# Patient Record
Sex: FEMALE | Race: WHITE | NOT HISPANIC OR LATINO | Employment: FULL TIME | URBAN - METROPOLITAN AREA
[De-identification: names, ages, dates, MRNs, and addresses within clinical notes are randomized per-mention and may not be internally consistent; named-entity substitution may affect disease eponyms.]

---

## 2020-08-13 ENCOUNTER — MEDICAL CORRESPONDENCE (OUTPATIENT)
Dept: HEALTH INFORMATION MANAGEMENT | Facility: CLINIC | Age: 35
End: 2020-08-13

## 2020-08-13 ENCOUNTER — TRANSFERRED RECORDS (OUTPATIENT)
Dept: HEALTH INFORMATION MANAGEMENT | Facility: CLINIC | Age: 35
End: 2020-08-13

## 2020-08-17 ENCOUNTER — TRANSFERRED RECORDS (OUTPATIENT)
Dept: HEALTH INFORMATION MANAGEMENT | Facility: CLINIC | Age: 35
End: 2020-08-17

## 2020-08-18 ENCOUNTER — TRANSCRIBE ORDERS (OUTPATIENT)
Dept: OTHER | Age: 35
End: 2020-08-18

## 2020-08-18 DIAGNOSIS — C53.9 SQUAMOUS CELL CARCINOMA OF CERVIX (H): Primary | ICD-10-CM

## 2020-08-20 NOTE — TELEPHONE ENCOUNTER
ONCOLOGY INTAKE: Records Information      APPT INFORMATION:  Referring provider:  Kenneth Shahid MD  Referring provider s clinic: [facility] ([place])  Reason for visit/diagnosis: Squamous cell carcinoma of cervix  Has patient been notified of appointment date and time?: Yes    RECORDS INFORMATION:  Were the records received with the referral (via [employer])? Yes    Has patient been seen for any external appt for this diagnosis? Yes    If yes, where? [facility])    Has patient had any imaging or procedures outside of [place] for this condition? Yes      If Yes, where? [facility])    ADDITIONAL INFORMATION:  None

## 2020-08-21 NOTE — TELEPHONE ENCOUNTER
Action    Action Taken 8/21/20:    -[employer] [facility] RAD: [id number]    -[employer] Tracking/Loren -Path: [id number]  10:11 AM    8/26/20: Slides from Bhavesh rec'd, taken to 5th floor lab @ [facility]  11:40 AM    -Imaging disc from KRISTY rec'd - taken for upload  12:07 PM       RECORDS STATUS - ALL OTHER DIAGNOSIS      RECORDS RECEIVED FROM: Loren/KRISTY   DATE RECEIVED:    NOTES STATUS DETAILS   OFFICE NOTE from referring provider CE Dr. Kenneth Shahid   OFFICE NOTE from medical oncologist     DISCHARGE SUMMARY from hospital     DISCHARGE REPORT from the ER     OPERATIVE REPORT     MEDICATION LIST     CLINICAL TRIAL TREATMENTS TO DATE     LABS     PATHOLOGY REPORTS KRISTY Sissy Tsang, Report in CE, Slides requested 8/21 12/2/15: [id number]   ANYTHING RELATED TO DIAGNOSIS Epic 8/13/20   GENONOMIC TESTING     TYPE:     IMAGING (NEED IMAGES & REPORT)     CT SCANS     MRI     MAMMO     ULTRASOUND Requested 8/21 8/13/20   PET

## 2020-08-27 PROCEDURE — 00000346 ZZHCL STATISTIC REVIEW OUTSIDE SLIDES TC 88321: Performed by: OBSTETRICS & GYNECOLOGY

## 2020-08-28 LAB — COPATH REPORT: NORMAL

## 2020-08-31 NOTE — PROGRESS NOTES
GYN Oncology New Patient Consult    Referring provider:    Kenneth KELLER [facility]  [address] [place] [id number]   RE: Suni Zuluaga  : 1985  LAURA: 2020      CC: Cervical cancer     HPI: Ms Suni Zuluaga is a 35 year old year old female who presents for consultation regarding cervical cancer  . She is here today with her partner.     Suni notes a history of  Heavy vaginal bleeding for last 3 months.  Having her periods around every 30 days but is lasting about a week now ( earlier 2-3 days) , associated with passage of large blood clots. She is also having lower abdominal cramping and pain.   She also noted pain with intercourse and postcoital bleeding. She is Feeling quite fatigued and tired. She denies any vaginal discharge other than bleeding    Her medical history is notable for a tubal ligation, She has completed childbearing . She is unsure if she had received  HPV vaccine in the past . There is a hisotry of  LEEP for cervical dysplasia [facility] ( Pt couldn't recall) . She  is a daily smoker    Treatment History:   2015:HSIL Pap at the beginning of pregnancy. Repeat pap postpartum was ASCUS. Colposcopy was negative.   20: Pelvic US. Uterine fibroids, normal appearance of uterus and ovaries  2020: Pap smear which confirmed squamous cell carcinoma of the cervix. Referred to GYN  ONC  2020: GYN ONC Consult [facility]. Cervical biopsy, path pending.     Review of Systems:  Systemic:No weight changes.    Skin : No skin changes or new lesions.   Eye : No changes in vision.   Pulmonary: No cough or SOB.   Cardiovascular: No CP or palpitations  Gastrointestinal : No diarrhea, constipation, abdominal pain. Bowel habits normal.   Genitourinary: No dysuria, urgency. + vaginal bleeding per HPI  Psychiatric: No depression or anxiety  Hematologic : No palpable lymph nodes.   Endocrine : No hot flashes. No heat/cold intolerance.     "  Neurological: No headaches, no numbness.     Past Medical History:   Diagnosis Date     Cervical cancer (H)      Past Surgical History:   Procedure Laterality Date     TUBAL LIGATION         OBGYN history and Health Maintenance:  , completed childbearing , had tubal ligation   Last Pap Smear: 2020  Last Mammogram: never  Last Colonoscopy: never    No current outpatient medications on file.   No meds     Allergies   Allergen Reactions     Clindamycin Hives         Social History:  Social History     Tobacco Use     Smoking status: Current Every Day Smoker     Packs/day: 1.00     Types: Cigarettes     Smokeless tobacco: Never Used     Tobacco comment: since age 13   Substance Use Topics     Alcohol use: Yes     Frequency: Monthly or less     Work: manufacturing, manual labor  Preferred language: English  Lives at home with: Partner and 4 kids  Long smoking history since age 13. Smokes a pack daily. Denies any substance abuse     Family History:     No family history of cancer     Physical Exam:     /64   Pulse 92   Temp 99.1  F (37.3  C) (Oral)   Resp 16   Ht 1.59 m (5' 2.6\")   Wt 67.1 kg (148 lb)   LMP 2020   SpO2 99%   BMI 26.55 kg/m      PS 0   General: Alert and oriented, no acute distress  Psych: Mood stable. Linear speech, flat affect.  Cardiovascular: RRR, no murmors  Pulmonary: Lungs clear . Normal respiratory effort  GI: No distention. No masses. No hernia.   Lymph: No enlarge lymph nodes in neck or groin  : Normal external genitalia. Visibly 3+ cm friable cervical mass on speculum exam. On bimanual exam, a 4-5 cm firm mass noted confined to the cervix, seems mostly endocervical in origin. It was difficult to delineate the posterior vagina separately from the posterior lip of the cervix as the posterior fornix is completely obliterated. Bleeding on manipulation. No parametrial involvement or obvious uterosacral involvement. Adenexa  Not palpated. Rectal exam confirms these " findings.     PROCEDURE NOTE:   Cervical Biopsy obtained after informed consent. Biopsy of  Friable posterior cervix taken. Hemostasis achieved with silver nitrate solution.     Patient Name: ISIDRO GRANADOS   MR#: [id number]   Specimen #: [id number]   Collected: 8/27/2020   Received: 8/27/2020   Reported: 8/28/2020 15:39   Ordering Phy(s): MARSHA MANRIQUE   Copy To: [facility]   Pathology Department   [address] [place] [address] [phone] fax     For improved result formatting, select 'View Enhanced Report Format' under    Linked Documents section.     TEST(S):   1 slide, case #[id number]     FINAL DIAGNOSIS:   CASE FROM [facility], [place], [place] ([id number],   OBTAINED 08/13/2020):   A. CERVICAL/ENDOCERVICAL, LIQUID BASED PAP:   - Epithelial cell abnormality: Squamous cell carcinoma.     I have personally reviewed all specimens and/or slides, including the   listed special stains, and used them   with my medical judgement to determine or confirm the final diagnosis.     Electronically signed out by:   Vito Sparks M.D., [facility]    Assessment:    Isidro Granados is a 35 year old woman with a new diagnosis of cervical cancer        Plan:     1.)   Cervical cancer : At least clinical Stage IIA2 (FIGO  2018 given tumor size >4cm and upper vaginal involvement. Notably , per FIGO 2009 she would be stage IIA).  Reviewed etiology and natural history of cervical cancer. Does seem to be at least 5cm on exam. Certainly has risk for metastatic spread. She is not a surgical candidate. Explained rationale for primary chemoradiation therapy. Will need pretreatment imaging. Discussed , she is an excellent candidate.     - Followup biopsies, although cytology already confirms carcinoma  -PET and MRI for  Treatment planning  -Refer to rad onc for chemoRT discussion. Would plan weekly Cisplatin  -Will discuss  pending imaging   Studies  -Encouraged smoking cessation      2.) Genetic risk factors were assessed: N/A    A total of  58 minutes was spent with the patient, 54  minutes of which were spent in counseling the patient and/or treatment planning.    Suzy Brambila MD    Department of Ob/Gyn and Women's Health  Division of Gynecologic Oncology  [facility]  [phone]        ADDENDUM:   Path reviewed,  HSIL only, but given obvious tumor,  Will plan to repeat biopsies so that tissue is available for future use     PET with positive pelvic nodes  and large bulky tumor. No  PA  Nodes. Will likely qualify for .  I discussed this with her at length today. Will bring her in tomorrow prior to MRI for screening/consent and repeat biopsy.  Pt to see my partner, Dr Rice as I am out of town.     Suzy Brambila MD  Gynecologic Oncology  Pager [phone]    9/8/20

## 2020-09-01 ENCOUNTER — ONCOLOGY VISIT (OUTPATIENT)
Dept: ONCOLOGY | Facility: CLINIC | Age: 35
End: 2020-09-01
Attending: FAMILY MEDICINE
Payer: MEDICAID

## 2020-09-01 ENCOUNTER — PRE VISIT (OUTPATIENT)
Dept: ONCOLOGY | Facility: CLINIC | Age: 35
End: 2020-09-01

## 2020-09-01 VITALS
DIASTOLIC BLOOD PRESSURE: 64 MMHG | HEART RATE: 92 BPM | RESPIRATION RATE: 16 BRPM | WEIGHT: 148 LBS | BODY MASS INDEX: 26.22 KG/M2 | OXYGEN SATURATION: 99 % | TEMPERATURE: 99.1 F | SYSTOLIC BLOOD PRESSURE: 114 MMHG | HEIGHT: 63 IN

## 2020-09-01 DIAGNOSIS — C53.9 SQUAMOUS CELL CARCINOMA OF CERVIX (H): ICD-10-CM

## 2020-09-01 PROCEDURE — 57500 BIOPSY OF CERVIX: CPT | Performed by: OBSTETRICS & GYNECOLOGY

## 2020-09-01 PROCEDURE — G0463 HOSPITAL OUTPT CLINIC VISIT: HCPCS | Mod: ZF

## 2020-09-01 PROCEDURE — 88305 TISSUE EXAM BY PATHOLOGIST: CPT | Performed by: OBSTETRICS & GYNECOLOGY

## 2020-09-01 PROCEDURE — G0463 HOSPITAL OUTPT CLINIC VISIT: HCPCS | Mod: 25,ZF

## 2020-09-01 PROCEDURE — 57500 BIOPSY OF CERVIX: CPT

## 2020-09-01 PROCEDURE — 99204 OFFICE O/P NEW MOD 45 MIN: CPT | Mod: 25 | Performed by: OBSTETRICS & GYNECOLOGY

## 2020-09-01 SDOH — HEALTH STABILITY: MENTAL HEALTH: HOW OFTEN DO YOU HAVE A DRINK CONTAINING ALCOHOL?: MONTHLY OR LESS

## 2020-09-01 ASSESSMENT — PAIN SCALES - GENERAL: PAINLEVEL: WORST PAIN (10)

## 2020-09-01 ASSESSMENT — MIFFLIN-ST. JEOR: SCORE: 1319.06

## 2020-09-01 NOTE — NURSING NOTE
Procedure discussed. Consent signed. Time out completed. Both forms placed into scanning.    Prior to the start of the procedure and with procedural staff participation, I verbally confirmed the patient s identity using two indicators, relevant allergies, that the procedure was appropriate and matched the consent or emergent situation, and that the correct equipment/implants were available. Immediately prior to starting the procedure I conducted the Time Out with the procedural staff and re-confirmed the patient s name, procedure, and site/side. (The Joint Commission universal protocol was followed.)  Yes    Sedation (Moderate or Deep): None    Post procedure pain: 0    Karin Evans RN

## 2020-09-01 NOTE — NURSING NOTE
"Oncology Rooming Note    September 1, 2020 1:48 PM   Suni Zuluaga is a 35 year old female who presents for:    Chief Complaint   Patient presents with     Oncology Clinic Visit     New; SCC of Cervix     Initial Vitals: /64   Pulse 92   Temp 99.1  F (37.3  C) (Oral)   Resp 16   Ht 1.59 m (5' 2.6\")   Wt 67.1 kg (148 lb)   LMP 08/25/2020   SpO2 99%   BMI 26.55 kg/m   Estimated body mass index is 26.55 kg/m  as calculated from the following:    Height as of this encounter: 1.59 m (5' 2.6\").    Weight as of this encounter: 67.1 kg (148 lb). Body surface area is 1.72 meters squared.  Worst Pain (10) Comment: Data Unavailable   Patient's last menstrual period was 08/25/2020.  Allergies reviewed: Yes  Medications reviewed: Yes    Medications: Medication refills not needed today.  Pharmacy name entered into EPIC: [facility] - [place], [place] - [address]    Clinical concerns: New pt here to discuss recent diagnoses of SCC of Cervix Dr Brambila was notified.      Jennifer Javed CMA              "

## 2020-09-02 ENCOUNTER — PATIENT OUTREACH (OUTPATIENT)
Dept: ONCOLOGY | Facility: CLINIC | Age: 35
End: 2020-09-02

## 2020-09-02 NOTE — PROGRESS NOTES
RN followed up with patient just to update her that we are still working on a timely PET and MRI. As of now we can't get this scheduled together until 9/23.     Patient understands that the team is working on a better option and that this is just taking more time.    Patient appreciative of the RN update.     Karin Evans RN

## 2020-09-03 LAB — COPATH REPORT: NORMAL

## 2020-09-04 ENCOUNTER — HOSPITAL ENCOUNTER (OUTPATIENT)
Dept: PET IMAGING | Facility: CLINIC | Age: 35
Discharge: HOME | End: 2020-09-04
Attending: OBSTETRICS & GYNECOLOGY | Admitting: OBSTETRICS & GYNECOLOGY
Payer: MEDICAID

## 2020-09-04 DIAGNOSIS — C53.9 SQUAMOUS CELL CARCINOMA OF CERVIX (H): ICD-10-CM

## 2020-09-04 PROCEDURE — 25000128 H RX IP 250 OP 636: Performed by: OBSTETRICS & GYNECOLOGY

## 2020-09-04 PROCEDURE — 78816 PET IMAGE W/CT FULL BODY: CPT | Mod: PI

## 2020-09-04 PROCEDURE — A9552 F18 FDG: HCPCS | Performed by: OBSTETRICS & GYNECOLOGY

## 2020-09-04 PROCEDURE — 34300033 ZZH RX 343: Performed by: OBSTETRICS & GYNECOLOGY

## 2020-09-04 RX ORDER — IOPAMIDOL 755 MG/ML
50-135 INJECTION, SOLUTION INTRAVASCULAR ONCE
Status: COMPLETED | OUTPATIENT
Start: 2020-09-04 | End: 2020-09-04

## 2020-09-04 RX ORDER — FUROSEMIDE 10 MG/ML
40 INJECTION INTRAMUSCULAR; INTRAVENOUS ONCE
Status: COMPLETED | OUTPATIENT
Start: 2020-09-04 | End: 2020-09-04

## 2020-09-04 RX ADMIN — FLUDEOXYGLUCOSE F-18 10.13 MCI.: 500 INJECTION, SOLUTION INTRAVENOUS at 13:40

## 2020-09-04 RX ADMIN — FUROSEMIDE 40 MG: 10 INJECTION, SOLUTION INTRAVENOUS at 14:51

## 2020-09-04 RX ADMIN — IOPAMIDOL 91 ML: 755 INJECTION, SOLUTION INTRAVENOUS at 14:50

## 2020-09-08 ENCOUNTER — TELEPHONE (OUTPATIENT)
Dept: ONCOLOGY | Facility: CLINIC | Age: 35
End: 2020-09-08

## 2020-09-08 NOTE — TELEPHONE ENCOUNTER
Reviewed PET. +pelvic nodes. Large cervical tumor.   Plan primary chemoRT  Will need likely repeat biopsies given they showed non-invasive disease yet clinically has very very high concern for invasive disease (also Pap showed SCC)  Does seem to be eligible for , will discuss w rad onc colleagues at our tumor board  Discussed all of this with Suni Brambila MD  Gynecologic Oncology  Pager [phone]

## 2020-09-09 ENCOUNTER — RESEARCH ENCOUNTER (OUTPATIENT)
Dept: ONCOLOGY | Facility: CLINIC | Age: 35
End: 2020-09-09

## 2020-09-09 ENCOUNTER — ONCOLOGY VISIT (OUTPATIENT)
Dept: ONCOLOGY | Facility: CLINIC | Age: 35
End: 2020-09-09
Attending: OBSTETRICS & GYNECOLOGY
Payer: MEDICAID

## 2020-09-09 ENCOUNTER — HOSPITAL ENCOUNTER (OUTPATIENT)
Dept: MRI IMAGING | Facility: CLINIC | Age: 35
End: 2020-09-09
Attending: OBSTETRICS & GYNECOLOGY
Payer: MEDICAID

## 2020-09-09 ENCOUNTER — APPOINTMENT (OUTPATIENT)
Dept: LAB | Facility: CLINIC | Age: 35
End: 2020-09-09
Attending: OBSTETRICS & GYNECOLOGY
Payer: MEDICAID

## 2020-09-09 VITALS
SYSTOLIC BLOOD PRESSURE: 118 MMHG | RESPIRATION RATE: 16 BRPM | WEIGHT: 146 LBS | TEMPERATURE: 98.3 F | BODY MASS INDEX: 25.87 KG/M2 | DIASTOLIC BLOOD PRESSURE: 81 MMHG | HEART RATE: 89 BPM | OXYGEN SATURATION: 98 % | HEIGHT: 63 IN

## 2020-09-09 DIAGNOSIS — R59.0 AXILLARY LYMPHADENOPATHY: ICD-10-CM

## 2020-09-09 DIAGNOSIS — C53.9 SQUAMOUS CELL CARCINOMA OF CERVIX (H): ICD-10-CM

## 2020-09-09 DIAGNOSIS — C53.9 SQUAMOUS CELL CARCINOMA OF CERVIX (H): Primary | ICD-10-CM

## 2020-09-09 LAB
ALBUMIN SERPL-MCNC: 3.7 G/DL (ref 3.4–5)
ALP SERPL-CCNC: 67 U/L (ref 40–150)
ALT SERPL W P-5'-P-CCNC: 30 U/L (ref 0–50)
ANION GAP SERPL CALCULATED.3IONS-SCNC: 5 MMOL/L (ref 3–14)
APTT PPP: 28 SEC (ref 22–37)
AST SERPL W P-5'-P-CCNC: 16 U/L (ref 0–45)
BASOPHILS # BLD AUTO: 0 10E9/L (ref 0–0.2)
BASOPHILS NFR BLD AUTO: 0.3 %
BILIRUB SERPL-MCNC: 0.2 MG/DL (ref 0.2–1.3)
BUN SERPL-MCNC: 10 MG/DL (ref 7–30)
CALCIUM SERPL-MCNC: 8.9 MG/DL (ref 8.5–10.1)
CHLORIDE SERPL-SCNC: 107 MMOL/L (ref 94–109)
CO2 SERPL-SCNC: 26 MMOL/L (ref 20–32)
CREAT SERPL-MCNC: 0.54 MG/DL (ref 0.52–1.04)
DIFFERENTIAL METHOD BLD: NORMAL
EOSINOPHIL # BLD AUTO: 0.1 10E9/L (ref 0–0.7)
EOSINOPHIL NFR BLD AUTO: 1.2 %
ERYTHROCYTE [DISTWIDTH] IN BLOOD BY AUTOMATED COUNT: 14.1 % (ref 10–15)
GFR SERPL CREATININE-BSD FRML MDRD: >90 ML/MIN/{1.73_M2}
GLUCOSE SERPL-MCNC: 85 MG/DL (ref 70–99)
HCT VFR BLD AUTO: 36.9 % (ref 35–47)
HGB BLD-MCNC: 12 G/DL (ref 11.7–15.7)
IMM GRANULOCYTES # BLD: 0.1 10E9/L (ref 0–0.4)
IMM GRANULOCYTES NFR BLD: 0.5 %
INR PPP: 1.1 (ref 0.86–1.14)
LYMPHOCYTES # BLD AUTO: 1.6 10E9/L (ref 0.8–5.3)
LYMPHOCYTES NFR BLD AUTO: 15.7 %
MAGNESIUM SERPL-MCNC: 1.9 MG/DL (ref 1.6–2.3)
MCH RBC QN AUTO: 29.6 PG (ref 26.5–33)
MCHC RBC AUTO-ENTMCNC: 32.5 G/DL (ref 31.5–36.5)
MCV RBC AUTO: 91 FL (ref 78–100)
MONOCYTES # BLD AUTO: 0.8 10E9/L (ref 0–1.3)
MONOCYTES NFR BLD AUTO: 7.6 %
NEUTROPHILS # BLD AUTO: 7.4 10E9/L (ref 1.6–8.3)
NEUTROPHILS NFR BLD AUTO: 74.7 %
NRBC # BLD AUTO: 0 10*3/UL
NRBC BLD AUTO-RTO: 0 /100
PLATELET # BLD AUTO: 241 10E9/L (ref 150–450)
POTASSIUM SERPL-SCNC: 3.7 MMOL/L (ref 3.4–5.3)
PROT SERPL-MCNC: 7.6 G/DL (ref 6.8–8.8)
RBC # BLD AUTO: 4.05 10E12/L (ref 3.8–5.2)
SODIUM SERPL-SCNC: 138 MMOL/L (ref 133–144)
WBC # BLD AUTO: 9.9 10E9/L (ref 4–11)

## 2020-09-09 PROCEDURE — 87389 HIV-1 AG W/HIV-1&-2 AB AG IA: CPT | Performed by: OBSTETRICS & GYNECOLOGY

## 2020-09-09 PROCEDURE — 93010 ELECTROCARDIOGRAM REPORT: CPT | Mod: ZP | Performed by: INTERNAL MEDICINE

## 2020-09-09 PROCEDURE — 57500 BIOPSY OF CERVIX: CPT

## 2020-09-09 PROCEDURE — 83735 ASSAY OF MAGNESIUM: CPT | Performed by: OBSTETRICS & GYNECOLOGY

## 2020-09-09 PROCEDURE — 80053 COMPREHEN METABOLIC PANEL: CPT | Performed by: OBSTETRICS & GYNECOLOGY

## 2020-09-09 PROCEDURE — 93005 ELECTROCARDIOGRAM TRACING: CPT | Mod: ZF

## 2020-09-09 PROCEDURE — 85025 COMPLETE CBC W/AUTO DIFF WBC: CPT | Performed by: OBSTETRICS & GYNECOLOGY

## 2020-09-09 PROCEDURE — 88305 TISSUE EXAM BY PATHOLOGIST: CPT | Performed by: OBSTETRICS & GYNECOLOGY

## 2020-09-09 PROCEDURE — 99213 OFFICE O/P EST LOW 20 MIN: CPT | Mod: 25 | Performed by: OBSTETRICS & GYNECOLOGY

## 2020-09-09 PROCEDURE — A9585 GADOBUTROL INJECTION: HCPCS | Performed by: OBSTETRICS & GYNECOLOGY

## 2020-09-09 PROCEDURE — 85730 THROMBOPLASTIN TIME PARTIAL: CPT | Performed by: OBSTETRICS & GYNECOLOGY

## 2020-09-09 PROCEDURE — 25500064 ZZH RX 255 OP 636: Performed by: OBSTETRICS & GYNECOLOGY

## 2020-09-09 PROCEDURE — 36415 COLL VENOUS BLD VENIPUNCTURE: CPT

## 2020-09-09 PROCEDURE — 57500 BIOPSY OF CERVIX: CPT | Performed by: OBSTETRICS & GYNECOLOGY

## 2020-09-09 PROCEDURE — 85610 PROTHROMBIN TIME: CPT | Performed by: OBSTETRICS & GYNECOLOGY

## 2020-09-09 PROCEDURE — 72196 MRI PELVIS W/DYE: CPT

## 2020-09-09 RX ORDER — GADOBUTROL 604.72 MG/ML
7.5 INJECTION INTRAVENOUS ONCE
Status: COMPLETED | OUTPATIENT
Start: 2020-09-09 | End: 2020-09-09

## 2020-09-09 RX ADMIN — GADOBUTROL 6.6 ML: 604.72 INJECTION INTRAVENOUS at 16:30

## 2020-09-09 ASSESSMENT — MIFFLIN-ST. JEOR: SCORE: 1310.03

## 2020-09-09 ASSESSMENT — PAIN SCALES - GENERAL: PAINLEVEL: NO PAIN (0)

## 2020-09-09 NOTE — PATIENT INSTRUCTIONS
Cervical biopsy done today, will be notified of test results via my chart/telephone  Enrollment in study treatment plan

## 2020-09-09 NOTE — NURSING NOTE
[id number]: Informed Consent Note   The consent form, including purpose, risks and benefits, was reviewed with Suni Zuluaga, and all questions were answered before she signed the consent form. The patient understands that the study involves an active treatment phase as well as a post-treatment follow up phase.     Present during the discussion partner, Nate and daughter Luis Enrique were in the room with the patient. A copy of the signed form was provided to the patient. No procedures specific to this study were performed prior to the patient signing the consent form.    Optional section - No. Patient did not consent to optional collection.    Consent Version Date: [place] version date Mar.18.2020  Consent obtained by: Suyeon Velo, RN    Date: 9/9/2020  HIPAA authorization signed?: Yes  HIPAA authorization version date: Form date 07.18.16, Revision date: 11.04.19    OVPR & HRPP released a Research Participant Information sheet on 7/16/2020 requiring researchers to provide patients with this information sheet at the time of the consent visit.     Research participant Suni KRISHNA Pierremichele was provided the information on the Research Participant Information Sheet by Suyeon velo, RN on September 9, 2020. This document contains information regarding the risks of participating in a research study during the COVID-19 pandemic. Receipt of the information sheet was confirmed by study personnel prior to the visit.    *Note that the OVPR and HRPP only require this information sheet be provided to the participant once as the information it contains is the same regardless of what study(s) the patient is participating in.     The Patient Information Sheet was provided to the patient.    Suyeon Velo, RN    [id number]: Study Visit Note   Subject name: Suni Zuluaga     Visit: Screening visit    All screening procedures were done after the consenting visit.  Patient is fasting today for the fasting screening lab. Optional HIV testing requested  by treating MD.  VS including height and weight and EKG completed for screening visit.  Patient completed tubal ligation 4 -5 years ago and does not require two forms of birth control. Patient is not currently breast feeding.   Patient today reports 5 pain scale out of 10.  Repeat biopsy completed by Dr. Washington today.  AE and con-med collected. Patient reported fiber pill and stool softer OTC taking but does not know the names or dosage. Will Follow-up on the phone.  PET/CT completed per SOC 9/4/2020.    Simulation scheduled for Monday, 9/14/2020.  Potential Day 1 on 9/21/20 or 9/28/20.    I have personally interviewed Suni Zuluaga and reviewed her medical record for adverse events and concomitant medications and these have been recorded on the corresponding logs in Suni Zuluaga's research file. Suni Zuluaga was given the opportunity to ask any trial related questions. Please see provider progress note for physical exam and other clinical information.     Suyeon Velo, RN  Clinical Trials Office   Pager: [phone]

## 2020-09-09 NOTE — NURSING NOTE
Enroll in study treatment plan  Cervical biopsy done today, orders entered, specimen sent to pathology

## 2020-09-09 NOTE — LETTER
2020         RE: Suni Zuluaga  [address]   [place] [address]        Dear Colleague,    Thank you for referring your patient, Suni Zuluaga, to the [facility]. Please see a copy of my visit note below.    GYN Oncology Established Patient Visit - In Person    RE: Suni Zuluaga  : 1985  Encounter date: Sep 9, 2020       CC: Cervical cancer     HPI: Ms Suni Zuluaga is a 35 year old  female who presents to discuss treatment options and possible consent for sstudy participation for     Her history is briefly summarized below. She was seen by Dr. Brambila earlier this month. She had a history of heavy bleeding for a few month with menses lasting longer and passage of large clots. She has lower pelvic pain and cramping. She also noted dyspareunia and post-coital bleeding. She had a Pap smear which returned with SCC of the cervix. She had a biopsy, which unfortunately only showed RADHA III but without invasion. She is a daily smoker.       Treatment History:   2015:HSIL Pap at the beginning of pregnancy. Repeat pap postpartum was ASCUS. Colposcopy was negative.   20: Pelvic US. Uterine fibroids, normal appearance of uterus and ovaries  2020: Pap smear which confirmed squamous cell carcinoma of the cervix. Referred to GYN  ONC  2020: GYN ONC Consult [facility]. Cervical biopsy, RADHA III, unable to identify invasion    Review of Systems:  Systemic:No weight changes.    Skin : No skin changes or new lesions.   Eye : No changes in vision.   Pulmonary: No cough or SOB.   Cardiovascular: No CP or palpitations  Gastrointestinal : No diarrhea, constipation, + lower abdominal pain. Bowel habits normal.   Genitourinary: No dysuria, urgency. + vaginal bleeding per HPI  Psychiatric: No depression or anxiety  Hematologic : no known issues  Endocrine : No hot flashes. No heat/cold intolerance.      Neurological: No headaches, no numbness.     Past Medical History:  "  Diagnosis Date     Cervical cancer (H)      Past Surgical History:   Procedure Laterality Date     TUBAL LIGATION         OBGYN history and Health Maintenance:  , completed childbearing , had tubal ligation   Last Pap Smear: 2020 showing SCC  Last Mammogram: never  Last Colonoscopy: never    No current outpatient medications on file.   No meds     Allergies   Allergen Reactions     Clindamycin Hives     Social History:  Social History     Tobacco Use     Smoking status: Current Every Day Smoker     Packs/day: 1.00     Types: Cigarettes     Smokeless tobacco: Never Used     Tobacco comment: since age 13   Substance Use Topics     Alcohol use: Yes     Frequency: Monthly or less     Work: manufacturing, manual labor  Preferred language: English  Lives at home with: Partner and 4 kids  Long smoking history since age 13. Smokes a pack daily. Denies any substance abuse     Family History:   No family history of cancer     Physical Exam:     /81   Pulse 89   Temp 98.3  F (36.8  C)   Resp 16   Ht 1.59 m (5' 2.6\")   Wt 66.2 kg (146 lb)   LMP 2020   SpO2 98%   BMI 26.19 kg/m    General: Alert and oriented, no acute distress  HEENT: pupils round, equal  Neuro: CN II-X grossly intact  MSK: no edema of lower extremities, no swelling of joints, normal gait  Psych: Mood stable. Linear speech, flat affect.  Cardiovascular: RRR, no murmors  Pulmonary: Lungs clear . Normal respiratory effort  GI: No distention. No masses. No hernia.   Lymph: No enlarge lymph nodes in neck or groin  : Normal external genitalia. On the cervix there is a friable cervical mass which measures 4.5cm on speculum exam this is centered on the posterior lip of the cervix.    ECOG performance status: 0 - no restrictions    PROCEDURE NOTE:   Cervical Biopsy obtained after informed consent. Biopsy of  Friable posterior cervix taken at 5 o'clock. Hemostasis achieved with pressure, silver nitrate, and Monsel's.    PET-CT " 9/4/2020:  IMPRESSION: In this patient with recently discovered squamous cell  carcinoma of the cervix:     1. Hypermetabolic cervical mass corresponding with the squamous cell  carcinoma. Mass extends into the caudal third of the vagina, and  probable invasion of the parametrium. There is loss of the  vesicovaginal and rectovaginal fat planes at the level of the mass.  Pelvic MRI is scheduled for the patient which can further characterize  full extent of the mass.     2. Hypermetabolic bilateral pelvic adenopathy consistent with sarah  metastases.     3. Borderline FDG avid left axillary lymph node, nonspecific, favor  reactive. Location and low levels of uptake disfavor metastasis.     4. Otherwise no evidence of distant metastatic disease.     I have personally reviewed the examination and initial interpretation  and I agree with the findings.         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 She presents to discuss consent for , which she has elected to do.  Repeat biopsy was obtained due to desire for histologic diagnosis.    Plan:     1.)   Cervical cancer : Stage IIIC1r SCC of the cervix (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 She has signed consent for participation in . She completed PET-CT and has MRI today. PET shows iliac sarah involvement hence staging.    2.) Axillary lymph node with borderline PET avidity, likely reactive; however, will plan for ultrasound and possible biopsy, likely 9/15 in conjunction with Rad Onc visit.     Dustin Washington MD    Gynecologic Oncology

## 2020-09-09 NOTE — NURSING NOTE
EKG was performed on patient. Patient tolerated EKG well without any incident. EKG was transmitted to [facility].  Belia Mitchell MA on 9/9/2020 at 3:23 PM

## 2020-09-09 NOTE — PROGRESS NOTES
GYN Oncology Established Patient Visit - In Person    RE: Suni Zuluaga  : 1985  Encounter date: Sep 9, 2020       CC: Cervical cancer     HPI: Ms Suni Zuluaga is a 35 year old  female who presents to discuss treatment options and possible consent for sstudy participation for     Her history is briefly summarized below. She was seen by Dr. Brambila earlier this month. She had a history of heavy bleeding for a few month with menses lasting longer and passage of large clots. She has lower pelvic pain and cramping. She also noted dyspareunia and post-coital bleeding. She had a Pap smear which returned with SCC of the cervix. She had a biopsy, which unfortunately only showed RADHA III but without invasion. She is a daily smoker.       Treatment History:   :HSIL Pap at the beginning of pregnancy. Repeat pap postpartum was ASCUS. Colposcopy was negative.   20: Pelvic US. Uterine fibroids, normal appearance of uterus and ovaries  2020: Pap smear which confirmed squamous cell carcinoma of the cervix. Referred to GYN  ONC  2020: GYN ONC Consult [facility]. Cervical biopsy, RADHA III, unable to identify invasion    Review of Systems:  Systemic:No weight changes.    Skin : No skin changes or new lesions.   Eye : No changes in vision.   Pulmonary: No cough or SOB.   Cardiovascular: No CP or palpitations  Gastrointestinal : No diarrhea, constipation, + lower abdominal pain. Bowel habits normal.   Genitourinary: No dysuria, urgency. + vaginal bleeding per HPI  Psychiatric: No depression or anxiety  Hematologic : no known issues  Endocrine : No hot flashes. No heat/cold intolerance.      Neurological: No headaches, no numbness.     Past Medical History:   Diagnosis Date     Cervical cancer (H)      Past Surgical History:   Procedure Laterality Date     TUBAL LIGATION         OBGYN history and Health Maintenance:  , completed childbearing , had tubal ligation   Last Pap Smear: 2020 showing  "SCC  Last Mammogram: never  Last Colonoscopy: never    No current outpatient medications on file.   No meds     Allergies   Allergen Reactions     Clindamycin Hives     Social History:  Social History     Tobacco Use     Smoking status: Current Every Day Smoker     Packs/day: 1.00     Types: Cigarettes     Smokeless tobacco: Never Used     Tobacco comment: since age 13   Substance Use Topics     Alcohol use: Yes     Frequency: Monthly or less     Work: manufacturing, manual labor  Preferred language: English  Lives at home with: Partner and 4 kids  Long smoking history since age 13. Smokes a pack daily. Denies any substance abuse     Family History:   No family history of cancer     Physical Exam:     /81   Pulse 89   Temp 98.3  F (36.8  C)   Resp 16   Ht 1.59 m (5' 2.6\")   Wt 66.2 kg (146 lb)   LMP 08/25/2020   SpO2 98%   BMI 26.19 kg/m    General: Alert and oriented, no acute distress  HEENT: pupils round, equal  Neuro: CN II-X grossly intact  MSK: no edema of lower extremities, no swelling of joints, normal gait  Psych: Mood stable. Linear speech, flat affect.  Cardiovascular: RRR, no murmors  Pulmonary: Lungs clear . Normal respiratory effort  GI: No distention. No masses. No hernia.   Lymph: No enlarge lymph nodes in neck or groin  : Normal external genitalia. On the cervix there is a friable cervical mass which measures 4.5cm on speculum exam this is centered on the posterior lip of the cervix.    ECOG performance status: 0 - no restrictions    PROCEDURE NOTE:   Cervical Biopsy obtained after informed consent. Biopsy of  Friable posterior cervix taken at 5 o'clock. Hemostasis achieved with pressure, silver nitrate, and Monsel's.    PET-CT 9/4/2020:  IMPRESSION: In this patient with recently discovered squamous cell  carcinoma of the cervix:     1. Hypermetabolic cervical mass corresponding with the squamous cell  carcinoma. Mass extends into the caudal third of the vagina, and  probable " invasion of the parametrium. There is loss of the  vesicovaginal and rectovaginal fat planes at the level of the mass.  Pelvic MRI is scheduled for the patient which can further characterize  full extent of the mass.     2. Hypermetabolic bilateral pelvic adenopathy consistent with sarah  metastases.     3. Borderline FDG avid left axillary lymph node, nonspecific, favor  reactive. Location and low levels of uptake disfavor metastasis.     4. Otherwise no evidence of distant metastatic disease.     I have personally reviewed the examination and initial interpretation  and I agree with the findings.         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 She presents to discuss consent for , which she has elected to do.  Repeat biopsy was obtained due to desire for histologic diagnosis.    Plan:     1.)   Cervical cancer : Stage IIIC1r SCC of the cervix (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 She has signed consent for participation in . She completed PET-CT and has MRI today. PET shows iliac sarah involvement hence staging.    2.) Axillary lymph node with borderline PET avidity, likely reactive; however, will plan for ultrasound and possible biopsy, likely 9/15 in conjunction with Rad Onc visit.     Dustin Washington MD    Gynecologic Oncology

## 2020-09-10 ENCOUNTER — NURSE TRIAGE (OUTPATIENT)
Dept: NURSING | Facility: CLINIC | Age: 35
End: 2020-09-10

## 2020-09-10 DIAGNOSIS — Z11.59 ENCOUNTER FOR SCREENING FOR OTHER VIRAL DISEASES: Primary | ICD-10-CM

## 2020-09-10 DIAGNOSIS — R59.9 ENLARGED LYMPH NODES: Primary | ICD-10-CM

## 2020-09-10 LAB — HIV 1+2 AB+HIV1 P24 AG SERPL QL IA: NONREACTIVE

## 2020-09-10 NOTE — TELEPHONE ENCOUNTER
Patient calling information regrding upcoming covid testing.  Gave per [facility].  No triage was needed.  Rhonda Jaime RN [facility]    COVID 19 Nurse Triage Plan/Patient Instructions    Please be aware that novel coronavirus (COVID-19) may be circulating in the community. If you develop symptoms such as fever, cough, or SOB or if you have concerns about the presence of another infection including coronavirus (COVID-19), please contact your health care provider or visit [email or web address].     Disposition/Instructions    Additional COVID19 information to add for patients.   How can I protect others?  If you have symptoms (fever, cough, body aches or trouble breathing): Stay home and away from others (self-isolate) until:    At least 10 days have passed since your symptoms started. And     You ve had no fever--and no medicine that reduces fever--for 1 full day (24 hours). And      Your other symptoms have resolved (gotten better).     If you don t have symptoms, but a test showed that you have COVID-19 (you tested positive):    Stay home and away from others (self-isolate) until at least 10 days have passed since the date of your first positive COVID-19 test.    During this time:    Stay in your own room, even for meals. Use your own bathroom if you can.     Stay away from others in your home. No hugging, kissing or shaking hands. No visitors.    Don t go to work, school or anywhere else.     Clean  high touch  surfaces often (doorknobs, counters, handles, etc.). Use a household cleaning spray or wipes. You ll find a full list on the EPA website:  www.epa.gov/pesticide-registration/list-n-disinfectants-use-against-sars-cov-2.    Cover your mouth and nose with a mask, tissue or washcloth to avoid spreading germs.    Wash your hands and face often. Use soap and water.    Caregivers in these groups are at risk for severe illness due to COVID-19:  o People 65 years and older  o People who live in a nursing home  or long-term care facility  o People with chronic disease (lung, heart, cancer, diabetes, kidney, liver, immunologic)  o People who have a weakened immune system, including those who:  - Are in cancer treatment  - Take medicine that weakens the immune system, such as corticosteroids  - Had a bone marrow or organ transplant  - Have an immune deficiency  - Have poorly controlled HIV or AIDS  - Are obese (body mass index of 40 or higher)  - Smoke regularly    Caregivers should wear gloves while washing dishes, handling laundry and cleaning bedrooms and bathrooms.    Use caution when washing and drying laundry: Don t shake dirty laundry, and use the warmest water setting that you can.    For more tips, go to www.cdc.gov/coronavirus/2019-ncov/downloads/10Things.pdf.    How can I take care of myself?  1. Get lots of rest. Drink extra fluids (unless a doctor has told you not to).     2. Take Tylenol (acetaminophen) for fever or pain. If you have liver or kidney problems, ask your family doctor if it s okay to take Tylenol.     Adults can take either:     650 mg (two 325 mg pills) every 4 to 6 hours, or     1,000 mg (two 500 mg pills) every 8 hours as needed.     Note: Don t take more than 3,000 mg in one day.   Acetaminophen is found in many medicines (both prescribed and over-the-counter medicines). Read all labels to be sure you don t take too much.     For children, check the Tylenol bottle for the right dose. The dose is based on the child s age or weight.    3. If you have other health problems (like cancer, heart failure, an organ transplant or severe kidney disease): Call your specialty clinic if you don t feel better in the next 2 days.    4. Know when to call 911: Emergency warning signs include:    Trouble breathing or shortness of breath    Pain or pressure in the chest that doesn t go away    Feeling confused like you haven t felt before, or not being able to wake up    Bluish-colored lips or face    What are  the symptoms of COVID-19?     The most common symptoms are cough, fever and trouble breathing.     Less common symptoms include body aches, chills, diarrhea (loose, watery poops), fatigue (feeling very tired), headache, runny nose, sore throat and loss of smell.    COVID-19 can cause severe coughing (bronchitis) and lung infection (pneumonia).    How does it spread?     The virus may spread when a person coughs or sneezes into the air. The virus can travel about 6 feet this way, and it can live on surfaces.      Common  (household disinfectants) will kill the virus.    Who is at risk?  Anyone can catch COVID-19 if they re around someone who has the virus.    How can others protect themselves?     Stay away from people who have COVID-19 (or symptoms of COVID-19).    Wash hands often with soap and water. Or, use hand  with at least 60% alcohol.    Avoid touching the eyes, nose or mouth.     Wear a face mask when you go out in public, when sick or when caring for a sick person.    Where can I get more information?    [facility]: About COVID-19: [email or web address]    CDC: What to Do If You re Sick: www.cdc.gov/coronavirus/2019-ncov/about/steps-when-sick.html    CDC: Ending Home Isolation: www.cdc.gov/coronavirus/2019-ncov/hcp/disposition-in-home-patients.html     CDC: Caring for Someone: www.cdc.gov/coronavirus/2019-ncov/if-you-are-sick/care-for-someone.html     [facility]: Interim Guidance for Hospital Discharge to Home: www.health.[facility].[place].us/diseases/coronavirus/hcp/hospdischarge.pdf    [facility] clinical trials (COVID-19 research studies): clinicalaffairs.[facility]/umn-clinical-trials     Below are the COVID-19 hotlines at the [place] Department of Health ([facility]). Interpreters are available.   o For health questions: Call 429-427-0716 or 1-835.784.6446 (7 a.m. to 7 p.m.)  o For questions about schools and childcare: Call 175-584-7089 or 1-730.967.2453 (7 a.m. to 7  p.m.)          Thank you for taking steps to prevent the spread of this virus.  o Limit your contact with others.  o Wear a simple mask to cover your cough.  o Wash your hands well and often.    Resources    [facility]: About COVID-19: [email or web address]    CDC: What to Do If You're Sick: www.cdc.gov/coronavirus/2019-ncov/about/steps-when-sick.html    CDC: Ending Home Isolation: www.cdc.gov/coronavirus/2019-ncov/hcp/disposition-in-home-patients.html     CDC: Caring for Someone: www.cdc.gov/coronavirus/2019-ncov/if-you-are-sick/care-for-someone.html     [facility]: Interim Guidance for Hospital Discharge to Home: www.[facility].mn./diseases/coronavirus/hcp/hospdischarge.pdf    [facility] clinical trials (COVID-19 research studies): clinicalaffairs.[facility]-clinical-trials     Below are the COVID-19 hotlines at the [place] Department of [facility] ([facility]). Interpreters are available.   o For health questions: Call 717-963-3944 or 1-357.315.1140 (7 a.m. to 7 p.m.)  o For questions about schools and childcare: Call 907-058-9309 or 1-796.498.1844 (7 a.m. to 7 p.m.)

## 2020-09-10 NOTE — PROGRESS NOTES
RADIATION ONCOLOGY CONSULT NOTE  Date of Visit: Sep 11, 2020    Suni Zuluaga  MRN: [id number]  : 1985    REASON FOR CONSULTATION: consideration of definitive chemoradiotherapy for cervical cancer  Diagnosis: Squamous cell carcinoma of the cervix  Stage:FIGO Stage IIIC1(r) (FIGO 2018)    HISTORY OF PRESENT ILLNESS:  Ms. Zuluaga is a 35 year old pre-menopausal woman here for consideration of definitive radiotherapy for cervical cancer. Her oncologic history dates back to 2015. While pregnant during her first trimester, she was found to have a high grade squamous intraepithelial lesion.  After delivering her baby, she had a repeat pap smear followed by a negative colposcopy by Dr. Kenneth Shahid. She was subsequently lost to follow up.     She presented to Dr. Shahid, on 2020 because she had been having heavy vaginal bleeding for 3-6 months, along with postcoital bleeding. Exam noted a friable, firm enlarged cervix that extended over into the bilateral adnexa. Pap smear at this time (Specimen #[id number]) demonstrated squamous cell carcinoma. Pelvic ultrasound showed a normal sized uterus measuring 9.9 x 5.5 x 5.7 cm with an endometrial stripe measuring 9.5 mm and visualization of fibroids.    Ms. Zuluaga was subsequently referred to gynecologic oncology at [facility] and saw Dr. Suzy Brambila on 2020. On speculum exam, there was a 3+ cm friable cervical mass. On bimanual exam, a 4-5 cm firm mass was noted confined to the cervix, seemingly most likely endocervical in origin. It was difficult to delineate the posterior vagina separately from the posterior lip of the cervix as the posterior fornix was completely obliterated. There was no apparent parametrial invasion. Thus, clinically she appeared to have at least FIGO IIA2 disease. Cervical biopsy, however, only showed HGSIL.      Staging PET/CT from 2020 demonstrated a 7.1 x 5.4 cm hypermetabolic cervical mass  with SUV max of 18.2. Mass extended  into the caudal third of the vagina, with probable invasion of the parametrium. There were multiple hypermetabolic pelvic adenopathy consistent with sarah metastases. These included the right perirectal/internal iliac node measuring 1.7 x 0.9 cm (SUV 9.5), left perirectal node (1.0 x 0.9 cm, SUV max 5.5), left external iliac node (2.7 x 1.9 cm, SUV max 10.6), and left external iliac node posterior to the left psoas muscle (1.5 x 1.2 cm, SUV max 11.2). There was also a borderline FDG avid left axillary lymph node measuring 1.1 x 0.9 cm with fatty hilum and SUV max 2.7 for which Ms. Zuluaga is scheduled to undergo biopsy on 09/15/2020.     MRI of the pelvis on 09/09/2020 further showed evidence of bilateral parametrial invasion as well as thickening of the left uterosacral ligament with associated leftward tethering of the rectum. There was no bladder or rectal invasion.     Ms. Zuluaga was being considered for participation in  trial.  She met with Dr. Washington yesterday for further discussion of the trial.  Cervical biopsy was repeated with did confirm invasive squamous cell carcinoma, moderately differentiated.    Ms. Zuluaga is evaluated today via phone encounter. She states that she is doing well, but continues to have painful intercourse with the blood noted afterwards. She also continues to have heavy menstrual bleeding with the passage of large clots. She has associated fatigue with this. She will be undergoing left axillary lymph node biopsy next Tuesday.       REVIEW OF SYSTEMS: A 12 point review of systems was obtained. Pertinent findings are noted in the HPI and are otherwise unremarkable.     CHEMOTHERAPY HISTORY: none  PACEMAKER: none  PREGNANCY STATUS: no  PAST RADIATION THERAPY HISTORY: no    PAST MEDICAL HISTORY:  Past Medical History:   Diagnosis Date     Cervical cancer (H)        PAST SURGICAL HISTORY:  Past Surgical History:   Procedure Laterality Date     TUBAL LIGATION         ALLERGIES:  Allergies as  of 09/11/2020 - Reviewed 09/01/2020   Allergen Reaction Noted     Clindamycin Hives 02/18/2015       MEDICATIONS:  No current outpatient medications on file.        FAMILY HISTORY:  No family history of cancer    SOCIAL HISTORY:  Social History     Socioeconomic History     Marital status: Legally      Spouse name: Not on file     Number of children: 4     Years of education: Not on file     Highest education level: Not on file   Tobacco Use     Smoking status: Current Every Day Smoker     Packs/day: 1.00     Types: Cigarettes     Smokeless tobacco: Never Used     Tobacco comment: since age 13   Substance and Sexual Activity     Alcohol use: Yes     Frequency: Monthly or less     Drug use: Not on file     Sexual activity: Not on file   Lifestyle     PHYSICAL EXAM:  This encounter was conducted over the phone, and as such no physical exam could be conducted.     ECOG PERFORMANCE STATUS: 0    IMAGING       MRI Pelvis T2 JOSHUA 09/09/2020       PET CT 09/04/2020 (post-Lasix)    IMPRESSION: Ms. Zuluaga is a 35 year old woman with a locoregional advanced cervical cancer, Stage IIIC1(r).  She has a very large primary tumor, with imaging evidence of bilateral parametrial invasion and clinical evidence of posterior vaginal involvement. She has multiple pelvic lymphadenopathy involving the external iliac, perirectal, and internal iliac nodes. The conglomerate of nodes on the left external iliac chain were quite bulky. She is awaiting biopsy of left axillary lymph node.     RECOMMENDATION:  Assuming her left axillary lymph node is negative, we recommend that Ms. Zuluaga undergo definitive chemoradiation for her cervical carcinoma, which will consist of concurrent chemotherapy and external beam radiotherapy followed by brachytherapy boost.     The external beam radiotherapy will be approximately of 4500 cGy in 25 fractions to include the cervix, uterus, upper half of the vagina, parametrium, and associated regional lymph  nodes, including common, external, internal iliacs, mesorectum and preascral nodes.  Grossly positive lymph nodes will receive a higher dose using simultaneously integrated boost. The external beam radiotherapy will be given with weekly cisplatin. Once she completes external beam radiotherapy, we will plan for her to undergo restaging imaging. Her tumor response to chemoradiation will dictate whether we proceed with tandem and ring versus interstitial brachytherapy.    She is being considered for [facility]  trial.  I will reach out to the study chair to clarify her suitability for the trial.  Specifically, the trial does not allow prophylactic low paraaortic sarah treatment.  She has a high pelvic node, and there may be some benefit of treating low paraaortics. Additionally, she has bulky lymph nodes and is fairly thin.  Meeting the trial dose constraints could be a challenge.     We explained our rationale, as well as the logistics, risks, benefits, and alternatives to concurrent chemotherapy and radiotherapy to the pelvis. Potential toxicities of treatment were outlined, including but not limited to, fatigue, decreased blood counts (from chemotherapy), nausea and vomiting (chemotherapy), ototoxicity (cisplatin), renal toxicity (cisplatin), loss of pubic hair, chronic proctitis, rectal bleeding, diarrhea, bladder irritation, decreased bone density and increased risk for sacral insufficiency and pathologic fracture, induction of menopause, vaginal stenosis and sexual dysfunction, increased risk for small bowel obstruction, and secondary cancer.     Smoking cessation counseling was also provided at this juncture as this also increases her risk for small bowel obstructions in the future. We encouraged Ms. Zuluaga to ask questions, which we answered to the best of our ability.    She will be brought back for simulation next week.    The patient was seen and discussed with staff, Dr. Hernández. Thank you for involving us in the  care of this patient.  Please feel free to contact us with questions or concerns at any time.    Nevin Joya MD PGY2  Department of Radiation Oncology  [phone]      I saw and examined the patient with the resident.  I have reviewed and edited the resident's note and agree with the plan of care.      Parish Hernández MD    CC  Patient Care Team:  No Ref-Primary, Physician as PCP - General  Marsha Manrique MD as MD (Gyn-Onc)  Kenneth Shahid as Referring Physician  MARSHA MANRIQUE

## 2020-09-11 ENCOUNTER — VIRTUAL VISIT (OUTPATIENT)
Dept: RADIATION ONCOLOGY | Facility: CLINIC | Age: 35
End: 2020-09-11
Attending: OBSTETRICS & GYNECOLOGY
Payer: MEDICAID

## 2020-09-11 ENCOUNTER — TELEPHONE (OUTPATIENT)
Dept: CARE COORDINATION | Facility: CLINIC | Age: 35
End: 2020-09-11

## 2020-09-11 DIAGNOSIS — C53.1 MALIGNANT NEOPLASM OF EXOCERVIX (H): Primary | ICD-10-CM

## 2020-09-11 LAB
COPATH REPORT: NORMAL
INTERPRETATION ECG - MUSE: NORMAL

## 2020-09-11 NOTE — PROGRESS NOTES
HPI  INITIAL PATIENT ASSESSMENT    Diagnosis: Cervical cancer    Prior radiation therapy: None    Prior chemotherapy: None, Dr. Brambila    Prior hormonal therapy:No    Pain Eval:  Current history of pain associated with this visit:   Intensity: Varies throughout the day, diff to rate.  Current: constant, then suddenly feels like a sharp jabbing pain  Location: Lower abd and back pain  Treatment: Ibuprofen and midol    Psychosocial  Living arrangements: family  Fall Risk: independent  Falls Risk Screening Questions - Radiation consult    1. Have you fallen in the past one week?  No.  2. Have you felt unsteady when walking or standing in the past one week?  No.       referral needs: Not needed, SW already on the case    Advanced Directive: No  Implantable Cardiac Device? No    Onset of menarche: @ age 14  LMP: Patient's last menstrual period was 08/25/2020. Currently on period.  Onset of menopause: No signs  Abnormal vaginal bleeding/discharge: Yes, heavy and lots of clotting  Are you pregnant? No  Reproductive note: 4 children    Nurse face-to-face time: Level 3:  10 min phone time

## 2020-09-11 NOTE — TELEPHONE ENCOUNTER
"Social Work Note: Telephone Call  Oncology Clinic    Data/Intervention:  Patient Name:  Suni Zuluaga  /Age:  1985 (35 year old)    Call From:  SW contacted patient over the phone.  Reason for Call:  Outreach and planning for upcoming treatment course    Assessment:  SW contacted patient by phone to offer outreach and introduce self and role. SW explained that as patient's treatment plan is formulated, SW would be available to provide psychosocial support and gather resources to assist patient in navigating treatment course.  Patient has appointments next week that she will need to attend in person, intends to drive back and forth for these appointments.  Patient uncertain of whether she will want to stay in the local area while undergoing treatment, will likely depend on final radiation schedule.  SW explained that writer can help patient assess lodging options once more information is known.  Patient is also uninsured.  She is currently  from her  (but not legally /) and would not qualify for MA due to his income at this time.  SW gave education about oncology financial counselor role and offered to provide connection if needed.  Patient indicated appreciation.  SW also generally reviewed availability of financial grants for oncology patients undergoing treatment as well as local organizations supporting children who have a parent with a cancer diagnosis.  Patient was appreciative of information given and indicated feeling overwhelmed at this time with uncertainty and 'all the questions.\" SW provided supportive counseling and assured patient in helping to navigate options and resources. SW provided contact information to patient and offered to check in again next week following appointments.     Plan:  SW will check in with patient again following appointments to further discuss options and continue providing support. SW remains available to assist with needs, questions " and concerns.     Soo Yeon Han, MSW, [facility] Center[facility]  Pager: [phone]  Phone: [phone]

## 2020-09-11 NOTE — LETTER
2020         RE: Suni Zuluaga  [address] [facility] [id number]        Dear Colleague,    Thank you for referring your patient, Suni Zuluaga, to the RADIATION ONCOLOGY CLINIC. Please see a copy of my visit note below.    RADIATION ONCOLOGY CONSULT NOTE  Date of Visit: Sep 11, 2020    Suni Zuluaga  MRN: [id number]  : 1985    REASON FOR CONSULTATION: consideration of definitive chemoradiotherapy for cervical cancer  Diagnosis: Squamous cell carcinoma of the cervix  Stage:FIGO Stage IIIC1(r) (FIGO 2018)    HISTORY OF PRESENT ILLNESS:  Ms. Zuluaga is a 35 year old pre-menopausal woman here for consideration of definitive radiotherapy for cervical cancer. Her oncologic history dates back to 2015. While pregnant during her first trimester, she was found to have a high grade squamous intraepithelial lesion.  After delivering her baby, she had a repeat pap smear followed by a negative colposcopy by Dr. Kenneth Shahid. She was subsequently lost to follow up.     She presented to Dr. Shahid, on 2020 because she had been having heavy vaginal bleeding for 3-6 months, along with postcoital bleeding. Exam noted a friable, firm enlarged cervix that extended over into the bilateral adnexa. Pap smear at this time (Specimen #[id number]) demonstrated squamous cell carcinoma. Pelvic ultrasound showed a normal sized uterus measuring 9.9 x 5.5 x 5.7 cm with an endometrial stripe measuring 9.5 mm and visualization of fibroids.    Ms. Zuluaga was subsequently referred to gynecologic oncology at [facility] and saw Dr. Suzy Brambila on 2020. On speculum exam, there was a 3+ cm friable cervical mass. On bimanual exam, a 4-5 cm firm mass was noted confined to the cervix, seemingly most likely endocervical in origin. It was difficult to delineate the posterior vagina separately from the posterior lip of the cervix as the posterior fornix was completely obliterated. There was no apparent parametrial  invasion. Thus, clinically she appeared to have at least FIGO IIA2 disease. Cervical biopsy, however, only showed HGSIL.      Staging PET/CT from 9/4/2020 demonstrated a 7.1 x 5.4 cm hypermetabolic cervical mass  with SUV max of 18.2. Mass extended into the caudal third of the vagina, with probable invasion of the parametrium. There were multiple hypermetabolic pelvic adenopathy consistent with sarah metastases. These included the right perirectal/internal iliac node measuring 1.7 x 0.9 cm (SUV 9.5), left perirectal node (1.0 x 0.9 cm, SUV max 5.5), left external iliac node (2.7 x 1.9 cm, SUV max 10.6), and left external iliac node posterior to the left psoas muscle (1.5 x 1.2 cm, SUV max 11.2). There was also a borderline FDG avid left axillary lymph node measuring 1.1 x 0.9 cm with fatty hilum and SUV max 2.7 for which Ms. Zuluaga is scheduled to undergo biopsy on 09/15/2020.     MRI of the pelvis on 09/09/2020 further showed evidence of bilateral parametrial invasion as well as thickening of the left uterosacral ligament with associated leftward tethering of the rectum. There was no bladder or rectal invasion.     Ms. Zuluaga was being considered for participation in  trial.  She met with Dr. Washington yesterday for further discussion of the trial.  Cervical biopsy was repeated with did confirm invasive squamous cell carcinoma, moderately differentiated.    Ms. Zuluaga is evaluated today via phone encounter. She states that she is doing well, but continues to have painful intercourse with the blood noted afterwards. She also continues to have heavy menstrual bleeding with the passage of large clots. She has associated fatigue with this. She will be undergoing left axillary lymph node biopsy next Tuesday.       REVIEW OF SYSTEMS: A 12 point review of systems was obtained. Pertinent findings are noted in the HPI and are otherwise unremarkable.     CHEMOTHERAPY HISTORY: none  PACEMAKER: none  PREGNANCY STATUS: no  PAST  RADIATION THERAPY HISTORY: no    PAST MEDICAL HISTORY:  Past Medical History:   Diagnosis Date     Cervical cancer (H)        PAST SURGICAL HISTORY:  Past Surgical History:   Procedure Laterality Date     TUBAL LIGATION         ALLERGIES:  Allergies as of 09/11/2020 - Reviewed 09/01/2020   Allergen Reaction Noted     Clindamycin Hives 02/18/2015       MEDICATIONS:  No current outpatient medications on file.        FAMILY HISTORY:  No family history of cancer    SOCIAL HISTORY:  Social History     Socioeconomic History     Marital status: Legally      Spouse name: Not on file     Number of children: 4     Years of education: Not on file     Highest education level: Not on file   Tobacco Use     Smoking status: Current Every Day Smoker     Packs/day: 1.00     Types: Cigarettes     Smokeless tobacco: Never Used     Tobacco comment: since age 13   Substance and Sexual Activity     Alcohol use: Yes     Frequency: Monthly or less     Drug use: Not on file     Sexual activity: Not on file   Lifestyle     PHYSICAL EXAM:  This encounter was conducted over the phone, and as such no physical exam could be conducted.     ECOG PERFORMANCE STATUS: 0    IMAGING       MRI Pelvis T2 JOSHUA 09/09/2020       PET CT 09/04/2020 (post-Lasix)    IMPRESSION: Ms. Zuluaga is a 35 year old woman with a locoregional advanced cervical cancer, Stage IIIC1(r).  She has a very large primary tumor, with imaging evidence of bilateral parametrial invasion and clinical evidence of posterior vaginal involvement. She has multiple pelvic lymphadenopathy involving the external iliac, perirectal, and internal iliac nodes. The conglomerate of nodes on the left external iliac chain were quite bulky. She is awaiting biopsy of left axillary lymph node.     RECOMMENDATION:  Assuming her left axillary lymph node is negative, we recommend that Ms. Zuluaga undergo definitive chemoradiation for her cervical carcinoma, which will consist of concurrent chemotherapy  and external beam radiotherapy followed by brachytherapy boost.     The external beam radiotherapy will be approximately of 4500 cGy in 25 fractions to include the cervix, uterus, upper half of the vagina, parametrium, and associated regional lymph nodes, including common, external, internal iliacs, mesorectum and preascral nodes.  Grossly positive lymph nodes will receive a higher dose using simultaneously integrated boost. The external beam radiotherapy will be given with weekly cisplatin. Once she completes external beam radiotherapy, we will plan for her to undergo restaging imaging. Her tumor response to chemoradiation will dictate whether we proceed with tandem and ring versus interstitial brachytherapy.    She is being considered for [facility]  trial.  I will reach out to the study chair to clarify her suitability for the trial.  Specifically, the trial does not allow prophylactic low paraaortic sarah treatment.  She has a high pelvic node, and there may be some benefit of treating low paraaortics. Additionally, she has bulky lymph nodes and is fairly thin.  Meeting the trial dose constraints could be a challenge.     We explained our rationale, as well as the logistics, risks, benefits, and alternatives to concurrent chemotherapy and radiotherapy to the pelvis. Potential toxicities of treatment were outlined, including but not limited to, fatigue, decreased blood counts (from chemotherapy), nausea and vomiting (chemotherapy), ototoxicity (cisplatin), renal toxicity (cisplatin), loss of pubic hair, chronic proctitis, rectal bleeding, diarrhea, bladder irritation, decreased bone density and increased risk for sacral insufficiency and pathologic fracture, induction of menopause, vaginal stenosis and sexual dysfunction, increased risk for small bowel obstruction, and secondary cancer.     Smoking cessation counseling was also provided at this juncture as this also increases her risk for small bowel obstructions  in the future. We encouraged Ms. Zuluaga to ask questions, which we answered to the best of our ability.    She will be brought back for simulation next week.    The patient was seen and discussed with staff, Dr. Hernández. Thank you for involving us in the care of this patient.  Please feel free to contact us with questions or concerns at any time.    Nevin Joya MD PGY2  Department of Radiation Oncology  [phone]      I saw and examined the patient with the resident.  I have reviewed and edited the resident's note and agree with the plan of care.      Parish Hernández MD    CC  Patient Care Team:  No Ref-Primary, Physician as PCP - General  Marsha Manrique MD as MD (Gyn-Onc)  Kenneth Shahid as Referring Physician  MARSHA MANRIQUE        HPI  INITIAL PATIENT ASSESSMENT    Diagnosis: Cervical cancer    Prior radiation therapy: None    Prior chemotherapy: None, Dr. Manrique    Prior hormonal therapy:No    Pain Eval:  Current history of pain associated with this visit:   Intensity: Varies throughout the day, diff to rate.  Current: constant, then suddenly feels like a sharp jabbing pain  Location: Lower abd and back pain  Treatment: Ibuprofen and midol    Psychosocial  Living arrangements: family  Fall Risk: independent  Falls Risk Screening Questions - Radiation consult    1. Have you fallen in the past one week?  No.  2. Have you felt unsteady when walking or standing in the past one week?  No.       referral needs: Not needed, SW already on the case    Advanced Directive: No  Implantable Cardiac Device? No    Onset of menarche: @ age 14  LMP: Patient's last menstrual period was 08/25/2020. Currently on period.  Onset of menopause: No signs  Abnormal vaginal bleeding/discharge: Yes, heavy and lots of clotting  Are you pregnant? No  Reproductive note: 4 children    Nurse face-to-face time: Level 3:  10 min phone time                  Again, thank you for allowing me to participate in the care  of your patient.        Sincerely,        Parish Hernández MD

## 2020-09-15 ENCOUNTER — ANCILLARY PROCEDURE (OUTPATIENT)
Dept: MAMMOGRAPHY | Facility: CLINIC | Age: 35
End: 2020-09-15
Attending: OBSTETRICS & GYNECOLOGY

## 2020-09-15 DIAGNOSIS — C53.9 SQUAMOUS CELL CARCINOMA OF CERVIX (H): ICD-10-CM

## 2020-09-16 DIAGNOSIS — C53.8 MALIGNANT NEOPLASM OF OVERLAPPING SITES OF CERVIX (H): Primary | ICD-10-CM

## 2020-09-16 RX ORDER — MEPERIDINE HYDROCHLORIDE 25 MG/ML
25 INJECTION INTRAMUSCULAR; INTRAVENOUS; SUBCUTANEOUS EVERY 30 MIN PRN
Status: CANCELLED | OUTPATIENT
Start: 2020-11-03

## 2020-09-16 RX ORDER — MEPERIDINE HYDROCHLORIDE 25 MG/ML
25 INJECTION INTRAMUSCULAR; INTRAVENOUS; SUBCUTANEOUS EVERY 30 MIN PRN
Status: CANCELLED | OUTPATIENT
Start: 2020-10-20

## 2020-09-16 RX ORDER — HEPARIN SODIUM (PORCINE) LOCK FLUSH IV SOLN 100 UNIT/ML 100 UNIT/ML
5 SOLUTION INTRAVENOUS
Status: CANCELLED | OUTPATIENT
Start: 2020-10-27

## 2020-09-16 RX ORDER — LORAZEPAM 2 MG/ML
1 INJECTION INTRAMUSCULAR EVERY 6 HOURS PRN
Status: CANCELLED
Start: 2020-10-05

## 2020-09-16 RX ORDER — SODIUM CHLORIDE 9 MG/ML
1000 INJECTION, SOLUTION INTRAVENOUS CONTINUOUS PRN
Status: CANCELLED
Start: 2020-10-05

## 2020-09-16 RX ORDER — NALOXONE HYDROCHLORIDE 0.4 MG/ML
.1-.4 INJECTION, SOLUTION INTRAMUSCULAR; INTRAVENOUS; SUBCUTANEOUS
Status: CANCELLED | OUTPATIENT
Start: 2020-10-12

## 2020-09-16 RX ORDER — PALONOSETRON 0.05 MG/ML
0.25 INJECTION, SOLUTION INTRAVENOUS ONCE
Status: CANCELLED
Start: 2020-09-28

## 2020-09-16 RX ORDER — ALBUTEROL SULFATE 0.83 MG/ML
2.5 SOLUTION RESPIRATORY (INHALATION)
Status: CANCELLED | OUTPATIENT
Start: 2020-10-05

## 2020-09-16 RX ORDER — HEPARIN SODIUM,PORCINE 10 UNIT/ML
5 VIAL (ML) INTRAVENOUS
Status: CANCELLED | OUTPATIENT
Start: 2020-10-20

## 2020-09-16 RX ORDER — NALOXONE HYDROCHLORIDE 0.4 MG/ML
.1-.4 INJECTION, SOLUTION INTRAMUSCULAR; INTRAVENOUS; SUBCUTANEOUS
Status: CANCELLED | OUTPATIENT
Start: 2020-10-05

## 2020-09-16 RX ORDER — EPINEPHRINE 1 MG/ML
0.3 INJECTION, SOLUTION INTRAMUSCULAR; SUBCUTANEOUS EVERY 5 MIN PRN
Status: CANCELLED | OUTPATIENT
Start: 2020-10-20

## 2020-09-16 RX ORDER — METHYLPREDNISOLONE SODIUM SUCCINATE 125 MG/2ML
125 INJECTION, POWDER, LYOPHILIZED, FOR SOLUTION INTRAMUSCULAR; INTRAVENOUS
Status: CANCELLED
Start: 2020-10-20

## 2020-09-16 RX ORDER — DEXTROSE, SODIUM CHLORIDE, AND POTASSIUM CHLORIDE 5; .45; .15 G/100ML; G/100ML; G/100ML
2000 INJECTION INTRAVENOUS ONCE
Status: CANCELLED
Start: 2020-10-12

## 2020-09-16 RX ORDER — HEPARIN SODIUM,PORCINE 10 UNIT/ML
5 VIAL (ML) INTRAVENOUS
Status: CANCELLED | OUTPATIENT
Start: 2020-10-27

## 2020-09-16 RX ORDER — PALONOSETRON 0.05 MG/ML
0.25 INJECTION, SOLUTION INTRAVENOUS ONCE
Status: CANCELLED
Start: 2020-10-20

## 2020-09-16 RX ORDER — PALONOSETRON 0.05 MG/ML
0.25 INJECTION, SOLUTION INTRAVENOUS ONCE
Status: CANCELLED
Start: 2020-10-05

## 2020-09-16 RX ORDER — DIPHENHYDRAMINE HYDROCHLORIDE 50 MG/ML
50 INJECTION INTRAMUSCULAR; INTRAVENOUS
Status: CANCELLED
Start: 2020-10-05

## 2020-09-16 RX ORDER — HEPARIN SODIUM (PORCINE) LOCK FLUSH IV SOLN 100 UNIT/ML 100 UNIT/ML
5 SOLUTION INTRAVENOUS
Status: CANCELLED | OUTPATIENT
Start: 2020-10-20

## 2020-09-16 RX ORDER — ALBUTEROL SULFATE 0.83 MG/ML
2.5 SOLUTION RESPIRATORY (INHALATION)
Status: CANCELLED | OUTPATIENT
Start: 2020-11-03

## 2020-09-16 RX ORDER — DIPHENHYDRAMINE HYDROCHLORIDE 50 MG/ML
50 INJECTION INTRAMUSCULAR; INTRAVENOUS
Status: CANCELLED
Start: 2020-09-28

## 2020-09-16 RX ORDER — HEPARIN SODIUM (PORCINE) LOCK FLUSH IV SOLN 100 UNIT/ML 100 UNIT/ML
5 SOLUTION INTRAVENOUS
Status: CANCELLED | OUTPATIENT
Start: 2020-11-03

## 2020-09-16 RX ORDER — LORAZEPAM 2 MG/ML
1 INJECTION INTRAMUSCULAR EVERY 6 HOURS PRN
Status: CANCELLED
Start: 2020-11-03

## 2020-09-16 RX ORDER — ALBUTEROL SULFATE 0.83 MG/ML
2.5 SOLUTION RESPIRATORY (INHALATION)
Status: CANCELLED | OUTPATIENT
Start: 2020-10-27

## 2020-09-16 RX ORDER — ALBUTEROL SULFATE 90 UG/1
1-2 AEROSOL, METERED RESPIRATORY (INHALATION)
Status: CANCELLED
Start: 2020-10-27

## 2020-09-16 RX ORDER — EPINEPHRINE 1 MG/ML
0.3 INJECTION, SOLUTION INTRAMUSCULAR; SUBCUTANEOUS EVERY 5 MIN PRN
Status: CANCELLED | OUTPATIENT
Start: 2020-11-03

## 2020-09-16 RX ORDER — METHYLPREDNISOLONE SODIUM SUCCINATE 125 MG/2ML
125 INJECTION, POWDER, LYOPHILIZED, FOR SOLUTION INTRAMUSCULAR; INTRAVENOUS
Status: CANCELLED
Start: 2020-11-03

## 2020-09-16 RX ORDER — EPINEPHRINE 1 MG/ML
0.3 INJECTION, SOLUTION INTRAMUSCULAR; SUBCUTANEOUS EVERY 5 MIN PRN
Status: CANCELLED | OUTPATIENT
Start: 2020-10-12

## 2020-09-16 RX ORDER — DEXTROSE, SODIUM CHLORIDE, AND POTASSIUM CHLORIDE 5; .45; .15 G/100ML; G/100ML; G/100ML
2000 INJECTION INTRAVENOUS ONCE
Status: CANCELLED
Start: 2020-09-28

## 2020-09-16 RX ORDER — DIPHENHYDRAMINE HYDROCHLORIDE 50 MG/ML
50 INJECTION INTRAMUSCULAR; INTRAVENOUS
Status: CANCELLED
Start: 2020-10-20

## 2020-09-16 RX ORDER — DIPHENHYDRAMINE HYDROCHLORIDE 50 MG/ML
50 INJECTION INTRAMUSCULAR; INTRAVENOUS
Status: CANCELLED
Start: 2020-10-12

## 2020-09-16 RX ORDER — METHYLPREDNISOLONE SODIUM SUCCINATE 125 MG/2ML
125 INJECTION, POWDER, LYOPHILIZED, FOR SOLUTION INTRAMUSCULAR; INTRAVENOUS
Status: CANCELLED
Start: 2020-09-28

## 2020-09-16 RX ORDER — ALBUTEROL SULFATE 90 UG/1
1-2 AEROSOL, METERED RESPIRATORY (INHALATION)
Status: CANCELLED
Start: 2020-10-12

## 2020-09-16 RX ORDER — ALBUTEROL SULFATE 90 UG/1
1-2 AEROSOL, METERED RESPIRATORY (INHALATION)
Status: CANCELLED
Start: 2020-10-05

## 2020-09-16 RX ORDER — ALBUTEROL SULFATE 0.83 MG/ML
2.5 SOLUTION RESPIRATORY (INHALATION)
Status: CANCELLED | OUTPATIENT
Start: 2020-09-28

## 2020-09-16 RX ORDER — MEPERIDINE HYDROCHLORIDE 25 MG/ML
25 INJECTION INTRAMUSCULAR; INTRAVENOUS; SUBCUTANEOUS EVERY 30 MIN PRN
Status: CANCELLED | OUTPATIENT
Start: 2020-10-27

## 2020-09-16 RX ORDER — HEPARIN SODIUM (PORCINE) LOCK FLUSH IV SOLN 100 UNIT/ML 100 UNIT/ML
5 SOLUTION INTRAVENOUS
Status: CANCELLED | OUTPATIENT
Start: 2020-09-28

## 2020-09-16 RX ORDER — NALOXONE HYDROCHLORIDE 0.4 MG/ML
.1-.4 INJECTION, SOLUTION INTRAMUSCULAR; INTRAVENOUS; SUBCUTANEOUS
Status: CANCELLED | OUTPATIENT
Start: 2020-09-28

## 2020-09-16 RX ORDER — NALOXONE HYDROCHLORIDE 0.4 MG/ML
.1-.4 INJECTION, SOLUTION INTRAMUSCULAR; INTRAVENOUS; SUBCUTANEOUS
Status: CANCELLED | OUTPATIENT
Start: 2020-10-20

## 2020-09-16 RX ORDER — HEPARIN SODIUM,PORCINE 10 UNIT/ML
5 VIAL (ML) INTRAVENOUS
Status: CANCELLED | OUTPATIENT
Start: 2020-10-12

## 2020-09-16 RX ORDER — HEPARIN SODIUM (PORCINE) LOCK FLUSH IV SOLN 100 UNIT/ML 100 UNIT/ML
5 SOLUTION INTRAVENOUS
Status: CANCELLED | OUTPATIENT
Start: 2020-10-12

## 2020-09-16 RX ORDER — PALONOSETRON 0.05 MG/ML
0.25 INJECTION, SOLUTION INTRAVENOUS ONCE
Status: CANCELLED
Start: 2020-10-27

## 2020-09-16 RX ORDER — SODIUM CHLORIDE 9 MG/ML
1000 INJECTION, SOLUTION INTRAVENOUS CONTINUOUS PRN
Status: CANCELLED
Start: 2020-10-27

## 2020-09-16 RX ORDER — ALBUTEROL SULFATE 0.83 MG/ML
2.5 SOLUTION RESPIRATORY (INHALATION)
Status: CANCELLED | OUTPATIENT
Start: 2020-10-20

## 2020-09-16 RX ORDER — LORAZEPAM 2 MG/ML
1 INJECTION INTRAMUSCULAR EVERY 6 HOURS PRN
Status: CANCELLED
Start: 2020-09-28

## 2020-09-16 RX ORDER — NALOXONE HYDROCHLORIDE 0.4 MG/ML
.1-.4 INJECTION, SOLUTION INTRAMUSCULAR; INTRAVENOUS; SUBCUTANEOUS
Status: CANCELLED | OUTPATIENT
Start: 2020-10-27

## 2020-09-16 RX ORDER — SODIUM CHLORIDE 9 MG/ML
1000 INJECTION, SOLUTION INTRAVENOUS CONTINUOUS PRN
Status: CANCELLED
Start: 2020-09-28

## 2020-09-16 RX ORDER — ALBUTEROL SULFATE 90 UG/1
1-2 AEROSOL, METERED RESPIRATORY (INHALATION)
Status: CANCELLED
Start: 2020-11-03

## 2020-09-16 RX ORDER — MEPERIDINE HYDROCHLORIDE 25 MG/ML
25 INJECTION INTRAMUSCULAR; INTRAVENOUS; SUBCUTANEOUS EVERY 30 MIN PRN
Status: CANCELLED | OUTPATIENT
Start: 2020-10-05

## 2020-09-16 RX ORDER — DEXTROSE, SODIUM CHLORIDE, AND POTASSIUM CHLORIDE 5; .45; .15 G/100ML; G/100ML; G/100ML
2000 INJECTION INTRAVENOUS ONCE
Status: CANCELLED
Start: 2020-10-27

## 2020-09-16 RX ORDER — MEPERIDINE HYDROCHLORIDE 25 MG/ML
25 INJECTION INTRAMUSCULAR; INTRAVENOUS; SUBCUTANEOUS EVERY 30 MIN PRN
Status: CANCELLED | OUTPATIENT
Start: 2020-10-12

## 2020-09-16 RX ORDER — ALBUTEROL SULFATE 0.83 MG/ML
2.5 SOLUTION RESPIRATORY (INHALATION)
Status: CANCELLED | OUTPATIENT
Start: 2020-10-12

## 2020-09-16 RX ORDER — LORAZEPAM 2 MG/ML
1 INJECTION INTRAMUSCULAR EVERY 6 HOURS PRN
Status: CANCELLED
Start: 2020-10-27

## 2020-09-16 RX ORDER — EPINEPHRINE 1 MG/ML
0.3 INJECTION, SOLUTION INTRAMUSCULAR; SUBCUTANEOUS EVERY 5 MIN PRN
Status: CANCELLED | OUTPATIENT
Start: 2020-10-05

## 2020-09-16 RX ORDER — SODIUM CHLORIDE 9 MG/ML
1000 INJECTION, SOLUTION INTRAVENOUS CONTINUOUS PRN
Status: CANCELLED
Start: 2020-11-03

## 2020-09-16 RX ORDER — NALOXONE HYDROCHLORIDE 0.4 MG/ML
.1-.4 INJECTION, SOLUTION INTRAMUSCULAR; INTRAVENOUS; SUBCUTANEOUS
Status: CANCELLED | OUTPATIENT
Start: 2020-11-03

## 2020-09-16 RX ORDER — EPINEPHRINE 1 MG/ML
0.3 INJECTION, SOLUTION INTRAMUSCULAR; SUBCUTANEOUS EVERY 5 MIN PRN
Status: CANCELLED | OUTPATIENT
Start: 2020-09-28

## 2020-09-16 RX ORDER — HEPARIN SODIUM,PORCINE 10 UNIT/ML
5 VIAL (ML) INTRAVENOUS
Status: CANCELLED | OUTPATIENT
Start: 2020-11-03

## 2020-09-16 RX ORDER — HEPARIN SODIUM (PORCINE) LOCK FLUSH IV SOLN 100 UNIT/ML 100 UNIT/ML
5 SOLUTION INTRAVENOUS
Status: CANCELLED | OUTPATIENT
Start: 2020-10-05

## 2020-09-16 RX ORDER — SODIUM CHLORIDE 9 MG/ML
1000 INJECTION, SOLUTION INTRAVENOUS CONTINUOUS PRN
Status: CANCELLED
Start: 2020-10-12

## 2020-09-16 RX ORDER — METHYLPREDNISOLONE SODIUM SUCCINATE 125 MG/2ML
125 INJECTION, POWDER, LYOPHILIZED, FOR SOLUTION INTRAMUSCULAR; INTRAVENOUS
Status: CANCELLED
Start: 2020-10-27

## 2020-09-16 RX ORDER — ALBUTEROL SULFATE 90 UG/1
1-2 AEROSOL, METERED RESPIRATORY (INHALATION)
Status: CANCELLED
Start: 2020-09-28

## 2020-09-16 RX ORDER — EPINEPHRINE 1 MG/ML
0.3 INJECTION, SOLUTION INTRAMUSCULAR; SUBCUTANEOUS EVERY 5 MIN PRN
Status: CANCELLED | OUTPATIENT
Start: 2020-10-27

## 2020-09-16 RX ORDER — PALONOSETRON 0.05 MG/ML
0.25 INJECTION, SOLUTION INTRAVENOUS ONCE
Status: CANCELLED
Start: 2020-10-12

## 2020-09-16 RX ORDER — MEPERIDINE HYDROCHLORIDE 25 MG/ML
25 INJECTION INTRAMUSCULAR; INTRAVENOUS; SUBCUTANEOUS EVERY 30 MIN PRN
Status: CANCELLED | OUTPATIENT
Start: 2020-09-28

## 2020-09-16 RX ORDER — DEXTROSE, SODIUM CHLORIDE, AND POTASSIUM CHLORIDE 5; .45; .15 G/100ML; G/100ML; G/100ML
2000 INJECTION INTRAVENOUS ONCE
Status: CANCELLED
Start: 2020-11-03

## 2020-09-16 RX ORDER — DIPHENHYDRAMINE HYDROCHLORIDE 50 MG/ML
50 INJECTION INTRAMUSCULAR; INTRAVENOUS
Status: CANCELLED
Start: 2020-11-03

## 2020-09-16 RX ORDER — HEPARIN SODIUM,PORCINE 10 UNIT/ML
5 VIAL (ML) INTRAVENOUS
Status: CANCELLED | OUTPATIENT
Start: 2020-09-28

## 2020-09-16 RX ORDER — METHYLPREDNISOLONE SODIUM SUCCINATE 125 MG/2ML
125 INJECTION, POWDER, LYOPHILIZED, FOR SOLUTION INTRAMUSCULAR; INTRAVENOUS
Status: CANCELLED
Start: 2020-10-05

## 2020-09-16 RX ORDER — DEXTROSE, SODIUM CHLORIDE, AND POTASSIUM CHLORIDE 5; .45; .15 G/100ML; G/100ML; G/100ML
2000 INJECTION INTRAVENOUS ONCE
Status: CANCELLED
Start: 2020-10-05

## 2020-09-16 RX ORDER — ALBUTEROL SULFATE 90 UG/1
1-2 AEROSOL, METERED RESPIRATORY (INHALATION)
Status: CANCELLED
Start: 2020-10-20

## 2020-09-16 RX ORDER — METHYLPREDNISOLONE SODIUM SUCCINATE 125 MG/2ML
125 INJECTION, POWDER, LYOPHILIZED, FOR SOLUTION INTRAMUSCULAR; INTRAVENOUS
Status: CANCELLED
Start: 2020-10-12

## 2020-09-16 RX ORDER — LORAZEPAM 2 MG/ML
1 INJECTION INTRAMUSCULAR EVERY 6 HOURS PRN
Status: CANCELLED
Start: 2020-10-12

## 2020-09-16 RX ORDER — SODIUM CHLORIDE 9 MG/ML
1000 INJECTION, SOLUTION INTRAVENOUS CONTINUOUS PRN
Status: CANCELLED
Start: 2020-10-20

## 2020-09-16 RX ORDER — HEPARIN SODIUM,PORCINE 10 UNIT/ML
5 VIAL (ML) INTRAVENOUS
Status: CANCELLED | OUTPATIENT
Start: 2020-10-05

## 2020-09-16 RX ORDER — DEXTROSE, SODIUM CHLORIDE, AND POTASSIUM CHLORIDE 5; .45; .15 G/100ML; G/100ML; G/100ML
2000 INJECTION INTRAVENOUS ONCE
Status: CANCELLED
Start: 2020-10-20

## 2020-09-16 RX ORDER — PALONOSETRON 0.05 MG/ML
0.25 INJECTION, SOLUTION INTRAVENOUS ONCE
Status: CANCELLED
Start: 2020-11-03

## 2020-09-16 RX ORDER — LORAZEPAM 2 MG/ML
1 INJECTION INTRAMUSCULAR EVERY 6 HOURS PRN
Status: CANCELLED
Start: 2020-10-20

## 2020-09-16 RX ORDER — DIPHENHYDRAMINE HYDROCHLORIDE 50 MG/ML
50 INJECTION INTRAMUSCULAR; INTRAVENOUS
Status: CANCELLED
Start: 2020-10-27

## 2020-09-18 ENCOUNTER — OFFICE VISIT (OUTPATIENT)
Dept: RADIATION ONCOLOGY | Facility: CLINIC | Age: 35
End: 2020-09-18
Attending: OBSTETRICS & GYNECOLOGY
Payer: MEDICAID

## 2020-09-18 ENCOUNTER — ALLIED HEALTH/NURSE VISIT (OUTPATIENT)
Dept: RADIATION ONCOLOGY | Facility: CLINIC | Age: 35
End: 2020-09-18
Attending: RADIOLOGY
Payer: MEDICAID

## 2020-09-18 ENCOUNTER — TELEPHONE (OUTPATIENT)
Dept: CARE COORDINATION | Facility: CLINIC | Age: 35
End: 2020-09-18

## 2020-09-18 ENCOUNTER — TELEPHONE (OUTPATIENT)
Dept: ONCOLOGY | Facility: CLINIC | Age: 35
End: 2020-09-18

## 2020-09-18 DIAGNOSIS — C53.1 MALIGNANT NEOPLASM OF EXOCERVIX (H): Primary | ICD-10-CM

## 2020-09-18 PROCEDURE — 77334 RADIATION TREATMENT AID(S): CPT | Performed by: RADIOLOGY

## 2020-09-18 NOTE — PROGRESS NOTES
[facility], [place]  Radiation Oncology Follow-up Note  Sep 18, 2020    Suni Zuluaga   MRN: [id number]  : 1985     Diagnosis: Squamous cell carcinoma of the cervix  Stage:FIGO Stage IIIC1(r) (FIGO 2018)    TYPE OF RADIATION THERAPY PLANNED: 4500 cGy in 180 cGy fractions with SIB to involved nodes and parametrium.     SUBJECTIVE: Ms. Zuluaga is a 35 year old pre-menopausal woman here for signing consent prior to simulation of radiotherapy for cervical cancer. Her oncologic history dates back to 2015. While pregnant during her first trimester, she was found to have a high grade squamous intraepithelial lesion.  After delivering her baby, she had a repeat pap smear followed by a negative colposcopy by Dr. Kenneth Shahid. She was subsequently lost to follow up.      She presented to Dr. Shahid, on 2020 because she had been having heavy vaginal bleeding for 3-6 months, along with postcoital bleeding. Exam noted a friable, firm enlarged cervix that extended over into the bilateral adnexa. Pap smear at this time (Specimen #[id number]) demonstrated squamous cell carcinoma. Pelvic ultrasound showed a normal sized uterus measuring 9.9 x 5.5 x 5.7 cm with an endometrial stripe measuring 9.5 mm and visualization of fibroids.     Ms. Zuluaga was subsequently referred to gynecologic oncology at [facility] and saw Dr. Suzy Brambila on 2020. On speculum exam, there was a 3+ cm friable cervical mass. On bimanual exam, a 4-5 cm firm mass was noted confined to the cervix, seemingly most likely endocervical in origin. It was difficult to delineate the posterior vagina separately from the posterior lip of the cervix as the posterior fornix was completely obliterated. There was no apparent parametrial invasion. Thus, clinically she appeared to have at least FIGO IIA2 disease. Cervical biopsy, however, only showed HGSIL.       Staging PET/CT from 2020 demonstrated a 7.1 x 5.4 cm  hypermetabolic cervical mass  with SUV max of 18.2. Mass extended into the caudal third of the vagina, with probable invasion of the parametrium. There were multiple hypermetabolic pelvic adenopathy consistent with sarah metastases. These included the right perirectal/internal iliac node measuring 1.7 x 0.9 cm (SUV 9.5), left perirectal node (1.0 x 0.9 cm, SUV max 5.5), left external iliac node (2.7 x 1.9 cm, SUV max 10.6), and left external iliac node posterior to the left psoas muscle (1.5 x 1.2 cm, SUV max 11.2). There was also a borderline FDG avid left axillary lymph node measuring 1.1 x 0.9 cm with fatty hilum and SUV max 2.7 for which Ms. Zuluaga underwent additional evaluation with ultrasound and felt to be benign.       MRI of the pelvis on 09/09/2020 further showed evidence of bilateral parametrial invasion as well as thickening of the left uterosacral ligament with associated leftward tethering of the rectum. There was no bladder or rectal invasion.      Her case was discussed at the Gyn Onc conference. She was eligible for  trial.  However, after further examination of her imaging and discussion with study chair, it was felt that her disease might be too extensive to fulfill protocol specified dose constraints.  Additionally study prohibits using simultaneously integrated boost for parametrium.  Given her bulky pelvic adenopathy, sequential boost to the parametria using AP/PA technique may result in unacceptable bowel dose. After extensive discussion with Dr. Brambila and Dr. Gonzalez, it was ultimately decided to not enroll her in the trial and instead treat her with our institution standard technique.     Ms. Zuluaga is here today for discussing plans for radiotherapy and proceeding with simulation.  She has no additional questions and is ready to proceed.        OBJECTIVE:   LMP 08/25/2020    PHYSICAL EXAM:  Gen: Alert, in NAD  Eyes: EOMI, sclera anicteric  HENT      Head: NC/AT     Ears: No external  auricular lesions     Nose/sinus: No rhinorrhea or epistaxis     Oral Cavity/Oropharynx: MMM, no thrush noted  Pulm: Breathing comfortably on room air, no audible wheezes or ronchi  CV: Well-perfused, no cyanosis  Abdominal: Soft, nondistended  Skin: Normal color and turgor  Neurologic/MSK: motor grossly intact, normal gait  Gyn: not performed today  Psych: Appropriate mood and affect      IMPRESSION: Ms. Zuluaga is a 35 year old woman with a locoregional advanced cervical cancer, Stage IIIC1(r).  She has a very large primary tumor, with imaging evidence of bilateral parametrial invasion and clinical evidence of posterior vaginal involvement. She has multiple pelvic lymphadenopathy involving the external iliac, perirectal, and internal iliac nodes. The conglomerate of nodes on the left external iliac chain were quite bulky.      RECOMMENDATIONS: We will proceed to simulation with plan to treat her with  external beam radiotherapy will be approximately of 4500 cGy in 25 fractions to include the cervix, uterus, upper half of the vagina, parametrium, and associated regional lymph nodes, including common, external, internal iliacs, mesorectum and preascral nodes.  Grossly positive lymph nodes will receive a higher dose using simultaneously integrated boost. The external beam radiotherapy will be given with weekly cisplatin. Once she completes external beam radiotherapy, we will plan for her to undergo restaging imaging. Her tumor response to chemoradiation will dictate whether we proceed with tandem and ring versus interstitial brachytherapy.    We explained our rationale, as well as the logistics, risks, benefits, and alternatives to concurrent chemotherapy and radiotherapy to the pelvis. Potential toxicities of treatment were outlined, including but not limited to, fatigue, decreased blood counts (from chemotherapy), nausea and vomiting (chemotherapy), ototoxicity (cisplatin), renal toxicity (cisplatin), loss of pubic  hair, chronic proctitis, rectal bleeding, diarrhea, bladder irritation, decreased bone density and increased risk for sacral insufficiency and pathologic fracture, induction of menopause, vaginal stenosis and sexual dysfunction, increased risk for small bowel obstruction, and secondary cancer.     She consented for simulation at this juncture. All questions were answered to the best of our ability. The patient was seen and discussed with Dr. Hernández.     Nevin Joya MD PGY2  Department of Radiation Oncology  [phone]    I saw and examined the patient with the resident.  I have reviewed and edited the resident's note and agree with the plan of care.      Parish Hernández MD

## 2020-09-18 NOTE — TELEPHONE ENCOUNTER
Social Work Note: Telephone Call  Oncology Clinic    Data/Intervention:  Patient Name:  Suni Zuluaga  /Age:  1985 (35 year old)    Call From:  SW contacted patient by phone.  Reason for Call:  Radiation RN sent in basket message indicating patient wondering about update regarding arranging lodging.    Assessment:  SW contacted patient by phone.  Patient will be starting radiation on  for approximately 5-6 weeks.  She indicated she intends to stay in the local area Mon-Fri while going through treatment and likely returning home to her family on weekends.  She indicated her radiation providers had indicated she would be set up with lodging for this duration.  SW explained that [facility] facility is still closed at this time and explained that likely a short term apartment rental or extended stay hotel would likely be the next best option.  SW gave some quotes of rates. Patient will be taking a leave of absence from work once she starts treatment and is uncertain whether she will receive any income while on leave.  Patient asked about oncology grants.  SW gave education on [employer] and [employer] grants.  Applications completed over the phone for PIF,  general grants, [employer] and Financial Cancer Care program. SW indicated that case management department Lodging and Accommodations specialist is out of office today but that writer would reach out next week to colleague to find out best options for lodging and assess whether foundation funds or other assistance could also be used to cover lodging.  Patient indicated understanding and is in agreement to connect again next week.    Plan:  SW will contact patient again next week after speaking with lodging specialist regarding options. Patient has SW contact information for any other needs. SW continues to be available to assist.     Soo Yeon Han, MSW, LICSW  Pager: [phone]  Phone: [phone]

## 2020-09-18 NOTE — TELEPHONE ENCOUNTER
----- Message from Afsaneh Ellison sent at 9/17/2020  1:57 PM CDT -----  Regarding: RE: Brambila - labs - infusion  Hello,    This appt has been scheduled. Pt aware. She stated that she does have some questions for you and would like a call to discuss them. Please call pt when you're able.    Thanks,    Afsaneh  Clinic Coordinator  [facility] [place]    ----- Message -----  From: Liliam Paniagua RN  Sent: 9/16/2020   5:11 PM CDT  [facility]  Subject: Brambila - labs - infusion                       Hi -    Can we get this patient set up for labs- infusion (Cisplatin)- no provider needed on Mon 9/28    Please call patient and let me know when scheduled.    Thanks      Odalys Paniagua RNCC

## 2020-09-18 NOTE — LETTER
2020         RE: Suni Zuluaga  [address] [facility] [address]        Dear Colleague,    Thank you for referring your patient, Suni Zuluaga, to the RADIATION ONCOLOGY CLINIC. Please see a copy of my visit note below.    [facility], [place]  Radiation Oncology Follow-up Note  Sep 18, 2020    Suni Zuluaga   MRN: [id number]  : 1985     Diagnosis: Squamous cell carcinoma of the cervix  Stage:FIGO Stage IIIC1(r) (FIGO 2018)    TYPE OF RADIATION THERAPY PLANNED: 4500 cGy in 180 cGy fractions with SIB to involved nodes and parametrium.     SUBJECTIVE: Ms. Zuluaga is a 35 year old pre-menopausal woman here for signing consent prior to simulation of radiotherapy for cervical cancer. Her oncologic history dates back to 2015. While pregnant during her first trimester, she was found to have a high grade squamous intraepithelial lesion.  After delivering her baby, she had a repeat pap smear followed by a negative colposcopy by Dr. Kenneth Shahid. She was subsequently lost to follow up.      She presented to Dr. Shahid, on 2020 because she had been having heavy vaginal bleeding for 3-6 months, along with postcoital bleeding. Exam noted a friable, firm enlarged cervix that extended over into the bilateral adnexa. Pap smear at this time (Specimen #[id number]) demonstrated squamous cell carcinoma. Pelvic ultrasound showed a normal sized uterus measuring 9.9 x 5.5 x 5.7 cm with an endometrial stripe measuring 9.5 mm and visualization of fibroids.     Ms. Zuluaga was subsequently referred to gynecologic oncology at [facility] and saw Dr. Suzy Brambila on 2020. On speculum exam, there was a 3+ cm friable cervical mass. On bimanual exam, a 4-5 cm firm mass was noted confined to the cervix, seemingly most likely endocervical in origin. It was difficult to delineate the posterior vagina separately from the posterior lip of the cervix as the posterior fornix was  completely obliterated. There was no apparent parametrial invasion. Thus, clinically she appeared to have at least FIGO IIA2 disease. Cervical biopsy, however, only showed HGSIL.       Staging PET/CT from 9/4/2020 demonstrated a 7.1 x 5.4 cm hypermetabolic cervical mass  with SUV max of 18.2. Mass extended into the caudal third of the vagina, with probable invasion of the parametrium. There were multiple hypermetabolic pelvic adenopathy consistent with sarah metastases. These included the right perirectal/internal iliac node measuring 1.7 x 0.9 cm (SUV 9.5), left perirectal node (1.0 x 0.9 cm, SUV max 5.5), left external iliac node (2.7 x 1.9 cm, SUV max 10.6), and left external iliac node posterior to the left psoas muscle (1.5 x 1.2 cm, SUV max 11.2). There was also a borderline FDG avid left axillary lymph node measuring 1.1 x 0.9 cm with fatty hilum and SUV max 2.7 for which Ms. Zuluaga underwent additional evaluation with ultrasound and felt to be benign.       MRI of the pelvis on 09/09/2020 further showed evidence of bilateral parametrial invasion as well as thickening of the left uterosacral ligament with associated leftward tethering of the rectum. There was no bladder or rectal invasion.      Her case was discussed at the Gyn Onc conference. She was eligible for  trial.  However, after further examination of her imaging and discussion with study chair, it was felt that her disease might be too extensive to fulfill protocol specified dose constraints.  Additionally study prohibits using simultaneously integrated boost for parametrium.  Given her bulky pelvic adenopathy, sequential boost to the parametria using AP/PA technique may result in unacceptable bowel dose. After extensive discussion with Dr. Brambila and Dr. Gonzalez, it was ultimately decided to not enroll her in the trial and instead treat her with our institution standard technique.     Ms. Zuluaga is here today for discussing plans for radiotherapy  and proceeding with simulation.  She has no additional questions and is ready to proceed.        OBJECTIVE:   LMP 08/25/2020    PHYSICAL EXAM:  Gen: Alert, in NAD  Eyes: EOMI, sclera anicteric  HENT      Head: NC/AT     Ears: No external auricular lesions     Nose/sinus: No rhinorrhea or epistaxis     Oral Cavity/Oropharynx: MMM, no thrush noted  Pulm: Breathing comfortably on room air, no audible wheezes or ronchi  CV: Well-perfused, no cyanosis  Abdominal: Soft, nondistended  Skin: Normal color and turgor  Neurologic/MSK: motor grossly intact, normal gait  Gyn: not performed today  Psych: Appropriate mood and affect      IMPRESSION: Ms. Zuluaga is a 35 year old woman with a locoregional advanced cervical cancer, Stage IIIC1(r).  She has a very large primary tumor, with imaging evidence of bilateral parametrial invasion and clinical evidence of posterior vaginal involvement. She has multiple pelvic lymphadenopathy involving the external iliac, perirectal, and internal iliac nodes. The conglomerate of nodes on the left external iliac chain were quite bulky.      RECOMMENDATIONS: We will proceed to simulation with plan to treat her with  external beam radiotherapy will be approximately of 4500 cGy in 25 fractions to include the cervix, uterus, upper half of the vagina, parametrium, and associated regional lymph nodes, including common, external, internal iliacs, mesorectum and preascral nodes.  Grossly positive lymph nodes will receive a higher dose using simultaneously integrated boost. The external beam radiotherapy will be given with weekly cisplatin. Once she completes external beam radiotherapy, we will plan for her to undergo restaging imaging. Her tumor response to chemoradiation will dictate whether we proceed with tandem and ring versus interstitial brachytherapy.    We explained our rationale, as well as the logistics, risks, benefits, and alternatives to concurrent chemotherapy and radiotherapy to the pelvis.  Potential toxicities of treatment were outlined, including but not limited to, fatigue, decreased blood counts (from chemotherapy), nausea and vomiting (chemotherapy), ototoxicity (cisplatin), renal toxicity (cisplatin), loss of pubic hair, chronic proctitis, rectal bleeding, diarrhea, bladder irritation, decreased bone density and increased risk for sacral insufficiency and pathologic fracture, induction of menopause, vaginal stenosis and sexual dysfunction, increased risk for small bowel obstruction, and secondary cancer.     She consented for simulation at this juncture. All questions were answered to the best of our ability. The patient was seen and discussed with Dr. Hernández.     Nevin Joya MD PGY2  Department of Radiation Oncology  [phone]    I saw and examined the patient with the resident.  I have reviewed and edited the resident's note and agree with the plan of care.      Parish Hernández MD

## 2020-09-18 NOTE — TELEPHONE ENCOUNTER
TC to pt in response to message regarding pt having questions regarding tx/appts. No answer LMTC clinic and speak with writer today or Odalys Paniagua next week. Await response.

## 2020-09-18 NOTE — NURSING NOTE
Radiation Therapy Patient Education    Person involved with teaching: Patient    Patient educational needs for self management of treatment-related side effects assessment completed.  [facility] Patient Ed tab contains Patient Learning Assessment    Education Materials Given  Radiation Therapy for GYN Patients, sim pamphlet and schdedule    Educational Topics Discussed  Side effects expected, Pain management, Skin care, Activity, Nutrition and weight loss and When to call MD/RN    Response To Teaching  Verbalizes understanding    GYN Only  Vaginal Dilator-given and educated: not given    Referrals sent: None    Chemotherapy?  Yes: Chemo set up for starting 9/28/2020

## 2020-09-21 ENCOUNTER — TELEPHONE (OUTPATIENT)
Dept: CARE COORDINATION | Facility: CLINIC | Age: 35
End: 2020-09-21

## 2020-09-21 NOTE — TELEPHONE ENCOUNTER
Social Work Note: Telephone Call  Oncology Clinic    Data/Intervention:  Patient Name:  Suni Zuluaga  /Age:  1985 (35 year old)    Call From:  SW contacted patient by phone.  Reason for Call:  Lodging resources.    Assessment:  SW contacted patient by phone to give update that writer is working with case management Lodging and Accommodations specialist on lodging arrangement.  Patient approved for [employer] [facility].  SW also asked whether patient will be returning home on weekends, she indicated this would likely be dependent on financial resources to assist with gas costs.  SW indicated writer would contact patient again once lodging arrangements finalized.     Plan:  SW continuing to follow patient for resources and needs.       Soo Yeon Han, MSW, [facility] Island HospitalSW  Pager: [phone]  Phone: [phone]

## 2020-09-22 ENCOUNTER — TELEPHONE (OUTPATIENT)
Dept: CARE COORDINATION | Facility: CLINIC | Age: 35
End: 2020-09-22

## 2020-09-22 NOTE — TELEPHONE ENCOUNTER
Social Work Note: Telephone Call  Oncology Clinic    Data/Intervention:  Patient Name:  Suni Zuluaga  /Age:  1985 (35 year old)    Call From:  SW contacted patient by phone  Reason for Call:  Confirmation of lodging for upcoming radiation therapy.    Assessment:  SW spoke with patient over the phone. Patient's lodging during radiation confirmed at [facility].  SW provided details of confirmation to patient.  Foundation funds also utilized to cover lodging costs. Patient indicated understanding of education given.    Plan:  SW continues to be available to assist with needs. Patient has SW contact information for any other needs.     Soo Yeon Han, MSW, LICSW  Pager: [phone]  Phone: [phone]

## 2020-09-23 ENCOUNTER — PATIENT OUTREACH (OUTPATIENT)
Dept: ONCOLOGY | Facility: CLINIC | Age: 35
End: 2020-09-23

## 2020-09-23 NOTE — PROGRESS NOTES
Left message for Suni to let her know that I have emailed and sent in the mail a copy of her up coming chemo/radiation appointments.  Also included Post Chemo instructions and written information on Cisplatin.  Requested patient call back with any questions.

## 2020-09-24 ENCOUNTER — TELEPHONE (OUTPATIENT)
Dept: ONCOLOGY | Facility: CLINIC | Age: 35
End: 2020-09-24

## 2020-09-24 NOTE — TELEPHONE ENCOUNTER
Patient scheduled to start chemoradiation therapy for her locally advanced cervical cancer.   Cisplatin is for chemosensitization and has been associated with improved survival therefore it is medically necessary and urgent for her to receive this treatment.   Given her bleeding, the extent of disease and risk for progression to an incurable stage, she needs to start treatment VANESA Brambila MD  Gynecologic Oncology  Pager [phone]

## 2020-09-25 RX ORDER — PROCHLORPERAZINE MALEATE 10 MG
10 TABLET ORAL EVERY 6 HOURS PRN
Qty: 30 TABLET | Refills: 2 | Status: ON HOLD | OUTPATIENT
Start: 2020-09-25 | End: 2020-11-13

## 2020-09-28 ENCOUNTER — APPOINTMENT (OUTPATIENT)
Dept: LAB | Facility: CLINIC | Age: 35
End: 2020-09-28
Attending: OBSTETRICS & GYNECOLOGY
Payer: MEDICAID

## 2020-09-28 ENCOUNTER — OFFICE VISIT (OUTPATIENT)
Dept: RADIATION ONCOLOGY | Facility: CLINIC | Age: 35
End: 2020-09-28
Attending: RADIOLOGY
Payer: MEDICAID

## 2020-09-28 ENCOUNTER — INFUSION THERAPY VISIT (OUTPATIENT)
Dept: ONCOLOGY | Facility: CLINIC | Age: 35
End: 2020-09-28
Attending: OBSTETRICS & GYNECOLOGY
Payer: MEDICAID

## 2020-09-28 VITALS
TEMPERATURE: 97.4 F | WEIGHT: 150.7 LBS | DIASTOLIC BLOOD PRESSURE: 78 MMHG | BODY MASS INDEX: 27.04 KG/M2 | HEART RATE: 81 BPM | SYSTOLIC BLOOD PRESSURE: 117 MMHG | OXYGEN SATURATION: 100 % | RESPIRATION RATE: 16 BRPM

## 2020-09-28 DIAGNOSIS — C53.8 MALIGNANT NEOPLASM OF OVERLAPPING SITES OF CERVIX (H): Primary | ICD-10-CM

## 2020-09-28 DIAGNOSIS — C53.1 MALIGNANT NEOPLASM OF EXOCERVIX (H): Primary | ICD-10-CM

## 2020-09-28 LAB
ALBUMIN SERPL-MCNC: 3.4 G/DL (ref 3.4–5)
ALP SERPL-CCNC: 68 U/L (ref 40–150)
ALT SERPL W P-5'-P-CCNC: 24 U/L (ref 0–50)
ANION GAP SERPL CALCULATED.3IONS-SCNC: 6 MMOL/L (ref 3–14)
AST SERPL W P-5'-P-CCNC: 18 U/L (ref 0–45)
BASOPHILS # BLD AUTO: 0 10E9/L (ref 0–0.2)
BASOPHILS NFR BLD AUTO: 0.3 %
BILIRUB SERPL-MCNC: 0.2 MG/DL (ref 0.2–1.3)
BUN SERPL-MCNC: 9 MG/DL (ref 7–30)
CALCIUM SERPL-MCNC: 8.6 MG/DL (ref 8.5–10.1)
CHLORIDE SERPL-SCNC: 107 MMOL/L (ref 94–109)
CO2 SERPL-SCNC: 25 MMOL/L (ref 20–32)
CREAT SERPL-MCNC: 0.46 MG/DL (ref 0.52–1.04)
DIFFERENTIAL METHOD BLD: ABNORMAL
EOSINOPHIL # BLD AUTO: 0.1 10E9/L (ref 0–0.7)
EOSINOPHIL NFR BLD AUTO: 1.6 %
ERYTHROCYTE [DISTWIDTH] IN BLOOD BY AUTOMATED COUNT: 14.1 % (ref 10–15)
GFR SERPL CREATININE-BSD FRML MDRD: >90 ML/MIN/{1.73_M2}
GLUCOSE SERPL-MCNC: 95 MG/DL (ref 70–99)
HCT VFR BLD AUTO: 36 % (ref 35–47)
HGB BLD-MCNC: 11.6 G/DL (ref 11.7–15.7)
IMM GRANULOCYTES # BLD: 0 10E9/L (ref 0–0.4)
IMM GRANULOCYTES NFR BLD: 0.3 %
LYMPHOCYTES # BLD AUTO: 1.4 10E9/L (ref 0.8–5.3)
LYMPHOCYTES NFR BLD AUTO: 15.6 %
MAGNESIUM SERPL-MCNC: 1.9 MG/DL (ref 1.6–2.3)
MCH RBC QN AUTO: 29.7 PG (ref 26.5–33)
MCHC RBC AUTO-ENTMCNC: 32.2 G/DL (ref 31.5–36.5)
MCV RBC AUTO: 92 FL (ref 78–100)
MONOCYTES # BLD AUTO: 0.7 10E9/L (ref 0–1.3)
MONOCYTES NFR BLD AUTO: 8.3 %
NEUTROPHILS # BLD AUTO: 6.4 10E9/L (ref 1.6–8.3)
NEUTROPHILS NFR BLD AUTO: 73.9 %
NRBC # BLD AUTO: 0 10*3/UL
NRBC BLD AUTO-RTO: 0 /100
PLATELET # BLD AUTO: 213 10E9/L (ref 150–450)
POTASSIUM SERPL-SCNC: 3.7 MMOL/L (ref 3.4–5.3)
PROT SERPL-MCNC: 7.1 G/DL (ref 6.8–8.8)
RBC # BLD AUTO: 3.91 10E12/L (ref 3.8–5.2)
SODIUM SERPL-SCNC: 138 MMOL/L (ref 133–144)
WBC # BLD AUTO: 8.7 10E9/L (ref 4–11)

## 2020-09-28 PROCEDURE — 85025 COMPLETE CBC W/AUTO DIFF WBC: CPT | Performed by: OBSTETRICS & GYNECOLOGY

## 2020-09-28 PROCEDURE — 83735 ASSAY OF MAGNESIUM: CPT | Performed by: OBSTETRICS & GYNECOLOGY

## 2020-09-28 PROCEDURE — 96367 TX/PROPH/DG ADDL SEQ IV INF: CPT

## 2020-09-28 PROCEDURE — 25800025 ZZH RX 258: Mod: ZF | Performed by: OBSTETRICS & GYNECOLOGY

## 2020-09-28 PROCEDURE — 25000128 H RX IP 250 OP 636: Mod: ZF | Performed by: OBSTETRICS & GYNECOLOGY

## 2020-09-28 PROCEDURE — 25800030 ZZH RX IP 258 OP 636: Mod: ZF | Performed by: OBSTETRICS & GYNECOLOGY

## 2020-09-28 PROCEDURE — 96361 HYDRATE IV INFUSION ADD-ON: CPT

## 2020-09-28 PROCEDURE — 80053 COMPREHEN METABOLIC PANEL: CPT | Performed by: OBSTETRICS & GYNECOLOGY

## 2020-09-28 PROCEDURE — 77386 ZZH IMRT TREATMENT DELIVERY, COMPLEX: CPT | Performed by: RADIOLOGY

## 2020-09-28 PROCEDURE — 96375 TX/PRO/DX INJ NEW DRUG ADDON: CPT

## 2020-09-28 PROCEDURE — 96413 CHEMO IV INFUSION 1 HR: CPT

## 2020-09-28 RX ORDER — PALONOSETRON 0.05 MG/ML
0.25 INJECTION, SOLUTION INTRAVENOUS ONCE
Status: COMPLETED | OUTPATIENT
Start: 2020-09-28 | End: 2020-09-28

## 2020-09-28 RX ORDER — DEXTROSE, SODIUM CHLORIDE, AND POTASSIUM CHLORIDE 5; .45; .15 G/100ML; G/100ML; G/100ML
2000 INJECTION INTRAVENOUS ONCE
Status: COMPLETED | OUTPATIENT
Start: 2020-09-28 | End: 2020-09-28

## 2020-09-28 RX ADMIN — CISPLATIN 70 MG: 1 INJECTION INTRAVENOUS at 09:46

## 2020-09-28 RX ADMIN — POTASSIUM CHLORIDE, DEXTROSE MONOHYDRATE AND SODIUM CHLORIDE 2000 ML: 150; 5; 450 INJECTION, SOLUTION INTRAVENOUS at 07:11

## 2020-09-28 RX ADMIN — DEXAMETHASONE SODIUM PHOSPHATE: 10 INJECTION, SOLUTION INTRAMUSCULAR; INTRAVENOUS at 08:54

## 2020-09-28 RX ADMIN — PALONOSETRON 0.25 MG: 0.05 INJECTION, SOLUTION INTRAVENOUS at 08:52

## 2020-09-28 ASSESSMENT — PAIN SCALES - GENERAL: PAINLEVEL: SEVERE PAIN (6)

## 2020-09-28 NOTE — NURSING NOTE
Chief Complaint   Patient presents with     Blood Draw     labs drawn with piv start by rn.  vs taken     Labs drawn with PIV start by rn.  Pt tolerated well.  VS taken and pt checked in for next appt.    Char Harper RN

## 2020-09-28 NOTE — LETTER
9/28/2020         RE: Suni Zuluaga  [address] [facility] [address]        Dear Colleague,    Thank you for referring your patient, Suni Zuluaga, to the RADIATION ONCOLOGY CLINIC. Please see a copy of my visit note below.    See note on same day.    Again, thank you for allowing me to participate in the care of your patient.        Sincerely,        Parish Hernández MD

## 2020-09-28 NOTE — PROGRESS NOTES
Infusion Nursing Note:  Suni Zuluaga presents today for Cycle 1, Day 1 Cisplatin.    Patient seen by provider today: No   present during visit today: Not Applicable.    Note: Pt assessed upon arrival to infusion suite. Denies fever, chills, cough, SOB or other signs/symptoms of infection. Pt received information on Cisplatin and post chemo instructions previously from Odalys Paniagua, RNCC. Pt is aware to call clinic with temp >100.4, uncontrolled nausea/pain, or any other issues/concerns. Pt voiding and drinking adequately prior to initiating Cisplatin.     Intravenous Access:  Peripheral IV placed.    Treatment Conditions:  Lab Results   Component Value Date    HGB 11.6 09/28/2020     Lab Results   Component Value Date    WBC 8.7 09/28/2020      Lab Results   Component Value Date    ANEU 6.4 09/28/2020     Lab Results   Component Value Date     09/28/2020      Lab Results   Component Value Date     09/28/2020                   Lab Results   Component Value Date    POTASSIUM 3.7 09/28/2020           Lab Results   Component Value Date    MAG 1.9 09/28/2020            Lab Results   Component Value Date    CR 0.46 09/28/2020                   Lab Results   Component Value Date    HARRISON 8.6 09/28/2020                Lab Results   Component Value Date    BILITOTAL 0.2 09/28/2020           Lab Results   Component Value Date    ALBUMIN 3.4 09/28/2020                    Lab Results   Component Value Date    ALT 24 09/28/2020           Lab Results   Component Value Date    AST 18 09/28/2020     Results reviewed, labs MET treatment parameters, ok to proceed with treatment.    Post Infusion Assessment:  Patient tolerated infusion without incident.  Blood return noted pre and post infusion.  Site patent and intact, free from redness, edema or discomfort.  No evidence of extravasations.  Access discontinued per protocol.     Discharge Plan:   Prescription refills given for Compazine.  Copy of AVS reviewed  with patient and/or family.  Patient will return 10/5/20 for next appointment.  Patient discharged in stable condition accompanied by: self.  Departure Mode: Ambulatory.    Karin Kevin RN

## 2020-09-28 NOTE — PROGRESS NOTES
[facility]  SPECIALIZING IN BREAKTHROUGHS  Radiation Oncology    On Treatment Visit Note      Suni Zuluaga      Date: Sep 28, 2020   MRN: [id number]   : 1985       DIAGNOSIS: squamous cell carcinoma of the cervix, FIGO Stage IIIC1(r)  Reason for Visit:  On Radiation Treatment Visit     Treatment Summary to Date   Pelvis, low PANS   180 cGy/4500 cGy   Fraction:           Subjective:   Ms. Zuluaga tolerated her first radiotherapy and cisplatin infusion without issue. She does have housing arrangements here; her kids will stay at home, and she will go home on . She was able to  her prescription of compazine. She is requiring one pad per day for intermittent spotting. Pelvic/abdominal pain is still present; she did not require any pain medicine today, but has been taking ibuprofen occasionally.     Objective:   There were no vitals taken for this visit.  Gen: Appears well, in no acute distress  Skin: No erythema    Labs:  CBC RESULTS:   Recent Labs   Lab Test 20  0636   WBC 8.7   RBC 3.91   HGB 11.6*   HCT 36.0   MCV 92   MCH 29.7   MCHC 32.2   RDW 14.1        ELECTROLYTES:  Recent Labs   Lab Test 20  0636      POTASSIUM 3.7   CHLORIDE 107   HARRISON 8.6   CO2 25   BUN 9   CR 0.46*   GLC 95       Assessment:  Ms. Zuluaga is a 35 year old woman with a locoregional advanced cervical cancer, Stage IIIC1(r).  She has a very large primary tumor, with imaging evidence of bilateral parametrial invasion and clinical evidence of posterior vaginal involvement. She has multiple pelvic lymphadenopathy involving the external iliac, perirectal, and internal iliac nodes. She has completed her first treatment without issue.  All questions and concerns addressed.    Toxicities:  Fatigue: Grade 0: No toxicity  Diarrhea: Grade 0: No toxicity  Urinary frequency: Grade 0: No toxicity  Urinary tract pain: Grade 0: No toxicity  Urinary urgency: Grade 0: No toxicity     Plan:    1. Continue current therapy.    2. Discussed re-staging plan with MRI. Will likely plan on tandem and ring following completion of external beam component    Mosaiq chart and setup information reviewed  MVCT/IGRT images checked       Nevin Joya MD PGY2  Department of Radiation Oncology  [phone]    I saw and examined the patient with the resident.  I have reviewed and edited the resident's note and agree with the plan of care.      Parish Hernández MD

## 2020-09-28 NOTE — PATIENT INSTRUCTIONS
[facility] Triage and after hours / weekends / holidays:  [phone]    Please call the triage or after hours line if you experience a temperature greater than or equal to 100.5, shaking chills, have uncontrolled nausea, vomiting and/or diarrhea, dizziness, shortness of breath, chest pain, bleeding, unexplained bruising, or if you have any other new/concerning symptoms, questions or concerns.      If you are having any concerning symptoms or wish to speak to a provider before your next infusion visit, please call your care coordinator or triage to notify them so we can adequately serve you.     If you need a refill on a narcotic prescription or other medication, please call before your infusion appointment.                 September 2020 Sunday Monday Tuesday Wednesday Thursday Friday Saturday             1    [facility] NEW   1:45 PM   (60 min.)   Suzy Brambila MD   [facility] 2     3     4    PET ONCOLOGY WHOLE BODY   1:30 PM   (45 min.)   UUPET1   [facility] PET CT 5       6     7     8     9    [facility] RESEARCH WITH ROOM   1:30 PM   (60 min.)   Coordinator, [facility] [employer]   [facility] RETURN   2:15 PM   (30 min.)   Dustin Washington MD   [facility] LAB DRAW   3:00 PM   (15 min.)   [facility] LAB DRAW   [facility] Lab Draw    MR PELVIS (GYN) W CONTRAST   4:00 PM   (60 min.)   URMR1   [facility], MRI 10     11    VIDEO VISIT NEW   9:30 AM   (60 min.)   Parish Hernández MD   [facility] 12       13     14     15    MA DIAGNOSTIC BILAT W/ ANGELINE  10:00 AM   (20 min.)   [id number]   [facility] Imaging    US AXILLARY LEFT  10:30 AM   (20 min.)   UCBCUS1   [facility] 16     17     18    P RETURN  12:30 PM   (30 min.)   Parish Hernández MD   Radiation Oncology Clinic    [facility] TCT/SIM SUITE   1:00 PM   (60 min.)   Parish Hernández MD   [facility] 19       20      21    [facility] TREATMENT PLAN VISIT   7:00 AM   (15 min.)   Parish Hernández MD   [facility] 22     23     24     25    [facility] TREATMENT PLAN VISIT   7:00 AM   (15 min.)   Parish Hernández MD   [facility] 26       27     28    [facility] DRAW   6:30 AM   (15 min.)    [facility] DRAW   [facility] Draw    P ONC INFUSION 360   7:00 AM   (360 min.)   [facility] ONCOLOGY INFUSION   [facility]    UMP EXTERNAL RADIATION TREATMT   3:30 PM   (30 min.)   [facility] RAD ONC ANGELINE   [facility]    [facility] ON TREATMENT VISIT   4:00 PM   (15 min.)   Parish Hernández MD   [facility] 29    [facility] EXTERNAL RADIATION TREATMT   8:30 AM   (30 min.)   [facility] RAD ONC ANGELINE   [facility] 30    [facility] EXTERNAL RADIATION TREATMT   8:30 AM   (30 min.)   [facility] RAD ONC ANGELINE   [facility]                            October 2020 Sunday Monday Tuesday Wednesday Thursday Friday Saturday                       1    [facility] EXTERNAL RADIATION TREATMT   8:30 AM   (30 min.)   [facility] RAD ONC ANGELINE   [facility] 2    [facility] EXTERNAL RADIATION TREATMT   8:30 AM   (30 min.)   [facility] RAD ONC ANGELINE   [facility] 3       4     5    [facility] DRAW   7:00 AM   (15 min.)    [facility] DRAW   [facility] Draw    P ONC INFUSION 360   7:30 AM   (360 min.)   [facility] ONCOLOGY INFUSION   [facility]    [facility] EXTERNAL RADIATION TREATMT   1:00 PM   (30 min.)   [facility] RAD ONC ANGELINE   [facility]    [facility] ON TREATMENT VISIT   1:30 PM   (15 min.)   Parish Hernández MD   [facility] 6    [facility] EXTERNAL RADIATION TREATMT   8:30 AM   (30 min.)   [facility] RAD ONC ANGELINE   [facility] 7    [facility] EXTERNAL RADIATION TREATMT   8:30 AM   (30 min.)   [facility] RAD ONC ANGELINE   [facility] 8    [facility] EXTERNAL RADIATION TREATMT   8:30 AM   (30 min.)   [facility] RAD ONC ANGELINE   [facility] 9    UMP EXTERNAL RADIATION  TREATMT   8:30 AM   (30 min.)   P RAD ONC ANGELINE   [facility] 10       11     12    [facility] LAB DRAW   7:15 AM   (15 min.)    [facility] LAB DRAW   [facility] Lab Draw    P ONC INFUSION 360   8:00 AM   (360 min.)   [facility] ONCOLOGY INFUSION   [facility]    UMP EXTERNAL RADIATION TREATMT   1:00 PM   (30 min.)   P RAD ONC ANGELINE   [facility]    [facility] ON TREATMENT VISIT   1:30 PM   (15 min.)   Parish Hernández MD   [facility] 13    UMP EXTERNAL RADIATION TREATMT   8:30 AM   (30 min.)   [facility] RAD ONC ANGELINE   [facility] 14    [facility] EXTERNAL RADIATION TREATMT   8:30 AM   (30 min.)   P RAD ONC ANGELINE   [facility] 15    [facility] EXTERNAL RADIATION TREATMT   8:30 AM   (30 min.)   P RAD ONC ANGELINE   [facility] 16    [facility] EXTERNAL RADIATION TREATMT   8:30 AM   (30 min.)   P RAD ONC ANGELINE   [facility] 17       18     19    [facility] EXTERNAL RADIATION TREATMT   8:30 AM   (30 min.)   P RAD ONC ANGELINE   [facility]    [facility] ON TREATMENT VISIT   9:00 AM   (15 min.)   Parish Hernández MD   [facility] 20    [facility] LAB DRAW   7:30 AM   (15 min.)    [facility]   [facility]    P ONC INFUSION 360   8:00 AM   (360 min.)   [facility] ONCOLOGY INFUSION   [facility]    TELEPHONE VISIT RETURN  10:20 AM   (20 min.)   Suzy Brambila MD   [facility]    UMP EXTERNAL RADIATION TREATMT   1:00 PM   (30 min.)   [facility] RAD ONC ANGELINE   [facility] 21    UMP EXTERNAL RADIATION TREATMT   8:30 AM   (30 min.)   [facility] RAD ONC ANEGLINE   [facility] 22    [facility] EXTERNAL RADIATION TREATMT   8:30 AM   (30 min.)   [facility] RAD ONC ANGELINE   [facility] 23    [facility] EXTERNAL RADIATION TREATMT   8:30 AM   (30 min.)   [facility] RAD ONC ANGELINE   [facility] 24       25     26    UMP EXTERNAL RADIATION TREATMT   8:30 AM   (30 min.)   UMP  RAD ONC ANGELINE   Rad[facility] Oncology Clinic    [facility] ON TREATMENT VISIT   9:00 AM   (15 min.)   Parish Hernández MD   [facility]cology [facility] 27    [facility] LAB DRAW   7:30 AM   (15 min.)   [facility] LAB DRAW   [facility] Lab Draw    [facility] ONC INFUSION 360   8:00 AM   (360 min.)   UC ONCOLOGY INFUSION   [facility] EXTERNAL RADIATION TREATMT   1:00 PM   (30 min.)   [facility] RAD ONC ANGELINE   [facility] 28    [facility] EXTERNAL RADIATION TREATMT   8:30 AM   (30 min.)   [facility] RAD ONC ANGELINE   [facility]cology [facility] 29    [facility] EXTERNAL RADIATION TREATMT   8:30 AM   (30 min.)   [facility] RAD ONC ANGELINE   [facility] 30    [facility] EXTERNAL RADIATION TREATMT   8:30 AM   (30 min.)   [facility] RAD ONC ANGELINE   [facility] 31                     Lab Results:  Recent Results (from the past 12 hour(s))   CBC with platelets differential    Collection Time: 09/28/20  6:36 AM   Result Value Ref Range    WBC 8.7 4.0 - 11.0 10e9/L    RBC Count 3.91 3.8 - 5.2 10e12/L    Hemoglobin 11.6 (L) 11.7 - 15.7 g/dL    Hematocrit 36.0 35.0 - 47.0 %    MCV 92 78 - 100 fl    MCH 29.7 26.5 - 33.0 pg    MCHC 32.2 31.5 - 36.5 g/dL    RDW 14.1 10.0 - 15.0 %    Platelet Count 213 150 - 450 10e9/L    Diff Method Automated Method     % Neutrophils 73.9 %    % Lymphocytes 15.6 %    % Monocytes 8.3 %    % Eosinophils 1.6 %    % Basophils 0.3 %    % Immature Granulocytes 0.3 %    Nucleated RBCs 0 0 /100    Absolute Neutrophil 6.4 1.6 - 8.3 10e9/L    Absolute Lymphocytes 1.4 0.8 - 5.3 10e9/L    Absolute Monocytes 0.7 0.0 - 1.3 10e9/L    Absolute Eosinophils 0.1 0.0 - 0.7 10e9/L    Absolute Basophils 0.0 0.0 - 0.2 10e9/L    Abs Immature Granulocytes 0.0 0 - 0.4 10e9/L    Absolute Nucleated RBC 0.0    Comprehensive metabolic panel    Collection Time: 09/28/20  6:36 AM   Result Value Ref Range    Sodium 138 133 - 144 mmol/L    Potassium 3.7 3.4 - 5.3 mmol/L    Chloride 107 94 - 109 mmol/L    Carbon Dioxide 25 20 - 32  mmol/L    Anion Gap 6 3 - 14 mmol/L    Glucose 95 70 - 99 mg/dL    Urea Nitrogen 9 7 - 30 mg/dL    Creatinine 0.46 (L) 0.52 - 1.04 mg/dL    GFR Estimate >90 >60 mL/min/[1.73_m2]    GFR Estimate If Black >90 >60 mL/min/[1.73_m2]    Calcium 8.6 8.5 - 10.1 mg/dL    Bilirubin Total 0.2 0.2 - 1.3 mg/dL    Albumin 3.4 3.4 - 5.0 g/dL    Protein Total 7.1 6.8 - 8.8 g/dL    Alkaline Phosphatase 68 40 - 150 U/L    ALT 24 0 - 50 U/L    AST 18 0 - 45 U/L   Magnesium    Collection Time: 09/28/20  6:36 AM   Result Value Ref Range    Magnesium 1.9 1.6 - 2.3 mg/dL

## 2020-09-28 NOTE — PROGRESS NOTES
[facility] PHYSICIANS  SPECIALIZING IN BREAKTHROUGHS  Radiation Oncology    On Treatment Visit Note      Suni Zuluaga      Date: Sep 28, 2020   MRN: [id number]   : 1985         Reason for Visit:  On Radiation Treatment Visit     Treatment Summary to Date      cGy             Subjective:       Nursing ROS:                                  Objective:   There were no vitals taken for this visit.  Gen: {appearance:987948}  Skin: {erythema:996342}    Labs:  CBC RESULTS:   Recent Labs   Lab Test 20  0636   WBC 8.7   RBC 3.91   HGB 11.6*   HCT 36.0   MCV 92   MCH 29.7   MCHC 32.2   RDW 14.1        ELECTROLYTES:  Recent Labs   Lab Test 20  0636      POTASSIUM 3.7   CHLORIDE 107   HARRISON 8.6   CO2 25   BUN 9   CR 0.46*   GLC 95       Assessment:    Tolerating radiation therapy well.  All questions and concerns addressed.    Toxicities:  {CTCv4 All:088445}     Plan:   1. Continue current therapy.        Mosaiq chart and setup information reviewed  {[facility] RAD ONC OTV PLAN:[id number]}              Fernando Greenfield MD PGY-4  Radiation Oncology, [facility], [place]  [phone] clinic  Pager [phone]    ***

## 2020-09-28 NOTE — PROGRESS NOTES
[facility] PHYSICIANS  SPECIALIZING IN BREAKTHROUGHS  Radiation Oncology    On Treatment Visit Note      Suni Zuluaga      Date: Sep 28, 2020   MRN: [id number]   : 1985         Reason for Visit:  On Radiation Treatment Visit     Treatment Summary to Date      cGy             Subjective:   Ms. Zuluaga tolerated her first radiotherapy and cisplatin infusion without issue. She does have housing arrangements here; her kids will stay at home, and she will go home on . She was able to  her prescription of compazine. She is requiring one pad per day for intermittent spotting. Pelvic/abdominal pain is still present; she did not require any pain medicine today, but has been taking ibuprofen occasionally.       Nursing ROS:                                  Objective:   There were no vitals taken for this visit.  Gen: {appearance:313976}  Skin: {erythema:575425}    Labs:  CBC RESULTS:   Recent Labs   Lab Test 20  0636   WBC 8.7   RBC 3.91   HGB 11.6*   HCT 36.0   MCV 92   MCH 29.7   MCHC 32.2   RDW 14.1        ELECTROLYTES:  Recent Labs   Lab Test 20  0636      POTASSIUM 3.7   CHLORIDE 107   HARRISON 8.6   CO2 25   BUN 9   CR 0.46*   GLC 95       Assessment:    Tolerating radiation therapy well.  All questions and concerns addressed.    Toxicities:  {CTCv4 All:695629}     Plan:   1. Continue current therapy.        Mosaiq chart and setup information reviewed  {[facility] RAD ONC OTV PLAN:[id number]}              Fernando Greenfield MD PGY-4  Radiation Oncology, [facility]  [phone] clinic  Pager [phone]    ***

## 2020-09-29 ENCOUNTER — APPOINTMENT (OUTPATIENT)
Dept: RADIATION ONCOLOGY | Facility: CLINIC | Age: 35
End: 2020-09-29
Attending: RADIOLOGY
Payer: MEDICAID

## 2020-09-29 PROCEDURE — 77386 ZZH IMRT TREATMENT DELIVERY, COMPLEX: CPT | Performed by: RADIOLOGY

## 2020-09-30 ENCOUNTER — APPOINTMENT (OUTPATIENT)
Dept: RADIATION ONCOLOGY | Facility: CLINIC | Age: 35
End: 2020-09-30
Attending: RADIOLOGY
Payer: MEDICAID

## 2020-09-30 PROCEDURE — 77386 ZZH IMRT TREATMENT DELIVERY, COMPLEX: CPT | Performed by: RADIOLOGY

## 2020-10-01 ENCOUNTER — PREP FOR PROCEDURE (OUTPATIENT)
Dept: RADIATION ONCOLOGY | Facility: CLINIC | Age: 35
End: 2020-10-01

## 2020-10-01 ENCOUNTER — APPOINTMENT (OUTPATIENT)
Dept: RADIATION ONCOLOGY | Facility: CLINIC | Age: 35
End: 2020-10-01
Attending: RADIOLOGY
Payer: MEDICAID

## 2020-10-01 DIAGNOSIS — C53.9 CERVICAL CANCER (H): Primary | ICD-10-CM

## 2020-10-01 PROCEDURE — 77386 HC IMRT TREATMENT DELIVERY, COMPLEX: CPT | Performed by: RADIOLOGY

## 2020-10-01 PROCEDURE — 77014 PR CT GUIDE FOR PLACEMENT RADIATION THERAPY FIELDS: CPT | Mod: 26 | Performed by: RADIOLOGY

## 2020-10-02 ENCOUNTER — APPOINTMENT (OUTPATIENT)
Dept: RADIATION ONCOLOGY | Facility: CLINIC | Age: 35
End: 2020-10-02
Attending: RADIOLOGY
Payer: MEDICAID

## 2020-10-02 PROCEDURE — 77336 RADIATION PHYSICS CONSULT: CPT | Performed by: RADIOLOGY

## 2020-10-02 PROCEDURE — 77386 HC IMRT TREATMENT DELIVERY, COMPLEX: CPT | Performed by: RADIOLOGY

## 2020-10-02 PROCEDURE — 77427 RADIATION TX MANAGEMENT X5: CPT | Performed by: RADIOLOGY

## 2020-10-02 PROCEDURE — 77014 PR CT GUIDE FOR PLACEMENT RADIATION THERAPY FIELDS: CPT | Mod: 26 | Performed by: RADIOLOGY

## 2020-10-05 ENCOUNTER — APPOINTMENT (OUTPATIENT)
Dept: LAB | Facility: CLINIC | Age: 35
End: 2020-10-05
Attending: OBSTETRICS & GYNECOLOGY
Payer: MEDICAID

## 2020-10-05 ENCOUNTER — OFFICE VISIT (OUTPATIENT)
Dept: RADIATION ONCOLOGY | Facility: CLINIC | Age: 35
End: 2020-10-05
Attending: RADIOLOGY
Payer: MEDICAID

## 2020-10-05 ENCOUNTER — INFUSION THERAPY VISIT (OUTPATIENT)
Dept: ONCOLOGY | Facility: CLINIC | Age: 35
End: 2020-10-05
Attending: OBSTETRICS & GYNECOLOGY
Payer: MEDICAID

## 2020-10-05 VITALS
DIASTOLIC BLOOD PRESSURE: 70 MMHG | HEART RATE: 86 BPM | OXYGEN SATURATION: 99 % | SYSTOLIC BLOOD PRESSURE: 110 MMHG | BODY MASS INDEX: 25.6 KG/M2 | WEIGHT: 142.7 LBS | TEMPERATURE: 97.5 F | RESPIRATION RATE: 16 BRPM

## 2020-10-05 VITALS — BODY MASS INDEX: 25.48 KG/M2 | WEIGHT: 142 LBS

## 2020-10-05 DIAGNOSIS — C53.1 MALIGNANT NEOPLASM OF EXOCERVIX (H): Primary | ICD-10-CM

## 2020-10-05 DIAGNOSIS — C53.8 MALIGNANT NEOPLASM OF OVERLAPPING SITES OF CERVIX (H): Primary | ICD-10-CM

## 2020-10-05 LAB
ALBUMIN SERPL-MCNC: 3.5 G/DL (ref 3.4–5)
ALP SERPL-CCNC: 66 U/L (ref 40–150)
ALT SERPL W P-5'-P-CCNC: 20 U/L (ref 0–50)
ANION GAP SERPL CALCULATED.3IONS-SCNC: 9 MMOL/L (ref 3–14)
AST SERPL W P-5'-P-CCNC: 14 U/L (ref 0–45)
BASOPHILS # BLD AUTO: 0 10E9/L (ref 0–0.2)
BASOPHILS NFR BLD AUTO: 0.5 %
BILIRUB SERPL-MCNC: 0.4 MG/DL (ref 0.2–1.3)
BUN SERPL-MCNC: 12 MG/DL (ref 7–30)
CALCIUM SERPL-MCNC: 8.9 MG/DL (ref 8.5–10.1)
CHLORIDE SERPL-SCNC: 103 MMOL/L (ref 94–109)
CO2 SERPL-SCNC: 25 MMOL/L (ref 20–32)
CREAT SERPL-MCNC: 0.62 MG/DL (ref 0.52–1.04)
DIFFERENTIAL METHOD BLD: ABNORMAL
EOSINOPHIL # BLD AUTO: 0.1 10E9/L (ref 0–0.7)
EOSINOPHIL NFR BLD AUTO: 1.4 %
ERYTHROCYTE [DISTWIDTH] IN BLOOD BY AUTOMATED COUNT: 13.2 % (ref 10–15)
GFR SERPL CREATININE-BSD FRML MDRD: >90 ML/MIN/{1.73_M2}
GLUCOSE SERPL-MCNC: 100 MG/DL (ref 70–99)
HCT VFR BLD AUTO: 35.3 % (ref 35–47)
HGB BLD-MCNC: 11.8 G/DL (ref 11.7–15.7)
IMM GRANULOCYTES # BLD: 0.1 10E9/L (ref 0–0.4)
IMM GRANULOCYTES NFR BLD: 0.9 %
LYMPHOCYTES # BLD AUTO: 0.5 10E9/L (ref 0.8–5.3)
LYMPHOCYTES NFR BLD AUTO: 8.3 %
MAGNESIUM SERPL-MCNC: 1.9 MG/DL (ref 1.6–2.3)
MCH RBC QN AUTO: 29.2 PG (ref 26.5–33)
MCHC RBC AUTO-ENTMCNC: 33.4 G/DL (ref 31.5–36.5)
MCV RBC AUTO: 87 FL (ref 78–100)
MONOCYTES # BLD AUTO: 0.7 10E9/L (ref 0–1.3)
MONOCYTES NFR BLD AUTO: 11.4 %
NEUTROPHILS # BLD AUTO: 4.4 10E9/L (ref 1.6–8.3)
NEUTROPHILS NFR BLD AUTO: 77.5 %
NRBC # BLD AUTO: 0 10*3/UL
NRBC BLD AUTO-RTO: 0 /100
PLATELET # BLD AUTO: 220 10E9/L (ref 150–450)
POTASSIUM SERPL-SCNC: 3.4 MMOL/L (ref 3.4–5.3)
PROT SERPL-MCNC: 7.6 G/DL (ref 6.8–8.8)
RBC # BLD AUTO: 4.04 10E12/L (ref 3.8–5.2)
SODIUM SERPL-SCNC: 138 MMOL/L (ref 133–144)
WBC # BLD AUTO: 5.7 10E9/L (ref 4–11)

## 2020-10-05 PROCEDURE — 96361 HYDRATE IV INFUSION ADD-ON: CPT

## 2020-10-05 PROCEDURE — 83735 ASSAY OF MAGNESIUM: CPT | Performed by: PATHOLOGY

## 2020-10-05 PROCEDURE — 85025 COMPLETE CBC W/AUTO DIFF WBC: CPT | Performed by: PATHOLOGY

## 2020-10-05 PROCEDURE — 77386 HC IMRT TREATMENT DELIVERY, COMPLEX: CPT | Performed by: RADIOLOGY

## 2020-10-05 PROCEDURE — 99207 PR NO CHARGE LOS: CPT

## 2020-10-05 PROCEDURE — 96375 TX/PRO/DX INJ NEW DRUG ADDON: CPT

## 2020-10-05 PROCEDURE — 258N000001 HC RX 258: Performed by: OBSTETRICS & GYNECOLOGY

## 2020-10-05 PROCEDURE — 80053 COMPREHEN METABOLIC PANEL: CPT | Performed by: PATHOLOGY

## 2020-10-05 PROCEDURE — 96413 CHEMO IV INFUSION 1 HR: CPT

## 2020-10-05 PROCEDURE — 96367 TX/PROPH/DG ADDL SEQ IV INF: CPT

## 2020-10-05 PROCEDURE — 258N000003 HC RX IP 258 OP 636: Performed by: OBSTETRICS & GYNECOLOGY

## 2020-10-05 PROCEDURE — 250N000011 HC RX IP 250 OP 636: Performed by: OBSTETRICS & GYNECOLOGY

## 2020-10-05 RX ORDER — PALONOSETRON 0.05 MG/ML
0.25 INJECTION, SOLUTION INTRAVENOUS ONCE
Status: COMPLETED | OUTPATIENT
Start: 2020-10-05 | End: 2020-10-05

## 2020-10-05 RX ORDER — DEXTROSE, SODIUM CHLORIDE, AND POTASSIUM CHLORIDE 5; .45; .15 G/100ML; G/100ML; G/100ML
2000 INJECTION INTRAVENOUS ONCE
Status: COMPLETED | OUTPATIENT
Start: 2020-10-05 | End: 2020-10-05

## 2020-10-05 RX ORDER — IBUPROFEN 600 MG/1
600 TABLET, FILM COATED ORAL EVERY 6 HOURS PRN
COMMUNITY
End: 2023-05-03

## 2020-10-05 RX ADMIN — POTASSIUM CHLORIDE, DEXTROSE MONOHYDRATE AND SODIUM CHLORIDE 2000 ML: 150; 5; 450 INJECTION, SOLUTION INTRAVENOUS at 07:18

## 2020-10-05 RX ADMIN — CISPLATIN 70 MG: 1 INJECTION INTRAVENOUS at 09:32

## 2020-10-05 RX ADMIN — FOSAPREPITANT: 150 INJECTION, POWDER, LYOPHILIZED, FOR SOLUTION INTRAVENOUS at 09:08

## 2020-10-05 RX ADMIN — PALONOSETRON 0.25 MG: 0.05 INJECTION, SOLUTION INTRAVENOUS at 09:04

## 2020-10-05 ASSESSMENT — PAIN SCALES - GENERAL: PAINLEVEL: SEVERE PAIN (6)

## 2020-10-05 NOTE — PATIENT INSTRUCTIONS
[facility] Triage and after hours / weekends / holidays:  [phone]    Please call the triage or after hours line if you experience a temperature greater than or equal to 100.5, shaking chills, have uncontrolled nausea, vomiting and/or diarrhea, dizziness, shortness of breath, chest pain, bleeding, unexplained bruising, or if you have any other new/concerning symptoms, questions or concerns.      If you are having any concerning symptoms or wish to speak to a provider before your next infusion visit, please call your care coordinator or triage to notify them so we can adequately serve you.     If you need a refill on a narcotic prescription or other medication, please call before your infusion appointment.                 October 2020 Sunday Monday Tuesday Wednesday Thursday Friday Saturday                       1    [facility] EXTERNAL RADIATION TREATMT   8:30 AM   (30 min.)   P RAD ONC ANGELINE   [facility] Radiation Oncology 2    P EXTERNAL RADIATION TREATMT   8:30 AM   (30 min.)   P RAD ONC ANGELINE   [facility] Radiation Oncology 3       4     5    P [facility] DRAW   6:30 AM   (15 min.)    [facility]   7:00 AM   (360 min.)   [facility]   [facility]   1:00 PM   (30 min.)   P RAD ONC ANGELINE   [facility] Radiation Oncology    UMP ON TREATMENT VISIT   1:30 PM   (15 min.)   Parish Hernández MD   [facility] Radiation Oncology 6    P EXTERNAL RADIATION TREATMT   8:30 AM   (30 min.)   UMP RAD ONC ANGELINE   [facility] Radiation Oncology 7    P EXTERNAL RADIATION TREATMT   8:30 AM   (30 min.)   P RAD ONC ANGELINE   [facility] Radiation Oncology 8    P EXTERNAL RADIATION TREATMT   8:30 AM   (30 min.)   P RAD ONC ANGELINE   [facility] Radiation Oncology 9    P EXTERNAL RADIATION TREATMT   8:30  AM   (30 min.)   UMP RAD ONC ANGELINE   [facility] Radiation Oncology 10       11     12    P [facility] LAB DRAW   7:15 AM   (15 min.)    [facility] LAB DRAW   [facility] ONC INFUSION 360   8:00 AM   (360 min.)    [facility]   1:00 PM   (30 min.)   UMP RAD ONC ANGELINE   [facility] Radiation Oncology    UMP ON TREATMENT VISIT   1:30 PM   (15 min.)   Parish Hernández MD   [facility] Radiation Oncology 13    UMP EXTERNAL RADIATION TREATMT   8:30 AM   (30 min.)   P RAD ONC ANGELINE   [facility] Radiation Oncology 14    UMP EXTERNAL RADIATION TREATMT   8:30 AM   (30 min.)   P RAD ONC ANGELINE   [facility] Radiation Oncology 15    P EXTERNAL RADIATION TREATMT   8:30 AM   (30 min.)   UMP RAD ONC ANGELINE   [facility] Radiation Oncology 16    UMP EXTERNAL RADIATION TREATMT   8:30 AM   (30 min.)   P RAD ONC ANGELINE   [facility] Radiation Oncology 17       18     19    UMP EXTERNAL RADIATION TREATMT   8:30 AM   (30 min.)   P RAD ONC ANGELINE   [facility] Radiation Oncology    P ON TREATMENT VISIT   9:00 AM   (15 min.)   Parish Hernández MD   [facility] Radiation Oncology 20    P [facility] LAB DRAW   7:30 AM   (15 min.)    [facility] LAB DRAW   [facility] ONC INFUSION 360   8:00 AM   (360 min.)    ONCOLOGY INFUSION   [facility]    TELEPHONE VISIT RETURN  10:20 AM   (20 min.)   Suzy Brambila MD   [facility]    UMP EXTERNAL RADIATION TREATMT   1:00 PM   (30 min.)   UMP RAD ONC ANGELINE   [facility] Radiation Oncology 21    UMP EXTERNAL RADIATION TREATMT   8:30 AM   (30 min.)   UMP RAD ONC ANGELINE   [facility] Radiation Oncology 22    UMP EXTERNAL RADIATION TREATMT   8:30 AM   (30 min.)   UMP RAD ONC ANGELINE    [facility] Radiation Oncology    UMP RETURN   9:00 AM   (45 min.)   Jennifer Pires APRN CNP   [facility] Radiation Oncology 23    UMP EXTERNAL RADIATION TREATMT   8:30 AM   (30 min.)   P RAD ONC ANGELINE   [facility] Radiation Oncology 24       25     26    UMP EXTERNAL RADIATION TREATMT   8:30 AM   (30 min.)   P RAD ONC ANGELINE   [facility] Radiation Oncology    UMP ON TREATMENT VISIT   9:00 AM   (15 min.)   Parish Hernández MD   [facility] Radiation Oncology 27    P [facility] LAB DRAW   7:30 AM   (15 min.)    [facility] LAB DRAW   [facility] ONC INFUSION 360   8:00 AM   (360 min.)   UC ONCOLOGY INFUSION   [facility] EXTERNAL RADIATION TREATMT   1:00 PM   (30 min.)   P RAD ONC ANGELINE   [facility] Radiation Oncology 28    P EXTERNAL RADIATION TREATMT   8:30 AM   (30 min.)   P RAD ONC ANGELINE   [facility] Radiation Oncology 29    UMP EXTERNAL RADIATION TREATMT   8:00 AM   (30 min.)   [facility] RAD ONC ANGELINE   [facility] Radiation Oncology    MR PELVIS (GYN) WWO CONTRAST   9:00 AM   (60 min.)   UUMR2   [facility] Imaging 30    P EXTERNAL RADIATION TREATMT   8:30 AM   (30 min.)   [facility] RAD ONC ANGELINE   [facility] Radiation Oncology 31 November 2020 Sunday Monday Tuesday Wednesday Thursday Friday Saturday   1     2    EXAM UNDER ANESTHESIA, GYNECOLOGIC, WITH INSERTION OF REGULO SLEEVE, UTERINE RING, AND TANDEM, WITH ULTRASOUND GUIDANCE   7:30 AM   Parish Hernánedz MD   [facility] OR 3     4    UMP HDR TREATMENT   5:30 AM   (90 min.)   Parish Hernández MD   [facility] Radiation Oncology 5     6    ANESTHESIA OUT OF OR RAD THERAPY (60)   8:30 AM   GENERIC ANESTHESIA PROVIDER   [facility] GI 7       8     9    UMP HDR TREATMENT   7:30 AM   (180 min.)   Parish Hernández MD   [facility] Radiation Oncology    ANESTHESIA  OUT OF OR RAD THERAPY (60)   8:30 AM   GENERIC ANESTHESIA PROVIDER   [facility] GI 10     11    UMP HDR TREATMENT   7:30 AM   (180 min.)   Parish Hernández MD   [facility] Radiation Oncology    ANESTHESIA OUT OF OR RAD THERAPY (60)   8:30 AM   GENERIC ANESTHESIA PROVIDER   [facility] 12     13    UMP HDR TREATMENT   7:30 AM   (180 min.)   Parish Hernández MD   [facility] Radiation Oncology    ANESTHESIA OUT OF OR RAD THERAPY (60)   8:30 AM   GENERIC ANESTHESIA PROVIDER   [facility] 14       15     16    UMP HDR TREATMENT   7:30 AM   (180 min.)   Parish Hernández MD   [facility] Radiation Oncology    ANESTHESIA OUT OF OR RAD THERAPY (60)   8:30 AM   GENERIC ANESTHESIA PROVIDER   [facility] 17     18     19     20     21       22     23     24     25     26     27     28       29     30                                              Lab Results:  Recent Results (from the past 12 hour(s))   CBC with platelets differential    Collection Time: 10/05/20  6:33 AM   Result Value Ref Range    WBC 5.7 4.0 - 11.0 10e9/L    RBC Count 4.04 3.8 - 5.2 10e12/L    Hemoglobin 11.8 11.7 - 15.7 g/dL    Hematocrit 35.3 35.0 - 47.0 %    MCV 87 78 - 100 fl    MCH 29.2 26.5 - 33.0 pg    MCHC 33.4 31.5 - 36.5 g/dL    RDW 13.2 10.0 - 15.0 %    Platelet Count 220 150 - 450 10e9/L    Diff Method Automated Method     % Neutrophils 77.5 %    % Lymphocytes 8.3 %    % Monocytes 11.4 %    % Eosinophils 1.4 %    % Basophils 0.5 %    % Immature Granulocytes 0.9 %    Nucleated RBCs 0 0 /100    Absolute Neutrophil 4.4 1.6 - 8.3 10e9/L    Absolute Lymphocytes 0.5 (L) 0.8 - 5.3 10e9/L    Absolute Monocytes 0.7 0.0 - 1.3 10e9/L    Absolute Eosinophils 0.1 0.0 - 0.7 10e9/L    Absolute Basophils 0.0 0.0 - 0.2 10e9/L    Abs Immature Granulocytes 0.1 0 - 0.4 10e9/L    Absolute Nucleated RBC 0.0    Comprehensive metabolic panel    Collection Time: 10/05/20  6:33 AM   Result Value Ref Range    Sodium 138 133 - 144 mmol/L    Potassium 3.4 3.4 - 5.3  mmol/L    Chloride 103 94 - 109 mmol/L    Carbon Dioxide 25 20 - 32 mmol/L    Anion Gap 9 3 - 14 mmol/L    Glucose 100 (H) 70 - 99 mg/dL    Urea Nitrogen 12 7 - 30 mg/dL    Creatinine 0.62 0.52 - 1.04 mg/dL    GFR Estimate >90 >60 mL/min/[1.73_m2]    GFR Estimate If Black >90 >60 mL/min/[1.73_m2]    Calcium 8.9 8.5 - 10.1 mg/dL    Bilirubin Total 0.4 0.2 - 1.3 mg/dL    Albumin 3.5 3.4 - 5.0 g/dL    Protein Total 7.6 6.8 - 8.8 g/dL    Alkaline Phosphatase 66 40 - 150 U/L    ALT 20 0 - 50 U/L    AST 14 0 - 45 U/L   Magnesium    Collection Time: 10/05/20  6:33 AM   Result Value Ref Range    Magnesium 1.9 1.6 - 2.3 mg/dL

## 2020-10-05 NOTE — PROGRESS NOTES
Infusion Nursing Note:  Suni Zuluaga presents today for Cycle 1, Day 8 Cisplatin.    Patient seen by provider today: No   present during visit today: Not Applicable.    Note: Pt assessed upon arrival to infusion suite. Denies fever, chills, cough, SOB or other signs/symptoms of infection. Pt states she felt fine after receiving her first infusion last week; however had horrible back pain Friday night and did not know that she could take her usual Tylenol or Ibuprofen for the pain. Informed patient that she can still take her usual pain medications and to call if that is not helping like it usually does. Pt verbalized understanding. No other issues or concerns.      Intravenous Access:  Peripheral IV placed.    Treatment Conditions:  Lab Results   Component Value Date    HGB 11.8 10/05/2020     Lab Results   Component Value Date    WBC 5.7 10/05/2020      Lab Results   Component Value Date    ANEU 4.4 10/05/2020     Lab Results   Component Value Date     10/05/2020      Lab Results   Component Value Date     10/05/2020                   Lab Results   Component Value Date    POTASSIUM 3.4 10/05/2020           Lab Results   Component Value Date    MAG 1.9 10/05/2020            Lab Results   Component Value Date    CR 0.62 10/05/2020                   Lab Results   Component Value Date    HARRISON 8.9 10/05/2020                Lab Results   Component Value Date    BILITOTAL 0.4 10/05/2020           Lab Results   Component Value Date    ALBUMIN 3.5 10/05/2020                    Lab Results   Component Value Date    ALT 20 10/05/2020           Lab Results   Component Value Date    AST 14 10/05/2020     Results reviewed, labs MET treatment parameters, ok to proceed with treatment.    Post Infusion Assessment:  Patient tolerated infusion without incident.  Blood return noted pre and post infusion.  Site patent and intact, free from redness, edema or discomfort.  No evidence of extravasations.  Access  discontinued per protocol.     Discharge Plan:   Patient declined prescription refills.  Copy of AVS reviewed with patient and/or family.  Patient will return 10/12/20 for next appointment.  Patient discharged in stable condition accompanied by: self.  Departure Mode: Ambulatory.    Karin Kevin RN

## 2020-10-05 NOTE — LETTER
10/5/2020         RE: Suni Zuluaga  [address]  [place] [address]        Dear Colleague,    Thank you for referring your patient, Suni Zuluaga, to the [facility] RADIATION ONCOLOGY. Please see a copy of my visit note below.    [facility] PHYSICIANS  SPECIALIZING IN BREAKTHROUGHS  Radiation Oncology    On Treatment Visit Note      Suni Zuluaga      Date: Oct 5, 2020   MRN: [id number]   : 1985  Diagnosis: Squamous cell carcinoma of the cervix, FIGO Stage IIIC1(r)      Reason for Visit:  On Radiation Treatment Visit     Treatment Summary to Date  Treatment Site:  Current Dose: 1080/4500 cGy Fractions:  +5 HDR          Chemotherapy  Chemo concurrent with radx?: Yes  Oncologist: Dr. Gonzalez  Drug Name/Frequency 1: Cisplatin/weekly    Subjective: Ms. Zuluaga has been struggling with constipation. She states that she had stopped taking her bowel regimen (normally takes 2 stool softeners and fiber supplementation daily). She otherwise is tolerating chemotherapy and radiotherapy without issue. She stated that she did not have nausea over the weekend. She has been having some continued groin pain. She denies vaginal bleeding.    Nursing ROS:   Nutrition Alteration  Diet Type: Patient's Preference  Skin  Skin Reaction: 0 - No changes     Gastrointestinal  Constipation NCI: 1 - Occasional or intermittent s/sx  GI Note: WNL  Genitourinary   Note: scant vaginal bleeding, 1 pad/day  Psychosocial  Pyschosocial Note: Tired from a long day of appointments  Pain Assessment  0-10 Pain Scale: 6  Pain Note: Back pain      Objective:   Wt 64.4 kg (142 lb)   BMI 25.48 kg/m    Gen: Appears well, in no acute distress  Skin: No erythema    Labs:  CBC RESULTS:   Recent Labs   Lab Test 10/05/20  0633   WBC 5.7   RBC 4.04   HGB 11.8   HCT 35.3   MCV 87   MCH 29.2   MCHC 33.4   RDW 13.2        ELECTROLYTES:  Recent Labs   Lab Test 10/05/20  0633      POTASSIUM 3.4   CHLORIDE  103   HARRISON 8.9   CO2 25   BUN 12   CR 0.62   *       Assessment:    Ms. Zuluaga is a 35 year old woman with a locoregional advanced cervical cancer, Stage IIIC1(r).  She has a very large primary tumor, with imaging evidence of bilateral parametrial invasion and clinical evidence of posterior vaginal involvement. She has multiple pelvic lymphadenopathy involving the external iliac, perirectal, and internal iliac nodes. She has completed her first treatment without issue.  All questions and concerns addressed.    Toxicities:  Fatigue: Grade 0: No toxicity  Pain: Grade 1: Mild pain  Diarrhea: Grade 0: No toxicity  Urinary frequency: Grade 0: No toxicity  Urinary tract pain: Grade 0: No toxicity  Urinary urgency: Grade 0: No toxicity     Plan:   1. Continue current therapy.    2. Discussed that she may resume home bowel regimen for constipation. Discussed with Ms. Zuluaga that if she notices increased numbers of bowel movements or diarrhea that her bowel regimen can be reassessed at that juncture.       Mosaiq chart and setup information reviewed  MVCT/IGRT images checked    Medication Review  Med list reviewed with patient?: Yes  Med Note: No changes    Educational Topic Discussed  Additional Instructions: Pt staying at a local hotel during the week.  Education Instructions: reviewed    Nevin Joya MD PGY2  Department of Radiation Oncology  [phone]      I saw and examined the patient with the resident.  I have reviewed and edited the resident's note and agree with the plan of care.      Parish Hernández MD      Again, thank you for allowing me to participate in the care of your patient.        Sincerely,        Parish Hernández MD

## 2020-10-05 NOTE — LETTER
Date:October 6, 2020      Provider requested that no letter be sent. Do not send.       [facility] Physicians Health Information

## 2020-10-05 NOTE — PROGRESS NOTES
[facility] PHYSICIANS  SPECIALIZING IN BREAKTHROUGHS  Radiation Oncology    On Treatment Visit Note      Suni Zuluaga      Date: Oct 5, 2020   MRN: [id number]   : 1985  Diagnosis: Squamous cell carcinoma of the cervix, FIGO Stage IIIC1(r)      Reason for Visit:  On Radiation Treatment Visit     Treatment Summary to Date  Treatment Site:  Current Dose: 1080/4500 cGy Fractions: 6/25 +5 HDR          Chemotherapy  Chemo concurrent with radx?: Yes  Oncologist: Dr. Gonzalez  Drug Name/Frequency 1: Cisplatin/weekly    Subjective: Ms. Zuluaga has been struggling with constipation. She states that she had stopped taking her bowel regimen (normally takes 2 stool softeners and fiber supplementation daily). She otherwise is tolerating chemotherapy and radiotherapy without issue. She stated that she did not have nausea over the weekend. She has been having some continued groin pain. She denies vaginal bleeding.    Nursing ROS:   Nutrition Alteration  Diet Type: Patient's Preference  Skin  Skin Reaction: 0 - No changes     Gastrointestinal  Constipation NCI: 1 - Occasional or intermittent s/sx  GI Note: WNL  Genitourinary   Note: scant vaginal bleeding, 1 pad/day  Psychosocial  Pyschosocial Note: Tired from a long day of appointments  Pain Assessment  0-10 Pain Scale: 6  Pain Note: Back pain      Objective:   Wt 64.4 kg (142 lb)   BMI 25.48 kg/m    Gen: Appears well, in no acute distress  Skin: No erythema    Labs:  CBC RESULTS:   Recent Labs   Lab Test 10/05/20  0633   WBC 5.7   RBC 4.04   HGB 11.8   HCT 35.3   MCV 87   MCH 29.2   MCHC 33.4   RDW 13.2        ELECTROLYTES:  Recent Labs   Lab Test 10/05/20  0633      POTASSIUM 3.4   CHLORIDE 103   HARRISON 8.9   CO2 25   BUN 12   CR 0.62   *       Assessment:    Ms. Zuluaga is a 35 year old woman with a locoregional advanced cervical cancer, Stage IIIC1(r).  She has a very large primary tumor, with imaging evidence of bilateral parametrial invasion  and clinical evidence of posterior vaginal involvement. She has multiple pelvic lymphadenopathy involving the external iliac, perirectal, and internal iliac nodes. She has completed her first treatment without issue.  All questions and concerns addressed.    Toxicities:  Fatigue: Grade 0: No toxicity  Pain: Grade 1: Mild pain  Diarrhea: Grade 0: No toxicity  Urinary frequency: Grade 0: No toxicity  Urinary tract pain: Grade 0: No toxicity  Urinary urgency: Grade 0: No toxicity     Plan:   1. Continue current therapy.    2. Discussed that she may resume home bowel regimen for constipation. Discussed with Ms. Zuluaga that if she notices increased numbers of bowel movements or diarrhea that her bowel regimen can be reassessed at that juncture.       Mosaiq chart and setup information reviewed  MVCT/IGRT images checked    Medication Review  Med list reviewed with patient?: Yes  Med Note: No changes    Educational Topic Discussed  Additional Instructions: Pt staying at a local hotel during the week.  Education Instructions: reviewed    Nevin Joya MD PGY2  Department of Radiation Oncology  [phone]      I saw and examined the patient with the resident.  I have reviewed and edited the resident's note and agree with the plan of care.      Parish Hernández MD

## 2020-10-06 ENCOUNTER — APPOINTMENT (OUTPATIENT)
Dept: RADIATION ONCOLOGY | Facility: CLINIC | Age: 35
End: 2020-10-06
Attending: RADIOLOGY
Payer: MEDICAID

## 2020-10-06 DIAGNOSIS — Z11.59 ENCOUNTER FOR SCREENING FOR OTHER VIRAL DISEASES: Primary | ICD-10-CM

## 2020-10-06 PROCEDURE — 77386 HC IMRT TREATMENT DELIVERY, COMPLEX: CPT | Performed by: RADIOLOGY

## 2020-10-06 PROCEDURE — 77014 PR CT GUIDE FOR PLACEMENT RADIATION THERAPY FIELDS: CPT | Mod: 26 | Performed by: RADIOLOGY

## 2020-10-07 ENCOUNTER — APPOINTMENT (OUTPATIENT)
Dept: RADIATION ONCOLOGY | Facility: CLINIC | Age: 35
End: 2020-10-07
Attending: RADIOLOGY
Payer: MEDICAID

## 2020-10-07 PROCEDURE — 77386 HC IMRT TREATMENT DELIVERY, COMPLEX: CPT | Performed by: RADIOLOGY

## 2020-10-07 PROCEDURE — 77014 PR CT GUIDE FOR PLACEMENT RADIATION THERAPY FIELDS: CPT | Mod: 26 | Performed by: RADIOLOGY

## 2020-10-08 ENCOUNTER — APPOINTMENT (OUTPATIENT)
Dept: RADIATION ONCOLOGY | Facility: CLINIC | Age: 35
End: 2020-10-08
Attending: RADIOLOGY
Payer: MEDICAID

## 2020-10-08 PROCEDURE — 77014 HC CT GUIDE FOR PLACEMENT RADIATION THERAPY FIELDS: CPT | Performed by: RADIOLOGY

## 2020-10-08 PROCEDURE — 77427 RADIATION TX MANAGEMENT X5: CPT | Performed by: RADIOLOGY

## 2020-10-08 PROCEDURE — 77336 RADIATION PHYSICS CONSULT: CPT | Performed by: RADIOLOGY

## 2020-10-08 PROCEDURE — 77386 HC IMRT TREATMENT DELIVERY, COMPLEX: CPT | Performed by: RADIOLOGY

## 2020-10-08 PROCEDURE — 77014 PR CT GUIDE FOR PLACEMENT RADIATION THERAPY FIELDS: CPT | Mod: 26 | Performed by: RADIOLOGY

## 2020-10-09 ENCOUNTER — APPOINTMENT (OUTPATIENT)
Dept: RADIATION ONCOLOGY | Facility: CLINIC | Age: 35
End: 2020-10-09
Attending: RADIOLOGY
Payer: MEDICAID

## 2020-10-09 PROCEDURE — 77386 HC IMRT TREATMENT DELIVERY, COMPLEX: CPT | Performed by: RADIOLOGY

## 2020-10-09 PROCEDURE — 77014 PR CT GUIDE FOR PLACEMENT RADIATION THERAPY FIELDS: CPT | Mod: 26 | Performed by: RADIOLOGY

## 2020-10-09 PROCEDURE — 77014 HC CT GUIDE FOR PLACEMENT RADIATION THERAPY FIELDS: CPT | Performed by: RADIOLOGY

## 2020-10-12 ENCOUNTER — APPOINTMENT (OUTPATIENT)
Dept: LAB | Facility: CLINIC | Age: 35
End: 2020-10-12
Attending: OBSTETRICS & GYNECOLOGY
Payer: MEDICAID

## 2020-10-12 ENCOUNTER — OFFICE VISIT (OUTPATIENT)
Dept: RADIATION ONCOLOGY | Facility: CLINIC | Age: 35
End: 2020-10-12
Attending: RADIOLOGY

## 2020-10-12 ENCOUNTER — INFUSION THERAPY VISIT (OUTPATIENT)
Dept: ONCOLOGY | Facility: CLINIC | Age: 35
End: 2020-10-12
Attending: OBSTETRICS & GYNECOLOGY
Payer: MEDICAID

## 2020-10-12 ENCOUNTER — APPOINTMENT (OUTPATIENT)
Dept: RADIATION ONCOLOGY | Facility: CLINIC | Age: 35
End: 2020-10-12
Attending: RADIOLOGY
Payer: MEDICAID

## 2020-10-12 VITALS
DIASTOLIC BLOOD PRESSURE: 67 MMHG | HEART RATE: 87 BPM | WEIGHT: 138.1 LBS | TEMPERATURE: 98 F | OXYGEN SATURATION: 100 % | RESPIRATION RATE: 18 BRPM | SYSTOLIC BLOOD PRESSURE: 103 MMHG | BODY MASS INDEX: 24.78 KG/M2

## 2020-10-12 DIAGNOSIS — C53.8 MALIGNANT NEOPLASM OF OVERLAPPING SITES OF CERVIX (H): Primary | ICD-10-CM

## 2020-10-12 DIAGNOSIS — C53.1 MALIGNANT NEOPLASM OF EXOCERVIX (H): Primary | ICD-10-CM

## 2020-10-12 LAB
ALBUMIN SERPL-MCNC: 3.2 G/DL (ref 3.4–5)
ALP SERPL-CCNC: 54 U/L (ref 40–150)
ALT SERPL W P-5'-P-CCNC: 19 U/L (ref 0–50)
ANION GAP SERPL CALCULATED.3IONS-SCNC: 5 MMOL/L (ref 3–14)
AST SERPL W P-5'-P-CCNC: 15 U/L (ref 0–45)
BASOPHILS # BLD AUTO: 0 10E9/L (ref 0–0.2)
BASOPHILS NFR BLD AUTO: 0.3 %
BILIRUB SERPL-MCNC: 0.2 MG/DL (ref 0.2–1.3)
BUN SERPL-MCNC: 8 MG/DL (ref 7–30)
CALCIUM SERPL-MCNC: 8.6 MG/DL (ref 8.5–10.1)
CHLORIDE SERPL-SCNC: 107 MMOL/L (ref 94–109)
CO2 SERPL-SCNC: 26 MMOL/L (ref 20–32)
CREAT SERPL-MCNC: 0.49 MG/DL (ref 0.52–1.04)
DIFFERENTIAL METHOD BLD: ABNORMAL
EOSINOPHIL # BLD AUTO: 0.1 10E9/L (ref 0–0.7)
EOSINOPHIL NFR BLD AUTO: 3.1 %
ERYTHROCYTE [DISTWIDTH] IN BLOOD BY AUTOMATED COUNT: 13.1 % (ref 10–15)
GFR SERPL CREATININE-BSD FRML MDRD: >90 ML/MIN/{1.73_M2}
GLUCOSE SERPL-MCNC: 98 MG/DL (ref 70–99)
HCT VFR BLD AUTO: 30.3 % (ref 35–47)
HGB BLD-MCNC: 10.2 G/DL (ref 11.7–15.7)
IMM GRANULOCYTES # BLD: 0 10E9/L (ref 0–0.4)
IMM GRANULOCYTES NFR BLD: 0.3 %
LYMPHOCYTES # BLD AUTO: 0.4 10E9/L (ref 0.8–5.3)
LYMPHOCYTES NFR BLD AUTO: 10.9 %
MAGNESIUM SERPL-MCNC: 1.8 MG/DL (ref 1.6–2.3)
MCH RBC QN AUTO: 30.2 PG (ref 26.5–33)
MCHC RBC AUTO-ENTMCNC: 33.7 G/DL (ref 31.5–36.5)
MCV RBC AUTO: 90 FL (ref 78–100)
MONOCYTES # BLD AUTO: 0.4 10E9/L (ref 0–1.3)
MONOCYTES NFR BLD AUTO: 9.7 %
NEUTROPHILS # BLD AUTO: 2.7 10E9/L (ref 1.6–8.3)
NEUTROPHILS NFR BLD AUTO: 75.7 %
NRBC # BLD AUTO: 0 10*3/UL
NRBC BLD AUTO-RTO: 0 /100
PLATELET # BLD AUTO: 139 10E9/L (ref 150–450)
POTASSIUM SERPL-SCNC: 3.7 MMOL/L (ref 3.4–5.3)
PROT SERPL-MCNC: 6.9 G/DL (ref 6.8–8.8)
RBC # BLD AUTO: 3.38 10E12/L (ref 3.8–5.2)
SODIUM SERPL-SCNC: 138 MMOL/L (ref 133–144)
WBC # BLD AUTO: 3.6 10E9/L (ref 4–11)

## 2020-10-12 PROCEDURE — 77386 HC IMRT TREATMENT DELIVERY, COMPLEX: CPT | Performed by: RADIOLOGY

## 2020-10-12 PROCEDURE — 96367 TX/PROPH/DG ADDL SEQ IV INF: CPT

## 2020-10-12 PROCEDURE — 250N000011 HC RX IP 250 OP 636: Performed by: OBSTETRICS & GYNECOLOGY

## 2020-10-12 PROCEDURE — 80053 COMPREHEN METABOLIC PANEL: CPT | Performed by: PATHOLOGY

## 2020-10-12 PROCEDURE — 99207 PR NO CHARGE LOS: CPT

## 2020-10-12 PROCEDURE — 258N000001 HC RX 258: Performed by: OBSTETRICS & GYNECOLOGY

## 2020-10-12 PROCEDURE — 258N000003 HC RX IP 258 OP 636: Performed by: OBSTETRICS & GYNECOLOGY

## 2020-10-12 PROCEDURE — 96375 TX/PRO/DX INJ NEW DRUG ADDON: CPT

## 2020-10-12 PROCEDURE — 77014 HC CT GUIDE FOR PLACEMENT RADIATION THERAPY FIELDS: CPT | Performed by: RADIOLOGY

## 2020-10-12 PROCEDURE — 83735 ASSAY OF MAGNESIUM: CPT | Performed by: PATHOLOGY

## 2020-10-12 PROCEDURE — 999N000127 HC STATISTIC PERIPHERAL IV START W US GUIDANCE

## 2020-10-12 PROCEDURE — G0463 HOSPITAL OUTPT CLINIC VISIT: HCPCS | Mod: 25

## 2020-10-12 PROCEDURE — 96413 CHEMO IV INFUSION 1 HR: CPT

## 2020-10-12 PROCEDURE — 85025 COMPLETE CBC W/AUTO DIFF WBC: CPT | Performed by: PATHOLOGY

## 2020-10-12 PROCEDURE — 96361 HYDRATE IV INFUSION ADD-ON: CPT

## 2020-10-12 RX ORDER — DEXTROSE, SODIUM CHLORIDE, AND POTASSIUM CHLORIDE 5; .45; .15 G/100ML; G/100ML; G/100ML
2000 INJECTION INTRAVENOUS ONCE
Status: COMPLETED | OUTPATIENT
Start: 2020-10-12 | End: 2020-10-12

## 2020-10-12 RX ORDER — PALONOSETRON 0.05 MG/ML
0.25 INJECTION, SOLUTION INTRAVENOUS ONCE
Status: COMPLETED | OUTPATIENT
Start: 2020-10-12 | End: 2020-10-12

## 2020-10-12 RX ADMIN — CISPLATIN 70 MG: 1 INJECTION, SOLUTION INTRAVENOUS at 11:04

## 2020-10-12 RX ADMIN — POTASSIUM CHLORIDE, DEXTROSE MONOHYDRATE AND SODIUM CHLORIDE 2000 ML: 150; 5; 450 INJECTION, SOLUTION INTRAVENOUS at 08:58

## 2020-10-12 RX ADMIN — DEXAMETHASONE SODIUM PHOSPHATE: 10 INJECTION, SOLUTION INTRAMUSCULAR; INTRAVENOUS at 10:06

## 2020-10-12 RX ADMIN — PALONOSETRON 0.25 MG: 0.05 INJECTION, SOLUTION INTRAVENOUS at 10:06

## 2020-10-12 ASSESSMENT — PAIN SCALES - GENERAL: PAINLEVEL: NO PAIN (0)

## 2020-10-12 NOTE — LETTER
10/12/2020         RE: Suni Zuluaga  [address]  [place] [address]        Dear Colleague,    Thank you for referring your patient, Suni Zuluaga, to the [facility] RADIATION ONCOLOGY. Please see a copy of my visit note below.    [facility] PHYSICIANS  SPECIALIZING IN BREAKTHROUGHS  Radiation Oncology    On Treatment Visit Note      Suni Zuluaga      Date: Oct 12, 2020   MRN: [id number]   : 1985  Diagnosis: Cervical cancer      Reason for Visit:  On Radiation Treatment Visit     Treatment Summary to Date  Treatment Site: pelvis Current Dose: / cGy Fractions:  +5 HDR            Chemotherapy  Chemo concurrent with radx?: Yes  Oncologist: Dr. Gonzalez  Drug Name/Frequency 1: Cisplatin/weekly    Subjective:   Ms. Zuluaga states that her vaginal bleeding has slowed down, previously requiring a pad every half hour, but now is down to 2-3 pads and no large clots are passing. She is feeling fatigued. She drives early on Monday morning at 2:30 am from spending the weekend with her children at home. She has started having some diarrhea; today she has had 3 loose/runny bowel movements. She has not had any nausea and does not require any of her nausea medication yet. She has not noted any changes in urinary habits.     Nursing ROS:   Nutrition Alteration  Diet Type: Patient's Preference  Nutrition Note: appetite good  Skin  Skin Reaction: 0 - No changes       Gastrointestinal  Diarrhea: 2 - Three to five soft or liquid bowel movements per day(will  some imodium. discussed low fiber diet.)  Constipation NCI: 1 - Occasional or intermittent s/sx  GI Note: WNL  Genitourinary  Urinary Status: 0 - Normal  Psychosocial  Pyschosocial Note: Tired from a long day of appointments  Pain Assessment  Pain Note: see above      Objective:   There were no vitals taken for this visit.  Gen: Appears well, in no acute distress  Skin: No erythema    Labs:  CBC RESULTS:   Recent  Labs   Lab Test 10/12/20  0826   WBC 3.6*   RBC 3.38*   HGB 10.2*   HCT 30.3*   MCV 90   MCH 30.2   MCHC 33.7   RDW 13.1   *     ELECTROLYTES:  Recent Labs   Lab Test 10/12/20  0826      POTASSIUM 3.7   CHLORIDE 107   HARRISON 8.6   CO2 26   BUN 8   CR 0.49*   GLC 98       Assessment:    Ms. Zuluaga is a 35 year old woman with a locoregional advanced cervical cancer, Stage IIIC1(r).  She has a very large primary tumor, with imaging evidence of bilateral parametrial invasion and clinical evidence of posterior vaginal involvement. She has multiple pelvic lymphadenopathy involving the external iliac, perirectal, and internal iliac nodes. She has completed her first treatment without issue.  All questions and concerns addressed.    Toxicities:  Fatigue: Grade 1: Fatigue relieved by rest  Nausea: Grade 0: No toxicity  Pain: Grade 0: No toxicity  Diarrhea: Grade 1: Increase of <4 stools per day over baseline; mild increase in ostomy output compared to baseline  Urinary frequency: Grade 0: No toxicity  Urinary tract pain: Grade 0: No toxicity  Urinary urgency: Grade 0: No toxicity  Dermatitis: Grade 0: No toxicity     Plan:   1. Continue current therapy.    2. Recommend starting Imodium for diarrhea and to halt stool softener regimen  3. Discussed tentative plan for re-staging MRI on 10/29/20 and Brandon sleeve placement for brachytherapy component of treatment.    Mosaiq chart and setup information reviewed  MVCT/IGRT images checked    Medication Review  Med list reviewed with patient?: Yes  Med Note: No changes    Educational Topic Discussed  Additional Instructions: Pt staying at a local hotel during the week.  Education Instructions: reviewed    Nevin Joya MD PGY2  Department of Radiation Oncology  [phone]    I saw and examined the patient with the resident.  I have reviewed and edited the resident's note and agree with the plan of care.      Parish Hernández MD        Again, thank you for allowing me to  participate in the care of your patient.        Sincerely,        Parish Hernández MD

## 2020-10-12 NOTE — PROGRESS NOTES
Chief Complaint   Patient presents with     Blood Draw     IV placement with blood draw and vitals     Labs drawn via IV placed by Bianca Caban in right arm.

## 2020-10-12 NOTE — PROGRESS NOTES
Infusion Nursing Note:  Suni Zuluaga presents today for Cycle 1 Day 15 Cisplatin.    Patient seen by provider today: No   present during visit today: Not Applicable.    Note: Patient states she has been having more diarrhea the past 2-3 days.  Patient states she has already gone 3x this morning.  Patient otherwise denies any other changes.  Dr. Washington notified of patient's diarrhea.    10/12/20 6438 TORB:  Dr. Dustin Washington MD/Joe Brambila RN  - Instruct patient it is ok to start Immodium.  She can either buy it over the counter or we can send a prescription.  - Instruct patient to call triage if she develops a fever or has any blood in her stool  - Instruct patient to continue to push PO fluids at home and to also drink Gatorade or other electrolyte drink.    Relayed above information to patient who verbalized understanding.    Intravenous Access:  Peripheral IV placed in lab today.    Treatment Conditions:  Lab Results   Component Value Date    HGB 10.2 10/12/2020     Lab Results   Component Value Date    WBC 3.6 10/12/2020      Lab Results   Component Value Date    ANEU 2.7 10/12/2020     Lab Results   Component Value Date     10/12/2020      Lab Results   Component Value Date     10/12/2020                   Lab Results   Component Value Date    POTASSIUM 3.7 10/12/2020           Lab Results   Component Value Date    MAG 1.8 10/12/2020            Lab Results   Component Value Date    CR 0.49 10/12/2020                   Lab Results   Component Value Date    HARRISON 8.6 10/12/2020                Lab Results   Component Value Date    BILITOTAL 0.2 10/12/2020           Lab Results   Component Value Date    ALBUMIN 3.2 10/12/2020                    Lab Results   Component Value Date    ALT 19 10/12/2020           Lab Results   Component Value Date    AST 15 10/12/2020       Results reviewed, labs MET treatment parameters, ok to proceed with treatment.    Post Infusion Assessment:  Patient  tolerated infusion without incident.  Patient voided pre, during, and post Cisplatin infusion.  Blood return noted pre and post infusion.  Site patent and intact, free from redness, edema or discomfort.  No evidence of extravasations.  Access discontinued per protocol.     Discharge Plan:   Patient declined prescription refills.  Discharge instructions reviewed with: Patient.  Patient and/or family verbalized understanding of discharge instructions and all questions answered.  [facility]  Patient will return 10/20/20 for next appointment.   Patient discharged in stable condition accompanied by: self.  Departure Mode: Ambulatory.  Face to Face time: 0.    TREVOR MANRIQUE RN

## 2020-10-12 NOTE — PROGRESS NOTES
[facility] PHYSICIANS  SPECIALIZING IN BREAKTHROUGHS  Radiation Oncology    On Treatment Visit Note      Suni Zuluaga      Date: Oct 12, 2020   MRN: [id number]   : 1985  Diagnosis: Cervical cancer      Reason for Visit:  On Radiation Treatment Visit     Treatment Summary to Date  Treatment Site: pelvis Current Dose: 1980/4500 cGy Fractions:  +5 HDR            Chemotherapy  Chemo concurrent with radx?: Yes  Oncologist: Dr. Gonzalez  Drug Name/Frequency 1: Cisplatin/weekly    Subjective:   Ms. Zuluaga states that her vaginal bleeding has slowed down, previously requiring a pad every half hour, but now is down to 2-3 pads and no large clots are passing. She is feeling fatigued. She drives early on Monday morning at 2:30 am from spending the weekend with her children at home. She has started having some diarrhea; today she has had 3 loose/runny bowel movements. She has not had any nausea and does not require any of her nausea medication yet. She has not noted any changes in urinary habits.     Nursing ROS:   Nutrition Alteration  Diet Type: Patient's Preference  Nutrition Note: appetite good  Skin  Skin Reaction: 0 - No changes       Gastrointestinal  Diarrhea: 2 - Three to five soft or liquid bowel movements per day(will  some imodium. discussed low fiber diet.)  Constipation NCI: 1 - Occasional or intermittent s/sx  GI Note: WNL  Genitourinary  Urinary Status: 0 - Normal  Psychosocial  Pyschosocial Note: Tired from a long day of appointments  Pain Assessment  Pain Note: see above      Objective:   There were no vitals taken for this visit.  Gen: Appears well, in no acute distress  Skin: No erythema    Labs:  CBC RESULTS:   Recent Labs   Lab Test 10/12/20  0826   WBC 3.6*   RBC 3.38*   HGB 10.2*   HCT 30.3*   MCV 90   MCH 30.2   MCHC 33.7   RDW 13.1   *     ELECTROLYTES:  Recent Labs   Lab Test 10/12/20  0826      POTASSIUM 3.7   CHLORIDE 107   HARRISON 8.6   CO2 26   BUN 8   CR  0.49*   GLC 98       Assessment:    Ms. Zuluaga is a 35 year old woman with a locoregional advanced cervical cancer, Stage IIIC1(r).  She has a very large primary tumor, with imaging evidence of bilateral parametrial invasion and clinical evidence of posterior vaginal involvement. She has multiple pelvic lymphadenopathy involving the external iliac, perirectal, and internal iliac nodes. She has completed her first treatment without issue.  All questions and concerns addressed.    Toxicities:  Fatigue: Grade 1: Fatigue relieved by rest  Nausea: Grade 0: No toxicity  Pain: Grade 0: No toxicity  Diarrhea: Grade 1: Increase of <4 stools per day over baseline; mild increase in ostomy output compared to baseline  Urinary frequency: Grade 0: No toxicity  Urinary tract pain: Grade 0: No toxicity  Urinary urgency: Grade 0: No toxicity  Dermatitis: Grade 0: No toxicity     Plan:   1. Continue current therapy.    2. Recommend starting Imodium for diarrhea and to halt stool softener regimen  3. Discussed tentative plan for re-staging MRI on 10/29/20 and Brandon sleeve placement for brachytherapy component of treatment.    Mosaiq chart and setup information reviewed  MVCT/IGRT images checked    Medication Review  Med list reviewed with patient?: Yes  Med Note: No changes    Educational Topic Discussed  Additional Instructions: Pt staying at a local hotel during the week.  Education Instructions: reviewed    Nevin Joya MD PGY2  Department of Radiation Oncology  [phone]    I saw and examined the patient with the resident.  I have reviewed and edited the resident's note and agree with the plan of care.      Parish Hernández MD

## 2020-10-12 NOTE — LETTER
Date:October 13, 2020      Provider requested that no letter be sent. Do not send.       [facility] Physicians Health Information

## 2020-10-13 ENCOUNTER — APPOINTMENT (OUTPATIENT)
Dept: RADIATION ONCOLOGY | Facility: CLINIC | Age: 35
End: 2020-10-13
Attending: RADIOLOGY
Payer: MEDICAID

## 2020-10-13 DIAGNOSIS — C53.1 MALIGNANT NEOPLASM OF EXOCERVIX (H): Primary | ICD-10-CM

## 2020-10-13 PROCEDURE — 77386 HC IMRT TREATMENT DELIVERY, COMPLEX: CPT | Performed by: RADIOLOGY

## 2020-10-13 PROCEDURE — 77014 HC CT GUIDE FOR PLACEMENT RADIATION THERAPY FIELDS: CPT | Performed by: RADIOLOGY

## 2020-10-13 PROCEDURE — 77014 PR CT GUIDE FOR PLACEMENT RADIATION THERAPY FIELDS: CPT | Mod: 26 | Performed by: RADIOLOGY

## 2020-10-14 ENCOUNTER — APPOINTMENT (OUTPATIENT)
Dept: RADIATION ONCOLOGY | Facility: CLINIC | Age: 35
End: 2020-10-14
Attending: RADIOLOGY
Payer: MEDICAID

## 2020-10-14 PROCEDURE — 77386 HC IMRT TREATMENT DELIVERY, COMPLEX: CPT | Performed by: RADIOLOGY

## 2020-10-14 PROCEDURE — 77014 PR CT GUIDE FOR PLACEMENT RADIATION THERAPY FIELDS: CPT | Mod: 26 | Performed by: RADIOLOGY

## 2020-10-14 PROCEDURE — 77014 HC CT GUIDE FOR PLACEMENT RADIATION THERAPY FIELDS: CPT | Performed by: RADIOLOGY

## 2020-10-15 ENCOUNTER — APPOINTMENT (OUTPATIENT)
Dept: RADIATION ONCOLOGY | Facility: CLINIC | Age: 35
End: 2020-10-15
Attending: RADIOLOGY
Payer: MEDICAID

## 2020-10-15 PROCEDURE — 77336 RADIATION PHYSICS CONSULT: CPT | Performed by: RADIOLOGY

## 2020-10-15 PROCEDURE — 77014 PR CT GUIDE FOR PLACEMENT RADIATION THERAPY FIELDS: CPT | Mod: 26 | Performed by: RADIOLOGY

## 2020-10-15 PROCEDURE — 77386 HC IMRT TREATMENT DELIVERY, COMPLEX: CPT | Performed by: RADIOLOGY

## 2020-10-15 PROCEDURE — 77014 HC CT GUIDE FOR PLACEMENT RADIATION THERAPY FIELDS: CPT | Performed by: RADIOLOGY

## 2020-10-15 PROCEDURE — 77427 RADIATION TX MANAGEMENT X5: CPT | Performed by: RADIOLOGY

## 2020-10-16 ENCOUNTER — APPOINTMENT (OUTPATIENT)
Dept: RADIATION ONCOLOGY | Facility: CLINIC | Age: 35
End: 2020-10-16
Attending: RADIOLOGY
Payer: MEDICAID

## 2020-10-16 PROCEDURE — 77014 HC CT GUIDE FOR PLACEMENT RADIATION THERAPY FIELDS: CPT | Performed by: RADIOLOGY

## 2020-10-16 PROCEDURE — 77386 HC IMRT TREATMENT DELIVERY, COMPLEX: CPT | Performed by: RADIOLOGY

## 2020-10-16 PROCEDURE — 77014 PR CT GUIDE FOR PLACEMENT RADIATION THERAPY FIELDS: CPT | Mod: 26 | Performed by: RADIOLOGY

## 2020-10-19 ENCOUNTER — APPOINTMENT (OUTPATIENT)
Dept: RADIATION ONCOLOGY | Facility: CLINIC | Age: 35
End: 2020-10-19
Attending: RADIOLOGY
Payer: MEDICAID

## 2020-10-19 ENCOUNTER — OFFICE VISIT (OUTPATIENT)
Dept: RADIATION ONCOLOGY | Facility: CLINIC | Age: 35
End: 2020-10-19
Attending: RADIOLOGY

## 2020-10-19 VITALS — WEIGHT: 136.9 LBS | BODY MASS INDEX: 24.56 KG/M2

## 2020-10-19 DIAGNOSIS — C53.1 MALIGNANT NEOPLASM OF EXOCERVIX (H): Primary | ICD-10-CM

## 2020-10-19 PROCEDURE — 77386 HC IMRT TREATMENT DELIVERY, COMPLEX: CPT | Performed by: RADIOLOGY

## 2020-10-19 PROCEDURE — 77014 HC CT GUIDE FOR PLACEMENT RADIATION THERAPY FIELDS: CPT | Performed by: RADIOLOGY

## 2020-10-19 NOTE — LETTER
Date:October 20, 2020      Provider requested that no letter be sent. Do not send.       [facility] Health Information

## 2020-10-19 NOTE — PROGRESS NOTES
[facility] PHYSICIANS  SPECIALIZING IN BREAKTHROUGHS  Radiation Oncology    On Treatment Visit Note      Suni Zuluaga      Date: 10/19/2020   MRN: [id number]   : 1985  Diagnosis: Cervical cancer      Reason for Visit:  On Radiation Treatment Visit     Treatment Summary to Date  Treatment Site: pelvis Current Dose: 2880/4500 cGy Fractions:  +5 HDR      Chemotherapy  Chemo concurrent with radx?: Yes  Oncologist: Dr. Gonzalez  Drug Name/Frequency 1: Cisplatin/weekly    ED Visit/Hosiptal Admission: None    Treatment Breaks: None      Subjective:   Suni is tolerating chemoradiation well. She reports some level of fatigue, which is partially attributed to the travel back home over the weekend.  She denies diarrhea, but has stopped her laxatives.  She denies any change in urinary habits.  Still has some vaginal spotting, but no clots. Uses about 2 pads per day.     Nursing ROS:   Nutrition Alteration  Diet Type: Patient's Preference  Nutrition Note: appetite good  Skin  Skin Reaction: 0 - No changes           Gastrointestinal  Diarrhea: 2 - Three to five soft or liquid bowel movements per day(will  some imodium. discussed low fiber diet.)  Constipation NCI: 1 - Occasional or intermittent s/sx  GI Note: WNL  Genitourinary  Urinary Status: 0 - Normal  Psychosocial  Pyschosocial Note: Tired from a long day of appointments  Pain Assessment  Pain Note: see above      Objective:   Wt 62.1 kg (136 lb 14.4 oz)   BMI 24.56 kg/m    Gen: Appears well, in no acute distress  Skin: deferred    Labs:  CBC RESULTS:   Recent Labs   Lab Test 10/12/20  0826   WBC 3.6*   RBC 3.38*   HGB 10.2*   HCT 30.3*   MCV 90   MCH 30.2   MCHC 33.7   RDW 13.1   *     ELECTROLYTES:  Recent Labs   Lab Test 10/12/20  0826      POTASSIUM 3.7   CHLORIDE 107   HARRISON 8.6   CO2 26   BUN 8   CR 0.49*   GLC 98       Assessment:    Tolerating radiation therapy well.  All questions and concerns  addressed.    Toxicities:  Fatigue: Grade 1: Fatigue relieved by rest  Diarrhea: Grade 0: No toxicity  Urinary frequency: Grade 0: No toxicity  Urinary tract pain: Grade 0: No toxicity  Urinary urgency: Grade 0: No toxicity    Plan:   1. Continue current therapy.    2. Went over the upcoming brachytherapy schedule.       Mosaiq chart and setup information reviewed  MVCT/IGRT images checked    Medication Review  Med list reviewed with patient?: Yes  Med Note: No changes    Educational Topic Discussed  Additional Instructions: Pt staying at a local hotel during the week.  Education Instructions: reviewed        Parish Hernández MD/PhD  [phone] clinic  Pager [phone]    Please do not send letter to referring physician.

## 2020-10-19 NOTE — LETTER
10/19/2020         RE: Suni Zuluaga  [address]  [place] [address]        Dear Colleague,    Thank you for referring your patient, Suni Zuluaga, to the [facility] RADIATION ONCOLOGY. Please see a copy of my visit note below.    [facility] PHYSICIANS  SPECIALIZING IN BREAKTHROUGHS  Radiation Oncology    On Treatment Visit Note      Suni Zuluaga      Date: 10/19/2020   MRN: [id number]   : 1985  Diagnosis: Cervical cancer      Reason for Visit:  On Radiation Treatment Visit     Treatment Summary to Date  Treatment Site: pelvis Current Dose: 2880/4500 cGy Fractions:  +5 HDR      Chemotherapy  Chemo concurrent with radx?: Yes  Oncologist: Dr. Gonzalez  Drug Name/Frequency 1: Cisplatin/weekly    ED Visit/Hosiptal Admission: None    Treatment Breaks: None      Subjective:   Suni is tolerating chemoradiation well. She reports some level of fatigue, which is partially attributed to the travel back home over the weekend.  She denies diarrhea, but has stopped her laxatives.  She denies any change in urinary habits.  Still has some vaginal spotting, but no clots. Uses about 2 pads per day.     Nursing ROS:   Nutrition Alteration  Diet Type: Patient's Preference  Nutrition Note: appetite good  Skin  Skin Reaction: 0 - No changes           Gastrointestinal  Diarrhea: 2 - Three to five soft or liquid bowel movements per day(will  some imodium. discussed low fiber diet.)  Constipation NCI: 1 - Occasional or intermittent s/sx  GI Note: WNL  Genitourinary  Urinary Status: 0 - Normal  Psychosocial  Pyschosocial Note: Tired from a long day of appointments  Pain Assessment  Pain Note: see above      Objective:   Wt 62.1 kg (136 lb 14.4 oz)   BMI 24.56 kg/m    Gen: Appears well, in no acute distress  Skin: deferred    Labs:  CBC RESULTS:   Recent Labs   Lab Test 10/12/20  0826   WBC 3.6*   RBC 3.38*   HGB 10.2*   HCT 30.3*   MCV 90   MCH 30.2   MCHC 33.7   RDW 13.1    *     ELECTROLYTES:  Recent Labs   Lab Test 10/12/20  0826      POTASSIUM 3.7   CHLORIDE 107   HARRISON 8.6   CO2 26   BUN 8   CR 0.49*   GLC 98       Assessment:    Tolerating radiation therapy well.  All questions and concerns addressed.    Toxicities:  Fatigue: Grade 1: Fatigue relieved by rest  Diarrhea: Grade 0: No toxicity  Urinary frequency: Grade 0: No toxicity  Urinary tract pain: Grade 0: No toxicity  Urinary urgency: Grade 0: No toxicity    Plan:   1. Continue current therapy.    2. Went over the upcoming brachytherapy schedule.       Mosaiq chart and setup information reviewed  MVCT/IGRT images checked    Medication Review  Med list reviewed with patient?: Yes  Med Note: No changes    Educational Topic Discussed  Additional Instructions: Pt staying at a local hotel during the week.  Education Instructions: reviewed        Parish Hernández MD/PhD  [phone] clinic  Pager [phone]    Please do not send letter to referring physician.        Again, thank you for allowing me to participate in the care of your patient.        Sincerely,        Parish Hernández MD

## 2020-10-20 ENCOUNTER — VIRTUAL VISIT (OUTPATIENT)
Dept: ONCOLOGY | Facility: CLINIC | Age: 35
End: 2020-10-20
Payer: MEDICAID

## 2020-10-20 ENCOUNTER — APPOINTMENT (OUTPATIENT)
Dept: RADIATION ONCOLOGY | Facility: CLINIC | Age: 35
End: 2020-10-20
Attending: RADIOLOGY
Payer: MEDICAID

## 2020-10-20 ENCOUNTER — INFUSION THERAPY VISIT (OUTPATIENT)
Dept: ONCOLOGY | Facility: CLINIC | Age: 35
End: 2020-10-20
Attending: OBSTETRICS & GYNECOLOGY
Payer: MEDICAID

## 2020-10-20 VITALS
WEIGHT: 137 LBS | DIASTOLIC BLOOD PRESSURE: 73 MMHG | RESPIRATION RATE: 18 BRPM | SYSTOLIC BLOOD PRESSURE: 125 MMHG | OXYGEN SATURATION: 98 % | TEMPERATURE: 97.3 F | HEART RATE: 105 BPM | BODY MASS INDEX: 24.58 KG/M2

## 2020-10-20 DIAGNOSIS — C53.9 SQUAMOUS CELL CARCINOMA OF CERVIX (H): ICD-10-CM

## 2020-10-20 DIAGNOSIS — C53.8 MALIGNANT NEOPLASM OF OVERLAPPING SITES OF CERVIX (H): Primary | ICD-10-CM

## 2020-10-20 DIAGNOSIS — C53.9 SQUAMOUS CELL CARCINOMA OF CERVIX (H): Primary | ICD-10-CM

## 2020-10-20 LAB
ALBUMIN SERPL-MCNC: 3.2 G/DL (ref 3.4–5)
ALP SERPL-CCNC: 65 U/L (ref 40–150)
ALT SERPL W P-5'-P-CCNC: 19 U/L (ref 0–50)
ANION GAP SERPL CALCULATED.3IONS-SCNC: 6 MMOL/L (ref 3–14)
AST SERPL W P-5'-P-CCNC: 13 U/L (ref 0–45)
BASOPHILS # BLD AUTO: 0 10E9/L (ref 0–0.2)
BASOPHILS NFR BLD AUTO: 0.4 %
BILIRUB SERPL-MCNC: 0.1 MG/DL (ref 0.2–1.3)
BUN SERPL-MCNC: 10 MG/DL (ref 7–30)
CALCIUM SERPL-MCNC: 8.5 MG/DL (ref 8.5–10.1)
CHLORIDE SERPL-SCNC: 106 MMOL/L (ref 94–109)
CO2 SERPL-SCNC: 25 MMOL/L (ref 20–32)
CREAT SERPL-MCNC: 0.51 MG/DL (ref 0.52–1.04)
DIFFERENTIAL METHOD BLD: ABNORMAL
EOSINOPHIL # BLD AUTO: 0.1 10E9/L (ref 0–0.7)
EOSINOPHIL NFR BLD AUTO: 3.7 %
ERYTHROCYTE [DISTWIDTH] IN BLOOD BY AUTOMATED COUNT: 13.1 % (ref 10–15)
GFR SERPL CREATININE-BSD FRML MDRD: >90 ML/MIN/{1.73_M2}
GLUCOSE SERPL-MCNC: 102 MG/DL (ref 70–99)
HCT VFR BLD AUTO: 31.3 % (ref 35–47)
HGB BLD-MCNC: 10.4 G/DL (ref 11.7–15.7)
IMM GRANULOCYTES # BLD: 0 10E9/L (ref 0–0.4)
IMM GRANULOCYTES NFR BLD: 0.4 %
LYMPHOCYTES # BLD AUTO: 0.3 10E9/L (ref 0.8–5.3)
LYMPHOCYTES NFR BLD AUTO: 10.8 %
MAGNESIUM SERPL-MCNC: 1.7 MG/DL (ref 1.6–2.3)
MCH RBC QN AUTO: 29.4 PG (ref 26.5–33)
MCHC RBC AUTO-ENTMCNC: 33.2 G/DL (ref 31.5–36.5)
MCV RBC AUTO: 88 FL (ref 78–100)
MONOCYTES # BLD AUTO: 0.4 10E9/L (ref 0–1.3)
MONOCYTES NFR BLD AUTO: 14.9 %
NEUTROPHILS # BLD AUTO: 1.9 10E9/L (ref 1.6–8.3)
NEUTROPHILS NFR BLD AUTO: 69.8 %
NRBC # BLD AUTO: 0 10*3/UL
NRBC BLD AUTO-RTO: 0 /100
PLATELET # BLD AUTO: 135 10E9/L (ref 150–450)
POTASSIUM SERPL-SCNC: 3.5 MMOL/L (ref 3.4–5.3)
PROT SERPL-MCNC: 7.3 G/DL (ref 6.8–8.8)
RBC # BLD AUTO: 3.54 10E12/L (ref 3.8–5.2)
SODIUM SERPL-SCNC: 138 MMOL/L (ref 133–144)
WBC # BLD AUTO: 2.7 10E9/L (ref 4–11)

## 2020-10-20 PROCEDURE — 77386 HC IMRT TREATMENT DELIVERY, COMPLEX: CPT | Performed by: RADIOLOGY

## 2020-10-20 PROCEDURE — 77014 PR CT GUIDE FOR PLACEMENT RADIATION THERAPY FIELDS: CPT | Mod: 26 | Performed by: RADIOLOGY

## 2020-10-20 PROCEDURE — 96361 HYDRATE IV INFUSION ADD-ON: CPT

## 2020-10-20 PROCEDURE — 99214 OFFICE O/P EST MOD 30 MIN: CPT | Mod: 95 | Performed by: OBSTETRICS & GYNECOLOGY

## 2020-10-20 PROCEDURE — 250N000011 HC RX IP 250 OP 636: Performed by: OBSTETRICS & GYNECOLOGY

## 2020-10-20 PROCEDURE — 258N000003 HC RX IP 258 OP 636: Performed by: OBSTETRICS & GYNECOLOGY

## 2020-10-20 PROCEDURE — 99207 PR NO CHARGE LOS: CPT

## 2020-10-20 PROCEDURE — 77014 HC CT GUIDE FOR PLACEMENT RADIATION THERAPY FIELDS: CPT | Performed by: RADIOLOGY

## 2020-10-20 PROCEDURE — 999N000127 HC STATISTIC PERIPHERAL IV START W US GUIDANCE

## 2020-10-20 PROCEDURE — 96413 CHEMO IV INFUSION 1 HR: CPT

## 2020-10-20 PROCEDURE — 83735 ASSAY OF MAGNESIUM: CPT | Performed by: OBSTETRICS & GYNECOLOGY

## 2020-10-20 PROCEDURE — 80053 COMPREHEN METABOLIC PANEL: CPT | Performed by: OBSTETRICS & GYNECOLOGY

## 2020-10-20 PROCEDURE — 96367 TX/PROPH/DG ADDL SEQ IV INF: CPT

## 2020-10-20 PROCEDURE — 85025 COMPLETE CBC W/AUTO DIFF WBC: CPT | Performed by: OBSTETRICS & GYNECOLOGY

## 2020-10-20 PROCEDURE — 258N000001 HC RX 258: Performed by: OBSTETRICS & GYNECOLOGY

## 2020-10-20 RX ORDER — PALONOSETRON 0.05 MG/ML
0.25 INJECTION, SOLUTION INTRAVENOUS ONCE
Status: COMPLETED | OUTPATIENT
Start: 2020-10-20 | End: 2020-10-20

## 2020-10-20 RX ORDER — DEXTROSE, SODIUM CHLORIDE, AND POTASSIUM CHLORIDE 5; .45; .15 G/100ML; G/100ML; G/100ML
2000 INJECTION INTRAVENOUS ONCE
Status: COMPLETED | OUTPATIENT
Start: 2020-10-20 | End: 2020-10-20

## 2020-10-20 RX ADMIN — POTASSIUM CHLORIDE, DEXTROSE MONOHYDRATE AND SODIUM CHLORIDE 2000 ML: 150; 5; 450 INJECTION, SOLUTION INTRAVENOUS at 09:26

## 2020-10-20 RX ADMIN — CISPLATIN 70 MG: 1 INJECTION, SOLUTION INTRAVENOUS at 11:09

## 2020-10-20 RX ADMIN — PALONOSETRON 0.25 MG: 0.05 INJECTION, SOLUTION INTRAVENOUS at 09:26

## 2020-10-20 RX ADMIN — DEXAMETHASONE SODIUM PHOSPHATE: 10 INJECTION, SOLUTION INTRAMUSCULAR; INTRAVENOUS at 09:29

## 2020-10-20 ASSESSMENT — PAIN SCALES - GENERAL: PAINLEVEL: NO PAIN (0)

## 2020-10-20 NOTE — PROGRESS NOTES
GYN Oncology Established Patient Visit - In Person    RE: Suni Zuluaga  : 1985  Encounter date: 10/20/2020       CC: Cervical cancer     HPI: Ms Suni Zuluaga is a 35 year old  female who presents to discuss cervical cancer. She is undergoing primary chemoradiation.         Treatment History:   2015:HSIL Pap at the beginning of pregnancy. Repeat pap postpartum was ASCUS. Colposcopy was negative.   20: Pelvic US. Uterine fibroids, normal appearance of uterus and ovaries  2020: Pap smear which confirmed squamous cell carcinoma of the cervix. Referred to GYN  ONC  2020: GYN ONC Consult [facility]. Cervical biopsy, RADHA III, unable to identify invasion  2020: Repeat cervical biopsy confirms invasive SCC  2020: Started primary chemoRT for locally advanced cervical cancer. Stage IIIC1(r), large primary tumor with bilateral parametrial invasion and clinical evidence of posterior vaginal involvement. Pelvic lymphadenopathy involving external iliac, perirectal and internal iliac nodes.     Today will be her 4th cycle of chemo.    Overall feeling well. Staying locally at a motel. Having ongiong fatigue. No nausea or emesis. Still having daily spotting. Normal bowel function. Really missing being home with her family.   Having hot flashes.   She has quit smoking    Review of Systems:  Systemic:No weight changes.    Skin : No skin changes or new lesions.   Eye : No changes in vision.   Pulmonary: No cough or SOB.   Cardiovascular: No CP or palpitations  Gastrointestinal : No diarrhea, constipation  Genitourinary: No dysuria, urgency. + vaginal bleeding per HPI  Psychiatric: No depression or anxiety  Hematologic : no known issues  Endocrine :+hot flashes  Neurological: No headaches, no numbness.     Past Medical History:   Diagnosis Date     Cervical cancer (H)      Past Surgical History:   Procedure Laterality Date     CARPAL TUNNEL RELEASE RT/LT Right      TUBAL LIGATION         OBGYN  history and Health Maintenance:  , completed childbearing , had tubal ligation   Last Pap Smear: 2020 showing SCC  Last Mammogram: never  Last Colonoscopy: never    Current Outpatient Medications   Medication Sig Dispense Refill     ibuprofen (ADVIL/MOTRIN) 600 MG tablet Take 600 mg by mouth every 6 hours as needed for moderate pain       prochlorperazine (COMPAZINE) 10 MG tablet Take 1 tablet (10 mg) by mouth every 6 hours as needed (Nausea/Vomiting) 30 tablet 2   No meds     Allergies   Allergen Reactions     Clindamycin Hives     Social History:  Social History     Tobacco Use     Smoking status: Current Every Day Smoker     Packs/day: 1.00     Types: Cigarettes     Smokeless tobacco: Never Used     Tobacco comment: 2020 1/2 pack/day, tying tp quit   Substance Use Topics     Alcohol use: Yes     Frequency: Monthly or less     Comment: occ.     Work: manufacturing, manual labor  Preferred language: English  Lives at home with: Partner and 4 kids  Long smoking history since age 13. Smokes a pack daily. Denies any substance abuse     Family History:   No family history of cancer     Physical Exam:   Vitals reviewed in epic  There were no vitals taken for this visit.  General: Alert and oriented, no acute distress    MSK: no edema of lower extremities, no swelling of joints, normal gait  Psych: Mood stable. Linear speech, brighter affect today  Cardiovascular: RRR  Pulmonary: Normal respiratory effort  GI: No distention. No masses. No hernia.   Lymph: No enlarge lymph nodes in neck or groin  : Def  ECOG performance status: 0 - no restrictions    CBC RESULTS:   Recent Labs   Lab Test 10/20/20  0809   WBC 2.7*   RBC 3.54*   HGB 10.4*   HCT 31.3*   MCV 88   MCH 29.4   MCHC 33.2   RDW 13.1   *     Recent Labs   Lab Test 10/20/20  0809 10/12/20  0826    138   POTASSIUM 3.5 3.7   CHLORIDE 106 107   CO2 25 26   ANIONGAP 6 5   * 98   BUN 10 8   CR 0.51* 0.49*   HARRISON 8.5 8.6          Assessment:  Suni Zuluaga is a 35 year old woman with 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 Receiving primary chemoRT. Signed consent or , but felt not to be a good candidate based on disease distribution.     Plan:     1.)   Cervical cancer : Stage IIIC1r SCC of the cervix (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 Hg, Plat, WBC all decreasing but still OK to treat. Creatinine stable.     -Ok to get week 4 Cis today  -Continue RT per Dr Hernández, plan brachytherapy after XRT  -Followup with me, with PET scan 3 months after treatment. Sooner if ongoing SEs from therapy    2.) Hot flashes: Suspect menopause from RT. May be a candidate for HRT if she continues to abstain from cigarettes. Will readdress after completion of primary therapy.     3. ) Smoking cessation: Doing great with this      Suzy Brambila MD  Gynecologic Oncology  Pager [phone]

## 2020-10-20 NOTE — NURSING NOTE
Chief Complaint   Patient presents with     Blood Draw     Labs drawn via PIV by RN in lab. VS taken.      Labs drawn via peripheral IV. Vital signs taken. Checked into next appointment.   Mounika Mclean RN

## 2020-10-20 NOTE — PROGRESS NOTES
Infusion Nursing Note:  Suni Zuluaga presents today for D22C1 Cisplatin  Patient seen by provider today: Yes: Dr. Brambila   present during visit today: Not Applicable.    Note: VSS, pt denies pain. Seen by Dr. Brambila today. Pt stated diarrhea has improved since last infusion. Voiding pre, during, and post Cisplatin- creatinine 0.51.     Intravenous Access:  Peripheral IV placed- blood return noted    Treatment Conditions:  Lab Results   Component Value Date    HGB 10.4 10/20/2020     Lab Results   Component Value Date    WBC 2.7 10/20/2020      Lab Results   Component Value Date    ANEU 1.9 10/20/2020     Lab Results   Component Value Date     10/20/2020      Lab Results   Component Value Date     10/20/2020                   Lab Results   Component Value Date    POTASSIUM 3.5 10/20/2020           Lab Results   Component Value Date    MAG 1.7 10/20/2020            Lab Results   Component Value Date    CR 0.51 10/20/2020                   Lab Results   Component Value Date    HARRISON 8.5 10/20/2020                Lab Results   Component Value Date    BILITOTAL 0.1 10/20/2020           Lab Results   Component Value Date    ALBUMIN 3.2 10/20/2020                    Lab Results   Component Value Date    ALT 19 10/20/2020           Lab Results   Component Value Date    AST 13 10/20/2020       Results reviewed, labs MET treatment parameters, ok to proceed with treatment.      Post Infusion Assessment:  Patient tolerated infusion without incident- Cisplatin for 1 hour.   Blood return noted pre and post infusion.  No evidence of extravasations.       Discharge Plan:   Patient declined prescription refills.  Patient and/or family verbalized understanding of discharge instructions and all questions answered.  AVS to patient via [employer].  Patient will return 10/27 for next appointment and continue with daily radiation.   Face to Face time: 0.    Renuka Taylor RN

## 2020-10-21 ENCOUNTER — APPOINTMENT (OUTPATIENT)
Dept: RADIATION ONCOLOGY | Facility: CLINIC | Age: 35
End: 2020-10-21
Attending: RADIOLOGY
Payer: MEDICAID

## 2020-10-21 PROCEDURE — 77386 HC IMRT TREATMENT DELIVERY, COMPLEX: CPT | Performed by: RADIOLOGY

## 2020-10-21 PROCEDURE — 77014 PR CT GUIDE FOR PLACEMENT RADIATION THERAPY FIELDS: CPT | Mod: 26 | Performed by: RADIOLOGY

## 2020-10-22 ENCOUNTER — OFFICE VISIT (OUTPATIENT)
Dept: RADIATION ONCOLOGY | Facility: CLINIC | Age: 35
End: 2020-10-22
Attending: NURSE PRACTITIONER

## 2020-10-22 ENCOUNTER — APPOINTMENT (OUTPATIENT)
Dept: RADIATION ONCOLOGY | Facility: CLINIC | Age: 35
End: 2020-10-22
Attending: RADIOLOGY
Payer: MEDICAID

## 2020-10-22 VITALS — OXYGEN SATURATION: 97 % | SYSTOLIC BLOOD PRESSURE: 97 MMHG | HEART RATE: 96 BPM | DIASTOLIC BLOOD PRESSURE: 65 MMHG

## 2020-10-22 DIAGNOSIS — C53.8 MALIGNANT NEOPLASM OF OVERLAPPING SITES OF CERVIX (H): Primary | ICD-10-CM

## 2020-10-22 PROCEDURE — 77336 RADIATION PHYSICS CONSULT: CPT | Performed by: RADIOLOGY

## 2020-10-22 PROCEDURE — 77386 HC IMRT TREATMENT DELIVERY, COMPLEX: CPT | Performed by: RADIOLOGY

## 2020-10-22 PROCEDURE — 77427 RADIATION TX MANAGEMENT X5: CPT | Performed by: RADIOLOGY

## 2020-10-22 NOTE — LETTER
10/22/2020         RE: Suni Zuluaga  [address]  [place] [address]        Dear Colleague,    Thank you for referring your patient, Suni Zuluaga, to the [facility] RADIATION ONCOLOGY. Please see a copy of my visit note below.    [facility] RADIATION ONCOLOGY  [address]  [facility], 1ST FLOOR  [facility] [address]  [phone]  Dept: [phone]    PRE-OP EVALUATION:  Today's date: 10/22/2020    Suni Zuluaga (: 1985) presents for pre-operative evaluation assessment as requested by Dr. Medina.  She requires evaluation and anesthesia risk assessment prior to undergoing surgery/procedure for treatment of cervical cancer .    Proposed Surgery/ Procedure: Brandon Sleeve insertion   Date of Surgery/ Procedure: 20  Time of Surgery/ Procedure: 7:30  Hospital/Surgical Facility: [facility]  Surgery Fax Number: Note does not need to be faxed, will be available electronically in Epic.  Primary Physician: No Ref-Primary, Physician  Type of Anesthesia Anticipated: General    Preoperative Questionnaire:   No - Have you ever had a heart attack or stroke?  No - Have you ever had surgery on your heart or blood vessels, such as a stent, coronary (heart) bypass, or surgery on an artery in the head, neck, heart, or legs?  No - Do you have chest pain when you are physically active?  No - Do you have a history of heart failure?  No - Do you currently have a cold, bronchitis, or symptoms of other respiratory (head and chest) infections?  No - Do you have a cough, shortness of breath, or wheezing?  No - Do you or anyone in your family have a history of blood clots?  No - Do you or anyone in your family have a serious bleeding problem, such as long-lasting bleeding after surgeries or cuts?  No - Have you ever had anemia or been told to take iron pills?  No - Have you had any abnormal blood loss such as black, tarry or bloody stools, or abnormal  vaginal bleeding?  No - Have you ever had a blood transfusion?  Yes - Are you willing to have a blood transfusion if it is medically needed before, during, or after your surgery?  No - Have you or anyone in your family ever had problems with anesthesia (sedation for surgery)?  No - Do you have sleep apnea, excessive snoring, or daytime drowsiness?   No - Do you have any artifical heart valves or other implanted medical devices, such as a pacemaker, defibrillator, or continuous glucose monitor?  No - Do you have any artifical joints?  No - Are you allergic to latex?  No - Is there any chance that you may be pregnant?    Patient has a Health Care Directive or Living Will:  NO    HPI:     HPI related to upcoming procedure: Patient had heavy vaginal bleeding for 3-6 months and presented on 8/13/2020.  She also reported post coital bleeding.  Pap smear showed squamous cell carcinoma consistent with exam that showed an enlarged friable cervical mass.  PET scan showed a 7.1 x 5.4 cm cervical mass with SUV 18.2 with multiple hypermetabolic pelvic adenopathy.  Patient currently undergoing chemoradiation and tolerating treatment very well.      See problem list for active medical problems.  Problems all longstanding and stable, except as noted/documented.  See ROS for pertinent symptoms related to these conditions.        MEDICAL HISTORY:     Patient Active Problem List    Diagnosis Date Noted     Cervical cancer (H) 10/01/2020     Priority: Medium     Added automatically from request for surgery [id number]       Malignant neoplasm of overlapping sites of cervix (H) 09/16/2020     Priority: Medium      Past Medical History:   Diagnosis Date     Cervical cancer (H)      Past Surgical History:   Procedure Laterality Date     CARPAL TUNNEL RELEASE RT/LT Right      TUBAL LIGATION       Current Outpatient Medications   Medication Sig Dispense Refill     ibuprofen (ADVIL/MOTRIN) 600 MG tablet Take 600 mg by mouth every 6 hours as  needed for moderate pain       prochlorperazine (COMPAZINE) 10 MG tablet Take 1 tablet (10 mg) by mouth every 6 hours as needed (Nausea/Vomiting) 30 tablet 2     OTC products: None, except as noted above    Allergies   Allergen Reactions     Clindamycin Hives      Latex Allergy: NO    Social History     Tobacco Use     Smoking status: Current Every Day Smoker     Packs/day: 1.00     Types: Cigarettes     Smokeless tobacco: Never Used     Tobacco comment: 9/11/2020 1/2 pack/day, tying tp quit   Substance Use Topics     Alcohol use: Yes     Frequency: Monthly or less     Comment: occ.     History   Drug Use Not on file       REVIEW OF SYSTEMS:   Constitutional, neuro, ENT, endocrine, pulmonary, cardiac, gastrointestinal, genitourinary, musculoskeletal, integument and psychiatric systems are negative, except as otherwise noted.    EXAM:   There were no vitals taken for this visit.    GENERAL APPEARANCE: healthy, alert and no distress     EYES: EOMI, PERRL     HENT: ear canals and TM's normal and nose and mouth without ulcers or lesions     NECK: no adenopathy, no asymmetry, masses, or scars and thyroid normal to palpation     RESP: lungs clear to auscultation - no rales, rhonchi or wheezes     CV: regular rates and rhythm, normal S1 S2, no S3 or S4 and no murmur, click or rub     ABDOMEN:  soft, nontender, no HSM or masses and bowel sounds normal     MS: extremities normal- no gross deformities noted, no evidence of inflammation in joints, FROM in all extremities.     SKIN: no suspicious lesions or rashes     NEURO: Normal strength and tone, sensory exam grossly normal, mentation intact and speech normal     PSYCH: mentation appears normal. and affect normal/bright     LYMPHATICS: No cervical adenopathy    DIAGNOSTICS:   No labs or EKG required for low risk surgery (cataract, skin procedure, breast biopsy, etc)    Recent Labs   Lab Test 10/20/20  0809 10/12/20  0826 09/09/20  1525 09/09/20  1525   HGB 10.4* 10.2*   < >  12.0   * 139*   < > 241   INR  --   --   --  1.10    138   < > 138   POTASSIUM 3.5 3.7   < > 3.7   CR 0.51* 0.49*   < > 0.54    < > = values in this interval not displayed.        IMPRESSION:   Diagnosis/reason for consult: cervical cancer    The proposed surgical procedure is considered LOW risk.    REVISED CARDIAC RISK INDEX  The patient has the following serious cardiovascular risks for perioperative complications such as (MI, PE, VFib and 3  AV Block):  No serious cardiac risks  INTERPRETATION: 0 risks: Class I (very low risk - 0.4% complication rate)    The patient has the following additional risks for perioperative complications:  No identified additional risks    No diagnosis found.    RECOMMENDATIONS:       --Patient currently receiving chemotherapy and is neutropenic.  Will need to watch counts closer to surgery.  --Patient aware she needs multiple covid test for multiple procedures.  --Patient is on no chronic medications  She will stop taking ibuprofen.    APPROVAL GIVEN to proceed with proposed procedure, without further diagnostic evaluation       Signed Electronically by: LAURA Jean CNP    Copy of this evaluation report is provided to requesting physicianVikash Albarran Preop Guidelines    Revised Cardiac Risk Index      Again, thank you for allowing me to participate in the care of your patient.        Sincerely,        LAURA Jean CNP

## 2020-10-22 NOTE — PROGRESS NOTES
[facility] RADIATION ONCOLOGY  [address]  [facility], 1ST FLOOR  [facility] [address]  [phone]  Dept: [phone]    PRE-OP EVALUATION:  Today's date: 10/22/2020    Suni Zuluaga (: 1985) presents for pre-operative evaluation assessment as requested by Dr. Medina.  She requires evaluation and anesthesia risk assessment prior to undergoing surgery/procedure for treatment of cervical cancer .    Proposed Surgery/ Procedure: Brandon Sleeve insertion   Date of Surgery/ Procedure: 20  Time of Surgery/ Procedure: 7:30  Hospital/Surgical Facility: [facility]  Surgery Fax Number: Note does not need to be faxed, will be available electronically in Epic.  Primary Physician: No Ref-Primary, Physician  Type of Anesthesia Anticipated: General    Preoperative Questionnaire:   No - Have you ever had a heart attack or stroke?  No - Have you ever had surgery on your heart or blood vessels, such as a stent, coronary (heart) bypass, or surgery on an artery in the head, neck, heart, or legs?  No - Do you have chest pain when you are physically active?  No - Do you have a history of heart failure?  No - Do you currently have a cold, bronchitis, or symptoms of other respiratory (head and chest) infections?  No - Do you have a cough, shortness of breath, or wheezing?  No - Do you or anyone in your family have a history of blood clots?  No - Do you or anyone in your family have a serious bleeding problem, such as long-lasting bleeding after surgeries or cuts?  No - Have you ever had anemia or been told to take iron pills?  No - Have you had any abnormal blood loss such as black, tarry or bloody stools, or abnormal vaginal bleeding?  No - Have you ever had a blood transfusion?  Yes - Are you willing to have a blood transfusion if it is medically needed before, during, or after your surgery?  No - Have you or anyone in your family ever had problems with anesthesia  (sedation for surgery)?  No - Do you have sleep apnea, excessive snoring, or daytime drowsiness?   No - Do you have any artifical heart valves or other implanted medical devices, such as a pacemaker, defibrillator, or continuous glucose monitor?  No - Do you have any artifical joints?  No - Are you allergic to latex?  No - Is there any chance that you may be pregnant?    Patient has a Health Care Directive or Living Will:  NO    HPI:     HPI related to upcoming procedure: Patient had heavy vaginal bleeding for 3-6 months and presented on 8/13/2020.  She also reported post coital bleeding.  Pap smear showed squamous cell carcinoma consistent with exam that showed an enlarged friable cervical mass.  PET scan showed a 7.1 x 5.4 cm cervical mass with SUV 18.2 with multiple hypermetabolic pelvic adenopathy.  Patient currently undergoing chemoradiation and tolerating treatment very well.      See problem list for active medical problems.  Problems all longstanding and stable, except as noted/documented.  See ROS for pertinent symptoms related to these conditions.        MEDICAL HISTORY:     Patient Active Problem List    Diagnosis Date Noted     Cervical cancer (H) 10/01/2020     Priority: Medium     Added automatically from request for surgery [id number]       Malignant neoplasm of overlapping sites of cervix (H) 09/16/2020     Priority: Medium      Past Medical History:   Diagnosis Date     Cervical cancer (H)      Past Surgical History:   Procedure Laterality Date     CARPAL TUNNEL RELEASE RT/LT Right      TUBAL LIGATION       Current Outpatient Medications   Medication Sig Dispense Refill     ibuprofen (ADVIL/MOTRIN) 600 MG tablet Take 600 mg by mouth every 6 hours as needed for moderate pain       prochlorperazine (COMPAZINE) 10 MG tablet Take 1 tablet (10 mg) by mouth every 6 hours as needed (Nausea/Vomiting) 30 tablet 2     OTC products: None, except as noted above    Allergies   Allergen Reactions     Clindamycin  Hives      Latex Allergy: NO    Social History     Tobacco Use     Smoking status: Current Every Day Smoker     Packs/day: 1.00     Types: Cigarettes     Smokeless tobacco: Never Used     Tobacco comment: 9/11/2020 1/2 pack/day, tying tp quit   Substance Use Topics     Alcohol use: Yes     Frequency: Monthly or less     Comment: occ.     History   Drug Use Not on file       REVIEW OF SYSTEMS:   Constitutional, neuro, ENT, endocrine, pulmonary, cardiac, gastrointestinal, genitourinary, musculoskeletal, integument and psychiatric systems are negative, except as otherwise noted.    EXAM:   There were no vitals taken for this visit.    GENERAL APPEARANCE: healthy, alert and no distress     EYES: EOMI, PERRL     HENT: ear canals and TM's normal and nose and mouth without ulcers or lesions     NECK: no adenopathy, no asymmetry, masses, or scars and thyroid normal to palpation     RESP: lungs clear to auscultation - no rales, rhonchi or wheezes     CV: regular rates and rhythm, normal S1 S2, no S3 or S4 and no murmur, click or rub     ABDOMEN:  soft, nontender, no HSM or masses and bowel sounds normal     MS: extremities normal- no gross deformities noted, no evidence of inflammation in joints, FROM in all extremities.     SKIN: no suspicious lesions or rashes     NEURO: Normal strength and tone, sensory exam grossly normal, mentation intact and speech normal     PSYCH: mentation appears normal. and affect normal/bright     LYMPHATICS: No cervical adenopathy    DIAGNOSTICS:   No labs or EKG required for low risk surgery (cataract, skin procedure, breast biopsy, etc)    Recent Labs   Lab Test 10/20/20  0809 10/12/20  0826 09/09/20  1525 09/09/20  1525   HGB 10.4* 10.2*   < > 12.0   * 139*   < > 241   INR  --   --   --  1.10    138   < > 138   POTASSIUM 3.5 3.7   < > 3.7   CR 0.51* 0.49*   < > 0.54    < > = values in this interval not displayed.        IMPRESSION:   Diagnosis/reason for consult: cervical  cancer    The proposed surgical procedure is considered LOW risk.    REVISED CARDIAC RISK INDEX  The patient has the following serious cardiovascular risks for perioperative complications such as (MI, PE, VFib and 3  AV Block):  No serious cardiac risks  INTERPRETATION: 0 risks: Class I (very low risk - 0.4% complication rate)    The patient has the following additional risks for perioperative complications:  No identified additional risks    No diagnosis found.    RECOMMENDATIONS:       --Patient currently receiving chemotherapy and is neutropenic.  Will need to watch counts closer to surgery.  --Patient aware she needs multiple covid test for multiple procedures.  --Patient is on no chronic medications  She will stop taking ibuprofen.    APPROVAL GIVEN to proceed with proposed procedure, without further diagnostic evaluation       Signed Electronically by: LAURA Jean CNP    Copy of this evaluation report is provided to requesting physician.    [facility] Preop Guidelines    Revised Cardiac Risk Index

## 2020-10-23 ENCOUNTER — APPOINTMENT (OUTPATIENT)
Dept: RADIATION ONCOLOGY | Facility: CLINIC | Age: 35
End: 2020-10-23
Attending: RADIOLOGY
Payer: MEDICAID

## 2020-10-23 PROCEDURE — 77014 PR CT GUIDE FOR PLACEMENT RADIATION THERAPY FIELDS: CPT | Mod: 26 | Performed by: RADIOLOGY

## 2020-10-23 PROCEDURE — 77386 HC IMRT TREATMENT DELIVERY, COMPLEX: CPT | Performed by: RADIOLOGY

## 2020-10-26 ENCOUNTER — APPOINTMENT (OUTPATIENT)
Dept: RADIATION ONCOLOGY | Facility: CLINIC | Age: 35
End: 2020-10-26
Attending: RADIOLOGY
Payer: MEDICAID

## 2020-10-26 VITALS — WEIGHT: 138.1 LBS | BODY MASS INDEX: 24.78 KG/M2

## 2020-10-26 DIAGNOSIS — C53.1 MALIGNANT NEOPLASM OF EXOCERVIX (H): Primary | ICD-10-CM

## 2020-10-26 PROCEDURE — 77386 HC IMRT TREATMENT DELIVERY, COMPLEX: CPT | Performed by: RADIOLOGY

## 2020-10-26 NOTE — LETTER
10/26/2020         RE: Suni Zuluaga  [address]  [place] [address]        Dear Colleague,    Thank you for referring your patient, Suni Zuluaga, to the [facility] RADIATION ONCOLOGY. Please see a copy of my visit note below.    [facility] PHYSICIANS  SPECIALIZING IN BREAKTHROUGHS  Radiation Oncology    On Treatment Visit Note      Suni Zuluaga      Date: Oct 26, 2020   MRN: [id number]   : 1985  Diagnosis: Cervical cancer    Reason for Visit:  On Radiation Treatment Visit     Treatment Summary to Date  Treatment Site: pelvis Current Dose: 3780/4500 cGy Fractions:  +5 HDR      Chemotherapy  Chemo concurrent with radx?: Yes  Oncologist: Dr. Gonzalez  Drug Name/Frequency 1: Cisplatin/weekly    Subjective: Ms. Zuluaga is doing well, tired from the long drive in. She is no longer having any vaginal bleeding. Pain is controlled. She is otherwise doing well.      Nursing ROS:   Nutrition Alteration  Diet Type: Patient's Preference  Nutrition Note: appetite ok  Skin  Skin Reaction: 0 - No changes     Gastrointestinal  Diarrhea: 2 - Three to five soft or liquid bowel movements per day(will  some imodium. discussed low fiber diet.)  Constipation NCI: 1 - Occasional or intermittent s/sx  GI Note: WNL  Genitourinary  Urinary Status: 0 - Normal  Psychosocial  Pyschosocial Note: Tired from a long day of appointments  Pain Assessment  Pain Note: see above      Objective:   Wt 62.6 kg (138 lb 1.6 oz)   BMI 24.78 kg/m    Gen: Appears well, in no acute distress  : no obvious deformity or lesions to the external genitalia, on speculum exam, normal anterior cervical lip, but with exophytic tumor on the posterior lip and minimal encroachment onto the vagina.  Smallest dilator passes easily through the cervix with scant bleeding  Skin: No erythema    Labs:  CBC RESULTS:   Recent Labs   Lab Test 10/20/20  0809   WBC 2.7*   RBC 3.54*   HGB 10.4*   HCT 31.3*   MCV 88   MCH 29.4    MCHC 33.2   RDW 13.1   *     ELECTROLYTES:  Recent Labs   Lab Test 10/20/20  0809      POTASSIUM 3.5   CHLORIDE 106   HARRISON 8.5   CO2 25   BUN 10   CR 0.51*   *       Assessment:    Tolerating radiation therapy well.  All questions and concerns addressed.    Toxicities:  Fatigue: Grade 1: Fatigue relieved by rest  Pain: Grade 0: No toxicity  Diarrhea: Grade 0: No toxicity  Urinary frequency: Grade 0: No toxicity  Urinary tract pain: Grade 0: No toxicity  Urinary urgency: Grade 0: No toxicity  Dermatitis: Grade 0: No toxicity     Plan:   1. Continue current therapy.    2. Plan to cancel morris sleeve placement given ease of dilator on speculum exam today.  Will perform each T&R with ultrasound guidance.     [employer] chart and setup information reviewed  MVCT/IGRT images checked    Medication Review  Med list reviewed with patient?: Yes  Med Note: No changes    Educational Topic Discussed  Additional Instructions: Pt staying at a local hotel during the week.  Education Instructions: reviewed    Nevin Joya MD PGY2  Department of Radiation Oncology  [phone]    I saw and examined the patient with the resident.  I have reviewed and edited the resident's note and agree with the plan of care.      Parish Hernández MD      Again, thank you for allowing me to participate in the care of your patient.        Sincerely,        Parish Hernández MD

## 2020-10-26 NOTE — PROGRESS NOTES
[facility] PHYSICIANS  SPECIALIZING IN BREAKTHROUGHS  Radiation Oncology    On Treatment Visit Note      Suni Zuluaga      Date: Oct 26, 2020   MRN: [id number]   : 1985  Diagnosis: Cervical cancer    Reason for Visit:  On Radiation Treatment Visit     Treatment Summary to Date  Treatment Site: pelvis Current Dose: 3780/4500 cGy Fractions:  +5 HDR      Chemotherapy  Chemo concurrent with radx?: Yes  Oncologist: Dr. Gonzalez  Drug Name/Frequency 1: Cisplatin/weekly    Subjective: Ms. Zuluaga is doing well, tired from the long drive in. She is no longer having any vaginal bleeding. Pain is controlled. She is otherwise doing well.      Nursing ROS:   Nutrition Alteration  Diet Type: Patient's Preference  Nutrition Note: appetite ok  Skin  Skin Reaction: 0 - No changes     Gastrointestinal  Diarrhea: 2 - Three to five soft or liquid bowel movements per day(will  some imodium. discussed low fiber diet.)  Constipation NCI: 1 - Occasional or intermittent s/sx  GI Note: WNL  Genitourinary  Urinary Status: 0 - Normal  Psychosocial  Pyschosocial Note: Tired from a long day of appointments  Pain Assessment  Pain Note: see above      Objective:   Wt 62.6 kg (138 lb 1.6 oz)   BMI 24.78 kg/m    Gen: Appears well, in no acute distress  : no obvious deformity or lesions to the external genitalia, on speculum exam, normal anterior cervical lip, but with exophytic tumor on the posterior lip and minimal encroachment onto the vagina.  Smallest dilator passes easily through the cervix with scant bleeding  Skin: No erythema    Labs:  CBC RESULTS:   Recent Labs   Lab Test 10/20/20  0809   WBC 2.7*   RBC 3.54*   HGB 10.4*   HCT 31.3*   MCV 88   MCH 29.4   MCHC 33.2   RDW 13.1   *     ELECTROLYTES:  Recent Labs   Lab Test 10/20/20  0809      POTASSIUM 3.5   CHLORIDE 106   HARRISON 8.5   CO2 25   BUN 10   CR 0.51*   *       Assessment:    Tolerating radiation therapy well.  All questions and  concerns addressed.    Toxicities:  Fatigue: Grade 1: Fatigue relieved by rest  Pain: Grade 0: No toxicity  Diarrhea: Grade 0: No toxicity  Urinary frequency: Grade 0: No toxicity  Urinary tract pain: Grade 0: No toxicity  Urinary urgency: Grade 0: No toxicity  Dermatitis: Grade 0: No toxicity     Plan:   1. Continue current therapy.    2. Plan to cancel morris sleeve placement given ease of dilator on speculum exam today.  Will perform each T&R with ultrasound guidance.     [employer] chart and setup information reviewed  MVCT/IGRT images checked    Medication Review  Med list reviewed with patient?: Yes  Med Note: No changes    Educational Topic Discussed  Additional Instructions: Pt staying at a local hotel during the week.  Education Instructions: reviewed    Nevin Joya MD PGY2  Department of Radiation Oncology  [phone]    I saw and examined the patient with the resident.  I have reviewed and edited the resident's note and agree with the plan of care.      Parish Hernández MD

## 2020-10-26 NOTE — LETTER
Date:October 27, 2020      Provider requested that no letter be sent. Do not send.       [facility] Health Information

## 2020-10-27 ENCOUNTER — APPOINTMENT (OUTPATIENT)
Dept: RADIATION ONCOLOGY | Facility: CLINIC | Age: 35
End: 2020-10-27
Attending: RADIOLOGY
Payer: MEDICAID

## 2020-10-27 ENCOUNTER — APPOINTMENT (OUTPATIENT)
Dept: LAB | Facility: CLINIC | Age: 35
End: 2020-10-27
Attending: OBSTETRICS & GYNECOLOGY

## 2020-10-27 ENCOUNTER — INFUSION THERAPY VISIT (OUTPATIENT)
Dept: ONCOLOGY | Facility: CLINIC | Age: 35
End: 2020-10-27
Attending: OBSTETRICS & GYNECOLOGY

## 2020-10-27 VITALS
TEMPERATURE: 97.3 F | BODY MASS INDEX: 24.08 KG/M2 | WEIGHT: 134.2 LBS | SYSTOLIC BLOOD PRESSURE: 112 MMHG | OXYGEN SATURATION: 97 % | RESPIRATION RATE: 18 BRPM | HEART RATE: 112 BPM | DIASTOLIC BLOOD PRESSURE: 76 MMHG

## 2020-10-27 DIAGNOSIS — Z11.59 ENCOUNTER FOR SCREENING FOR OTHER VIRAL DISEASES: ICD-10-CM

## 2020-10-27 DIAGNOSIS — C53.8 MALIGNANT NEOPLASM OF OVERLAPPING SITES OF CERVIX (H): Primary | ICD-10-CM

## 2020-10-27 LAB
ALBUMIN SERPL-MCNC: 3.8 G/DL (ref 3.4–5)
ALP SERPL-CCNC: 64 U/L (ref 40–150)
ALT SERPL W P-5'-P-CCNC: 21 U/L (ref 0–50)
ANION GAP SERPL CALCULATED.3IONS-SCNC: 6 MMOL/L (ref 3–14)
AST SERPL W P-5'-P-CCNC: 18 U/L (ref 0–45)
BASOPHILS # BLD AUTO: 0 10E9/L (ref 0–0.2)
BASOPHILS NFR BLD AUTO: 0.3 %
BILIRUB SERPL-MCNC: 0.3 MG/DL (ref 0.2–1.3)
BUN SERPL-MCNC: 9 MG/DL (ref 7–30)
CALCIUM SERPL-MCNC: 8.8 MG/DL (ref 8.5–10.1)
CHLORIDE SERPL-SCNC: 105 MMOL/L (ref 94–109)
CO2 SERPL-SCNC: 27 MMOL/L (ref 20–32)
CREAT SERPL-MCNC: 0.57 MG/DL (ref 0.52–1.04)
DIFFERENTIAL METHOD BLD: ABNORMAL
EOSINOPHIL # BLD AUTO: 0 10E9/L (ref 0–0.7)
EOSINOPHIL NFR BLD AUTO: 1 %
ERYTHROCYTE [DISTWIDTH] IN BLOOD BY AUTOMATED COUNT: 13.3 % (ref 10–15)
GFR SERPL CREATININE-BSD FRML MDRD: >90 ML/MIN/{1.73_M2}
GLUCOSE SERPL-MCNC: 118 MG/DL (ref 70–99)
HCT VFR BLD AUTO: 32.8 % (ref 35–47)
HGB BLD-MCNC: 11 G/DL (ref 11.7–15.7)
IMM GRANULOCYTES # BLD: 0 10E9/L (ref 0–0.4)
IMM GRANULOCYTES NFR BLD: 0.3 %
LYMPHOCYTES # BLD AUTO: 0.2 10E9/L (ref 0.8–5.3)
LYMPHOCYTES NFR BLD AUTO: 6.4 %
MAGNESIUM SERPL-MCNC: 1.6 MG/DL (ref 1.6–2.3)
MCH RBC QN AUTO: 29.6 PG (ref 26.5–33)
MCHC RBC AUTO-ENTMCNC: 33.5 G/DL (ref 31.5–36.5)
MCV RBC AUTO: 88 FL (ref 78–100)
MONOCYTES # BLD AUTO: 0.3 10E9/L (ref 0–1.3)
MONOCYTES NFR BLD AUTO: 8.7 %
NEUTROPHILS # BLD AUTO: 2.5 10E9/L (ref 1.6–8.3)
NEUTROPHILS NFR BLD AUTO: 83.3 %
NRBC # BLD AUTO: 0 10*3/UL
NRBC BLD AUTO-RTO: 0 /100
PLATELET # BLD AUTO: 95 10E9/L (ref 150–450)
POTASSIUM SERPL-SCNC: 3.3 MMOL/L (ref 3.4–5.3)
PROT SERPL-MCNC: 7.7 G/DL (ref 6.8–8.8)
RBC # BLD AUTO: 3.72 10E12/L (ref 3.8–5.2)
SARS-COV-2 RNA SPEC QL NAA+PROBE: NOT DETECTED
SODIUM SERPL-SCNC: 138 MMOL/L (ref 133–144)
SPECIMEN SOURCE: NORMAL
WBC # BLD AUTO: 3 10E9/L (ref 4–11)

## 2020-10-27 PROCEDURE — 258N000001 HC RX 258: Performed by: OBSTETRICS & GYNECOLOGY

## 2020-10-27 PROCEDURE — 96367 TX/PROPH/DG ADDL SEQ IV INF: CPT

## 2020-10-27 PROCEDURE — 83735 ASSAY OF MAGNESIUM: CPT | Performed by: OBSTETRICS & GYNECOLOGY

## 2020-10-27 PROCEDURE — 77386 HC IMRT TREATMENT DELIVERY, COMPLEX: CPT | Performed by: RADIOLOGY

## 2020-10-27 PROCEDURE — 36415 COLL VENOUS BLD VENIPUNCTURE: CPT

## 2020-10-27 PROCEDURE — U0003 INFECTIOUS AGENT DETECTION BY NUCLEIC ACID (DNA OR RNA); SEVERE ACUTE RESPIRATORY SYNDROME CORONAVIRUS 2 (SARS-COV-2) (CORONAVIRUS DISEASE [COVID-19]), AMPLIFIED PROBE TECHNIQUE, MAKING USE OF HIGH THROUGHPUT TECHNOLOGIES AS DESCRIBED BY CMS-2020-01-R: HCPCS | Performed by: OBSTETRICS & GYNECOLOGY

## 2020-10-27 PROCEDURE — 99207 PR NO CHARGE LOS: CPT

## 2020-10-27 PROCEDURE — 85025 COMPLETE CBC W/AUTO DIFF WBC: CPT | Performed by: OBSTETRICS & GYNECOLOGY

## 2020-10-27 PROCEDURE — 96375 TX/PRO/DX INJ NEW DRUG ADDON: CPT

## 2020-10-27 PROCEDURE — 80053 COMPREHEN METABOLIC PANEL: CPT | Performed by: OBSTETRICS & GYNECOLOGY

## 2020-10-27 PROCEDURE — 250N000011 HC RX IP 250 OP 636: Performed by: OBSTETRICS & GYNECOLOGY

## 2020-10-27 PROCEDURE — 96361 HYDRATE IV INFUSION ADD-ON: CPT

## 2020-10-27 PROCEDURE — 258N000003 HC RX IP 258 OP 636: Performed by: OBSTETRICS & GYNECOLOGY

## 2020-10-27 PROCEDURE — 96413 CHEMO IV INFUSION 1 HR: CPT

## 2020-10-27 RX ORDER — PALONOSETRON 0.05 MG/ML
0.25 INJECTION, SOLUTION INTRAVENOUS ONCE
Status: COMPLETED | OUTPATIENT
Start: 2020-10-27 | End: 2020-10-27

## 2020-10-27 RX ORDER — DEXTROSE, SODIUM CHLORIDE, AND POTASSIUM CHLORIDE 5; .45; .15 G/100ML; G/100ML; G/100ML
2000 INJECTION INTRAVENOUS ONCE
Status: COMPLETED | OUTPATIENT
Start: 2020-10-27 | End: 2020-10-27

## 2020-10-27 RX ADMIN — PALONOSETRON 0.25 MG: 0.05 INJECTION, SOLUTION INTRAVENOUS at 10:31

## 2020-10-27 RX ADMIN — DEXAMETHASONE SODIUM PHOSPHATE: 10 INJECTION, SOLUTION INTRAMUSCULAR; INTRAVENOUS at 10:33

## 2020-10-27 RX ADMIN — POTASSIUM CHLORIDE, DEXTROSE MONOHYDRATE AND SODIUM CHLORIDE 2000 ML: 150; 5; 450 INJECTION, SOLUTION INTRAVENOUS at 09:18

## 2020-10-27 RX ADMIN — CISPLATIN 70 MG: 1 INJECTION INTRAVENOUS at 11:02

## 2020-10-27 ASSESSMENT — PAIN SCALES - GENERAL: PAINLEVEL: NO PAIN (0)

## 2020-10-27 NOTE — PROGRESS NOTES
"Infusion Nursing Note:  Suni Zuluaga presents today for Cycle 1 Day 29 Cisplatin.      Note:  Suni reports feeling well today.  She notes ongoing problems with hot flashes and diarrhea although these are unchanged.  She wishes to proceed with treatment as planned.  She does report a new cough please see below for details.       Intravenous Access:  Peripheral IV placed in lab  Positive blood return pre and post cisplatin    Treatment Conditions:  Lab Results   Component Value Date    HGB 11.0 10/27/2020     Lab Results   Component Value Date    WBC 3.0 10/27/2020      Lab Results   Component Value Date    ANEU 2.5 10/27/2020     Lab Results   Component Value Date    PLT 95 10/27/2020      Lab Results   Component Value Date     10/27/2020                   Lab Results   Component Value Date    POTASSIUM 3.3 10/27/2020           Lab Results   Component Value Date    MAG 1.6 10/27/2020            Lab Results   Component Value Date    CR 0.57 10/27/2020                   Lab Results   Component Value Date    HARRISON 8.8 10/27/2020                Lab Results   Component Value Date    BILITOTAL 0.3 10/27/2020           Lab Results   Component Value Date    ALBUMIN 3.8 10/27/2020                    Lab Results   Component Value Date    ALT 21 10/27/2020           Lab Results   Component Value Date    AST 18 10/27/2020       Results reviewed, labs did NOT meet treatment parameters:     Platelets are [address]40 today.  Melissa Wiggins aware.  Ok to proceed with treatment.  See treatment plan for details.    Potassium 3.3. 40meq of potassium is included in her maintenance fluid.  Per Melissa no need for additional potassium replacement.    Suni reports a new productive cough.  Afebrile.  Denies SOB.  Just feels like she has \"a little cold\"  Pt was placed on COVID precautions and Melissa was notified.  A COVID test was done today in infusion.  Radiation was updated about her new cough.    Post Infusion " Assessment:  Patient tolerated infusion without incident.       Discharge Plan:   Patient declined prescription refills.  Today is Suni's last chemotheapy infusion.  She will continue radiation and has a follow up appt with Dr Brambila on 2/16/21. She was discharged to radiation therapy.  Face to Face time: 0.    Carole Kim RN

## 2020-10-27 NOTE — PROGRESS NOTES
Chief Complaint   Patient presents with     Blood Draw     Labs drawn, IV established. VS Taken     Labs drawn via venipuncture by Paramedic in lab. Vital signs taken. Pt checked in for next appointment.   Aline Carter, Paramedic  Aline Carter on 10/27/2020 at 7:52 AM

## 2020-10-28 ENCOUNTER — APPOINTMENT (OUTPATIENT)
Dept: RADIATION ONCOLOGY | Facility: CLINIC | Age: 35
End: 2020-10-28
Attending: RADIOLOGY
Payer: MEDICAID

## 2020-10-28 PROCEDURE — 77386 HC IMRT TREATMENT DELIVERY, COMPLEX: CPT | Performed by: RADIOLOGY

## 2020-10-28 PROCEDURE — 77014 PR CT GUIDE FOR PLACEMENT RADIATION THERAPY FIELDS: CPT | Mod: 26 | Performed by: RADIOLOGY

## 2020-10-29 ENCOUNTER — APPOINTMENT (OUTPATIENT)
Dept: RADIATION ONCOLOGY | Facility: CLINIC | Age: 35
End: 2020-10-29
Attending: RADIOLOGY
Payer: MEDICAID

## 2020-10-29 ENCOUNTER — HOSPITAL ENCOUNTER (OUTPATIENT)
Dept: MRI IMAGING | Facility: CLINIC | Age: 35
Discharge: HOME | End: 2020-10-29
Attending: RADIOLOGY | Admitting: RADIOLOGY
Payer: MEDICAID

## 2020-10-29 DIAGNOSIS — C53.1 MALIGNANT NEOPLASM OF EXOCERVIX (H): ICD-10-CM

## 2020-10-29 PROCEDURE — 77386 HC IMRT TREATMENT DELIVERY, COMPLEX: CPT | Performed by: RADIOLOGY

## 2020-10-29 PROCEDURE — A9585 GADOBUTROL INJECTION: HCPCS | Performed by: RADIOLOGY

## 2020-10-29 PROCEDURE — 77427 RADIATION TX MANAGEMENT X5: CPT | Performed by: RADIOLOGY

## 2020-10-29 PROCEDURE — 72197 MRI PELVIS W/O & W/DYE: CPT | Mod: 26 | Performed by: RADIOLOGY

## 2020-10-29 PROCEDURE — 72197 MRI PELVIS W/O & W/DYE: CPT

## 2020-10-29 PROCEDURE — 255N000002 HC RX 255 OP 636: Performed by: RADIOLOGY

## 2020-10-29 PROCEDURE — 77336 RADIATION PHYSICS CONSULT: CPT | Performed by: RADIOLOGY

## 2020-10-29 RX ORDER — GADOBUTROL 604.72 MG/ML
7.5 INJECTION INTRAVENOUS ONCE
Status: COMPLETED | OUTPATIENT
Start: 2020-10-29 | End: 2020-10-29

## 2020-10-29 RX ADMIN — GADOBUTROL 6 ML: 604.72 INJECTION INTRAVENOUS at 10:14

## 2020-10-30 ENCOUNTER — APPOINTMENT (OUTPATIENT)
Dept: RADIATION ONCOLOGY | Facility: CLINIC | Age: 35
End: 2020-10-30
Attending: RADIOLOGY
Payer: MEDICAID

## 2020-10-30 PROCEDURE — 77386 HC IMRT TREATMENT DELIVERY, COMPLEX: CPT | Performed by: RADIOLOGY

## 2020-10-30 PROCEDURE — 77014 PR CT GUIDE FOR PLACEMENT RADIATION THERAPY FIELDS: CPT | Mod: 26 | Performed by: RADIOLOGY

## 2020-11-06 ENCOUNTER — ANESTHESIA (OUTPATIENT)
Dept: GASTROENTEROLOGY | Facility: CLINIC | Age: 35
End: 2020-11-06
Payer: MEDICAID

## 2020-11-06 ENCOUNTER — ALLIED HEALTH/NURSE VISIT (OUTPATIENT)
Dept: RADIATION ONCOLOGY | Facility: CLINIC | Age: 35
End: 2020-11-06
Attending: RADIOLOGY
Payer: MEDICAID

## 2020-11-06 ENCOUNTER — ANESTHESIA EVENT (OUTPATIENT)
Dept: GASTROENTEROLOGY | Facility: CLINIC | Age: 35
End: 2020-11-06
Payer: MEDICAID

## 2020-11-06 ENCOUNTER — HOSPITAL ENCOUNTER (OUTPATIENT)
Dept: MRI IMAGING | Facility: CLINIC | Age: 35
Discharge: HOME | End: 2020-11-06
Attending: RADIOLOGY | Admitting: RADIOLOGY
Payer: MEDICAID

## 2020-11-06 ENCOUNTER — HOSPITAL ENCOUNTER (OUTPATIENT)
Dept: ULTRASOUND IMAGING | Facility: CLINIC | Age: 35
End: 2020-11-06
Attending: RADIOLOGY | Admitting: RADIOLOGY
Payer: MEDICAID

## 2020-11-06 ENCOUNTER — HOSPITAL ENCOUNTER (OUTPATIENT)
Facility: CLINIC | Age: 35
Discharge: ANOTHER HOSPITAL | End: 2020-11-06
Attending: RADIOLOGY | Admitting: RADIOLOGY
Payer: MEDICAID

## 2020-11-06 VITALS
HEART RATE: 89 BPM | DIASTOLIC BLOOD PRESSURE: 74 MMHG | SYSTOLIC BLOOD PRESSURE: 108 MMHG | TEMPERATURE: 98.6 F | RESPIRATION RATE: 21 BRPM | OXYGEN SATURATION: 99 %

## 2020-11-06 DIAGNOSIS — C53.8 MALIGNANT NEOPLASM OF OVERLAPPING SITES OF CERVIX (H): Primary | ICD-10-CM

## 2020-11-06 DIAGNOSIS — Z11.59 ENCOUNTER FOR SCREENING FOR OTHER VIRAL DISEASES: ICD-10-CM

## 2020-11-06 DIAGNOSIS — C53.9 CERVICAL CANCER (H): ICD-10-CM

## 2020-11-06 DIAGNOSIS — C53.8 MALIGNANT NEOPLASM OF OVERLAPPING SITES OF CERVIX (H): ICD-10-CM

## 2020-11-06 DIAGNOSIS — C53.1 MALIGNANT NEOPLASM OF EXOCERVIX (H): ICD-10-CM

## 2020-11-06 PROCEDURE — C1717 BRACHYTX, NON-STR,HDR IR-192: HCPCS | Performed by: RADIOLOGY

## 2020-11-06 PROCEDURE — 99000 SPECIMEN HANDLING OFFICE-LAB: CPT | Performed by: PATHOLOGY

## 2020-11-06 PROCEDURE — 57155 INSERT UTERI TANDEM/OVOIDS: CPT | Performed by: RADIOLOGY

## 2020-11-06 PROCEDURE — 77295 3-D RADIOTHERAPY PLAN: CPT | Performed by: RADIOLOGY

## 2020-11-06 PROCEDURE — 77771 HDR RDNCL NTRSTL/ICAV BRCHTX: CPT | Performed by: RADIOLOGY

## 2020-11-06 PROCEDURE — 77332 RADIATION TREATMENT AID(S): CPT | Performed by: RADIOLOGY

## 2020-11-06 PROCEDURE — 370N000001 HC ANESTHESIA TECHNICAL FEE, 1ST 30 MIN

## 2020-11-06 PROCEDURE — 72195 MRI PELVIS W/O DYE: CPT | Mod: TC

## 2020-11-06 PROCEDURE — 250N000009 HC RX 250: Performed by: NURSE ANESTHETIST, CERTIFIED REGISTERED

## 2020-11-06 PROCEDURE — 77290 THER RAD SIMULAJ FIELD CPLX: CPT | Mod: 26 | Performed by: RADIOLOGY

## 2020-11-06 PROCEDURE — 258N000003 HC RX IP 258 OP 636: Performed by: NURSE ANESTHETIST, CERTIFIED REGISTERED

## 2020-11-06 PROCEDURE — 250N000011 HC RX IP 250 OP 636: Performed by: NURSE ANESTHETIST, CERTIFIED REGISTERED

## 2020-11-06 PROCEDURE — 77771 HDR RDNCL NTRSTL/ICAV BRCHTX: CPT | Mod: 26 | Performed by: RADIOLOGY

## 2020-11-06 PROCEDURE — 271N000005 HC IMPLANT RADIATION BRIEF: Performed by: RADIOLOGY

## 2020-11-06 PROCEDURE — 77295 3-D RADIOTHERAPY PLAN: CPT | Mod: 26 | Performed by: RADIOLOGY

## 2020-11-06 PROCEDURE — 77332 RADIATION TREATMENT AID(S): CPT | Mod: 26 | Performed by: RADIOLOGY

## 2020-11-06 PROCEDURE — U0003 INFECTIOUS AGENT DETECTION BY NUCLEIC ACID (DNA OR RNA); SEVERE ACUTE RESPIRATORY SYNDROME CORONAVIRUS 2 (SARS-COV-2) (CORONAVIRUS DISEASE [COVID-19]), AMPLIFIED PROBE TECHNIQUE, MAKING USE OF HIGH THROUGHPUT TECHNOLOGIES AS DESCRIBED BY CMS-2020-01-R: HCPCS | Mod: 90 | Performed by: PATHOLOGY

## 2020-11-06 RX ORDER — SODIUM CHLORIDE 9 MG/ML
INJECTION, SOLUTION INTRAVENOUS CONTINUOUS PRN
Status: DISCONTINUED | OUTPATIENT
Start: 2020-11-06 | End: 2020-11-06

## 2020-11-06 RX ORDER — NALOXONE HYDROCHLORIDE 0.4 MG/ML
.1-.4 INJECTION, SOLUTION INTRAMUSCULAR; INTRAVENOUS; SUBCUTANEOUS
Status: CANCELLED | OUTPATIENT
Start: 2020-11-06 | End: 2020-11-07

## 2020-11-06 RX ORDER — PROPOFOL 10 MG/ML
INJECTION, EMULSION INTRAVENOUS CONTINUOUS PRN
Status: DISCONTINUED | OUTPATIENT
Start: 2020-11-06 | End: 2020-11-06

## 2020-11-06 RX ORDER — KETOROLAC TROMETHAMINE 30 MG/ML
INJECTION, SOLUTION INTRAMUSCULAR; INTRAVENOUS PRN
Status: DISCONTINUED | OUTPATIENT
Start: 2020-11-06 | End: 2020-11-06

## 2020-11-06 RX ORDER — SODIUM CHLORIDE, SODIUM LACTATE, POTASSIUM CHLORIDE, CALCIUM CHLORIDE 600; 310; 30; 20 MG/100ML; MG/100ML; MG/100ML; MG/100ML
INJECTION, SOLUTION INTRAVENOUS CONTINUOUS
Status: CANCELLED | OUTPATIENT
Start: 2020-11-06

## 2020-11-06 RX ORDER — PROPOFOL 10 MG/ML
INJECTION, EMULSION INTRAVENOUS PRN
Status: DISCONTINUED | OUTPATIENT
Start: 2020-11-06 | End: 2020-11-06

## 2020-11-06 RX ORDER — ONDANSETRON 4 MG/1
4 TABLET, ORALLY DISINTEGRATING ORAL EVERY 30 MIN PRN
Status: CANCELLED | OUTPATIENT
Start: 2020-11-06

## 2020-11-06 RX ORDER — LIDOCAINE HYDROCHLORIDE 20 MG/ML
INJECTION, SOLUTION INFILTRATION; PERINEURAL PRN
Status: DISCONTINUED | OUTPATIENT
Start: 2020-11-06 | End: 2020-11-06

## 2020-11-06 RX ORDER — ONDANSETRON 2 MG/ML
INJECTION INTRAMUSCULAR; INTRAVENOUS PRN
Status: DISCONTINUED | OUTPATIENT
Start: 2020-11-06 | End: 2020-11-06

## 2020-11-06 RX ORDER — ONDANSETRON 2 MG/ML
4 INJECTION INTRAMUSCULAR; INTRAVENOUS EVERY 30 MIN PRN
Status: CANCELLED | OUTPATIENT
Start: 2020-11-06

## 2020-11-06 RX ORDER — FENTANYL CITRATE 50 UG/ML
INJECTION, SOLUTION INTRAMUSCULAR; INTRAVENOUS PRN
Status: DISCONTINUED | OUTPATIENT
Start: 2020-11-06 | End: 2020-11-06

## 2020-11-06 RX ORDER — FENTANYL CITRATE 50 UG/ML
25-50 INJECTION, SOLUTION INTRAMUSCULAR; INTRAVENOUS
Status: CANCELLED | OUTPATIENT
Start: 2020-11-06

## 2020-11-06 RX ADMIN — KETOROLAC TROMETHAMINE 30 MG: 30 INJECTION, SOLUTION INTRAMUSCULAR at 09:16

## 2020-11-06 RX ADMIN — FENTANYL CITRATE 50 MCG: 50 INJECTION, SOLUTION INTRAMUSCULAR; INTRAVENOUS at 09:04

## 2020-11-06 RX ADMIN — PROPOFOL 30 MG: 10 INJECTION, EMULSION INTRAVENOUS at 09:10

## 2020-11-06 RX ADMIN — MIDAZOLAM 2 MG: 1 INJECTION INTRAMUSCULAR; INTRAVENOUS at 09:01

## 2020-11-06 RX ADMIN — LIDOCAINE HYDROCHLORIDE 60 MG: 20 INJECTION, SOLUTION INFILTRATION; PERINEURAL at 09:04

## 2020-11-06 RX ADMIN — PROPOFOL 100 MCG/KG/MIN: 10 INJECTION, EMULSION INTRAVENOUS at 09:04

## 2020-11-06 RX ADMIN — ONDANSETRON 4 MG: 2 INJECTION INTRAMUSCULAR; INTRAVENOUS at 09:12

## 2020-11-06 RX ADMIN — SODIUM CHLORIDE: 9 INJECTION, SOLUTION INTRAVENOUS at 09:01

## 2020-11-06 ASSESSMENT — LIFESTYLE VARIABLES: TOBACCO_USE: 1

## 2020-11-06 ASSESSMENT — COPD QUESTIONNAIRES: COPD: 0

## 2020-11-06 NOTE — PATIENT INSTRUCTIONS
You will be having a type of treatment called High Dose Radiation (HDR) therapy.  You will have this near the end of your course of radiation therapy.     You will get a call from the surgery department with specific pre-op instructions including when to stop eating and drinking prior to surgery as well as check in instructions. No eating or Drinking 6 hours before treatment.  Preparing for HDR treatments:    1. You will be scheduled to have sedation for this procedure.  If so:    A. The nurse will tell you when to arrive and give you specific instructions to prepare for the sedation.  This will include not eating or drinking for six hours prior to arrival.   b.  You will check in on the scheduled day at the Endoscopy center on the first floor of   the hospital.Then you will be directed to the pre-surgery area to prepare.   c.  Your Radiation Oncology doctor and nurse will come to the procedure room to insert   the treatment tube and a urinary catheter.  You will be asleep for this procedure.   When you wake up you will be brought to the Radiation oncology department   for the radiation therapy treatment.  2. You will have an MRI (sometimes a CT scan also) and then wait for treatment planning.  This may take several hours. While the planning is being completed it is important for you to lie flat so that the vaginal treatment device stays in place.  3. When the planning is done, we will bring you to the treatment room to have your treatment.  4. You will be brought into an exam room.  The doctor or nurse will remove the vaginal treatment device and urinary catheter.  5. You will receive instructions for home and your next visit.   6. You MUST have a  to bring you home.  Post Sedation instructions:   1.  Get plenty of rest.  A responsible adult must stay with you for at least 24 hours after   you leave the hospital   2.  Do not drive or use heavy equipment.  If you have weakness or tingling, don't drive   or  use heavy equipment until this feeling goes away.   3.  Do not drink alcohol for 24 hours   4.  Avoid strenuous or risky activities.  Ask for help when climbing stairs   5.  You may feel lightheaded.  IF so, sit for a few minutes before standing.  Have    someone help you get up.   6.  If you have nausea: Drink only clear liquids.  Be sure to drink enough fluids.  Move   to a regular diet as you feel able.   7.  You may have a dry mouth, a sore throat, muscle aches or trouble sleeping.  These   should go away after 24 hours.   8.  Do not make important or legal decisions.  When to call your doctor:   1. Some minor bleeding /spotting is normal after this procedure. Call if you have bright red vaginal bleeding.    2. If vaginal discharge has a bad odor.  3. If you have a fever over 100.5 F.  4. If you are not able to urinate more than 6 hours after the urinary catheter is removed.  5. If you have increased urinary burning when using the bathroom. You may have some minor burning with urination for a few days after the catheter is removed.    [facility] Radiation Oncology: [phone]  Covid test today at the [facility], [address] before going home.

## 2020-11-06 NOTE — NURSING NOTE
Suni Zuluaga is here for scheduled HDR brachytherapy    HDR Tandem and Ring   1    Pre-procedure-  Time out done by Dr. Hernández and SANDRA Rios.   Patient identified, arm band applied, signed consent available   Medications taken by patient prior to procedure Per CRNA  Pain assessment: 0/10    Waiting-  Pt resting in room, bed low and locked, call light in reach.    Post-procedure-  Pain assessment: 0/10  Device removed without difficulty, Education for possible side effects and management. Discharge teaching reviewed, questions answered. Pt going to [facility] for covid test then home.  Pt fiance here to drive her home.

## 2020-11-06 NOTE — ANESTHESIA POSTPROCEDURE EVALUATION
Anesthesia POST Procedure Evaluation    Patient: Suni Zuluaga   MRN:     [id number] Gender:   female   Age:    35 year old :      1985        Preoperative Diagnosis: Malignant neoplasm of cervix, unspecified site (H) [C53.9]   Procedure(s):  ANESTHESIA OUT OF OR RAD THERAPY  Tandem and Ring  @0830   Postop Comments: No value filed.     Anesthesia Type: MAC       Disposition: Outpatient   Postop Pain Control: Uneventful            Sign Out: Well controlled pain   PONV: No   Neuro/Psych: Uneventful            Sign Out: Acceptable/Baseline neuro status   Airway/Respiratory: Uneventful            Sign Out: Acceptable/Baseline resp. status   CV/Hemodynamics: Uneventful            Sign Out: Acceptable CV status   Other NRE: NONE   DID A NON-ROUTINE EVENT OCCUR? No         Last Anesthesia Record Vitals:  CRNA VITALS  2020 0856 - 2020 0956      2020             Pulse:  86    SpO2:  99 %          Last PACU Vitals:  No vitals data found for the desired time range.        Electronically Signed By: Chip Mims MD, 2020, 11:18 AM

## 2020-11-06 NOTE — ANESTHESIA PREPROCEDURE EVALUATION
"Anesthesia Pre-Procedure Evaluation    Patient: Suni Zuluaga   MRN:     [id number] Gender:   female   Age:    35 year old :      1985        Preoperative Diagnosis: Malignant neoplasm of cervix, unspecified site (H) [C53.9]   Procedure(s):  ANESTHESIA OUT OF OR RAD THERAPY  Tandem and Ring  @0830     LABS:  CBC:   Lab Results   Component Value Date    WBC 3.0 (L) 10/27/2020    WBC 2.7 (L) 10/20/2020    HGB 11.0 (L) 10/27/2020    HGB 10.4 (L) 10/20/2020    HCT 32.8 (L) 10/27/2020    HCT 31.3 (L) 10/20/2020    PLT 95 (L) 10/27/2020     (L) 10/20/2020     BMP:   Lab Results   Component Value Date     10/27/2020     10/20/2020    POTASSIUM 3.3 (L) 10/27/2020    POTASSIUM 3.5 10/20/2020    CHLORIDE 105 10/27/2020    CHLORIDE 106 10/20/2020    CO2 27 10/27/2020    CO2 25 10/20/2020    BUN 9 10/27/2020    BUN 10 10/20/2020    CR 0.57 10/27/2020    CR 0.51 (L) 10/20/2020     (H) 10/27/2020     (H) 10/20/2020     COAGS:   Lab Results   Component Value Date    PTT 28 2020    INR 1.10 2020     POC: No results found for: BGM, HCG, HCGS  OTHER:   Lab Results   Component Value Date    HARRISON 8.8 10/27/2020    MAG 1.6 10/27/2020    ALBUMIN 3.8 10/27/2020    PROTTOTAL 7.7 10/27/2020    ALT 21 10/27/2020    AST 18 10/27/2020    ALKPHOS 64 10/27/2020    BILITOTAL 0.3 10/27/2020        Preop Vitals    BP Readings from Last 3 Encounters:   20 108/74   10/27/20 112/76   10/22/20 97/65    Pulse Readings from Last 3 Encounters:   20 89   10/27/20 112   10/22/20 96      Resp Readings from Last 3 Encounters:   20 21   10/27/20 18   10/20/20 18    SpO2 Readings from Last 3 Encounters:   20 99%   10/27/20 97%   10/22/20 97%      Temp Readings from Last 1 Encounters:   20 37  C (98.6  F) (Oral)    Ht Readings from Last 1 Encounters:   20 1.59 m (5' 2.6\")      Wt Readings from Last 1 Encounters:   10/27/20 60.9 kg (134 lb 3.2 oz)    Estimated body mass " "index is 24.08 kg/m  as calculated from the following:    Height as of 9/9/20: 1.59 m (5' 2.6\").    Weight as of 10/27/20: 60.9 kg (134 lb 3.2 oz).     LDA:  Peripheral IV 10/20/20 Left Lower forearm (Active)   Number of days: 17       Peripheral IV 11/06/20 Right Hand (Active)   Number of days: 0        Past Medical History:   Diagnosis Date     Cervical cancer (H)       Past Surgical History:   Procedure Laterality Date     CARPAL TUNNEL RELEASE RT/LT Right      TUBAL LIGATION        Allergies   Allergen Reactions     Clindamycin Hives        Anesthesia Evaluation     . Pt has not had prior anesthetic            ROS/MED HX    ENT/Pulmonary:  - neg pulmonary ROS   (+)tobacco use, Current use , . .   (-) asthma and COPD   Neurologic:  - neg neurologic ROS    (-) CVA and TIA   Cardiovascular:  - neg cardiovascular ROS   (+) ----. : . . . :. . Previous cardiac testing date:results:date: results:ECG reviewed date:9/9/2020 results:NSR date: results:         (-) hypertension and dyslipidemia   METS/Exercise Tolerance:  >4 METS   Hematologic:  - neg hematologic  ROS       Musculoskeletal:  - neg musculoskeletal ROS      (-) arthritis   GI/Hepatic:  - neg GI/hepatic ROS      (-) GERD   Renal/Genitourinary:  - ROS Renal section negative    (-) renal disease   Endo:  - neg endo ROS    (-) Type II DM, thyroid disease and chronic steroid usage   Psychiatric:  - neg psychiatric ROS       Infectious Disease: Comment: COVID NEGATIVE - neg infectious disease ROS       Malignancy:   (+) Malignancy History of Other  Other CA cervical cancer Active status post Radiation and Chemo         Other:    (+) No chance of pregnancy C-spine cleared: N/A, no H/O Chronic Pain,no other significant disability   - neg other ROS                     PHYSICAL EXAM:   Mental Status/Neuro: A/A/O   Airway: Facies: Feasible  Mallampati: I  Mouth/Opening: Full  TM distance: > 6 cm  Neck ROM: Full   Respiratory: Auscultation: CTAB     Resp. Rate: Normal   "   Resp. Effort: Normal      CV: Rhythm: Regular  Rate: Age appropriate  Heart: Normal Sounds  Edema: None   Comments:      Dental: Normal Dentition                Assessment:   ASA SCORE: 2    H&P: History and physical reviewed and following examination; no interval change.     Smoking Status:  Active Smoker       - patient smoked on day of surgery       - not instructed to abstain from smoking on day of procedure       - Patient was counseled regarding increased Infection Risk with same day smoking.   NPO Status: NPO Appropriate     Plan:   Anes. Type:  MAC   Pre-Medication: None   Induction:  IV (Standard)   Airway: Native Airway   Access/Monitoring: PIV   Maintenance: Propofol Sedation     Postop Plan:   Postop Pain: None  Postop Sedation/Airway: Not planned     PONV Management: Adult Risk Factors: Female   Prevention: Ondansetron, Propofol     CONSENT: Direct conversation   Plan and risks discussed with: Patient                      Chip Mims MD

## 2020-11-07 LAB
SARS-COV-2 RNA SPEC QL NAA+PROBE: NOT DETECTED
SPECIMEN SOURCE: NORMAL

## 2020-11-09 ENCOUNTER — HOSPITAL ENCOUNTER (OUTPATIENT)
Facility: CLINIC | Age: 35
Discharge: ANOTHER HOSPITAL | End: 2020-11-09
Attending: RADIOLOGY | Admitting: RADIOLOGY
Payer: MEDICAID

## 2020-11-09 ENCOUNTER — ALLIED HEALTH/NURSE VISIT (OUTPATIENT)
Dept: RADIATION ONCOLOGY | Facility: CLINIC | Age: 35
End: 2020-11-09
Attending: RADIOLOGY
Payer: MEDICAID

## 2020-11-09 ENCOUNTER — ANESTHESIA EVENT (OUTPATIENT)
Dept: GASTROENTEROLOGY | Facility: CLINIC | Age: 35
End: 2020-11-09
Payer: MEDICAID

## 2020-11-09 ENCOUNTER — HOSPITAL ENCOUNTER (OUTPATIENT)
Dept: MRI IMAGING | Facility: CLINIC | Age: 35
End: 2020-11-09
Attending: RADIOLOGY | Admitting: RADIOLOGY
Payer: MEDICAID

## 2020-11-09 ENCOUNTER — ANESTHESIA (OUTPATIENT)
Dept: GASTROENTEROLOGY | Facility: CLINIC | Age: 35
End: 2020-11-09
Payer: MEDICAID

## 2020-11-09 VITALS
OXYGEN SATURATION: 98 % | HEART RATE: 93 BPM | DIASTOLIC BLOOD PRESSURE: 74 MMHG | TEMPERATURE: 98.4 F | RESPIRATION RATE: 10 BRPM | SYSTOLIC BLOOD PRESSURE: 105 MMHG

## 2020-11-09 VITALS — DIASTOLIC BLOOD PRESSURE: 46 MMHG | SYSTOLIC BLOOD PRESSURE: 94 MMHG | HEART RATE: 83 BPM | OXYGEN SATURATION: 98 %

## 2020-11-09 DIAGNOSIS — Z11.59 ENCOUNTER FOR SCREENING FOR OTHER VIRAL DISEASES: ICD-10-CM

## 2020-11-09 DIAGNOSIS — C53.1 MALIGNANT NEOPLASM OF EXOCERVIX (H): ICD-10-CM

## 2020-11-09 DIAGNOSIS — C53.1 MALIGNANT NEOPLASM OF EXOCERVIX (H): Primary | ICD-10-CM

## 2020-11-09 PROCEDURE — 999N000104 MR LIMITED SCAN: Mod: TC

## 2020-11-09 PROCEDURE — 370N000001 HC ANESTHESIA TECHNICAL FEE, 1ST 30 MIN

## 2020-11-09 PROCEDURE — 370N000002 HC ANESTHESIA TECHNICAL FEE, EACH ADDTL 15 MIN

## 2020-11-09 PROCEDURE — C1717 BRACHYTX, NON-STR,HDR IR-192: HCPCS | Performed by: RADIOLOGY

## 2020-11-09 PROCEDURE — 77771 HDR RDNCL NTRSTL/ICAV BRCHTX: CPT | Performed by: RADIOLOGY

## 2020-11-09 PROCEDURE — 57155 INSERT UTERI TANDEM/OVOIDS: CPT | Performed by: RADIOLOGY

## 2020-11-09 PROCEDURE — 77290 THER RAD SIMULAJ FIELD CPLX: CPT | Performed by: RADIOLOGY

## 2020-11-09 PROCEDURE — 77317 BRACHYTX ISODOSE INTERMED: CPT | Performed by: RADIOLOGY

## 2020-11-09 PROCEDURE — 77332 RADIATION TREATMENT AID(S): CPT | Mod: 26 | Performed by: RADIOLOGY

## 2020-11-09 PROCEDURE — 77771 HDR RDNCL NTRSTL/ICAV BRCHTX: CPT | Mod: 26 | Performed by: RADIOLOGY

## 2020-11-09 PROCEDURE — 250N000009 HC RX 250: Performed by: NURSE ANESTHETIST, CERTIFIED REGISTERED

## 2020-11-09 PROCEDURE — 77290 THER RAD SIMULAJ FIELD CPLX: CPT | Mod: 26 | Performed by: RADIOLOGY

## 2020-11-09 PROCEDURE — 77332 RADIATION TREATMENT AID(S): CPT | Performed by: RADIOLOGY

## 2020-11-09 PROCEDURE — U0003 INFECTIOUS AGENT DETECTION BY NUCLEIC ACID (DNA OR RNA); SEVERE ACUTE RESPIRATORY SYNDROME CORONAVIRUS 2 (SARS-COV-2) (CORONAVIRUS DISEASE [COVID-19]), AMPLIFIED PROBE TECHNIQUE, MAKING USE OF HIGH THROUGHPUT TECHNOLOGIES AS DESCRIBED BY CMS-2020-01-R: HCPCS | Performed by: PATHOLOGY

## 2020-11-09 PROCEDURE — 77317 BRACHYTX ISODOSE INTERMED: CPT | Mod: 26 | Performed by: RADIOLOGY

## 2020-11-09 PROCEDURE — 271N000005 HC IMPLANT RADIATION BRIEF: Performed by: RADIOLOGY

## 2020-11-09 PROCEDURE — 258N000003 HC RX IP 258 OP 636: Performed by: NURSE ANESTHETIST, CERTIFIED REGISTERED

## 2020-11-09 PROCEDURE — 250N000011 HC RX IP 250 OP 636: Performed by: NURSE ANESTHETIST, CERTIFIED REGISTERED

## 2020-11-09 RX ORDER — ONDANSETRON 2 MG/ML
INJECTION INTRAMUSCULAR; INTRAVENOUS PRN
Status: DISCONTINUED | OUTPATIENT
Start: 2020-11-09 | End: 2020-11-09

## 2020-11-09 RX ORDER — DEXAMETHASONE SODIUM PHOSPHATE 4 MG/ML
INJECTION, SOLUTION INTRA-ARTICULAR; INTRALESIONAL; INTRAMUSCULAR; INTRAVENOUS; SOFT TISSUE PRN
Status: DISCONTINUED | OUTPATIENT
Start: 2020-11-09 | End: 2020-11-09

## 2020-11-09 RX ORDER — PROPOFOL 10 MG/ML
INJECTION, EMULSION INTRAVENOUS CONTINUOUS PRN
Status: DISCONTINUED | OUTPATIENT
Start: 2020-11-09 | End: 2020-11-09

## 2020-11-09 RX ORDER — SODIUM CHLORIDE 9 MG/ML
INJECTION, SOLUTION INTRAVENOUS CONTINUOUS PRN
Status: DISCONTINUED | OUTPATIENT
Start: 2020-11-09 | End: 2020-11-09

## 2020-11-09 RX ORDER — FENTANYL CITRATE 50 UG/ML
INJECTION, SOLUTION INTRAMUSCULAR; INTRAVENOUS PRN
Status: DISCONTINUED | OUTPATIENT
Start: 2020-11-09 | End: 2020-11-09

## 2020-11-09 RX ORDER — KETOROLAC TROMETHAMINE 30 MG/ML
INJECTION, SOLUTION INTRAMUSCULAR; INTRAVENOUS PRN
Status: DISCONTINUED | OUTPATIENT
Start: 2020-11-09 | End: 2020-11-09

## 2020-11-09 RX ORDER — PROPOFOL 10 MG/ML
INJECTION, EMULSION INTRAVENOUS PRN
Status: DISCONTINUED | OUTPATIENT
Start: 2020-11-09 | End: 2020-11-09

## 2020-11-09 RX ORDER — LIDOCAINE HYDROCHLORIDE 20 MG/ML
INJECTION, SOLUTION INFILTRATION; PERINEURAL PRN
Status: DISCONTINUED | OUTPATIENT
Start: 2020-11-09 | End: 2020-11-09

## 2020-11-09 RX ADMIN — FENTANYL CITRATE 25 MCG: 50 INJECTION, SOLUTION INTRAMUSCULAR; INTRAVENOUS at 08:58

## 2020-11-09 RX ADMIN — LIDOCAINE HYDROCHLORIDE 60 MG: 20 INJECTION, SOLUTION INFILTRATION; PERINEURAL at 08:48

## 2020-11-09 RX ADMIN — DEXAMETHASONE SODIUM PHOSPHATE 8 MG: 4 INJECTION, SOLUTION INTRA-ARTICULAR; INTRALESIONAL; INTRAMUSCULAR; INTRAVENOUS; SOFT TISSUE at 08:48

## 2020-11-09 RX ADMIN — MIDAZOLAM 2 MG: 1 INJECTION INTRAMUSCULAR; INTRAVENOUS at 08:46

## 2020-11-09 RX ADMIN — PHENYLEPHRINE HYDROCHLORIDE 100 MCG: 10 INJECTION INTRAVENOUS at 08:59

## 2020-11-09 RX ADMIN — PHENYLEPHRINE HYDROCHLORIDE 100 MCG: 10 INJECTION INTRAVENOUS at 09:06

## 2020-11-09 RX ADMIN — KETOROLAC TROMETHAMINE 30 MG: 30 INJECTION, SOLUTION INTRAMUSCULAR at 09:00

## 2020-11-09 RX ADMIN — PROPOFOL 150 MCG/KG/MIN: 10 INJECTION, EMULSION INTRAVENOUS at 08:48

## 2020-11-09 RX ADMIN — PROPOFOL 40 MG: 10 INJECTION, EMULSION INTRAVENOUS at 08:53

## 2020-11-09 RX ADMIN — PHENYLEPHRINE HYDROCHLORIDE 100 MCG: 10 INJECTION INTRAVENOUS at 09:11

## 2020-11-09 RX ADMIN — FENTANYL CITRATE 50 MCG: 50 INJECTION, SOLUTION INTRAMUSCULAR; INTRAVENOUS at 08:48

## 2020-11-09 RX ADMIN — ONDANSETRON 4 MG: 2 INJECTION INTRAMUSCULAR; INTRAVENOUS at 08:48

## 2020-11-09 RX ADMIN — SODIUM CHLORIDE: 9 INJECTION, SOLUTION INTRAVENOUS at 08:46

## 2020-11-09 ASSESSMENT — COPD QUESTIONNAIRES: COPD: 0

## 2020-11-09 ASSESSMENT — LIFESTYLE VARIABLES: TOBACCO_USE: 1

## 2020-11-09 NOTE — NURSING NOTE
Suni KELLER Ignaciomichele is here for scheduled HDR brachytherapy    HDR Tandem and Ring   2    Pre-procedure-  Patient identified, arm band applied, signed consent available   Medications taken by patient prior to procedure Aliza FLORES  Pain assessment: 0/10    Waiting-  BP 94/46   Pulse 83   SpO2 98%    Pt resting quietly bed low and locked, call light in reach.     Post-procedure-  Pain assessment: 0/10   Device removed without difficulty, Education for possible side effects and management, Discharge teaching reviewed, questions answered. Pt will get covid test at [facility] then discharge to home.

## 2020-11-09 NOTE — ANESTHESIA PREPROCEDURE EVALUATION
"Anesthesia Pre-Procedure Evaluation    Patient: Suni Zuluaga   MRN:     [id number] Gender:   female   Age:    35 year old :      1985        Preoperative Diagnosis: Malignant neoplasm of cervix, unspecified site (H) [C53.9]   Procedure(s):  ANESTHESIA OUT OF OR RAD THERAPY ( 60 ) @0830     LABS:  CBC:   Lab Results   Component Value Date    WBC 3.0 (L) 10/27/2020    WBC 2.7 (L) 10/20/2020    HGB 11.0 (L) 10/27/2020    HGB 10.4 (L) 10/20/2020    HCT 32.8 (L) 10/27/2020    HCT 31.3 (L) 10/20/2020    PLT 95 (L) 10/27/2020     (L) 10/20/2020     BMP:   Lab Results   Component Value Date     10/27/2020     10/20/2020    POTASSIUM 3.3 (L) 10/27/2020    POTASSIUM 3.5 10/20/2020    CHLORIDE 105 10/27/2020    CHLORIDE 106 10/20/2020    CO2 27 10/27/2020    CO2 25 10/20/2020    BUN 9 10/27/2020    BUN 10 10/20/2020    CR 0.57 10/27/2020    CR 0.51 (L) 10/20/2020     (H) 10/27/2020     (H) 10/20/2020     COAGS:   Lab Results   Component Value Date    PTT 28 2020    INR 1.10 2020     POC: No results found for: BGM, HCG, HCGS  OTHER:   Lab Results   Component Value Date    HARRISON 8.8 10/27/2020    MAG 1.6 10/27/2020    ALBUMIN 3.8 10/27/2020    PROTTOTAL 7.7 10/27/2020    ALT 21 10/27/2020    AST 18 10/27/2020    ALKPHOS 64 10/27/2020    BILITOTAL 0.3 10/27/2020        Preop Vitals    BP Readings from Last 3 Encounters:   20 108/74   10/27/20 112/76   10/22/20 97/65    Pulse Readings from Last 3 Encounters:   20 89   10/27/20 112   10/22/20 96      Resp Readings from Last 3 Encounters:   20 21   10/27/20 18   10/20/20 18    SpO2 Readings from Last 3 Encounters:   20 99%   10/27/20 97%   10/22/20 97%      Temp Readings from Last 1 Encounters:   20 37  C (98.6  F) (Oral)    Ht Readings from Last 1 Encounters:   20 1.59 m (5' 2.6\")      Wt Readings from Last 1 Encounters:   10/27/20 60.9 kg (134 lb 3.2 oz)    Estimated body mass index is 24.08 " "kg/m  as calculated from the following:    Height as of 9/9/20: 1.59 m (5' 2.6\").    Weight as of 10/27/20: 60.9 kg (134 lb 3.2 oz).     LDA:  Peripheral IV 10/20/20 Left Lower forearm (Active)   Number of days: 20       Peripheral IV 11/06/20 Right Hand (Active)   Number of days: 3        Past Medical History:   Diagnosis Date     Cervical cancer (H)       Past Surgical History:   Procedure Laterality Date     CARPAL TUNNEL RELEASE RT/LT Right      TUBAL LIGATION        Allergies   Allergen Reactions     Clindamycin Hives        Anesthesia Evaluation     . Pt has had prior anesthetic. Type: MAC    No history of anesthetic complications          ROS/MED HX    ENT/Pulmonary:  - neg pulmonary ROS   (+)tobacco use, Current use , . .   (-) asthma and COPD   Neurologic:  - neg neurologic ROS    (-) CVA and TIA   Cardiovascular:  - neg cardiovascular ROS   (+) ----. : . . . :. . Previous cardiac testing date:results:date: results:ECG reviewed date:9/9/2020 results:NSR date: results:         (-) hypertension and dyslipidemia   METS/Exercise Tolerance:  >4 METS   Hematologic:  - neg hematologic  ROS       Musculoskeletal:  - neg musculoskeletal ROS      (-) arthritis   GI/Hepatic:  - neg GI/hepatic ROS      (-) GERD   Renal/Genitourinary:  - ROS Renal section negative    (-) renal disease   Endo:  - neg endo ROS    (-) Type II DM, thyroid disease and chronic steroid usage   Psychiatric:  - neg psychiatric ROS       Infectious Disease: Comment: COVID NEGATIVE - neg infectious disease ROS       Malignancy:   (+) Malignancy History of Other  Other CA cervical cancer Active status post Radiation and Chemo         Other:    (+) No chance of pregnancy C-spine cleared: N/A, no H/O Chronic Pain,no other significant disability   - neg other ROS                     PHYSICAL EXAM:   Mental Status/Neuro: A/A/O   Airway: Facies: Feasible  Mallampati: I  Mouth/Opening: Full  TM distance: > 6 cm  Neck ROM: Full   Respiratory: " Auscultation: CTAB     Resp. Rate: Normal     Resp. Effort: Normal      CV: Rhythm: Regular  Rate: Age appropriate  Heart: Normal Sounds  Edema: None   Comments:      Dental: Normal Dentition                Assessment:   ASA SCORE: 2    H&P: History and physical reviewed and following examination; no interval change.   Smoking Status:  Active Smoker   NPO Status: NPO Appropriate     Plan:   Anes. Type:  MAC   Pre-Medication: None   Induction:  N/a   Airway: Native Airway   Access/Monitoring: PIV   Maintenance: Propofol Sedation     Postop Plan:   Postop Pain: None  Postop Sedation/Airway: Not planned     PONV Management: Adult Risk Factors: Female   Prevention: Ondansetron, Propofol     CONSENT: Direct conversation             Comments for Plan/Consent:  ______________________________________________________________________  I discussed the risks and benefits of MAC with deep sedation with the patient.  Questions were sought and answered.      Aj Melara MD  Attending Anesthesiologist                   Aj Melara MD

## 2020-11-09 NOTE — ANESTHESIA CARE TRANSFER NOTE
Patient: Snui Zuluaga    Procedure(s):  ANESTHESIA OUT OF OR RAD THERAPY ( 60 ) @0830    Diagnosis: Malignant neoplasm of cervix, unspecified site (H) [C53.9]  Diagnosis Additional Information: No value filed.    Anesthesia Type:   MAC     Note:  Airway :Room Air  Patient transferred to:Phase II  Comments: Pt denies pain or nausea. Report to Rad/onc nurse Meredith. Pt to MRI. Handoff Report: Identifed the Patient, Identified the Reponsible Provider, Reviewed the pertinent medical history, Discussed the surgical course, Reviewed Intra-OP anesthesia mangement and issues during anesthesia, Set expectations for post-procedure period and Allowed opportunity for questions and acknowledgement of understanding      Vitals: (Last set prior to Anesthesia Care Transfer)    CRNA VITALS  11/9/2020 0847 - 11/9/2020 0919      11/9/2020             Pulse:  85    SpO2:  98 %                Electronically Signed By: LAURA Moseley CRNA  November 9, 2020  9:19 AM

## 2020-11-09 NOTE — ANESTHESIA POSTPROCEDURE EVALUATION
Anesthesia POST Procedure Evaluation    Patient: Suni Zuluaga   MRN:     [id number] Gender:   female   Age:    35 year old :      1985        Preoperative Diagnosis: Malignant neoplasm of cervix, unspecified site (H) [C53.9]   Procedure(s):  ANESTHESIA OUT OF OR RAD THERAPY ( 60 ) @0830   Postop Comments: No value filed.     Anesthesia Type: MAC       Disposition: Outpatient   Postop Pain Control: Uneventful            Sign Out: Well controlled pain   PONV: No   Neuro/Psych: Uneventful            Sign Out: Acceptable/Baseline neuro status   Airway/Respiratory: Uneventful            Sign Out: Acceptable/Baseline resp. status   CV/Hemodynamics: Uneventful            Sign Out: Acceptable CV status   Other NRE: NONE   DID A NON-ROUTINE EVENT OCCUR? No    Event details/Postop Comments:  No noted anesthetic complications.  Patient satisfied with anesthetic.           Last Anesthesia Record Vitals:  CRNA VITALS  2020 0847 - 2020 0947      2020             Pulse:  85    SpO2:  98 %          Last PACU Vitals:  Vitals Value Taken Time   BP 94/46 20 1014   Temp     Pulse 83 20 1014   Resp     SpO2 98 % 20 1014   Temp src     NIBP     Pulse     SpO2     Resp     Temp     Ht Rate     Temp 2           Electronically Signed By: Aj Melara MD, 2020, 10:52 AM

## 2020-11-09 NOTE — OP NOTE
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 Cervical os easily visualized.  Residual erythematous nodular lesion on cervix.        DESCRIPTION OF OPERATIVE PROCEDURE:  Ms. Zuluaga was brought back to the operating room and identified to be herself. A time-out was performed and the patient was given conscious sedation. She was placed in the dorsal lithotomy position. Using sterile technique, a alonso catheter was inserted into the bladder and 8 cc of saline was used to inflate the balloon. Speculum examination was performed withcervical os easily identified. A 60 mm, 60 degree tandem applicator was placed and secured in the proper fashion. A 3.4 cm ring applicator was placed and secured. Vaginal packing was placed and the implant secured with radiation panties.  Anesthesia was then reversed and the patient was brought to MRI.      Parish Hernández MD

## 2020-11-09 NOTE — PATIENT INSTRUCTIONS
You will be having a type of treatment called High Dose Radiation (HDR) therapy.  You will have this near the end of your course of radiation therapy.       Preparing for HDR treatments:    1. You will be scheduled to have sedation for this procedure.  If so:    A. The nurse will tell you when to arrive and give you specific instructions to prepare for the sedation.  This will include not eating or drinking for six hours prior to arrival.   b.  You will check in on the scheduled day at the Endoscopy center on the first floor of   the hospital.Then you will be directed to the pre-surgery area to prepare.   c.  Your Radiation Oncology doctor and nurse will come to the procedure room to insert   the treatment tube and a urinary catheter.  You will be asleep for this procedure.   When you wake up you will be brought to the Radiation oncology department   for the radiation therapy treatment.  2. You will have an MRI (sometimes a CT scan also) and then wait for treatment planning.  This may take several hours. While the planning is being completed it is important for you to lie flat so that the vaginal treatment device stays in place.  3. When the planning is done, we will bring you to the treatment room to have your treatment.  4. You will be brought into an exam room.  The doctor or nurse will remove the vaginal treatment device and urinary catheter.  5. You will receive instructions for home and your next visit.   6. You MUST have a  to bring you home.  Post Sedation instructions:   1.  Get plenty of rest.  A responsible adult must stay with you for at least 24 hours after   you leave the hospital   2.  Do not drive or use heavy equipment.  If you have weakness or tingling, don't drive   or use heavy equipment until this feeling goes away.   3.  Do not drink alcohol for 24 hours   4.  Avoid strenuous or risky activities.  Ask for help when climbing stairs   5.  You may feel lightheaded.  IF so, sit for a few  minutes before standing.  Have    someone help you get up.   6.  If you have nausea: Drink only clear liquids.  Be sure to drink enough fluids.  Move   to a regular diet as you feel able.   7.  You may have a dry mouth, a sore throat, muscle aches or trouble sleeping.  These   should go away after 24 hours.   8.  Do not make important or legal decisions.  When to call your doctor:   1. Some minor bleeding /spotting is normal after this procedure. Call if you have bright red vaginal bleeding.    2. If vaginal discharge has a bad odor.  3. If you have a fever over 100.5 F.  4. If you are not able to urinate more than 6 hours after the urinary catheter is removed.  5. If you have increased urinary burning when using the bathroom. You may have some minor burning with urination for a few days after the catheter is removed.    [facility]: [phone]    Covid test today after discharge at the [facility], [address] [place] [address]

## 2020-11-10 LAB
LABORATORY COMMENT REPORT: NORMAL
SARS-COV-2 RNA SPEC QL NAA+PROBE: NEGATIVE
SARS-COV-2 RNA SPEC QL NAA+PROBE: NORMAL
SPECIMEN SOURCE: NORMAL
SPECIMEN SOURCE: NORMAL

## 2020-11-11 ENCOUNTER — ANESTHESIA (OUTPATIENT)
Dept: GASTROENTEROLOGY | Facility: CLINIC | Age: 35
End: 2020-11-11
Payer: MEDICAID

## 2020-11-11 ENCOUNTER — HOSPITAL ENCOUNTER (OUTPATIENT)
Dept: MRI IMAGING | Facility: CLINIC | Age: 35
End: 2020-11-11
Attending: RADIOLOGY | Admitting: RADIOLOGY
Payer: MEDICAID

## 2020-11-11 ENCOUNTER — HOSPITAL ENCOUNTER (OUTPATIENT)
Facility: CLINIC | Age: 35
Discharge: HOME | End: 2020-11-11
Attending: RADIOLOGY | Admitting: RADIOLOGY
Payer: MEDICAID

## 2020-11-11 ENCOUNTER — APPOINTMENT (OUTPATIENT)
Dept: LAB | Facility: CLINIC | Age: 35
End: 2020-11-11
Payer: MEDICAID

## 2020-11-11 ENCOUNTER — ALLIED HEALTH/NURSE VISIT (OUTPATIENT)
Dept: RADIATION ONCOLOGY | Facility: CLINIC | Age: 35
End: 2020-11-11
Attending: RADIOLOGY
Payer: MEDICAID

## 2020-11-11 VITALS
OXYGEN SATURATION: 98 % | BODY MASS INDEX: 23.74 KG/M2 | TEMPERATURE: 98.5 F | HEIGHT: 63 IN | RESPIRATION RATE: 18 BRPM | HEART RATE: 92 BPM | DIASTOLIC BLOOD PRESSURE: 71 MMHG | WEIGHT: 134 LBS | SYSTOLIC BLOOD PRESSURE: 101 MMHG

## 2020-11-11 VITALS — OXYGEN SATURATION: 94 % | HEART RATE: 81 BPM | DIASTOLIC BLOOD PRESSURE: 53 MMHG | SYSTOLIC BLOOD PRESSURE: 92 MMHG

## 2020-11-11 DIAGNOSIS — C53.1 MALIGNANT NEOPLASM OF EXOCERVIX (H): ICD-10-CM

## 2020-11-11 DIAGNOSIS — C53.1 MALIGNANT NEOPLASM OF EXOCERVIX (H): Primary | ICD-10-CM

## 2020-11-11 PROCEDURE — 77290 THER RAD SIMULAJ FIELD CPLX: CPT | Performed by: RADIOLOGY

## 2020-11-11 PROCEDURE — 271N000005 HC IMPLANT RADIATION BRIEF: Performed by: RADIOLOGY

## 2020-11-11 PROCEDURE — 77317 BRACHYTX ISODOSE INTERMED: CPT | Performed by: RADIOLOGY

## 2020-11-11 PROCEDURE — 57155 INSERT UTERI TANDEM/OVOIDS: CPT | Performed by: RADIOLOGY

## 2020-11-11 PROCEDURE — 77771 HDR RDNCL NTRSTL/ICAV BRCHTX: CPT | Performed by: RADIOLOGY

## 2020-11-11 PROCEDURE — 77317 BRACHYTX ISODOSE INTERMED: CPT | Mod: 26 | Performed by: RADIOLOGY

## 2020-11-11 PROCEDURE — 999N000104 MR LIMITED SCAN: Mod: TC

## 2020-11-11 PROCEDURE — 258N000003 HC RX IP 258 OP 636: Performed by: NURSE ANESTHETIST, CERTIFIED REGISTERED

## 2020-11-11 PROCEDURE — 77332 RADIATION TREATMENT AID(S): CPT | Performed by: RADIOLOGY

## 2020-11-11 PROCEDURE — 370N000001 HC ANESTHESIA TECHNICAL FEE, 1ST 30 MIN

## 2020-11-11 PROCEDURE — U0003 INFECTIOUS AGENT DETECTION BY NUCLEIC ACID (DNA OR RNA); SEVERE ACUTE RESPIRATORY SYNDROME CORONAVIRUS 2 (SARS-COV-2) (CORONAVIRUS DISEASE [COVID-19]), AMPLIFIED PROBE TECHNIQUE, MAKING USE OF HIGH THROUGHPUT TECHNOLOGIES AS DESCRIBED BY CMS-2020-01-R: HCPCS | Performed by: RADIOLOGY

## 2020-11-11 PROCEDURE — 250N000011 HC RX IP 250 OP 636: Performed by: NURSE ANESTHETIST, CERTIFIED REGISTERED

## 2020-11-11 PROCEDURE — 250N000009 HC RX 250: Performed by: NURSE ANESTHETIST, CERTIFIED REGISTERED

## 2020-11-11 PROCEDURE — 77290 THER RAD SIMULAJ FIELD CPLX: CPT | Mod: 26 | Performed by: RADIOLOGY

## 2020-11-11 PROCEDURE — 77771 HDR RDNCL NTRSTL/ICAV BRCHTX: CPT | Mod: 26 | Performed by: RADIOLOGY

## 2020-11-11 PROCEDURE — 370N000002 HC ANESTHESIA TECHNICAL FEE, EACH ADDTL 15 MIN

## 2020-11-11 PROCEDURE — C1717 BRACHYTX, NON-STR,HDR IR-192: HCPCS | Performed by: RADIOLOGY

## 2020-11-11 PROCEDURE — 77332 RADIATION TREATMENT AID(S): CPT | Mod: 26 | Performed by: RADIOLOGY

## 2020-11-11 RX ORDER — PROPOFOL 10 MG/ML
INJECTION, EMULSION INTRAVENOUS CONTINUOUS PRN
Status: DISCONTINUED | OUTPATIENT
Start: 2020-11-11 | End: 2020-11-11

## 2020-11-11 RX ORDER — ONDANSETRON 2 MG/ML
INJECTION INTRAMUSCULAR; INTRAVENOUS PRN
Status: DISCONTINUED | OUTPATIENT
Start: 2020-11-11 | End: 2020-11-11

## 2020-11-11 RX ORDER — DEXAMETHASONE SODIUM PHOSPHATE 4 MG/ML
INJECTION, SOLUTION INTRA-ARTICULAR; INTRALESIONAL; INTRAMUSCULAR; INTRAVENOUS; SOFT TISSUE PRN
Status: DISCONTINUED | OUTPATIENT
Start: 2020-11-11 | End: 2020-11-11

## 2020-11-11 RX ORDER — KETOROLAC TROMETHAMINE 30 MG/ML
INJECTION, SOLUTION INTRAMUSCULAR; INTRAVENOUS PRN
Status: DISCONTINUED | OUTPATIENT
Start: 2020-11-11 | End: 2020-11-11

## 2020-11-11 RX ORDER — FENTANYL CITRATE 50 UG/ML
INJECTION, SOLUTION INTRAMUSCULAR; INTRAVENOUS PRN
Status: DISCONTINUED | OUTPATIENT
Start: 2020-11-11 | End: 2020-11-11

## 2020-11-11 RX ORDER — PROPOFOL 10 MG/ML
INJECTION, EMULSION INTRAVENOUS PRN
Status: DISCONTINUED | OUTPATIENT
Start: 2020-11-11 | End: 2020-11-11

## 2020-11-11 RX ORDER — LIDOCAINE HYDROCHLORIDE 20 MG/ML
INJECTION, SOLUTION INFILTRATION; PERINEURAL PRN
Status: DISCONTINUED | OUTPATIENT
Start: 2020-11-11 | End: 2020-11-11

## 2020-11-11 RX ORDER — SODIUM CHLORIDE, SODIUM LACTATE, POTASSIUM CHLORIDE, CALCIUM CHLORIDE 600; 310; 30; 20 MG/100ML; MG/100ML; MG/100ML; MG/100ML
INJECTION, SOLUTION INTRAVENOUS CONTINUOUS PRN
Status: DISCONTINUED | OUTPATIENT
Start: 2020-11-11 | End: 2020-11-11

## 2020-11-11 RX ADMIN — KETOROLAC TROMETHAMINE 30 MG: 30 INJECTION, SOLUTION INTRAMUSCULAR at 09:15

## 2020-11-11 RX ADMIN — PROPOFOL 30 MG: 10 INJECTION, EMULSION INTRAVENOUS at 09:09

## 2020-11-11 RX ADMIN — SODIUM CHLORIDE, POTASSIUM CHLORIDE, SODIUM LACTATE AND CALCIUM CHLORIDE: 600; 310; 30; 20 INJECTION, SOLUTION INTRAVENOUS at 08:50

## 2020-11-11 RX ADMIN — PROPOFOL 100 MCG/KG/MIN: 10 INJECTION, EMULSION INTRAVENOUS at 08:55

## 2020-11-11 RX ADMIN — FENTANYL CITRATE 50 MCG: 50 INJECTION, SOLUTION INTRAMUSCULAR; INTRAVENOUS at 09:09

## 2020-11-11 RX ADMIN — MIDAZOLAM 2 MG: 1 INJECTION INTRAMUSCULAR; INTRAVENOUS at 08:50

## 2020-11-11 RX ADMIN — DEXAMETHASONE SODIUM PHOSPHATE 8 MG: 4 INJECTION, SOLUTION INTRA-ARTICULAR; INTRALESIONAL; INTRAMUSCULAR; INTRAVENOUS; SOFT TISSUE at 08:55

## 2020-11-11 RX ADMIN — ONDANSETRON 4 MG: 2 INJECTION INTRAMUSCULAR; INTRAVENOUS at 08:55

## 2020-11-11 RX ADMIN — LIDOCAINE HYDROCHLORIDE 60 MG: 20 INJECTION, SOLUTION INFILTRATION; PERINEURAL at 08:55

## 2020-11-11 ASSESSMENT — LIFESTYLE VARIABLES: TOBACCO_USE: 1

## 2020-11-11 ASSESSMENT — COPD QUESTIONNAIRES: COPD: 0

## 2020-11-11 ASSESSMENT — MIFFLIN-ST. JEOR: SCORE: 1261.95

## 2020-11-11 NOTE — ANESTHESIA POSTPROCEDURE EVALUATION
Anesthesia POST Procedure Evaluation    Patient: Suni Zuluaga   MRN:     [id number] Gender:   female   Age:    35 year old :      1985        Preoperative Diagnosis: Malignant neoplasm of cervix, unspecified site (H) [C53.9]   Procedure(s):  ANESTHESIA OUT OF OR RAD THERAPY Tandem And Ring  @0830   Postop Comments: No value filed.     Anesthesia Type: MAC       Disposition: Outpatient   Postop Pain Control: Uneventful            Sign Out: Well controlled pain   PONV: No   Neuro/Psych: Uneventful            Sign Out: Acceptable/Baseline neuro status   Airway/Respiratory: Uneventful            Sign Out: Acceptable/Baseline resp. status   CV/Hemodynamics: Uneventful            Sign Out: Acceptable CV status   Other NRE: NONE   DID A NON-ROUTINE EVENT OCCUR? No    Event details/Postop Comments:  No noted anesthetic complications.  Patient satisfied with anesthetic.           Last Anesthesia Record Vitals:  CRNA VITALS  2020 0859 - 2020 0959      2020             Pulse:  78    SpO2:  96 %    Resp Rate (observed):  (!) 1          Last PACU Vitals:  Vitals Value Taken Time   BP 92/53 20 0955   Temp     Pulse 81 20 0955   Resp     SpO2 94 % 20 0955   Temp src     NIBP     Pulse     SpO2     Resp     Temp     Ht Rate     Temp 2           Electronically Signed By: Aj Melara MD, 2020, 11:50 AM

## 2020-11-11 NOTE — ANESTHESIA CARE TRANSFER NOTE
"Patient: Suni Zuluaga    Procedure(s):  ANESTHESIA OUT OF OR RAD THERAPY Tandem And Ring  @0830    Diagnosis: Malignant neoplasm of cervix, unspecified site (H) [C53.9]  Diagnosis Additional Information: No value filed.    Anesthesia Type:   MAC     Note:  Airway :Room Air  Patient transferred to:Phase II  Comments: Report given to RN \"Meredith\" on gyn onc team.  She will transport patient to next therapy.  Pt opening eyes to commands and answering questions.  No concerns at this time.Handoff Report: Identifed the Patient, Identified the Reponsible Provider, Reviewed the pertinent medical history, Discussed the surgical course, Reviewed Intra-OP anesthesia mangement and issues during anesthesia, Set expectations for post-procedure period and Allowed opportunity for questions and acknowledgement of understanding      Vitals: (Last set prior to Anesthesia Care Transfer)    CRNA VITALS  11/11/2020 0859 - 11/11/2020 0929      11/11/2020             Pulse:  78    SpO2:  96 %    Resp Rate (observed):  (!) 1                Electronically Signed By: LAURA Houston CRNA  November 11, 2020  9:29 AM  "

## 2020-11-11 NOTE — NURSING NOTE
Suni KRISHNA Zuluaga is here for scheduled HDR brachytherapy    HDR Tandem and Ring   3    Pre-procedure-  Patient identified, arm band applied, signed consent available   Medications taken by patient prior to procedure Per CRNA  Pain assessment: 0/10    Waiting-  BP 92/53   Pulse 81   SpO2 94%    Pt resting quietly with bed low and locked, call light in reach.    Post-procedure-  Pain assessment: 0/10   Device removed without difficulty, Education for possible side effects and management, Discharge teaching reviewed, questions answered and Discharged to home, finance driving.

## 2020-11-11 NOTE — ANESTHESIA PREPROCEDURE EVALUATION
"Anesthesia Pre-Procedure Evaluation    Patient: Suni Zuluaga   MRN:     [id number] Gender:   female   Age:    35 year old :      1985        Preoperative Diagnosis: Malignant neoplasm of cervix, unspecified site (H) [C53.9]   Procedure(s):  ANESTHESIA OUT OF OR RAD THERAPY Tandem And Ring  @0830     LABS:  CBC:   Lab Results   Component Value Date    WBC 3.0 (L) 10/27/2020    WBC 2.7 (L) 10/20/2020    HGB 11.0 (L) 10/27/2020    HGB 10.4 (L) 10/20/2020    HCT 32.8 (L) 10/27/2020    HCT 31.3 (L) 10/20/2020    PLT 95 (L) 10/27/2020     (L) 10/20/2020     BMP:   Lab Results   Component Value Date     10/27/2020     10/20/2020    POTASSIUM 3.3 (L) 10/27/2020    POTASSIUM 3.5 10/20/2020    CHLORIDE 105 10/27/2020    CHLORIDE 106 10/20/2020    CO2 27 10/27/2020    CO2 25 10/20/2020    BUN 9 10/27/2020    BUN 10 10/20/2020    CR 0.57 10/27/2020    CR 0.51 (L) 10/20/2020     (H) 10/27/2020     (H) 10/20/2020     COAGS:   Lab Results   Component Value Date    PTT 28 2020    INR 1.10 2020     POC: No results found for: BGM, HCG, HCGS  OTHER:   Lab Results   Component Value Date    HARRISON 8.8 10/27/2020    MAG 1.6 10/27/2020    ALBUMIN 3.8 10/27/2020    PROTTOTAL 7.7 10/27/2020    ALT 21 10/27/2020    AST 18 10/27/2020    ALKPHOS 64 10/27/2020    BILITOTAL 0.3 10/27/2020        Preop Vitals    BP Readings from Last 3 Encounters:   20 105/74   20 94/46   20 108/74    Pulse Readings from Last 3 Encounters:   20 93   20 83   20 89      Resp Readings from Last 3 Encounters:   20 10   20 21   10/27/20 18    SpO2 Readings from Last 3 Encounters:   20 98%   20 98%   20 99%      Temp Readings from Last 1 Encounters:   20 36.9  C (98.4  F) (Oral)    Ht Readings from Last 1 Encounters:   20 1.59 m (5' 2.6\")      Wt Readings from Last 1 Encounters:   10/27/20 60.9 kg (134 lb 3.2 oz)    Estimated body mass " "index is 24.08 kg/m  as calculated from the following:    Height as of 9/9/20: 1.59 m (5' 2.6\").    Weight as of 10/27/20: 60.9 kg (134 lb 3.2 oz).     LDA:  Peripheral IV 10/20/20 Left Lower forearm (Active)   Number of days: 22        Past Medical History:   Diagnosis Date     Cervical cancer (H)       Past Surgical History:   Procedure Laterality Date     CARPAL TUNNEL RELEASE RT/LT Right      TUBAL LIGATION        Allergies   Allergen Reactions     Clindamycin Hives        Anesthesia Evaluation     . Pt has had prior anesthetic. Type: MAC    No history of anesthetic complications          ROS/MED HX    ENT/Pulmonary:  - neg pulmonary ROS   (+)tobacco use, Current use , . .   (-) asthma and COPD   Neurologic:  - neg neurologic ROS    (-) CVA and TIA   Cardiovascular:  - neg cardiovascular ROS   (+) ----. : . . . :. . Previous cardiac testing date:results:date: results:ECG reviewed date:9/9/2020 results:NSR date: results:         (-) hypertension and dyslipidemia   METS/Exercise Tolerance:  >4 METS   Hematologic:  - neg hematologic  ROS       Musculoskeletal:  - neg musculoskeletal ROS      (-) arthritis   GI/Hepatic:  - neg GI/hepatic ROS      (-) GERD   Renal/Genitourinary:  - ROS Renal section negative    (-) renal disease   Endo:  - neg endo ROS    (-) Type II DM, thyroid disease and chronic steroid usage   Psychiatric:  - neg psychiatric ROS       Infectious Disease: Comment: COVID NEGATIVE - neg infectious disease ROS       Malignancy:   (+) Malignancy History of Other  Other CA cervical cancer Active status post Radiation and Chemo         Other:    (+) No chance of pregnancy C-spine cleared: N/A, no H/O Chronic Pain,no other significant disability   - neg other ROS                     PHYSICAL EXAM:   Mental Status/Neuro: A/A/O   Airway: Facies: Feasible  Mallampati: I  Mouth/Opening: Full  TM distance: > 6 cm  Neck ROM: Full   Respiratory: Auscultation: CTAB     Resp. Rate: Normal     Resp. Effort: Normal "      CV: Rhythm: Regular  Rate: Age appropriate  Heart: Normal Sounds  Edema: None   Comments:      Dental: Normal Dentition                Assessment:   ASA SCORE: 2    H&P: History and physical reviewed and following examination; no interval change.   Smoking Status:  Active Smoker   NPO Status: NPO Appropriate     Plan:   Anes. Type:  MAC   Pre-Medication: Midazolam   Induction:  N/a   Airway: Native Airway   Access/Monitoring: PIV   Maintenance: Propofol Sedation     Postop Plan:   Postop Pain: None  Postop Sedation/Airway: Not planned     PONV Management: Adult Risk Factors: Female   Prevention: Ondansetron, Dexamethasone, Propofol     CONSENT: Direct conversation   Plan and risks discussed with: Patient          Comments for Plan/Consent:  ______________________________________________________________________  Will plan for similar anesthetic as patient had on 11/9.  I discussed the risks and benefits of MAC with deep sedation with the patient.  Questions were sought and answered.      Aj Melara MD  Attending Anesthesiologist                   Aj Melara MD

## 2020-11-11 NOTE — OP NOTE
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 Cervical os easily visualized.  Residual erythematous nodular lesion on cervix.        DESCRIPTION OF OPERATIVE PROCEDURE:  Ms. Zuluaga was brought back to the operating room and identified to be herself. A time-out was performed and the patient was given conscious sedation. She was placed in the dorsal lithotomy position. Using sterile technique, a alonso catheter was inserted into the bladder and 8 cc of saline was used to inflate the balloon. Speculum examination was performed withcervical os easily identified. A 60 mm, 60 degree tandem applicator was placed and secured in the proper fashion. A 3.4 cm ring applicator was placed and secured. Vaginal packing was placed and the implant secured with radiation panties.  Anesthesia was then reversed and the patient was brought to MRI.     Jennie Carpenter MD PGY-2    I was present and supervised the entire procedure.    Parish Hernández MD

## 2020-11-12 ENCOUNTER — ANESTHESIA EVENT (OUTPATIENT)
Dept: GASTROENTEROLOGY | Facility: CLINIC | Age: 35
End: 2020-11-12
Payer: MEDICAID

## 2020-11-13 ENCOUNTER — HOSPITAL ENCOUNTER (OUTPATIENT)
Dept: MRI IMAGING | Facility: CLINIC | Age: 35
End: 2020-11-13
Attending: RADIOLOGY | Admitting: RADIOLOGY
Payer: MEDICAID

## 2020-11-13 ENCOUNTER — ALLIED HEALTH/NURSE VISIT (OUTPATIENT)
Dept: RADIATION ONCOLOGY | Facility: CLINIC | Age: 35
End: 2020-11-13
Attending: RADIOLOGY
Payer: MEDICAID

## 2020-11-13 ENCOUNTER — HOSPITAL ENCOUNTER (OUTPATIENT)
Facility: CLINIC | Age: 35
Discharge: ANOTHER HOSPITAL | End: 2020-11-13
Attending: RADIOLOGY | Admitting: RADIOLOGY
Payer: MEDICAID

## 2020-11-13 ENCOUNTER — ANESTHESIA (OUTPATIENT)
Dept: GASTROENTEROLOGY | Facility: CLINIC | Age: 35
End: 2020-11-13
Payer: MEDICAID

## 2020-11-13 VITALS
RESPIRATION RATE: 16 BRPM | TEMPERATURE: 99 F | HEART RATE: 84 BPM | DIASTOLIC BLOOD PRESSURE: 69 MMHG | WEIGHT: 132 LBS | OXYGEN SATURATION: 99 % | BODY MASS INDEX: 23.39 KG/M2 | HEIGHT: 63 IN | SYSTOLIC BLOOD PRESSURE: 106 MMHG

## 2020-11-13 VITALS — SYSTOLIC BLOOD PRESSURE: 96 MMHG | HEART RATE: 75 BPM | DIASTOLIC BLOOD PRESSURE: 57 MMHG | OXYGEN SATURATION: 97 %

## 2020-11-13 DIAGNOSIS — Z11.59 ENCOUNTER FOR SCREENING FOR OTHER VIRAL DISEASES: ICD-10-CM

## 2020-11-13 DIAGNOSIS — C53.1 MALIGNANT NEOPLASM OF EXOCERVIX (H): ICD-10-CM

## 2020-11-13 DIAGNOSIS — C53.1 MALIGNANT NEOPLASM OF EXOCERVIX (H): Primary | ICD-10-CM

## 2020-11-13 PROCEDURE — 999N000104 MR LIMITED SCAN: Mod: TC

## 2020-11-13 PROCEDURE — 99000 SPECIMEN HANDLING OFFICE-LAB: CPT | Performed by: PATHOLOGY

## 2020-11-13 PROCEDURE — 77332 RADIATION TREATMENT AID(S): CPT | Performed by: RADIOLOGY

## 2020-11-13 PROCEDURE — 258N000003 HC RX IP 258 OP 636: Performed by: NURSE ANESTHETIST, CERTIFIED REGISTERED

## 2020-11-13 PROCEDURE — U0003 INFECTIOUS AGENT DETECTION BY NUCLEIC ACID (DNA OR RNA); SEVERE ACUTE RESPIRATORY SYNDROME CORONAVIRUS 2 (SARS-COV-2) (CORONAVIRUS DISEASE [COVID-19]), AMPLIFIED PROBE TECHNIQUE, MAKING USE OF HIGH THROUGHPUT TECHNOLOGIES AS DESCRIBED BY CMS-2020-01-R: HCPCS | Mod: 90 | Performed by: PATHOLOGY

## 2020-11-13 PROCEDURE — 77317 BRACHYTX ISODOSE INTERMED: CPT | Performed by: RADIOLOGY

## 2020-11-13 PROCEDURE — 77290 THER RAD SIMULAJ FIELD CPLX: CPT | Mod: 26 | Performed by: RADIOLOGY

## 2020-11-13 PROCEDURE — 77290 THER RAD SIMULAJ FIELD CPLX: CPT | Performed by: RADIOLOGY

## 2020-11-13 PROCEDURE — 250N000009 HC RX 250: Performed by: NURSE ANESTHETIST, CERTIFIED REGISTERED

## 2020-11-13 PROCEDURE — 77336 RADIATION PHYSICS CONSULT: CPT | Performed by: RADIOLOGY

## 2020-11-13 PROCEDURE — 77771 HDR RDNCL NTRSTL/ICAV BRCHTX: CPT | Performed by: RADIOLOGY

## 2020-11-13 PROCEDURE — 77317 BRACHYTX ISODOSE INTERMED: CPT | Mod: 26 | Performed by: RADIOLOGY

## 2020-11-13 PROCEDURE — 271N000005 HC IMPLANT RADIATION BRIEF: Performed by: RADIOLOGY

## 2020-11-13 PROCEDURE — 77771 HDR RDNCL NTRSTL/ICAV BRCHTX: CPT | Mod: 26 | Performed by: RADIOLOGY

## 2020-11-13 PROCEDURE — 57155 INSERT UTERI TANDEM/OVOIDS: CPT | Performed by: RADIOLOGY

## 2020-11-13 PROCEDURE — 77332 RADIATION TREATMENT AID(S): CPT | Mod: 26 | Performed by: RADIOLOGY

## 2020-11-13 PROCEDURE — C1717 BRACHYTX, NON-STR,HDR IR-192: HCPCS | Performed by: RADIOLOGY

## 2020-11-13 PROCEDURE — 250N000011 HC RX IP 250 OP 636: Performed by: NURSE ANESTHETIST, CERTIFIED REGISTERED

## 2020-11-13 PROCEDURE — 370N000001 HC ANESTHESIA TECHNICAL FEE, 1ST 30 MIN

## 2020-11-13 RX ORDER — ACETAMINOPHEN 500 MG
500-1000 TABLET ORAL EVERY 6 HOURS PRN
COMMUNITY
End: 2023-05-08

## 2020-11-13 RX ORDER — ONDANSETRON 2 MG/ML
INJECTION INTRAMUSCULAR; INTRAVENOUS PRN
Status: DISCONTINUED | OUTPATIENT
Start: 2020-11-13 | End: 2020-11-13

## 2020-11-13 RX ORDER — LIDOCAINE HYDROCHLORIDE 20 MG/ML
INJECTION, SOLUTION INFILTRATION; PERINEURAL PRN
Status: DISCONTINUED | OUTPATIENT
Start: 2020-11-13 | End: 2020-11-13

## 2020-11-13 RX ORDER — SODIUM CHLORIDE, SODIUM LACTATE, POTASSIUM CHLORIDE, CALCIUM CHLORIDE 600; 310; 30; 20 MG/100ML; MG/100ML; MG/100ML; MG/100ML
INJECTION, SOLUTION INTRAVENOUS CONTINUOUS PRN
Status: DISCONTINUED | OUTPATIENT
Start: 2020-11-13 | End: 2020-11-13

## 2020-11-13 RX ORDER — PROPOFOL 10 MG/ML
INJECTION, EMULSION INTRAVENOUS PRN
Status: DISCONTINUED | OUTPATIENT
Start: 2020-11-13 | End: 2020-11-13

## 2020-11-13 RX ORDER — FENTANYL CITRATE 50 UG/ML
INJECTION, SOLUTION INTRAMUSCULAR; INTRAVENOUS PRN
Status: DISCONTINUED | OUTPATIENT
Start: 2020-11-13 | End: 2020-11-13

## 2020-11-13 RX ORDER — KETOROLAC TROMETHAMINE 30 MG/ML
INJECTION, SOLUTION INTRAMUSCULAR; INTRAVENOUS PRN
Status: DISCONTINUED | OUTPATIENT
Start: 2020-11-13 | End: 2020-11-13

## 2020-11-13 RX ORDER — DEXAMETHASONE SODIUM PHOSPHATE 4 MG/ML
INJECTION, SOLUTION INTRA-ARTICULAR; INTRALESIONAL; INTRAMUSCULAR; INTRAVENOUS; SOFT TISSUE PRN
Status: DISCONTINUED | OUTPATIENT
Start: 2020-11-13 | End: 2020-11-13

## 2020-11-13 RX ORDER — PROPOFOL 10 MG/ML
INJECTION, EMULSION INTRAVENOUS CONTINUOUS PRN
Status: DISCONTINUED | OUTPATIENT
Start: 2020-11-13 | End: 2020-11-13

## 2020-11-13 RX ADMIN — LIDOCAINE HYDROCHLORIDE 60 MG: 20 INJECTION, SOLUTION INFILTRATION; PERINEURAL at 08:44

## 2020-11-13 RX ADMIN — ONDANSETRON 4 MG: 2 INJECTION INTRAMUSCULAR; INTRAVENOUS at 08:51

## 2020-11-13 RX ADMIN — KETOROLAC TROMETHAMINE 30 MG: 30 INJECTION, SOLUTION INTRAMUSCULAR at 08:56

## 2020-11-13 RX ADMIN — PROPOFOL 30 MG: 10 INJECTION, EMULSION INTRAVENOUS at 08:47

## 2020-11-13 RX ADMIN — PROPOFOL 100 MCG/KG/MIN: 10 INJECTION, EMULSION INTRAVENOUS at 08:47

## 2020-11-13 RX ADMIN — DEXAMETHASONE SODIUM PHOSPHATE 8 MG: 4 INJECTION, SOLUTION INTRA-ARTICULAR; INTRALESIONAL; INTRAMUSCULAR; INTRAVENOUS; SOFT TISSUE at 08:51

## 2020-11-13 RX ADMIN — MIDAZOLAM 2 MG: 1 INJECTION INTRAMUSCULAR; INTRAVENOUS at 08:44

## 2020-11-13 RX ADMIN — SODIUM CHLORIDE, POTASSIUM CHLORIDE, SODIUM LACTATE AND CALCIUM CHLORIDE: 600; 310; 30; 20 INJECTION, SOLUTION INTRAVENOUS at 08:43

## 2020-11-13 RX ADMIN — FENTANYL CITRATE 50 MCG: 50 INJECTION, SOLUTION INTRAMUSCULAR; INTRAVENOUS at 08:44

## 2020-11-13 ASSESSMENT — MIFFLIN-ST. JEOR: SCORE: 1252.75

## 2020-11-13 ASSESSMENT — LIFESTYLE VARIABLES: TOBACCO_USE: 1

## 2020-11-13 ASSESSMENT — COPD QUESTIONNAIRES: COPD: 0

## 2020-11-13 NOTE — OP NOTE
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 Cervical os easily visualized.  Residual erythematous nodular lesion on cervix.        DESCRIPTION OF OPERATIVE PROCEDURE:  Ms. Zuluaga was brought back to the operating room and identified to be herself. A time-out was performed and the patient was given conscious sedation. She was placed in the dorsal lithotomy position. Using sterile technique, a alonso catheter was inserted into the bladder and 8 cc of saline was used to inflate the balloon. Speculum examination was performed withcervical os easily identified. A 60 mm, 60 degree tandem applicator was placed and secured in the proper fashion. A 3.4 cm ring applicator was placed and secured. Vaginal packing was placed and the implant secured with radiation panties.  Anesthesia was then reversed and the patient was brought to MRI.      Jennie Carpenter MD PGY-2    I was present and supervised the entire procedure.    Parish Hernández MD

## 2020-11-13 NOTE — ANESTHESIA PREPROCEDURE EVALUATION
"Anesthesia Pre-Procedure Evaluation    Patient: Suni Zuluaga   MRN:     [id number] Gender:   female   Age:    35 year old :      1985        Preoperative Diagnosis: Malignant neoplasm of cervix, unspecified site (H) [C53.9]   Procedure(s):  ANESTHESIA OUT OF OR RAD THERAPY  Tandem And Ring  @0830     LABS:  CBC:   Lab Results   Component Value Date    WBC 3.0 (L) 10/27/2020    WBC 2.7 (L) 10/20/2020    HGB 11.0 (L) 10/27/2020    HGB 10.4 (L) 10/20/2020    HCT 32.8 (L) 10/27/2020    HCT 31.3 (L) 10/20/2020    PLT 95 (L) 10/27/2020     (L) 10/20/2020     BMP:   Lab Results   Component Value Date     10/27/2020     10/20/2020    POTASSIUM 3.3 (L) 10/27/2020    POTASSIUM 3.5 10/20/2020    CHLORIDE 105 10/27/2020    CHLORIDE 106 10/20/2020    CO2 27 10/27/2020    CO2 25 10/20/2020    BUN 9 10/27/2020    BUN 10 10/20/2020    CR 0.57 10/27/2020    CR 0.51 (L) 10/20/2020     (H) 10/27/2020     (H) 10/20/2020     COAGS:   Lab Results   Component Value Date    PTT 28 2020    INR 1.10 2020     POC: No results found for: BGM, HCG, HCGS  OTHER:   Lab Results   Component Value Date    HARRISON 8.8 10/27/2020    MAG 1.6 10/27/2020    ALBUMIN 3.8 10/27/2020    PROTTOTAL 7.7 10/27/2020    ALT 21 10/27/2020    AST 18 10/27/2020    ALKPHOS 64 10/27/2020    BILITOTAL 0.3 10/27/2020        Preop Vitals    BP Readings from Last 3 Encounters:   20 106/69   20 92/53   20 101/71    Pulse Readings from Last 3 Encounters:   20 84   20 81   20 92      Resp Readings from Last 3 Encounters:   20 16   20 18   20 10    SpO2 Readings from Last 3 Encounters:   20 99%   20 94%   20 98%      Temp Readings from Last 1 Encounters:   20 37.2  C (99  F) (Oral)    Ht Readings from Last 1 Encounters:   20 1.6 m (5' 2.99\")      Wt Readings from Last 1 Encounters:   20 59.9 kg (132 lb)    Estimated body mass index is " "23.39 kg/m  as calculated from the following:    Height as of this encounter: 1.6 m (5' 2.99\").    Weight as of this encounter: 59.9 kg (132 lb).     LDA:  Peripheral IV 11/13/20 Right Upper forearm (Active)   Site Assessment WDL 11/13/20 0758   Line Status Saline locked 11/13/20 0758   Dressing Intervention New dressing  11/13/20 0758   Phlebitis Scale 0-->no symptoms 11/13/20 0758   Infiltration Scale 0 11/13/20 0758   Number of days: 0        Past Medical History:   Diagnosis Date     Cervical cancer (H)       Past Surgical History:   Procedure Laterality Date     CARPAL TUNNEL RELEASE RT/LT Right      TUBAL LIGATION        Allergies   Allergen Reactions     Clindamycin Hives        Anesthesia Evaluation     . Pt has had prior anesthetic. Type: MAC    No history of anesthetic complications          ROS/MED HX    ENT/Pulmonary:  - neg pulmonary ROS   (+)tobacco use, Current use , . .   (-) asthma and COPD   Neurologic:  - neg neurologic ROS    (-) CVA and TIA   Cardiovascular:  - neg cardiovascular ROS   (+) ----. : . . . :. . Previous cardiac testing date:results:date: results:ECG reviewed date:9/9/2020 results:NSR date: results:         (-) hypertension and dyslipidemia   METS/Exercise Tolerance:  >4 METS   Hematologic:  - neg hematologic  ROS       Musculoskeletal:  - neg musculoskeletal ROS      (-) arthritis   GI/Hepatic:  - neg GI/hepatic ROS      (-) GERD   Renal/Genitourinary: Comment: Cervical CA  - ROS Renal section negative    (-) renal disease   Endo:  - neg endo ROS    (-) Type II DM, thyroid disease and chronic steroid usage   Psychiatric:  - neg psychiatric ROS       Infectious Disease: Comment: COVID NEGATIVE - neg infectious disease ROS       Malignancy:   (+) Malignancy History of Other  Other CA cervical cancer Active status post Radiation and Chemo         Other:    (+) No chance of pregnancy C-spine cleared: N/A, no H/O Chronic Pain,no other significant disability   - neg other ROS "                     PHYSICAL EXAM:   Mental Status/Neuro:    Airway: Facies: Feasible  Mallampati: II  Mouth/Opening: Full   Respiratory: Auscultation: CTAB     Resp. Rate: Normal      CV: Rhythm: Regular  Heart: Normal Sounds   Comments:      Dental: Normal Dentition                Assessment:   ASA SCORE: 3    H&P: History and physical reviewed and following examination; no interval change.   Smoking Status:  Non-Smoker/Unknown   NPO Status: NPO Appropriate     Plan:   Anes. Type:  MAC   Pre-Medication: None   Induction:  N/a   Airway: Native Airway   Access/Monitoring: PIV   Maintenance: N/a     Postop Plan:   Postop Pain: None  Postop Sedation/Airway: Not planned  Disposition: Inpatient/Admit     PONV Management:   Adult Risk Factors: Female, Non-Smoker   Prevention:, Propofol     CONSENT: Direct conversation   Plan and risks discussed with: Patient   Blood Products: Consent Deferred (Minimal Blood Loss)                   Jaciel Dennis MD

## 2020-11-13 NOTE — ANESTHESIA CARE TRANSFER NOTE
Patient: Suni Zuluaga    Procedure(s):  ANESTHESIA OUT OF OR RAD THERAPY  [facility] Ring  @0830    Diagnosis: Malignant neoplasm of cervix, unspecified site (H) [C53.9]  Diagnosis Additional Information: No value filed.    Anesthesia Type:   MAC     Note:  Airway :Room Air    Comments: Pt awake and conversing on room air in GI7.  Report given to Rad Onc Meredith.  Pt taken to next scan with MEREDITH.       Vitals: (Last set prior to Anesthesia Care Transfer)    CRNA VITALS  11/13/2020 0837 - 11/13/2020 0908      11/13/2020             Pulse:  76    SpO2:  98 %    Resp Rate (observed):  (!) 0                Electronically Signed By: LAURA Leonard CRNA  November 13, 2020  9:08 AM

## 2020-11-14 LAB
SARS-COV-2 RNA SPEC QL NAA+PROBE: NOT DETECTED
SPECIMEN SOURCE: NORMAL

## 2020-11-16 ENCOUNTER — ANESTHESIA (OUTPATIENT)
Dept: GASTROENTEROLOGY | Facility: CLINIC | Age: 35
End: 2020-11-16
Payer: MEDICAID

## 2020-11-16 ENCOUNTER — ALLIED HEALTH/NURSE VISIT (OUTPATIENT)
Dept: RADIATION ONCOLOGY | Facility: CLINIC | Age: 35
End: 2020-11-16
Attending: RADIOLOGY
Payer: MEDICAID

## 2020-11-16 ENCOUNTER — HOSPITAL ENCOUNTER (OUTPATIENT)
Facility: CLINIC | Age: 35
Discharge: HOME | End: 2020-11-16
Attending: RADIOLOGY | Admitting: RADIOLOGY
Payer: MEDICAID

## 2020-11-16 ENCOUNTER — HOSPITAL ENCOUNTER (OUTPATIENT)
Dept: MRI IMAGING | Facility: CLINIC | Age: 35
End: 2020-11-16
Attending: RADIOLOGY | Admitting: RADIOLOGY
Payer: MEDICAID

## 2020-11-16 ENCOUNTER — ANESTHESIA EVENT (OUTPATIENT)
Dept: GASTROENTEROLOGY | Facility: CLINIC | Age: 35
End: 2020-11-16
Payer: MEDICAID

## 2020-11-16 VITALS
HEART RATE: 90 BPM | BODY MASS INDEX: 23.39 KG/M2 | OXYGEN SATURATION: 99 % | RESPIRATION RATE: 20 BRPM | DIASTOLIC BLOOD PRESSURE: 72 MMHG | HEIGHT: 63 IN | WEIGHT: 132 LBS | SYSTOLIC BLOOD PRESSURE: 102 MMHG

## 2020-11-16 DIAGNOSIS — C53.1 MALIGNANT NEOPLASM OF EXOCERVIX (H): ICD-10-CM

## 2020-11-16 DIAGNOSIS — C53.1 MALIGNANT NEOPLASM OF EXOCERVIX (H): Primary | ICD-10-CM

## 2020-11-16 PROCEDURE — 250N000009 HC RX 250: Performed by: NURSE ANESTHETIST, CERTIFIED REGISTERED

## 2020-11-16 PROCEDURE — 77771 HDR RDNCL NTRSTL/ICAV BRCHTX: CPT | Performed by: RADIOLOGY

## 2020-11-16 PROCEDURE — 250N000011 HC RX IP 250 OP 636: Performed by: NURSE ANESTHETIST, CERTIFIED REGISTERED

## 2020-11-16 PROCEDURE — 77771 HDR RDNCL NTRSTL/ICAV BRCHTX: CPT | Mod: 26 | Performed by: RADIOLOGY

## 2020-11-16 PROCEDURE — C1717 BRACHYTX, NON-STR,HDR IR-192: HCPCS | Performed by: RADIOLOGY

## 2020-11-16 PROCEDURE — 77332 RADIATION TREATMENT AID(S): CPT | Mod: 26 | Performed by: RADIOLOGY

## 2020-11-16 PROCEDURE — 57155 INSERT UTERI TANDEM/OVOIDS: CPT | Performed by: RADIOLOGY

## 2020-11-16 PROCEDURE — 370N000001 HC ANESTHESIA TECHNICAL FEE, 1ST 30 MIN

## 2020-11-16 PROCEDURE — 999N000128 HC STATISTIC PERIPHERAL IV START W/O US GUIDANCE

## 2020-11-16 PROCEDURE — 77332 RADIATION TREATMENT AID(S): CPT | Performed by: RADIOLOGY

## 2020-11-16 PROCEDURE — 77290 THER RAD SIMULAJ FIELD CPLX: CPT | Performed by: RADIOLOGY

## 2020-11-16 PROCEDURE — 77317 BRACHYTX ISODOSE INTERMED: CPT | Performed by: RADIOLOGY

## 2020-11-16 PROCEDURE — 271N000005 HC IMPLANT RADIATION BRIEF: Performed by: RADIOLOGY

## 2020-11-16 PROCEDURE — 258N000003 HC RX IP 258 OP 636: Performed by: NURSE ANESTHETIST, CERTIFIED REGISTERED

## 2020-11-16 PROCEDURE — 999N000104 MR LIMITED SCAN: Mod: TC

## 2020-11-16 PROCEDURE — 77290 THER RAD SIMULAJ FIELD CPLX: CPT | Mod: 26 | Performed by: RADIOLOGY

## 2020-11-16 PROCEDURE — 77317 BRACHYTX ISODOSE INTERMED: CPT | Mod: 26 | Performed by: RADIOLOGY

## 2020-11-16 RX ORDER — PROPOFOL 10 MG/ML
INJECTION, EMULSION INTRAVENOUS PRN
Status: DISCONTINUED | OUTPATIENT
Start: 2020-11-16 | End: 2020-11-16

## 2020-11-16 RX ORDER — KETOROLAC TROMETHAMINE 30 MG/ML
INJECTION, SOLUTION INTRAMUSCULAR; INTRAVENOUS PRN
Status: DISCONTINUED | OUTPATIENT
Start: 2020-11-16 | End: 2020-11-16

## 2020-11-16 RX ORDER — ONDANSETRON 2 MG/ML
INJECTION INTRAMUSCULAR; INTRAVENOUS PRN
Status: DISCONTINUED | OUTPATIENT
Start: 2020-11-16 | End: 2020-11-16

## 2020-11-16 RX ORDER — SODIUM CHLORIDE, SODIUM LACTATE, POTASSIUM CHLORIDE, CALCIUM CHLORIDE 600; 310; 30; 20 MG/100ML; MG/100ML; MG/100ML; MG/100ML
INJECTION, SOLUTION INTRAVENOUS CONTINUOUS PRN
Status: DISCONTINUED | OUTPATIENT
Start: 2020-11-16 | End: 2020-11-16

## 2020-11-16 RX ORDER — PROPOFOL 10 MG/ML
INJECTION, EMULSION INTRAVENOUS CONTINUOUS PRN
Status: DISCONTINUED | OUTPATIENT
Start: 2020-11-16 | End: 2020-11-16

## 2020-11-16 RX ORDER — FENTANYL CITRATE 50 UG/ML
INJECTION, SOLUTION INTRAMUSCULAR; INTRAVENOUS PRN
Status: DISCONTINUED | OUTPATIENT
Start: 2020-11-16 | End: 2020-11-16

## 2020-11-16 RX ORDER — LIDOCAINE HYDROCHLORIDE 20 MG/ML
INJECTION, SOLUTION INFILTRATION; PERINEURAL PRN
Status: DISCONTINUED | OUTPATIENT
Start: 2020-11-16 | End: 2020-11-16

## 2020-11-16 RX ORDER — DEXAMETHASONE SODIUM PHOSPHATE 4 MG/ML
INJECTION, SOLUTION INTRA-ARTICULAR; INTRALESIONAL; INTRAMUSCULAR; INTRAVENOUS; SOFT TISSUE PRN
Status: DISCONTINUED | OUTPATIENT
Start: 2020-11-16 | End: 2020-11-16

## 2020-11-16 RX ADMIN — FENTANYL CITRATE 50 MCG: 50 INJECTION, SOLUTION INTRAMUSCULAR; INTRAVENOUS at 09:45

## 2020-11-16 RX ADMIN — PROPOFOL 30 MG: 10 INJECTION, EMULSION INTRAVENOUS at 09:53

## 2020-11-16 RX ADMIN — ONDANSETRON 4 MG: 2 INJECTION INTRAMUSCULAR; INTRAVENOUS at 09:50

## 2020-11-16 RX ADMIN — SODIUM CHLORIDE, POTASSIUM CHLORIDE, SODIUM LACTATE AND CALCIUM CHLORIDE: 600; 310; 30; 20 INJECTION, SOLUTION INTRAVENOUS at 09:40

## 2020-11-16 RX ADMIN — DEXAMETHASONE SODIUM PHOSPHATE 8 MG: 4 INJECTION, SOLUTION INTRA-ARTICULAR; INTRALESIONAL; INTRAMUSCULAR; INTRAVENOUS; SOFT TISSUE at 09:52

## 2020-11-16 RX ADMIN — KETOROLAC TROMETHAMINE 30 MG: 30 INJECTION, SOLUTION INTRAMUSCULAR at 10:00

## 2020-11-16 RX ADMIN — MIDAZOLAM 2 MG: 1 INJECTION INTRAMUSCULAR; INTRAVENOUS at 09:40

## 2020-11-16 RX ADMIN — LIDOCAINE HYDROCHLORIDE 40 MG: 20 INJECTION, SOLUTION INFILTRATION; PERINEURAL at 09:45

## 2020-11-16 RX ADMIN — PROPOFOL 100 MCG/KG/MIN: 10 INJECTION, EMULSION INTRAVENOUS at 09:47

## 2020-11-16 ASSESSMENT — MIFFLIN-ST. JEOR: SCORE: 1252.88

## 2020-11-16 NOTE — NURSING NOTE
Suni Zuluaga is here for scheduled HDR brachytherapy    HDR Tandem and Ring   5    Pre-procedure-  Patient identified, arm band applied, signed consent available   Medications taken by patient prior to procedure Per CRNA  Pain assessment: 0/10    Waiting-  Pt resting quietly , bed low and locked, call light in reach.    Post-procedure-  Pain assessment: 0/10   Device removed without difficulty, Education for possible side effects and management, Discharge teaching reviewed, questions answered, Vaginal dilator use reviewed, Video Follow-up scheduled and Discharged to home, fiance driving.

## 2020-11-16 NOTE — ANESTHESIA CARE TRANSFER NOTE
Patient: Suni Zuluaga    Procedure(s):  ANESTHESIA OUT OF OR RAD THERAPY  [facility] Ring  @0959    Diagnosis: Malignant neoplasm of cervix, unspecified site (H) [C53.9]  Diagnosis Additional Information: No value filed.    Anesthesia Type:   MAC     Note:  Airway :Room Air  Destination: radiation oncology.  Comments: Pt stable, awake, alert, responding appropriately. Report given to RN, is being transported to radiation oncology with RN.       Vitals: (Last set prior to Anesthesia Care Transfer)    CRNA VITALS  11/16/2020 0938 - 11/16/2020 1008      11/16/2020             Pulse:  78    SpO2:  100 %    Resp Rate (observed):  (!) 1                Electronically Signed By: LAURA Hope CRNA  November 16, 2020  10:08 AM

## 2020-11-16 NOTE — ANESTHESIA PREPROCEDURE EVALUATION
"Anesthesia Pre-Procedure Evaluation    Patient: Suni Zuluaga   MRN:     [id number] Gender:   female   Age:    35 year old :      1985        Preoperative Diagnosis: Malignant neoplasm of cervix, unspecified site (H) [C53.9]   Procedure(s):  ANESTHESIA OUT OF OR RAD THERAPY  Tandem And Ring  @0930     LABS:  CBC:   Lab Results   Component Value Date    WBC 3.0 (L) 10/27/2020    WBC 2.7 (L) 10/20/2020    HGB 11.0 (L) 10/27/2020    HGB 10.4 (L) 10/20/2020    HCT 32.8 (L) 10/27/2020    HCT 31.3 (L) 10/20/2020    PLT 95 (L) 10/27/2020     (L) 10/20/2020     BMP:   Lab Results   Component Value Date     10/27/2020     10/20/2020    POTASSIUM 3.3 (L) 10/27/2020    POTASSIUM 3.5 10/20/2020    CHLORIDE 105 10/27/2020    CHLORIDE 106 10/20/2020    CO2 27 10/27/2020    CO2 25 10/20/2020    BUN 9 10/27/2020    BUN 10 10/20/2020    CR 0.57 10/27/2020    CR 0.51 (L) 10/20/2020     (H) 10/27/2020     (H) 10/20/2020     COAGS:   Lab Results   Component Value Date    PTT 28 2020    INR 1.10 2020     POC: No results found for: BGM, HCG, HCGS  OTHER:   Lab Results   Component Value Date    HARRISON 8.8 10/27/2020    MAG 1.6 10/27/2020    ALBUMIN 3.8 10/27/2020    PROTTOTAL 7.7 10/27/2020    ALT 21 10/27/2020    AST 18 10/27/2020    ALKPHOS 64 10/27/2020    BILITOTAL 0.3 10/27/2020        Preop Vitals    BP Readings from Last 3 Encounters:   20 102/72   20 106/69   20 96/57    Pulse Readings from Last 3 Encounters:   20 90   20 84   20 75      Resp Readings from Last 3 Encounters:   20 20   20 16   20 18    SpO2 Readings from Last 3 Encounters:   20 99%   20 99%   20 97%      Temp Readings from Last 1 Encounters:   20 37.2  C (99  F) (Oral)    Ht Readings from Last 1 Encounters:   20 1.6 m (5' 3\")      Wt Readings from Last 1 Encounters:   20 59.9 kg (132 lb)    Estimated body mass index is 23.38 " "kg/m  as calculated from the following:    Height as of this encounter: 1.6 m (5' 3\").    Weight as of this encounter: 59.9 kg (132 lb).     LDA:  Peripheral IV 11/13/20 Right Upper forearm (Active)   Number of days: 3       Peripheral IV 11/16/20 Right;Anterior Lower forearm (Active)   Number of days: 0        Past Medical History:   Diagnosis Date     Cervical cancer (H)       Past Surgical History:   Procedure Laterality Date     CARPAL TUNNEL RELEASE RT/LT Right      TUBAL LIGATION        Allergies   Allergen Reactions     Clindamycin Hives        Anesthesia Evaluation     . Pt has had prior anesthetic. Type: General and MAC           ROS/MED HX    ENT/Pulmonary:  - neg pulmonary ROS     Neurologic:  - neg neurologic ROS     Cardiovascular:  - neg cardiovascular ROS       METS/Exercise Tolerance:     Hematologic:     (+) Anemia, -      Musculoskeletal:  - neg musculoskeletal ROS       GI/Hepatic:  - neg GI/hepatic ROS       Renal/Genitourinary:  - ROS Renal section negative       Endo:  - neg endo ROS       Psychiatric:  - neg psychiatric ROS       Infectious Disease:  - neg infectious disease ROS       Malignancy:   (+) Malignancy History of Other  Other CA Active status post Radiation and Chemo         Other:                         PHYSICAL EXAM:   Mental Status/Neuro: A/A/O   Airway: Facies: Feasible  Mallampati: I  Mouth/Opening: Full  TM distance: > 6 cm  Neck ROM: Full   Respiratory: Auscultation: CTAB     Resp. Rate: Normal     Resp. Effort: Normal      CV: Rhythm: Regular  Rate: Age appropriate  Heart: Normal Sounds  Edema: None   Comments:      Dental: Normal Dentition                Assessment:   ASA SCORE: 2    H&P: History and physical reviewed and following examination; no interval change.     Smoking Status:  Active Smoker       - patient smoked on day of surgery       - instructed to abstain from smoking on day of procedure       - Patient was counseled regarding increased Infection Risk with " same day smoking.   NPO Status: NPO Appropriate     Plan:   Anes. Type:  MAC   Pre-Medication: None   Induction:  N/a   Airway: Native Airway   Access/Monitoring: PIV   Maintenance: N/a     Postop Plan:   Postop Pain: None  Postop Sedation/Airway: Not planned  Disposition: Outpatient     PONV Management: Adult Risk Factors: Female   Prevention: Ondansetron, Dexamethasone     CONSENT: Direct conversation   Plan and risks discussed with: Patient   Blood Products: Consent Deferred (Minimal Blood Loss)                   Wilder Chaves MD

## 2020-11-16 NOTE — OP NOTE
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 Cervical os easily visualized.  Residual erythematous nodular lesion on cervix.        DESCRIPTION OF OPERATIVE PROCEDURE:  Ms. Zuluaga was brought back to the operating room and identified to be herself. A time-out was performed and the patient was given conscious sedation. She was placed in the dorsal lithotomy position. Using sterile technique, a alonso catheter was inserted into the bladder and 8 cc of saline was used to inflate the balloon. Speculum examination was performed withcervical os easily identified. A 60 mm, 60 degree tandem applicator was placed and secured in the proper fashion. A 3.4 cm ring applicator was placed and secured. Vaginal packing was placed and the implant secured with radiation panties.  Anesthesia was then reversed and the patient was brought to MRI.      Jennie Carpenter MD PGY-2    I was present and supervised the entire procedure.    Parish Hernández MD

## 2020-11-16 NOTE — ANESTHESIA POSTPROCEDURE EVALUATION
Anesthesia POST Procedure Evaluation    Patient: Suni Zuluaga   MRN:     [id number] Gender:   female   Age:    35 year old :      1985        Preoperative Diagnosis: Malignant neoplasm of cervix, unspecified site (H) [C53.9]   Procedure(s):  ANESTHESIA OUT OF OR RAD THERAPY  Tandem And Ring  @9679   Postop Comments: No value filed.     Anesthesia Type: MAC       Disposition: Outpatient   Postop Pain Control: Uneventful            Sign Out: Well controlled pain   PONV: No   Neuro/Psych: Uneventful            Sign Out: Acceptable/Baseline neuro status   Airway/Respiratory: Uneventful            Sign Out: Acceptable/Baseline resp. status   CV/Hemodynamics: Uneventful            Sign Out: Acceptable CV status   Other NRE: NONE   DID A NON-ROUTINE EVENT OCCUR? No         Last Anesthesia Record Vitals:  CRNA VITALS  2020 0937 - 2020 1021      2020             Pulse:  78    SpO2:  100 %    Resp Rate (observed):  (!) 1          Last PACU Vitals:  No vitals data found for the desired time range.        Electronically Signed By: Wilder Chaves MD, 2020, 10:21 AM

## 2020-11-16 NOTE — PATIENT INSTRUCTIONS
Post Sedation instructions:   1.  Get plenty of rest.  A responsible adult must stay with you for at least 24 hours after   you leave the hospital   2.  Do not drive or use heavy equipment.  If you have weakness or tingling, don't drive   or use heavy equipment until this feeling goes away.   3.  Do not drink alcohol for 24 hours   4.  Avoid strenuous or risky activities.  Ask for help when climbing stairs   5.  You may feel lightheaded.  IF so, sit for a few minutes before standing.  Have    someone help you get up.   6.  If you have nausea: Drink only clear liquids.  Be sure to drink enough fluids.  Move   to a regular diet as you feel able.   7.  You may have a dry mouth, a sore throat, muscle aches or trouble sleeping.  These   should go away after 24 hours.   8.  Do not make important or legal decisions.  When to call your doctor:   1. Some minor bleeding /spotting is normal after this procedure. Call if you have bright red vaginal bleeding.    2. If vaginal discharge has a bad odor.  3. If you have a fever over 100.5 F.  4. If you are not able to urinate more than 6 hours after the urinary catheter is removed.  5. If you have increased urinary burning when using the bathroom. You may have some minor burning with urination for a few days after the catheter is removed.    [facility] Radiation Oncology: [phone]

## 2020-11-25 ENCOUNTER — ONCOLOGY VISIT (OUTPATIENT)
Dept: RADIATION ONCOLOGY | Facility: CLINIC | Age: 35
End: 2020-11-25

## 2020-12-02 NOTE — PROCEDURES
Radiotherapy Treatment Summary          Date of Report: 2020     PATIENT: ISIDRO GRANADOS  MEDICAL RECORD NO: [id number]  : 1985     DIAGNOSIS: C53.0 Malignant neoplasm of endocervix  INTENT OF RADIOTHERAPY: Cure  PATHOLOGY: Squamous cell carcinoma of the cervix                                   STAGE: FIGO stage IIIC1(r)   CONCURRENT SYSTEMIC THERAPY: Cisplatin     Details of the treatments summarized below are found in records kept in the Department of   Radiation Oncology at [facility].     Treatment Summary:  Radiation Oncology - Course: 1 Protocol:   Treatment Site   Current Dose   Modality From        To Elapsed Days Fx.  1 Pelvis + Low PAN  4,500 cGy 06 X       9/28/2020      10/30/2020  32 25  1 Brachythrapy     2,750 cGy         2020  10  5          Dose per Fraction: 180 cGy for external beam; 550 cGy for brachytherapy        Total Dose: 4500 cGy for external beam; 2750 cGy for brachytherapy         COMMENTS:                      Ms. Granados is a 35 year old pre-menopausal woman with diagnosis of FIGO stage IIIC1 (r)   squamous cell carcinoma of the cervix. She has a history of high grade squamous intraepithelial   lesion diagnosed in  during pregnancy. Repeat pap and colposcopy after delivery were   negative. She next presented on 2020 with heavy vaginal bleeding for 3-6 months, along   with postcoital bleeding. Exam noted a friable, firm enlarged cervix that extended over into the   bilateral adnexa. Pap demonstrated squamous cell carcinoma. Pelvic ultrasound showed a normal   sized uterus measuring 9.9 x 5.5 x 5.7 cm with an endometrial stripe measuring 9.5 mm and   visualization of fibroids.     She established care with Gyn Onc on 2020.  Staging PET/CT from 2020   demonstrated a 7.1 x 5.4 cm hypermetabolic cervical mass. Mass extended into the caudal third   of the vagina, with probable invasion of the parametrium. There were multiple  hypermetabolic   pelvic adenopathy consistent with sarah metastases. There was also a borderline FDG avid left   axillary lymph node measuring 1.1 x 0.9 cm with fatty hilum and SUV max 2.7 for which Ms. Zuluaga underwent ultrasound and it was felt to be benign.      MRI of the pelvis on 09/09/2020 further showed evidence of bilateral parametrial invasion as   well as thickening of the left uterosacral ligament with associated leftward tethering of the   rectum. There was no bladder or rectal invasion. Repeat cervical biopsy 9/9/20 did confirm   invasive squamous cell carcinoma, moderately differentiated.      The patient was started on chemoradiation. She received external beam radiation 9/28/20 -   10/30/20 with concurrent cisplatin as outlined above. She then underwent T&R brachytherapy as   outlined above. She tolerated the entire course of radiation therapy very well with grade 1   fatigue.      ED visits/hospitalizations: None     Missed treatments: None     Acute Toxicity Profile by CTC v5.0: Grade 1 fatigue     PAIN MANAGEMENT:  Did not require any.                             FOLLOW UP PLAN:  Follow-up here on 12/18/20. Follow-up with Dr. Brambila as scheduled.                          Resident Physician: Jennie Carpenter M.D.   Staff Physician: Parish Hernández M.D.  Physicist: Fran Guillory, Ph.D.      CC: Suzy Brambila M.D.  Kenneth Shahid M.D.                                     Radiation Oncology:  [facility] [address], [address] [place] [id number]

## 2020-12-28 ENCOUNTER — DOCUMENTATION ONLY (OUTPATIENT)
Dept: RADIATION ONCOLOGY | Facility: CLINIC | Age: 35
End: 2020-12-28

## 2020-12-28 NOTE — PROGRESS NOTES
Pt was scheduled for a video follow up on 12/17/2020. RN called several times and left messages to call back to reschedule after the 1st appointment was missed. RN and  staff have been unable to contact pt, messages have been left to reschedule but no return phone call. Unable to reschedule any follow up appointment with Dr. Hernández.

## 2021-01-04 ENCOUNTER — HEALTH MAINTENANCE LETTER (OUTPATIENT)
Age: 36
End: 2021-01-04

## 2021-02-18 ENCOUNTER — PATIENT OUTREACH (OUTPATIENT)
Dept: ONCOLOGY | Facility: CLINIC | Age: 36
End: 2021-02-18

## 2021-02-18 DIAGNOSIS — C53.8 MALIGNANT NEOPLASM OF OVERLAPPING SITES OF CERVIX (H): Primary | ICD-10-CM

## 2021-02-18 DIAGNOSIS — C53.9 SQUAMOUS CELL CARCINOMA OF CERVIX (H): ICD-10-CM

## 2021-02-18 NOTE — PROGRESS NOTES
Sent letter in regular mail to see if can connect with Suni - if no response in 3 weeks then will send a certified letter.

## 2021-02-18 NOTE — LETTER
February 18, 2021      Suni Zuluaga  [address] [place] [address]              Hi Suni,               I just wanted to reach out and see how you are doing after your chemo/ radiation treatment for Cervical Cancer.  I know this last year has been a very busy and stressful year for you.  I hoping we can connect to see if there is anything we can do to help and get you in for a follow up visit with Dr. Suzy Brambila.  I have sent you a couple emails, but not sure if you are still using that email and your phone does not have voicemail.  If you would be so kind to call me and just let me know how you are doing I would appreciate it.                     Sincerely,      Odalys Paniagua RNCC  GynOncology  C/o Dr. Suzy Brambila

## 2021-03-25 PROBLEM — C53.8 MALIGNANT NEOPLASM OF OVERLAPPING SITES OF CERVIX (H): Status: ACTIVE | Noted: 2020-09-16

## 2021-04-30 ENCOUNTER — ANCILLARY PROCEDURE (OUTPATIENT)
Dept: PET IMAGING | Facility: CLINIC | Age: 36
End: 2021-04-30
Attending: OBSTETRICS & GYNECOLOGY
Payer: COMMERCIAL

## 2021-04-30 DIAGNOSIS — C53.8 MALIGNANT NEOPLASM OF OVERLAPPING SITES OF CERVIX (H): ICD-10-CM

## 2021-04-30 DIAGNOSIS — C53.9 SQUAMOUS CELL CARCINOMA OF CERVIX (H): ICD-10-CM

## 2021-04-30 LAB
CREAT BLD-MCNC: 0.7 MG/DL (ref 0.5–1.2)
GFR SERPL CREATININE-BSD FRML MDRD: >90 ML/MIN/{1.73_M2}
GFRB: NORMAL
GLUCOSE SERPL-MCNC: 103 MG/DL (ref 70–99)

## 2021-04-30 RX ORDER — FUROSEMIDE 10 MG/ML
40 INJECTION INTRAMUSCULAR; INTRAVENOUS ONCE
Status: COMPLETED | OUTPATIENT
Start: 2021-04-30 | End: 2021-04-30

## 2021-04-30 RX ADMIN — FUROSEMIDE 40 MG: 10 INJECTION INTRAMUSCULAR; INTRAVENOUS at 10:10

## 2021-04-30 NOTE — DISCHARGE INSTRUCTIONS

## 2021-05-06 ENCOUNTER — ONCOLOGY VISIT (OUTPATIENT)
Dept: ONCOLOGY | Facility: CLINIC | Age: 36
End: 2021-05-06
Attending: OBSTETRICS & GYNECOLOGY
Payer: COMMERCIAL

## 2021-05-06 VITALS
SYSTOLIC BLOOD PRESSURE: 117 MMHG | WEIGHT: 147.5 LBS | DIASTOLIC BLOOD PRESSURE: 74 MMHG | HEART RATE: 89 BPM | BODY MASS INDEX: 26.13 KG/M2 | TEMPERATURE: 98.3 F | OXYGEN SATURATION: 98 %

## 2021-05-06 DIAGNOSIS — C53.9 SQUAMOUS CELL CARCINOMA OF CERVIX (H): Primary | ICD-10-CM

## 2021-05-06 PROCEDURE — G0463 HOSPITAL OUTPT CLINIC VISIT: HCPCS

## 2021-05-06 PROCEDURE — 99214 OFFICE O/P EST MOD 30 MIN: CPT | Performed by: OBSTETRICS & GYNECOLOGY

## 2021-05-06 ASSESSMENT — PAIN SCALES - GENERAL: PAINLEVEL: NO PAIN (0)

## 2021-05-06 NOTE — NURSING NOTE
"Oncology Rooming Note    May 6, 2021 2:37 PM   Suni Zuluaga is a 36 year old female who presents for:    Chief Complaint   Patient presents with     Oncology Clinic Visit     cervical cancer     Initial Vitals: Wt 66.9 kg (147 lb 8 oz)   BMI 26.13 kg/m   Estimated body mass index is 26.13 kg/m  as calculated from the following:    Height as of 11/16/20: 1.6 m (5' 3\").    Weight as of this encounter: 66.9 kg (147 lb 8 oz). Body surface area is 1.72 meters squared.  No Pain (0) Comment: Data Unavailable   No LMP recorded.  Allergies reviewed: Yes  Medications reviewed: Yes    Medications: Medication refills not needed today.  Pharmacy name entered into EPIC: [facility] - [place], [place] - [address]    Clinical concerns: none       Lynne Bender CMA            "

## 2021-05-06 NOTE — LETTER
2021     RE: Suni Zuluaga      [address] [place] [address]        Dear Colleague,    Thank you for referring your patient, Suni Zuluaga, to the [facility]. Please see a copy of my visit note below.    GYN Oncology Established Patient Visit - In Person    RE: Suni Zuluaga  : 1985  Encounter date: 2021       CC: Cervical cancer     HPI: Ms Suni Zuluaga is a 35 year old  female who presents to discuss cervical cancer.         Treatment History:   2015:HSIL Pap at the beginning of pregnancy. Repeat pap postpartum was ASCUS. Colposcopy was negative.   20: Pelvic US. Uterine fibroids, normal appearance of uterus and ovaries  2020: Pap smear which confirmed squamous cell carcinoma of the cervix. Referred to GYN  ONC  2020: GYN ONC Consult [facility]. Cervical biopsy, RADHA III, unable to identify invasion  2020: Repeat cervical biopsy confirms invasive SCC  2020: Started primary chemoRT for locally advanced cervical cancer. Stage IIIC1(r), large primary tumor with bilateral parametrial invasion and clinical evidence of posterior vaginal involvement. Pelvic lymphadenopathy involving external iliac, perirectal and internal iliac nodes.   20-10/30/20: ChemoRT.   20-20: T&R brachytherapy  2020: Followup with [facility] (Dr Hernández)     Completed therapy in September. Has not yet had any followup due to insurance issues.   Having hot flashes.   She has not quit smoking  She is back to work with no physical restrictions.         Past Medical History:   Diagnosis Date     Cervical cancer (H)      Past Surgical History:   Procedure Laterality Date     CARPAL TUNNEL RELEASE RT/LT Right      TUBAL LIGATION         OBGYN history and Health Maintenance:  , completed childbearing , had tubal ligation   Last Pap Smear: 2020 showing SCC. Due 2021  Last Mammogram: never  Last Colonoscopy: never    Current Outpatient  Medications   Medication Sig Dispense Refill     acetaminophen (TYLENOL) 500 MG tablet Take 500-1,000 mg by mouth every 6 hours as needed for mild pain       ibuprofen (ADVIL/MOTRIN) 600 MG tablet Take 600 mg by mouth every 6 hours as needed for moderate pain     No meds     Allergies   Allergen Reactions     Clindamycin Hives     Social History:  Social History     Tobacco Use     Smoking status: Current Every Day Smoker     Packs/day: 1.00     Types: Cigarettes     Smokeless tobacco: Never Used     Tobacco comment: 9/11/2020 1/2 pack/day, tying tp quit   Substance Use Topics     Alcohol use: Yes     Frequency: Monthly or less     Comment: occ.     Work: manufacturing, manual labor  Preferred language: English  Lives at home with: Partner and 4 kids  Long smoking history since age 13. Smokes a pack daily. Denies any substance abuse     Family History:   No family history of cancer     Physical Exam:   Vitals reviewed in [facility]  /74   Pulse 89   Temp 98.3  F (36.8  C) (Oral)   Wt 66.9 kg (147 lb 8 oz)   SpO2 98%   BMI 26.13 kg/m    General: Alert and oriented, no acute distress    MSK: no edema of lower extremities, no swelling of joints, normal gait  Psych: Mood stable. Linear speech, brighter affect today  Cardiovascular: RRR  Pulmonary: Normal respiratory effort  GI: No distention. No masses. No hernia.   Lymph: No enlarge lymph nodes in neck or groin  : NEFG and normal perianal skin. Vagina smooth without lesions. Cervix with radiation changes including some erythema of ectropion. Still with left parametrial thickening. No obvious disease. No biopsies taken.       ECOG performance status: 0 - no restrictions      Imaging (Personally reviewed today,  5/6/2021)    IMPRESSION: In this patient with carcinoma of the cervix status post  chemoradiation:  1. Resolution of the previously seen cervical mass with mild residual  soft tissue fullness in the cervix with persistent though decreased  FDG uptake,  likely postradiation changes versus minimal residual  disease. Resolution of the previously seen hypermetabolic pelvic  lymphadenopathy.  2. No findings suspicious for metastatic disease within the chest,  abdomen or pelvis.  3. Debris in the right maxillary sinus may represent acute sinusitis  in the appropriate clinical context.     I have personally reviewed the examination and initial interpretation  and I agree with the findings.     JEZ REGALADO MD        Assessment:  Suni Zuluaga is a 35 year old woman with 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     Plan:     1.)   Cervical cancer : Stage IIIC1r SCC of the cervix (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  Completed primary chemoRT without complication. Posttreatment PET (6 months, delayed due to insurance issue) with minimal residual cervical disease, otherwise normal. Exam with no obvious recurrence, although significant radiation changes.     -Repeat PET in 3 months to follow findings on recent imaging, ordered. Ok to have virtual followup with me after PET. If concerning features on PET, may need to bring in for repeat exam with biopsies  -Discussed smoking cessation at length today, had previously quit. Explained this increases recurrence rates.     2.) Hot flashes:  menopause from RT. Not an HRT candidate given smoking        Total time today was 28 minutes including reviewing imaging, counseling patient, exam and documentation.     Suzy Brambila MD  Gynecologic Oncology  Pager [phone]

## 2021-05-06 NOTE — PROGRESS NOTES
GYN Oncology Established Patient Visit - In Person    RE: Suni Zuluaga  : 1985  Encounter date: 2021       CC: Cervical cancer     HPI: Ms Suni Zuluaga is a 35 year old  female who presents to discuss cervical cancer.         Treatment History:   2015:HSIL Pap at the beginning of pregnancy. Repeat pap postpartum was ASCUS. Colposcopy was negative.   20: Pelvic US. Uterine fibroids, normal appearance of uterus and ovaries  2020: Pap smear which confirmed squamous cell carcinoma of the cervix. Referred to GYN  ONC  2020: GYN ONC Consult [facility]. Cervical biopsy, RADHA III, unable to identify invasion  2020: Repeat cervical biopsy confirms invasive SCC  2020: Started primary chemoRT for locally advanced cervical cancer. Stage IIIC1(r), large primary tumor with bilateral parametrial invasion and clinical evidence of posterior vaginal involvement. Pelvic lymphadenopathy involving external iliac, perirectal and internal iliac nodes.   20-10/30/20: ChemoRT.   20-20: T&R brachytherapy  2020: Followup with [facility] (Dr Hernández)     Completed therapy in September. Has not yet had any followup due to insurance issues.   Having hot flashes.   She has not quit smoking  She is back to work with no physical restrictions.         Past Medical History:   Diagnosis Date     Cervical cancer (H)      Past Surgical History:   Procedure Laterality Date     CARPAL TUNNEL RELEASE RT/LT Right      TUBAL LIGATION         OBGYN history and Health Maintenance:  , completed childbearing , had tubal ligation   Last Pap Smear: 2020 showing SCC. Due 2021  Last Mammogram: never  Last Colonoscopy: never    Current Outpatient Medications   Medication Sig Dispense Refill     acetaminophen (TYLENOL) 500 MG tablet Take 500-1,000 mg by mouth every 6 hours as needed for mild pain       ibuprofen (ADVIL/MOTRIN) 600 MG tablet Take 600 mg by mouth every 6 hours as needed for moderate  pain     No meds     Allergies   Allergen Reactions     Clindamycin Hives     Social History:  Social History     Tobacco Use     Smoking status: Current Every Day Smoker     Packs/day: 1.00     Types: Cigarettes     Smokeless tobacco: Never Used     Tobacco comment: 9/11/2020 1/2 pack/day, tying tp quit   Substance Use Topics     Alcohol use: Yes     Frequency: Monthly or less     Comment: occ.     Work: manufacturing, manual labor  Preferred language: English  Lives at home with: Partner and 4 kids  Long smoking history since age 13. Smokes a pack daily. Denies any substance abuse     Family History:   No family history of cancer     Physical Exam:   Vitals reviewed in [facility]  /74   Pulse 89   Temp 98.3  F (36.8  C) (Oral)   Wt 66.9 kg (147 lb 8 oz)   SpO2 98%   BMI 26.13 kg/m    General: Alert and oriented, no acute distress    MSK: no edema of lower extremities, no swelling of joints, normal gait  Psych: Mood stable. Linear speech, brighter affect today  Cardiovascular: RRR  Pulmonary: Normal respiratory effort  GI: No distention. No masses. No hernia.   Lymph: No enlarge lymph nodes in neck or groin  : NEFG and normal perianal skin. Vagina smooth without lesions. Cervix with radiation changes including some erythema of ectropion. Still with left parametrial thickening. No obvious disease. No biopsies taken.       ECOG performance status: 0 - no restrictions      Imaging (Personally reviewed today,  5/6/2021)    IMPRESSION: In this patient with carcinoma of the cervix status post  chemoradiation:  1. Resolution of the previously seen cervical mass with mild residual  soft tissue fullness in the cervix with persistent though decreased  FDG uptake, likely postradiation changes versus minimal residual  disease. Resolution of the previously seen hypermetabolic pelvic  lymphadenopathy.  2. No findings suspicious for metastatic disease within the chest,  abdomen or pelvis.  3. Debris in the right maxillary  sinus may represent acute sinusitis  in the appropriate clinical context.     I have personally reviewed the examination and initial interpretation  and I agree with the findings.     JEZ REGALADO MD        Assessment:  Suni Zuluaga is a 35 year old woman with 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     Plan:     1.)   Cervical cancer : Stage IIIC1r SCC of the cervix (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  Completed primary chemoRT without complication. Posttreatment PET (6 months, delayed due to insurance issue) with minimal residual cervical disease, otherwise normal. Exam with no obvious recurrence, although significant radiation changes.     -Repeat PET in 3 months to follow findings on recent imaging, ordered. Ok to have virtual followup with me after PET. If concerning features on PET, may need to bring in for repeat exam with biopsies  -Discussed smoking cessation at length today, had previously quit. Explained this increases recurrence rates.     2.) Hot flashes:  menopause from RT. Not an HRT candidate given smoking        Total time today was 28 minutes including reviewing imaging, counseling patient, exam and documentation.     Suzy Brambila MD  Gynecologic Oncology  Pager [phone]

## 2021-05-16 PROBLEM — C53.9 CERVICAL CANCER (H): Status: ACTIVE | Noted: 2020-08-13

## 2021-06-23 ENCOUNTER — MEDICAL CORRESPONDENCE (OUTPATIENT)
Dept: HEALTH INFORMATION MANAGEMENT | Facility: CLINIC | Age: 36
End: 2021-06-23

## 2021-08-05 ENCOUNTER — VIRTUAL VISIT (OUTPATIENT)
Dept: ONCOLOGY | Facility: CLINIC | Age: 36
End: 2021-08-05
Attending: OBSTETRICS & GYNECOLOGY
Payer: COMMERCIAL

## 2021-08-05 DIAGNOSIS — C53.1 MALIGNANT NEOPLASM OF EXOCERVIX (H): Primary | ICD-10-CM

## 2021-08-05 RX ORDER — BUSPIRONE HYDROCHLORIDE 5 MG/1
5 TABLET ORAL 2 TIMES DAILY
COMMUNITY
Start: 2021-07-03 | End: 2022-09-14

## 2021-08-05 RX ORDER — ESCITALOPRAM OXALATE 10 MG/1
10 TABLET ORAL DAILY
COMMUNITY
Start: 2021-07-03 | End: 2022-09-14

## 2021-08-05 NOTE — LETTER
October 1, 2021      Suni Zuluaga  [address] [place] [address] [address] [place] [address]              Dear Suni,      I am reaching out to you on behalf of Dr. Suzy Brambila, GynOncology.  We had set you up for a virtual visit with Dr. Brambila on 8/5/2021 which did not happen.  Dr. Brambila last saw you on 5/6/2021 where she wanted to repeat a Petscan and talk with you either over the phone or video visit.  I have tried calling you several times, but have not been able to connect.  The Petscan and follow up should have been mid August 2021.  Can you please call scheduling at [phone] to get this set up or if you have any questions please call me directly at [phone].    Hope you are doing well and we hear from you soon!    Sincerely,        Odalys Paniagua RNCC  Dr. Suzy Brambila

## 2021-08-05 NOTE — LETTER
"    8/5/2021         RE: Suni Zuluaga  [address] [place] [address]        Dear Colleague,    Thank you for referring your patient, Suni Zuluaga, to the [facility] CANCER [facility]. Please see a copy of my visit note below.    Suni is a 36 year old who is being evaluated via a billable video visit.      How would you like to obtain your AVS? MyChart  If the video visit is dropped, the invitation should be resent by: Text to cell phone: [phone]  Will anyone else be joining your video visit? No  {If patient encounters technical issues they should call [phone] :[id number]}     I have reviewed and updated the patient's allergies and medication list.    Concerns: none  Refills: none     Vitals - Patient Reported  Weight (Patient Reported): 63.5 kg (140 lb)  Height (Patient Reported): 160 cm (5' 3\")  BMI (Based on Pt Reported Ht/Wt): 24.8  Pain Score: No Pain (0)    Lynne Bender CMA        Video Start Time: {video visit start/end time for provider to select:887564}  Video-Visit Details    Type of service:  Video Visit    Video End Time:{video visit start/end time for provider to select:152948}    Originating Location (pt. Location): {video visit patient location:205615::\"Home\"}    Distant Location (provider location):  [facility] CANCER [facility]     Platform used for Video Visit: {Virtual Visit Platforms:224953::\"CloudEndure\"}      Could  Not get ahold of suni today.   Needs a pet and followup with me      Suzy Brambila MD  Gynecologic Oncology  Pager [phone]         Again, thank you for allowing me to participate in the care of your patient.        Sincerely,        Suzy Brambila MD    "

## 2021-08-05 NOTE — PROGRESS NOTES
Could  Not get ahold of tiffanie today.   Needs a pet and followup with me      Suzy Brambila MD  Gynecologic Oncology  Pager [phone]

## 2021-08-05 NOTE — PROGRESS NOTES
"Suni is a 36 year old who is being evaluated via a billable video visit.      How would you like to obtain your AVS? MyChart  If the video visit is dropped, the invitation should be resent by: Text to cell phone: [phone]  Will anyone else be joining your video visit? No       I have reviewed and updated the patient's allergies and medication list.    Concerns: none  Refills: none     Vitals - Patient Reported  Weight (Patient Reported): 63.5 kg (140 lb)  Height (Patient Reported): 160 cm (5' 3\")  BMI (Based on Pt Reported Ht/Wt): 24.8  Pain Score: No Pain (0)    Lynne Bender CMA      "

## 2021-10-01 ENCOUNTER — PATIENT OUTREACH (OUTPATIENT)
Dept: ONCOLOGY | Facility: CLINIC | Age: 36
End: 2021-10-01

## 2021-10-01 NOTE — PROGRESS NOTES
Sent certified letter today concerning getting a follow up Petscan and virtual follow up with Dr. Brambila.  Patient was due for her Petscan and follow up around 8/5/21 but have been unable to connect with her.  Left several message and sent regular letter in the mail trying to get appointments set up.

## 2021-10-11 ENCOUNTER — HEALTH MAINTENANCE LETTER (OUTPATIENT)
Age: 36
End: 2021-10-11

## 2022-01-30 ENCOUNTER — HEALTH MAINTENANCE LETTER (OUTPATIENT)
Age: 37
End: 2022-01-30

## 2022-06-27 ENCOUNTER — PATIENT OUTREACH (OUTPATIENT)
Dept: ONCOLOGY | Facility: CLINIC | Age: 37
End: 2022-06-27

## 2022-06-27 NOTE — PROGRESS NOTES
Patient had an appointment with local provider. However, local provider reinforced patients need to have follow up with Gynecology Oncology. Local team has sent in more information hoping this will be approved.     Local team understands patient has not been cooperative with needed follow up. Wondering how we can work together.     RN reviewed with Gyn/Onc MD who approved patient being seen on MG by our NP as this is less of a hardship for the patient.     MD did request CT scan if PET is not approved. MD will review CT scan and update NP if there are any concerns.     This information was relayed to local team     Karin Evans RN

## 2022-08-29 ENCOUNTER — PRE VISIT (OUTPATIENT)
Dept: ONCOLOGY | Facility: CLINIC | Age: 37
End: 2022-08-29

## 2022-08-29 ENCOUNTER — PATIENT OUTREACH (OUTPATIENT)
Dept: ONCOLOGY | Facility: CLINIC | Age: 37
End: 2022-08-29

## 2022-08-29 ENCOUNTER — TRANSCRIBE ORDERS (OUTPATIENT)
Dept: OTHER | Age: 37
End: 2022-08-29

## 2022-08-29 DIAGNOSIS — C53.9 SQUAMOUS CELL CARCINOMA OF CERVIX (H): Primary | ICD-10-CM

## 2022-08-29 DIAGNOSIS — R10.9 ABDOMINAL PAIN: Primary | ICD-10-CM

## 2022-08-29 DIAGNOSIS — D06.9 CIN III (CERVICAL INTRAEPITHELIAL NEOPLASIA III): ICD-10-CM

## 2022-08-29 NOTE — TELEPHONE ENCOUNTER
RECORDS STATUS - ALL OTHER DIAGNOSIS      Action    Action Taken 8/29/22  Pt has DUPLICATE CHART//Previously marked for merge. MRN for duplicate chart: [id number].        RECORDS RECEIVED FROM: Loren/KRISTY   DATE RECEIVED:    NOTES STATUS DETAILS   OFFICE NOTE from referring provider     OFFICE NOTE from medical oncologist Epic Dr. Suzy Brambila: 5/6/21   DISCHARGE SUMMARY from \[facility]\""/[facility] 6/8/22, 12/16/15, 11/30/15, 10/5/15: [facility]    11/16/20, 11/13/20, 11/11/20, 11/9/20, 11/6/20: [facility]   DISCHARGE REPORT from the ER [facility] 8/23/22, 3/20/22, 2/3/22   MEDICATION LIST [facility]   LABS     PATHOLOGY REPORTS [facility] 9/9/20, 9/1/20: Surg Path  8/27/20: Path Consult   ANYTHING RELATED TO DIAGNOSIS Epic/ 8/23/22   IMAGING (NEED IMAGES & REPORT)     CT SCANS Requested 8/29 8/23/22: Loren/[facility]    3/20/22, 2/3/22: KRISTY [facility]/Reports in [facility]

## 2022-08-29 NOTE — PROGRESS NOTES
New Patient Hematology / Oncology Nurse Navigator Note     Referral Date: 8/29/22    Referring provider:  Leigh Dreyer, DO    Per chart review, patient has duplicate charts and is an established patient with Dr. Brambila under MRN: [id number]. Will send IB to CC for Dr. Brambila requesting follow-up and forward email with notes/records from referring MD.     Nicolasa Leal, BSN, RN, PHN, OCN  Hematology/Oncology Nurse Navigator  [facility]  [phone]

## 2022-08-29 NOTE — PROGRESS NOTES
Left message for Suni to call back to get scheduled for repeat [facility] and then virtual or in person follow up with Dr. Brambila here at the [facility]. Waiting for call back.    Thanks    Odalys Paniagua RN Care Coordinator  [facility]  Ph.[phone] Fax. [phone]

## 2022-08-29 NOTE — TELEPHONE ENCOUNTER
Action    Action Taken 8/29/22  Pt has DUPLICATE CHART//Previously marked for merge. ALL RECORDS UNDER PREVIOUS MRN: [id number]. All records/Pre-Visit are under other MRN, detailed above.   11:06 AM

## 2022-09-14 ENCOUNTER — TRANSFERRED RECORDS (OUTPATIENT)
Dept: HEALTH INFORMATION MANAGEMENT | Facility: CLINIC | Age: 37
End: 2022-09-14

## 2022-09-14 ENCOUNTER — VIRTUAL VISIT (OUTPATIENT)
Dept: ONCOLOGY | Facility: CLINIC | Age: 37
End: 2022-09-14
Attending: OBSTETRICS & GYNECOLOGY
Payer: COMMERCIAL

## 2022-09-14 DIAGNOSIS — C53.8 MALIGNANT NEOPLASM OF OVERLAPPING SITES OF CERVIX (H): ICD-10-CM

## 2022-09-14 DIAGNOSIS — C53.9 SQUAMOUS CELL CARCINOMA OF CERVIX (H): Primary | ICD-10-CM

## 2022-09-14 PROCEDURE — 99214 OFFICE O/P EST MOD 30 MIN: CPT | Mod: 95 | Performed by: OBSTETRICS & GYNECOLOGY

## 2022-09-14 NOTE — LETTER
"    2022         RE: Suni Zuluaga  [address]  [facility] [address]        Dear Colleague,    Thank you for referring your patient, Suni Zuluaga, to the [facility]. Please see a copy of my visit note below.    Suni is a 37 year old who is being evaluated via a billable video visit.      How would you like to obtain your AVS? MyChart  If the video visit is dropped, the invitation should be resent by: Text to cell phone: [phone]  Will anyone else be joining your video visit? No  {If patient encounters technical issues they should call [phone] :[id number]}    Augustin LEW    Video-Visit Details    Video Start Time: {video visit start/end time for provider to select:666952}    Type of service:  Video Visit    Video End Time:{video visit start/end time for provider to select:919041}    Originating Location (pt. Location): {video visit patient location:269949::\"Home\"}    Distant Location (provider location):  [facility]     Platform used for Video Visit: {Virtual Visit Platforms:212892::\"[employer]"}    GYN Oncology Established Patient Visit    RE: Suni Zuluaga  : 1985  2022       CC: Cervical cancer     HPI: Ms Suni Zuluaga is a 37 year old  female who presents to discuss cervical cancer.         Treatment History:   2015:HSIL Pap at the beginning of pregnancy. Repeat pap postpartum was ASCUS. Colposcopy was negative.   20: Pelvic US. Uterine fibroids, normal appearance of uterus and ovaries  2020: Pap smear which confirmed squamous cell carcinoma of the cervix. Referred to GYN  ONC  2020: GYN ONC Consult [facility]. Cervical biopsy, RADHA III, unable to identify invasion  2020: Repeat cervical biopsy confirms invasive SCC  2020: Started primary chemoRT for locally advanced cervical cancer. Stage IIIC1(r), large primary tumor with bilateral parametrial invasion and clinical evidence of " posterior vaginal involvement. Pelvic lymphadenopathy involving external iliac, perirectal and internal iliac nodes.   20-10/30/20: ChemoRT.   20-20: T&R brachytherapy  2020: Followup with radonc (Dr Hernández)   22: CT A/P with nonspecific increased density in the para-aortic region, which could be seen in the setting of retroperitoneal fibrosis, inflammation, or potentially lymphoma/desmoplastic reaction. New varix from right renal vein extending down into the RLQ.     Suni reports a 1-2 month  history of pelvic pain and back pain. She is Having difficulty sleeping and eating. She has lost weight (now 125 pounds, was 160). Has nausea and decreased appetite. Reports night sweat 1-2x/night. She also gets hot flashes during the day.  She is also having significant edema of her abdomen, mons, vulva and the upper part of her lower extremities.       Past Medical History:   Diagnosis Date     Cervical cancer (H)      Past Surgical History:   Procedure Laterality Date     CARPAL TUNNEL RELEASE RT/LT Right      TUBAL LIGATION         OBGYN history and Health Maintenance:  , completed childbearing , had tubal ligation   Last Pap Smear: 2020 showing SCC. Due 2021  Last Mammogram: never  Last Colonoscopy: never    Current Outpatient Medications   Medication Sig Dispense Refill     ibuprofen (ADVIL/MOTRIN) 600 MG tablet Take 600 mg by mouth every 6 hours as needed for moderate pain       acetaminophen (TYLENOL) 500 MG tablet Take 500-1,000 mg by mouth every 6 hours as needed for mild pain (Patient not taking: Reported on 2022)     No meds     Allergies   Allergen Reactions     Clindamycin Hives     Social History:  Social History     Tobacco Use     Smoking status: Current Every Day Smoker     Packs/day: 1.00     Types: Cigarettes     Smokeless tobacco: Never Used     Tobacco comment: 2020 1/2 pack/day, tying tp quit   Substance Use Topics     Alcohol use: Yes     Comment: occ.  "    Work: manufacturing, manual labor  Preferred language: English  Lives at home with: Partner and 4 kids  Long smoking history since age 13. Smokes a pack daily. Denies any substance abuse     Family History:   No family history of cancer     Physical Exam:   Vitals reviewed in epic  There were no vitals taken for this visit.  General: APpears very upset, frustrated, uncomfortable. Also  appears significantly thinner with some temporal wasting  Abd: diffuse edema of lower abdomen, upper legs, mons (seen over video visit)      ECOG performance status: 1       Imaging 8/23/2022    IMPRESSION:   1. Nonspecific increased density in the para-aortic region, which could be seen   in the setting of retroperitoneal fibrosis, inflammation, or potentially   lymphoma/desmoplastic reaction.  This is new from the prior study.   2. New varix has developed from the right renal vein extending down into the   right lower quadrant.   3. Stable dilated common bile duct.   4. Moderate to large volume colonic stool.   5. Diminutive appearance of the proximal uterus/cervical region, possibly post   treatment related.  No evidence for peritoneal or omental nodularity.      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     Plan:     1.)   Cervical cancer : Stage IIIC1r SCC of the cervix (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  Completed primary chemoRT without complication. Posttreatment PET (6 months, delayed due to insurance issue) with minimal residual cervical disease. We were unable to get further PET scans covered.   Now with new constellation of symptoms which are very concerning. Weight loss, decreased PO, abdominal pain, lower abdominal/vulvar/leg edema. CT scan with findings of \"increased density in para-aortic region\".  She is clearly " having some venous/lymphatic congestion based on exam. It is very unclear to me the etiology at this point. No obvious recurrence although she is certainly at high risk and this remains high on the differential. Additionally, this could be retroperitoneal fibrosis, although this does not necessarily explain her GI symptoms.     -Need PET to rule out recurrence . Will plan to write letter to insurance company ASAP to explain medical necessity.   -Will discuss with rad onc and gyn onc teams next steps for possible retroperitoneal fibrosis  -Followup next week given severity of her symptoms. Virtual visit OK   - Reach out to IR for possible biopsy once PET scan complete.   - Aggressively treat constipation with daily mirilax and stool softener      2.) Hot flashes:  menopause from RT. Not an HRT candidate given smoking      Total time today was 35 minutes including reviewing imaging, counseling patient, and documentation.     Suzy Brambila MD  Gynecologic Oncology  Pager [phone]        Again, thank you for allowing me to participate in the care of your patient.        Sincerely,        Suzy Brambila MD

## 2022-09-14 NOTE — PROGRESS NOTES
GYN Oncology Established Patient Visit    RE: Suni Zuluaga  : 1985  2022       CC: Cervical cancer     HPI: Ms Suni Zuluaga is a 37 year old  female who presents to discuss cervical cancer.         Treatment History:   2015:HSIL Pap at the beginning of pregnancy. Repeat pap postpartum was ASCUS. Colposcopy was negative.   20: Pelvic US. Uterine fibroids, normal appearance of uterus and ovaries  2020: Pap smear which confirmed squamous cell carcinoma of the cervix. Referred to GYN  ONC  2020: GYN ONC Consult [facility]. Cervical biopsy, RADHA III, unable to identify invasion  2020: Repeat cervical biopsy confirms invasive SCC  2020: Started primary chemoRT for locally advanced cervical cancer. Stage IIIC1(r), large primary tumor with bilateral parametrial invasion and clinical evidence of posterior vaginal involvement. Pelvic lymphadenopathy involving external iliac, perirectal and internal iliac nodes.   20-10/30/20: ChemoRT.   20-20: T&R brachytherapy  2020: Followup with radonc (Dr Hernández)   22: CT A/P with nonspecific increased density in the para-aortic region, which could be seen in the setting of retroperitoneal fibrosis, inflammation, or potentially lymphoma/desmoplastic reaction. New varix from right renal vein extending down into the RLQ.     Suni reports a 1-2 month  history of pelvic pain and back pain. She is Having difficulty sleeping and eating. She has lost weight (now 125 pounds, was 160). Has nausea and decreased appetite. Reports night sweat 1-2x/night. She also gets hot flashes during the day.  She is also having significant edema of her abdomen, mons, vulva and the upper part of her lower extremities.       Past Medical History:   Diagnosis Date     Cervical cancer (H)      Past Surgical History:   Procedure Laterality Date     CARPAL TUNNEL RELEASE RT/LT Right      TUBAL LIGATION         OBGYN history and Health  Maintenance:  , completed childbearing , had tubal ligation   Last Pap Smear: 2020 showing SCC. Due 2021  Last Mammogram: never  Last Colonoscopy: never    Current Outpatient Medications   Medication Sig Dispense Refill     ibuprofen (ADVIL/MOTRIN) 600 MG tablet Take 600 mg by mouth every 6 hours as needed for moderate pain       acetaminophen (TYLENOL) 500 MG tablet Take 500-1,000 mg by mouth every 6 hours as needed for mild pain (Patient not taking: Reported on 2022)     No meds     Allergies   Allergen Reactions     Clindamycin Hives     Social History:  Social History     Tobacco Use     Smoking status: Current Every Day Smoker     Packs/day: 1.00     Types: Cigarettes     Smokeless tobacco: Never Used     Tobacco comment: 2020 1/2 pack/day, tying tp quit   Substance Use Topics     Alcohol use: Yes     Comment: occ.     Work: manufacturing, manual labor  Preferred language: English  Lives at home with: Partner and 4 kids  Long smoking history since age 13. Smokes a pack daily. Denies any substance abuse     Family History:   No family history of cancer     Physical Exam:   Vitals reviewed in epic  There were no vitals taken for this visit.  General: APpears very upset, frustrated, uncomfortable. Also  appears significantly thinner with some temporal wasting  Abd: diffuse edema of lower abdomen, upper legs, mons (seen over video visit)      ECOG performance status: 1       Imaging 2022    IMPRESSION:   1. Nonspecific increased density in the para-aortic region, which could be seen   in the setting of retroperitoneal fibrosis, inflammation, or potentially   lymphoma/desmoplastic reaction.  This is new from the prior study.   2. New varix has developed from the right renal vein extending down into the   right lower quadrant.   3. Stable dilated common bile duct.   4. Moderate to large volume colonic stool.   5. Diminutive appearance of the proximal uterus/cervical region, possibly post  "  treatment related.  No evidence for peritoneal or omental nodularity.      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     Plan:     1.)   Cervical cancer : Stage IIIC1r SCC of the cervix (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  Completed primary chemoRT without complication. Posttreatment PET (6 months, delayed due to insurance issue) with minimal residual cervical disease. We were unable to get further PET scans covered.   Now with new constellation of symptoms which are very concerning. Weight loss, decreased PO, abdominal pain, lower abdominal/vulvar/leg edema. CT scan with findings of \"increased density in para-aortic region\".  She is clearly having some venous/lymphatic congestion based on exam. It is very unclear to me the etiology at this point. No obvious recurrence although she is certainly at high risk and this remains high on the differential. Additionally, this could be retroperitoneal fibrosis, although this does not necessarily explain her GI symptoms.     -Need PET to rule out recurrence . Will plan to write letter to insurance company ASAP to explain medical necessity.   -Will discuss with rad onc and gyn onc teams next steps for possible retroperitoneal fibrosis  -Followup next week given severity of her symptoms. Virtual visit OK   - Reach out to IR for possible biopsy once PET scan complete.   - Aggressively treat constipation with daily mirilax and stool softener      2.) Hot flashes:  menopause from RT. Not an HRT candidate given smoking      Total time today was 35 minutes including reviewing imaging, counseling patient, and documentation.     Suzy Brambila MD  Gynecologic Oncology  Pager [phone]    "

## 2022-09-14 NOTE — LETTER
"    2022         RE: Suni Zuluaga  [address]  [facility] [address]        Dear Colleague,    Thank you for referring your patient, Suni Zuluaga, to the [facility]. Please see a copy of my visit note below.    Suni is a 37 year old who is being evaluated via a billable video visit.      How would you like to obtain your AVS? MyChart  If the video visit is dropped, the invitation should be resent by: Text to cell phone: [phone]  Will anyone else be joining your video visit? No  {If patient encounters technical issues they should call [phone] :[id number]}    Augustin LEW    Video-Visit Details    Video Start Time: {video visit start/end time for provider to select:661248}    Type of service:  Video Visit    Video End Time:{video visit start/end time for provider to select:132834}    Originating Location (pt. Location): {video visit patient location:772433::\"Home\"}    Distant Location (provider location):  [facility]     Platform used for Video Visit: {Virtual Visit Platforms:372240::\"[email or web address]\"}    GYN Oncology Established Patient Visit    RE: Suni Zuluaga  : 1985  2022       CC: Cervical cancer     HPI: Ms Suni Zuluaga is a 37 year old  female who presents to discuss cervical cancer.         Treatment History:   2015:HSIL Pap at the beginning of pregnancy. Repeat pap postpartum was ASCUS. Colposcopy was negative.   20: Pelvic US. Uterine fibroids, normal appearance of uterus and ovaries  2020: Pap smear which confirmed squamous cell carcinoma of the cervix. Referred to GYN  ONC  2020: GYN ONC Consult [facility]. Cervical biopsy, RADHA III, unable to identify invasion  2020: Repeat cervical biopsy confirms invasive SCC  2020: Started primary chemoRT for locally advanced cervical cancer. Stage IIIC1(r), large primary tumor with bilateral parametrial invasion and clinical evidence of " posterior vaginal involvement. Pelvic lymphadenopathy involving external iliac, perirectal and internal iliac nodes.   20-10/30/20: ChemoRT.   20-20: T&R brachytherapy  2020: Followup with radonc (Dr Hernández)   22: CT A/P with nonspecific increased density in the para-aortic region, which could be seen in the setting of retroperitoneal fibrosis, inflammation, or potentially lymphoma/desmoplastic reaction. New varix from right renal vein extending down into the RLQ.     Suni reports a 1-2 month  history of pelvic pain and back pain. She is Having difficulty sleeping and eating. She has lost weight (now 125 pounds, was 160). Has nausea and decreased appetite. Reports night sweat 1-2x/night. She also gets hot flashes during the day.  She is also having significant edema of her abdomen, mons, vulva and the upper part of her lower extremities.       Past Medical History:   Diagnosis Date     Cervical cancer (H)      Past Surgical History:   Procedure Laterality Date     CARPAL TUNNEL RELEASE RT/LT Right      TUBAL LIGATION         OBGYN history and Health Maintenance:  , completed childbearing , had tubal ligation   Last Pap Smear: 2020 showing SCC. Due 2021  Last Mammogram: never  Last Colonoscopy: never    Current Outpatient Medications   Medication Sig Dispense Refill     ibuprofen (ADVIL/MOTRIN) 600 MG tablet Take 600 mg by mouth every 6 hours as needed for moderate pain       acetaminophen (TYLENOL) 500 MG tablet Take 500-1,000 mg by mouth every 6 hours as needed for mild pain (Patient not taking: Reported on 2022)     No meds     Allergies   Allergen Reactions     Clindamycin Hives     Social History:  Social History     Tobacco Use     Smoking status: Current Every Day Smoker     Packs/day: 1.00     Types: Cigarettes     Smokeless tobacco: Never Used     Tobacco comment: 2020 1/2 pack/day, tying tp quit   Substance Use Topics     Alcohol use: Yes     Comment: occ.  "    Work: manufacturing, manual labor  Preferred language: English  Lives at home with: Partner and 4 kids  Long smoking history since age 13. Smokes a pack daily. Denies any substance abuse     Family History:   No family history of cancer     Physical Exam:   Vitals reviewed in epic  There were no vitals taken for this visit.  General: APpears very upset, frustrated, uncomfortable. Also  appears significantly thinner with some temporal wasting  Abd: diffuse edema of lower abdomen, upper legs, mons (seen over video visit)      ECOG performance status: 1       Imaging 8/23/2022    IMPRESSION:   1. Nonspecific increased density in the para-aortic region, which could be seen   in the setting of retroperitoneal fibrosis, inflammation, or potentially   lymphoma/desmoplastic reaction.  This is new from the prior study.   2. New varix has developed from the right renal vein extending down into the   right lower quadrant.   3. Stable dilated common bile duct.   4. Moderate to large volume colonic stool.   5. Diminutive appearance of the proximal uterus/cervical region, possibly post   treatment related.  No evidence for peritoneal or omental nodularity.      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     Plan:     1.)   Cervical cancer : Stage IIIC1r SCC of the cervix (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  Completed primary chemoRT without complication. Posttreatment PET (6 months, delayed due to insurance issue) with minimal residual cervical disease. We were unable to get further PET scans covered.   Now with new constellation of symptoms which are very concerning. Weight loss, decreased PO, abdominal pain, lower abdominal/vulvar/leg edema. CT scan with findings of \"increased density in para-aortic region\".  She is clearly " having some venous/lymphatic congestion based on exam. It is very unclear to me the etiology at this point. No obvious recurrence although she is certainly at high risk and this remains high on the differential. Additionally, this could be retroperitoneal fibrosis, although this does not necessarily explain her GI symptoms.     -Need PET to rule out recurrence . Will plan to write letter to insurance company ASAP to explain medical necessity.   -Will discuss with rad onc and gyn onc teams next steps for possible retroperitoneal fibrosis  -Followup next week given severity of her symptoms. Virtual visit OK   - Reach out to IR for possible biopsy once PET scan complete.   - Aggressively treat constipation with daily mirilax and stool softener      2.) Hot flashes:  menopause from RT. Not an HRT candidate given smoking      Total time today was 35 minutes including reviewing imaging, counseling patient, and documentation.     Suzy Brambila MD  Gynecologic Oncology  Pager [phone]        Again, thank you for allowing me to participate in the care of your patient.        Sincerely,        Suzy Brambila MD

## 2022-09-14 NOTE — PROGRESS NOTES
Suni is a 37 year old who is being evaluated via a billable video visit.      How would you like to obtain your AVS? MyChart  If the video visit is dropped, the invitation should be resent by: Text to cell phone: [phone]  Will anyone else be joining your video visit? Brittaney LEW

## 2022-09-15 ENCOUNTER — TRANSFERRED RECORDS (OUTPATIENT)
Dept: HEALTH INFORMATION MANAGEMENT | Facility: CLINIC | Age: 37
End: 2022-09-15

## 2022-09-21 DIAGNOSIS — C53.9 SQUAMOUS CELL CARCINOMA OF CERVIX (H): Primary | ICD-10-CM

## 2022-09-26 ENCOUNTER — PATIENT OUTREACH (OUTPATIENT)
Dept: ONCOLOGY | Facility: CLINIC | Age: 37
End: 2022-09-26

## 2022-09-26 NOTE — PROGRESS NOTES
Spoke to Pre Authorization department at [facility] to see if they had heard anything from Suni's insurance on approval of her Pet[employer] - They called for update and were told that they have up to 30 days to make a decision on the appeal and the decision would be faxed both to Dr. Brambila [phone] and [facility] directly.    Thanks    Odalys Paniagua RN Care Coordinator  [facility]  Ph.[phone] Fax. [phone]

## 2022-09-27 ENCOUNTER — TELEPHONE (OUTPATIENT)
Dept: MEDSURG UNIT | Facility: CLINIC | Age: 37
End: 2022-09-27

## 2022-09-27 RX ORDER — LIDOCAINE 40 MG/G
CREAM TOPICAL
Status: CANCELLED | OUTPATIENT
Start: 2022-09-27

## 2022-09-27 NOTE — TELEPHONE ENCOUNTER
Chart reviewed pre procedure. Left   9/27 @1000am for pt to return call regarding pre procedure instructions. No pertinent allergies, no aspirin or blood thinners listed.

## 2022-09-28 ENCOUNTER — HOSPITAL ENCOUNTER (OUTPATIENT)
Dept: CT IMAGING | Facility: CLINIC | Age: 37
Discharge: HOME | End: 2022-09-28
Attending: OBSTETRICS & GYNECOLOGY
Payer: COMMERCIAL

## 2022-09-28 ENCOUNTER — HOSPITAL ENCOUNTER (OUTPATIENT)
Facility: CLINIC | Age: 37
Discharge: HOME | End: 2022-09-28
Admitting: OBSTETRICS & GYNECOLOGY
Payer: COMMERCIAL

## 2022-09-28 VITALS
WEIGHT: 130 LBS | RESPIRATION RATE: 16 BRPM | SYSTOLIC BLOOD PRESSURE: 130 MMHG | TEMPERATURE: 98.7 F | OXYGEN SATURATION: 99 % | BODY MASS INDEX: 23.04 KG/M2 | HEART RATE: 103 BPM | HEIGHT: 63 IN | DIASTOLIC BLOOD PRESSURE: 92 MMHG

## 2022-09-28 DIAGNOSIS — C53.9 SQUAMOUS CELL CARCINOMA OF CERVIX (H): ICD-10-CM

## 2022-09-28 LAB
APTT PPP: 30 SECONDS (ref 22–38)
ERYTHROCYTE [DISTWIDTH] IN BLOOD BY AUTOMATED COUNT: 14.5 % (ref 10–15)
HCT VFR BLD AUTO: 32.9 % (ref 35–47)
HGB BLD-MCNC: 10.5 G/DL (ref 11.7–15.7)
INR PPP: 1.06 (ref 0.85–1.15)
MCH RBC QN AUTO: 30.5 PG (ref 26.5–33)
MCHC RBC AUTO-ENTMCNC: 31.9 G/DL (ref 31.5–36.5)
MCV RBC AUTO: 96 FL (ref 78–100)
PLATELET # BLD AUTO: 225 10E3/UL (ref 150–450)
RBC # BLD AUTO: 3.44 10E6/UL (ref 3.8–5.2)
WBC # BLD AUTO: 8.2 10E3/UL (ref 4–11)

## 2022-09-28 PROCEDURE — 88305 TISSUE EXAM BY PATHOLOGIST: CPT | Mod: 26

## 2022-09-28 PROCEDURE — 85027 COMPLETE CBC AUTOMATED: CPT | Performed by: RADIOLOGY

## 2022-09-28 PROCEDURE — 85730 THROMBOPLASTIN TIME PARTIAL: CPT | Performed by: RADIOLOGY

## 2022-09-28 PROCEDURE — 85610 PROTHROMBIN TIME: CPT | Performed by: RADIOLOGY

## 2022-09-28 PROCEDURE — 999N000154 HC STATISTIC RADIOLOGY XRAY, US, CT, MAR, NM

## 2022-09-28 PROCEDURE — 49180 BIOPSY ABDOMINAL MASS: CPT

## 2022-09-28 PROCEDURE — 88305 TISSUE EXAM BY PATHOLOGIST: CPT | Mod: TC | Performed by: OBSTETRICS & GYNECOLOGY

## 2022-09-28 PROCEDURE — 36415 COLL VENOUS BLD VENIPUNCTURE: CPT | Performed by: RADIOLOGY

## 2022-09-28 PROCEDURE — 272N000431 CT ABDOMEN RETROPERITONEAL BIOPSY

## 2022-09-28 PROCEDURE — 99152 MOD SED SAME PHYS/QHP 5/>YRS: CPT

## 2022-09-28 PROCEDURE — 88342 IMHCHEM/IMCYTCHM 1ST ANTB: CPT | Mod: 26

## 2022-09-28 PROCEDURE — 250N000011 HC RX IP 250 OP 636: Performed by: RADIOLOGY

## 2022-09-28 RX ORDER — NALOXONE HYDROCHLORIDE 0.4 MG/ML
0.4 INJECTION, SOLUTION INTRAMUSCULAR; INTRAVENOUS; SUBCUTANEOUS
Status: CANCELLED | OUTPATIENT
Start: 2022-09-28

## 2022-09-28 RX ORDER — ACETAMINOPHEN 325 MG/1
650 TABLET ORAL EVERY 4 HOURS PRN
Status: CANCELLED | OUTPATIENT
Start: 2022-09-28

## 2022-09-28 RX ORDER — FLUMAZENIL 0.1 MG/ML
0.2 INJECTION, SOLUTION INTRAVENOUS
Status: CANCELLED | OUTPATIENT
Start: 2022-09-28

## 2022-09-28 RX ORDER — IBUPROFEN 200 MG
400 TABLET ORAL EVERY 6 HOURS PRN
Status: DISCONTINUED | OUTPATIENT
Start: 2022-09-28 | End: 2022-09-28 | Stop reason: HOSPADM

## 2022-09-28 RX ORDER — NALOXONE HYDROCHLORIDE 0.4 MG/ML
0.4 INJECTION, SOLUTION INTRAMUSCULAR; INTRAVENOUS; SUBCUTANEOUS
Status: DISCONTINUED | OUTPATIENT
Start: 2022-09-28 | End: 2022-09-29 | Stop reason: HOSPADM

## 2022-09-28 RX ORDER — LIDOCAINE 40 MG/G
CREAM TOPICAL
Status: DISCONTINUED | OUTPATIENT
Start: 2022-09-28 | End: 2022-09-28 | Stop reason: HOSPADM

## 2022-09-28 RX ORDER — NALOXONE HYDROCHLORIDE 0.4 MG/ML
0.2 INJECTION, SOLUTION INTRAMUSCULAR; INTRAVENOUS; SUBCUTANEOUS
Status: CANCELLED | OUTPATIENT
Start: 2022-09-28

## 2022-09-28 RX ORDER — ACETAMINOPHEN 325 MG/1
650 TABLET ORAL EVERY 4 HOURS PRN
Status: DISCONTINUED | OUTPATIENT
Start: 2022-09-28 | End: 2022-09-28 | Stop reason: HOSPADM

## 2022-09-28 RX ORDER — IBUPROFEN 200 MG
400 TABLET ORAL EVERY 6 HOURS PRN
Status: CANCELLED | OUTPATIENT
Start: 2022-09-28

## 2022-09-28 RX ORDER — NALOXONE HYDROCHLORIDE 0.4 MG/ML
0.2 INJECTION, SOLUTION INTRAMUSCULAR; INTRAVENOUS; SUBCUTANEOUS
Status: DISCONTINUED | OUTPATIENT
Start: 2022-09-28 | End: 2022-09-29 | Stop reason: HOSPADM

## 2022-09-28 RX ORDER — FLUMAZENIL 0.1 MG/ML
0.2 INJECTION, SOLUTION INTRAVENOUS
Status: DISCONTINUED | OUTPATIENT
Start: 2022-09-28 | End: 2022-09-29 | Stop reason: HOSPADM

## 2022-09-28 RX ORDER — FENTANYL CITRATE 50 UG/ML
25-50 INJECTION, SOLUTION INTRAMUSCULAR; INTRAVENOUS EVERY 5 MIN PRN
Status: CANCELLED | OUTPATIENT
Start: 2022-09-28

## 2022-09-28 RX ORDER — FENTANYL CITRATE 50 UG/ML
25-50 INJECTION, SOLUTION INTRAMUSCULAR; INTRAVENOUS EVERY 5 MIN PRN
Status: DISCONTINUED | OUTPATIENT
Start: 2022-09-28 | End: 2022-09-29 | Stop reason: HOSPADM

## 2022-09-28 RX ADMIN — FENTANYL CITRATE 75 MCG: 50 INJECTION, SOLUTION INTRAMUSCULAR; INTRAVENOUS at 13:36

## 2022-09-28 RX ADMIN — MIDAZOLAM HYDROCHLORIDE 1.5 MG: 1 INJECTION, SOLUTION INTRAMUSCULAR; INTRAVENOUS at 13:37

## 2022-09-28 ASSESSMENT — ACTIVITIES OF DAILY LIVING (ADL): ADLS_ACUITY_SCORE: 35

## 2022-09-28 NOTE — PROGRESS NOTES
Care Suites Admission Nursing Note    Patient Information  Name: Suni Zuluaga  Age: 37 year old  Reason for admission: CT biopsy  Care Suites arrival time: 1100    Visitor Information  Name: VietMelva  Informed of visitor restrictions: Yes  1 visitor allowed per patient   Visitor must screen negative for COVID symptoms   Visitor must wear a mask  Waiting rooms closed to visitors    Patient Admission/Assessment   Pre-procedure assessment complete: Yes  If abnormal assessment/labs, provider notified: N/A  NPO: Yes  Medications held per instructions/orders: Yes  Consent: obtained  If applicable, pregnancy test status: declined  Patient oriented to room: Yes  Education/questions answered: Yes  Plan/other: Proceed    Discharge Planning  Discharge name/phone number: Melva  Overnight post sedation caregiver: melva  Discharge location: home    Karin Dexter RN

## 2022-09-28 NOTE — PROGRESS NOTES
Sedation Post Procedure Summary:    Immediately prior to starting the procedure a Time Out was conducted with procedural staff and re-confirmed the patient s name, procedure, and site/side. Md completed sedation assessment.     Consent obtained from patient after discussing the risks, benefits and alternatives.       Indication:  Sedation was required to allow for Ct guided retroperitoneal biopsy    Sedatives: Fentanyl 75 Mcg and Versed 1.5 Mg    Vital signs, airway, and pulse oximetry were monitored throughout the procedure and sedation was maintained until the procedure was complete.      Patient tolerance: Patient tolerated the procedure well with no immediate complications. Site at Right lower back clean and dry with band aide. No bleeding or drainage from site at completion.      (See Doc Flow-sheets and MAR for additional information)    Warren Stafford RN

## 2022-09-28 NOTE — PROGRESS NOTES
Care Suites Discharge Summary    Discharge Criteria:   Discharge Criteria met per MD orders: Yes.   Vital signs stable.     Pt demonstrates ability to ambulate safely: Yes.  (See discharge questionnaire for additional information)    Discharge instructions & education:   Discharge instructions reviewed with patient. Patient verbalizes  understanding.   Additional patient education provided: retroperitoneal biopsy    Medications:   Patient will be discharging on new medications- No. Patient verbalizes reason for use, start date, and side effects NA.    Items returned to patient:   Home and hospital acquired medications returned to patient NA   Listed belongings gathered and returned to patient: Yes    Patient discharged to home with .     Warren Stafford RN

## 2022-09-28 NOTE — DISCHARGE INSTRUCTIONS
Retroperitoneal Biopsy Discharge Instructions     After you go home:    You may resume your normal diet  Have an adult stay with you for 6 hours if you received sedation       For 24 hours - due to the sedation you received:  Relax and take it easy  Do NOT make any important or legal decisions  Do NOT drive or operate machines at home or at work  Do NOT drink alcohol    Care of Puncture Site:    For the first 48 hrs, check your puncture site every couple hours while you are awake   You may remove/change the bandaid tomorrow  You may shower tomorrow  No tub baths, whirlpools or swimming until your puncture site has fully healed    Activity     You may go back to normal activity in 24 hours   Wait 48 hours before lifting, straining, exercise or other strenuous activity    Medicines:    You may resume all medications  Resume your Warfarin/Coumadin at your regular dose today. Follow up with your provider to have your INR rechecked  Resume your Platelet Inhibitors and Aspirin tomorrow at your regular dose  For minor pain, you may take Acetaminophen (Tylenol) or Ibuprofen (Advil)            Call the provider who ordered this test if:    Increased pain or a large or growing hard lump around the site  Blood or fluid is draining from the site  The site is red, swollen, hot or tender  Chills or a fever greater than 101 F (38 C)  Pain that is getting worse  Any questions or concerns    Call  911 or go to the Emergency Room if:    Severe pain or trouble breathing  Bleeding that you cannot control        If you have questions call:          [facility] @ [phone]

## 2022-09-29 DIAGNOSIS — C53.9 SQUAMOUS CELL CARCINOMA OF CERVIX (H): Primary | ICD-10-CM

## 2022-09-30 ENCOUNTER — PATIENT OUTREACH (OUTPATIENT)
Dept: CARE COORDINATION | Facility: CLINIC | Age: 37
End: 2022-09-30

## 2022-09-30 NOTE — PROGRESS NOTES
Social Work Follow-Up Encounter Visit  Oncology Clinic    Data/Intervention:  Patient Name:  Suni Zuluaga  /Age:  1985 (37 year old)    Reason for Follow-Up: social supports    Collaborated With:    -patient    Resources Provided:  Pay it Forward  Barb - phone number  [facility] phone [facility]  Website provided for 30 day [facility]  Referral sent to Financial worker for Rola care application      Assessment:  SW covering for [facility] followed up on referral from patient's care team regarding request for supportive resources. SW introduced themselves and stated reason for call. Patient discussed how they were coping with their diagnosis and treatment. Patient expressed their interest in learning about resources available to them. Patient stated they would like to know about resources that could help with financial assistance. SW reviewed the above resources with patient. Patient asked for resource information to follow up with at their convenience. SW provided patient with the above resource information. SW discussed rola care program and placed a referral with a financial worker to assist patient with rola care application, per patient's request. SW reviewed their role and other supports available to patient. Patient identified no further needs at this time and agreed to follow up as needed. SW provided patient with contact information for [facility]ley as their primary .      Plan:  Previously provided patient/family with writer's contact information and availability.   SW will remain available as needed for on going supports while Brett Baker remains out of office.     OC Fuller  - Oncology  Phone : [phone]  Pager: [phone]

## 2022-10-06 ENCOUNTER — TELEPHONE (OUTPATIENT)
Dept: ONCOLOGY | Facility: CLINIC | Age: 37
End: 2022-10-06

## 2022-10-06 ENCOUNTER — ANCILLARY PROCEDURE (OUTPATIENT)
Dept: PET IMAGING | Facility: CLINIC | Age: 37
End: 2022-10-06
Attending: OBSTETRICS & GYNECOLOGY
Payer: COMMERCIAL

## 2022-10-06 DIAGNOSIS — C53.9 SQUAMOUS CELL CARCINOMA OF CERVIX (H): ICD-10-CM

## 2022-10-06 LAB
CREAT BLD-MCNC: 0.7 MG/DL (ref 0.5–1.2)
GFR SERPL CREATININE-BSD FRML MDRD: >90 ML/MIN/{1.73_M2}
GLUCOSE SERPL-MCNC: 99 MG/DL (ref 70–99)

## 2022-10-06 RX ORDER — IOPAMIDOL 755 MG/ML
50-125 INJECTION, SOLUTION INTRAVASCULAR ONCE
Status: COMPLETED | OUTPATIENT
Start: 2022-10-06 | End: 2022-10-06

## 2022-10-06 RX ORDER — DIPHENHYDRAMINE HCL 25 MG
50 CAPSULE ORAL ONCE
Status: CANCELLED
Start: 2022-10-12

## 2022-10-06 RX ORDER — DIPHENHYDRAMINE HYDROCHLORIDE 50 MG/ML
50 INJECTION INTRAMUSCULAR; INTRAVENOUS
Status: CANCELLED
Start: 2022-10-12

## 2022-10-06 RX ORDER — HEPARIN SODIUM,PORCINE 10 UNIT/ML
5 VIAL (ML) INTRAVENOUS
Status: CANCELLED | OUTPATIENT
Start: 2022-10-12

## 2022-10-06 RX ORDER — ALBUTEROL SULFATE 90 UG/1
1-2 AEROSOL, METERED RESPIRATORY (INHALATION)
Status: CANCELLED
Start: 2022-10-12

## 2022-10-06 RX ORDER — LORAZEPAM 2 MG/ML
0.5 INJECTION INTRAMUSCULAR EVERY 4 HOURS PRN
Status: CANCELLED | OUTPATIENT
Start: 2022-10-12

## 2022-10-06 RX ORDER — FUROSEMIDE 10 MG/ML
40 INJECTION INTRAMUSCULAR; INTRAVENOUS ONCE
Status: COMPLETED | OUTPATIENT
Start: 2022-10-06 | End: 2022-10-06

## 2022-10-06 RX ORDER — ALBUTEROL SULFATE 0.83 MG/ML
2.5 SOLUTION RESPIRATORY (INHALATION)
Status: CANCELLED | OUTPATIENT
Start: 2022-10-12

## 2022-10-06 RX ORDER — EPINEPHRINE 1 MG/ML
0.3 INJECTION, SOLUTION INTRAMUSCULAR; SUBCUTANEOUS EVERY 5 MIN PRN
Status: CANCELLED | OUTPATIENT
Start: 2022-10-12

## 2022-10-06 RX ORDER — MEPERIDINE HYDROCHLORIDE 25 MG/ML
25 INJECTION INTRAMUSCULAR; INTRAVENOUS; SUBCUTANEOUS EVERY 30 MIN PRN
Status: CANCELLED | OUTPATIENT
Start: 2022-10-12

## 2022-10-06 RX ORDER — METHYLPREDNISOLONE SODIUM SUCCINATE 125 MG/2ML
125 INJECTION, POWDER, LYOPHILIZED, FOR SOLUTION INTRAMUSCULAR; INTRAVENOUS
Status: CANCELLED
Start: 2022-10-12

## 2022-10-06 RX ORDER — HEPARIN SODIUM (PORCINE) LOCK FLUSH IV SOLN 100 UNIT/ML 100 UNIT/ML
5 SOLUTION INTRAVENOUS
Status: CANCELLED | OUTPATIENT
Start: 2022-10-12

## 2022-10-06 RX ORDER — PALONOSETRON 0.05 MG/ML
0.25 INJECTION, SOLUTION INTRAVENOUS ONCE
Status: CANCELLED | OUTPATIENT
Start: 2022-10-12

## 2022-10-06 RX ADMIN — FUROSEMIDE 40 MG: 10 INJECTION INTRAMUSCULAR; INTRAVENOUS at 09:04

## 2022-10-06 RX ADMIN — IOPAMIDOL 77 ML: 755 INJECTION, SOLUTION INTRAVASCULAR at 09:04

## 2022-10-06 NOTE — DISCHARGE INSTRUCTIONS

## 2022-10-06 NOTE — TELEPHONE ENCOUNTER
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 She is very symptomatic (edema and pain).     I recommend systemic palliative chemo. Would start with carbo/taxol/johny and add pembro if tumor is PDL1+ (will send off to [facility], I have also requested IHC locally).       Orders placed. Risks benefits and SE of chemo reviewed over the phone and I will also have her see me or an NP prior to cycle 1. Does not have port. OK to give IV for first cycle, plan port later if needed    Suzy Brambila MD  Gynecologic Oncology  Pager [phone]

## 2022-10-07 ENCOUNTER — PATIENT OUTREACH (OUTPATIENT)
Dept: ONCOLOGY | Facility: CLINIC | Age: 37
End: 2022-10-07

## 2022-10-07 NOTE — PROGRESS NOTES
Left message for Suni to call me back in regards to starting chemo treatment at the [facility] [place]  (Carbo/Taxol/Avatin).  Sent email to inquire about how to get her set up to stay at the [facility] while she is in town for chemotherapy at the [facility] 1-3 days  Every 3 weeks.    Thanks    Odalys Paniagua RN Care Coordinator  [facility]  Ph.[phone] Fax. [phone]

## 2022-10-07 NOTE — PROGRESS NOTES
Sent [employer] testing for biopsy from 9/28/2022 per Dr. Brambila' request.    Thanks    Odalys Paniagua RN Care Coordinator  [facility]  Ph.[phone] Fax. [phone]

## 2022-10-10 DIAGNOSIS — C53.9 MALIGNANT NEOPLASM OF CERVIX, UNSPECIFIED SITE (H): Primary | ICD-10-CM

## 2022-10-10 RX ORDER — OXYCODONE HYDROCHLORIDE 5 MG/1
5 TABLET ORAL EVERY 6 HOURS PRN
Qty: 30 TABLET | Refills: 0 | Status: SHIPPED | OUTPATIENT
Start: 2022-10-10 | End: 2022-10-13

## 2022-10-11 ENCOUNTER — TELEPHONE (OUTPATIENT)
Dept: ONCOLOGY | Facility: CLINIC | Age: 37
End: 2022-10-11

## 2022-10-11 NOTE — TELEPHONE ENCOUNTER
Left message for Suni that we were able to get her chemotherapy approved and scheduled for labs then infusion checking in at 0700 am on 10/13/2022 at [address], [facility].  I have also faxed over request for a 1 night stay at the [facility] for Thursday 10/13/22 night and emailed Suni the details including address and phone number for the [facility].    Thanks    Odalys Paniagua RN Care Coordinator  [facility]  Ph.[phone] Fax. [phone]

## 2022-10-12 LAB
PATH REPORT.ADDENDUM SPEC: ABNORMAL
PATH REPORT.ADDENDUM SPEC: ABNORMAL
PATH REPORT.COMMENTS IMP SPEC: ABNORMAL
PATH REPORT.COMMENTS IMP SPEC: YES
PATH REPORT.FINAL DX SPEC: ABNORMAL
PATH REPORT.GROSS SPEC: ABNORMAL
PATH REPORT.MICROSCOPIC SPEC OTHER STN: ABNORMAL
PATH REPORT.RELEVANT HX SPEC: ABNORMAL
PHOTO IMAGE: ABNORMAL

## 2022-10-12 RX ORDER — PROCHLORPERAZINE MALEATE 10 MG
10 TABLET ORAL EVERY 6 HOURS PRN
Qty: 30 TABLET | Refills: 2 | Status: SHIPPED | OUTPATIENT
Start: 2022-10-12 | End: 2023-05-08

## 2022-10-13 ENCOUNTER — APPOINTMENT (OUTPATIENT)
Dept: LAB | Facility: CLINIC | Age: 37
End: 2022-10-13
Attending: OBSTETRICS & GYNECOLOGY
Payer: COMMERCIAL

## 2022-10-13 ENCOUNTER — HOSPITAL ENCOUNTER (OUTPATIENT)
Dept: ULTRASOUND IMAGING | Facility: CLINIC | Age: 37
Discharge: HOME | End: 2022-10-13
Attending: OBSTETRICS & GYNECOLOGY
Payer: COMMERCIAL

## 2022-10-13 ENCOUNTER — INFUSION THERAPY VISIT (OUTPATIENT)
Dept: ONCOLOGY | Facility: CLINIC | Age: 37
End: 2022-10-13
Attending: OBSTETRICS & GYNECOLOGY
Payer: COMMERCIAL

## 2022-10-13 VITALS
RESPIRATION RATE: 16 BRPM | HEIGHT: 63 IN | TEMPERATURE: 98.3 F | BODY MASS INDEX: 24.7 KG/M2 | OXYGEN SATURATION: 94 % | DIASTOLIC BLOOD PRESSURE: 87 MMHG | HEART RATE: 103 BPM | SYSTOLIC BLOOD PRESSURE: 138 MMHG | WEIGHT: 139.4 LBS

## 2022-10-13 DIAGNOSIS — C53.8 MALIGNANT NEOPLASM OF OVERLAPPING SITES OF CERVIX (H): ICD-10-CM

## 2022-10-13 DIAGNOSIS — C53.8 MALIGNANT NEOPLASM OF OVERLAPPING SITES OF CERVIX (H): Primary | ICD-10-CM

## 2022-10-13 DIAGNOSIS — G89.3 CANCER ASSOCIATED PAIN: ICD-10-CM

## 2022-10-13 LAB
ALBUMIN SERPL BCG-MCNC: 3.9 G/DL (ref 3.5–5.2)
ALBUMIN UR-MCNC: NEGATIVE MG/DL
ALP SERPL-CCNC: 74 U/L (ref 35–104)
ALT SERPL W P-5'-P-CCNC: 11 U/L (ref 10–35)
ANION GAP SERPL CALCULATED.3IONS-SCNC: 10 MMOL/L (ref 7–15)
AST SERPL W P-5'-P-CCNC: 17 U/L (ref 10–35)
BASOPHILS # BLD AUTO: 0 10E3/UL (ref 0–0.2)
BASOPHILS NFR BLD AUTO: 1 %
BILIRUB SERPL-MCNC: <0.2 MG/DL
BUN SERPL-MCNC: 14.8 MG/DL (ref 6–20)
CALCIUM SERPL-MCNC: 9.7 MG/DL (ref 8.6–10)
CHLORIDE SERPL-SCNC: 103 MMOL/L (ref 98–107)
CREAT SERPL-MCNC: 0.58 MG/DL (ref 0.51–0.95)
DEPRECATED HCO3 PLAS-SCNC: 25 MMOL/L (ref 22–29)
EOSINOPHIL # BLD AUTO: 0.1 10E3/UL (ref 0–0.7)
EOSINOPHIL NFR BLD AUTO: 1 %
ERYTHROCYTE [DISTWIDTH] IN BLOOD BY AUTOMATED COUNT: 13.8 % (ref 10–15)
GFR SERPL CREATININE-BSD FRML MDRD: >90 ML/MIN/1.73M2
GLUCOSE SERPL-MCNC: 111 MG/DL (ref 70–99)
HCT VFR BLD AUTO: 29.7 % (ref 35–47)
HGB BLD-MCNC: 9.7 G/DL (ref 11.7–15.7)
IMM GRANULOCYTES # BLD: 0 10E3/UL
IMM GRANULOCYTES NFR BLD: 0 %
LYMPHOCYTES # BLD AUTO: 0.8 10E3/UL (ref 0.8–5.3)
LYMPHOCYTES NFR BLD AUTO: 11 %
MAGNESIUM SERPL-MCNC: 1.6 MG/DL (ref 1.7–2.3)
MCH RBC QN AUTO: 29.8 PG (ref 26.5–33)
MCHC RBC AUTO-ENTMCNC: 32.7 G/DL (ref 31.5–36.5)
MCV RBC AUTO: 91 FL (ref 78–100)
MONOCYTES # BLD AUTO: 0.6 10E3/UL (ref 0–1.3)
MONOCYTES NFR BLD AUTO: 7 %
NEUTROPHILS # BLD AUTO: 6.1 10E3/UL (ref 1.6–8.3)
NEUTROPHILS NFR BLD AUTO: 80 %
NRBC # BLD AUTO: 0 10E3/UL
NRBC BLD AUTO-RTO: 0 /100
PLATELET # BLD AUTO: 313 10E3/UL (ref 150–450)
POTASSIUM SERPL-SCNC: 3.8 MMOL/L (ref 3.4–5.3)
PROT SERPL-MCNC: 7.2 G/DL (ref 6.4–8.3)
RBC # BLD AUTO: 3.25 10E6/UL (ref 3.8–5.2)
SODIUM SERPL-SCNC: 138 MMOL/L (ref 136–145)
WBC # BLD AUTO: 7.6 10E3/UL (ref 4–11)

## 2022-10-13 PROCEDURE — 96375 TX/PRO/DX INJ NEW DRUG ADDON: CPT

## 2022-10-13 PROCEDURE — 36415 COLL VENOUS BLD VENIPUNCTURE: CPT | Performed by: OBSTETRICS & GYNECOLOGY

## 2022-10-13 PROCEDURE — 96417 CHEMO IV INFUS EACH ADDL SEQ: CPT

## 2022-10-13 PROCEDURE — 250N000011 HC RX IP 250 OP 636: Performed by: OBSTETRICS & GYNECOLOGY

## 2022-10-13 PROCEDURE — 81003 URINALYSIS AUTO W/O SCOPE: CPT | Performed by: OBSTETRICS & GYNECOLOGY

## 2022-10-13 PROCEDURE — 250N000013 HC RX MED GY IP 250 OP 250 PS 637: Performed by: OBSTETRICS & GYNECOLOGY

## 2022-10-13 PROCEDURE — 96413 CHEMO IV INFUSION 1 HR: CPT

## 2022-10-13 PROCEDURE — 85025 COMPLETE CBC W/AUTO DIFF WBC: CPT | Performed by: OBSTETRICS & GYNECOLOGY

## 2022-10-13 PROCEDURE — 83735 ASSAY OF MAGNESIUM: CPT | Performed by: OBSTETRICS & GYNECOLOGY

## 2022-10-13 PROCEDURE — 93970 EXTREMITY STUDY: CPT | Mod: 26 | Performed by: RADIOLOGY

## 2022-10-13 PROCEDURE — 258N000003 HC RX IP 258 OP 636: Performed by: OBSTETRICS & GYNECOLOGY

## 2022-10-13 PROCEDURE — 99215 OFFICE O/P EST HI 40 MIN: CPT | Performed by: OBSTETRICS & GYNECOLOGY

## 2022-10-13 PROCEDURE — 96415 CHEMO IV INFUSION ADDL HR: CPT

## 2022-10-13 PROCEDURE — 93970 EXTREMITY STUDY: CPT

## 2022-10-13 PROCEDURE — 80053 COMPREHEN METABOLIC PANEL: CPT | Performed by: OBSTETRICS & GYNECOLOGY

## 2022-10-13 PROCEDURE — 96367 TX/PROPH/DG ADDL SEQ IV INF: CPT

## 2022-10-13 RX ORDER — FUROSEMIDE 20 MG
20 TABLET ORAL ONCE
Status: COMPLETED | OUTPATIENT
Start: 2022-10-13 | End: 2022-10-13

## 2022-10-13 RX ORDER — POLYETHYLENE GLYCOL 3350 17 G/17G
1 POWDER, FOR SOLUTION ORAL DAILY
Qty: 850 G | Refills: 3 | Status: SHIPPED | OUTPATIENT
Start: 2022-10-13 | End: 2023-05-03

## 2022-10-13 RX ORDER — PALONOSETRON 0.05 MG/ML
0.25 INJECTION, SOLUTION INTRAVENOUS ONCE
Status: COMPLETED | OUTPATIENT
Start: 2022-10-13 | End: 2022-10-13

## 2022-10-13 RX ORDER — ACETAMINOPHEN 500 MG
1000 TABLET ORAL 3 TIMES DAILY PRN
Qty: 90 TABLET | Refills: 3 | Status: SHIPPED | OUTPATIENT
Start: 2022-10-13 | End: 2023-05-08

## 2022-10-13 RX ORDER — SENNA AND DOCUSATE SODIUM 50; 8.6 MG/1; MG/1
1 TABLET, FILM COATED ORAL AT BEDTIME
Qty: 60 TABLET | Refills: 3 | Status: SHIPPED | OUTPATIENT
Start: 2022-10-13 | End: 2023-05-08

## 2022-10-13 RX ADMIN — SODIUM CHLORIDE 900 MG: 9 INJECTION, SOLUTION INTRAVENOUS at 13:24

## 2022-10-13 RX ADMIN — FUROSEMIDE 20 MG: 20 TABLET ORAL at 11:00

## 2022-10-13 RX ADMIN — DIPHENHYDRAMINE HYDROCHLORIDE 50 MG: 50 INJECTION, SOLUTION INTRAMUSCULAR; INTRAVENOUS at 08:20

## 2022-10-13 RX ADMIN — FAMOTIDINE 20 MG: 10 INJECTION INTRAVENOUS at 08:17

## 2022-10-13 RX ADMIN — CARBOPLATIN 850 MG: 10 INJECTION, SOLUTION INTRAVENOUS at 12:30

## 2022-10-13 RX ADMIN — PACLITAXEL 284 MG: 6 INJECTION, SOLUTION INTRAVENOUS at 09:29

## 2022-10-13 RX ADMIN — DEXAMETHASONE SODIUM PHOSPHATE: 10 INJECTION, SOLUTION INTRAMUSCULAR; INTRAVENOUS at 08:42

## 2022-10-13 RX ADMIN — SODIUM CHLORIDE 250 ML: 9 INJECTION, SOLUTION INTRAVENOUS at 08:14

## 2022-10-13 RX ADMIN — PALONOSETRON HYDROCHLORIDE 0.25 MG: 0.25 INJECTION INTRAVENOUS at 08:15

## 2022-10-13 ASSESSMENT — PAIN SCALES - GENERAL: PAINLEVEL: WORST PAIN (10)

## 2022-10-13 NOTE — LETTER
10/13/2022         RE: Suni Zuluaga  [address]  [place] [address]        Dear Colleague,    Thank you for referring your patient, Suni Zuluaga, to the [facility]. Please see a copy of my visit note below.                   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  She is here today for follow up and disease management.       Treatment History:   2015:HSIL Pap at the beginning of pregnancy. Repeat pap postpartum was ASCUS. Colposcopy was negative.   20: Pelvic US. Uterine fibroids, normal appearance of uterus and ovaries  2020: Pap smear which confirmed squamous cell carcinoma of the cervix. Referred to GYN  ONC  2020: GYN ONC Consult [facility]. Cervical biopsy, RADHA III, unable to identify invasion  2020: Repeat cervical biopsy confirms invasive SCC  2020: Started primary chemoRT for locally advanced cervical cancer. Stage IIIC1(r), large primary tumor with bilateral parametrial invasion and clinical evidence of posterior vaginal involvement. Pelvic lymphadenopathy involving external iliac, perirectal and internal iliac nodes.   20-10/30/20: ChemoRT.   20-20: T&R brachytherapy  2020: Followup with radonc (Dr Hernández)   22: CT A/P with nonspecific increased density in the para-aortic region, which could be seen in the setting of retroperitoneal fibrosis, inflammation, or potentially lymphoma/desmoplastic reaction. New varix from right renal vein extending down into the RLQ.   22: CT abdomen retroperitoneal bx   Final Diagnosis  A and B.  Soft tissue, right retroperitoneum and retroperitoneum, CT-guided needle biopsies:  -Metastatic squamous cell  "carcinoma.  Addendum 2  CPS score is 30-40.     RESULT FOR IMMUNOHISTOCHEMICAL VENTANA CLONE  PD-L1 ASSAY  TUMOR PROPORTION SCORE (TPS):  30%  INTERPRETATION: LOW PD-L1 EXPRESSION (TPS >/=1-49%)    10/6/22: PET CT Whole body: IMPRESSION: This patient with history of squamous cell carcinoma of  the cervix there is evidence of disease progression:  1. Large 5 cm right retroperitoneal mass previously biopsied as  metastatic squamous cell carcinoma demonstrates FDG avidity. No  definite significant disease within the pelvis.  2. New metastatic nodes within the retroperitoneum and mediastinum.  3. 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  4. Diffusely increased FDG avidity throughout the terminal ileum and  large intestine without associated mass lesion may represent vascular  congestion or diffuse colitis.  5. Diffuse soft tissue edema.    10/6/22 plan: carbo/taxol/johny and add pembro if tumor is PDL1+ (will send off to [facility], I have also requested IHC locally).       10/13/22: Cycle #1 Paclitaxel 175 mg/m2, Carboplatin AUC 6, Bevacizumab 15 mg/m2              Suni was seen today in infusion. Pt reports a bowel movement yesterday but chronic constipation. She is taking a softener daily but is unsure which kind. Feels abdominal bloating and discomfort. Lower leg swelling ongoing for a few weeks. R>L. No skin breakdown. Feels that pelvis is swollen. Appeitte is poor. Working on increasing protein shakes. Eating mac and cheese and ramen noodles. Not interested in nutrition referral. She denies any vaginal bleeding, no changes in her bladder habits, no nausea/emesis, and no difficulties sleeping. She denies  fevers or chills, and no chest pain or shortness of breath. Taking Oxycodone 5 mg nightly. Taking Tylenol 500 my a few times per day. Using hot and cold packs. Has \"some\"\" relief with this regimen. She would like to " speak with palliative care, . She would like a wig.                 Review of Systems:    Systemic           + weight changes; no fever; no chills; no night sweats; + appetite changes  Skin           no rashes, or lesions  Eye           no irritation; no changes in vision  Sunil-Laryngeal           no dysphagia; no hoarseness   Pulmonary    no cough; no shortness of breath  Cardiovascular    no chest pain; no palpitations  Gastrointestinal    no diarrhea; + constipation; + abdominal pain; no changes in bowel  habits; no blood in stool  Genitourinary   no urinary frequency; no urinary urgency; no dysuria; no pain; no abnormal vaginal discharge; no abnormal vaginal bleeding  Breast    no breast discharge; no breast changes; no breast pain  Musculoskeletal    no myalgias; no arthralgias; no back pain  Psychiatric           no depressed mood; no anxiety    Hematologic           no tender lymph nodes; + noticeable swellings or lumps   Endocrine    no hot flashes; no heat/cold intolerance         Neurological   no tremor; no numbness and tingling; no headaches; no difficulty  sleeping      Past Medical History:    Past Medical History:   Diagnosis Date     Cervical cancer (H)          Past Surgical History:    Past Surgical History:   Procedure Laterality Date     CARPAL TUNNEL RELEASE RT/LT Right      TUBAL LIGATION           Health Maintenance Due   Topic Date Due     NICOTINE/TOBACCO CESSATION COUNSELING Q 1 YR  Never done     YEARLY PREVENTIVE VISIT  Never done     ADVANCE CARE PLANNING  Never done     HEPATITIS B IMMUNIZATION (1 of 3 - 3-dose series) Never done     COVID-19 Vaccine (1) Never done     Pneumococcal Vaccine: Pediatrics (0 to 5 Years) and At-Risk Patients (6 to 64 Years) (1 - PCV) Never done     HEPATITIS C SCREENING  Never done     PHQ-2 (once per calendar year)  Never done     INFLUENZA VACCINE (1) 09/01/2022       Current Medications:     Current Outpatient Medications   Medication Sig  Dispense Refill     acetaminophen (TYLENOL) 500 MG tablet Take 2 tablets (1,000 mg) by mouth 3 times daily as needed for mild pain 90 tablet 3     polyethylene glycol (MIRALAX) 17 GM/Dose powder Take 17 g (1 capful) by mouth daily 850 g 3     SENNA-docusate sodium (SENNA S) 8.6-50 MG tablet Take 1 tablet by mouth At Bedtime Can increase up to two tablets twice per day. 60 tablet 3     acetaminophen (TYLENOL) 500 MG tablet Take 500-1,000 mg by mouth every 6 hours as needed for mild pain       ibuprofen (ADVIL/MOTRIN) 600 MG tablet Take 600 mg by mouth every 6 hours as needed for moderate pain       oxyCODONE (ROXICODONE) 5 MG tablet Take 1 tablet (5 mg) by mouth every 6 hours as needed for pain 30 tablet 0     prochlorperazine (COMPAZINE) 10 MG tablet Take 1 tablet (10 mg) by mouth every 6 hours as needed for nausea or vomiting (Patient not taking: Reported on 10/13/2022) 30 tablet 2         Allergies:        Allergies   Allergen Reactions     Clindamycin Hives        Social History:     Social History     Tobacco Use     Smoking status: Every Day     Packs/day: 1.00     Types: Cigarettes     Smokeless tobacco: Never     Tobacco comments:     9/11/2020 1/2 pack/day, tying tp quit   Substance Use Topics     Alcohol use: Yes     Comment: occ.       History   Drug Use Unknown         Family History:     The patient's family history is notable for     Family History   Problem Relation Age of Onset     Breast Cancer Mother         8/2020 newly dx         Physical Exam:     There were no vitals taken for this visit.  There is no height or weight on file to calculate BMI.    General Appearance: alert, no distress     HEENT: no thyromegaly, no palpable nodules or masses     Musculoskeletal: extremities non tender; bilateral LE edema 2-3+ R>L no skin breakdown or erythema     Skin: no lesions or rashes     Neurological: normal gait, no gross defects     Psychiatric: appropriate mood and affect                                Hematological: normal cervical, supraclavicular and inguinal lymph nodes     Gastrointestinal:       abdomen soft, tender to palpation throughout lower abdomen, non-distended, no organomegaly or masses    Genitourinary: Deferred        Assessment:    Suni Zuluaga is a 37 year old woman with 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  She is here today for follow up and disease management.      50 minutes spent on the date of the encounter doing chart review, history and exam, documentation, and further activities as noted above.          Plan:     1.)        Ok to proceed with planned treatment given labs have met parameters. MD to sign cycle #1. Encouraged 5-6 small high protein meals a day and discussed nausea management and control. Encouraged 64 oz of fluids per day. Discussed neutropenic precautions and karen period. Reviewed signs and symptoms for when she should contact the clinic or seek additional care. Patient to contact the clinic with any questions or concerns in the interim. Pt has gynonc follow up scheduled.     2.)       Treatment/disease related side effects:    -Poor appetite: reviewed spacing meals, high protein high calorie foods. Offered nutrition consult however pt declined at this time.    -Bilateral lower leg edema R>L: Stat bilateral doppler US today scheduled. Gyn oncology residents to watch for results. One time dose PO 20 mg lasix to be given per Dr. Brambila orders. Stat lymphedema referral for [facility] or [facility]. Message sent to Odalys Paniagua to help coordinate this. Weight gain of 9 pounds likely related to lymphedema. Will continue to follow weight trends.    -Cancer related pain: oxycodone 5 mg PO 30 tablets prescribed along with Tylenol 1000 mg TID prn. Refer to Palliative care for video visit today.    -Constipation: Senna S and Miralax rx today for daily use. Increase PO fluids reviewed.     -Alopecia: to occur 3-4 weeks after first dose of Taxol. DME for cranial prosthesis provided today.    -Social/financial concerns: referral to  placed today       3.) Genetic risk factors were assessed and the patient does not meet the qualifications for a referral.      4.) Labs and/or tests ordered include:  CBC, CMP, Mag, protein urine reviewed.      5.) Health maintenance issues addressed today include  annual health maintenance and non-gynecologic issues with PCP.            LAURA Gilliam, NP-BC  Women's Health Nurse Practitioner  Division of Gynecologic Oncology  [facility]      CC  Patient Care Team:  Suzy Brambila MD as PCP - General (Gynecologic Oncology)  Kenneth Shahid as Referring Physician  Liliam Paniagua, RN as Specialty Care Coordinator (Hematology & Oncology)       no

## 2022-10-13 NOTE — PATIENT INSTRUCTIONS
[facility] Main Line: [phone]    Call triage nurse with chills and/or temperature greater than or equal to 100.4, uncontrolled nausea/vomiting, diarrhea, constipation, dizziness, shortness of breath, chest pain, bleeding, unexplained bruising, or any new/concerning symptoms, questions/concerns.   If you are having any concerning symptoms or wish to speak to a provider before your next infusion visit, please call your care coordinator or triage to notify them so we can adequately serve you.   Triage Nurse Line: [phone]    If after hours, weekends, or holidays [phone]

## 2022-10-13 NOTE — NURSING NOTE
Chief Complaint   Patient presents with     Blood Draw     Labs drawn via piv start by RN. Vitals taken.     Labs drawn from PIV placed by RN. Line flushed with saline. Vitals taken. Pt checked in for appointment(s).    Sun Hong RN

## 2022-10-13 NOTE — PROGRESS NOTES
Infusion Nursing Note:  Suni Zuluaga presents today for Cycle 1 Day 1 Taxol, Carboplatin, Avastin.    Patient seen by provider today: No   present during visit today: Not Applicable.    Note: New Regimen.  Last received Cisplat in 2020 x 5 doses.  Reports not receiving any chemotherapy teaching on today's regimen.  I have printed information for her and pharmacist Fausto Qureshi did speak with Suni about her chemo drugs.  Compazine was sent to local pharmacy which she was unaware of.  Requested pharmacist to discuss compazine with her as well (per patient request).    Her weight is up 9 pounds since last checked here on 9/28/22.  She does say that her lower extremity edema (from hip down) is worse, right > left.  She tells me this is from her mass.    I did discuss increased weight and swelling with Dr. Brambila who is going to arrange for LAURENCE to see her today or tomorrow (patient is leaving for back home today so Dr. Brambila was updated on that).      Pain is rated at a 10 (right hip area and leg).  She said she takes Oxycodone at night to help with sleep and then uses tylenol or ibuprofen during the day.    Jaqueline Simental LAURENCE will see patient while in infusion this morning.    Lasix 20mg po given x1  To have LE U/S after chemo today (on [place])    I have provided a copy of the DME for Hair Prosthesis to patient.    Intravenous Access:  Peripheral IV placed.    Treatment Conditions:  Lab Results   Component Value Date    HGB 9.7 (L) 10/13/2022    WBC 7.6 10/13/2022    ANEU 2.5 10/27/2020    ANEUTAUTO 6.1 10/13/2022     10/13/2022      Lab Results   Component Value Date     10/13/2022    POTASSIUM 3.8 10/13/2022    MAG 1.6 (L) 10/13/2022    CR 0.58 10/13/2022    HARRISON 9.7 10/13/2022    BILITOTAL <0.2 10/13/2022    ALBUMIN 3.9 10/13/2022    ALT 11 10/13/2022    AST 17 10/13/2022     Results reviewed, labs MET treatment parameters, ok to proceed with treatment.    Post Infusion  Assessment:  Patient tolerated infusion without incident.  Blood return noted pre and post infusion.  Site patent and intact, free from redness, edema or discomfort.  No evidence of extravasations.  Access discontinued per protocol.     Discharge Plan:   Prescription refills given for (Compazine to local pharmacy yesterday).  Copy of AVS reviewed with patient and/or family.  Patient will return 11/3/22 labs/LAURENCE/infusion for next appointment.  Patient discharged in stable condition accompanied by: friend Melva.  Departure Mode: Wheelchair.      Nuris Lewis RN

## 2022-10-13 NOTE — PROGRESS NOTES
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  She is here today for follow up and disease management.       Treatment History:   2015:HSIL Pap at the beginning of pregnancy. Repeat pap postpartum was ASCUS. Colposcopy was negative.   20: Pelvic US. Uterine fibroids, normal appearance of uterus and ovaries  2020: Pap smear which confirmed squamous cell carcinoma of the cervix. Referred to GYN  ONC  2020: GYN ONC Consult [facility]. Cervical biopsy, RADHA III, unable to identify invasion  2020: Repeat cervical biopsy confirms invasive SCC  2020: Started primary chemoRT for locally advanced cervical cancer. Stage IIIC1(r), large primary tumor with bilateral parametrial invasion and clinical evidence of posterior vaginal involvement. Pelvic lymphadenopathy involving external iliac, perirectal and internal iliac nodes.   20-10/30/20: ChemoRT.   20-20: T&R brachytherapy  2020: Followup with radonc (Dr Hernández)   22: CT A/P with nonspecific increased density in the para-aortic region, which could be seen in the setting of retroperitoneal fibrosis, inflammation, or potentially lymphoma/desmoplastic reaction. New varix from right renal vein extending down into the RLQ.   22: CT abdomen retroperitoneal bx   Final Diagnosis  A and B.  Soft tissue, right retroperitoneum and retroperitoneum, CT-guided needle biopsies:  -Metastatic squamous cell carcinoma.  Addendum 2  CPS score is 30-40.     RESULT FOR IMMUNOHISTOCHEMICAL VENTANA CLONE  PD-L1 ASSAY  TUMOR PROPORTION SCORE (TPS):  30%  INTERPRETATION: LOW PD-L1 EXPRESSION (TPS >/=1-49%)    10/6/22: PET CT Whole body: IMPRESSION: This patient with history of squamous  "cell carcinoma of  the cervix there is evidence of disease progression:  1. Large 5 cm right retroperitoneal mass previously biopsied as  metastatic squamous cell carcinoma demonstrates FDG avidity. No  definite significant disease within the pelvis.  2. New metastatic nodes within the retroperitoneum and mediastinum.  3. 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  4. Diffusely increased FDG avidity throughout the terminal ileum and  large intestine without associated mass lesion may represent vascular  congestion or diffuse colitis.  5. Diffuse soft tissue edema.    10/6/22 plan: carbo/taxol/johny and add pembro if tumor is PDL1+ (will send off to [facility], I have also requested IHC locally).       10/13/22: Cycle #1 Paclitaxel 175 mg/m2, Carboplatin AUC 6, Bevacizumab 15 mg/m2              Suni was seen today in infusion. Pt reports a bowel movement yesterday but chronic constipation. She is taking a softener daily but is unsure which kind. Feels abdominal bloating and discomfort. Lower leg swelling ongoing for a few weeks. R>L. No skin breakdown. Feels that pelvis is swollen. Appeitte is poor. Working on increasing protein shakes. Eating mac and cheese and ramen noodles. Not interested in nutrition referral. She denies any vaginal bleeding, no changes in her bladder habits, no nausea/emesis, and no difficulties sleeping. She denies  fevers or chills, and no chest pain or shortness of breath. Taking Oxycodone 5 mg nightly. Taking Tylenol 500 my a few times per day. Using hot and cold packs. Has \"some\"\" relief with this regimen. She would like to speak with palliative care, . She would like a wig.                 Review of Systems:    Systemic           + weight changes; no fever; no chills; no night sweats; + appetite changes  Skin           no rashes, or lesions  Eye           no irritation; no changes in " vision  Sunil-Laryngeal           no dysphagia; no hoarseness   Pulmonary    no cough; no shortness of breath  Cardiovascular    no chest pain; no palpitations  Gastrointestinal    no diarrhea; + constipation; + abdominal pain; no changes in bowel  habits; no blood in stool  Genitourinary   no urinary frequency; no urinary urgency; no dysuria; no pain; no abnormal vaginal discharge; no abnormal vaginal bleeding  Breast    no breast discharge; no breast changes; no breast pain  Musculoskeletal    no myalgias; no arthralgias; no back pain  Psychiatric           no depressed mood; no anxiety    Hematologic           no tender lymph nodes; + noticeable swellings or lumps   Endocrine    no hot flashes; no heat/cold intolerance         Neurological   no tremor; no numbness and tingling; no headaches; no difficulty  sleeping      Past Medical History:    Past Medical History:   Diagnosis Date     Cervical cancer (H)          Past Surgical History:    Past Surgical History:   Procedure Laterality Date     CARPAL TUNNEL RELEASE RT/LT Right      TUBAL LIGATION           Health Maintenance Due   Topic Date Due     NICOTINE/TOBACCO CESSATION COUNSELING Q 1 YR  Never done     YEARLY PREVENTIVE VISIT  Never done     ADVANCE CARE PLANNING  Never done     HEPATITIS B IMMUNIZATION (1 of 3 - 3-dose series) Never done     COVID-19 Vaccine (1) Never done     Pneumococcal Vaccine: Pediatrics (0 to 5 Years) and At-Risk Patients (6 to 64 Years) (1 - PCV) Never done     HEPATITIS C SCREENING  Never done     PHQ-2 (once per calendar year)  Never done     INFLUENZA VACCINE (1) 09/01/2022       Current Medications:     Current Outpatient Medications   Medication Sig Dispense Refill     acetaminophen (TYLENOL) 500 MG tablet Take 2 tablets (1,000 mg) by mouth 3 times daily as needed for mild pain 90 tablet 3     polyethylene glycol (MIRALAX) 17 GM/Dose powder Take 17 g (1 capful) by mouth daily 850 g 3     SENNA-docusate sodium (SENNA S) 8.6-50  MG tablet Take 1 tablet by mouth At Bedtime Can increase up to two tablets twice per day. 60 tablet 3     acetaminophen (TYLENOL) 500 MG tablet Take 500-1,000 mg by mouth every 6 hours as needed for mild pain       ibuprofen (ADVIL/MOTRIN) 600 MG tablet Take 600 mg by mouth every 6 hours as needed for moderate pain       oxyCODONE (ROXICODONE) 5 MG tablet Take 1 tablet (5 mg) by mouth every 6 hours as needed for pain 30 tablet 0     prochlorperazine (COMPAZINE) 10 MG tablet Take 1 tablet (10 mg) by mouth every 6 hours as needed for nausea or vomiting (Patient not taking: Reported on 10/13/2022) 30 tablet 2         Allergies:        Allergies   Allergen Reactions     Clindamycin Hives        Social History:     Social History     Tobacco Use     Smoking status: Every Day     Packs/day: 1.00     Types: Cigarettes     Smokeless tobacco: Never     Tobacco comments:     9/11/2020 1/2 pack/day, tying tp quit   Substance Use Topics     Alcohol use: Yes     Comment: occ.       History   Drug Use Unknown         Family History:     The patient's family history is notable for     Family History   Problem Relation Age of Onset     Breast Cancer Mother         8/2020 newly dx         Physical Exam:     There were no vitals taken for this visit.  There is no height or weight on file to calculate BMI.    General Appearance: alert, no distress     HEENT: no thyromegaly, no palpable nodules or masses     Musculoskeletal: extremities non tender; bilateral LE edema 2-3+ R>L no skin breakdown or erythema     Skin: no lesions or rashes     Neurological: normal gait, no gross defects     Psychiatric: appropriate mood and affect                               Hematological: normal cervical, supraclavicular and inguinal lymph nodes     Gastrointestinal:       abdomen soft, tender to palpation throughout lower abdomen, non-distended, no organomegaly or masses    Genitourinary: Deferred        Assessment:    Suni Zuluaga is a 37 year old  woman with 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  She is here today for follow up and disease management.      50 minutes spent on the date of the encounter doing chart review, history and exam, documentation, and further activities as noted above.          Plan:     1.)        Ok to proceed with planned treatment given labs have met parameters. MD to sign cycle #1. Encouraged 5-6 small high protein meals a day and discussed nausea management and control. Encouraged 64 oz of fluids per day. Discussed neutropenic precautions and karen period. Reviewed signs and symptoms for when she should contact the clinic or seek additional care. Patient to contact the clinic with any questions or concerns in the interim. Pt has gynonc follow up scheduled.     2.)       Treatment/disease related side effects:    -Poor appetite: reviewed spacing meals, high protein high calorie foods. Offered nutrition consult however pt declined at this time.    -Bilateral lower leg edema R>L: Stat bilateral doppler US today scheduled. Gyn oncology residents to watch for results. One time dose PO 20 mg lasix to be given per Dr. Brambila orders. Stat lymphedema referral for Darshan or  [facility]att. Message sent to Odalys Paniagua to help coordinate this. Weight gain of 9 pounds likely related to lymphedema. Will continue to follow weight trends.    -Cancer related pain: oxycodone 5 mg PO 30 tablets prescribed along with Tylenol 1000 mg TID prn. Refer to Palliative care for video visit today.    -Constipation: Senna S and Miralax rx today for daily use. Increase PO fluids reviewed.    -Alopecia: to occur 3-4 weeks after first dose of Taxol. DME for cranial prosthesis provided today.    -Social/financial concerns: referral to  placed today       3.) Genetic risk factors were assessed and the patient does not meet the qualifications for a referral.       4.) Labs and/or tests ordered include:  CBC, CMP, Mag, protein urine reviewed.      5.) Health maintenance issues addressed today include  annual health maintenance and non-gynecologic issues with PCP.            LAURA Gilliam, NP-BC  Women's Health Nurse Practitioner  Division of Gynecologic Oncology  [facility]      CC  Patient Care Team:  Suzy Brambila MD as PCP - General (Gynecologic Oncology)  Suzy Brambila MD as MD (Gynecologic Oncology)  Kenneth Shahid as Referring Physician  Suzy Brambila MD as Assigned Cancer Care Provider  Liliam Paniagua RN as Specialty Care Coordinator (Hematology & Oncology)  SELF, REFERRED

## 2022-10-14 ENCOUNTER — TELEPHONE (OUTPATIENT)
Dept: ONCOLOGY | Facility: CLINIC | Age: 37
End: 2022-10-14

## 2022-10-14 NOTE — TELEPHONE ENCOUNTER
----- Message from LAURA Stanton CNP sent at 10/14/2022  7:40 AM CDT -----  Negative for DVT thankfully! Encouraged lymphedema therapy with pt. Thanks for your help again!

## 2022-10-14 NOTE — TELEPHONE ENCOUNTER
Left message for Suni to let her know that the ultrasound of her legs were negative for any DVT/blood clots.  I did email her a copy of the referral placed for Lymphedema and encouraged her to reach out to get this set up.  Any question to call me back at [phone].    Thanks    Odalys Paniagua RN Care Coordinator  [facility]  Ph.[phone] Fax. [phone]

## 2022-10-16 LAB — BKR LAB AP ADDL TEST(S) ADDED: NORMAL

## 2022-10-19 ENCOUNTER — PATIENT OUTREACH (OUTPATIENT)
Dept: ONCOLOGY | Facility: CLINIC | Age: 37
End: 2022-10-19

## 2022-10-19 ENCOUNTER — DOCUMENTATION ONLY (OUTPATIENT)
Dept: SURGERY | Facility: CLINIC | Age: 37
End: 2022-10-19

## 2022-10-19 DIAGNOSIS — R11.2 NAUSEA WITH VOMITING: ICD-10-CM

## 2022-10-19 DIAGNOSIS — T45.1X5A CHEMOTHERAPY INDUCED NAUSEA AND VOMITING: ICD-10-CM

## 2022-10-19 DIAGNOSIS — C53.9 MALIGNANT NEOPLASM OF CERVIX, UNSPECIFIED SITE (H): Primary | ICD-10-CM

## 2022-10-19 DIAGNOSIS — R11.2 CHEMOTHERAPY INDUCED NAUSEA AND VOMITING: ICD-10-CM

## 2022-10-19 RX ORDER — EPINEPHRINE 1 MG/ML
0.3 INJECTION, SOLUTION INTRAMUSCULAR; SUBCUTANEOUS EVERY 5 MIN PRN
Status: CANCELLED | OUTPATIENT
Start: 2022-11-02

## 2022-10-19 RX ORDER — MEPERIDINE HYDROCHLORIDE 25 MG/ML
25 INJECTION INTRAMUSCULAR; INTRAVENOUS; SUBCUTANEOUS EVERY 30 MIN PRN
Status: CANCELLED | OUTPATIENT
Start: 2022-11-02

## 2022-10-19 RX ORDER — METHYLPREDNISOLONE SODIUM SUCCINATE 125 MG/2ML
125 INJECTION, POWDER, LYOPHILIZED, FOR SOLUTION INTRAMUSCULAR; INTRAVENOUS
Status: CANCELLED
Start: 2022-11-02

## 2022-10-19 RX ORDER — PALONOSETRON 0.05 MG/ML
0.25 INJECTION, SOLUTION INTRAVENOUS ONCE
Status: CANCELLED | OUTPATIENT
Start: 2022-11-02

## 2022-10-19 RX ORDER — ALBUTEROL SULFATE 0.83 MG/ML
2.5 SOLUTION RESPIRATORY (INHALATION)
Status: CANCELLED | OUTPATIENT
Start: 2022-11-02

## 2022-10-19 RX ORDER — ALBUTEROL SULFATE 90 UG/1
1-2 AEROSOL, METERED RESPIRATORY (INHALATION)
Status: CANCELLED
Start: 2022-11-02

## 2022-10-19 RX ORDER — DIPHENHYDRAMINE HCL 25 MG
50 CAPSULE ORAL ONCE
Status: CANCELLED
Start: 2022-11-02

## 2022-10-19 RX ORDER — HEPARIN SODIUM,PORCINE 10 UNIT/ML
5 VIAL (ML) INTRAVENOUS
Status: CANCELLED | OUTPATIENT
Start: 2022-11-02

## 2022-10-19 RX ORDER — ONDANSETRON 4 MG/1
8 TABLET, ORALLY DISINTEGRATING ORAL EVERY 8 HOURS PRN
Qty: 20 TABLET | Refills: 3 | Status: SHIPPED | OUTPATIENT
Start: 2022-10-19 | End: 2023-05-03

## 2022-10-19 RX ORDER — DIPHENHYDRAMINE HYDROCHLORIDE 50 MG/ML
50 INJECTION INTRAMUSCULAR; INTRAVENOUS
Status: CANCELLED
Start: 2022-11-02

## 2022-10-19 RX ORDER — HEPARIN SODIUM (PORCINE) LOCK FLUSH IV SOLN 100 UNIT/ML 100 UNIT/ML
5 SOLUTION INTRAVENOUS
Status: CANCELLED | OUTPATIENT
Start: 2022-11-02

## 2022-10-19 RX ORDER — LORAZEPAM 2 MG/ML
0.5 INJECTION INTRAMUSCULAR EVERY 4 HOURS PRN
Status: CANCELLED | OUTPATIENT
Start: 2022-11-02

## 2022-10-19 NOTE — PROGRESS NOTES
Note to chart    Patients' biopsy addended results returned as PDL1+ with a CPS of 30-40. I recommend adding pembrolizumab to carbo/taxol/avastin per Keynote 826 (now FDA aprroved)     Orders changed for cycle 2.     Suzy Brambila MD  Gynecologic Oncology  Pager [phone]

## 2022-10-19 NOTE — PROGRESS NOTES
Left message for Suni that sent in new anti nausea medication Zolfran to her pharmacy.  Also Dr. Brambila will add more antinausea medication to her treatment plan.  Will send her copy of her next infusion which is set for 11/4.    Thanks    Odalys Paniagua RN Care Coordinator  [facility]  Ph.[phone] Fax. [phone]

## 2022-10-21 ENCOUNTER — PATIENT OUTREACH (OUTPATIENT)
Dept: CARE COORDINATION | Facility: CLINIC | Age: 37
End: 2022-10-21

## 2022-10-21 NOTE — PROGRESS NOTES
Social Work Telephone Message Note  [facility]    Patient Name:  Suni Zuluaga  /Age:  1985 (37 year old)    Referral Source: [facility]  Reason for Referral:  Financial Concerns    Contacted patient via telephone on 10/21/22. SW unable to leave VM as phone kept ringing and ringing. SW will attempted to call patient again on Monday 10/24/22. SW will continue to remain available as needed.    JOELLE Crowley,LGSW  Hematology/Oncology Social Worker  Phone:[phone] Pager: [phone]

## 2022-10-24 ENCOUNTER — PATIENT OUTREACH (OUTPATIENT)
Dept: ONCOLOGY | Facility: CLINIC | Age: 37
End: 2022-10-24

## 2022-10-24 NOTE — PROGRESS NOTES
Left message for Suni to find out how her nausea is doing after new prescription.   Also wondering how the stay at the [facility] went after her first chemotherapy (staying 1 night sufficient or not). Requested call back with update [phone].    Thanks    Odalys Paniagua RN Care Coordinator  [facility]  Ph.[phone] Fax. [phone]

## 2022-11-02 ENCOUNTER — DOCUMENTATION ONLY (OUTPATIENT)
Dept: ONCOLOGY | Facility: CLINIC | Age: 37
End: 2022-11-02

## 2022-11-02 LAB — SPECIMEN STATUS: NORMAL

## 2022-11-02 NOTE — PROGRESS NOTES
Caris tumor testing reviewed   JAIRO, MMR proficient, TMB low, PDL1+ (CPS 5), PIK3CK mutation, TERT mutation      Suzy Brambila MD  Gynecologic Oncology  Pager [phone]

## 2022-11-02 NOTE — PROGRESS NOTES
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  She is here today for follow up and disease management.     Oncology History:  2015:HSIL Pap at the beginning of pregnancy. Repeat pap postpartum was ASCUS. Colposcopy was negative.   20: Pelvic US. Uterine fibroids, normal appearance of uterus and ovaries  2020: Pap smear which confirmed squamous cell carcinoma of the cervix. Referred to GYN  ONC  2020: GYN ONC Consult [facility]. Cervical biopsy, RADHA III, unable to identify invasion  2020: Repeat cervical biopsy confirms invasive SCC  2020: Started primary chemoRT for locally advanced cervical cancer. Stage IIIC1(r), large primary tumor with bilateral parametrial invasion and clinical evidence of posterior vaginal involvement. Pelvic lymphadenopathy involving external iliac, perirectal and internal iliac nodes.   20-10/30/20: ChemoRT.   20-20: T&R brachytherapy  2020: Followup with radonc (Dr Hernández)   22: CT A/P with nonspecific increased density in the para-aortic region, which could be seen in the setting of retroperitoneal fibrosis, inflammation, or potentially lymphoma/desmoplastic reaction. New varix from right renal vein extending down into the RLQ.   22: CT abdomen retroperitoneal bx   Final Diagnosis  A and B.  Soft tissue, right retroperitoneum and retroperitoneum, CT-guided needle biopsies:  -Metastatic squamous cell carcinoma.  Addendum 2  CPS score is 30-40.     RESULT FOR IMMUNOHISTOCHEMICAL VENTANA CLONE  PD-L1 ASSAY  TUMOR PROPORTION SCORE (TPS):  30%  INTERPRETATION: LOW PD-L1 EXPRESSION (TPS >/=1-49%)     10/6/22: PET CT Whole body: IMPRESSION: This patient with history of squamous cell carcinoma of  the  cervix there is evidence of disease progression:  1. Large 5 cm right retroperitoneal mass previously biopsied as  metastatic squamous cell carcinoma demonstrates FDG avidity. No  definite significant disease within the pelvis.  2. New metastatic nodes within the retroperitoneum and mediastinum.  3. 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  4. Diffusely increased FDG avidity throughout the terminal ileum and  large intestine without associated mass lesion may represent vascular  congestion or diffuse colitis.  5. Diffuse soft tissue edema.     Plan: Paclitaxel 175 mg/m2, Carboplatin AUC 6, Bevacizumab 15 mg/m2, and pembrolizumab 200 mg added due to PD-L1 expression every 3 weeks.      10/13/22: Cycle 1 paclitaxel, carboplatin, and bevacizumab.  11/4/22: Cycle 2 paclitaxel, carboplatin, and bevacizumab. Pembrolizumab on hold while awaiting insurance coverage.                  Today she comes to clinic feeling well overall and is without concern.  She reports that her leg swelling, pain, bloating, constipation have all improved significantly or completely resolved.  Her leg swelling worsens during the day but overall is very minimal and much improved since her last visit.  She has not been contacted for lymphedema therapy.  She is no longer taking oxycodone or Tylenol for pain.  No longer having constipation or needing any stool softeners.  She did have nausea the day after treatment and took one of her antiemetics (unsure which one).  Afterwards she had some vomiting and needed to come home early from work.  Symptoms improved over the next 2 days with only 1 episode of vomiting.  She did not take the antiemetics after her initial episode of vomiting as she thought this may have contributed to her symptoms.  Otherwise she has been able to continue to eat and drink without difficulty.  She denies any vaginal bleeding, no  changes in her bowel or bladder habits, no fevers or chills, and no chest pain or shortness of breath.  She has some questions about her PET scan and upcoming treatments.                      Review of Systems:    Systemic           no weight changes; no fever; no chills; no night sweats; no appetite changes  Skin           no rashes, or lesions  Eye           no irritation; no changes in vision  Sunil-Laryngeal           no dysphagia; no hoarseness   Pulmonary    no cough; no shortness of breath  Cardiovascular    no chest pain; no palpitations  Gastrointestinal    no diarrhea; no constipation; no abdominal pain; no changes in bowel habits; no blood in stool  Genitourinary   no urinary frequency; no urinary urgency; no dysuria; no pain; no abnormal vaginal discharge; no abnormal vaginal bleeding  Breast   no breast discharge; no breast changes; no breast pain  Musculoskeletal    no myalgias; no arthralgias; no back pain  Psychiatric           no depressed mood; no anxiety    Hematologic   no tender lymph nodes; +BLE   Endocrine    no hot flashes; no heat/cold intolerance         Neurological   no tremor; no numbness and tingling; no headaches; no difficulty sleeping      Past Medical History:    Past Medical History:   Diagnosis Date     Cervical cancer (H)          Past Surgical History:    Past Surgical History:   Procedure Laterality Date     CARPAL TUNNEL RELEASE RT/LT Right      TUBAL LIGATION           Health Maintenance Due   Topic Date Due     NICOTINE/TOBACCO CESSATION COUNSELING Q 1 YR  Never done     YEARLY PREVENTIVE VISIT  Never done     ADVANCE CARE PLANNING  Never done     HEPATITIS B IMMUNIZATION (1 of 3 - 3-dose series) Never done     COVID-19 Vaccine (1) Never done     Pneumococcal Vaccine: Pediatrics (0 to 5 Years) and At-Risk Patients (6 to 64 Years) (1 - PCV) Never done     HEPATITIS C SCREENING  Never done     PHQ-2 (once per calendar year)  Never done     INFLUENZA VACCINE (1) 09/01/2022        Current Medications:     Current Outpatient Medications   Medication Sig Dispense Refill     acetaminophen (TYLENOL) 500 MG tablet Take 2 tablets (1,000 mg) by mouth 3 times daily as needed for mild pain 90 tablet 3     acetaminophen (TYLENOL) 500 MG tablet Take 500-1,000 mg by mouth every 6 hours as needed for mild pain       ibuprofen (ADVIL/MOTRIN) 600 MG tablet Take 600 mg by mouth every 6 hours as needed for moderate pain       ondansetron (ZOFRAN ODT) 4 MG ODT tab Take 2 tablets (8 mg) by mouth every 8 hours as needed for nausea 20 tablet 3     polyethylene glycol (MIRALAX) 17 GM/Dose powder Take 17 g (1 capful) by mouth daily 850 g 3     SENNA-docusate sodium (SENNA S) 8.6-50 MG tablet Take 1 tablet by mouth At Bedtime Can increase up to two tablets twice per day. 60 tablet 3     prochlorperazine (COMPAZINE) 10 MG tablet Take 1 tablet (10 mg) by mouth every 6 hours as needed for nausea or vomiting (Patient not taking: Reported on 10/13/2022) 30 tablet 2         Allergies:        Allergies   Allergen Reactions     Clindamycin Hives        Social History:     Social History     Tobacco Use     Smoking status: Every Day     Packs/day: 1.00     Types: Cigarettes     Smokeless tobacco: Never     Tobacco comments:     9/11/2020 1/2 pack/day, tying tp quit   Substance Use Topics     Alcohol use: Yes     Comment: occ.       History   Drug Use Unknown         Family History:     The patient's family history is notable for:    Family History   Problem Relation Age of Onset     Breast Cancer Mother         8/2020 newly dx         Physical Exam:     /75   Pulse 101   Temp 98.1  F (36.7  C) (Oral)   Resp 16   Wt 57.7 kg (127 lb 3.2 oz)   SpO2 100%   BMI 22.53 kg/m    Body mass index is 22.53 kg/m .    General Appearance: healthy and alert, no distress     HEENT: no palpable nodules or masses        Cardiovascular: regular rate and rhythm, no gallops, rubs or murmurs     Respiratory: lungs clear, no  rales, rhonchi or wheezes    Musculoskeletal: extremities non tender and without edema    Skin: no lesions or rashes     Neurological: normal gait, no gross defects     Psychiatric: appropriate mood and affect                               Hematological: normal cervical and supraclavicular lymph nodes     Gastrointestinal:       abdomen soft, non-tender, non-distended    Genitourinary: Deferred.     Lab Results   Component Value Date    WBC 3.7 11/04/2022    WBC 3.0 10/27/2020     Lab Results   Component Value Date    RBC 3.24 11/04/2022    RBC 3.72 10/27/2020     Lab Results   Component Value Date    HGB 9.9 11/04/2022    HGB 11.0 10/27/2020     Lab Results   Component Value Date    HCT 30.8 11/04/2022    HCT 32.8 10/27/2020     No components found for: MCT  Lab Results   Component Value Date    MCV 95 11/04/2022    MCV 88 10/27/2020     Lab Results   Component Value Date    MCH 30.6 11/04/2022    MCH 29.6 10/27/2020     Lab Results   Component Value Date    MCHC 32.1 11/04/2022    MCHC 33.5 10/27/2020     Lab Results   Component Value Date    RDW 16.5 11/04/2022    RDW 13.3 10/27/2020     Lab Results   Component Value Date     11/04/2022    PLT 95 10/27/2020     % Neutrophils   Date Value Ref Range Status   11/04/2022 69 % Final   10/27/2020 83.3 % Final     % Lymphocytes   Date Value Ref Range Status   11/04/2022 20 % Final   10/27/2020 6.4 % Final     % Monocytes   Date Value Ref Range Status   11/04/2022 10 % Final   10/27/2020 8.7 % Final     % Eosinophils   Date Value Ref Range Status   11/04/2022 1 % Final   10/27/2020 1.0 % Final     % Basophils   Date Value Ref Range Status   11/04/2022 0 % Final   10/27/2020 0.3 % Final     % Immature Granulocytes   Date Value Ref Range Status   10/27/2020 0.3 % Final     Absolute Neutrophil   Date Value Ref Range Status   10/27/2020 2.5 1.6 - 8.3 10e9/L Final     Absolute Lymphocytes   Date Value Ref Range Status   10/27/2020 0.2 (L) 0.8 - 5.3 10e9/L Final      Absolute Monocytes   Date Value Ref Range Status   10/27/2020 0.3 0.0 - 1.3 10e9/L Final     Absolute Eosinophils   Date Value Ref Range Status   10/27/2020 0.0 0.0 - 0.7 10e9/L Final     Absolute Basophils   Date Value Ref Range Status   10/27/2020 0.0 0.0 - 0.2 10e9/L Final     Abs Immature Granulocytes   Date Value Ref Range Status   10/27/2020 0.0 0 - 0.4 10e9/L Final     Absolute Immature Granulocytes   Date Value Ref Range Status   11/04/2022 0.0 <=0.4 10e3/uL Final     Nucleated RBCs   Date Value Ref Range Status   10/27/2020 0 0 /100 Final     Absolute Nucleated RBC   Date Value Ref Range Status   10/27/2020 0.0  Final     Last Comprehensive Metabolic Panel:  Sodium   Date Value Ref Range Status   11/04/2022 139 136 - 145 mmol/L Final   10/27/2020 138 133 - 144 mmol/L Final     Potassium   Date Value Ref Range Status   11/04/2022 4.1 3.4 - 5.3 mmol/L Final   10/27/2020 3.3 (L) 3.4 - 5.3 mmol/L Final     Chloride   Date Value Ref Range Status   11/04/2022 104 98 - 107 mmol/L Final   10/27/2020 105 94 - 109 mmol/L Final     Carbon Dioxide   Date Value Ref Range Status   10/27/2020 27 20 - 32 mmol/L Final     Carbon Dioxide (CO2)   Date Value Ref Range Status   11/04/2022 25 22 - 29 mmol/L Final     Anion Gap   Date Value Ref Range Status   11/04/2022 10 7 - 15 mmol/L Final   10/27/2020 6 3 - 14 mmol/L Final     Glucose   Date Value Ref Range Status   11/04/2022 73 70 - 99 mg/dL Final   10/06/2022 99 70 - 99 mg/dL Final     Urea Nitrogen   Date Value Ref Range Status   11/04/2022 15.2 6.0 - 20.0 mg/dL Final   10/27/2020 9 7 - 30 mg/dL Final     Creatinine   Date Value Ref Range Status   11/04/2022 0.52 0.51 - 0.95 mg/dL Final   10/27/2020 0.57 0.52 - 1.04 mg/dL Final     GFR Estimate   Date Value Ref Range Status   11/04/2022 >90 >60 mL/min/1.73m2 Final     Comment:     Effective December 21, 2021 eGFRcr in adults is calculated using the 2021 CKD-EPI creatinine equation which includes age and gender Beverly  et al., [facility], DOI: 10.1056/FWXXdr8119854)   10/27/2020 >90 >60 mL/min/[1.73_m2] Final     Comment:     Non  GFR Calc  Starting 12/18/2018, serum creatinine based estimated GFR (eGFR) will be   calculated using the Chronic Kidney Disease Epidemiology Collaboration   (CKD-EPI) equation.       Calcium   Date Value Ref Range Status   11/04/2022 9.8 8.6 - 10.0 mg/dL Final   10/27/2020 8.8 8.5 - 10.1 mg/dL Final     Bilirubin Total   Date Value Ref Range Status   11/04/2022 0.2 <=1.2 mg/dL Final   10/27/2020 0.3 0.2 - 1.3 mg/dL Final     Alkaline Phosphatase   Date Value Ref Range Status   11/04/2022 74 35 - 104 U/L Final   10/27/2020 64 40 - 150 U/L Final     ALT   Date Value Ref Range Status   11/04/2022 17 10 - 35 U/L Final   10/27/2020 21 0 - 50 U/L Final     AST   Date Value Ref Range Status   11/04/2022 20 10 - 35 U/L Final   10/27/2020 18 0 - 45 U/L Final     Lab Results   Component Value Date    ALBUMIN 4.1 11/04/2022    ALBUMIN 3.8 10/27/2020     Lab Results   Component Value Date    PROTTOTAL 7.2 11/04/2022    PROTTOTAL 7.7 10/27/2020     Magnesium   Date Value Ref Range Status   11/04/2022 1.8 1.7 - 2.3 mg/dL Final   10/27/2020 1.6 1.6 - 2.3 mg/dL Final     Component      Latest Ref Rng & Units 11/4/2022   Protein Albumin Urine      Negative mg/dL Negative         Assessment:    Suni Zuluaga is a 37 year old woman with 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  She is here today for follow up and disease management.      40 minutes spent on the date of the encounter doing chart review, history and exam, documentation, and further activities as noted above.      Plan:     1.) Cervical cancer:  OK to proceed with planned treatment today if labs and BP are WDL.  RTC in 3 weeks for labs, provider visit, and next cycle; this is already scheduled.  Discussed that while her blood counts were okay for treatment today they were low and she could notice easy bleeding from her  thrombocytopenia.  ANC WDL but WBC low 3.7.  Counts okay for treatment today but may be low at her next cycle in which case we may need to hold treatment for a week.  Pembrolizumab was added to her plan due to PD-L1 status but still awaiting insurance coverage so this will hopefully start next cycle.  Discussed expected effects of adding this medication and when to notify the clinic.  Reviewed signs and symptoms for when she should contact the clinic or seek additional care.  Patient to contact the clinic with any questions or concerns in the interim.        Genetic risk factors were assessed and she does not meet qualification for referral.      Labs and/or tests ordered include:  CBC. CMP. Mag. Urine protein. TSH with reflex to T4.     2.) Health maintenance:  Issues addressed today include following up with PCP for annual health maintenance and non-gynecologic issues.     3.) BLE edema: Significantly improved with no evidence of edema on exam today.  She does note after working and being on her feet that she has some slight swelling in the evenings otherwise no swelling.    5.) Chemo induced nausea and vomiting: Symptoms lasted about 1-2 days not improved with antiemetics.  Encouraged that she check which antiemetic she used; if she tried Zofran she may have decreased effectiveness of Aloxi exacerbating her symptoms.  Do not take Zofran for 3 days after treatment.  Encouraged Compazine use as needed.  Can alternate with Zofran 3 days after chemo.  If she is having nausea and vomiting encouraged her to reach out to clinic so we can make recommendations to help her symptoms.  Continue small frequent meals high in calories and protein.  Appears she has had a 12 pound weight loss however suspect this is mostly her decreased edema as she had reported a 9 pound weight gain with the edema.  Appears she has had a 3 pound weight loss from her baseline.      Mounika Ritchie, DNP, APRN, FNP-C  Nurse Practitioner  Division  of Gynecologic Oncology  Pager: [phone]     CC  Patient Care Team:  Suzy Brambila MD as PCP - General (Gynecologic Oncology)  Suzy Brambila MD as MD (Gynecologic Oncology)  Kenneth Shahid as Referring Physician  Suzy Brambila MD as Assigned Cancer Care Provider  Liliam Paniagua, RN as Specialty Care Coordinator (Hematology & Oncology)  SELF, REFERRED

## 2022-11-04 ENCOUNTER — APPOINTMENT (OUTPATIENT)
Dept: LAB | Facility: CLINIC | Age: 37
End: 2022-11-04
Attending: OBSTETRICS & GYNECOLOGY
Payer: COMMERCIAL

## 2022-11-04 ENCOUNTER — INFUSION THERAPY VISIT (OUTPATIENT)
Dept: ONCOLOGY | Facility: CLINIC | Age: 37
End: 2022-11-04
Attending: OBSTETRICS & GYNECOLOGY
Payer: COMMERCIAL

## 2022-11-04 ENCOUNTER — PATIENT OUTREACH (OUTPATIENT)
Dept: CARE COORDINATION | Facility: CLINIC | Age: 37
End: 2022-11-04

## 2022-11-04 VITALS
SYSTOLIC BLOOD PRESSURE: 115 MMHG | BODY MASS INDEX: 22.53 KG/M2 | RESPIRATION RATE: 16 BRPM | WEIGHT: 127.2 LBS | TEMPERATURE: 98.1 F | OXYGEN SATURATION: 100 % | HEART RATE: 101 BPM | DIASTOLIC BLOOD PRESSURE: 75 MMHG

## 2022-11-04 DIAGNOSIS — C53.8 MALIGNANT NEOPLASM OF OVERLAPPING SITES OF CERVIX (H): Primary | ICD-10-CM

## 2022-11-04 DIAGNOSIS — C53.1 MALIGNANT NEOPLASM OF EXOCERVIX (H): Primary | ICD-10-CM

## 2022-11-04 DIAGNOSIS — C53.8 MALIGNANT NEOPLASM OF OVERLAPPING SITES OF CERVIX (H): ICD-10-CM

## 2022-11-04 DIAGNOSIS — Z51.11 ENCOUNTER FOR ANTINEOPLASTIC CHEMOTHERAPY: ICD-10-CM

## 2022-11-04 LAB
ALBUMIN SERPL BCG-MCNC: 4.1 G/DL (ref 3.5–5.2)
ALBUMIN UR-MCNC: NEGATIVE MG/DL
ALP SERPL-CCNC: 74 U/L (ref 35–104)
ALT SERPL W P-5'-P-CCNC: 17 U/L (ref 10–35)
ANION GAP SERPL CALCULATED.3IONS-SCNC: 10 MMOL/L (ref 7–15)
AST SERPL W P-5'-P-CCNC: 20 U/L (ref 10–35)
BASOPHILS # BLD AUTO: 0 10E3/UL (ref 0–0.2)
BASOPHILS NFR BLD AUTO: 0 %
BILIRUB SERPL-MCNC: 0.2 MG/DL
BUN SERPL-MCNC: 15.2 MG/DL (ref 6–20)
CALCIUM SERPL-MCNC: 9.8 MG/DL (ref 8.6–10)
CHLORIDE SERPL-SCNC: 104 MMOL/L (ref 98–107)
CREAT SERPL-MCNC: 0.52 MG/DL (ref 0.51–0.95)
DEPRECATED HCO3 PLAS-SCNC: 25 MMOL/L (ref 22–29)
EOSINOPHIL # BLD AUTO: 0 10E3/UL (ref 0–0.7)
EOSINOPHIL NFR BLD AUTO: 1 %
ERYTHROCYTE [DISTWIDTH] IN BLOOD BY AUTOMATED COUNT: 16.5 % (ref 10–15)
GFR SERPL CREATININE-BSD FRML MDRD: >90 ML/MIN/1.73M2
GLUCOSE SERPL-MCNC: 73 MG/DL (ref 70–99)
HCT VFR BLD AUTO: 30.8 % (ref 35–47)
HGB BLD-MCNC: 9.9 G/DL (ref 11.7–15.7)
IMM GRANULOCYTES # BLD: 0 10E3/UL
IMM GRANULOCYTES NFR BLD: 0 %
LYMPHOCYTES # BLD AUTO: 0.7 10E3/UL (ref 0.8–5.3)
LYMPHOCYTES NFR BLD AUTO: 20 %
MAGNESIUM SERPL-MCNC: 1.8 MG/DL (ref 1.7–2.3)
MCH RBC QN AUTO: 30.6 PG (ref 26.5–33)
MCHC RBC AUTO-ENTMCNC: 32.1 G/DL (ref 31.5–36.5)
MCV RBC AUTO: 95 FL (ref 78–100)
MONOCYTES # BLD AUTO: 0.4 10E3/UL (ref 0–1.3)
MONOCYTES NFR BLD AUTO: 10 %
NEUTROPHILS # BLD AUTO: 2.5 10E3/UL (ref 1.6–8.3)
NEUTROPHILS NFR BLD AUTO: 69 %
NRBC # BLD AUTO: 0 10E3/UL
NRBC BLD AUTO-RTO: 0 /100
PLATELET # BLD AUTO: 102 10E3/UL (ref 150–450)
POTASSIUM SERPL-SCNC: 4.1 MMOL/L (ref 3.4–5.3)
PROT SERPL-MCNC: 7.2 G/DL (ref 6.4–8.3)
RBC # BLD AUTO: 3.24 10E6/UL (ref 3.8–5.2)
SODIUM SERPL-SCNC: 139 MMOL/L (ref 136–145)
TSH SERPL DL<=0.005 MIU/L-ACNC: 1.74 UIU/ML (ref 0.3–4.2)
WBC # BLD AUTO: 3.7 10E3/UL (ref 4–11)

## 2022-11-04 PROCEDURE — 250N000013 HC RX MED GY IP 250 OP 250 PS 637: Performed by: OBSTETRICS & GYNECOLOGY

## 2022-11-04 PROCEDURE — 80053 COMPREHEN METABOLIC PANEL: CPT | Performed by: OBSTETRICS & GYNECOLOGY

## 2022-11-04 PROCEDURE — G0463 HOSPITAL OUTPT CLINIC VISIT: HCPCS

## 2022-11-04 PROCEDURE — 83735 ASSAY OF MAGNESIUM: CPT | Performed by: OBSTETRICS & GYNECOLOGY

## 2022-11-04 PROCEDURE — 250N000011 HC RX IP 250 OP 636: Performed by: OBSTETRICS & GYNECOLOGY

## 2022-11-04 PROCEDURE — 85025 COMPLETE CBC W/AUTO DIFF WBC: CPT | Performed by: OBSTETRICS & GYNECOLOGY

## 2022-11-04 PROCEDURE — 96375 TX/PRO/DX INJ NEW DRUG ADDON: CPT

## 2022-11-04 PROCEDURE — 84443 ASSAY THYROID STIM HORMONE: CPT

## 2022-11-04 PROCEDURE — 96367 TX/PROPH/DG ADDL SEQ IV INF: CPT

## 2022-11-04 PROCEDURE — 258N000003 HC RX IP 258 OP 636: Performed by: OBSTETRICS & GYNECOLOGY

## 2022-11-04 PROCEDURE — 36415 COLL VENOUS BLD VENIPUNCTURE: CPT | Performed by: OBSTETRICS & GYNECOLOGY

## 2022-11-04 PROCEDURE — 96417 CHEMO IV INFUS EACH ADDL SEQ: CPT

## 2022-11-04 PROCEDURE — 96415 CHEMO IV INFUSION ADDL HR: CPT

## 2022-11-04 PROCEDURE — 96413 CHEMO IV INFUSION 1 HR: CPT

## 2022-11-04 PROCEDURE — 99215 OFFICE O/P EST HI 40 MIN: CPT | Performed by: NURSE PRACTITIONER

## 2022-11-04 PROCEDURE — 81003 URINALYSIS AUTO W/O SCOPE: CPT | Performed by: OBSTETRICS & GYNECOLOGY

## 2022-11-04 RX ORDER — PALONOSETRON 0.05 MG/ML
0.25 INJECTION, SOLUTION INTRAVENOUS ONCE
Status: COMPLETED | OUTPATIENT
Start: 2022-11-04 | End: 2022-11-04

## 2022-11-04 RX ORDER — DIPHENHYDRAMINE HCL 25 MG
50 CAPSULE ORAL ONCE
Status: COMPLETED | OUTPATIENT
Start: 2022-11-04 | End: 2022-11-04

## 2022-11-04 RX ADMIN — CARBOPLATIN 750 MG: 10 INJECTION, SOLUTION INTRAVENOUS at 11:34

## 2022-11-04 RX ADMIN — FAMOTIDINE 20 MG: 10 INJECTION INTRAVENOUS at 07:58

## 2022-11-04 RX ADMIN — PALONOSETRON HYDROCHLORIDE 0.25 MG: 0.25 INJECTION INTRAVENOUS at 07:58

## 2022-11-04 RX ADMIN — SODIUM CHLORIDE 250 ML: 9 INJECTION, SOLUTION INTRAVENOUS at 07:55

## 2022-11-04 RX ADMIN — DEXAMETHASONE SODIUM PHOSPHATE: 10 INJECTION, SOLUTION INTRAMUSCULAR; INTRAVENOUS at 08:00

## 2022-11-04 RX ADMIN — DIPHENHYDRAMINE HYDROCHLORIDE 50 MG: 25 CAPSULE ORAL at 07:58

## 2022-11-04 RX ADMIN — SODIUM CHLORIDE 900 MG: 9 INJECTION, SOLUTION INTRAVENOUS at 12:22

## 2022-11-04 RX ADMIN — PACLITAXEL 284 MG: 300 INJECTION, SOLUTION INTRAVENOUS at 08:34

## 2022-11-04 ASSESSMENT — PAIN SCALES - GENERAL: PAINLEVEL: NO PAIN (0)

## 2022-11-04 NOTE — PROGRESS NOTES
Infusion Nursing Note:  Suni Zuluaga presents today for Cycle 2, Day 1 Taxol, Carboplatin, Bevacizumab-bvzr.    Patient seen by provider today: Yes: Mounika Ritchie CNP   present during visit today: Not Applicable.    Note: N/A.    Intravenous Access:  Peripheral IV placed.    Treatment Conditions:  Lab Results   Component Value Date    HGB 9.9 (L) 11/04/2022    WBC 3.7 (L) 11/04/2022    ANEU 2.5 10/27/2020    ANEUTAUTO 2.5 11/04/2022     (L) 11/04/2022      Lab Results   Component Value Date     11/04/2022    POTASSIUM 4.1 11/04/2022    MAG 1.8 11/04/2022    CR 0.52 11/04/2022    HARRISON 9.8 11/04/2022    BILITOTAL 0.2 11/04/2022    ALBUMIN 4.1 11/04/2022    ALT 17 11/04/2022    AST 20 11/04/2022     Results reviewed, labs MET treatment parameters, ok to proceed with treatment.  Urine Protein - Negative.    Post Infusion Assessment:  Patient tolerated infusion without incident.  Blood return noted pre and post infusion.  Site patent and intact, free from redness, edema or discomfort.  No evidence of extravasations.  Access discontinued per protocol.     Discharge Plan:   Patient declined prescription refills.  AVS to patient via [email or web address].  Patient will return 11/25/22 for next appointment.   Patient discharged in stable condition accompanied by: self.  Departure Mode: Ambulatory.      Karin Kevin RN

## 2022-11-04 NOTE — LETTER
2022         RE: Suni Zuluaga  [address]  [facility] [address]        Dear Colleague,    Thank you for referring your patient, Suni Zuluaga, to the [facility]. Please see a copy of my visit note below.    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  She is here today for follow up and disease management.     Oncology History:  2015:HSIL Pap at the beginning of pregnancy. Repeat pap postpartum was ASCUS. Colposcopy was negative.   20: Pelvic US. Uterine fibroids, normal appearance of uterus and ovaries  2020: Pap smear which confirmed squamous cell carcinoma of the cervix. Referred to GYN  ONC  2020: GYN ONC Consult [facility]. Cervical biopsy, RADHA III, unable to identify invasion  2020: Repeat cervical biopsy confirms invasive SCC  2020: Started primary chemoRT for locally advanced cervical cancer. Stage IIIC1(r), large primary tumor with bilateral parametrial invasion and clinical evidence of posterior vaginal involvement. Pelvic lymphadenopathy involving external iliac, perirectal and internal iliac nodes.   20-10/30/20: ChemoRT.   20-20: T&R brachytherapy  2020: Followup with radonc (Dr Hernández)   22: CT A/P with nonspecific increased density in the para-aortic region, which could be seen in the setting of retroperitoneal fibrosis, inflammation, or potentially lymphoma/desmoplastic reaction. New varix from right renal vein extending down into the RLQ.   22: CT abdomen retroperitoneal bx   Final Diagnosis  A and B.  Soft tissue, right retroperitoneum and retroperitoneum, CT-guided needle biopsies:  -Metastatic squamous cell carcinoma.  Addendum 2  CPS score  is 30-40.     RESULT FOR IMMUNOHISTOCHEMICAL VENTANA CLONE  PD-L1 ASSAY  TUMOR PROPORTION SCORE (TPS):  30%  INTERPRETATION: LOW PD-L1 EXPRESSION (TPS >/=1-49%)     10/6/22: PET CT Whole body: IMPRESSION: This patient with history of squamous cell carcinoma of  the cervix there is evidence of disease progression:  1. Large 5 cm right retroperitoneal mass previously biopsied as  metastatic squamous cell carcinoma demonstrates FDG avidity. No  definite significant disease within the pelvis.  2. New metastatic nodes within the retroperitoneum and mediastinum.  3. 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  4. Diffusely increased FDG avidity throughout the terminal ileum and  large intestine without associated mass lesion may represent vascular  congestion or diffuse colitis.  5. Diffuse soft tissue edema.     Plan: Paclitaxel 175 mg/m2, Carboplatin AUC 6, Bevacizumab 15 mg/m2, and pembrolizumab 200 mg added due to PD-L1 expression every 3 weeks.      10/13/22: Cycle 1 paclitaxel, carboplatin, and bevacizumab.  11/4/22: Cycle 2 paclitaxel, carboplatin, and bevacizumab. Pembrolizumab on hold while awaiting insurance coverage.                  Today she comes to clinic feeling well overall and is without concern.  She reports that her leg swelling, pain, bloating, constipation have all improved significantly or completely resolved.  Her leg swelling worsens during the day but overall is very minimal and much improved since her last visit.  She has not been contacted for lymphedema therapy.  She is no longer taking oxycodone or Tylenol for pain.  No longer having constipation or needing any stool softeners.  She did have nausea the day after treatment and took one of her antiemetics (unsure which one).  Afterwards she had some vomiting and needed to come home early from work.  Symptoms improved over the next 2 days with only  1 episode of vomiting.  She did not take the antiemetics after her initial episode of vomiting as she thought this may have contributed to her symptoms.  Otherwise she has been able to continue to eat and drink without difficulty.  She denies any vaginal bleeding, no changes in her bowel or bladder habits, no fevers or chills, and no chest pain or shortness of breath.  She has some questions about her PET scan and upcoming treatments.                      Review of Systems:    Systemic           no weight changes; no fever; no chills; no night sweats; no appetite changes  Skin           no rashes, or lesions  Eye           no irritation; no changes in vision  Sunil-Laryngeal           no dysphagia; no hoarseness   Pulmonary    no cough; no shortness of breath  Cardiovascular    no chest pain; no palpitations  Gastrointestinal    no diarrhea; no constipation; no abdominal pain; no changes in bowel habits; no blood in stool  Genitourinary   no urinary frequency; no urinary urgency; no dysuria; no pain; no abnormal vaginal discharge; no abnormal vaginal bleeding  Breast   no breast discharge; no breast changes; no breast pain  Musculoskeletal    no myalgias; no arthralgias; no back pain  Psychiatric           no depressed mood; no anxiety    Hematologic   no tender lymph nodes; +BLE   Endocrine    no hot flashes; no heat/cold intolerance         Neurological   no tremor; no numbness and tingling; no headaches; no difficulty sleeping      Past Medical History:    Past Medical History:   Diagnosis Date     Cervical cancer (H)          Past Surgical History:    Past Surgical History:   Procedure Laterality Date     CARPAL TUNNEL RELEASE RT/LT Right      TUBAL LIGATION           Health Maintenance Due   Topic Date Due     NICOTINE/TOBACCO CESSATION COUNSELING Q 1 YR  Never done     YEARLY PREVENTIVE VISIT  Never done     ADVANCE CARE PLANNING  Never done     HEPATITIS B IMMUNIZATION (1 of 3 - 3-dose series) Never done      COVID-19 Vaccine (1) Never done     Pneumococcal Vaccine: Pediatrics (0 to 5 Years) and At-Risk Patients (6 to 64 Years) (1 - PCV) Never done     HEPATITIS C SCREENING  Never done     PHQ-2 (once per calendar year)  Never done     INFLUENZA VACCINE (1) 09/01/2022       Current Medications:     Current Outpatient Medications   Medication Sig Dispense Refill     acetaminophen (TYLENOL) 500 MG tablet Take 2 tablets (1,000 mg) by mouth 3 times daily as needed for mild pain 90 tablet 3     acetaminophen (TYLENOL) 500 MG tablet Take 500-1,000 mg by mouth every 6 hours as needed for mild pain       ibuprofen (ADVIL/MOTRIN) 600 MG tablet Take 600 mg by mouth every 6 hours as needed for moderate pain       ondansetron (ZOFRAN ODT) 4 MG ODT tab Take 2 tablets (8 mg) by mouth every 8 hours as needed for nausea 20 tablet 3     polyethylene glycol (MIRALAX) 17 GM/Dose powder Take 17 g (1 capful) by mouth daily 850 g 3     SENNA-docusate sodium (SENNA S) 8.6-50 MG tablet Take 1 tablet by mouth At Bedtime Can increase up to two tablets twice per day. 60 tablet 3     prochlorperazine (COMPAZINE) 10 MG tablet Take 1 tablet (10 mg) by mouth every 6 hours as needed for nausea or vomiting (Patient not taking: Reported on 10/13/2022) 30 tablet 2         Allergies:        Allergies   Allergen Reactions     Clindamycin Hives        Social History:     Social History     Tobacco Use     Smoking status: Every Day     Packs/day: 1.00     Types: Cigarettes     Smokeless tobacco: Never     Tobacco comments:     9/11/2020 1/2 pack/day, tying tp quit   Substance Use Topics     Alcohol use: Yes     Comment: occ.       History   Drug Use Unknown         Family History:     The patient's family history is notable for:    Family History   Problem Relation Age of Onset     Breast Cancer Mother         8/2020 newly dx         Physical Exam:     /75   Pulse 101   Temp 98.1  F (36.7  C) (Oral)   Resp 16   Wt 57.7 kg (127 lb 3.2 oz)   SpO2  100%   BMI 22.53 kg/m    Body mass index is 22.53 kg/m .    General Appearance: healthy and alert, no distress     HEENT: no palpable nodules or masses        Cardiovascular: regular rate and rhythm, no gallops, rubs or murmurs     Respiratory: lungs clear, no rales, rhonchi or wheezes    Musculoskeletal: extremities non tender and without edema    Skin: no lesions or rashes     Neurological: normal gait, no gross defects     Psychiatric: appropriate mood and affect                               Hematological: normal cervical and supraclavicular lymph nodes     Gastrointestinal:       abdomen soft, non-tender, non-distended    Genitourinary: Deferred.     Lab Results   Component Value Date    WBC 3.7 11/04/2022    WBC 3.0 10/27/2020     Lab Results   Component Value Date    RBC 3.24 11/04/2022    RBC 3.72 10/27/2020     Lab Results   Component Value Date    HGB 9.9 11/04/2022    HGB 11.0 10/27/2020     Lab Results   Component Value Date    HCT 30.8 11/04/2022    HCT 32.8 10/27/2020     No components found for: MCT  Lab Results   Component Value Date    MCV 95 11/04/2022    MCV 88 10/27/2020     Lab Results   Component Value Date    MCH 30.6 11/04/2022    MCH 29.6 10/27/2020     Lab Results   Component Value Date    MCHC 32.1 11/04/2022    MCHC 33.5 10/27/2020     Lab Results   Component Value Date    RDW 16.5 11/04/2022    RDW 13.3 10/27/2020     Lab Results   Component Value Date     11/04/2022    PLT 95 10/27/2020     % Neutrophils   Date Value Ref Range Status   11/04/2022 69 % Final   10/27/2020 83.3 % Final     % Lymphocytes   Date Value Ref Range Status   11/04/2022 20 % Final   10/27/2020 6.4 % Final     % Monocytes   Date Value Ref Range Status   11/04/2022 10 % Final   10/27/2020 8.7 % Final     % Eosinophils   Date Value Ref Range Status   11/04/2022 1 % Final   10/27/2020 1.0 % Final     % Basophils   Date Value Ref Range Status   11/04/2022 0 % Final   10/27/2020 0.3 % Final     % Immature  Granulocytes   Date Value Ref Range Status   10/27/2020 0.3 % Final     Absolute Neutrophil   Date Value Ref Range Status   10/27/2020 2.5 1.6 - 8.3 10e9/L Final     Absolute Lymphocytes   Date Value Ref Range Status   10/27/2020 0.2 (L) 0.8 - 5.3 10e9/L Final     Absolute Monocytes   Date Value Ref Range Status   10/27/2020 0.3 0.0 - 1.3 10e9/L Final     Absolute Eosinophils   Date Value Ref Range Status   10/27/2020 0.0 0.0 - 0.7 10e9/L Final     Absolute Basophils   Date Value Ref Range Status   10/27/2020 0.0 0.0 - 0.2 10e9/L Final     Abs Immature Granulocytes   Date Value Ref Range Status   10/27/2020 0.0 0 - 0.4 10e9/L Final     Absolute Immature Granulocytes   Date Value Ref Range Status   11/04/2022 0.0 <=0.4 10e3/uL Final     Nucleated RBCs   Date Value Ref Range Status   10/27/2020 0 0 /100 Final     Absolute Nucleated RBC   Date Value Ref Range Status   10/27/2020 0.0  Final     Last Comprehensive Metabolic Panel:  Sodium   Date Value Ref Range Status   11/04/2022 139 136 - 145 mmol/L Final   10/27/2020 138 133 - 144 mmol/L Final     Potassium   Date Value Ref Range Status   11/04/2022 4.1 3.4 - 5.3 mmol/L Final   10/27/2020 3.3 (L) 3.4 - 5.3 mmol/L Final     Chloride   Date Value Ref Range Status   11/04/2022 104 98 - 107 mmol/L Final   10/27/2020 105 94 - 109 mmol/L Final     Carbon Dioxide   Date Value Ref Range Status   10/27/2020 27 20 - 32 mmol/L Final     Carbon Dioxide (CO2)   Date Value Ref Range Status   11/04/2022 25 22 - 29 mmol/L Final     Anion Gap   Date Value Ref Range Status   11/04/2022 10 7 - 15 mmol/L Final   10/27/2020 6 3 - 14 mmol/L Final     Glucose   Date Value Ref Range Status   11/04/2022 73 70 - 99 mg/dL Final   10/06/2022 99 70 - 99 mg/dL Final     Urea Nitrogen   Date Value Ref Range Status   11/04/2022 15.2 6.0 - 20.0 mg/dL Final   10/27/2020 9 7 - 30 mg/dL Final     Creatinine   Date Value Ref Range Status   11/04/2022 0.52 0.51 - 0.95 mg/dL Final   10/27/2020 0.57 0.52 -  1.04 mg/dL Final     GFR Estimate   Date Value Ref Range Status   11/04/2022 >90 >60 mL/min/1.73m2 Final     Comment:     Effective December 21, 2021 eGFRcr in adults is calculated using the 2021 CKD-EPI creatinine equation which includes age and gender (Azeem et al., [facility], DOI: 10.1056/XGHGbb1708710)   10/27/2020 >90 >60 mL/min/[1.73_m2] Final     Comment:     Non  GFR Calc  Starting 12/18/2018, serum creatinine based estimated GFR (eGFR) will be   calculated using the Chronic Kidney Disease Epidemiology Collaboration   (CKD-EPI) equation.       Calcium   Date Value Ref Range Status   11/04/2022 9.8 8.6 - 10.0 mg/dL Final   10/27/2020 8.8 8.5 - 10.1 mg/dL Final     Bilirubin Total   Date Value Ref Range Status   11/04/2022 0.2 <=1.2 mg/dL Final   10/27/2020 0.3 0.2 - 1.3 mg/dL Final     Alkaline Phosphatase   Date Value Ref Range Status   11/04/2022 74 35 - 104 U/L Final   10/27/2020 64 40 - 150 U/L Final     ALT   Date Value Ref Range Status   11/04/2022 17 10 - 35 U/L Final   10/27/2020 21 0 - 50 U/L Final     AST   Date Value Ref Range Status   11/04/2022 20 10 - 35 U/L Final   10/27/2020 18 0 - 45 U/L Final     Lab Results   Component Value Date    ALBUMIN 4.1 11/04/2022    ALBUMIN 3.8 10/27/2020     Lab Results   Component Value Date    PROTTOTAL 7.2 11/04/2022    PROTTOTAL 7.7 10/27/2020     Magnesium   Date Value Ref Range Status   11/04/2022 1.8 1.7 - 2.3 mg/dL Final   10/27/2020 1.6 1.6 - 2.3 mg/dL Final     Component      Latest Ref Rng & Units 11/4/2022   Protein Albumin Urine      Negative mg/dL Negative         Assessment:    Suni Zuluaga is a 37 year old woman with 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  She is here today for follow up and disease management.      40 minutes spent on the date of the encounter doing chart review, history and exam, documentation, and further activities as noted above.      Plan:     1.) Cervical cancer:  OK to proceed  with planned treatment today if labs and BP are WDL.  RTC in 3 weeks for labs, provider visit, and next cycle; this is already scheduled.  Discussed that while her blood counts were okay for treatment today they were low and she could notice easy bleeding from her thrombocytopenia.  ANC WDL but WBC low 3.7.  Counts okay for treatment today but may be low at her next cycle in which case we may need to hold treatment for a week.  Pembrolizumab was added to her plan due to PD-L1 status but still awaiting insurance coverage so this will hopefully start next cycle.  Discussed expected effects of adding this medication and when to notify the clinic.  Reviewed signs and symptoms for when she should contact the clinic or seek additional care.  Patient to contact the clinic with any questions or concerns in the interim.        Genetic risk factors were assessed and she does not meet qualification for referral.      Labs and/or tests ordered include:  CBC. CMP. Mag. Urine protein. TSH with reflex to T4.     2.) Health maintenance:  Issues addressed today include following up with PCP for annual health maintenance and non-gynecologic issues.     3.) BLE edema: Significantly improved with no evidence of edema on exam today.  She does note after working and being on her feet that she has some slight swelling in the evenings otherwise no swelling.    5.) Chemo induced nausea and vomiting: Symptoms lasted about 1-2 days not improved with antiemetics.  Encouraged that she check which antiemetic she used; if she tried Zofran she may have decreased effectiveness of Aloxi exacerbating her symptoms.  Do not take Zofran for 3 days after treatment.  Encouraged Compazine use as needed.  Can alternate with Zofran 3 days after chemo.  If she is having nausea and vomiting encouraged her to reach out to clinic so we can make recommendations to help her symptoms.  Continue small frequent meals high in calories and protein.  Appears she has had  a 12 pound weight loss however suspect this is mostly her decreased edema as she had reported a 9 pound weight gain with the edema.  Appears she has had a 3 pound weight loss from her baseline.      Mounika Ritchie, KYLE, APRN, FNP-C  Nurse Practitioner  Division of Gynecologic Oncology  Pager: [phone]     CC  Patient Care Team:  Kenneth Shahid as Referring Physician  Liliam Paniagua, RN as Specialty Care Coordinator (Hematology & Oncology)

## 2022-11-04 NOTE — NURSING NOTE
"Oncology Rooming Note    November 4, 2022 6:56 AM   Suni Zuluaga is a 37 year old female who presents for:    Chief Complaint   Patient presents with     Blood Draw     Labs drawn from PIV placed by RN. Line flushed with saline. Vitals taken. Pt checked in for appointment(s).      Oncology Clinic Visit     Cervical cancer     Initial Vitals: /75   Pulse 101   Temp 98.1  F (36.7  C) (Oral)   Resp 16   Wt 57.7 kg (127 lb 3.2 oz)   SpO2 100%   BMI 22.53 kg/m   Estimated body mass index is 22.53 kg/m  as calculated from the following:    Height as of 10/13/22: 1.6 m (5' 3\").    Weight as of this encounter: 57.7 kg (127 lb 3.2 oz). Body surface area is 1.6 meters squared.  No Pain (0) Comment: Data Unavailable   No LMP recorded.  Allergies reviewed: Yes  Medications reviewed: Yes    Medications: Medication refills not needed today.  Pharmacy name entered into EPIC: [facility] - [place], [place] - [address]    Clinical concerns: none       Lynne Bender CMA            "

## 2022-11-04 NOTE — NURSING NOTE
Chief Complaint   Patient presents with     Blood Draw     Labs drawn from PIV placed by RN. Line flushed with saline. Vitals taken. Pt checked in for appointment(s).      Pt unable to leave urine.    Anabella ROSE RN PHN BSN  BMT/Oncology Lab

## 2022-11-04 NOTE — PROGRESS NOTES
Social Work Intervention  [facility]    Data/Intervention:    Patient Name:  Suni Zuluaga  /Age:  1985 (37 year old)    Visit Type: telephone  Referral Source: Care Management Line  Reason for Referral:  Friend called in regarding wig resources     Collaborated With:    -Patient    Psychosocial Information/Concerns:  Patients friend left a VM on the main care management line asking for call back regarding wig resources. Per chart review no consent on file to speak with anyone aside from patient. Patient does have infusion appointment today and SW plans to visit patient then.    Intervention/Education/Resources Provided:  SW met with patient and friend, Viet, introduced self and explained the reason for the visit. SW explained that they were unfortunately not able to connect with friend due to no consent on file, but did bring a copy so that patient can identify individuals that they give permission to call on their behalf. Patient verbalized understanding.    Patient previously connected with [employer] and went over financial assistance grants. Patient reported that they did not get applied for anything at this time. Did express some concerns around gas and utility bills. SW reviewed grants available to patient and how they can assist. SW will also send patient resources on wigs.    Assessment/Plan:  SW will complete [employer] Fund application and the [employer] through [employer]. SW will follow up with patient via e-mail regarding additional resources discussed during visit. SW will work on getting communication to consent form on file. SW will continue to remain available as needed. Provided patient/family with contact information and availability.    JOELLE Crowley,SUSAN  Hematology/Oncology Social Worker  Phone:[phone] Pager: [phone]

## 2022-11-07 ENCOUNTER — PATIENT OUTREACH (OUTPATIENT)
Dept: CARE COORDINATION | Facility: CLINIC | Age: 37
End: 2022-11-07

## 2022-11-07 NOTE — PROGRESS NOTES
Clinic Care Coordination Contact  Program: [facility]   County: [facility] Case #:  [facility] Worker:   Neil #:   Subscriber #:   Renewal:  Date Applied:     FORREST Outreach:   11/7/2022 FRW enrolled patient and set up time to call patient within the 72 hour window of receiving referral      Health Insurance:      Referral/Screening:  [facility] Screening    Row Name 11/04/22 0955       [facility] Benefits   Is patient requesting help applying for [facility] benefits? No       Insurance:   [place]? No       Is this an insurance renewal? No       Is this a new insurance application request? No       OTHER   Is this a katy care application? Yes

## 2022-11-08 ENCOUNTER — PATIENT OUTREACH (OUTPATIENT)
Dept: CARE COORDINATION | Facility: CLINIC | Age: 37
End: 2022-11-08

## 2022-11-08 NOTE — PROGRESS NOTES
Clinic Care Coordination Contact  Program: [facility]   County: [facility] Case #:  [facility] Worker:   Neil #:   Subscriber #:   Renewal:  Date Applied:     FORREST Outreach:   11/8/2022:  FRW called patient and left a vm with call back information. FRW will make outreach next week FRW sent the patient a katy care applicion to her email.   11/7/2022 FRW enrolled patient and set up time to call patient within the 72 hour window of receiving referral      Health Insurance:      Referral/Screening:  [facility] Screening    Row Name 11/04/22 0955       [facility] Benefits   Is patient requesting help applying for [facility] benefits? No       Insurance:   [place]? No       Is this an insurance renewal? No       Is this a new insurance application request? No       OTHER   Is this a katy care application? Yes

## 2022-11-16 ENCOUNTER — PATIENT OUTREACH (OUTPATIENT)
Dept: CARE COORDINATION | Facility: CLINIC | Age: 37
End: 2022-11-16

## 2022-11-16 NOTE — PROGRESS NOTES
Clinic Care Coordination Contact  Program: [facility]   County: [facility] Case #:  [facility] Worker:   [place] #:   Subscriber #:   Renewal:  Date Applied:     FRW Outreach:   1/16/2022 FRW per Chart review patient doesn't have a self-pay balance and cant apply for Rola care but she does have the application in her e-mail. FRW called patient and left a final vm with call back information as attempt x2 with no answer or return phone calls. FRW resolved the FRW episode and remove patient from panel. Please refer to FRW for future needs.   11/8/2022:  FRW called patient and left a vm with call back information. FRW will make outreach next week FRW sent the patient a rola care applicion to her email.   11/7/2022 FRW enrolled patient and set up time to call patient within the 72 hour window of receiving referral     Health Insurance:      Referral/Screening:  [facility] Screening    Row Name 11/04/22 0955       [facility] Benefits   Is patient requesting help applying for [facility] benefits? No       Insurance:   [place]? No       Is this an insurance renewal? No       Is this a new insurance application request? No       OTHER   Is this a rola care application? Yes

## 2022-11-22 NOTE — PROGRESS NOTES
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  She is here today for follow up and disease management.      Oncology History:  2015:HSIL Pap at the beginning of pregnancy. Repeat pap postpartum was ASCUS. Colposcopy was negative.   20: Pelvic US. Uterine fibroids, normal appearance of uterus and ovaries  2020: Pap smear which confirmed squamous cell carcinoma of the cervix. Referred to GYN  ONC  2020: GYN ONC Consult [facility]. Cervical biopsy, RADHA III, unable to identify invasion  2020: Repeat cervical biopsy confirms invasive SCC  2020: Started primary chemoRT for locally advanced cervical cancer. Stage IIIC1(r), large primary tumor with bilateral parametrial invasion and clinical evidence of posterior vaginal involvement. Pelvic lymphadenopathy involving external iliac, perirectal and internal iliac nodes.   20-10/30/20: ChemoRT.   20-20: T&R brachytherapy  2020: Followup with radonc (Dr Hernández)   22: CT A/P with nonspecific increased density in the para-aortic region, which could be seen in the setting of retroperitoneal fibrosis, inflammation, or potentially lymphoma/desmoplastic reaction. New varix from right renal vein extending down into the RLQ.   22: CT abdomen retroperitoneal bx   Final Diagnosis  A and B.  Soft tissue, right retroperitoneum and retroperitoneum, CT-guided needle biopsies:  -Metastatic squamous cell carcinoma.  Addendum 2  CPS score is 30-40.     RESULT FOR IMMUNOHISTOCHEMICAL VENTANA CLONE  PD-L1 ASSAY  TUMOR PROPORTION SCORE (TPS):  30%  INTERPRETATION: LOW PD-L1 EXPRESSION (TPS >/=1-49%)     10/6/22: PET CT Whole body: IMPRESSION: This patient with history of squamous cell carcinoma  of  the cervix there is evidence of disease progression:  1. Large 5 cm right retroperitoneal mass previously biopsied as  metastatic squamous cell carcinoma demonstrates FDG avidity. No  definite significant disease within the pelvis.  2. New metastatic nodes within the retroperitoneum and mediastinum.  3. 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  4. Diffusely increased FDG avidity throughout the terminal ileum and  large intestine without associated mass lesion may represent vascular  congestion or diffuse colitis.  5. Diffuse soft tissue edema.     Plan: Paclitaxel, Carboplatin, Bevacizumab, and pembrolizumab added due to PD-L1 expression every 3 weeks.      10/13/22: Cycle 1 Paclitaxel 175 mg/m2, Carboplatin AUC 6, Bevacizumab 15 mg/m2  11/4/22: Cycle 2 Paclitaxel 175 mg/m2, Carboplatin AUC 5, Bevacizumab 15 mg/m2. Pembrolizumab on hold while awaiting insurance coverage.    11/8/22: ED for nausea (no vomiting) and dizziness, discharged with valium for vertigo.    11/25/22: Cycle 3 Paclitaxel 175 mg/m2, Carboplatin AUC 5, Bevacizumab 15 mg/m2, and pembrolizumab 200 mg.  Defer x 1 week for thrombocytopenia (plts 82).                  Today she comes to clinic feeling well overall and is without concern.  She did have 3 to 4 days of dizziness after chemotherapy and was evaluated in the ED.  They diagnosed her with vertigo and discharged her with Valium which she found helpful; she does need a refill.  She denies nausea or vomiting.  She is not using any antiemetics.  Reports that her appetite is good.  Her weight has been stable.  No diarrhea or constipation.  No numbness or tingling in her fingers or toes.  Her leg swelling continues to be significantly improved however does note some swelling she is on her feet.  She does note inguinal swelling on the right side that is not new and has not improved like the rest of  her swelling.  She notes this area is uncomfortable and a little numb.  She denies any changes in her bladder habits, no fevers or chills, and no chest pain or shortness of breath.                 Review of Systems:    Systemic           no weight changes; no fever; no chills; no night sweats; no appetite changes; +dizziness  Skin           no rashes, or lesions  Eye           no irritation; no changes in vision  Sunil-Laryngeal           no dysphagia; no hoarseness   Pulmonary    no cough; no shortness of breath  Cardiovascular    no chest pain; no palpitations  Gastrointestinal    no diarrhea; no constipation; no abdominal pain; no changes in bowel habits; no blood in stool  Genitourinary   no urinary frequency; no urinary urgency; no dysuria; no pain; no abnormal vaginal discharge; no abnormal vaginal bleeding  Breast   no breast discharge; no breast changes; no breast pain  Musculoskeletal    no myalgias; no arthralgias; no back pain  Psychiatric           no depressed mood; no anxiety    Hematologic   + tender lymph nodes; +right inguinal swelling   Endocrine    no hot flashes; no heat/cold intolerance         Neurological   no tremor; + numbness and tingling; no headaches; no difficulty sleeping      Past Medical History:    Past Medical History:   Diagnosis Date     Cervical cancer (H)          Past Surgical History:    Past Surgical History:   Procedure Laterality Date     CARPAL TUNNEL RELEASE RT/LT Right      TUBAL LIGATION           Health Maintenance Due   Topic Date Due     NICOTINE/TOBACCO CESSATION COUNSELING Q 1 YR  Never done     YEARLY PREVENTIVE VISIT  Never done     ADVANCE CARE PLANNING  Never done     HEPATITIS B IMMUNIZATION (1 of 3 - 3-dose series) Never done     COVID-19 Vaccine (1) Never done     Pneumococcal Vaccine: Pediatrics (0 to 5 Years) and At-Risk Patients (6 to 64 Years) (1 - PCV) Never done     HEPATITIS C SCREENING  Never done     PHQ-2 (once per calendar year)  Never done      INFLUENZA VACCINE (1) 09/01/2022       Current Medications:     Current Outpatient Medications   Medication Sig Dispense Refill     acetaminophen (TYLENOL) 500 MG tablet Take 2 tablets (1,000 mg) by mouth 3 times daily as needed for mild pain 90 tablet 3     acetaminophen (TYLENOL) 500 MG tablet Take 500-1,000 mg by mouth every 6 hours as needed for mild pain       ibuprofen (ADVIL/MOTRIN) 600 MG tablet Take 600 mg by mouth every 6 hours as needed for moderate pain       ondansetron (ZOFRAN ODT) 4 MG ODT tab Take 2 tablets (8 mg) by mouth every 8 hours as needed for nausea 20 tablet 3     polyethylene glycol (MIRALAX) 17 GM/Dose powder Take 17 g (1 capful) by mouth daily 850 g 3     SENNA-docusate sodium (SENNA S) 8.6-50 MG tablet Take 1 tablet by mouth At Bedtime Can increase up to two tablets twice per day. 60 tablet 3     prochlorperazine (COMPAZINE) 10 MG tablet Take 1 tablet (10 mg) by mouth every 6 hours as needed for nausea or vomiting (Patient not taking: Reported on 10/13/2022) 30 tablet 2         Allergies:        Allergies   Allergen Reactions     Clindamycin Hives        Social History:     Social History     Tobacco Use     Smoking status: Every Day     Packs/day: 1.00     Types: Cigarettes     Smokeless tobacco: Never     Tobacco comments:     9/11/2020 1/2 pack/day, tying tp quit   Substance Use Topics     Alcohol use: Yes     Comment: occ.       History   Drug Use Unknown         Family History:     The patient's family history is notable for:    Family History   Problem Relation Age of Onset     Breast Cancer Mother         8/2020 newly dx         Physical Exam:     /79 (BP Location: Right arm, Patient Position: Sitting, Cuff Size: Adult Regular)   Pulse 98   Temp 98.4  F (36.9  C) (Oral)   Resp 18   Wt 59 kg (130 lb 1.6 oz)   SpO2 100%   BMI 23.05 kg/m    Body mass index is 23.05 kg/m .    General Appearance: healthy and alert, no distress     HEENT: no palpable nodules or masses         Cardiovascular: regular rate and rhythm, no gallops, rubs or murmurs     Respiratory: lungs clear, no rales, rhonchi or wheezes    Musculoskeletal: extremities non tender and without edema    Skin: no lesions or rashes     Neurological: normal gait, no gross defects     Psychiatric: appropriate mood and affect                               Hematological: normal cervical and supraclavicular lymph nodes     Gastrointestinal:       abdomen soft, non-tender, non-distended    Genitourinary: Deferred.     Lab Results   Component Value Date    WBC 3.3 11/25/2022    WBC 3.0 10/27/2020     Lab Results   Component Value Date    RBC 3.20 11/25/2022    RBC 3.72 10/27/2020     Lab Results   Component Value Date    HGB 10.1 11/25/2022    HGB 11.0 10/27/2020     Lab Results   Component Value Date    HCT 32.2 11/25/2022    HCT 32.8 10/27/2020     No components found for: MCT  Lab Results   Component Value Date     11/25/2022    MCV 88 10/27/2020     Lab Results   Component Value Date    MCH 31.6 11/25/2022    MCH 29.6 10/27/2020     Lab Results   Component Value Date    MCHC 31.4 11/25/2022    MCHC 33.5 10/27/2020     Lab Results   Component Value Date    RDW 20.7 11/25/2022    RDW 13.3 10/27/2020     Lab Results   Component Value Date    PLT 82 11/25/2022    PLT 95 10/27/2020     % Neutrophils   Date Value Ref Range Status   11/25/2022 63 % Final   10/27/2020 83.3 % Final     % Lymphocytes   Date Value Ref Range Status   11/25/2022 24 % Final   10/27/2020 6.4 % Final     % Monocytes   Date Value Ref Range Status   11/25/2022 12 % Final   10/27/2020 8.7 % Final     % Eosinophils   Date Value Ref Range Status   11/25/2022 0 % Final   10/27/2020 1.0 % Final     % Basophils   Date Value Ref Range Status   11/25/2022 1 % Final   10/27/2020 0.3 % Final     % Immature Granulocytes   Date Value Ref Range Status   10/27/2020 0.3 % Final     Absolute Neutrophil   Date Value Ref Range Status   10/27/2020 2.5 1.6 - 8.3 10e9/L  Final     Absolute Lymphocytes   Date Value Ref Range Status   10/27/2020 0.2 (L) 0.8 - 5.3 10e9/L Final     Absolute Monocytes   Date Value Ref Range Status   10/27/2020 0.3 0.0 - 1.3 10e9/L Final     Absolute Eosinophils   Date Value Ref Range Status   10/27/2020 0.0 0.0 - 0.7 10e9/L Final     Absolute Basophils   Date Value Ref Range Status   10/27/2020 0.0 0.0 - 0.2 10e9/L Final     Abs Immature Granulocytes   Date Value Ref Range Status   10/27/2020 0.0 0 - 0.4 10e9/L Final     Absolute Immature Granulocytes   Date Value Ref Range Status   11/25/2022 0.0 <=0.4 10e3/uL Final     Nucleated RBCs   Date Value Ref Range Status   10/27/2020 0 0 /100 Final     Absolute Nucleated RBC   Date Value Ref Range Status   10/27/2020 0.0  Final     Last Comprehensive Metabolic Panel:  Sodium   Date Value Ref Range Status   11/25/2022 140 136 - 145 mmol/L Final   10/27/2020 138 133 - 144 mmol/L Final     Potassium   Date Value Ref Range Status   11/25/2022 4.0 3.4 - 5.3 mmol/L Final   10/27/2020 3.3 (L) 3.4 - 5.3 mmol/L Final     Chloride   Date Value Ref Range Status   11/25/2022 104 98 - 107 mmol/L Final   10/27/2020 105 94 - 109 mmol/L Final     Carbon Dioxide   Date Value Ref Range Status   10/27/2020 27 20 - 32 mmol/L Final     Carbon Dioxide (CO2)   Date Value Ref Range Status   11/25/2022 27 22 - 29 mmol/L Final     Anion Gap   Date Value Ref Range Status   11/25/2022 9 7 - 15 mmol/L Final   10/27/2020 6 3 - 14 mmol/L Final     Glucose   Date Value Ref Range Status   11/25/2022 74 70 - 99 mg/dL Final   10/06/2022 99 70 - 99 mg/dL Final     Urea Nitrogen   Date Value Ref Range Status   11/25/2022 13.2 6.0 - 20.0 mg/dL Final   10/27/2020 9 7 - 30 mg/dL Final     Creatinine   Date Value Ref Range Status   11/25/2022 0.54 0.51 - 0.95 mg/dL Final   10/27/2020 0.57 0.52 - 1.04 mg/dL Final     GFR Estimate   Date Value Ref Range Status   11/25/2022 >90 >60 mL/min/1.73m2 Final     Comment:     Effective December 21, 2021 eGFRcr  in adults is calculated using the 2021 CKD-EPI creatinine equation which includes age and gender (Azeem et al., [place], DOI: 10.1056/OGYIny0205002)   10/27/2020 >90 >60 mL/min/[1.73_m2] Final     Comment:     Non  GFR Calc  Starting 12/18/2018, serum creatinine based estimated GFR (eGFR) will be   calculated using the Chronic Kidney Disease Epidemiology Collaboration   (CKD-EPI) equation.       Calcium   Date Value Ref Range Status   11/25/2022 9.7 8.6 - 10.0 mg/dL Final   10/27/2020 8.8 8.5 - 10.1 mg/dL Final     Bilirubin Total   Date Value Ref Range Status   11/25/2022 0.2 <=1.2 mg/dL Final   10/27/2020 0.3 0.2 - 1.3 mg/dL Final     Alkaline Phosphatase   Date Value Ref Range Status   11/25/2022 69 35 - 104 U/L Final   10/27/2020 64 40 - 150 U/L Final     ALT   Date Value Ref Range Status   11/25/2022 17 10 - 35 U/L Final   10/27/2020 21 0 - 50 U/L Final     AST   Date Value Ref Range Status   11/25/2022 20 10 - 35 U/L Final   10/27/2020 18 0 - 45 U/L Final     Lab Results   Component Value Date    ALBUMIN 4.2 11/25/2022    ALBUMIN 3.8 10/27/2020     Lab Results   Component Value Date    PROTTOTAL 7.2 11/25/2022    PROTTOTAL 7.7 10/27/2020     Magnesium   Date Value Ref Range Status   11/25/2022 1.6 (L) 1.7 - 2.3 mg/dL Final   10/27/2020 1.6 1.6 - 2.3 mg/dL Final     TSH   Date Value Ref Range Status   11/25/2022 1.52 0.30 - 4.20 uIU/mL Final           Assessment:    Suni Zuluaga is a 37 year old woman with 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  She is here today for follow up and disease management.     40 minutes spent on the date of the encounter doing chart review, history and exam, documentation, and further activities as noted above.      Plan:     1.)        Cervical cancer:   Defer chemo x1 week due to thrombocytopenia.  OK to proceed with planned treatment in one week if labs and BP are WDL.  Message sent to reschedule today's treatment.  RTC 3 weeks after  next chemotherapy for labs, CT, follow-up with Dr. Brambila, and next cycle.    All of her upcoming appointments will need to be adjusted.  Message sent to [facility] to coordinate.  Reviewed signs and symptoms for when she should contact the clinic or seek additional care.  Patient to contact the clinic with any questions or concerns in the interim.        Genetic risk factors were assessed and she does not meet qualification for referral.       Labs and/or tests ordered include:  CBC. CMP. Mag. Urine protein. TSH with reflex to T4.                2.)        Health maintenance:  Issues addressed today include following up with PCP for annual health maintenance and non-gynecologic issues.      3.)        BLE edema: Significantly improved overall.    Per her report still has right inguinal edema.  No leg edema noted today.  Her inguinal edema has not changed by her report and no DVT seen on CT, PET, or ultrasound.     4.)        Vertigo: Discussed I generally would not recommend Valium for vertigo.  She could try meclizine if her symptoms resume after this cycle.  Prescription for 12.5 to 25 mg meclizine sent to her local pharmacy per her request.    5.) Hypomagnesemia: Magnesium today 1.6 which is slightly low.  Overall this is stable and does not meet parameters for electrolyte replacement.  We will recheck next week when she comes back for chemo.    6.) Thrombocytopenia: Platelets today 82.  Hold chemotherapy x1 week as above.  Message sent to Dr. Brambila to consider decreasing dose of chemotherapy versus continuing to monitor.  Cycle 2 carboplatin was decreased from AUC of 6 to AUC of 5.       Mounika Ritchie, DNP, APRN, FNP-C  Nurse Practitioner  Division of Gynecologic Oncology  Pager: [phone]     CC  Patient Care Team:  Suzy Brambila MD as PCP - General (Gynecologic Oncology)  Suzy Brambila MD as MD (Gynecologic Oncology)  Kenneth Shahid as Referring Physician  Suzy Brambila MD as  Assigned Cancer Care Provider  Liliam Paniagua RN as Specialty Care Coordinator (Hematology & Oncology)  SELF, REFERRED

## 2022-11-25 ENCOUNTER — ONCOLOGY VISIT (OUTPATIENT)
Dept: ONCOLOGY | Facility: CLINIC | Age: 37
End: 2022-11-25
Attending: OBSTETRICS & GYNECOLOGY
Payer: COMMERCIAL

## 2022-11-25 ENCOUNTER — TELEPHONE (OUTPATIENT)
Dept: ONCOLOGY | Facility: CLINIC | Age: 37
End: 2022-11-25

## 2022-11-25 ENCOUNTER — APPOINTMENT (OUTPATIENT)
Dept: LAB | Facility: CLINIC | Age: 37
End: 2022-11-25
Attending: OBSTETRICS & GYNECOLOGY
Payer: COMMERCIAL

## 2022-11-25 VITALS
DIASTOLIC BLOOD PRESSURE: 79 MMHG | OXYGEN SATURATION: 100 % | RESPIRATION RATE: 18 BRPM | WEIGHT: 130.1 LBS | SYSTOLIC BLOOD PRESSURE: 123 MMHG | HEART RATE: 98 BPM | BODY MASS INDEX: 23.05 KG/M2 | TEMPERATURE: 98.4 F

## 2022-11-25 DIAGNOSIS — R42 VERTIGO: ICD-10-CM

## 2022-11-25 DIAGNOSIS — Z51.11 ENCOUNTER FOR ANTINEOPLASTIC CHEMOTHERAPY: ICD-10-CM

## 2022-11-25 DIAGNOSIS — C53.8 MALIGNANT NEOPLASM OF OVERLAPPING SITES OF CERVIX (H): Primary | ICD-10-CM

## 2022-11-25 PROCEDURE — G0463 HOSPITAL OUTPT CLINIC VISIT: HCPCS

## 2022-11-25 PROCEDURE — 99215 OFFICE O/P EST HI 40 MIN: CPT | Performed by: NURSE PRACTITIONER

## 2022-11-25 RX ORDER — MECLIZINE HYDROCHLORIDE 25 MG/1
12.5-25 TABLET ORAL 3 TIMES DAILY PRN
Qty: 30 TABLET | Refills: 1 | Status: SHIPPED | OUTPATIENT
Start: 2022-11-25 | End: 2023-03-03

## 2022-11-25 ASSESSMENT — PAIN SCALES - GENERAL: PAINLEVEL: NO PAIN (0)

## 2022-11-25 NOTE — TELEPHONE ENCOUNTER
Patient calling in asking to get re-scheduled for her infusion appointments. Informed patient that writer is unable to do so, as she was transferred to the [facility] (possibly by mistake). Writer was able to find number to [facility], transferred patient to their number (writer was on hold trying to reach representative, but unable to reach representative after 10 minutes on hold - patient was conferenced in and took over the call). Informed patient to call [phone] if she gets disconnected or has any further questions regarding this. Patient verbalized understanding.      CAREN SolaresN, RN  [facility]

## 2022-11-25 NOTE — NURSING NOTE
Chief Complaint   Patient presents with     Blood Draw     Labs drawn via piv placed by RN in lab. VS taken.      Labs drawn from PIV placed by RN. Line flushed with saline. Vitals taken. Pt checked in for appointment(s).    Meredith Ruiz RN

## 2022-11-25 NOTE — NURSING NOTE
"Oncology Rooming Note    November 25, 2022 6:52 AM   Suni Zuluaga is a 37 year old female who presents for:    Chief Complaint   Patient presents with     Blood Draw     Labs drawn via piv placed by RN in lab. VS taken.      Initial Vitals: Blood Pressure 123/79 (BP Location: Right arm, Patient Position: Sitting, Cuff Size: Adult Regular)   Pulse 98   Temperature 98.4  F (36.9  C) (Oral)   Respiration 18   Weight 59 kg (130 lb 1.6 oz)   Oxygen Saturation 100%   Body Mass Index 23.05 kg/m   Estimated body mass index is 23.05 kg/m  as calculated from the following:    Height as of 10/13/22: 1.6 m (5' 3\").    Weight as of this encounter: 59 kg (130 lb 1.6 oz). Body surface area is 1.62 meters squared.  No Pain (0) Comment: Data Unavailable   No LMP recorded.  Allergies reviewed: Yes  Medications reviewed: Yes    Medications: Medication refills not needed today.  Pharmacy name entered into EPIC: [facility] - [place], [place] - [address]    Clinical concerns: none       Belén Townsend MA            "

## 2022-11-25 NOTE — LETTER
2022         RE: Suni Zuluaga  [address]  [place] [address]        Dear Colleague,    Thank you for referring your patient, Suni Zuluaga, to the [facility]. Please see a copy of my visit note below.    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  She is here today for follow up and disease management.      Oncology History:  2015:HSIL Pap at the beginning of pregnancy. Repeat pap postpartum was ASCUS. Colposcopy was negative.   20: Pelvic US. Uterine fibroids, normal appearance of uterus and ovaries  2020: Pap smear which confirmed squamous cell carcinoma of the cervix. Referred to GYN  ONC  2020: GYN ONC Consult [facility]. Cervical biopsy, RADHA III, unable to identify invasion  2020: Repeat cervical biopsy confirms invasive SCC  2020: Started primary chemoRT for locally advanced cervical cancer. Stage IIIC1(r), large primary tumor with bilateral parametrial invasion and clinical evidence of posterior vaginal involvement. Pelvic lymphadenopathy involving external iliac, perirectal and internal iliac nodes.   20-10/30/20: ChemoRT.   20-20: T&R brachytherapy  2020: Followup with radonc (Dr Hernández)   22: CT A/P with nonspecific increased density in the para-aortic region, which could be seen in the setting of retroperitoneal fibrosis, inflammation, or potentially lymphoma/desmoplastic reaction. New varix from right renal vein extending down into the RLQ.   22: CT abdomen retroperitoneal bx   Final Diagnosis  A and B.  Soft tissue, right retroperitoneum and retroperitoneum, CT-guided needle biopsies:  -Metastatic squamous cell carcinoma.  Addendum 2  CPS  score is 30-40.     RESULT FOR IMMUNOHISTOCHEMICAL VENTANA CLONE  PD-L1 ASSAY  TUMOR PROPORTION SCORE (TPS):  30%  INTERPRETATION: LOW PD-L1 EXPRESSION (TPS >/=1-49%)     10/6/22: PET CT Whole body: IMPRESSION: This patient with history of squamous cell carcinoma of  the cervix there is evidence of disease progression:  1. Large 5 cm right retroperitoneal mass previously biopsied as  metastatic squamous cell carcinoma demonstrates FDG avidity. No  definite significant disease within the pelvis.  2. New metastatic nodes within the retroperitoneum and mediastinum.  3. 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  4. Diffusely increased FDG avidity throughout the terminal ileum and  large intestine without associated mass lesion may represent vascular  congestion or diffuse colitis.  5. Diffuse soft tissue edema.     Plan: Paclitaxel, Carboplatin, Bevacizumab, and pembrolizumab added due to PD-L1 expression every 3 weeks.      10/13/22: Cycle 1 Paclitaxel 175 mg/m2, Carboplatin AUC 6, Bevacizumab 15 mg/m2  11/4/22: Cycle 2 Paclitaxel 175 mg/m2, Carboplatin AUC 5, Bevacizumab 15 mg/m2. Pembrolizumab on hold while awaiting insurance coverage.    11/8/22: ED for nausea (no vomiting) and dizziness, discharged with valium for vertigo.    11/25/22: Cycle 3 Paclitaxel 175 mg/m2, Carboplatin AUC 5, Bevacizumab 15 mg/m2, and pembrolizumab 200 mg.  Defer x 1 week for thrombocytopenia (plts 82).                  Today she comes to clinic feeling well overall and is without concern.  She did have 3 to 4 days of dizziness after chemotherapy and was evaluated in the ED.  They diagnosed her with vertigo and discharged her with Valium which she found helpful; she does need a refill.  She denies nausea or vomiting.  She is not using any antiemetics.  Reports that her appetite is good.  Her weight has been stable.  No diarrhea or  constipation.  No numbness or tingling in her fingers or toes.  Her leg swelling continues to be significantly improved however does note some swelling she is on her feet.  She does note inguinal swelling on the right side that is not new and has not improved like the rest of her swelling.  She notes this area is uncomfortable and a little numb.  She denies any changes in her bladder habits, no fevers or chills, and no chest pain or shortness of breath.                 Review of Systems:    Systemic           no weight changes; no fever; no chills; no night sweats; no appetite changes; +dizziness  Skin           no rashes, or lesions  Eye           no irritation; no changes in vision  Sunil-Laryngeal           no dysphagia; no hoarseness   Pulmonary    no cough; no shortness of breath  Cardiovascular    no chest pain; no palpitations  Gastrointestinal    no diarrhea; no constipation; no abdominal pain; no changes in bowel habits; no blood in stool  Genitourinary   no urinary frequency; no urinary urgency; no dysuria; no pain; no abnormal vaginal discharge; no abnormal vaginal bleeding  Breast   no breast discharge; no breast changes; no breast pain  Musculoskeletal    no myalgias; no arthralgias; no back pain  Psychiatric           no depressed mood; no anxiety    Hematologic   + tender lymph nodes; +right inguinal swelling   Endocrine    no hot flashes; no heat/cold intolerance         Neurological   no tremor; + numbness and tingling; no headaches; no difficulty sleeping      Past Medical History:    Past Medical History:   Diagnosis Date     Cervical cancer (H)          Past Surgical History:    Past Surgical History:   Procedure Laterality Date     CARPAL TUNNEL RELEASE RT/LT Right      TUBAL LIGATION           Health Maintenance Due   Topic Date Due     NICOTINE/TOBACCO CESSATION COUNSELING Q 1 YR  Never done     YEARLY PREVENTIVE VISIT  Never done     ADVANCE CARE PLANNING  Never done     HEPATITIS B  IMMUNIZATION (1 of 3 - 3-dose series) Never done     COVID-19 Vaccine (1) Never done     Pneumococcal Vaccine: Pediatrics (0 to 5 Years) and At-Risk Patients (6 to 64 Years) (1 - PCV) Never done     HEPATITIS C SCREENING  Never done     PHQ-2 (once per calendar year)  Never done     INFLUENZA VACCINE (1) 09/01/2022       Current Medications:     Current Outpatient Medications   Medication Sig Dispense Refill     acetaminophen (TYLENOL) 500 MG tablet Take 2 tablets (1,000 mg) by mouth 3 times daily as needed for mild pain 90 tablet 3     acetaminophen (TYLENOL) 500 MG tablet Take 500-1,000 mg by mouth every 6 hours as needed for mild pain       ibuprofen (ADVIL/MOTRIN) 600 MG tablet Take 600 mg by mouth every 6 hours as needed for moderate pain       ondansetron (ZOFRAN ODT) 4 MG ODT tab Take 2 tablets (8 mg) by mouth every 8 hours as needed for nausea 20 tablet 3     polyethylene glycol (MIRALAX) 17 GM/Dose powder Take 17 g (1 capful) by mouth daily 850 g 3     SENNA-docusate sodium (SENNA S) 8.6-50 MG tablet Take 1 tablet by mouth At Bedtime Can increase up to two tablets twice per day. 60 tablet 3     prochlorperazine (COMPAZINE) 10 MG tablet Take 1 tablet (10 mg) by mouth every 6 hours as needed for nausea or vomiting (Patient not taking: Reported on 10/13/2022) 30 tablet 2         Allergies:        Allergies   Allergen Reactions     Clindamycin Hives        Social History:     Social History     Tobacco Use     Smoking status: Every Day     Packs/day: 1.00     Types: Cigarettes     Smokeless tobacco: Never     Tobacco comments:     9/11/2020 1/2 pack/day, tying tp quit   Substance Use Topics     Alcohol use: Yes     Comment: occ.       History   Drug Use Unknown         Family History:     The patient's family history is notable for:    Family History   Problem Relation Age of Onset     Breast Cancer Mother         8/2020 newly dx         Physical Exam:     /79 (BP Location: Right arm, Patient Position:  Sitting, Cuff Size: Adult Regular)   Pulse 98   Temp 98.4  F (36.9  C) (Oral)   Resp 18   Wt 59 kg (130 lb 1.6 oz)   SpO2 100%   BMI 23.05 kg/m    Body mass index is 23.05 kg/m .    General Appearance: healthy and alert, no distress     HEENT: no palpable nodules or masses        Cardiovascular: regular rate and rhythm, no gallops, rubs or murmurs     Respiratory: lungs clear, no rales, rhonchi or wheezes    Musculoskeletal: extremities non tender and without edema    Skin: no lesions or rashes     Neurological: normal gait, no gross defects     Psychiatric: appropriate mood and affect                               Hematological: normal cervical and supraclavicular lymph nodes     Gastrointestinal:       abdomen soft, non-tender, non-distended    Genitourinary: Deferred.     Lab Results   Component Value Date    WBC 3.3 11/25/2022    WBC 3.0 10/27/2020     Lab Results   Component Value Date    RBC 3.20 11/25/2022    RBC 3.72 10/27/2020     Lab Results   Component Value Date    HGB 10.1 11/25/2022    HGB 11.0 10/27/2020     Lab Results   Component Value Date    HCT 32.2 11/25/2022    HCT 32.8 10/27/2020     No components found for: MCT  Lab Results   Component Value Date     11/25/2022    MCV 88 10/27/2020     Lab Results   Component Value Date    MCH 31.6 11/25/2022    MCH 29.6 10/27/2020     Lab Results   Component Value Date    MCHC 31.4 11/25/2022    MCHC 33.5 10/27/2020     Lab Results   Component Value Date    RDW 20.7 11/25/2022    RDW 13.3 10/27/2020     Lab Results   Component Value Date    PLT 82 11/25/2022    PLT 95 10/27/2020     % Neutrophils   Date Value Ref Range Status   11/25/2022 63 % Final   10/27/2020 83.3 % Final     % Lymphocytes   Date Value Ref Range Status   11/25/2022 24 % Final   10/27/2020 6.4 % Final     % Monocytes   Date Value Ref Range Status   11/25/2022 12 % Final   10/27/2020 8.7 % Final     % Eosinophils   Date Value Ref Range Status   11/25/2022 0 % Final   10/27/2020  1.0 % Final     % Basophils   Date Value Ref Range Status   11/25/2022 1 % Final   10/27/2020 0.3 % Final     % Immature Granulocytes   Date Value Ref Range Status   10/27/2020 0.3 % Final     Absolute Neutrophil   Date Value Ref Range Status   10/27/2020 2.5 1.6 - 8.3 10e9/L Final     Absolute Lymphocytes   Date Value Ref Range Status   10/27/2020 0.2 (L) 0.8 - 5.3 10e9/L Final     Absolute Monocytes   Date Value Ref Range Status   10/27/2020 0.3 0.0 - 1.3 10e9/L Final     Absolute Eosinophils   Date Value Ref Range Status   10/27/2020 0.0 0.0 - 0.7 10e9/L Final     Absolute Basophils   Date Value Ref Range Status   10/27/2020 0.0 0.0 - 0.2 10e9/L Final     Abs Immature Granulocytes   Date Value Ref Range Status   10/27/2020 0.0 0 - 0.4 10e9/L Final     Absolute Immature Granulocytes   Date Value Ref Range Status   11/25/2022 0.0 <=0.4 10e3/uL Final     Nucleated RBCs   Date Value Ref Range Status   10/27/2020 0 0 /100 Final     Absolute Nucleated RBC   Date Value Ref Range Status   10/27/2020 0.0  Final     Last Comprehensive Metabolic Panel:  Sodium   Date Value Ref Range Status   11/25/2022 140 136 - 145 mmol/L Final   10/27/2020 138 133 - 144 mmol/L Final     Potassium   Date Value Ref Range Status   11/25/2022 4.0 3.4 - 5.3 mmol/L Final   10/27/2020 3.3 (L) 3.4 - 5.3 mmol/L Final     Chloride   Date Value Ref Range Status   11/25/2022 104 98 - 107 mmol/L Final   10/27/2020 105 94 - 109 mmol/L Final     Carbon Dioxide   Date Value Ref Range Status   10/27/2020 27 20 - 32 mmol/L Final     Carbon Dioxide (CO2)   Date Value Ref Range Status   11/25/2022 27 22 - 29 mmol/L Final     Anion Gap   Date Value Ref Range Status   11/25/2022 9 7 - 15 mmol/L Final   10/27/2020 6 3 - 14 mmol/L Final     Glucose   Date Value Ref Range Status   11/25/2022 74 70 - 99 mg/dL Final   10/06/2022 99 70 - 99 mg/dL Final     Urea Nitrogen   Date Value Ref Range Status   11/25/2022 13.2 6.0 - 20.0 mg/dL Final   10/27/2020 9 7 - 30  mg/dL Final     Creatinine   Date Value Ref Range Status   11/25/2022 0.54 0.51 - 0.95 mg/dL Final   10/27/2020 0.57 0.52 - 1.04 mg/dL Final     GFR Estimate   Date Value Ref Range Status   11/25/2022 >90 >60 mL/min/1.73m2 Final     Comment:     Effective December 21, 2021 eGFRcr in adults is calculated using the 2021 CKD-EPI creatinine equation which includes age and gender (Azeem et al., [facility], DOI: 10.[facility]6/[id number])   10/27/2020 >90 >60 mL/min/[1.73_m2] Final     Comment:     Non  GFR Calc  Starting 12/18/2018, serum creatinine based estimated GFR (eGFR) will be   calculated using the Chronic Kidney Disease Epidemiology Collaboration   (CKD-EPI) equation.       Calcium   Date Value Ref Range Status   11/25/2022 9.7 8.6 - 10.0 mg/dL Final   10/27/2020 8.8 8.5 - 10.1 mg/dL Final     Bilirubin Total   Date Value Ref Range Status   11/25/2022 0.2 <=1.2 mg/dL Final   10/27/2020 0.3 0.2 - 1.3 mg/dL Final     Alkaline Phosphatase   Date Value Ref Range Status   11/25/2022 69 35 - 104 U/L Final   10/27/2020 64 40 - 150 U/L Final     ALT   Date Value Ref Range Status   11/25/2022 17 10 - 35 U/L Final   10/27/2020 21 0 - 50 U/L Final     AST   Date Value Ref Range Status   11/25/2022 20 10 - 35 U/L Final   10/27/2020 18 0 - 45 U/L Final     Lab Results   Component Value Date    ALBUMIN 4.2 11/25/2022    ALBUMIN 3.8 10/27/2020     Lab Results   Component Value Date    PROTTOTAL 7.2 11/25/2022    PROTTOTAL 7.7 10/27/2020     Magnesium   Date Value Ref Range Status   11/25/2022 1.6 (L) 1.7 - 2.3 mg/dL Final   10/27/2020 1.6 1.6 - 2.3 mg/dL Final     TSH   Date Value Ref Range Status   11/25/2022 1.52 0.30 - 4.20 uIU/mL Final           Assessment:    Suni Zuluaga is a 37 year old woman with 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  She is here today for follow up and disease management.     40 minutes spent on the date of the encounter doing chart review, history and exam,  documentation, and further activities as noted above.      Plan:     1.)        Cervical cancer:   Defer chemo x1 week due to thrombocytopenia.  OK to proceed with planned treatment in one week if labs and BP are WDL.  Message sent to reschedule today's treatment.  RTC 3 weeks after next chemotherapy for labs, CT, follow-up with Dr. Brambila, and next cycle.    All of her upcoming appointments will need to be adjusted.  Message sent to [facility] to coordinate.  Reviewed signs and symptoms for when she should contact the clinic or seek additional care.  Patient to contact the clinic with any questions or concerns in the interim.        Genetic risk factors were assessed and she does not meet qualification for referral.       Labs and/or tests ordered include:  CBC. CMP. Mag. Urine protein. TSH with reflex to T4.                2.)        Health maintenance:  Issues addressed today include following up with PCP for annual health maintenance and non-gynecologic issues.      3.)        BLE edema: Significantly improved overall.    Per her report still has right inguinal edema.  No leg edema noted today.  Her inguinal edema has not changed by her report and no DVT seen on CT, PET, or ultrasound.     4.)        Vertigo: Discussed I generally would not recommend Valium for vertigo.  She could try meclizine if her symptoms resume after this cycle.  Prescription for 12.5 to 25 mg meclizine sent to her local pharmacy per her request.    5.) Hypomagnesemia: Magnesium today 1.6 which is slightly low.  Overall this is stable and does not meet parameters for electrolyte replacement.  We will recheck next week when she comes back for chemo.    6.) Thrombocytopenia: Platelets today 82.  Hold chemotherapy x1 week as above.  Message sent to Dr. Brambila to consider decreasing dose of chemotherapy versus continuing to monitor.  Cycle 2 carboplatin was decreased from AUC of 6 to AUC of 5.       Mounika Ritchie, DNP, APRN, FNP-C  Nurse  Practitioner  Division of Gynecologic Oncology  Pager: [phone]     CC  Patient Care Team:  Suzy Brambila MD as PCP - General (Gynecologic Oncology)  Suzy Brambila MD as MD (Gynecologic Oncology)  Kenneth Shahid as Referring Physician  Liliam Paniagua RN as Specialty Care Coordinator (Hematology & Oncology)

## 2022-11-28 DIAGNOSIS — D69.6 THROMBOCYTOPENIA (H): ICD-10-CM

## 2022-11-28 DIAGNOSIS — C53.8 MALIGNANT NEOPLASM OF OVERLAPPING SITES OF CERVIX (H): Primary | ICD-10-CM

## 2022-11-30 RX ORDER — ALBUTEROL SULFATE 90 UG/1
1-2 AEROSOL, METERED RESPIRATORY (INHALATION)
Status: CANCELLED
Start: 2022-12-05

## 2022-11-30 RX ORDER — METHYLPREDNISOLONE SODIUM SUCCINATE 125 MG/2ML
125 INJECTION, POWDER, LYOPHILIZED, FOR SOLUTION INTRAMUSCULAR; INTRAVENOUS
Status: CANCELLED
Start: 2022-12-05

## 2022-11-30 RX ORDER — DIPHENHYDRAMINE HCL 25 MG
50 CAPSULE ORAL ONCE
Status: CANCELLED
Start: 2022-12-05

## 2022-11-30 RX ORDER — HEPARIN SODIUM,PORCINE 10 UNIT/ML
5 VIAL (ML) INTRAVENOUS
Status: CANCELLED | OUTPATIENT
Start: 2022-12-05

## 2022-11-30 RX ORDER — HEPARIN SODIUM (PORCINE) LOCK FLUSH IV SOLN 100 UNIT/ML 100 UNIT/ML
5 SOLUTION INTRAVENOUS
Status: CANCELLED | OUTPATIENT
Start: 2022-12-05

## 2022-11-30 RX ORDER — ALBUTEROL SULFATE 0.83 MG/ML
2.5 SOLUTION RESPIRATORY (INHALATION)
Status: CANCELLED | OUTPATIENT
Start: 2022-12-05

## 2022-11-30 RX ORDER — PALONOSETRON 0.05 MG/ML
0.25 INJECTION, SOLUTION INTRAVENOUS ONCE
Status: CANCELLED | OUTPATIENT
Start: 2022-12-05

## 2022-11-30 RX ORDER — LORAZEPAM 2 MG/ML
0.5 INJECTION INTRAMUSCULAR EVERY 4 HOURS PRN
Status: CANCELLED | OUTPATIENT
Start: 2022-12-05

## 2022-11-30 RX ORDER — MEPERIDINE HYDROCHLORIDE 25 MG/ML
25 INJECTION INTRAMUSCULAR; INTRAVENOUS; SUBCUTANEOUS EVERY 30 MIN PRN
Status: CANCELLED | OUTPATIENT
Start: 2022-12-05

## 2022-11-30 RX ORDER — EPINEPHRINE 1 MG/ML
0.3 INJECTION, SOLUTION INTRAMUSCULAR; SUBCUTANEOUS EVERY 5 MIN PRN
Status: CANCELLED | OUTPATIENT
Start: 2022-12-05

## 2022-11-30 RX ORDER — DIPHENHYDRAMINE HYDROCHLORIDE 50 MG/ML
50 INJECTION INTRAMUSCULAR; INTRAVENOUS
Status: CANCELLED
Start: 2022-12-05

## 2022-12-03 NOTE — PROGRESS NOTES
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  She is here today for follow up and disease management.      Oncology History:  2015:HSIL Pap at the beginning of pregnancy. Repeat pap postpartum was ASCUS. Colposcopy was negative.   20: Pelvic US. Uterine fibroids, normal appearance of uterus and ovaries  2020: Pap smear which confirmed squamous cell carcinoma of the cervix. Referred to GYN  ONC  2020: GYN ONC Consult [facility]. Cervical biopsy, RADHA III, unable to identify invasion  2020: Repeat cervical biopsy confirms invasive SCC  2020: Started primary chemoRT for locally advanced cervical cancer. Stage IIIC1(r), large primary tumor with bilateral parametrial invasion and clinical evidence of posterior vaginal involvement. Pelvic lymphadenopathy involving external iliac, perirectal and internal iliac nodes.   20-10/30/20: ChemoRT.   20-20: T&R brachytherapy  2020: Followup with radonc (Dr Hernández)   22: CT A/P with nonspecific increased density in the para-aortic region, which could be seen in the setting of retroperitoneal fibrosis, inflammation, or potentially lymphoma/desmoplastic reaction. New varix from right renal vein extending down into the RLQ.   22: CT abdomen retroperitoneal bx   Final Diagnosis  A and B.  Soft tissue, right retroperitoneum and retroperitoneum, CT-guided needle biopsies:  -Metastatic squamous cell carcinoma.  Addendum 2  CPS score is 30-40.     RESULT FOR IMMUNOHISTOCHEMICAL VENTANA CLONE  PD-L1 ASSAY  TUMOR PROPORTION SCORE (TPS):  30%  INTERPRETATION: LOW PD-L1 EXPRESSION (TPS >/=1-49%)     10/6/22: PET CT Whole body: IMPRESSION: This patient with history of squamous cell carcinoma  of  the cervix there is evidence of disease progression:  1. Large 5 cm right retroperitoneal mass previously biopsied as  metastatic squamous cell carcinoma demonstrates FDG avidity. No  definite significant disease within the pelvis.  2. New metastatic nodes within the retroperitoneum and mediastinum.  3. 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  4. Diffusely increased FDG avidity throughout the terminal ileum and  large intestine without associated mass lesion may represent vascular  congestion or diffuse colitis.  5. Diffuse soft tissue edema.     Plan: Paclitaxel, Carboplatin, Bevacizumab, and pembrolizumab added due to PD-L1 expression every 3 weeks.      10/13/22: Cycle 1 Paclitaxel 175 mg/m2, Carboplatin AUC 6, Bevacizumab 15 mg/m2  11/4/22: Cycle 2 Paclitaxel 175 mg/m2, Carboplatin AUC 5, Bevacizumab 15 mg/m2. Pembrolizumab on hold while awaiting insurance coverage.     11/8/22: ED for nausea (no vomiting) and dizziness, discharged with valium for vertigo.     11/25/22: Cycle 3 Paclitaxel 175 mg/m2, Carboplatin AUC 5, Bevacizumab 15 mg/m2, and pembrolizumab 200 mg.  Defer x 1 week for thrombocytopenia (plts 82).  Decrease Paclitaxel 135 mg/m2, Carboplatin AUC 5, Bevacizumab 15 mg/m2, and pembrolizumab 200 mg, given 12/5.                  Today she comes to clinic feeling well overall and is without concern.  No changes since her last visit.  She denies nausea or vomiting after chemotherapy.  She is not using any antiemetics.  Appetite is good, weight stable.  No diarrhea or constipation.  No numbness or tingling in her fingers or toes.  Her leg swelling continues to be significantly improved however some swelling in her feet.  She denies any changes in her bladder habits, no fevers or chills, and no chest pain or shortness of breath.                   Review of Systems:    Systemic           no weight changes;  no fever; no chills; no night sweats; no appetite changes  Skin           no rashes, or lesions  Eye           no irritation; no changes in vision  Sunil-Laryngeal           no dysphagia; no hoarseness   Pulmonary    no cough; no shortness of breath  Cardiovascular    no chest pain; no palpitations  Gastrointestinal    no diarrhea; no constipation; no abdominal pain; no changes in bowel habits; no blood in stool  Genitourinary   no urinary frequency; no urinary urgency; no dysuria; no pain; no abnormal vaginal discharge; no abnormal vaginal bleeding  Breast   no breast discharge; no breast changes; no breast pain  Musculoskeletal    no myalgias; no arthralgias; no back pain  Psychiatric           no depressed mood; no anxiety    Hematologic   no tender lymph nodes; no noticeable swellings or lumps   Endocrine    no hot flashes; no heat/cold intolerance         Neurological   no tremor; no numbness and tingling; no headaches; no difficulty sleeping      Past Medical History:    Past Medical History:   Diagnosis Date     Cervical cancer (H)          Past Surgical History:    Past Surgical History:   Procedure Laterality Date     CARPAL TUNNEL RELEASE RT/LT Right      TUBAL LIGATION           Health Maintenance Due   Topic Date Due     NICOTINE/TOBACCO CESSATION COUNSELING Q 1 YR  Never done     YEARLY PREVENTIVE VISIT  Never done     ADVANCE CARE PLANNING  Never done     HEPATITIS B IMMUNIZATION (1 of 3 - 3-dose series) Never done     COVID-19 Vaccine (1) Never done     Pneumococcal Vaccine: Pediatrics (0 to 5 Years) and At-Risk Patients (6 to 64 Years) (1 - PCV) Never done     HEPATITIS C SCREENING  Never done     PHQ-2 (once per calendar year)  Never done     INFLUENZA VACCINE (1) 09/01/2022       Current Medications:     Current Outpatient Medications   Medication Sig Dispense Refill     acetaminophen (TYLENOL) 500 MG tablet Take 2 tablets (1,000 mg) by mouth 3 times daily as needed for mild pain (Patient not taking:  Reported on 12/5/2022) 90 tablet 3     acetaminophen (TYLENOL) 500 MG tablet Take 500-1,000 mg by mouth every 6 hours as needed for mild pain (Patient not taking: Reported on 12/5/2022)       ibuprofen (ADVIL/MOTRIN) 600 MG tablet Take 600 mg by mouth every 6 hours as needed for moderate pain (Patient not taking: Reported on 12/5/2022)       meclizine (ANTIVERT) 25 MG tablet Take 0.5-1 tablets (12.5-25 mg) by mouth 3 times daily as needed for dizziness (Patient not taking: Reported on 12/5/2022) 30 tablet 1     ondansetron (ZOFRAN ODT) 4 MG ODT tab Take 2 tablets (8 mg) by mouth every 8 hours as needed for nausea (Patient not taking: Reported on 12/5/2022) 20 tablet 3     polyethylene glycol (MIRALAX) 17 GM/Dose powder Take 17 g (1 capful) by mouth daily (Patient not taking: Reported on 12/5/2022) 850 g 3     prochlorperazine (COMPAZINE) 10 MG tablet Take 1 tablet (10 mg) by mouth every 6 hours as needed for nausea or vomiting (Patient not taking: Reported on 10/13/2022) 30 tablet 2     SENNA-docusate sodium (SENNA S) 8.6-50 MG tablet Take 1 tablet by mouth At Bedtime Can increase up to two tablets twice per day. (Patient not taking: Reported on 12/5/2022) 60 tablet 3         Allergies:        Allergies   Allergen Reactions     Clindamycin Hives        Social History:     Social History     Tobacco Use     Smoking status: Every Day     Packs/day: 1.00     Types: Cigarettes     Smokeless tobacco: Never     Tobacco comments:     9/11/2020 1/2 pack/day, tying tp quit   Substance Use Topics     Alcohol use: Yes     Comment: occ.       History   Drug Use Unknown         Family History:     The patient's family history is notable for:    Family History   Problem Relation Age of Onset     Breast Cancer Mother         8/2020 newly dx         Physical Exam:     /75   Pulse 111   Temp 98.4  F (36.9  C) (Oral)   Resp 16   Wt 58.4 kg (128 lb 12.8 oz)   SpO2 100%   BMI 22.82 kg/m    Body mass index is 22.82  kg/m .    General Appearance: healthy and alert, no distress     HEENT: no palpable nodules or masses        Cardiovascular: regular rate and rhythm, no gallops, rubs or murmurs     Respiratory: lungs clear, no rales, rhonchi or wheezes    Musculoskeletal: extremities non tender and without edema    Skin: no lesions or rashes     Neurological: normal gait, no gross defects     Psychiatric: appropriate mood and affect                               Hematological: normal cervical and supraclavicular lymph nodes     Gastrointestinal:       abdomen soft, non-tender, non-distended    Genitourinary: Deferred.     Lab Results   Component Value Date    WBC 3.7 12/05/2022    WBC 3.0 10/27/2020     Lab Results   Component Value Date    RBC 3.33 12/05/2022    RBC 3.72 10/27/2020     Lab Results   Component Value Date    HGB 10.7 12/05/2022    HGB 11.0 10/27/2020     Lab Results   Component Value Date    HCT 33.4 12/05/2022    HCT 32.8 10/27/2020     No components found for: MCT  Lab Results   Component Value Date     12/05/2022    MCV 88 10/27/2020     Lab Results   Component Value Date    MCH 32.1 12/05/2022    MCH 29.6 10/27/2020     Lab Results   Component Value Date    MCHC 32.0 12/05/2022    MCHC 33.5 10/27/2020     Lab Results   Component Value Date    RDW 20.5 12/05/2022    RDW 13.3 10/27/2020     Lab Results   Component Value Date     12/05/2022    PLT 95 10/27/2020     % Neutrophils   Date Value Ref Range Status   12/05/2022 66 % Final   10/27/2020 83.3 % Final     % Lymphocytes   Date Value Ref Range Status   12/05/2022 21 % Final   10/27/2020 6.4 % Final     % Monocytes   Date Value Ref Range Status   12/05/2022 11 % Final   10/27/2020 8.7 % Final     % Eosinophils   Date Value Ref Range Status   12/05/2022 1 % Final   10/27/2020 1.0 % Final     % Basophils   Date Value Ref Range Status   12/05/2022 1 % Final   10/27/2020 0.3 % Final     % Immature Granulocytes   Date Value Ref Range Status   10/27/2020  0.3 % Final     Absolute Neutrophil   Date Value Ref Range Status   10/27/2020 2.5 1.6 - 8.3 10e9/L Final     Absolute Lymphocytes   Date Value Ref Range Status   10/27/2020 0.2 (L) 0.8 - 5.3 10e9/L Final     Absolute Monocytes   Date Value Ref Range Status   10/27/2020 0.3 0.0 - 1.3 10e9/L Final     Absolute Eosinophils   Date Value Ref Range Status   10/27/2020 0.0 0.0 - 0.7 10e9/L Final     Absolute Basophils   Date Value Ref Range Status   10/27/2020 0.0 0.0 - 0.2 10e9/L Final     Abs Immature Granulocytes   Date Value Ref Range Status   10/27/2020 0.0 0 - 0.4 10e9/L Final     Absolute Immature Granulocytes   Date Value Ref Range Status   12/05/2022 0.0 <=0.4 10e3/uL Final     Nucleated RBCs   Date Value Ref Range Status   10/27/2020 0 0 /100 Final     Absolute Nucleated RBC   Date Value Ref Range Status   10/27/2020 0.0  Final     Last Comprehensive Metabolic Panel:  Sodium   Date Value Ref Range Status   12/05/2022 139 136 - 145 mmol/L Final   10/27/2020 138 133 - 144 mmol/L Final     Potassium   Date Value Ref Range Status   12/05/2022 3.9 3.4 - 5.3 mmol/L Final   10/27/2020 3.3 (L) 3.4 - 5.3 mmol/L Final     Chloride   Date Value Ref Range Status   12/05/2022 103 98 - 107 mmol/L Final   10/27/2020 105 94 - 109 mmol/L Final     Carbon Dioxide   Date Value Ref Range Status   10/27/2020 27 20 - 32 mmol/L Final     Carbon Dioxide (CO2)   Date Value Ref Range Status   12/05/2022 26 22 - 29 mmol/L Final     Anion Gap   Date Value Ref Range Status   12/05/2022 10 7 - 15 mmol/L Final   10/27/2020 6 3 - 14 mmol/L Final     Glucose   Date Value Ref Range Status   12/05/2022 89 70 - 99 mg/dL Final   10/06/2022 99 70 - 99 mg/dL Final     Urea Nitrogen   Date Value Ref Range Status   12/05/2022 12.2 6.0 - 20.0 mg/dL Final   10/27/2020 9 7 - 30 mg/dL Final     Creatinine   Date Value Ref Range Status   12/05/2022 0.51 0.51 - 0.95 mg/dL Final   10/27/2020 0.57 0.52 - 1.04 mg/dL Final     GFR Estimate   Date Value Ref Range  Status   12/05/2022 >90 >60 mL/min/1.73m2 Final     Comment:     Effective December 21, 2021 eGFRcr in adults is calculated using the 2021 CKD-EPI creatinine equation which includes age and gender (Azeem et al., NEJM, DOI: 10.1056/LGDXyq2325686)   10/27/2020 >90 >60 mL/min/[1.73_m2] Final     Comment:     Non  GFR Calc  Starting 12/18/2018, serum creatinine based estimated GFR (eGFR) will be   calculated using the Chronic Kidney Disease Epidemiology Collaboration   (CKD-EPI) equation.       Calcium   Date Value Ref Range Status   12/05/2022 9.6 8.6 - 10.0 mg/dL Final   10/27/2020 8.8 8.5 - 10.1 mg/dL Final     Bilirubin Total   Date Value Ref Range Status   12/05/2022 0.2 <=1.2 mg/dL Final   10/27/2020 0.3 0.2 - 1.3 mg/dL Final     Alkaline Phosphatase   Date Value Ref Range Status   12/05/2022 73 35 - 104 U/L Final   10/27/2020 64 40 - 150 U/L Final     ALT   Date Value Ref Range Status   12/05/2022 17 10 - 35 U/L Final   10/27/2020 21 0 - 50 U/L Final     AST   Date Value Ref Range Status   12/05/2022 20 10 - 35 U/L Final   10/27/2020 18 0 - 45 U/L Final     Lab Results   Component Value Date    ALBUMIN 4.2 12/05/2022    ALBUMIN 3.8 10/27/2020     Lab Results   Component Value Date    PROTTOTAL 7.2 12/05/2022    PROTTOTAL 7.7 10/27/2020     Magnesium   Date Value Ref Range Status   12/05/2022 1.6 (L) 1.7 - 2.3 mg/dL Final   10/27/2020 1.6 1.6 - 2.3 mg/dL Final     TSH   Date Value Ref Range Status   12/05/2022 1.38 0.30 - 4.20 uIU/mL Final      Component      Latest Ref Rng & Units 12/5/2022   Protein Albumin Urine      Negative mg/dL Negative         Assessment:    Suni Zuluaga is a 37 year old woman with 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  She is here today for follow up and disease management.      30 minutes spent on the date of the encounter doing chart review, history and exam, documentation, and further activities as noted above.      Plan:      1.)        Cervical cancer:   OK to proceed with planned treatment if labs and BP are WDL.  Discussed with Dr. Brambila and paclitaxel was reduced to 135 mg/m2.  RTC for CT and follow-up with Dr. Brambila.  Message sent to scheduling to move her CT scan to 12/19 and her next schedule.  Reviewed signs and symptoms for when she should contact the clinic or seek additional care.  Patient to contact the clinic with any questions or concerns in the interim.        Genetic risk factors were assessed and she does not meet qualification for referral.       Labs and/or tests ordered include:  CBC. CMP. Mag. Urine protein. TSH with reflex to T4.                2.)        Health maintenance:  Issues addressed today include following up with PCP for annual health maintenance and non-gynecologic issues.      3.)        BLE edema: Significantly improved overall.  Per her report still has right inguinal edema.  No leg edema noted today.  Her inguinal edema has not changed by her report and no DVT seen on CT, PET, or ultrasound.     4.)        Vertigo: As previously discussed try 12.5 to 25 mg meclizine.     5.)        Hypomagnesemia: Magnesium today 1.6 which is slightly low.  Overall this is stable and does not meet parameters for electrolyte replacement.      Mounika Ritchie, DNP, APRN, FNP-C  Nurse Practitioner  Division of Gynecologic Oncology  Pager: [phone]     CC  Patient Care Team:  Suzy Brambila MD as PCP - General (Gynecologic Oncology)  Suzy Brambila MD as MD (Gynecologic Oncology)  Kenneth Shahid as Referring Physician  Suzy Brambila MD as Assigned Cancer Care Provider  Liliam Paniagua RN as Specialty Care Coordinator (Hematology & Oncology)  SELF, REFERRED

## 2022-12-05 ENCOUNTER — APPOINTMENT (OUTPATIENT)
Dept: LAB | Facility: CLINIC | Age: 37
End: 2022-12-05
Attending: OBSTETRICS & GYNECOLOGY
Payer: COMMERCIAL

## 2022-12-05 ENCOUNTER — ONCOLOGY VISIT (OUTPATIENT)
Dept: ONCOLOGY | Facility: CLINIC | Age: 37
End: 2022-12-05
Attending: OBSTETRICS & GYNECOLOGY
Payer: COMMERCIAL

## 2022-12-05 VITALS
SYSTOLIC BLOOD PRESSURE: 120 MMHG | OXYGEN SATURATION: 100 % | DIASTOLIC BLOOD PRESSURE: 75 MMHG | RESPIRATION RATE: 16 BRPM | WEIGHT: 128.8 LBS | HEART RATE: 111 BPM | BODY MASS INDEX: 22.82 KG/M2 | TEMPERATURE: 98.4 F

## 2022-12-05 DIAGNOSIS — Z51.11 ENCOUNTER FOR ANTINEOPLASTIC CHEMOTHERAPY: ICD-10-CM

## 2022-12-05 DIAGNOSIS — C53.8 MALIGNANT NEOPLASM OF OVERLAPPING SITES OF CERVIX (H): Primary | ICD-10-CM

## 2022-12-05 DIAGNOSIS — C53.1 MALIGNANT NEOPLASM OF EXOCERVIX (H): Primary | ICD-10-CM

## 2022-12-05 LAB
ALBUMIN SERPL BCG-MCNC: 4.2 G/DL (ref 3.5–5.2)
ALBUMIN UR-MCNC: NEGATIVE MG/DL
ALP SERPL-CCNC: 73 U/L (ref 35–104)
ALT SERPL W P-5'-P-CCNC: 17 U/L (ref 10–35)
ANION GAP SERPL CALCULATED.3IONS-SCNC: 10 MMOL/L (ref 7–15)
AST SERPL W P-5'-P-CCNC: 20 U/L (ref 10–35)
BASOPHILS # BLD AUTO: 0 10E3/UL (ref 0–0.2)
BASOPHILS NFR BLD AUTO: 1 %
BILIRUB SERPL-MCNC: 0.2 MG/DL
BUN SERPL-MCNC: 12.2 MG/DL (ref 6–20)
CALCIUM SERPL-MCNC: 9.6 MG/DL (ref 8.6–10)
CHLORIDE SERPL-SCNC: 103 MMOL/L (ref 98–107)
CREAT SERPL-MCNC: 0.51 MG/DL (ref 0.51–0.95)
DEPRECATED HCO3 PLAS-SCNC: 26 MMOL/L (ref 22–29)
EOSINOPHIL # BLD AUTO: 0 10E3/UL (ref 0–0.7)
EOSINOPHIL NFR BLD AUTO: 1 %
ERYTHROCYTE [DISTWIDTH] IN BLOOD BY AUTOMATED COUNT: 20.5 % (ref 10–15)
GFR SERPL CREATININE-BSD FRML MDRD: >90 ML/MIN/1.73M2
GLUCOSE SERPL-MCNC: 89 MG/DL (ref 70–99)
HCT VFR BLD AUTO: 33.4 % (ref 35–47)
HGB BLD-MCNC: 10.7 G/DL (ref 11.7–15.7)
IMM GRANULOCYTES # BLD: 0 10E3/UL
IMM GRANULOCYTES NFR BLD: 0 %
LYMPHOCYTES # BLD AUTO: 0.8 10E3/UL (ref 0.8–5.3)
LYMPHOCYTES NFR BLD AUTO: 21 %
MAGNESIUM SERPL-MCNC: 1.6 MG/DL (ref 1.7–2.3)
MCH RBC QN AUTO: 32.1 PG (ref 26.5–33)
MCHC RBC AUTO-ENTMCNC: 32 G/DL (ref 31.5–36.5)
MCV RBC AUTO: 100 FL (ref 78–100)
MONOCYTES # BLD AUTO: 0.4 10E3/UL (ref 0–1.3)
MONOCYTES NFR BLD AUTO: 11 %
NEUTROPHILS # BLD AUTO: 2.4 10E3/UL (ref 1.6–8.3)
NEUTROPHILS NFR BLD AUTO: 66 %
NRBC # BLD AUTO: 0 10E3/UL
NRBC BLD AUTO-RTO: 0 /100
PLATELET # BLD AUTO: 196 10E3/UL (ref 150–450)
POTASSIUM SERPL-SCNC: 3.9 MMOL/L (ref 3.4–5.3)
PROT SERPL-MCNC: 7.2 G/DL (ref 6.4–8.3)
RBC # BLD AUTO: 3.33 10E6/UL (ref 3.8–5.2)
SODIUM SERPL-SCNC: 139 MMOL/L (ref 136–145)
TSH SERPL DL<=0.005 MIU/L-ACNC: 1.38 UIU/ML (ref 0.3–4.2)
WBC # BLD AUTO: 3.7 10E3/UL (ref 4–11)

## 2022-12-05 PROCEDURE — 250N000011 HC RX IP 250 OP 636: Performed by: OBSTETRICS & GYNECOLOGY

## 2022-12-05 PROCEDURE — G0463 HOSPITAL OUTPT CLINIC VISIT: HCPCS

## 2022-12-05 PROCEDURE — 83735 ASSAY OF MAGNESIUM: CPT | Performed by: OBSTETRICS & GYNECOLOGY

## 2022-12-05 PROCEDURE — 96375 TX/PRO/DX INJ NEW DRUG ADDON: CPT

## 2022-12-05 PROCEDURE — 96367 TX/PROPH/DG ADDL SEQ IV INF: CPT

## 2022-12-05 PROCEDURE — 81003 URINALYSIS AUTO W/O SCOPE: CPT | Performed by: OBSTETRICS & GYNECOLOGY

## 2022-12-05 PROCEDURE — 84443 ASSAY THYROID STIM HORMONE: CPT

## 2022-12-05 PROCEDURE — 96415 CHEMO IV INFUSION ADDL HR: CPT

## 2022-12-05 PROCEDURE — 258N000003 HC RX IP 258 OP 636: Performed by: OBSTETRICS & GYNECOLOGY

## 2022-12-05 PROCEDURE — 99214 OFFICE O/P EST MOD 30 MIN: CPT | Performed by: NURSE PRACTITIONER

## 2022-12-05 PROCEDURE — 96413 CHEMO IV INFUSION 1 HR: CPT

## 2022-12-05 PROCEDURE — 36415 COLL VENOUS BLD VENIPUNCTURE: CPT | Performed by: OBSTETRICS & GYNECOLOGY

## 2022-12-05 PROCEDURE — 85025 COMPLETE CBC W/AUTO DIFF WBC: CPT | Performed by: OBSTETRICS & GYNECOLOGY

## 2022-12-05 PROCEDURE — 250N000013 HC RX MED GY IP 250 OP 250 PS 637: Performed by: OBSTETRICS & GYNECOLOGY

## 2022-12-05 PROCEDURE — 96417 CHEMO IV INFUS EACH ADDL SEQ: CPT

## 2022-12-05 PROCEDURE — 80053 COMPREHEN METABOLIC PANEL: CPT | Performed by: OBSTETRICS & GYNECOLOGY

## 2022-12-05 RX ORDER — PALONOSETRON 0.05 MG/ML
0.25 INJECTION, SOLUTION INTRAVENOUS ONCE
Status: COMPLETED | OUTPATIENT
Start: 2022-12-05 | End: 2022-12-05

## 2022-12-05 RX ORDER — DIPHENHYDRAMINE HCL 25 MG
50 CAPSULE ORAL ONCE
Status: COMPLETED | OUTPATIENT
Start: 2022-12-05 | End: 2022-12-05

## 2022-12-05 RX ADMIN — SODIUM CHLORIDE 900 MG: 9 INJECTION, SOLUTION INTRAVENOUS at 17:58

## 2022-12-05 RX ADMIN — FAMOTIDINE 20 MG: 10 INJECTION INTRAVENOUS at 13:34

## 2022-12-05 RX ADMIN — CARBOPLATIN 750 MG: 10 INJECTION, SOLUTION INTRAVENOUS at 17:08

## 2022-12-05 RX ADMIN — SODIUM CHLORIDE 250 ML: 9 INJECTION, SOLUTION INTRAVENOUS at 13:29

## 2022-12-05 RX ADMIN — PACLITAXEL 219 MG: 6 INJECTION, SOLUTION INTRAVENOUS at 14:38

## 2022-12-05 RX ADMIN — PALONOSETRON HYDROCHLORIDE 0.25 MG: 0.25 INJECTION INTRAVENOUS at 13:33

## 2022-12-05 RX ADMIN — DIPHENHYDRAMINE HYDROCHLORIDE 50 MG: 25 CAPSULE ORAL at 13:29

## 2022-12-05 RX ADMIN — DEXAMETHASONE SODIUM PHOSPHATE: 10 INJECTION, SOLUTION INTRAMUSCULAR; INTRAVENOUS at 13:42

## 2022-12-05 RX ADMIN — SODIUM CHLORIDE 200 MG: 9 INJECTION, SOLUTION INTRAVENOUS at 14:03

## 2022-12-05 ASSESSMENT — PAIN SCALES - GENERAL: PAINLEVEL: NO PAIN (0)

## 2022-12-05 NOTE — NURSING NOTE
"Oncology Rooming Note    December 5, 2022 12:54 PM   Suni Zuluaga is a 37 year old female who presents for:    Chief Complaint   Patient presents with     Blood Draw     Vitals, blood drawn and PIV placed by CHALINO PATIÑO. Pt checked into appt.      Oncology Clinic Visit     Malignant neoplasm of overlapping sites of cervix      Initial Vitals: /75   Pulse 111   Temp 98.4  F (36.9  C) (Oral)   Resp 16   Wt 58.4 kg (128 lb 12.8 oz)   SpO2 100%   BMI 22.82 kg/m   Estimated body mass index is 22.82 kg/m  as calculated from the following:    Height as of 10/13/22: 1.6 m (5' 3\").    Weight as of this encounter: 58.4 kg (128 lb 12.8 oz). Body surface area is 1.61 meters squared.  No Pain (0) Comment: Data Unavailable   No LMP recorded.  Allergies reviewed: Yes  Medications reviewed: Yes    Medications: Medication refills not needed today.  Pharmacy name entered into EPIC: [facility] - [place], [place] - [address]    Clinical concerns: No additional clinical concerns.       Felisha Coughlin (Martha), LPN December 5, 2022 12:54 PM              "

## 2022-12-05 NOTE — NURSING NOTE
Chief Complaint   Patient presents with     Blood Draw     Vitals, blood drawn and PIV placed by KG, VAT. Pt checked into appt.      CUONG Snyder LPN

## 2022-12-05 NOTE — PATIENT INSTRUCTIONS
[facility] Triage and after hours / weekends / holidays:  [phone]    Please call the triage or after hours line if you experience a temperature greater than or equal to 100.4, shaking chills, have uncontrolled nausea, vomiting and/or diarrhea, dizziness, shortness of breath, chest pain, bleeding, unexplained bruising, or if you have any other new/concerning symptoms, questions or concerns.      If you are having any concerning symptoms or wish to speak to a provider before your next infusion visit, please call your care coordinator or triage to notify them so we can adequately serve you.     If you need a refill on a narcotic prescription or other medication, please call before your infusion appointment.           December 2022 Sunday Monday Tuesday Wednesday Thursday Friday Saturday                       1     2     3       4     5    LAB PERIPHERAL  11:30 AM   (15 min.)   [facility] LAB DRAW   [facility]    RETURN  11:55 AM   (40 min.)   Mounika Ritchie APRN CNP   [facility]    ONC INFUSION 1 HR (60 MIN)   2:00 PM   (60 min.)    ONC INFUSION NURSE   [facility] 6     7     8     9     10       11     12    LAB  12:30 PM   (15 min.)    LAB   [facility]    CT CHEST/ABDOMEN/PELVIS W  12:50 PM   (20 min.)   [facility]   [facility] [place] [facility] [place] 13     14     15     16     17       18     19     20     21    VIDEO VISIT RETURN   3:40 PM   (20 min.)   Suzy Brambila MD   [facility] 22     23     24       25     26     27     28     29     30     31                   January 2023 Sunday Monday Tuesday Wednesday Thursday Friday Saturday   1     2     3     4     5     6     7       8     9     10     11     12     13     14       15     16     17     18     19     20     21       22     23     24     25     26     27     28       29      30     31                                            Recent Results (from the past 24 hour(s))   TSH with free T4 reflex    Collection Time: 12/05/22 11:56 AM   Result Value Ref Range    TSH 1.38 0.30 - 4.20 uIU/mL   Comprehensive metabolic panel    Collection Time: 12/05/22 11:56 AM   Result Value Ref Range    Sodium 139 136 - 145 mmol/L    Potassium 3.9 3.4 - 5.3 mmol/L    Chloride 103 98 - 107 mmol/L    Carbon Dioxide (CO2) 26 22 - 29 mmol/L    Anion Gap 10 7 - 15 mmol/L    Urea Nitrogen 12.2 6.0 - 20.0 mg/dL    Creatinine 0.51 0.51 - 0.95 mg/dL    Calcium 9.6 8.6 - 10.0 mg/dL    Glucose 89 70 - 99 mg/dL    Alkaline Phosphatase 73 35 - 104 U/L    AST 20 10 - 35 U/L    ALT 17 10 - 35 U/L    Protein Total 7.2 6.4 - 8.3 g/dL    Albumin 4.2 3.5 - 5.2 g/dL    Bilirubin Total 0.2 <=1.2 mg/dL    GFR Estimate >90 >60 mL/min/1.73m2   Magnesium    Collection Time: 12/05/22 11:56 AM   Result Value Ref Range    Magnesium 1.6 (L) 1.7 - 2.3 mg/dL   Protein qualitative urine    Collection Time: 12/05/22 11:56 AM   Result Value Ref Range    Protein Albumin Urine Negative Negative mg/dL   CBC with platelets and differential    Collection Time: 12/05/22 11:56 AM   Result Value Ref Range    WBC Count 3.7 (L) 4.0 - 11.0 10e3/uL    RBC Count 3.33 (L) 3.80 - 5.20 10e6/uL    Hemoglobin 10.7 (L) 11.7 - 15.7 g/dL    Hematocrit 33.4 (L) 35.0 - 47.0 %     78 - 100 fL    MCH 32.1 26.5 - 33.0 pg    MCHC 32.0 31.5 - 36.5 g/dL    RDW 20.5 (H) 10.0 - 15.0 %    Platelet Count 196 150 - 450 10e3/uL    % Neutrophils 66 %    % Lymphocytes 21 %    % Monocytes 11 %    % Eosinophils 1 %    % Basophils 1 %    % Immature Granulocytes 0 %    NRBCs per 100 WBC 0 <1 /100    Absolute Neutrophils 2.4 1.6 - 8.3 10e3/uL    Absolute Lymphocytes 0.8 0.8 - 5.3 10e3/uL    Absolute Monocytes 0.4 0.0 - 1.3 10e3/uL    Absolute Eosinophils 0.0 0.0 - 0.7 10e3/uL    Absolute Basophils 0.0 0.0 - 0.2 10e3/uL    Absolute Immature Granulocytes 0.0 <=0.4 10e3/uL     Absolute NRBCs 0.0 10e3/uL

## 2022-12-05 NOTE — PROGRESS NOTES
Infusion Nursing Note:  Suni Zuluaga presents today for cycle 3, day 1 keytruda, taxol, carboplatin, avastin.    Patient seen by provider today: Yes: Mounika Meléndez   present during visit today: Not Applicable.    Note: Keytruda is new for patient today, was not approved by insurance prior to this cycle. Taxol dose reduced due to thrombocytopenia with last cycle. Patient was only scheduled for avastin today. RN confirmed with provider Mounika Ritchie that patient should be getting keyruda, taxol, carboplatin, and avastin. Due to the long length of infusion and late of time day in which patient was scheduled, RN discussed options with pharmacy for safely administering medications within clinic hours.  Conchis Leslie RN spoke with Zuleyka in pharmacy who gave the ok to infuse taxol over 2.5 hours due to it being dose reduced today to 135mg/m2.      Intravenous Access:  Peripheral IV placed in lab    Treatment Conditions:  Lab Results   Component Value Date    HGB 10.7 (L) 12/05/2022    WBC 3.7 (L) 12/05/2022    ANEU 2.5 10/27/2020    ANEUTAUTO 2.4 12/05/2022     12/05/2022      Lab Results   Component Value Date     12/05/2022    POTASSIUM 3.9 12/05/2022    MAG 1.6 (L) 12/05/2022    CR 0.51 12/05/2022    HARRISON 9.6 12/05/2022    BILITOTAL 0.2 12/05/2022    ALBUMIN 4.2 12/05/2022    ALT 17 12/05/2022    AST 20 12/05/2022     Results reviewed, labs MET treatment parameters, ok to proceed with treatment.  Urine negative for protein.  /75.    Post Infusion Assessment:  Patient tolerated infusion without incident.  Blood return noted pre and post infusion.  Site patent and intact, free from redness, edema or discomfort.  No evidence of extravasations.  Access discontinued per protocol.     Discharge Plan:   Patient declined prescription refills.  Discharge instructions reviewed with: Patient.  Patient and/or family verbalized understanding of discharge instructions and all questions answered.  AVS to  patient via [employer].  Patient will return 12/21 for next MD appointment.   Patient discharged in stable condition accompanied by: self.  Departure Mode: Ambulatory.      Meredith Yi RN

## 2022-12-05 NOTE — LETTER
2022         RE: Suni Zuluaga  [address]  [place] [address]        Dear Colleague,    Thank you for referring your patient, Suni Zuluaga, to the [facility]. Please see a copy of my visit note below.    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  She is here today for follow up and disease management.      Oncology History:  2015:HSIL Pap at the beginning of pregnancy. Repeat pap postpartum was ASCUS. Colposcopy was negative.   20: Pelvic US. Uterine fibroids, normal appearance of uterus and ovaries  2020: Pap smear which confirmed squamous cell carcinoma of the cervix. Referred to GYN  ONC  2020: GYN ONC Consult [facility]. Cervical biopsy, RADHA III, unable to identify invasion  2020: Repeat cervical biopsy confirms invasive SCC  2020: Started primary chemoRT for locally advanced cervical cancer. Stage IIIC1(r), large primary tumor with bilateral parametrial invasion and clinical evidence of posterior vaginal involvement. Pelvic lymphadenopathy involving external iliac, perirectal and internal iliac nodes.   20-10/30/20: ChemoRT.   20-20: T&R brachytherapy  2020: Followup with radonc (Dr Hernández)   22: CT A/P with nonspecific increased density in the para-aortic region, which could be seen in the setting of retroperitoneal fibrosis, inflammation, or potentially lymphoma/desmoplastic reaction. New varix from right renal vein extending down into the RLQ.   22: CT abdomen retroperitoneal bx   Final Diagnosis  A and B.  Soft tissue, right retroperitoneum and retroperitoneum, CT-guided needle biopsies:  -Metastatic squamous cell carcinoma.  Addendum 2  CPS score  is 30-40.     RESULT FOR IMMUNOHISTOCHEMICAL VENTANA CLONE  PD-L1 ASSAY  TUMOR PROPORTION SCORE (TPS):  30%  INTERPRETATION: LOW PD-L1 EXPRESSION (TPS >/=1-49%)     10/6/22: PET CT Whole body: IMPRESSION: This patient with history of squamous cell carcinoma of  the cervix there is evidence of disease progression:  1. Large 5 cm right retroperitoneal mass previously biopsied as  metastatic squamous cell carcinoma demonstrates FDG avidity. No  definite significant disease within the pelvis.  2. New metastatic nodes within the retroperitoneum and mediastinum.  3. 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  4. Diffusely increased FDG avidity throughout the terminal ileum and  large intestine without associated mass lesion may represent vascular  congestion or diffuse colitis.  5. Diffuse soft tissue edema.     Plan: Paclitaxel, Carboplatin, Bevacizumab, and pembrolizumab added due to PD-L1 expression every 3 weeks.      10/13/22: Cycle 1 Paclitaxel 175 mg/m2, Carboplatin AUC 6, Bevacizumab 15 mg/m2  11/4/22: Cycle 2 Paclitaxel 175 mg/m2, Carboplatin AUC 5, Bevacizumab 15 mg/m2. Pembrolizumab on hold while awaiting insurance coverage.     11/8/22: ED for nausea (no vomiting) and dizziness, discharged with valium for vertigo.     11/25/22: Cycle 3 Paclitaxel 175 mg/m2, Carboplatin AUC 5, Bevacizumab 15 mg/m2, and pembrolizumab 200 mg.  Defer x 1 week for thrombocytopenia (plts 82).  Decrease Paclitaxel 135 mg/m2, Carboplatin AUC 5, Bevacizumab 15 mg/m2, and pembrolizumab 200 mg, given 12/5.                  Today she comes to clinic feeling well overall and is without concern.  No changes since her last visit.  She denies nausea or vomiting after chemotherapy.  She is not using any antiemetics.  Appetite is good, weight stable.  No diarrhea or constipation.  No numbness or tingling in her fingers or toes.  Her leg swelling  continues to be significantly improved however some swelling in her feet.  She denies any changes in her bladder habits, no fevers or chills, and no chest pain or shortness of breath.                   Review of Systems:    Systemic           no weight changes; no fever; no chills; no night sweats; no appetite changes  Skin           no rashes, or lesions  Eye           no irritation; no changes in vision  Sunil-Laryngeal           no dysphagia; no hoarseness   Pulmonary    no cough; no shortness of breath  Cardiovascular    no chest pain; no palpitations  Gastrointestinal    no diarrhea; no constipation; no abdominal pain; no changes in bowel habits; no blood in stool  Genitourinary   no urinary frequency; no urinary urgency; no dysuria; no pain; no abnormal vaginal discharge; no abnormal vaginal bleeding  Breast   no breast discharge; no breast changes; no breast pain  Musculoskeletal    no myalgias; no arthralgias; no back pain  Psychiatric           no depressed mood; no anxiety    Hematologic   no tender lymph nodes; no noticeable swellings or lumps   Endocrine    no hot flashes; no heat/cold intolerance         Neurological   no tremor; no numbness and tingling; no headaches; no difficulty sleeping      Past Medical History:    Past Medical History:   Diagnosis Date     Cervical cancer (H)          Past Surgical History:    Past Surgical History:   Procedure Laterality Date     CARPAL TUNNEL RELEASE RT/LT Right      TUBAL LIGATION           Health Maintenance Due   Topic Date Due     NICOTINE/TOBACCO CESSATION COUNSELING Q 1 YR  Never done     YEARLY PREVENTIVE VISIT  Never done     ADVANCE CARE PLANNING  Never done     HEPATITIS B IMMUNIZATION (1 of 3 - 3-dose series) Never done     COVID-19 Vaccine (1) Never done     Pneumococcal Vaccine: Pediatrics (0 to 5 Years) and At-Risk Patients (6 to 64 Years) (1 - PCV) Never done     HEPATITIS C SCREENING  Never done     PHQ-2 (once per calendar year)  Never done      INFLUENZA VACCINE (1) 09/01/2022       Current Medications:     Current Outpatient Medications   Medication Sig Dispense Refill     acetaminophen (TYLENOL) 500 MG tablet Take 2 tablets (1,000 mg) by mouth 3 times daily as needed for mild pain (Patient not taking: Reported on 12/5/2022) 90 tablet 3     acetaminophen (TYLENOL) 500 MG tablet Take 500-1,000 mg by mouth every 6 hours as needed for mild pain (Patient not taking: Reported on 12/5/2022)       ibuprofen (ADVIL/MOTRIN) 600 MG tablet Take 600 mg by mouth every 6 hours as needed for moderate pain (Patient not taking: Reported on 12/5/2022)       meclizine (ANTIVERT) 25 MG tablet Take 0.5-1 tablets (12.5-25 mg) by mouth 3 times daily as needed for dizziness (Patient not taking: Reported on 12/5/2022) 30 tablet 1     ondansetron (ZOFRAN ODT) 4 MG ODT tab Take 2 tablets (8 mg) by mouth every 8 hours as needed for nausea (Patient not taking: Reported on 12/5/2022) 20 tablet 3     polyethylene glycol (MIRALAX) 17 GM/Dose powder Take 17 g (1 capful) by mouth daily (Patient not taking: Reported on 12/5/2022) 850 g 3     prochlorperazine (COMPAZINE) 10 MG tablet Take 1 tablet (10 mg) by mouth every 6 hours as needed for nausea or vomiting (Patient not taking: Reported on 10/13/2022) 30 tablet 2     SENNA-docusate sodium (SENNA S) 8.6-50 MG tablet Take 1 tablet by mouth At Bedtime Can increase up to two tablets twice per day. (Patient not taking: Reported on 12/5/2022) 60 tablet 3         Allergies:        Allergies   Allergen Reactions     Clindamycin Hives        Social History:     Social History     Tobacco Use     Smoking status: Every Day     Packs/day: 1.00     Types: Cigarettes     Smokeless tobacco: Never     Tobacco comments:     9/11/2020 1/2 pack/day, tying tp quit   Substance Use Topics     Alcohol use: Yes     Comment: occ.       History   Drug Use Unknown         Family History:     The patient's family history is notable for:    Family History   Problem  Relation Age of Onset     Breast Cancer Mother         8/2020 newly dx         Physical Exam:     /75   Pulse 111   Temp 98.4  F (36.9  C) (Oral)   Resp 16   Wt 58.4 kg (128 lb 12.8 oz)   SpO2 100%   BMI 22.82 kg/m    Body mass index is 22.82 kg/m .    General Appearance: healthy and alert, no distress     HEENT: no palpable nodules or masses        Cardiovascular: regular rate and rhythm, no gallops, rubs or murmurs     Respiratory: lungs clear, no rales, rhonchi or wheezes    Musculoskeletal: extremities non tender and without edema    Skin: no lesions or rashes     Neurological: normal gait, no gross defects     Psychiatric: appropriate mood and affect                               Hematological: normal cervical and supraclavicular lymph nodes     Gastrointestinal:       abdomen soft, non-tender, non-distended    Genitourinary: Deferred.     Lab Results   Component Value Date    WBC 3.7 12/05/2022    WBC 3.0 10/27/2020     Lab Results   Component Value Date    RBC 3.33 12/05/2022    RBC 3.72 10/27/2020     Lab Results   Component Value Date    HGB 10.7 12/05/2022    HGB 11.0 10/27/2020     Lab Results   Component Value Date    HCT 33.4 12/05/2022    HCT 32.8 10/27/2020     No components found for: MCT  Lab Results   Component Value Date     12/05/2022    MCV 88 10/27/2020     Lab Results   Component Value Date    MCH 32.1 12/05/2022    MCH 29.6 10/27/2020     Lab Results   Component Value Date    MCHC 32.0 12/05/2022    MCHC 33.5 10/27/2020     Lab Results   Component Value Date    RDW 20.5 12/05/2022    RDW 13.3 10/27/2020     Lab Results   Component Value Date     12/05/2022    PLT 95 10/27/2020     % Neutrophils   Date Value Ref Range Status   12/05/2022 66 % Final   10/27/2020 83.3 % Final     % Lymphocytes   Date Value Ref Range Status   12/05/2022 21 % Final   10/27/2020 6.4 % Final     % Monocytes   Date Value Ref Range Status   12/05/2022 11 % Final   10/27/2020 8.7 % Final     %  Eosinophils   Date Value Ref Range Status   12/05/2022 1 % Final   10/27/2020 1.0 % Final     % Basophils   Date Value Ref Range Status   12/05/2022 1 % Final   10/27/2020 0.3 % Final     % Immature Granulocytes   Date Value Ref Range Status   10/27/2020 0.3 % Final     Absolute Neutrophil   Date Value Ref Range Status   10/27/2020 2.5 1.6 - 8.3 10e9/L Final     Absolute Lymphocytes   Date Value Ref Range Status   10/27/2020 0.2 (L) 0.8 - 5.3 10e9/L Final     Absolute Monocytes   Date Value Ref Range Status   10/27/2020 0.3 0.0 - 1.3 10e9/L Final     Absolute Eosinophils   Date Value Ref Range Status   10/27/2020 0.0 0.0 - 0.7 10e9/L Final     Absolute Basophils   Date Value Ref Range Status   10/27/2020 0.0 0.0 - 0.2 10e9/L Final     Abs Immature Granulocytes   Date Value Ref Range Status   10/27/2020 0.0 0 - 0.4 10e9/L Final     Absolute Immature Granulocytes   Date Value Ref Range Status   12/05/2022 0.0 <=0.4 10e3/uL Final     Nucleated RBCs   Date Value Ref Range Status   10/27/2020 0 0 /100 Final     Absolute Nucleated RBC   Date Value Ref Range Status   10/27/2020 0.0  Final     Last Comprehensive Metabolic Panel:  Sodium   Date Value Ref Range Status   12/05/2022 139 136 - 145 mmol/L Final   10/27/2020 138 133 - 144 mmol/L Final     Potassium   Date Value Ref Range Status   12/05/2022 3.9 3.4 - 5.3 mmol/L Final   10/27/2020 3.3 (L) 3.4 - 5.3 mmol/L Final     Chloride   Date Value Ref Range Status   12/05/2022 103 98 - 107 mmol/L Final   10/27/2020 105 94 - 109 mmol/L Final     Carbon Dioxide   Date Value Ref Range Status   10/27/2020 27 20 - 32 mmol/L Final     Carbon Dioxide (CO2)   Date Value Ref Range Status   12/05/2022 26 22 - 29 mmol/L Final     Anion Gap   Date Value Ref Range Status   12/05/2022 10 7 - 15 mmol/L Final   10/27/2020 6 3 - 14 mmol/L Final     Glucose   Date Value Ref Range Status   12/05/2022 89 70 - 99 mg/dL Final   10/06/2022 99 70 - 99 mg/dL Final     Urea Nitrogen   Date Value Ref  Range Status   12/05/2022 12.2 6.0 - 20.0 mg/dL Final   10/27/2020 9 7 - 30 mg/dL Final     Creatinine   Date Value Ref Range Status   12/05/2022 0.51 0.51 - 0.95 mg/dL Final   10/27/2020 0.57 0.52 - 1.04 mg/dL Final     GFR Estimate   Date Value Ref Range Status   12/05/2022 >90 >60 mL/min/1.73m2 Final     Comment:     Effective December 21, 2021 eGFRcr in adults is calculated using the 2021 CKD-EPI creatinine equation which includes age and gender (Azeem et al., NEJM, DOI: 10.1056/NAIPsg7186696)   10/27/2020 >90 >60 mL/min/[1.73_m2] Final     Comment:     Non  GFR Calc  Starting 12/18/2018, serum creatinine based estimated GFR (eGFR) will be   calculated using the Chronic Kidney Disease Epidemiology Collaboration   (CKD-EPI) equation.       Calcium   Date Value Ref Range Status   12/05/2022 9.6 8.6 - 10.0 mg/dL Final   10/27/2020 8.8 8.5 - 10.1 mg/dL Final     Bilirubin Total   Date Value Ref Range Status   12/05/2022 0.2 <=1.2 mg/dL Final   10/27/2020 0.3 0.2 - 1.3 mg/dL Final     Alkaline Phosphatase   Date Value Ref Range Status   12/05/2022 73 35 - 104 U/L Final   10/27/2020 64 40 - 150 U/L Final     ALT   Date Value Ref Range Status   12/05/2022 17 10 - 35 U/L Final   10/27/2020 21 0 - 50 U/L Final     AST   Date Value Ref Range Status   12/05/2022 20 10 - 35 U/L Final   10/27/2020 18 0 - 45 U/L Final     Lab Results   Component Value Date    ALBUMIN 4.2 12/05/2022    ALBUMIN 3.8 10/27/2020     Lab Results   Component Value Date    PROTTOTAL 7.2 12/05/2022    PROTTOTAL 7.7 10/27/2020     Magnesium   Date Value Ref Range Status   12/05/2022 1.6 (L) 1.7 - 2.3 mg/dL Final   10/27/2020 1.6 1.6 - 2.3 mg/dL Final     TSH   Date Value Ref Range Status   12/05/2022 1.38 0.30 - 4.20 uIU/mL Final      Component      Latest Ref Rng & Units 12/5/2022   Protein Albumin Urine      Negative mg/dL Negative         Assessment:    Suni Zuluaga is a 37 year old woman with stage IIIC1(r) squamous cell  carcinoma of the cervix (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  She is here today for follow up and disease management.      30 minutes spent on the date of the encounter doing chart review, history and exam, documentation, and further activities as noted above.      Plan:     1.)        Cervical cancer:   OK to proceed with planned treatment if labs and BP are WDL.  Discussed with Dr. Brambila and paclitaxel was reduced to 135 mg/m2.  RTC for CT and follow-up with Dr. Brambila.  Message sent to scheduling to move her CT scan to 12/19 and her next schedule.  Reviewed signs and symptoms for when she should contact the clinic or seek additional care.  Patient to contact the clinic with any questions or concerns in the interim.        Genetic risk factors were assessed and she does not meet qualification for referral.       Labs and/or tests ordered include:  CBC. CMP. Mag. Urine protein. TSH with reflex to T4.                2.)        Health maintenance:  Issues addressed today include following up with PCP for annual health maintenance and non-gynecologic issues.      3.)        BLE edema: Significantly improved overall.  Per her report still has right inguinal edema.  No leg edema noted today.  Her inguinal edema has not changed by her report and no DVT seen on CT, PET, or ultrasound.     4.)        Vertigo: As previously discussed try 12.5 to 25 mg meclizine.     5.)        Hypomagnesemia: Magnesium today 1.6 which is slightly low.  Overall this is stable and does not meet parameters for electrolyte replacement.      Mounika Ritchie, DNP, APRN, FNP-C  Nurse Practitioner  Division of Gynecologic Oncology  Pager: [phone]     CC  Patient Care Team:  Suzy Brambila MD as PCP - General (Gynecologic Oncology)  Kenneth Shahid as Referring Physician  Liliam Paniagua, RN as Specialty Care Coordinator (Hematology & Oncology)

## 2022-12-05 NOTE — PROGRESS NOTES
Pt scheduled incorrectly for the amount of time needed to receive her entire chemo regimen today; to pt lives 2.5 hours away and is unable to return for chemo on a different day. Ok per Zuleyka ASKEW Pharm D; OK to increase taxol infusion rate to be completed over 2.5 hours due to time constraints; taxol dosing is 135mg/m2. Additionally, okay to administer carboplatin and zirabev (through different IVs) simultaneously. Conchis Leslie RN

## 2022-12-19 ENCOUNTER — ANCILLARY PROCEDURE (OUTPATIENT)
Dept: CT IMAGING | Facility: CLINIC | Age: 37
End: 2022-12-19
Attending: OBSTETRICS & GYNECOLOGY
Payer: COMMERCIAL

## 2022-12-19 DIAGNOSIS — C53.1 MALIGNANT NEOPLASM OF EXOCERVIX (H): ICD-10-CM

## 2022-12-19 PROCEDURE — 71260 CT THORAX DX C+: CPT | Performed by: RADIOLOGY

## 2022-12-19 PROCEDURE — 74177 CT ABD & PELVIS W/CONTRAST: CPT | Performed by: RADIOLOGY

## 2022-12-19 RX ORDER — IOPAMIDOL 755 MG/ML
70 INJECTION, SOLUTION INTRAVASCULAR ONCE
Status: COMPLETED | OUTPATIENT
Start: 2022-12-19 | End: 2022-12-19

## 2022-12-19 RX ADMIN — IOPAMIDOL 70 ML: 755 INJECTION, SOLUTION INTRAVASCULAR at 15:14

## 2022-12-28 NOTE — PROGRESS NOTES
Suni is a 37 year old who is being evaluated via a billable video visit.      How would you like to obtain your AVS? [employer]  If the video visit is dropped, the invitation should be resent by: Text to cell phone: [phone]  Will anyone else be joining your video visit? Brittaney Avendanoivan Mayersana paula LEW    Video-Visit Details    Type of service:  Video Visit   Video Start Time: 1025  Video End Time:1040    Originating Location (pt. Location): work/car  Distant Location (provider location):  [facility]  Platform used for Video Visit: [email or web address]    .                   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 She is here today for follow up and disease management by video.     Oncology History:  2015:HSIL Pap at the beginning of pregnancy. Repeat pap postpartum was ASCUS. Colposcopy was negative.   20: Pelvic US. Uterine fibroids, normal appearance of uterus and ovaries  2020: Pap smear which confirmed squamous cell carcinoma of the cervix. Referred to GYN  ONC  2020: GYN ONC Consult [facility]. Cervical biopsy, RADHA III, unable to identify invasion  2020: Repeat cervical biopsy confirms invasive SCC  2020: Started primary chemoRT for locally advanced cervical cancer. Stage IIIC1(r), large primary tumor with bilateral parametrial invasion and clinical evidence of posterior vaginal involvement. Pelvic lymphadenopathy involving external iliac, perirectal and internal iliac nodes.   20-10/30/20: ChemoRT.   20-20: T&R brachytherapy  2020: Followup with radonvern (Dr Hernández)   22: CT A/P with nonspecific increased density in the para-aortic region, which could be seen in the setting of retroperitoneal fibrosis, inflammation, or potentially lymphoma/desmoplastic reaction. New varix from right  renal vein extending down into the RLQ.   9/28/22: CT abdomen retroperitoneal bx   Final Diagnosis  A and B.  Soft tissue, right retroperitoneum and retroperitoneum, CT-guided needle biopsies:  -Metastatic squamous cell carcinoma.  Addendum 2  CPS score is 30-40.     RESULT FOR IMMUNOHISTOCHEMICAL VENTANA CLONE  PD-L1 ASSAY  TUMOR PROPORTION SCORE (TPS):  30%  INTERPRETATION: LOW PD-L1 EXPRESSION (TPS >/=1-49%)     10/6/22: PET CT Whole body: IMPRESSION: This patient with history of squamous cell carcinoma of  the cervix there is evidence of disease progression:  1. Large 5 cm right retroperitoneal mass previously biopsied as  metastatic squamous cell carcinoma demonstrates FDG avidity. No  definite significant disease within the pelvis.  2. New metastatic nodes within the retroperitoneum and mediastinum.  3. 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  4. Diffusely increased FDG avidity throughout the terminal ileum and  large intestine without associated mass lesion may represent vascular  congestion or diffuse colitis.  5. Diffuse soft tissue edema.     Plan: Paclitaxel, Carboplatin, Bevacizumab, and pembrolizumab added due to PD-L1 expression every 3 weeks.      10/13/22: Cycle 1 Paclitaxel 175 mg/m2, Carboplatin AUC 6, Bevacizumab 15 mg/m2  11/4/22: Cycle 2 Paclitaxel 175 mg/m2, Carboplatin AUC 5, Bevacizumab 15 mg/m2. Pembrolizumab on hold while awaiting insurance coverage.     11/8/22: ED for nausea (no vomiting) and dizziness, discharged with valium for vertigo.     11/25/22: Cycle 3 Paclitaxel 175 mg/m2, Carboplatin AUC 5, Bevacizumab 15 mg/m2, and pembrolizumab 200 mg.  Defer x 1 week for thrombocytopenia (plts 82).  Decrease Paclitaxel 135 mg/m2, Carboplatin AUC 5, Bevacizumab 15 mg/m2, and pembrolizumab 200 mg, given 12/5.  12/5/22: Cycle 3 Paclitaxel 135 mg/m2, Carboplatin AUC 5, Bevacizumab 15 mg/m2, and  pembrolizumab 200 mg    12/19/22 CT CAP IMPRESSION:  1.  Metastatic squamous cell carcinoma of the cervix with decreased size of previously FDG avid thoracoabdominal lymph nodes compared to 10/06/2022. No new or worsening disease.   2.  Unchanged tumoral encasement and occlusion of the infrarenal IVC.   3.  Rectal wall thickening and fat stranding suggestive of postradiation inflammation.    12/28/22 plan: continue current treatment regimen with CT/MD after 3 more cycles (6 total)    12/30/22: Cycle 4 Paclitaxel 135 mg/m2, Carboplatin AUC 5, Bevacizumab 15 mg/m2, and pembrolizumab 200 mg  Addendum: Paclitaxel and Carboplatin held due to platelets of 39. Bevacizumab and Pembro given.             Today Suni reports swelling in right groin which she noticed at diagnosis. Suni believes that this has mildly decreased. Will have right leg edema that comes and goes. No redness or warmth of the right leg or calf. No inguinal hernia. Right leg swelling comes and goes. Swells while on feet during the day at work. Improves with elevating feet. Swelling is the same in the right leg since last dopplers in October.     She denies nausea or vomiting after chemotherapy.  She is not using any antiemetics.  Appetite is good, weight stable.  No diarrhea or constipation.  No numbness or tingling in her fingers or toes. She denies any changes in her bladder habits, no fevers or chills, and no chest pain or shortness of breath.  Pt denies neuropathy. Eating and drinking well. No pain ongoing.                 Review of Systems:    Systemic           no weight changes; no fever; no chills; no night sweats; no appetite changes  Skin           no rashes, or lesions  Eye           no irritation; no changes in vision  Sunil-Laryngeal           no dysphagia; no hoarseness   Pulmonary    no cough; no shortness of breath  Cardiovascular    no chest pain; no palpitations  Gastrointestinal    no diarrhea; no constipation; no abdominal pain;  no changes in bowel  habits; no blood in stool  Genitourinary   no urinary frequency; no urinary urgency; no dysuria; no pain; no abnormal vaginal discharge; no abnormal vaginal bleeding  Breast   no breast discharge; no breast changes; no breast pain  Musculoskeletal    no myalgias; no arthralgias; no back pain  Psychiatric           no depressed mood; no anxiety    Hematologic           no tender lymph nodes; + noticeable swellings or lumps   Endocrine    no hot flashes; no heat/cold intolerance         Neurological   no tremor; no numbness and tingling; no headaches; no difficulty sleeping      Past Medical History:    Past Medical History:   Diagnosis Date     Cervical cancer (H)          Past Surgical History:    Past Surgical History:   Procedure Laterality Date     CARPAL TUNNEL RELEASE RT/LT Right      TUBAL LIGATION           Health Maintenance Due   Topic Date Due     NICOTINE/TOBACCO CESSATION COUNSELING Q 1 YR  Never done     YEARLY PREVENTIVE VISIT  Never done     ADVANCE CARE PLANNING  Never done     HEPATITIS B IMMUNIZATION (1 of 3 - 3-dose series) Never done     COVID-19 Vaccine (1) Never done     Pneumococcal Vaccine: Pediatrics (0 to 5 Years) and At-Risk Patients (6 to 64 Years) (1 - PCV) Never done     HEPATITIS C SCREENING  Never done     PHQ-2 (once per calendar year)  Never done     INFLUENZA VACCINE (1) 09/01/2022       Current Medications:     Current Outpatient Medications   Medication Sig Dispense Refill     acetaminophen (TYLENOL) 500 MG tablet Take 2 tablets (1,000 mg) by mouth 3 times daily as needed for mild pain (Patient not taking: Reported on 12/5/2022) 90 tablet 3     acetaminophen (TYLENOL) 500 MG tablet Take 500-1,000 mg by mouth every 6 hours as needed for mild pain (Patient not taking: Reported on 12/5/2022)       ibuprofen (ADVIL/MOTRIN) 600 MG tablet Take 600 mg by mouth every 6 hours as needed for moderate pain (Patient not taking: Reported on 12/5/2022)       meclizine  (ANTIVERT) 25 MG tablet Take 0.5-1 tablets (12.5-25 mg) by mouth 3 times daily as needed for dizziness (Patient not taking: Reported on 12/5/2022) 30 tablet 1     ondansetron (ZOFRAN ODT) 4 MG ODT tab Take 2 tablets (8 mg) by mouth every 8 hours as needed for nausea (Patient not taking: Reported on 12/5/2022) 20 tablet 3     polyethylene glycol (MIRALAX) 17 GM/Dose powder Take 17 g (1 capful) by mouth daily (Patient not taking: Reported on 12/5/2022) 850 g 3     prochlorperazine (COMPAZINE) 10 MG tablet Take 1 tablet (10 mg) by mouth every 6 hours as needed for nausea or vomiting (Patient not taking: Reported on 10/13/2022) 30 tablet 2     SENNA-docusate sodium (SENNA S) 8.6-50 MG tablet Take 1 tablet by mouth At Bedtime Can increase up to two tablets twice per day. (Patient not taking: Reported on 12/5/2022) 60 tablet 3         Allergies:        Allergies   Allergen Reactions     Clindamycin Hives        Social History:     Social History     Tobacco Use     Smoking status: Every Day     Packs/day: 1.00     Types: Cigarettes     Smokeless tobacco: Never     Tobacco comments:     9/11/2020 1/2 pack/day, tying tp quit   Substance Use Topics     Alcohol use: Yes     Comment: occ.       History   Drug Use Unknown         Family History:     The patient's family history is notable for     Family History   Problem Relation Age of Onset     Breast Cancer Mother         8/2020 newly dx         Physical Exam:     There were no vitals taken for this visit.  There is no height or weight on file to calculate BMI.    General Appearance:  healthy and alert, no distress     Eyes:  Eyes grossly normal to inspection.  No discharge or erythema, or obvious scleral/conjunctival abnormalities.     Respiratory:     No audible wheeze, cough, or visible cyanosis.  No visible retractions or increased work of breathing.      Musculoskeletal:         extremities non tender and without edema     Skin:    no lesions or rashes on visible skin      Neurological:   normal gait, no gross defects                Psychiatric:      appropriate mood and affect. 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 She is here today for follow up and disease management by video.     40 minutes spent on the date of the encounter doing chart review, history and exam, documentation, and further activities as noted above.         Plan:     1.)       Cervical cancer:  CT CAP reviewed with pt today (I reviewed with Dr. Brambila yesterday). Plan to continue current treatment plan 3 cycles then CT/MD visit. OK to proceed with planned treatment if labs and BP are WDL. Pt has gynonc follow up scheduled.  Reviewed signs and symptoms for when she should contact the clinic or seek additional care.  Patient to contact the clinic with any questions or concerns in the interim.      2.) Genetic risk factors were assessed and the patient does not meet the qualifications for a referral.      3.) Labs and/or tests ordered include:  Labs to be collected 12/30/22. CT CAP reviewed with pt today.     4.) Health maintenance issues addressed today include annual health maintenance and non-gynecologic issues with PCP.    5.)        RLE edema: ongoing per pt and was seen by PCP last week with US results of subcutaneous edema at left groin no masses or lymphadenopathy. Her inguinal edema has not changed by her report. CT CAP reviewed with pt today. Message sent to scheduling STAT to schedule pt for LE dopplers for tomorrow 12/30/22 at [facility] around chemo. Message sent to Odalys Paniagua to help with lymphedema referral in [facility] (this was also requested and ordered in October 2022 but pt did not schedule).         Addendum:     Message follow up to Dr. Brambila:   I held  Taxol and Carbo today due to her platelets of 39. I did give her Keytruda and Avastin today.     She is already dose reduced to Carbo AUC 5 and Taxol 135 mg/m2. Did you want to further reduce her? Do you want to bring her back next week for T/C or wait until she is due for Keytruda/Avastin again?    Looks like her labs are hyperthyroid. She likely will transition to hypothyroid with Keytruda. I have asked her to see her PCP within the week locally to follow and manage this.     Odalys, can we recheck platelets next week and treatment depending on if Kosta wants to treat with T/C next week or wait?            LAUAR Gilliam, NP-BC  Women's Health Nurse Practitioner  Division of Gynecologic Oncology  [facility]        CC  Patient Care Team:  Suzy Brambila MD as PCP - General (Gynecologic Oncology)  Suzy Brambila MD as MD (Gynecologic Oncology)  Kenneth Shahid as Referring Physician  Suzy Brambila MD as Assigned Cancer Care Provider  Liliam Paniagua RN as Specialty Care Coordinator (Hematology & Oncology)  SELF, REFERRED

## 2022-12-29 ENCOUNTER — VIRTUAL VISIT (OUTPATIENT)
Dept: ONCOLOGY | Facility: CLINIC | Age: 37
End: 2022-12-29
Attending: OBSTETRICS & GYNECOLOGY
Payer: COMMERCIAL

## 2022-12-29 DIAGNOSIS — Z51.11 ENCOUNTER FOR ANTINEOPLASTIC CHEMOTHERAPY: ICD-10-CM

## 2022-12-29 DIAGNOSIS — C53.8 MALIGNANT NEOPLASM OF OVERLAPPING SITES OF CERVIX (H): ICD-10-CM

## 2022-12-29 DIAGNOSIS — M79.89 RIGHT LEG SWELLING: ICD-10-CM

## 2022-12-29 DIAGNOSIS — C53.1 MALIGNANT NEOPLASM OF EXOCERVIX (H): Primary | ICD-10-CM

## 2022-12-29 PROCEDURE — 99215 OFFICE O/P EST HI 40 MIN: CPT | Mod: 95 | Performed by: OBSTETRICS & GYNECOLOGY

## 2022-12-29 NOTE — LETTER
2022         RE: Suni Zuluaga  [address]  [facility] [address]        Dear Colleague,    Thank you for referring your patient, Suni Zuluaga, to the [facility]. Please see a copy of my visit note below.    Suni is a 37 year old who is being evaluated via a billable video visit.      How would you like to obtain your AVS? MyChart  If the video visit is dropped, the invitation should be resent by: Text to cell phone: [phone]  Will anyone else be joining your video visit? Brittaney LEW    Video-Visit Details    Type of service:  Video Visit   Video Start Time: 1025  Video End Time:1040    Originating Location (pt. Location): work/car  Distant Location (provider location):  [facility]  Platform used for Video Visit: TapBookAuthor    .                   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 She is here today for follow up and disease management by video.     Oncology History:  2015:HSIL Pap at the beginning of pregnancy. Repeat pap postpartum was ASCUS. Colposcopy was negative.   20: Pelvic US. Uterine fibroids, normal appearance of uterus and ovaries  2020: Pap smear which confirmed squamous cell carcinoma of the cervix. Referred to GYN  ONC  2020: GYN ONC Consult [facility]. Cervical biopsy, RADHA III, unable to identify invasion  2020: Repeat cervical biopsy confirms invasive SCC  2020: Started primary chemoRT for locally advanced cervical cancer. Stage IIIC1(r), large primary tumor with bilateral parametrial invasion and clinical evidence of posterior vaginal involvement. Pelvic lymphadenopathy involving external iliac, perirectal and internal iliac nodes.   20-10/30/20: ChemoRT.   20-20: T&R  brachytherapy  11/25/2020: Followup with [facility] (Dr Hernández)   8/23/22: CT A/P with nonspecific increased density in the para-aortic region, which could be seen in the setting of retroperitoneal fibrosis, inflammation, or potentially lymphoma/desmoplastic reaction. New varix from right renal vein extending down into the RLQ.   9/28/22: CT abdomen retroperitoneal bx   Final Diagnosis  A and B.  Soft tissue, right retroperitoneum and retroperitoneum, CT-guided needle biopsies:  -Metastatic squamous cell carcinoma.  Addendum 2  CPS score is 30-40.     RESULT FOR IMMUNOHISTOCHEMICAL VENTANA CLONE  PD-L1 ASSAY  TUMOR PROPORTION SCORE (TPS):  30%  INTERPRETATION: LOW PD-L1 EXPRESSION (TPS >/=1-49%)     10/6/22: PET CT Whole body: IMPRESSION: This patient with history of squamous cell carcinoma of  the cervix there is evidence of disease progression:  1. Large 5 cm right retroperitoneal mass previously biopsied as  metastatic squamous cell carcinoma demonstrates FDG avidity. No  definite significant disease within the pelvis.  2. New metastatic nodes within the retroperitoneum and mediastinum.  3. 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  4. Diffusely increased FDG avidity throughout the terminal ileum and  large intestine without associated mass lesion may represent vascular  congestion or diffuse colitis.  5. Diffuse soft tissue edema.     Plan: Paclitaxel, Carboplatin, Bevacizumab, and pembrolizumab added due to PD-L1 expression every 3 weeks.      10/13/22: Cycle 1 Paclitaxel 175 mg/m2, Carboplatin AUC 6, Bevacizumab 15 mg/m2  11/4/22: Cycle 2 Paclitaxel 175 mg/m2, Carboplatin AUC 5, Bevacizumab 15 mg/m2. Pembrolizumab on hold while awaiting insurance coverage.     11/8/22: ED for nausea (no vomiting) and dizziness, discharged with valium for vertigo.     11/25/22: Cycle 3 Paclitaxel 175 mg/m2, Carboplatin AUC 5,  Bevacizumab 15 mg/m2, and pembrolizumab 200 mg.  Defer x 1 week for thrombocytopenia (plts 82).  Decrease Paclitaxel 135 mg/m2, Carboplatin AUC 5, Bevacizumab 15 mg/m2, and pembrolizumab 200 mg, given 12/5.  12/5/22: Cycle 3 Paclitaxel 135 mg/m2, Carboplatin AUC 5, Bevacizumab 15 mg/m2, and pembrolizumab 200 mg    12/19/22 CT CAP IMPRESSION:  1.  Metastatic squamous cell carcinoma of the cervix with decreased size of previously FDG avid thoracoabdominal lymph nodes compared to 10/06/2022. No new or worsening disease.   2.  Unchanged tumoral encasement and occlusion of the infrarenal IVC.   3.  Rectal wall thickening and fat stranding suggestive of postradiation inflammation.    12/28/22 plan: continue current treatment regimen with CT/MD after 3 more cycles (6 total)    12/30/22: Cycle 4 Paclitaxel 135 mg/m2, Carboplatin AUC 5, Bevacizumab 15 mg/m2, and pembrolizumab 200 mg  Addendum: Paclitaxel and Carboplatin held due to platelets of 39. Bevacizumab and Pembro given.             Today Suni reports swelling in right groin which she noticed at diagnosis. Suni believes that this has mildly decreased. Will have right leg edema that comes and goes. No redness or warmth of the right leg or calf. No inguinal hernia. Right leg swelling comes and goes. Swells while on feet during the day at work. Improves with elevating feet. Swelling is the same in the right leg since last dopplers in October.     She denies nausea or vomiting after chemotherapy.  She is not using any antiemetics.  Appetite is good, weight stable.  No diarrhea or constipation.  No numbness or tingling in her fingers or toes. She denies any changes in her bladder habits, no fevers or chills, and no chest pain or shortness of breath.  Pt denies neuropathy. Eating and drinking well. No pain ongoing.                 Review of Systems:    Systemic           no weight changes; no fever; no chills; no night sweats; no appetite changes  Skin           no  rashes, or lesions  Eye           no irritation; no changes in vision  Sunil-Laryngeal           no dysphagia; no hoarseness   Pulmonary    no cough; no shortness of breath  Cardiovascular    no chest pain; no palpitations  Gastrointestinal    no diarrhea; no constipation; no abdominal pain; no changes in bowel  habits; no blood in stool  Genitourinary   no urinary frequency; no urinary urgency; no dysuria; no pain; no abnormal vaginal discharge; no abnormal vaginal bleeding  Breast   no breast discharge; no breast changes; no breast pain  Musculoskeletal    no myalgias; no arthralgias; no back pain  Psychiatric           no depressed mood; no anxiety    Hematologic           no tender lymph nodes; + noticeable swellings or lumps   Endocrine    no hot flashes; no heat/cold intolerance         Neurological   no tremor; no numbness and tingling; no headaches; no difficulty sleeping      Past Medical History:    Past Medical History:   Diagnosis Date     Cervical cancer (H)          Past Surgical History:    Past Surgical History:   Procedure Laterality Date     CARPAL TUNNEL RELEASE RT/LT Right      TUBAL LIGATION           Health Maintenance Due   Topic Date Due     NICOTINE/TOBACCO CESSATION COUNSELING Q 1 YR  Never done     YEARLY PREVENTIVE VISIT  Never done     ADVANCE CARE PLANNING  Never done     HEPATITIS B IMMUNIZATION (1 of 3 - 3-dose series) Never done     COVID-19 Vaccine (1) Never done     Pneumococcal Vaccine: Pediatrics (0 to 5 Years) and At-Risk Patients (6 to 64 Years) (1 - PCV) Never done     HEPATITIS C SCREENING  Never done     PHQ-2 (once per calendar year)  Never done     INFLUENZA VACCINE (1) 09/01/2022       Current Medications:     Current Outpatient Medications   Medication Sig Dispense Refill     acetaminophen (TYLENOL) 500 MG tablet Take 2 tablets (1,000 mg) by mouth 3 times daily as needed for mild pain (Patient not taking: Reported on 12/5/2022) 90 tablet 3     acetaminophen (TYLENOL) 500  MG tablet Take 500-1,000 mg by mouth every 6 hours as needed for mild pain (Patient not taking: Reported on 12/5/2022)       ibuprofen (ADVIL/MOTRIN) 600 MG tablet Take 600 mg by mouth every 6 hours as needed for moderate pain (Patient not taking: Reported on 12/5/2022)       meclizine (ANTIVERT) 25 MG tablet Take 0.5-1 tablets (12.5-25 mg) by mouth 3 times daily as needed for dizziness (Patient not taking: Reported on 12/5/2022) 30 tablet 1     ondansetron (ZOFRAN ODT) 4 MG ODT tab Take 2 tablets (8 mg) by mouth every 8 hours as needed for nausea (Patient not taking: Reported on 12/5/2022) 20 tablet 3     polyethylene glycol (MIRALAX) 17 GM/Dose powder Take 17 g (1 capful) by mouth daily (Patient not taking: Reported on 12/5/2022) 850 g 3     prochlorperazine (COMPAZINE) 10 MG tablet Take 1 tablet (10 mg) by mouth every 6 hours as needed for nausea or vomiting (Patient not taking: Reported on 10/13/2022) 30 tablet 2     SENNA-docusate sodium (SENNA S) 8.6-50 MG tablet Take 1 tablet by mouth At Bedtime Can increase up to two tablets twice per day. (Patient not taking: Reported on 12/5/2022) 60 tablet 3         Allergies:        Allergies   Allergen Reactions     Clindamycin Hives        Social History:     Social History     Tobacco Use     Smoking status: Every Day     Packs/day: 1.00     Types: Cigarettes     Smokeless tobacco: Never     Tobacco comments:     9/11/2020 1/2 pack/day, tying tp quit   Substance Use Topics     Alcohol use: Yes     Comment: occ.       History   Drug Use Unknown         Family History:     The patient's family history is notable for     Family History   Problem Relation Age of Onset     Breast Cancer Mother         8/2020 newly dx         Physical Exam:     There were no vitals taken for this visit.  There is no height or weight on file to calculate BMI.    General Appearance:  healthy and alert, no distress     Eyes:  Eyes grossly normal to inspection.  No discharge or erythema, or  obvious scleral/conjunctival abnormalities.     Respiratory:     No audible wheeze, cough, or visible cyanosis.  No visible retractions or increased work of breathing.      Musculoskeletal:         extremities non tender and without edema     Skin:    no lesions or rashes on visible skin     Neurological:   normal gait, no gross defects                Psychiatric:      appropriate mood and affect. 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 She is here today for follow up and disease management by video.     40 minutes spent on the date of the encounter doing chart review, history and exam, documentation, and further activities as noted above.         Plan:     1.)       Cervical cancer:  CT CAP reviewed with pt today (I reviewed with Dr. Brambila yesterday). Plan to continue current treatment plan 3 cycles then CT/MD visit. OK to proceed with planned treatment if labs and BP are WDL. Pt has gynonc follow up scheduled.  Reviewed signs and symptoms for when she should contact the clinic or seek additional care.  Patient to contact the clinic with any questions or concerns in the interim.      2.) Genetic risk factors were assessed and the patient does not meet the qualifications for a referral.      3.) Labs and/or tests ordered include:  Labs to be collected 12/30/22. CT CAP reviewed with pt today.     4.) Health maintenance issues addressed today include annual health maintenance and non-gynecologic issues with PCP.    5.)        RLE edema: ongoing per pt and was seen by PCP last week with US results of subcutaneous edema at left groin no masses or lymphadenopathy. Her inguinal edema has not changed by her report. CT CAP reviewed with pt today. Message sent to scheduling STAT to  schedule pt for LE dopplers for tomorrow 12/30/22 at [facility] around chemo. Message sent to Odalys Paniagua to help with lymphedema referral in [facility] (this was also requested and ordered in October 2022 but pt did not schedule).         Addendum:     Message follow up to Dr. Brambila:   I held Taxol and Carbo today due to her platelets of 39. I did give her Keytruda and Avastin today.     She is already dose reduced to Carbo AUC 5 and Taxol 135 mg/m2. Did you want to further reduce her? Do you want to bring her back next week for T/C or wait until she is due for Keytruda/Avastin again?    Looks like her labs are hyperthyroid. She likely will transition to hypothyroid with Keytruda. I have asked her to see her PCP within the week locally to follow and manage this.     Odalys, can we recheck platelets next week and treatment depending on if Kosta wants to treat with T/C next week or wait?          Again, thank you for allowing me to participate in the care of your patient.      Sincerely,    Jaqueline Simental, LAURA CNP

## 2022-12-30 ENCOUNTER — INFUSION THERAPY VISIT (OUTPATIENT)
Dept: ONCOLOGY | Facility: CLINIC | Age: 37
End: 2022-12-30
Attending: OBSTETRICS & GYNECOLOGY
Payer: COMMERCIAL

## 2022-12-30 ENCOUNTER — APPOINTMENT (OUTPATIENT)
Dept: LAB | Facility: CLINIC | Age: 37
End: 2022-12-30
Attending: OBSTETRICS & GYNECOLOGY
Payer: COMMERCIAL

## 2022-12-30 VITALS
HEART RATE: 107 BPM | BODY MASS INDEX: 23.38 KG/M2 | TEMPERATURE: 98.5 F | DIASTOLIC BLOOD PRESSURE: 79 MMHG | WEIGHT: 132 LBS | OXYGEN SATURATION: 100 % | RESPIRATION RATE: 16 BRPM | SYSTOLIC BLOOD PRESSURE: 126 MMHG

## 2022-12-30 DIAGNOSIS — C53.8 MALIGNANT NEOPLASM OF OVERLAPPING SITES OF CERVIX (H): Primary | ICD-10-CM

## 2022-12-30 LAB
ALBUMIN SERPL BCG-MCNC: 4 G/DL (ref 3.5–5.2)
ALBUMIN UR-MCNC: 10 MG/DL
ALP SERPL-CCNC: 77 U/L (ref 35–104)
ALT SERPL W P-5'-P-CCNC: 28 U/L (ref 10–35)
ANION GAP SERPL CALCULATED.3IONS-SCNC: 11 MMOL/L (ref 7–15)
AST SERPL W P-5'-P-CCNC: 25 U/L (ref 10–35)
BASOPHILS # BLD AUTO: 0 10E3/UL (ref 0–0.2)
BASOPHILS NFR BLD AUTO: 0 %
BILIRUB SERPL-MCNC: 0.3 MG/DL
BUN SERPL-MCNC: 15.5 MG/DL (ref 6–20)
CALCIUM SERPL-MCNC: 9.6 MG/DL (ref 8.6–10)
CHLORIDE SERPL-SCNC: 106 MMOL/L (ref 98–107)
CREAT SERPL-MCNC: 0.41 MG/DL (ref 0.51–0.95)
DEPRECATED HCO3 PLAS-SCNC: 24 MMOL/L (ref 22–29)
EOSINOPHIL # BLD AUTO: 0 10E3/UL (ref 0–0.7)
EOSINOPHIL NFR BLD AUTO: 0 %
ERYTHROCYTE [DISTWIDTH] IN BLOOD BY AUTOMATED COUNT: 19.3 % (ref 10–15)
GFR SERPL CREATININE-BSD FRML MDRD: >90 ML/MIN/1.73M2
GLUCOSE SERPL-MCNC: 96 MG/DL (ref 70–99)
HCT VFR BLD AUTO: 28.8 % (ref 35–47)
HGB BLD-MCNC: 9.4 G/DL (ref 11.7–15.7)
IMM GRANULOCYTES # BLD: 0 10E3/UL
IMM GRANULOCYTES NFR BLD: 0 %
LYMPHOCYTES # BLD AUTO: 0.5 10E3/UL (ref 0.8–5.3)
LYMPHOCYTES NFR BLD AUTO: 10 %
MAGNESIUM SERPL-MCNC: 1.4 MG/DL (ref 1.7–2.3)
MCH RBC QN AUTO: 32.9 PG (ref 26.5–33)
MCHC RBC AUTO-ENTMCNC: 32.6 G/DL (ref 31.5–36.5)
MCV RBC AUTO: 101 FL (ref 78–100)
MONOCYTES # BLD AUTO: 0.5 10E3/UL (ref 0–1.3)
MONOCYTES NFR BLD AUTO: 11 %
NEUTROPHILS # BLD AUTO: 3.7 10E3/UL (ref 1.6–8.3)
NEUTROPHILS NFR BLD AUTO: 79 %
NRBC # BLD AUTO: 0 10E3/UL
NRBC BLD AUTO-RTO: 0 /100
PLATELET # BLD AUTO: 39 10E3/UL (ref 150–450)
POTASSIUM SERPL-SCNC: 3.8 MMOL/L (ref 3.4–5.3)
PROT SERPL-MCNC: 7 G/DL (ref 6.4–8.3)
RBC # BLD AUTO: 2.86 10E6/UL (ref 3.8–5.2)
SODIUM SERPL-SCNC: 141 MMOL/L (ref 136–145)
TSH SERPL DL<=0.005 MIU/L-ACNC: 0.02 UIU/ML (ref 0.3–4.2)
WBC # BLD AUTO: 4.7 10E3/UL (ref 4–11)

## 2022-12-30 PROCEDURE — 250N000011 HC RX IP 250 OP 636: Performed by: OBSTETRICS & GYNECOLOGY

## 2022-12-30 PROCEDURE — 36415 COLL VENOUS BLD VENIPUNCTURE: CPT | Performed by: OBSTETRICS & GYNECOLOGY

## 2022-12-30 PROCEDURE — 258N000003 HC RX IP 258 OP 636: Performed by: OBSTETRICS & GYNECOLOGY

## 2022-12-30 PROCEDURE — 96413 CHEMO IV INFUSION 1 HR: CPT

## 2022-12-30 PROCEDURE — 96361 HYDRATE IV INFUSION ADD-ON: CPT

## 2022-12-30 PROCEDURE — 84439 ASSAY OF FREE THYROXINE: CPT

## 2022-12-30 PROCEDURE — 83735 ASSAY OF MAGNESIUM: CPT | Performed by: OBSTETRICS & GYNECOLOGY

## 2022-12-30 PROCEDURE — 81003 URINALYSIS AUTO W/O SCOPE: CPT | Performed by: OBSTETRICS & GYNECOLOGY

## 2022-12-30 PROCEDURE — 85025 COMPLETE CBC W/AUTO DIFF WBC: CPT | Performed by: OBSTETRICS & GYNECOLOGY

## 2022-12-30 PROCEDURE — 96417 CHEMO IV INFUS EACH ADDL SEQ: CPT

## 2022-12-30 PROCEDURE — 80053 COMPREHEN METABOLIC PANEL: CPT | Performed by: OBSTETRICS & GYNECOLOGY

## 2022-12-30 PROCEDURE — 84443 ASSAY THYROID STIM HORMONE: CPT

## 2022-12-30 RX ORDER — PALONOSETRON 0.05 MG/ML
0.25 INJECTION, SOLUTION INTRAVENOUS ONCE
Status: CANCELLED | OUTPATIENT
Start: 2022-12-30

## 2022-12-30 RX ORDER — DIPHENHYDRAMINE HCL 25 MG
50 CAPSULE ORAL ONCE
Status: CANCELLED
Start: 2022-12-30

## 2022-12-30 RX ORDER — ALBUTEROL SULFATE 0.83 MG/ML
2.5 SOLUTION RESPIRATORY (INHALATION)
Status: CANCELLED | OUTPATIENT
Start: 2022-12-30

## 2022-12-30 RX ORDER — EPINEPHRINE 1 MG/ML
0.3 INJECTION, SOLUTION INTRAMUSCULAR; SUBCUTANEOUS EVERY 5 MIN PRN
Status: CANCELLED | OUTPATIENT
Start: 2022-12-30

## 2022-12-30 RX ORDER — ALBUTEROL SULFATE 90 UG/1
1-2 AEROSOL, METERED RESPIRATORY (INHALATION)
Status: CANCELLED
Start: 2022-12-30

## 2022-12-30 RX ORDER — LORAZEPAM 2 MG/ML
0.5 INJECTION INTRAMUSCULAR EVERY 4 HOURS PRN
Status: CANCELLED | OUTPATIENT
Start: 2022-12-30

## 2022-12-30 RX ORDER — METHYLPREDNISOLONE SODIUM SUCCINATE 125 MG/2ML
125 INJECTION, POWDER, LYOPHILIZED, FOR SOLUTION INTRAMUSCULAR; INTRAVENOUS
Status: CANCELLED
Start: 2022-12-30

## 2022-12-30 RX ORDER — HEPARIN SODIUM,PORCINE 10 UNIT/ML
5 VIAL (ML) INTRAVENOUS
Status: CANCELLED | OUTPATIENT
Start: 2022-12-30

## 2022-12-30 RX ORDER — DIPHENHYDRAMINE HYDROCHLORIDE 50 MG/ML
50 INJECTION INTRAMUSCULAR; INTRAVENOUS
Status: CANCELLED
Start: 2022-12-30

## 2022-12-30 RX ORDER — MAGNESIUM SULFATE HEPTAHYDRATE 40 MG/ML
2 INJECTION, SOLUTION INTRAVENOUS ONCE
Status: COMPLETED | OUTPATIENT
Start: 2022-12-30 | End: 2022-12-30

## 2022-12-30 RX ORDER — HEPARIN SODIUM (PORCINE) LOCK FLUSH IV SOLN 100 UNIT/ML 100 UNIT/ML
5 SOLUTION INTRAVENOUS
Status: CANCELLED | OUTPATIENT
Start: 2022-12-30

## 2022-12-30 RX ORDER — MEPERIDINE HYDROCHLORIDE 25 MG/ML
25 INJECTION INTRAMUSCULAR; INTRAVENOUS; SUBCUTANEOUS EVERY 30 MIN PRN
Status: CANCELLED | OUTPATIENT
Start: 2022-12-30

## 2022-12-30 RX ADMIN — MAGNESIUM SULFATE 2 G: 2 INJECTION INTRAVENOUS at 08:17

## 2022-12-30 RX ADMIN — SODIUM CHLORIDE 200 MG: 9 INJECTION, SOLUTION INTRAVENOUS at 09:27

## 2022-12-30 RX ADMIN — SODIUM CHLORIDE 250 ML: 9 INJECTION, SOLUTION INTRAVENOUS at 09:26

## 2022-12-30 RX ADMIN — SODIUM CHLORIDE 900 MG: 9 INJECTION, SOLUTION INTRAVENOUS at 10:10

## 2022-12-30 ASSESSMENT — PAIN SCALES - GENERAL: PAINLEVEL: NO PAIN (0)

## 2022-12-30 NOTE — PROGRESS NOTES
Infusion Nursing Note:  Suni Zuluaga presents today for Cycle 4 Day 1 Pembrolizumab (KEYTRUDA) and bevacizumab-bvzr (ZIRABEV) and I magnesium replacement; Taxol/Carboplatin HELD today due to thrombocytopnia.    Patient seen by provider today: No   present during visit today: Not Applicable.    Note: Patient had a virtual visit with Jaqueline Simental NP yesterday.  She reports no changes overnight.  She states she has been feeling pretty good.  She is tachy to the 120s upon arrival in infusion.  Patient states she had a stressful drive in this morning.  Allowed patient to sit for several minutes.  Upon recheck, she was still tachy, but down to 107.  Jaqueline Simental NP notified.  Other VSS.    Intravenous Access:  Peripheral IV placed in lab today.    Treatment Conditions:  Lab Results   Component Value Date    HGB 9.4 (L) 12/30/2022    WBC 4.7 12/30/2022    ANEU 2.5 10/27/2020    ANEUTAUTO 3.7 12/30/2022    PLT 39 (LL) 12/30/2022      Lab Results   Component Value Date     12/30/2022    POTASSIUM 3.8 12/30/2022    MAG 1.4 (L) 12/30/2022    CR 0.41 (L) 12/30/2022    HARRISON 9.6 12/30/2022    BILITOTAL 0.3 12/30/2022    ALBUMIN 4.0 12/30/2022    ALT 28 12/30/2022    AST 25 12/30/2022     Results reviewed, labs did NOT meet treatment parameters: Jaqueline Simental NP aware of patient's labs today.    12/30/22 4652 TORB:  Jaqueline Simental NP/Joe Brambila RN  - HOLD Taxol/Carboplatin today for platelet count of 39.  I will have to talk to Dr. Brambila about doses and when she thinks patient should come back for that.  - OK to give Pembrolizumab and Avastin today.  - Replace magnesium per protocol.    12/30/22 0917 TORB:  Jaqueline Simental NP/Joe Brambila RN  - Relay to patient, that I would like her to make an appointment with her PCP in 1 week to recheck her thyroid labs.  She is currently showing hyper levels, but this will change to hypo and they can manage this for her.      Relayed this information to patient.   Patient reports frustration, as she says every time she has gone to her primary, they tell her she needs to check with her oncology team.  She also states she doesn't think she will be able to get an appointment within the week due to the holiday.  Message sent to patient's care coordinator to ask to follow-up on patient's concerns and if she was dar to schedule an appointment.    Post Infusion Assessment:  Patient tolerated infusion without incident.  Blood return noted pre and post infusion.  Site patent and intact, free from redness, edema or discomfort.  No evidence of extravasations.  Access discontinued per protocol.     Discharge Plan:   Patient declined prescription refills.  Discharge instructions reviewed with: Patient and Family.  Patient and/or family verbalized understanding of discharge instructions and all questions answered.  AVS to patient via [employer].  Patient will return 1/20/23 (may be earlier if Dr. Manrique wants patient to return for Taxol/Carbo prior to 1/20.  Gyn/Onc team to follow-up with her with the final plan) for next appointment.   Patient discharged in stable condition accompanied by: .  Departure Mode: Ambulatory.  Face to Face time: 5 minutes.      TREVOR MANRIQUE RN

## 2022-12-30 NOTE — ADDENDUM NOTE
Addended by: ALLY MCCLOUD on: 12/30/2022 08:18 AM     Modules accepted: Orders     Orthopaedic Clinic Note: Knee New Patient    Chief Complaint   Patient presents with   • Right Knee - Pain        HPI    Christopher Raymond is a 58 y.o. male who presents with right knee pain for 3 month(s).  He is a new patient to me.  Onset has been atraumatic and gradual in nature.  Pain is localized the anterior, medial, and lateral aspects of the knee.  He is complaining of intermittent swelling and stiffness of the knee as well as some skin sensitivity along the medial joint line.  He rates his pain a 5/10 on the pain scale.  His pain is worse with walking, standing for long periods of time.  Sitting and resting improve his pain Previous treatments have included oral anti-inflammatories and Tylenol.  Despite these interventions, his symptoms have persisted.  He is here today to discuss treatment options for his ongoing right knee pain.    Past Medical History:   Diagnosis Date   • Hematospermia    • Sciatica       Past Surgical History:   Procedure Laterality Date   • CATARACT EXTRACTION     • HERNIA REPAIR     • TONSILLECTOMY  1967      Family History   Problem Relation Age of Onset   • Hypotension Mother    • Stroke Father    • Hypertension Father      Social History     Social History   • Marital status:      Spouse name: N/A   • Number of children: N/A   • Years of education: N/A     Occupational History   • Not on file.     Social History Main Topics   • Smoking status: Never Smoker   • Smokeless tobacco: Never Used   • Alcohol use 0.6 - 1.2 oz/week     1 - 2 Cans of beer per week      Comment: very rare   • Drug use: No   • Sexual activity: Defer     Other Topics Concern   • Not on file     Social History Narrative   • No narrative on file      Current Outpatient Prescriptions on File Prior to Visit   Medication Sig Dispense Refill   • Ascorbic Acid (VITAMIN C PO) Take 1,000 mg by mouth Daily.     • baclofen (LIORESAL) 10 MG tablet Take 1 tablet by mouth 3 (Three) Times a Day. 30 tablet 0   •  "Cholecalciferol (VITAMIN D) 2000 units tablet Take 2,000 Units by mouth Daily.     • gabapentin (NEURONTIN) 300 MG capsule Take 1 capsule by mouth 3 (Three) Times a Day. 90 capsule 2   • ibuprofen (ADVIL,MOTRIN) 600 MG tablet Take 1 tablet by mouth Every 6 (Six) Hours As Needed for Mild Pain . 50 tablet 1   • Multiple Vitamins-Minerals (MULTIVITAMIN PO) Take  by mouth daily.     • aspirin 81 MG tablet Take 81 mg by mouth daily.     • [DISCONTINUED] oxyCODONE-acetaminophen (PERCOCET) 5-325 MG per tablet Take 1 tablet by mouth Every 6 (Six) Hours As Needed for Moderate Pain . 45 tablet 0     No current facility-administered medications on file prior to visit.       Allergies   Allergen Reactions   • Penicillins Unknown (See Comments)     As a child   • Tramadol Hcl Nausea And Vomiting        Review of Systems   Constitutional: Negative.    HENT: Negative.    Eyes: Negative.    Respiratory: Negative.    Cardiovascular: Negative.    Gastrointestinal: Negative.    Endocrine: Negative.    Genitourinary: Negative.    Musculoskeletal: Positive for arthralgias (knee pain) and joint swelling.   Skin: Negative.    Allergic/Immunologic: Negative.    Neurological: Negative.    Hematological: Negative.    Psychiatric/Behavioral: Negative.         The following portions of the patient's history were reviewed and updated as appropriate: allergies, current medications, past family history, past medical history, past social history, past surgical history and problem list.    Physical Exam  Pulse 103, height 191.8 cm (75.51\"), weight 102 kg (224 lb 13.9 oz), SpO2 98 %.    Body mass index is 27.73 kg/m².    GENERAL APPEARANCE: awake, alert & oriented x 3, in no acute distress and well developed, well nourished  PSYCH: normal affect  LUNGS:  breathing nonlabored  EYES: sclera anicteric  CARDIOVASCULAR: palpable dorsalis pedis, palpable posterior tibial bilaterally. Capillary refill less than 2 seconds  EXTREMITIES: no clubbing, " cyanosis  GAIT:  Antalgic            Right Lower Extremity Exam:   ----------  Hip Exam  ----------  FLEXION CONTRACTURE: None  FLEXION: 110 degrees  INTERNAL ROTATION: 20 degrees at 90 degrees of flexion   EXTERNAL ROTATION: 40 degrees at 90 degrees of flexion    PAIN WITH HIP MOTION: no  ----------  Knee Exam  ----------  ALIGNMENT: neutral    RANGE OF MOTION:  Normal (0-130 degrees) with no extensor lag or flexion contracture  LIGAMENTOUS STABILITY:   stable to varus and valgus stress at terminal extension and 30 degrees without any evidence of laxity     STRENGTH:  4/5 knee flexion, extension. 5/5 ankle dorsiflexion and plantarflexion.     PAIN WITH PALPATION: medial joint line, lateral joint line and anterior knee  KNEE EFFUSION: yes, mild effusion  PAIN WITH KNEE ROM: yes, globally   PATELLAR CREPITUS: yes, painful and symptomatic  SPECIAL EXAM FINDINGS:  painful patellar compression    REFLEXES:  PATELLAR 2+/4  ACHILLES 2+/4    CLONUS: no  STRAIGHT LEG TEST:   negative    SENSATION TO LIGHT TOUCH:  DEEP PERONEAL/SUPERFICIAL PERONEAL/SURAL/SAPHENOUS/TIBIAL:   intact    EDEMA:  no  ERYTHEMA:  no  WOUNDS/INCISIONS: no        Left Lower Extremity Exam:   ----------  Hip Exam  ----------  FLEXION CONTRACTURE: None  FLEXION: 110 degrees  INTERNAL ROTATION: 20 degrees at 90 degrees of flexion   EXTERNAL ROTATION: 40 degrees at 90 degrees of flexion    PAIN WITH HIP MOTION: no  ----------  Knee Exam  ----------  ALIGNMENT: neutral    RANGE OF MOTION:  Normal (0-130 degrees) with no extensor lag or flexion contracture  LIGAMENTOUS STABILITY:   stable to varus and valgus stress at terminal extension and 30 degrees without any evidence of laxity     STRENGTH:  5/5 knee flexion, extension. 5/5 ankle dorsiflexion and plantarflexion.     PAIN WITH PALPATION: Denies tenderness to palpation about the knee  KNEE EFFUSION: No effusion  PAIN WITH KNEE ROM: Slight anterior knee pain   PATELLAR CREPITUS: yes, slightly  symptomatic  SPECIAL EXAM FINDINGS:  painful patellar compression    REFLEXES:  PATELLAR 2+/4  ACHILLES 2+/4    CLONUS: no  STRAIGHT LEG TEST:   negative    SENSATION TO LIGHT TOUCH:  DEEP PERONEAL/SUPERFICIAL PERONEAL/SURAL/SAPHENOUS/TIBIAL:   intact    EDEMA:  no  ERYTHEMA:  no  WOUNDS/INCISIONS: no      ______________________________________________________________________  ______________________________________________________________________    RADIOGRAPHIC FINDINGS:   Indication: Right knee pain    Comparison: No prior xrays are available for comparison    Right knee(s) 4 views: moderate to severe tricompartmental arthritis with bone-on-bone articulation and patellofemoral compartment.  Medial lateral compartment spaces appear to be only mildly decreased, periarticular osteophytes visualized in all compartments.  No acute bony injury or fracture.      Assessment/Plan:   Diagnosis Plan   1. Primary osteoarthritis of right knee  Large Joint Arthrocentesis   2. Chronic pain of right knee  XR Knee 4+ View Right     Patient's symptoms are consistent with osteoarthritis the right knee.  He has failed conservative treatment with oral anti-inflammatories.  I offered him a cortisone injection today.  He is agreeable to this.  He'll follow-up in 3 months for repeat evaluation.    Procedure Note:  I discussed with the patient the potential benefits of performing a therapeutic injection of the right knee as well as potential risks including but not limited to infection, swelling, pain, bleeding, bruising, nerve/vessel damage, skin color changes, transient elevation in blood glucose levels, and fat atrophy. After informed consent and after the area was prepped with alcohol, ethyl chloride was used to numb the skin. Via the superolateral approach, 3cc of 1% lidocaine, 3cc of 0.25% marcaine and 2 cc of 40mg/ml of Kenalog were injected into the right knee. The patient tolerated the procedure well. There were no complications.  A sterile dressing was placed over the injection site.      Doug Liu MD  09/06/18  8:55 AM

## 2022-12-30 NOTE — NURSING NOTE
Chief Complaint   Patient presents with     Blood Draw     Vitals taken, PIV started, labs drawn, saline locked, checked into next appt     /79 (BP Location: Left arm, Patient Position: Sitting, Cuff Size: Adult Regular)   Pulse (!) 121   Temp 98.5  F (36.9  C) (Oral)   Resp 16   Wt 59.9 kg (132 lb)   SpO2 100%   BMI 23.38 kg/m    Dustin Flower RN on 12/30/2022 at 7:23 AM

## 2023-01-02 ENCOUNTER — DOCUMENTATION ONLY (OUTPATIENT)
Dept: ONCOLOGY | Facility: CLINIC | Age: 38
End: 2023-01-02

## 2023-01-02 NOTE — PROGRESS NOTES
"Discussed thrombocytopenia with Dr. Brambila. MD plan listed below:       \"Lets reduce her to AUC of 4, ok to just bring her  back in 3 weeks (when she is due for avastin/keytruda), otherwise we will get off schedule. The next option would  be to just do q 4 week scheduling but id  like to try a  dose reduction first.   Thanks for taking care of her.       I think OK for her to  see her PCP for thyroid stuff (she does have one I think) but if patients cant get in to see their PCP and their T4 starts dropping, I usually just start them on synthroid 100 and follow it q 3 weeks with chemo labs. I think tiffanie has pretty  limited resources and access, so we should be prepared to help her with this too. Thanks!!\"      Treatment plan updated to Carboplatin AUC 4. RN to update pt and schedule as per plan above. RN to coordinate platelet recheck this week given pt was down to 39.       LAURA Gilliam, NP-BC  Women's Health Nurse Practitioner  Division of Gynecologic Oncology  [facility]        "

## 2023-01-04 ENCOUNTER — TELEPHONE (OUTPATIENT)
Dept: ONCOLOGY | Facility: CLINIC | Age: 38
End: 2023-01-04

## 2023-01-04 DIAGNOSIS — C53.8 MALIGNANT NEOPLASM OF OVERLAPPING SITES OF CERVIX (H): Primary | ICD-10-CM

## 2023-01-04 DIAGNOSIS — C53.9 SQUAMOUS CELL CARCINOMA OF CERVIX (H): ICD-10-CM

## 2023-01-04 NOTE — TELEPHONE ENCOUNTER
Left message for Suni that plan will be not to bring her back for the Carbo/Taxol for this last cycle but to move on to Cycle #5 with another reduction of her Carbo to AUC of 4.  This will be on 1/20/2023, but we do want to get repeat labs in the next week or so to check her platelets which can be done closer to home. Dr. Brambila also recommends waiting until next cycle to repeat her thyroid function labs to see if she needs this to be managed by us or PCP versus endocrine.  Waiting for patient to call back to set up labs closer to home.    Thanks    Odalys Paniagua RN Care Coordinator  [facility]  Ph.[phone] Fax. [phone]

## 2023-01-06 ENCOUNTER — TELEPHONE (OUTPATIENT)
Dept: ONCOLOGY | Facility: CLINIC | Age: 38
End: 2023-01-06

## 2023-01-06 ENCOUNTER — TRANSFERRED RECORDS (OUTPATIENT)
Dept: HEALTH INFORMATION MANAGEMENT | Facility: CLINIC | Age: 38
End: 2023-01-06
Payer: COMMERCIAL

## 2023-01-06 LAB
ALT SERPL-CCNC: 34 U/L (ref 14–63)
AST SERPL-CCNC: 26 U/L (ref 15–37)
CREATININE (EXTERNAL): 0.56 MG/DL (ref 0.59–1.27)
GFR ESTIMATED (EXTERNAL): >60 ML/MIN/1.73M(2)
GLUCOSE (EXTERNAL): 99 MG/DL (ref 74–106)
POTASSIUM (EXTERNAL): 3.9 MMOL/L (ref 3.5–5.1)

## 2023-01-06 NOTE — TELEPHONE ENCOUNTER
Called Suni back.  Reviewed thrombocytopenic precautions.  Pt will call hospital and schedule apt for labs on Monday.   Pt encouraged to call back if she has any additional questions concerns, or symptoms.  Pt verbalized understanding of the above information and is in agreement with this plan of care.

## 2023-01-06 NOTE — TELEPHONE ENCOUNTER
DATE:  1/6/2023   TIME OF RECEIPT FROM LAB:  1037  LAB TEST:  Plts: 29, Hgb: 8.8, WBC: 2.7, ANC: 1.8  LAB VALUEs on 12/30/22: Plts: 39, Hgb: 9.4, WBC: 4.7, ANC: 3.7  No blood therapy plan  Last infusion: Cycle 4 Day 1 Pembrolizumab (KEYTRUDA) and bevacizumab-bvzr (ZIRABEV) and I magnesium replacement; Taxol/Carboplatin HELD today due to thrombocytopnia.    RESULTS GIVEN WITH READ-BACK TO (PROVIDER): Paged Jaqueline at [phone]   TIME LAB VALUE REPORTED TO PROVIDER:   Spoke with Jaqueline at [phone]: Please call pt to check for bleeding, check if on blood thinners, review thrombocytopenic precautions. Set up lab apt locally on Monday to recheck.  LVM for pt to review thrombocytopenic precautions per Jaqueline's direction.  Message sent to ALYCIA Hamilton to schedule lab apt for pt at local clinic on Monday, per Jaqueline's direction.  Per Odalys, she has already contacted the pt and has arranged to have labs on Monday. Odalys reviewed thrombocytopenic precautions and pt is aware of s/sx to watch for and aware of apt on Monday.    Routed to Jaqueline Simental CNP and ALYCIA Liu

## 2023-01-06 NOTE — TELEPHONE ENCOUNTER
"Suni (pt) called back,     This writer updated pt on info from [facility] Odalys documented earlier today:       Suni (pt) states,\"did not talk with Odalys about labs on Monday and precautions\"      This writer informed Suni will follow up care team and Brianna to update pt on plan.   "

## 2023-01-10 ENCOUNTER — TELEPHONE (OUTPATIENT)
Dept: ONCOLOGY | Facility: CLINIC | Age: 38
End: 2023-01-10
Payer: COMMERCIAL

## 2023-01-10 DIAGNOSIS — C53.8 MALIGNANT NEOPLASM OF OVERLAPPING SITES OF CERVIX (H): Primary | ICD-10-CM

## 2023-01-10 NOTE — TELEPHONE ENCOUNTER
Left message for Suni - let her know her platelets did come up to 46, so we want to repeat next week either Wed 1/18 or Thursday 1/19 to make sure she will be able to have infusion on Friday 1/20.  I have faxed these orders to [facility].    Thanks    Odalys Paniagua RN Care Coordinator  [facility]  Ph.[phone] Fax. [phone]

## 2023-01-17 NOTE — PROGRESS NOTES
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  She is here today for follow up and disease management.      Oncology History:  2015:HSIL Pap at the beginning of pregnancy. Repeat pap postpartum was ASCUS. Colposcopy was negative.   20: Pelvic US. Uterine fibroids, normal appearance of uterus and ovaries  2020: Pap smear which confirmed squamous cell carcinoma of the cervix. Referred to GYN  ONC  2020: GYN ONC Consult [facility]. Cervical biopsy, RADHA III, unable to identify invasion  2020: Repeat cervical biopsy confirms invasive SCC  2020: Started primary chemoRT for locally advanced cervical cancer. Stage IIIC1(r), large primary tumor with bilateral parametrial invasion and clinical evidence of posterior vaginal involvement. Pelvic lymphadenopathy involving external iliac, perirectal and internal iliac nodes.   20-10/30/20: ChemoRT.   20-20: T&R brachytherapy  2020: Followup with radonc (Dr Hernández)   22: CT A/P with nonspecific increased density in the para-aortic region, which could be seen in the setting of retroperitoneal fibrosis, inflammation, or potentially lymphoma/desmoplastic reaction. New varix from right renal vein extending down into the RLQ.   22: CT abdomen retroperitoneal bx   Final Diagnosis  A and B.  Soft tissue, right retroperitoneum and retroperitoneum, CT-guided needle biopsies:  -Metastatic squamous cell carcinoma.  Addendum 2  CPS score is 30-40.     RESULT FOR IMMUNOHISTOCHEMICAL VENTANA CLONE  PD-L1 ASSAY  TUMOR PROPORTION SCORE (TPS):  30%  INTERPRETATION: LOW PD-L1 EXPRESSION (TPS >/=1-49%)     10/6/22: PET CT Whole body: IMPRESSION: This patient with history of squamous cell carcinoma  of  the cervix there is evidence of disease progression:  1. Large 5 cm right retroperitoneal mass previously biopsied as  metastatic squamous cell carcinoma demonstrates FDG avidity. No  definite significant disease within the pelvis.  2. New metastatic nodes within the retroperitoneum and mediastinum.  3. 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  4. Diffusely increased FDG avidity throughout the terminal ileum and  large intestine without associated mass lesion may represent vascular  congestion or diffuse colitis.  5. Diffuse soft tissue edema.     Plan: Paclitaxel, Carboplatin, Bevacizumab, and pembrolizumab added due to PD-L1 expression every 3 weeks.      10/13/22: Cycle 1 Paclitaxel 175 mg/m2, Carboplatin AUC 6, Bevacizumab 15 mg/m2  11/4/22: Cycle 2 Paclitaxel 175 mg/m2, Carboplatin AUC 5, Bevacizumab 15 mg/m2. Pembrolizumab on hold while awaiting insurance coverage.     11/8/22: ED for nausea (no vomiting) and dizziness, discharged with valium for vertigo.     11/25/22: Cycle 3 Paclitaxel 175 mg/m2, Carboplatin AUC 5, Bevacizumab 15 mg/m2, and pembrolizumab 200 mg.  Defer x 1 week for thrombocytopenia (plts 82).  Decrease Paclitaxel 135 mg/m2, Carboplatin AUC 5, Bevacizumab 15 mg/m2, and pembrolizumab 200 mg, given 12/5.    12/19/22 CT CAP IMPRESSION:  1.  Metastatic squamous cell carcinoma of the cervix with decreased size of previously FDG avid thoracoabdominal lymph nodes compared to 10/06/2022. No new or worsening disease.   2.  Unchanged tumoral encasement and occlusion of the infrarenal IVC.   3.  Rectal wall thickening and fat stranding suggestive of postradiation inflammation.     Plan: Continue paclitaxel, carboplatin, bevacizumab, and pembrolizumab with CT/MD after 3 more cycles (6 total)     12/30/22: Cycle 4 Paclitaxel 135 mg/m2, Carboplatin AUC 5, Bevacizumab 15 mg/m2, and pembrolizumab 200 mg, held  carboplatin and paclitaxel due to thrombocytopenia (plts 39), not given this cycle.  Bevacizumab and Pembrolizumab given.  1/20/23: Cycle 5 Paclitaxel 135 mg/m2, Carboplatin AUC 4, Bevacizumab 15 mg/m2, and pembrolizumab 200 mg.                    Today she comes to clinic feeling well overall.  She continues with right leg swelling that worsens throughout the day especially when she is being active or working.  Overall symptoms seem to be worsening again.  She is waiting to hear from the local clinic about lymphedema therapy.  She is scheduled for a  US today after her treatment.  She would like to have her labs drawn closer to home prior to her next cycle.  She denies any changes in her bowel or bladder habits, no nausea/emesis, no abdominal discomfort/bloating, no fevers or chills, and no chest pain or shortness of breath.                 Review of Systems:    Systemic           no weight changes; no fever; no chills; no night sweats; no appetite changes  Skin           no rashes, or lesions  Eye           no irritation; no changes in vision  Sunil-Laryngeal           no dysphagia; no hoarseness   Pulmonary    no cough; no shortness of breath  Cardiovascular    no chest pain; no palpitations  Gastrointestinal    no diarrhea; no constipation; no abdominal pain; no changes in bowel habits; no blood in stool  Genitourinary   no urinary frequency; no urinary urgency; no dysuria; no pain; no abnormal vaginal discharge; no abnormal vaginal bleeding  Breast   no breast discharge; no breast changes; no breast pain  Musculoskeletal    no myalgias; no arthralgias; no back pain  Psychiatric           no depressed mood; no anxiety    Hematologic   no tender lymph nodes; + RLE swelling   Endocrine    no hot flashes; no heat/cold intolerance         Neurological   no tremor; no numbness and tingling; no headaches; no difficulty sleeping      Past Medical History:    Past Medical History:   Diagnosis Date     Cervical cancer  (H)          Past Surgical History:    Past Surgical History:   Procedure Laterality Date     CARPAL TUNNEL RELEASE RT/LT Right      TUBAL LIGATION           Health Maintenance Due   Topic Date Due     NICOTINE/TOBACCO CESSATION COUNSELING Q 1 YR  Never done     YEARLY PREVENTIVE VISIT  Never done     ADVANCE CARE PLANNING  Never done     HEPATITIS B IMMUNIZATION (1 of 3 - 3-dose series) Never done     COVID-19 Vaccine (1) Never done     Pneumococcal Vaccine: Pediatrics (0 to 5 Years) and At-Risk Patients (6 to 64 Years) (1 - PCV) Never done     HEPATITIS C SCREENING  Never done     INFLUENZA VACCINE (1) 09/01/2022     PHQ-2 (once per calendar year)  Never done       Current Medications:     Current Outpatient Medications   Medication Sig Dispense Refill     magnesium oxide (MAG-OX) 400 MG tablet Take 1 tablet (400 mg) by mouth daily 30 tablet 1     acetaminophen (TYLENOL) 500 MG tablet Take 2 tablets (1,000 mg) by mouth 3 times daily as needed for mild pain (Patient not taking: Reported on 12/5/2022) 90 tablet 3     acetaminophen (TYLENOL) 500 MG tablet Take 500-1,000 mg by mouth every 6 hours as needed for mild pain (Patient not taking: Reported on 12/5/2022)       ibuprofen (ADVIL/MOTRIN) 600 MG tablet Take 600 mg by mouth every 6 hours as needed for moderate pain (Patient not taking: Reported on 12/5/2022)       meclizine (ANTIVERT) 25 MG tablet Take 0.5-1 tablets (12.5-25 mg) by mouth 3 times daily as needed for dizziness (Patient not taking: Reported on 12/5/2022) 30 tablet 1     ondansetron (ZOFRAN ODT) 4 MG ODT tab Take 2 tablets (8 mg) by mouth every 8 hours as needed for nausea (Patient not taking: Reported on 12/5/2022) 20 tablet 3     polyethylene glycol (MIRALAX) 17 GM/Dose powder Take 17 g (1 capful) by mouth daily (Patient not taking: Reported on 12/5/2022) 850 g 3     prochlorperazine (COMPAZINE) 10 MG tablet Take 1 tablet (10 mg) by mouth every 6 hours as needed for nausea or vomiting (Patient not  taking: Reported on 10/13/2022) 30 tablet 2     SENNA-docusate sodium (SENNA S) 8.6-50 MG tablet Take 1 tablet by mouth At Bedtime Can increase up to two tablets twice per day. (Patient not taking: Reported on 12/5/2022) 60 tablet 3         Allergies:        Allergies   Allergen Reactions     Clindamycin Hives        Social History:     Social History     Tobacco Use     Smoking status: Every Day     Packs/day: 1.00     Types: Cigarettes     Smokeless tobacco: Never     Tobacco comments:     9/11/2020 1/2 pack/day, tying tp quit   Substance Use Topics     Alcohol use: Yes     Comment: occ.       History   Drug Use Unknown         Family History:     The patient's family history is notable for:    Family History   Problem Relation Age of Onset     Breast Cancer Mother         8/2020 newly dx         Physical Exam:     /76 (BP Location: Right arm)   Pulse 104   Temp 98.4  F (36.9  C) (Oral)   Resp 16   Wt 62.1 kg (137 lb)   SpO2 97%   BMI 24.27 kg/m    Body mass index is 24.27 kg/m .    General Appearance: healthy and alert, no distress     HEENT: no palpable nodules or masses        Cardiovascular: regular rate and rhythm, no gallops, rubs or murmurs     Respiratory: lungs clear, no rales, rhonchi or wheezes    Musculoskeletal: extremities non tender and RLE with trace edema     Skin: no lesions or rashes     Neurological: normal gait, no gross defects     Psychiatric: appropriate mood and affect                               Hematological: normal cervical and supraclavicular lymph nodes     Gastrointestinal:       abdomen soft, non-tender, non-distended    Genitourinary: Deferred.     Lab Results   Component Value Date    WBC 4.2 01/20/2023    WBC 3.0 10/27/2020     Lab Results   Component Value Date    RBC 2.87 01/20/2023    RBC 3.72 10/27/2020     Lab Results   Component Value Date    HGB 9.4 01/20/2023    HGB 11.0 10/27/2020     Lab Results   Component Value Date    HCT 30.3 01/20/2023    HCT 32.8  10/27/2020     No components found for: MCT  Lab Results   Component Value Date     01/20/2023    MCV 88 10/27/2020     Lab Results   Component Value Date    MCH 32.8 01/20/2023    MCH 29.6 10/27/2020     Lab Results   Component Value Date    MCHC 31.0 01/20/2023    MCHC 33.5 10/27/2020     Lab Results   Component Value Date    RDW 17.7 01/20/2023    RDW 13.3 10/27/2020     Lab Results   Component Value Date     01/20/2023    PLT 95 10/27/2020     % Neutrophils   Date Value Ref Range Status   01/20/2023 69 % Final   10/27/2020 83.3 % Final     % Lymphocytes   Date Value Ref Range Status   01/20/2023 16 % Final   10/27/2020 6.4 % Final     % Monocytes   Date Value Ref Range Status   01/20/2023 12 % Final   10/27/2020 8.7 % Final     % Eosinophils   Date Value Ref Range Status   01/20/2023 2 % Final   10/27/2020 1.0 % Final     % Basophils   Date Value Ref Range Status   01/20/2023 0 % Final   10/27/2020 0.3 % Final     % Immature Granulocytes   Date Value Ref Range Status   10/27/2020 0.3 % Final     Absolute Neutrophil   Date Value Ref Range Status   10/27/2020 2.5 1.6 - 8.3 10e9/L Final     Absolute Lymphocytes   Date Value Ref Range Status   10/27/2020 0.2 (L) 0.8 - 5.3 10e9/L Final     Absolute Monocytes   Date Value Ref Range Status   10/27/2020 0.3 0.0 - 1.3 10e9/L Final     Absolute Eosinophils   Date Value Ref Range Status   10/27/2020 0.0 0.0 - 0.7 10e9/L Final     Absolute Basophils   Date Value Ref Range Status   10/27/2020 0.0 0.0 - 0.2 10e9/L Final     Abs Immature Granulocytes   Date Value Ref Range Status   10/27/2020 0.0 0 - 0.4 10e9/L Final     Absolute Immature Granulocytes   Date Value Ref Range Status   01/20/2023 0.0 <=0.4 10e3/uL Final     Nucleated RBCs   Date Value Ref Range Status   10/27/2020 0 0 /100 Final     Absolute Nucleated RBC   Date Value Ref Range Status   10/27/2020 0.0  Final     Last Comprehensive Metabolic Panel:  Sodium   Date Value Ref Range Status   01/20/2023  140 136 - 145 mmol/L Final   10/27/2020 138 133 - 144 mmol/L Final     Potassium   Date Value Ref Range Status   01/20/2023 4.4 3.4 - 5.3 mmol/L Final   10/27/2020 3.3 (L) 3.4 - 5.3 mmol/L Final     Chloride   Date Value Ref Range Status   01/20/2023 105 98 - 107 mmol/L Final   10/27/2020 105 94 - 109 mmol/L Final     Carbon Dioxide   Date Value Ref Range Status   10/27/2020 27 20 - 32 mmol/L Final     Carbon Dioxide (CO2)   Date Value Ref Range Status   01/20/2023 27 22 - 29 mmol/L Final     Anion Gap   Date Value Ref Range Status   01/20/2023 8 7 - 15 mmol/L Final   10/27/2020 6 3 - 14 mmol/L Final     Glucose   Date Value Ref Range Status   01/20/2023 85 70 - 99 mg/dL Final   10/06/2022 99 70 - 99 mg/dL Final     Urea Nitrogen   Date Value Ref Range Status   01/20/2023 11.4 6.0 - 20.0 mg/dL Final   10/27/2020 9 7 - 30 mg/dL Final     Creatinine   Date Value Ref Range Status   01/20/2023 0.51 0.51 - 0.95 mg/dL Final   10/27/2020 0.57 0.52 - 1.04 mg/dL Final     GFR Estimate   Date Value Ref Range Status   01/20/2023 >90 >60 mL/min/1.73m2 Final     Comment:     Effective December 21, 2021 eGFRcr in adults is calculated using the 2021 CKD-EPI creatinine equation which includes age and gender (Azeem et al., NEJM, DOI: 10.1056/LKJSzu8720540)   10/27/2020 >90 >60 mL/min/[1.73_m2] Final     Comment:     Non  GFR Calc  Starting 12/18/2018, serum creatinine based estimated GFR (eGFR) will be   calculated using the Chronic Kidney Disease Epidemiology Collaboration   (CKD-EPI) equation.       Calcium   Date Value Ref Range Status   01/20/2023 9.5 8.6 - 10.0 mg/dL Final   10/27/2020 8.8 8.5 - 10.1 mg/dL Final     Bilirubin Total   Date Value Ref Range Status   01/20/2023 0.2 <=1.2 mg/dL Final   10/27/2020 0.3 0.2 - 1.3 mg/dL Final     Alkaline Phosphatase   Date Value Ref Range Status   01/20/2023 72 35 - 104 U/L Final   10/27/2020 64 40 - 150 U/L Final     ALT   Date Value Ref Range Status   01/20/2023 24 10  - 35 U/L Final   10/27/2020 21 0 - 50 U/L Final     AST   Date Value Ref Range Status   01/20/2023 26 10 - 35 U/L Final   10/27/2020 18 0 - 45 U/L Final     Lab Results   Component Value Date    ALBUMIN 4.0 01/20/2023    ALBUMIN 3.8 10/27/2020     Lab Results   Component Value Date    PROTTOTAL 6.9 01/20/2023    PROTTOTAL 7.7 10/27/2020     Magnesium   Date Value Ref Range Status   01/20/2023 1.5 (L) 1.7 - 2.3 mg/dL Final   10/27/2020 1.6 1.6 - 2.3 mg/dL Final     Component      Latest Ref Rng & Units 1/20/2023   Protein Albumin Urine      Negative mg/dL Negative     TSH   Date Value Ref Range Status   01/20/2023 0.25 (L) 0.30 - 4.20 uIU/mL Final         Assessment:    Suni Zuluaga is a 37 year old woman with 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  She is here today for follow up and disease management.      40 minutes spent on the date of the encounter doing chart review, history and exam, documentation, and further activities as noted above.      Plan:     1.)        Cervical cancer:   OK to proceed with planned treatment if labs and BP are WDL.  RTC in 3 weeks for labs, provider visit, and next cycle; this is already scheduled.  Prefers for her labs to be done closer to home a couple days prior to ensure that her counts are ok for treatment.  Message sent to [facility] to fax orders to her local clinic.  Plan for CT and follow up with Dr. Brambila after cycle 6.  Currently CT is scheduled after cycle 7 (cycle 6 with chemo), message sent to Dr. Brambila to verify when she prefers her scan.  Reviewed signs and symptoms for when she should contact the clinic or seek additional care.  Patient to contact the clinic with any questions or concerns in the interim.        Genetic risk factors were assessed and she does not meet qualification for referral.       Labs and/or tests ordered include:  CBC. CMP. Mag. Urine protein. TSH with reflex to T4.                2.)        Health  maintenance:  Issues addressed today include following up with PCP for annual health maintenance and non-gynecologic issues.      3.)        RLE edema: Scant edema noted today but per patient this will increase significantly throughout the day.  Proceed with scheduled LE US to r/o DVT.  Message sent to [facility] to follow up with with them on scheduling.       4.)        Vertigo: Vertigo with prior chemotherapy treatment.  As previously discussed try 12.5 to 25 mg meclizine. She has the medication at home.     5.)        Hypomagnesemia: Magnesium today 1.5 which is slightly low, will be replaced in infusion.  Discussed that her magnesium has been consistently low for last several results.  Will start magnesium oxide 400 mg daily.  If causes stomach upset ok to hold.      6.) Thyroid dysfunction: TSH 0.25 and T4 WDL at 0.99.  No symptoms currently.  Discussed that often with Keytruda the thyroid may fluctuate between hyperthyroid and hypothyroid then may stabilize or settle into hypothyroid.  As her T4 currently is WDL and she is not having symptoms will continue to monitor at upcoming cycles.  If her T4 drops out of the normal range we would need to start a thyroid supplement.      Mounika Ritchie, KYLE, APRN, FNP-C  Nurse Practitioner  Division of Gynecologic Oncology  Pager: [phone]     CC  Patient Care Team:  Suzy Brambila MD as PCP - General (Gynecologic Oncology)  Suzy Brambila MD as MD (Gynecologic Oncology)  Kenneth Shahid as Referring Physician  Liliam Paniagua, RN as Specialty Care Coordinator (Hematology & Oncology)  Mounika Ritchie APRN CNP as Assigned Cancer Care Provider  SELF, REFERRED

## 2023-01-20 ENCOUNTER — INFUSION THERAPY VISIT (OUTPATIENT)
Dept: ONCOLOGY | Facility: CLINIC | Age: 38
End: 2023-01-20
Attending: OBSTETRICS & GYNECOLOGY
Payer: COMMERCIAL

## 2023-01-20 ENCOUNTER — ANCILLARY PROCEDURE (OUTPATIENT)
Dept: ULTRASOUND IMAGING | Facility: CLINIC | Age: 38
End: 2023-01-20
Attending: OBSTETRICS & GYNECOLOGY
Payer: COMMERCIAL

## 2023-01-20 ENCOUNTER — ONCOLOGY VISIT (OUTPATIENT)
Dept: ONCOLOGY | Facility: CLINIC | Age: 38
End: 2023-01-20
Attending: FAMILY MEDICINE
Payer: COMMERCIAL

## 2023-01-20 ENCOUNTER — APPOINTMENT (OUTPATIENT)
Dept: LAB | Facility: CLINIC | Age: 38
End: 2023-01-20
Attending: OBSTETRICS & GYNECOLOGY
Payer: COMMERCIAL

## 2023-01-20 VITALS
SYSTOLIC BLOOD PRESSURE: 114 MMHG | DIASTOLIC BLOOD PRESSURE: 76 MMHG | TEMPERATURE: 98.4 F | HEART RATE: 104 BPM | RESPIRATION RATE: 16 BRPM | OXYGEN SATURATION: 97 % | WEIGHT: 137 LBS | BODY MASS INDEX: 24.27 KG/M2

## 2023-01-20 DIAGNOSIS — Z51.11 ENCOUNTER FOR ANTINEOPLASTIC CHEMOTHERAPY: ICD-10-CM

## 2023-01-20 DIAGNOSIS — E83.42 HYPOMAGNESEMIA: ICD-10-CM

## 2023-01-20 DIAGNOSIS — C53.8 MALIGNANT NEOPLASM OF OVERLAPPING SITES OF CERVIX (H): Primary | ICD-10-CM

## 2023-01-20 LAB
ALBUMIN SERPL BCG-MCNC: 4 G/DL (ref 3.5–5.2)
ALBUMIN UR-MCNC: NEGATIVE MG/DL
ALP SERPL-CCNC: 72 U/L (ref 35–104)
ALT SERPL W P-5'-P-CCNC: 24 U/L (ref 10–35)
ANION GAP SERPL CALCULATED.3IONS-SCNC: 8 MMOL/L (ref 7–15)
AST SERPL W P-5'-P-CCNC: 26 U/L (ref 10–35)
BASOPHILS # BLD AUTO: 0 10E3/UL (ref 0–0.2)
BASOPHILS NFR BLD AUTO: 0 %
BILIRUB SERPL-MCNC: 0.2 MG/DL
BUN SERPL-MCNC: 11.4 MG/DL (ref 6–20)
CALCIUM SERPL-MCNC: 9.5 MG/DL (ref 8.6–10)
CHLORIDE SERPL-SCNC: 105 MMOL/L (ref 98–107)
CREAT SERPL-MCNC: 0.51 MG/DL (ref 0.51–0.95)
DEPRECATED HCO3 PLAS-SCNC: 27 MMOL/L (ref 22–29)
EOSINOPHIL # BLD AUTO: 0.1 10E3/UL (ref 0–0.7)
EOSINOPHIL NFR BLD AUTO: 2 %
ERYTHROCYTE [DISTWIDTH] IN BLOOD BY AUTOMATED COUNT: 17.7 % (ref 10–15)
GFR SERPL CREATININE-BSD FRML MDRD: >90 ML/MIN/1.73M2
GLUCOSE SERPL-MCNC: 85 MG/DL (ref 70–99)
HCT VFR BLD AUTO: 30.3 % (ref 35–47)
HGB BLD-MCNC: 9.4 G/DL (ref 11.7–15.7)
IMM GRANULOCYTES # BLD: 0 10E3/UL
IMM GRANULOCYTES NFR BLD: 1 %
LYMPHOCYTES # BLD AUTO: 0.7 10E3/UL (ref 0.8–5.3)
LYMPHOCYTES NFR BLD AUTO: 16 %
MAGNESIUM SERPL-MCNC: 1.5 MG/DL (ref 1.7–2.3)
MCH RBC QN AUTO: 32.8 PG (ref 26.5–33)
MCHC RBC AUTO-ENTMCNC: 31 G/DL (ref 31.5–36.5)
MCV RBC AUTO: 106 FL (ref 78–100)
MONOCYTES # BLD AUTO: 0.5 10E3/UL (ref 0–1.3)
MONOCYTES NFR BLD AUTO: 12 %
NEUTROPHILS # BLD AUTO: 2.9 10E3/UL (ref 1.6–8.3)
NEUTROPHILS NFR BLD AUTO: 69 %
NRBC # BLD AUTO: 0 10E3/UL
NRBC BLD AUTO-RTO: 0 /100
PLATELET # BLD AUTO: 219 10E3/UL (ref 150–450)
POTASSIUM SERPL-SCNC: 4.4 MMOL/L (ref 3.4–5.3)
PROT SERPL-MCNC: 6.9 G/DL (ref 6.4–8.3)
RBC # BLD AUTO: 2.87 10E6/UL (ref 3.8–5.2)
SODIUM SERPL-SCNC: 140 MMOL/L (ref 136–145)
T4 FREE SERPL-MCNC: 0.99 NG/DL (ref 0.9–1.7)
TSH SERPL DL<=0.005 MIU/L-ACNC: 0.25 UIU/ML (ref 0.3–4.2)
WBC # BLD AUTO: 4.2 10E3/UL (ref 4–11)

## 2023-01-20 PROCEDURE — 258N000003 HC RX IP 258 OP 636: Performed by: NURSE PRACTITIONER

## 2023-01-20 PROCEDURE — 96413 CHEMO IV INFUSION 1 HR: CPT

## 2023-01-20 PROCEDURE — 999N000285 HC STATISTIC VASC ACCESS LAB DRAW WITH PIV START

## 2023-01-20 PROCEDURE — 80053 COMPREHEN METABOLIC PANEL: CPT | Performed by: NURSE PRACTITIONER

## 2023-01-20 PROCEDURE — 999N000248 HC STATISTIC IV INSERT WITH US BY RN

## 2023-01-20 PROCEDURE — 99215 OFFICE O/P EST HI 40 MIN: CPT | Performed by: NURSE PRACTITIONER

## 2023-01-20 PROCEDURE — 96367 TX/PROPH/DG ADDL SEQ IV INF: CPT

## 2023-01-20 PROCEDURE — 93970 EXTREMITY STUDY: CPT | Mod: GC | Performed by: RADIOLOGY

## 2023-01-20 PROCEDURE — G0463 HOSPITAL OUTPT CLINIC VISIT: HCPCS | Mod: 25

## 2023-01-20 PROCEDURE — G0463 HOSPITAL OUTPT CLINIC VISIT: HCPCS | Mod: 25 | Performed by: NURSE PRACTITIONER

## 2023-01-20 PROCEDURE — 96415 CHEMO IV INFUSION ADDL HR: CPT

## 2023-01-20 PROCEDURE — 83735 ASSAY OF MAGNESIUM: CPT | Performed by: NURSE PRACTITIONER

## 2023-01-20 PROCEDURE — 250N000013 HC RX MED GY IP 250 OP 250 PS 637: Performed by: NURSE PRACTITIONER

## 2023-01-20 PROCEDURE — 84439 ASSAY OF FREE THYROXINE: CPT

## 2023-01-20 PROCEDURE — 250N000011 HC RX IP 250 OP 636: Performed by: NURSE PRACTITIONER

## 2023-01-20 PROCEDURE — 85025 COMPLETE CBC W/AUTO DIFF WBC: CPT | Performed by: NURSE PRACTITIONER

## 2023-01-20 PROCEDURE — 96417 CHEMO IV INFUS EACH ADDL SEQ: CPT

## 2023-01-20 PROCEDURE — 81003 URINALYSIS AUTO W/O SCOPE: CPT | Performed by: NURSE PRACTITIONER

## 2023-01-20 PROCEDURE — 36415 COLL VENOUS BLD VENIPUNCTURE: CPT | Performed by: NURSE PRACTITIONER

## 2023-01-20 PROCEDURE — 84443 ASSAY THYROID STIM HORMONE: CPT

## 2023-01-20 PROCEDURE — 96375 TX/PRO/DX INJ NEW DRUG ADDON: CPT

## 2023-01-20 RX ORDER — DIPHENHYDRAMINE HYDROCHLORIDE 50 MG/ML
50 INJECTION INTRAMUSCULAR; INTRAVENOUS
Status: CANCELLED
Start: 2023-01-20

## 2023-01-20 RX ORDER — PALONOSETRON 0.05 MG/ML
0.25 INJECTION, SOLUTION INTRAVENOUS ONCE
Status: CANCELLED | OUTPATIENT
Start: 2023-01-20

## 2023-01-20 RX ORDER — ALBUTEROL SULFATE 90 UG/1
1-2 AEROSOL, METERED RESPIRATORY (INHALATION)
Status: CANCELLED
Start: 2023-01-20

## 2023-01-20 RX ORDER — EPINEPHRINE 1 MG/ML
0.3 INJECTION, SOLUTION INTRAMUSCULAR; SUBCUTANEOUS EVERY 5 MIN PRN
Status: CANCELLED | OUTPATIENT
Start: 2023-01-20

## 2023-01-20 RX ORDER — ALBUTEROL SULFATE 0.83 MG/ML
2.5 SOLUTION RESPIRATORY (INHALATION)
Status: CANCELLED | OUTPATIENT
Start: 2023-01-20

## 2023-01-20 RX ORDER — PALONOSETRON 0.05 MG/ML
0.25 INJECTION, SOLUTION INTRAVENOUS ONCE
Status: COMPLETED | OUTPATIENT
Start: 2023-01-20 | End: 2023-01-20

## 2023-01-20 RX ORDER — MEPERIDINE HYDROCHLORIDE 25 MG/ML
25 INJECTION INTRAMUSCULAR; INTRAVENOUS; SUBCUTANEOUS EVERY 30 MIN PRN
Status: CANCELLED | OUTPATIENT
Start: 2023-01-20

## 2023-01-20 RX ORDER — METHYLPREDNISOLONE SODIUM SUCCINATE 125 MG/2ML
125 INJECTION, POWDER, LYOPHILIZED, FOR SOLUTION INTRAMUSCULAR; INTRAVENOUS
Status: CANCELLED
Start: 2023-01-20

## 2023-01-20 RX ORDER — DIPHENHYDRAMINE HCL 25 MG
50 CAPSULE ORAL ONCE
Status: COMPLETED | OUTPATIENT
Start: 2023-01-20 | End: 2023-01-20

## 2023-01-20 RX ORDER — DIPHENHYDRAMINE HCL 25 MG
50 CAPSULE ORAL ONCE
Status: CANCELLED
Start: 2023-01-20

## 2023-01-20 RX ORDER — LORAZEPAM 2 MG/ML
0.5 INJECTION INTRAMUSCULAR EVERY 4 HOURS PRN
Status: CANCELLED | OUTPATIENT
Start: 2023-01-20

## 2023-01-20 RX ORDER — MAGNESIUM OXIDE 400 MG/1
400 TABLET ORAL DAILY
Qty: 30 TABLET | Refills: 1 | Status: SHIPPED | OUTPATIENT
Start: 2023-01-20 | End: 2023-03-03

## 2023-01-20 RX ORDER — MAGNESIUM SULFATE HEPTAHYDRATE 40 MG/ML
2 INJECTION, SOLUTION INTRAVENOUS ONCE
Status: COMPLETED | OUTPATIENT
Start: 2023-01-20 | End: 2023-01-20

## 2023-01-20 RX ORDER — HEPARIN SODIUM,PORCINE 10 UNIT/ML
5 VIAL (ML) INTRAVENOUS
Status: CANCELLED | OUTPATIENT
Start: 2023-01-20

## 2023-01-20 RX ORDER — HEPARIN SODIUM (PORCINE) LOCK FLUSH IV SOLN 100 UNIT/ML 100 UNIT/ML
5 SOLUTION INTRAVENOUS
Status: CANCELLED | OUTPATIENT
Start: 2023-01-20

## 2023-01-20 RX ADMIN — SODIUM CHLORIDE 900 MG: 9 INJECTION, SOLUTION INTRAVENOUS at 13:43

## 2023-01-20 RX ADMIN — FAMOTIDINE 20 MG: 10 INJECTION INTRAVENOUS at 08:56

## 2023-01-20 RX ADMIN — PALONOSETRON HYDROCHLORIDE 0.25 MG: 0.25 INJECTION INTRAVENOUS at 08:56

## 2023-01-20 RX ADMIN — DIPHENHYDRAMINE HYDROCHLORIDE 50 MG: 25 CAPSULE ORAL at 09:03

## 2023-01-20 RX ADMIN — SODIUM CHLORIDE 200 MG: 9 INJECTION, SOLUTION INTRAVENOUS at 09:30

## 2023-01-20 RX ADMIN — CARBOPLATIN 600 MG: 10 INJECTION, SOLUTION INTRAVENOUS at 12:57

## 2023-01-20 RX ADMIN — SODIUM CHLORIDE 219 MG: 9 INJECTION, SOLUTION INTRAVENOUS at 10:11

## 2023-01-20 RX ADMIN — MAGNESIUM SULFATE 2 G: 2 INJECTION INTRAVENOUS at 10:14

## 2023-01-20 RX ADMIN — SODIUM CHLORIDE 250 ML: 9 INJECTION, SOLUTION INTRAVENOUS at 08:50

## 2023-01-20 RX ADMIN — FOSAPREPITANT: 150 INJECTION, POWDER, LYOPHILIZED, FOR SOLUTION INTRAVENOUS at 09:04

## 2023-01-20 ASSESSMENT — PAIN SCALES - GENERAL: PAINLEVEL: NO PAIN (0)

## 2023-01-20 NOTE — PROGRESS NOTES
Infusion Nursing Note:  Suni Zuluaga presents today for cycle 5 day 1 keytruda, taxol, carboplatin, bevacizumab-bvzr, + magnesium replacement.    Patient seen by provider today: Yes: Mounika Ritchie CNP   present during visit today: Not Applicable.    Note:   Suni has no questions after her appointment with Mounika Ritchie.    Intravenous Access:  Peripheral IV placed in lab.    Treatment Conditions:  Lab Results   Component Value Date    HGB 9.4 (L) 01/20/2023    WBC 4.2 01/20/2023    ANEU 2.5 10/27/2020    ANEUTAUTO 2.9 01/20/2023     01/20/2023      Lab Results   Component Value Date     01/20/2023    POTASSIUM 4.4 01/20/2023    MAG 1.5 (L) 01/20/2023    CR 0.51 01/20/2023    HARRISON 9.5 01/20/2023    BILITOTAL 0.2 01/20/2023    ALBUMIN 4.0 01/20/2023    ALT 24 01/20/2023    AST 26 01/20/2023     Results reviewed, labs MET treatment parameters, ok to proceed with treatment.  Urine protein negative.  /76.    Post Infusion Assessment:  Patient tolerated infusion without incident.  Blood return noted pre and post infusion.  Site patent and intact, free from redness, edema or discomfort.  No evidence of extravasations.  Access discontinued per protocol.     Discharge Plan:   Patient declined prescription refills.  Discharge instructions reviewed with: Patient.  Patient and/or family verbalized understanding of discharge instructions and all questions answered.  AVS to patient via [facility].  Patient will return 2/10 for next appointment.   Patient discharged in stable condition accompanied by: significant other.  Departure Mode: Ambulatory.      Caridad Mireles RN

## 2023-01-20 NOTE — PATIENT INSTRUCTIONS
[facility] Triage and after hours / weekends / holidays:  [phone]    Please call the triage or after hours line if you experience a temperature greater than or equal to 100.5, shaking chills, have uncontrolled nausea, vomiting and/or diarrhea, dizziness, shortness of breath, chest pain, bleeding, unexplained bruising, or if you have any other new/concerning symptoms, questions or concerns.      If you are having any concerning symptoms or wish to speak to a provider before your next infusion visit, please call your care coordinator or triage to notify them so we can adequately serve you.     If you need a refill on a narcotic prescription or other medication, please call before your infusion appointment.

## 2023-01-20 NOTE — NURSING NOTE
"Oncology Rooming Note    January 20, 2023 7:52 AM   Suni Zuluaga is a 37 year old female who presents for:    Chief Complaint   Patient presents with     Blood Draw     Labs drawn via piv start by RN. Vitals taken.     Oncology Clinic Visit     Malignant neoplasm of overlapping sites of cervix     Initial Vitals: /76 (BP Location: Right arm)   Pulse 104   Temp 98.4  F (36.9  C) (Oral)   Resp 16   Wt 62.1 kg (137 lb)   SpO2 97%   BMI 24.27 kg/m   Estimated body mass index is 24.27 kg/m  as calculated from the following:    Height as of 10/13/22: 1.6 m (5' 3\").    Weight as of this encounter: 62.1 kg (137 lb). Body surface area is 1.66 meters squared.  No Pain (0) Comment: Data Unavailable   No LMP recorded.  Allergies reviewed: Yes  Medications reviewed: Yes    Medications: Medication refills not needed today.  Pharmacy name entered into EPIC: [facility] [place] - [place], [place] - [address]    Clinical concerns: none       Isela Mcintyre            "

## 2023-02-08 ENCOUNTER — TRANSFERRED RECORDS (OUTPATIENT)
Dept: HEALTH INFORMATION MANAGEMENT | Facility: CLINIC | Age: 38
End: 2023-02-08
Payer: COMMERCIAL

## 2023-02-08 LAB
ALT SERPL-CCNC: 30 U/L (ref 14–63)
AST SERPL-CCNC: 32 U/L (ref 15–37)
CREATININE (EXTERNAL): 0.7 MG/DL (ref 0.59–1.28)
GFR ESTIMATED (EXTERNAL): >60 ML/MIN/1.73M2
GLUCOSE (EXTERNAL): 96 MG/DL (ref 74–106)
POTASSIUM (EXTERNAL): 4.2 MMOL/L (ref 3.5–5.1)
TSH SERPL-ACNC: 43.49 UIU/ML (ref 0.36–3.74)

## 2023-02-08 NOTE — PROGRESS NOTES
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  She is here today for follow up and disease management.      Oncology History:  2015:HSIL Pap at the beginning of pregnancy. Repeat pap postpartum was ASCUS. Colposcopy was negative.   20: Pelvic US. Uterine fibroids, normal appearance of uterus and ovaries  2020: Pap smear which confirmed squamous cell carcinoma of the cervix. Referred to GYN  ONC  2020: GYN ONC Consult [facility]. Cervical biopsy, RADHA III, unable to identify invasion  2020: Repeat cervical biopsy confirms invasive SCC  2020: Started primary chemoRT for locally advanced cervical cancer. Stage IIIC1(r), large primary tumor with bilateral parametrial invasion and clinical evidence of posterior vaginal involvement. Pelvic lymphadenopathy involving external iliac, perirectal and internal iliac nodes.   20-10/30/20: ChemoRT.   20-20: T&R brachytherapy  2020: Followup with radonc (Dr Hernández)   22: CT A/P with nonspecific increased density in the para-aortic region, which could be seen in the setting of retroperitoneal fibrosis, inflammation, or potentially lymphoma/desmoplastic reaction. New varix from right renal vein extending down into the RLQ.   22: CT abdomen retroperitoneal bx   Final Diagnosis  A and B.  Soft tissue, right retroperitoneum and retroperitoneum, CT-guided needle biopsies:  -Metastatic squamous cell carcinoma.  Addendum 2  CPS score is 30-40.     RESULT FOR IMMUNOHISTOCHEMICAL VENTANA CLONE  PD-L1 ASSAY  TUMOR PROPORTION SCORE (TPS):  30%  INTERPRETATION: LOW PD-L1 EXPRESSION (TPS >/=1-49%)     10/6/22: PET CT Whole body: IMPRESSION: This patient with history of squamous cell carcinoma  of  the cervix there is evidence of disease progression:  1. Large 5 cm right retroperitoneal mass previously biopsied as  metastatic squamous cell carcinoma demonstrates FDG avidity. No  definite significant disease within the pelvis.  2. New metastatic nodes within the retroperitoneum and mediastinum.  3. 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  4. Diffusely increased FDG avidity throughout the terminal ileum and  large intestine without associated mass lesion may represent vascular  congestion or diffuse colitis.  5. Diffuse soft tissue edema.     Plan: Paclitaxel, Carboplatin, Bevacizumab, and pembrolizumab added due to PD-L1 expression every 3 weeks.      10/13/22: Cycle 1 Paclitaxel 175 mg/m2, Carboplatin AUC 6, Bevacizumab 15 mg/m2  11/4/22: Cycle 2 Paclitaxel 175 mg/m2, Carboplatin AUC 5, Bevacizumab 15 mg/m2. Pembrolizumab on hold while awaiting insurance coverage.     11/8/22: ED for nausea (no vomiting) and dizziness, discharged with valium for vertigo.     11/25/22: Cycle 3 Paclitaxel 175 mg/m2, Carboplatin AUC 5, Bevacizumab 15 mg/m2, and pembrolizumab 200 mg.  Defer x 1 week for thrombocytopenia (plts 82).  Decrease Paclitaxel 135 mg/m2, Carboplatin AUC 5, Bevacizumab 15 mg/m2, and pembrolizumab 200 mg, given 12/5.     12/19/22 CT CAP IMPRESSION:  1.  Metastatic squamous cell carcinoma of the cervix with decreased size of previously FDG avid thoracoabdominal lymph nodes compared to 10/06/2022. No new or worsening disease.   2.  Unchanged tumoral encasement and occlusion of the infrarenal IVC.   3.  Rectal wall thickening and fat stranding suggestive of postradiation inflammation.     Plan: Continue paclitaxel, carboplatin, bevacizumab, and pembrolizumab with CT/MD after 3 more cycles (6 total)     12/30/22: Cycle 4 Paclitaxel 135 mg/m2, Carboplatin AUC 5, Bevacizumab 15 mg/m2, and pembrolizumab 200 mg,  held carboplatin and paclitaxel due to thrombocytopenia (plts 39), not given this cycle.  Bevacizumab and Pembrolizumab given.  1/20/23: Cycle 5 Paclitaxel 135 mg/m2, Carboplatin AUC 4, Bevacizumab 15 mg/m2, and pembrolizumab 200 mg.    1/20/23: BLE US  IMPRESSION: No deep venous thrombosis demonstrated in either leg.    2/10/23: Cycle 6 Paclitaxel 135 mg/m2, Carboplatin AUC 4, Bevacizumab 15 mg/m2, and pembrolizumab 200 mg.                  Today she comes to clinic feeling well overall and ready for her next cycle of treatment.  She is having no nausea or vomiting after treatment.  Not needing any antiemetics.  Eating and drinking without difficulty.  Weight has increased.  She is not checking her BP at home.  She does have significant vertigo for a couple of days after treatment.  She was seen in the ED for this back in November and received diazepam 2 mg tablets which was very helpful for her symptoms.  We previously discussed meclizine which she tried last treatment which was not helpful.  She would like a prescription for the diazepam again.  She continues with right leg swelling that worsens throughout the day.  Overall her symptoms have not improved or worsened since her last visit.  She has still not heard from lymphedema therapy in [place].  She denies any changes in her bowel or bladder habits, no fevers or chills, and no chest pain or shortness of breath.                      Review of Systems:    Systemic           +weight gain; no fever; no chills; no night sweats; no appetite changes  Skin           no rashes, or lesions  Eye           no irritation; no changes in vision  Sunil-Laryngeal           no dysphagia; no hoarseness   Pulmonary    no cough; no shortness of breath  Cardiovascular    no chest pain; no palpitations  Gastrointestinal    no diarrhea; no constipation; no abdominal pain; no changes in bowel habits; no blood in stool  Genitourinary   no urinary frequency; no urinary urgency; no  dysuria; no pain; no abnormal vaginal discharge; no abnormal vaginal bleeding  Breast   no breast discharge; no breast changes; no breast pain  Musculoskeletal    no myalgias; no arthralgias; no back pain  Psychiatric           no depressed mood; no anxiety    Hematologic   no tender lymph nodes; + RLE swelling    Endocrine    no hot flashes; no heat/cold intolerance         Neurological   no tremor; no numbness and tingling; no headaches; no difficulty sleeping; +dizziness      Past Medical History:    Past Medical History:   Diagnosis Date     Cervical cancer (H)          Past Surgical History:    Past Surgical History:   Procedure Laterality Date     CARPAL TUNNEL RELEASE RT/LT Right      TUBAL LIGATION           Health Maintenance Due   Topic Date Due     NICOTINE/TOBACCO CESSATION COUNSELING Q 1 YR  Never done     YEARLY PREVENTIVE VISIT  Never done     ADVANCE CARE PLANNING  Never done     HEPATITIS B IMMUNIZATION (1 of 3 - 3-dose series) Never done     COVID-19 Vaccine (1) Never done     Pneumococcal Vaccine: Pediatrics (0 to 5 Years) and At-Risk Patients (6 to 64 Years) (1 - PCV) Never done     HEPATITIS C SCREENING  Never done     INFLUENZA VACCINE (1) 09/01/2022     PHQ-2 (once per calendar year)  Never done       Current Medications:     Current Outpatient Medications   Medication Sig Dispense Refill     diazepam (VALIUM) 2 MG tablet Take 1 tablet (2 mg) by mouth every 12 hours as needed for anxiety 12 tablet 0     levothyroxine (SYNTHROID/LEVOTHROID) 100 MCG tablet Take 1 tablet (100 mcg) by mouth daily 30 tablet 0     magnesium oxide (MAG-OX) 400 MG tablet Take 1 tablet (400 mg) by mouth daily 30 tablet 1     meclizine (ANTIVERT) 25 MG tablet Take 0.5-1 tablets (12.5-25 mg) by mouth 3 times daily as needed for dizziness 30 tablet 1     prochlorperazine (COMPAZINE) 10 MG tablet Take 1 tablet (10 mg) by mouth every 6 hours as needed for nausea or vomiting 30 tablet 2     acetaminophen (TYLENOL) 500 MG  tablet Take 2 tablets (1,000 mg) by mouth 3 times daily as needed for mild pain (Patient not taking: Reported on 12/5/2022) 90 tablet 3     acetaminophen (TYLENOL) 500 MG tablet Take 500-1,000 mg by mouth every 6 hours as needed for mild pain (Patient not taking: Reported on 12/5/2022)       ibuprofen (ADVIL/MOTRIN) 600 MG tablet Take 600 mg by mouth every 6 hours as needed for moderate pain (Patient not taking: Reported on 12/5/2022)       ondansetron (ZOFRAN ODT) 4 MG ODT tab Take 2 tablets (8 mg) by mouth every 8 hours as needed for nausea (Patient not taking: Reported on 12/5/2022) 20 tablet 3     polyethylene glycol (MIRALAX) 17 GM/Dose powder Take 17 g (1 capful) by mouth daily (Patient not taking: Reported on 12/5/2022) 850 g 3     SENNA-docusate sodium (SENNA S) 8.6-50 MG tablet Take 1 tablet by mouth At Bedtime Can increase up to two tablets twice per day. (Patient not taking: Reported on 12/5/2022) 60 tablet 3         Allergies:        Allergies   Allergen Reactions     Clindamycin Hives        Social History:     Social History     Tobacco Use     Smoking status: Every Day     Packs/day: 1.00     Types: Cigarettes     Smokeless tobacco: Never     Tobacco comments:     9/11/2020 1/2 pack/day, tying tp quit   Substance Use Topics     Alcohol use: Yes     Comment: occ.       History   Drug Use Unknown         Family History:     The patient's family history is notable for:    Family History   Problem Relation Age of Onset     Breast Cancer Mother         8/2020 newly dx         Physical Exam:     /82   Pulse 81   Temp 97.9  F (36.6  C) (Oral)   Resp 18   Wt 64.5 kg (142 lb 4.8 oz)   SpO2 99%   BMI 25.21 kg/m    Body mass index is 25.21 kg/m .    General Appearance: healthy and alert, no distress     HEENT: no palpable nodules or masses        Cardiovascular: regular rate and rhythm, no gallops, rubs or murmurs   Respiratory: lungs clear, no rales, rhonchi or  wheezes    Musculoskeletal: extremities non tender and RLE with trace edema    Skin: no lesions or rashes     Neurological: normal gait, no gross defects     Psychiatric: appropriate mood and affect                               Hematological: normal cervical and supraclavicular lymph nodes     Gastrointestinal:       abdomen soft, non-tender, non-distended    Genitourinary: Deferred.    2/8/23:      Ref Range & Units Today   WBC Count 4.8 - 10.8 10(3)/uL 6.2    RBC Count 4.20 - 5.40 10(6)/uL 3.24 Low     Hemoglobin 12.0 - 16.0 g/dL 10.9 Low     Hematocrit 37.0 - 47.0 % 34.2 Low     MCV 81.0 - 99.0 fL 105.6 High     MCH 27.0 - 33.0 pg 33.6 High     MCHC 33.0 - 37.0 g/dL 31.9 Low     RDW 11.5 - 14.5 % 16.5 High     Platelets 130 - 400 10(3)/uL 167    MPV 7.4 - 10.4 fL 9.6    % Neutrophils 42.2 - 75.2 % 72.5    % Lymphocytes 20.5 - 51.1 % 12.8 Low     % Monocytes 1.7 - 9.3 % 12.8 High     % Eosinophils 0.9 - 7.0 % 1.1    % Basophils 0.2 - 1.0 % 0.5    % Immature Granulocytes 0.0 - 5.0 % 0.3    Abs Neutrophils 1.4 - 6.5 10(3)/uL 4.5    Abs Lymphocytes 1.2 - 3.4 10(3)/uL 0.8 Low     Abs Monocytes 0.1 - 0.6 10(3)/uL 0.8 High     Abs Eosinophils 0.0 - 0.6 10(3)/uL 0.1    Abs Basophils 0.0 - 0.1 10(3)/uL 0.0    Abs Immature Granulocytes 0.00 - 0.50 10(3)/uL 0.02        Ref Range & Units Today Comments   Sodium 136 - 145 mmol/L 139     Potassium 3.5 - 5.1 mmol/L 4.2     Chloride 98 - 107 mmol/L 103     CO2 21 - 32 mmol/L 28     Anion Gap 10.0 - 20.0 mmol/L 12.2     Creatinine 0.59 - 1.28 mg/dL 0.70     Blood Urea Nitrogen 7.0 - 18.0 mg/dL 14.0     BUN/Creatinine Ratio 11.70 - 22.90 ratio 20.00     Calcium 8.5 - 10.1 mg/dL 8.7     Glucose 74 - 106 mg/dL 96     eGFR >=60 mL/min/1.73m(2) >60  Calculation based on the Chronic Kidney Disease Epidemiology Collaboration (CKD-EPI) equation refit without adjustment for race.   Total Protein 6.4 - 8.2 g/dL 7.4     Albumin 3.4 - 5.0 g/dL 3.3 Low      Globulin 2.6 - 4.6 g/dL 4.1      Albumin/Globulin Ratio 1.0 - 2.0 0.8 Low      Aspartate Aminotransferase (AST) 15 - 37 U/L 32     Alanine Aminotransferase (ALT) 14 - 63 U/L 30     Alkaline Phosphatase 46 - 116 U/L 79     Bilirubin, Total 0.2 - 1.0 mg/dL 0.2     eCrCl (Rx) - Adults mL/min 107.1     Fasting Status  No        Ref Range & Units Today   Phosphorus 2.6 - 4.7 mg/dL 5.0 High        Ref Range & Units Today   Magnesium 1.8 - 2.4 mg/dL 1.6 Low        Ref Range & Units Today   TSH 0.36 - 3.74 uIU/mL 43.49 High        Ref Range & Units Today   Free T4 0.80 - 1.50 ng/dL 0.27 Low       Component      Latest Ref Rng & Units 2/10/2023   Protein Albumin Urine      Negative mg/dL Negative       Assessment:    Suni Zuluaga is a 37 year old woman with 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  She is here today for follow up and disease management.      40 minutes spent on the date of the encounter doing chart review, history and exam, documentation, and further activities as noted above.      Plan:     1.)        Cervical cancer:   OK to proceed with planned treatment today if labs and BP are WDL.  RTC in 3 weeks for labs, provider visit, and next cycle; this is already scheduled.  Discussed with Dr. Brambila previously and while this is technically a cycle 6 today as she could not have chemotherapy for cycle 4 we will try to proceed as scheduled with an additional cycle of chemotherapy prior to obtaining CT and follow-up with Dr. Brambila.  Patient prefers for her labs to be done closer to home a couple days prior to ensure that her counts are ok for treatment.  Message sent to [facility] to fax orders to her local clinic.  If blood counts are too low to proceed with treatment for that cycle will plan to obtain CT and follow-up with Dr. Brambila at that time with plan to proceed with maintenance treatment.  Reviewed signs and symptoms for when she should contact the clinic or seek additional care.  Patient to contact  the clinic with any questions or concerns in the interim.        Genetic risk factors were assessed and she does not meet qualification for referral.       Labs and/or tests ordered include:  CBC. CMP. Mag. Urine protein. TSH with reflex to T4.                2.)        Health maintenance:  Issues addressed today include following up with PCP for annual health maintenance and non-gynecologic issues.      3.)        RLE edema: Scant edema noted today but per patient this will increase significantly throughout the day.  Prior ultrasounds negative for DVT.  [facility] orders were sent to schedule.       4.)        Vertigo: Vertigo with prior chemotherapy treatments. Not well controlled with 12.5 to 25 mg meclizine but did tolerate diazepam 2 mg with improvement in her symptoms. Previously discussed that this is not a typical treatment for vertigo however so far it has been the only treatment that has helped her symptoms.   checked without any other high risk medications.  Prescription for diazepam 2 mg every 12 hrs, 12 tabs sent to pharmacy.  Will reevaulate with each chemotherapy treatment.     5.)        Hypomagnesemia: Magnesium this week was 1.6 which is slightly low but improved.  No need for magnesium replacement today.  Continue magnesium oxide 400 mg daily.         6.) Hypothyroid: This week TSH 43.49 and T4 0.27.  No symptoms currently.  Will start synthroid 100 mcg daily based on weight; one month supply sent.  Will continue to check TSH which each treatment but will not proceed with dose adjustments for 6-8 weeks.  She has a PCP that can take over synthroid management.  Patient will make a visit with them within the next month to take over management.    Mounika Ritchie, DNP, APRN, FNP-C  Nurse Practitioner  Division of Gynecologic Oncology  Pager: [phone]     CC  Patient Care Team:  Suzy Brambila MD as PCP - General  (Gynecologic Oncology)  Suzy Brambila MD as MD (Gynecologic Oncology)  Kenneth Shahid as Referring Physician  Liliam Paniagua, RN as Specialty Care Coordinator (Hematology & Oncology)  Mounika Ritchie APRN CNP as Assigned Cancer Care Provider  SELF, REFERRED

## 2023-02-10 ENCOUNTER — APPOINTMENT (OUTPATIENT)
Dept: LAB | Facility: CLINIC | Age: 38
End: 2023-02-10
Attending: OBSTETRICS & GYNECOLOGY
Payer: COMMERCIAL

## 2023-02-10 ENCOUNTER — INFUSION THERAPY VISIT (OUTPATIENT)
Dept: ONCOLOGY | Facility: CLINIC | Age: 38
End: 2023-02-10
Attending: OBSTETRICS & GYNECOLOGY
Payer: COMMERCIAL

## 2023-02-10 VITALS
DIASTOLIC BLOOD PRESSURE: 82 MMHG | TEMPERATURE: 97.9 F | BODY MASS INDEX: 25.21 KG/M2 | WEIGHT: 142.3 LBS | RESPIRATION RATE: 18 BRPM | HEART RATE: 81 BPM | OXYGEN SATURATION: 99 % | SYSTOLIC BLOOD PRESSURE: 117 MMHG

## 2023-02-10 DIAGNOSIS — Z51.11 ENCOUNTER FOR ANTINEOPLASTIC CHEMOTHERAPY: ICD-10-CM

## 2023-02-10 DIAGNOSIS — E03.2 HYPOTHYROIDISM DUE TO MEDICATION: ICD-10-CM

## 2023-02-10 DIAGNOSIS — C53.8 MALIGNANT NEOPLASM OF OVERLAPPING SITES OF CERVIX (H): Primary | ICD-10-CM

## 2023-02-10 DIAGNOSIS — R42 VERTIGO: ICD-10-CM

## 2023-02-10 DIAGNOSIS — R60.0 EDEMA OF RIGHT LOWER EXTREMITY: ICD-10-CM

## 2023-02-10 DIAGNOSIS — E83.42 HYPOMAGNESEMIA: ICD-10-CM

## 2023-02-10 LAB — ALBUMIN UR-MCNC: NEGATIVE MG/DL

## 2023-02-10 PROCEDURE — G0463 HOSPITAL OUTPT CLINIC VISIT: HCPCS

## 2023-02-10 PROCEDURE — G0463 HOSPITAL OUTPT CLINIC VISIT: HCPCS | Performed by: NURSE PRACTITIONER

## 2023-02-10 PROCEDURE — 96375 TX/PRO/DX INJ NEW DRUG ADDON: CPT

## 2023-02-10 PROCEDURE — 96417 CHEMO IV INFUS EACH ADDL SEQ: CPT

## 2023-02-10 PROCEDURE — 96367 TX/PROPH/DG ADDL SEQ IV INF: CPT

## 2023-02-10 PROCEDURE — 96415 CHEMO IV INFUSION ADDL HR: CPT

## 2023-02-10 PROCEDURE — 96413 CHEMO IV INFUSION 1 HR: CPT

## 2023-02-10 PROCEDURE — 258N000003 HC RX IP 258 OP 636: Performed by: NURSE PRACTITIONER

## 2023-02-10 PROCEDURE — 250N000013 HC RX MED GY IP 250 OP 250 PS 637: Performed by: NURSE PRACTITIONER

## 2023-02-10 PROCEDURE — 99215 OFFICE O/P EST HI 40 MIN: CPT | Performed by: NURSE PRACTITIONER

## 2023-02-10 PROCEDURE — 250N000011 HC RX IP 250 OP 636: Performed by: NURSE PRACTITIONER

## 2023-02-10 PROCEDURE — 81003 URINALYSIS AUTO W/O SCOPE: CPT | Performed by: OBSTETRICS & GYNECOLOGY

## 2023-02-10 RX ORDER — HEPARIN SODIUM (PORCINE) LOCK FLUSH IV SOLN 100 UNIT/ML 100 UNIT/ML
5 SOLUTION INTRAVENOUS
Status: CANCELLED | OUTPATIENT
Start: 2023-02-10

## 2023-02-10 RX ORDER — PALONOSETRON 0.05 MG/ML
0.25 INJECTION, SOLUTION INTRAVENOUS ONCE
Status: COMPLETED | OUTPATIENT
Start: 2023-02-10 | End: 2023-02-10

## 2023-02-10 RX ORDER — DIPHENHYDRAMINE HCL 25 MG
50 CAPSULE ORAL ONCE
Status: COMPLETED | OUTPATIENT
Start: 2023-02-10 | End: 2023-02-10

## 2023-02-10 RX ORDER — LEVOTHYROXINE SODIUM 100 UG/1
100 TABLET ORAL DAILY
Qty: 30 TABLET | Refills: 0 | Status: SHIPPED | OUTPATIENT
Start: 2023-02-10 | End: 2023-03-03

## 2023-02-10 RX ORDER — METHYLPREDNISOLONE SODIUM SUCCINATE 125 MG/2ML
125 INJECTION, POWDER, LYOPHILIZED, FOR SOLUTION INTRAMUSCULAR; INTRAVENOUS
Status: CANCELLED
Start: 2023-02-10

## 2023-02-10 RX ORDER — LORAZEPAM 2 MG/ML
0.5 INJECTION INTRAMUSCULAR EVERY 4 HOURS PRN
Status: CANCELLED | OUTPATIENT
Start: 2023-02-10

## 2023-02-10 RX ORDER — DIPHENHYDRAMINE HYDROCHLORIDE 50 MG/ML
50 INJECTION INTRAMUSCULAR; INTRAVENOUS
Status: CANCELLED
Start: 2023-02-10

## 2023-02-10 RX ORDER — MEPERIDINE HYDROCHLORIDE 25 MG/ML
25 INJECTION INTRAMUSCULAR; INTRAVENOUS; SUBCUTANEOUS EVERY 30 MIN PRN
Status: CANCELLED | OUTPATIENT
Start: 2023-02-10

## 2023-02-10 RX ORDER — ALBUTEROL SULFATE 90 UG/1
1-2 AEROSOL, METERED RESPIRATORY (INHALATION)
Status: CANCELLED
Start: 2023-02-10

## 2023-02-10 RX ORDER — DIAZEPAM 2 MG
2 TABLET ORAL EVERY 12 HOURS PRN
Qty: 12 TABLET | Refills: 0 | Status: SHIPPED | OUTPATIENT
Start: 2023-02-10 | End: 2023-03-03

## 2023-02-10 RX ORDER — ALBUTEROL SULFATE 0.83 MG/ML
2.5 SOLUTION RESPIRATORY (INHALATION)
Status: CANCELLED | OUTPATIENT
Start: 2023-02-10

## 2023-02-10 RX ORDER — DIPHENHYDRAMINE HCL 25 MG
50 CAPSULE ORAL ONCE
Status: CANCELLED
Start: 2023-02-10

## 2023-02-10 RX ORDER — HEPARIN SODIUM,PORCINE 10 UNIT/ML
5 VIAL (ML) INTRAVENOUS
Status: CANCELLED | OUTPATIENT
Start: 2023-02-10

## 2023-02-10 RX ORDER — EPINEPHRINE 1 MG/ML
0.3 INJECTION, SOLUTION INTRAMUSCULAR; SUBCUTANEOUS EVERY 5 MIN PRN
Status: CANCELLED | OUTPATIENT
Start: 2023-02-10

## 2023-02-10 RX ORDER — PALONOSETRON 0.05 MG/ML
0.25 INJECTION, SOLUTION INTRAVENOUS ONCE
Status: CANCELLED | OUTPATIENT
Start: 2023-02-10

## 2023-02-10 RX ADMIN — PALONOSETRON HYDROCHLORIDE 0.25 MG: 0.25 INJECTION INTRAVENOUS at 09:34

## 2023-02-10 RX ADMIN — SODIUM CHLORIDE 250 ML: 9 INJECTION, SOLUTION INTRAVENOUS at 09:35

## 2023-02-10 RX ADMIN — PACLITAXEL 219 MG: 6 INJECTION, SOLUTION INTRAVENOUS at 10:50

## 2023-02-10 RX ADMIN — CARBOPLATIN 500 MG: 10 INJECTION, SOLUTION INTRAVENOUS at 13:36

## 2023-02-10 RX ADMIN — FAMOTIDINE 20 MG: 10 INJECTION INTRAVENOUS at 09:34

## 2023-02-10 RX ADMIN — DIPHENHYDRAMINE HYDROCHLORIDE 50 MG: 25 CAPSULE ORAL at 09:34

## 2023-02-10 RX ADMIN — DEXAMETHASONE SODIUM PHOSPHATE: 10 INJECTION, SOLUTION INTRAMUSCULAR; INTRAVENOUS at 09:43

## 2023-02-10 RX ADMIN — SODIUM CHLORIDE 200 MG: 9 INJECTION, SOLUTION INTRAVENOUS at 10:11

## 2023-02-10 RX ADMIN — SODIUM CHLORIDE 900 MG: 9 INJECTION, SOLUTION INTRAVENOUS at 14:23

## 2023-02-10 ASSESSMENT — PAIN SCALES - GENERAL: PAINLEVEL: NO PAIN (0)

## 2023-02-10 NOTE — NURSING NOTE
"Oncology Rooming Note    February 10, 2023 8:20 AM   Suni Zuluaga is a 37 year old female who presents for:    Chief Complaint   Patient presents with     Blood Draw     IV placement by lab RN. Vitals taken and appointment arrived     Oncology Clinic Visit     RTN for Cervical Cancer     Initial Vitals: Blood Pressure 117/82   Pulse 81   Temperature 97.9  F (36.6  C) (Oral)   Respiration 18   Weight 64.5 kg (142 lb 4.8 oz)   Oxygen Saturation 99%   Body Mass Index 25.21 kg/m   Estimated body mass index is 25.21 kg/m  as calculated from the following:    Height as of 10/13/22: 1.6 m (5' 3\").    Weight as of this encounter: 64.5 kg (142 lb 4.8 oz). Body surface area is 1.69 meters squared.  No Pain (0) Comment: Data Unavailable   No LMP recorded.  Allergies reviewed: Yes  Medications reviewed: Yes    Medications: Medication refills not needed today.  Pharmacy name entered into EPIC: [facility] - [place], [place] - [address]    Clinical concerns: none       Belén Townsend MA            "

## 2023-02-10 NOTE — PROGRESS NOTES
Infusion Nursing Note:  Suni Zuluaga presents today for cycle 6 day 1 pembrolizumab, paclitaxel, carboplatin and bevacizumab-bvzr.  Patient seen by provider today: Yes: Mounika Ritchie CNP   present during visit today: Not Applicable.    Note: Pt presents today generally feeling well. Assessed in clinic by Mounika Ritchie CNP. Pt wishes to proceed with planned treatment.    Intravenous Access:  Peripheral IV placed.    Treatment Conditions:  Lab Results   Component Value Date    HGB 9.4 (L) 01/20/2023    WBC 4.2 01/20/2023    ANEU 2.5 10/27/2020    ANEUTAUTO 2.9 01/20/2023     01/20/2023      Lab Results   Component Value Date     01/20/2023    POTASSIUM 4.4 01/20/2023    MAG 1.5 (L) 01/20/2023    CR 0.51 01/20/2023    HARRISON 9.5 01/20/2023    BILITOTAL 0.2 01/20/2023    ALBUMIN 4.0 01/20/2023    ALT 24 01/20/2023    AST 26 01/20/2023     Results reviewed, labs MET treatment parameters, ok to proceed with treatment.  Urine protein - negative.  /82.    Post Infusion Assessment:  Patient tolerated infusion without incident.  Blood return noted pre and post infusion.  Site patent and intact, free from redness, edema or discomfort.  No evidence of extravasations.  Access discontinued per protocol.     Discharge Plan:   Prescription refills given for synthroid and diazepam.  Discharge instructions reviewed with: Patient.  Patient and/or family verbalized understanding of discharge instructions and all questions answered.  Copy of AVS reviewed with patient and/or family.  Patient will return 03/03/23 for next appointment.  AVS to patient via [email or web address].  Patient will return 03/03/23 for next appointment.   Patient discharged in stable condition accompanied by: self.  Departure Mode: Ambulatory.      Emily Meese, RN

## 2023-02-10 NOTE — NURSING NOTE
Chief Complaint   Patient presents with     Blood Draw     IV placement by lab RN. Vitals taken and appointment arrived     Labs drawn two days ago. Urine sample collected this morning.    Aliza Feng RN

## 2023-02-10 NOTE — PATIENT INSTRUCTIONS
[facility]  [address] [place] [address]    Phone: [phone]

## 2023-02-10 NOTE — PATIENT INSTRUCTIONS
[facility] Triage and after hours / weekends / holidays:  [phone]    Please call the triage or after hours line if you experience a temperature greater than or equal to 100.4, shaking chills, have uncontrolled nausea, vomiting and/or diarrhea, dizziness, shortness of breath, chest pain, bleeding, unexplained bruising, or if you have any other new/concerning symptoms, questions or concerns.      If you are having any concerning symptoms or wish to speak to a provider before your next infusion visit, please call your care coordinator or triage to notify them so we can adequately serve you.     If you need a refill on a narcotic prescription or other medication, please call before your infusion appointment.                February 2023 Sunday Monday Tuesday Wednesday Thursday Friday Saturday                  1     2     3     4       5     6     7     8     9     10    LAB PERIPHERAL   8:00 AM   (15 min.)    [facility] DRAW   [facility]    RETURN   8:15 AM   (45 min.)   Mounika Ritchie APRN CNP   [facility]    ONC INFUSION 6 HR (360 MIN)  10:30 AM   (360 min.)    ONC INFUSION NURSE   [facility] 11       12     13     14     15     16     17     18       19     20     21     22     23     24     25       26     27     28                                       March 2023 Sunday Monday Tuesday Wednesday Thursday Friday Saturday                  1     2     3    LAB PERIPHERAL   6:30 AM   (15 min.)    [facility]RAW   [facility]    RETURN   6:45 AM   (45 min.)   Mounika Ritchie APRN CNP   [facility]    ONC INFUSION 6 HR (360 MIN)   8:30 AM   (360 min.)    ONC INFUSION NURSE   [facility] 4       5     6     7     8     9     10     11       12     13     14     15     16     17     18       19     20  Happy Birthday!    CT  CHEST/ABDOMEN/PELVIS W   8:30 AM   (20 min.)   [id number]   [facility] [place] 21     22    RETURN [facility]CSL   4:00 PM   (20 min.)   Suzy Brambila MD   [facility] 23     24    LAB PERIPHERAL   7:00 AM   (15 min.)   [facility] LAB DRAW   [facility]    ONC INFUSION 2 HR (120 MIN)   7:30 AM   (120 min.)    ONC INFUSION NURSE   [facility] 25       26     27     28     29     30     31                            Lab Results:  Recent Results (from the past 12 hour(s))   Protein qualitative urine    Collection Time: 02/10/23  8:12 AM   Result Value Ref Range    Protein Albumin Urine Negative Negative mg/dL

## 2023-02-10 NOTE — LETTER
2/10/2023     RE: Suni Zuluaga  [address]  [facility] [id number]    Dear Colleague,    Thank you for referring your patient, Suni Zuluaga, to the [facility]. Please see a copy of my visit note below.    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  She is here today for follow up and disease management.      Oncology History:  2015:HSIL Pap at the beginning of pregnancy. Repeat pap postpartum was ASCUS. Colposcopy was negative.   20: Pelvic US. Uterine fibroids, normal appearance of uterus and ovaries  2020: Pap smear which confirmed squamous cell carcinoma of the cervix. Referred to GYN  ONC  2020: GYN ONC Consult [facility]. Cervical biopsy, RADHA III, unable to identify invasion  2020: Repeat cervical biopsy confirms invasive SCC  2020: Started primary chemoRT for locally advanced cervical cancer. Stage IIIC1(r), large primary tumor with bilateral parametrial invasion and clinical evidence of posterior vaginal involvement. Pelvic lymphadenopathy involving external iliac, perirectal and internal iliac nodes.   20-10/30/20: ChemoRT.   20-20: T&R brachytherapy  2020: Followup with radonc (Dr Hernández)   22: CT A/P with nonspecific increased density in the para-aortic region, which could be seen in the setting of retroperitoneal fibrosis, inflammation, or potentially lymphoma/desmoplastic reaction. New varix from right renal vein extending down into the RLQ.   22: CT abdomen retroperitoneal bx   Final Diagnosis  A and B.  Soft tissue, right retroperitoneum and retroperitoneum, CT-guided needle biopsies:  -Metastatic squamous cell carcinoma.  Addendum 2  CPS score is  30-40.     RESULT FOR IMMUNOHISTOCHEMICAL VENTANA CLONE  PD-L1 ASSAY  TUMOR PROPORTION SCORE (TPS):  30%  INTERPRETATION: LOW PD-L1 EXPRESSION (TPS >/=1-49%)     10/6/22: PET CT Whole body: IMPRESSION: This patient with history of squamous cell carcinoma of  the cervix there is evidence of disease progression:  1. Large 5 cm right retroperitoneal mass previously biopsied as  metastatic squamous cell carcinoma demonstrates FDG avidity. No  definite significant disease within the pelvis.  2. New metastatic nodes within the retroperitoneum and mediastinum.  3. 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  4. Diffusely increased FDG avidity throughout the terminal ileum and  large intestine without associated mass lesion may represent vascular  congestion or diffuse colitis.  5. Diffuse soft tissue edema.     Plan: Paclitaxel, Carboplatin, Bevacizumab, and pembrolizumab added due to PD-L1 expression every 3 weeks.      10/13/22: Cycle 1 Paclitaxel 175 mg/m2, Carboplatin AUC 6, Bevacizumab 15 mg/m2  11/4/22: Cycle 2 Paclitaxel 175 mg/m2, Carboplatin AUC 5, Bevacizumab 15 mg/m2. Pembrolizumab on hold while awaiting insurance coverage.     11/8/22: ED for nausea (no vomiting) and dizziness, discharged with valium for vertigo.     11/25/22: Cycle 3 Paclitaxel 175 mg/m2, Carboplatin AUC 5, Bevacizumab 15 mg/m2, and pembrolizumab 200 mg.  Defer x 1 week for thrombocytopenia (plts 82).  Decrease Paclitaxel 135 mg/m2, Carboplatin AUC 5, Bevacizumab 15 mg/m2, and pembrolizumab 200 mg, given 12/5.     12/19/22 CT CAP IMPRESSION:  1.  Metastatic squamous cell carcinoma of the cervix with decreased size of previously FDG avid thoracoabdominal lymph nodes compared to 10/06/2022. No new or worsening disease.   2.  Unchanged tumoral encasement and occlusion of the infrarenal IVC.   3.  Rectal wall thickening and fat stranding suggestive  of postradiation inflammation.     Plan: Continue paclitaxel, carboplatin, bevacizumab, and pembrolizumab with CT/MD after 3 more cycles (6 total)     12/30/22: Cycle 4 Paclitaxel 135 mg/m2, Carboplatin AUC 5, Bevacizumab 15 mg/m2, and pembrolizumab 200 mg, held carboplatin and paclitaxel due to thrombocytopenia (plts 39), not given this cycle.  Bevacizumab and Pembrolizumab given.  1/20/23: Cycle 5 Paclitaxel 135 mg/m2, Carboplatin AUC 4, Bevacizumab 15 mg/m2, and pembrolizumab 200 mg.    1/20/23: BLE US  IMPRESSION: No deep venous thrombosis demonstrated in either leg.    2/10/23: Cycle 6 Paclitaxel 135 mg/m2, Carboplatin AUC 4, Bevacizumab 15 mg/m2, and pembrolizumab 200 mg.                  Today she comes to clinic feeling well overall and ready for her next cycle of treatment.  She is having no nausea or vomiting after treatment.  Not needing any antiemetics.  Eating and drinking without difficulty.  Weight has increased.  She is not checking her BP at home.  She does have significant vertigo for a couple of days after treatment.  She was seen in the ED for this back in November and received diazepam 2 mg tablets which was very helpful for her symptoms.  We previously discussed meclizine which she tried last treatment which was not helpful.  She would like a prescription for the diazepam again.  She continues with right leg swelling that worsens throughout the day.  Overall her symptoms have not improved or worsened since her last visit.  She has still not heard from lymphedema therapy in [place].  She denies any changes in her bowel or bladder habits, no fevers or chills, and no chest pain or shortness of breath.                      Review of Systems:    Systemic           +weight gain; no fever; no chills; no night sweats; no appetite changes  Skin           no rashes, or lesions  Eye           no irritation; no changes in vision  Sunil-Laryngeal           no dysphagia; no hoarseness   Pulmonary    no  cough; no shortness of breath  Cardiovascular    no chest pain; no palpitations  Gastrointestinal    no diarrhea; no constipation; no abdominal pain; no changes in bowel habits; no blood in stool  Genitourinary   no urinary frequency; no urinary urgency; no dysuria; no pain; no abnormal vaginal discharge; no abnormal vaginal bleeding  Breast   no breast discharge; no breast changes; no breast pain  Musculoskeletal    no myalgias; no arthralgias; no back pain  Psychiatric           no depressed mood; no anxiety    Hematologic   no tender lymph nodes; + RLE swelling    Endocrine    no hot flashes; no heat/cold intolerance         Neurological   no tremor; no numbness and tingling; no headaches; no difficulty sleeping; +dizziness      Past Medical History:    Past Medical History:   Diagnosis Date     Cervical cancer (H)          Past Surgical History:    Past Surgical History:   Procedure Laterality Date     CARPAL TUNNEL RELEASE RT/LT Right      TUBAL LIGATION           Health Maintenance Due   Topic Date Due     NICOTINE/TOBACCO CESSATION COUNSELING Q 1 YR  Never done     YEARLY PREVENTIVE VISIT  Never done     ADVANCE CARE PLANNING  Never done     HEPATITIS B IMMUNIZATION (1 of 3 - 3-dose series) Never done     COVID-19 Vaccine (1) Never done     Pneumococcal Vaccine: Pediatrics (0 to 5 Years) and At-Risk Patients (6 to 64 Years) (1 - PCV) Never done     HEPATITIS C SCREENING  Never done     INFLUENZA VACCINE (1) 09/01/2022     PHQ-2 (once per calendar year)  Never done       Current Medications:     Current Outpatient Medications   Medication Sig Dispense Refill     diazepam (VALIUM) 2 MG tablet Take 1 tablet (2 mg) by mouth every 12 hours as needed for anxiety 12 tablet 0     levothyroxine (SYNTHROID/LEVOTHROID) 100 MCG tablet Take 1 tablet (100 mcg) by mouth daily 30 tablet 0     magnesium oxide (MAG-OX) 400 MG tablet Take 1 tablet (400 mg) by mouth daily 30 tablet 1     meclizine (ANTIVERT) 25 MG tablet Take  0.5-1 tablets (12.5-25 mg) by mouth 3 times daily as needed for dizziness 30 tablet 1     prochlorperazine (COMPAZINE) 10 MG tablet Take 1 tablet (10 mg) by mouth every 6 hours as needed for nausea or vomiting 30 tablet 2     acetaminophen (TYLENOL) 500 MG tablet Take 2 tablets (1,000 mg) by mouth 3 times daily as needed for mild pain (Patient not taking: Reported on 12/5/2022) 90 tablet 3     acetaminophen (TYLENOL) 500 MG tablet Take 500-1,000 mg by mouth every 6 hours as needed for mild pain (Patient not taking: Reported on 12/5/2022)       ibuprofen (ADVIL/MOTRIN) 600 MG tablet Take 600 mg by mouth every 6 hours as needed for moderate pain (Patient not taking: Reported on 12/5/2022)       ondansetron (ZOFRAN ODT) 4 MG ODT tab Take 2 tablets (8 mg) by mouth every 8 hours as needed for nausea (Patient not taking: Reported on 12/5/2022) 20 tablet 3     polyethylene glycol (MIRALAX) 17 GM/Dose powder Take 17 g (1 capful) by mouth daily (Patient not taking: Reported on 12/5/2022) 850 g 3     SENNA-docusate sodium (SENNA S) 8.6-50 MG tablet Take 1 tablet by mouth At Bedtime Can increase up to two tablets twice per day. (Patient not taking: Reported on 12/5/2022) 60 tablet 3         Allergies:        Allergies   Allergen Reactions     Clindamycin Hives        Social History:     Social History     Tobacco Use     Smoking status: Every Day     Packs/day: 1.00     Types: Cigarettes     Smokeless tobacco: Never     Tobacco comments:     9/11/2020 1/2 pack/day, tying tp quit   Substance Use Topics     Alcohol use: Yes     Comment: occ.       History   Drug Use Unknown         Family History:     The patient's family history is notable for:    Family History   Problem Relation Age of Onset     Breast Cancer Mother         8/2020 newly dx         Physical Exam:     /82   Pulse 81   Temp 97.9  F (36.6  C) (Oral)   Resp 18   Wt 64.5 kg (142 lb 4.8 oz)   SpO2 99%   BMI 25.21 kg/m    Body mass index is 25.21  kg/m .    General Appearance: healthy and alert, no distress     HEENT: no palpable nodules or masses        Cardiovascular: regular rate and rhythm, no gallops, rubs or murmurs   Respiratory: lungs clear, no rales, rhonchi or wheezes    Musculoskeletal: extremities non tender and RLE with trace edema    Skin: no lesions or rashes     Neurological: normal gait, no gross defects     Psychiatric: appropriate mood and affect                               Hematological: normal cervical and supraclavicular lymph nodes     Gastrointestinal:       abdomen soft, non-tender, non-distended    Genitourinary: Deferred.    2/8/23:      Ref Range & Units Today   WBC Count 4.8 - 10.8 10(3)/uL 6.2    RBC Count 4.20 - 5.40 10(6)/uL 3.24 Low     Hemoglobin 12.0 - 16.0 g/dL 10.9 Low     Hematocrit 37.0 - 47.0 % 34.2 Low     MCV 81.0 - 99.0 fL 105.6 High     MCH 27.0 - 33.0 pg 33.6 High     MCHC 33.0 - 37.0 g/dL 31.9 Low     RDW 11.5 - 14.5 % 16.5 High     Platelets 130 - 400 10(3)/uL 167    MPV 7.4 - 10.4 fL 9.6    % Neutrophils 42.2 - 75.2 % 72.5    % Lymphocytes 20.5 - 51.1 % 12.8 Low     % Monocytes 1.7 - 9.3 % 12.8 High     % Eosinophils 0.9 - 7.0 % 1.1    % Basophils 0.2 - 1.0 % 0.5    % Immature Granulocytes 0.0 - 5.0 % 0.3    Abs Neutrophils 1.4 - 6.5 10(3)/uL 4.5    Abs Lymphocytes 1.2 - 3.4 10(3)/uL 0.8 Low     Abs Monocytes 0.1 - 0.6 10(3)/uL 0.8 High     Abs Eosinophils 0.0 - 0.6 10(3)/uL 0.1    Abs Basophils 0.0 - 0.1 10(3)/uL 0.0    Abs Immature Granulocytes 0.00 - 0.50 10(3)/uL 0.02        Ref Range & Units Today Comments   Sodium 136 - 145 mmol/L 139     Potassium 3.5 - 5.1 mmol/L 4.2     Chloride 98 - 107 mmol/L 103     CO2 21 - 32 mmol/L 28     Anion Gap 10.0 - 20.0 mmol/L 12.2     Creatinine 0.59 - 1.28 mg/dL 0.70     Blood Urea Nitrogen 7.0 - 18.0 mg/dL 14.0     BUN/Creatinine Ratio 11.70 - 22.90 ratio 20.00     Calcium 8.5 - 10.1 mg/dL 8.7     Glucose 74 - 106 mg/dL 96     eGFR >=60 mL/min/1.73m(2) >60  Calculation  based on the Chronic Kidney Disease Epidemiology Collaboration (CKD-EPI) equation refit without adjustment for race.   Total Protein 6.4 - 8.2 g/dL 7.4     Albumin 3.4 - 5.0 g/dL 3.3 Low      Globulin 2.6 - 4.6 g/dL 4.1     Albumin/Globulin Ratio 1.0 - 2.0 0.8 Low      Aspartate Aminotransferase (AST) 15 - 37 U/L 32     Alanine Aminotransferase (ALT) 14 - 63 U/L 30     Alkaline Phosphatase 46 - 116 U/L 79     Bilirubin, Total 0.2 - 1.0 mg/dL 0.2     eCrCl (Rx) - Adults mL/min 107.1     Fasting Status  No        Ref Range & Units Today   Phosphorus 2.6 - 4.7 mg/dL 5.0 High        Ref Range & Units Today   Magnesium 1.8 - 2.4 mg/dL 1.6 Low        Ref Range & Units Today   TSH 0.36 - 3.74 uIU/mL 43.49 High        Ref Range & Units Today   Free T4 0.80 - 1.50 ng/dL 0.27 Low       Component      Latest Ref Rng & Units 2/10/2023   Protein Albumin Urine      Negative mg/dL Negative       Assessment:    Suni Zuluaga is a 37 year old woman with 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  She is here today for follow up and disease management.      40 minutes spent on the date of the encounter doing chart review, history and exam, documentation, and further activities as noted above.      Plan:     1.)        Cervical cancer:   OK to proceed with planned treatment today if labs and BP are WDL.  RTC in 3 weeks for labs, provider visit, and next cycle; this is already scheduled.  Discussed with Dr. Brambila previously and while this is technically a cycle 6 today as she could not have chemotherapy for cycle 4 we will try to proceed as scheduled with an additional cycle of chemotherapy prior to obtaining CT and follow-up with Dr. Brambila.  Patient prefers for her labs to be done closer to home a couple days prior to ensure that her counts are ok for treatment.  Message sent to [facility] to fax orders to her local clinic.  If blood counts are too low to proceed with treatment for that cycle will plan  to obtain CT and follow-up with Dr. Brambila at that time with plan to proceed with maintenance treatment.  Reviewed signs and symptoms for when she should contact the clinic or seek additional care.  Patient to contact the clinic with any questions or concerns in the interim.        Genetic risk factors were assessed and she does not meet qualification for referral.       Labs and/or tests ordered include:  CBC. CMP. Mag. Urine protein. TSH with reflex to T4.                2.)        Health maintenance:  Issues addressed today include following up with PCP for annual health maintenance and non-gynecologic issues.      3.)        RLE edema: Scant edema noted today but per patient this will increase significantly throughout the day.  Prior ultrasounds negative for DVT.  [facility] schedule.       4.)        Vertigo: Vertigo with prior chemotherapy treatments. Not well controlled with 12.5 to 25 mg meclizine but did tolerate diazepam 2 mg with improvement in her symptoms. Previously discussed that this is not a typical treatment for vertigo however so far it has been the only treatment that has helped her symptoms.   checked without any other high risk medications.  Prescription for diazepam 2 mg every 12 hrs, 12 tabs sent to pharmacy.  Will reevaulate with each chemotherapy treatment.     5.)        Hypomagnesemia: Magnesium this week was 1.6 which is slightly low but improved.  No need for magnesium replacement today.  Continue magnesium oxide 400 mg daily.         6.) Hypothyroid: This week TSH 43.49 and T4 0.27.  No symptoms currently.  Will start synthroid 100 mcg daily based on weight; one month supply sent.  Will continue to check TSH which each treatment but will not proceed with dose adjustments for 6-8 weeks.  She has a PCP that can take over synthroid management.  Patient will make a visit with them within  the next month to take over management.    Mounika Ritchie, KYLE, APRN, FNP-C  Nurse Practitioner  Division of Gynecologic Oncology  Pager: [phone]     CC  Patient Care Team:  Suzy Brambila MD as PCP - General (Gynecologic Oncology)  Suzy Brambila MD as MD (Gynecologic Oncology)  Kenneth Shahid as Referring Physician  Liliam Paniagua RN as Specialty Care Coordinator (Hematology & Oncology)  Mounika Ritchie, LAURA CNP as Assigned Cancer Care Provider  SELF, REFERRED

## 2023-03-01 ENCOUNTER — TRANSFERRED RECORDS (OUTPATIENT)
Dept: HEALTH INFORMATION MANAGEMENT | Facility: CLINIC | Age: 38
End: 2023-03-01
Payer: COMMERCIAL

## 2023-03-02 NOTE — PROGRESS NOTES
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  She is here today for follow up and disease management.      Oncology History:  2015:HSIL Pap at the beginning of pregnancy. Repeat pap postpartum was ASCUS. Colposcopy was negative.   20: Pelvic US. Uterine fibroids, normal appearance of uterus and ovaries  2020: Pap smear which confirmed squamous cell carcinoma of the cervix. Referred to GYN  ONC  2020: GYN ONC Consult [facility]. Cervical biopsy, RADHA III, unable to identify invasion  2020: Repeat cervical biopsy confirms invasive SCC  2020: Started primary chemoRT for locally advanced cervical cancer. Stage IIIC1(r), large primary tumor with bilateral parametrial invasion and clinical evidence of posterior vaginal involvement. Pelvic lymphadenopathy involving external iliac, perirectal and internal iliac nodes.   20-10/30/20: ChemoRT.   20-20: T&R brachytherapy  2020: Followup with radonc (Dr Hernández)   22: CT A/P with nonspecific increased density in the para-aortic region, which could be seen in the setting of retroperitoneal fibrosis, inflammation, or potentially lymphoma/desmoplastic reaction. New varix from right renal vein extending down into the RLQ.   22: CT abdomen retroperitoneal bx   Final Diagnosis  A and B.  Soft tissue, right retroperitoneum and retroperitoneum, CT-guided needle biopsies:  -Metastatic squamous cell carcinoma.  Addendum 2  CPS score is 30-40.     RESULT FOR IMMUNOHISTOCHEMICAL VENTANA CLONE  PD-L1 ASSAY  TUMOR PROPORTION SCORE (TPS):  30%  INTERPRETATION: LOW PD-L1 EXPRESSION (TPS >/=1-49%)     10/6/22: PET CT Whole body: IMPRESSION: This patient with history of squamous cell carcinoma of  the  cervix there is evidence of disease progression:  1. Large 5 cm right retroperitoneal mass previously biopsied as  metastatic squamous cell carcinoma demonstrates FDG avidity. No  definite significant disease within the pelvis.  2. New metastatic nodes within the retroperitoneum and mediastinum.  3. 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  4. Diffusely increased FDG avidity throughout the terminal ileum and  large intestine without associated mass lesion may represent vascular  congestion or diffuse colitis.  5. Diffuse soft tissue edema.     Plan: Paclitaxel, Carboplatin, Bevacizumab, and pembrolizumab added due to PD-L1 expression every 3 weeks.      10/13/22: Cycle 1 Paclitaxel 175 mg/m2, Carboplatin AUC 6, Bevacizumab 15 mg/m2  11/4/22: Cycle 2 Paclitaxel 175 mg/m2, Carboplatin AUC 5, Bevacizumab 15 mg/m2. Pembrolizumab on hold while awaiting insurance coverage.     11/8/22: ED for nausea (no vomiting) and dizziness, discharged with valium for vertigo.     11/25/22: Cycle 3 Paclitaxel 175 mg/m2, Carboplatin AUC 5, Bevacizumab 15 mg/m2, and pembrolizumab 200 mg.  Defer x 1 week for thrombocytopenia (plts 82).  Decrease Paclitaxel 135 mg/m2, Carboplatin AUC 5, Bevacizumab 15 mg/m2, and pembrolizumab 200 mg, given 12/5.     12/19/22 CT CAP IMPRESSION:  1.  Metastatic squamous cell carcinoma of the cervix with decreased size of previously FDG avid thoracoabdominal lymph nodes compared to 10/06/2022. No new or worsening disease.   2.  Unchanged tumoral encasement and occlusion of the infrarenal IVC.   3.  Rectal wall thickening and fat stranding suggestive of postradiation inflammation.     Plan: Continue paclitaxel, carboplatin, bevacizumab, and pembrolizumab with CT/MD after 3 more cycles (6 total)     12/30/22: Cycle 4 Paclitaxel 135 mg/m2, Carboplatin AUC 5, Bevacizumab 15 mg/m2, and pembrolizumab 200 mg, held  carboplatin and paclitaxel due to thrombocytopenia (plts 39), not given this cycle.  Bevacizumab and Pembrolizumab given.  1/20/23: Cycle 5 Paclitaxel 135 mg/m2, Carboplatin AUC 4, Bevacizumab 15 mg/m2, and pembrolizumab 200 mg.     1/20/23: BLE US  IMPRESSION: No deep venous thrombosis demonstrated in either leg.     2/10/23: Cycle 6 Paclitaxel 135 mg/m2, Carboplatin AUC 4, Bevacizumab 15 mg/m2, and pembrolizumab 200 mg.    Plan: Continue paclitaxel, carboplatin, bevacizumab, and pembrolizumab x 1 more cycle as she did not have chemotherapy for cycle 4.  Followed by CT and follow up with Dr. Brambila.    3/3/23: Cycle 7 Paclitaxel 135 mg/m2, Carboplatin AUC 4, Bevacizumab 15 mg/m2, and pembrolizumab 200 mg.                      Today she comes to clinic feeling well overall and ready for her next treatment.  She notes that she was able to work the full week after treatment as her vertigo was controlled with the diazepam, she did occasionally also use the meclizine.  She would like refills for both of these.  She also needs refills for her synthroid and magnesium.  She has a pcp but has not contacted them about scheduling to take over synthroid prescription.  She is not having any pain and is not using oxycodone so does not need a refill of these.  She is not having any nausea or vomiting after chemo.  She is eating and drinking without difficulty and has been gaining some weight.  Her right leg swelling is about the same, slight swelling in the morning and throughout the day the swelling will increase.  She did receive a message from [employer] about scheduling for lymphedema therapy; plans to call today to schedule.  No diarrhea.  No constipation.  No numbness or tingling in her fingers.  She will have some numbness in her right toes when squatting for long periods.  She attributes this to the swelling she gets in her right leg. No fevers, no chest pain, and shortness of breath.                   Systemic                +weight gain; no fever; no chills; no night sweats; no appetite changes  Skin               no rashes, or lesions  Eye               no irritation; no changes in vision  Sunil-Laryngeal               no dysphagia; no hoarseness   Pulmonary               no cough; no shortness of breath  Cardiovascular               no chest pain; no palpitations  Gastrointestinal               no diarrhea; no constipation; no abdominal pain; no changes in bowel habits; no blood in stool  Genitourinary              no urinary frequency; no urinary urgency; no dysuria; no pain; no abnormal vaginal discharge; no abnormal vaginal bleeding  Breast   no breast discharge; no breast changes; no breast pain  Musculoskeletal               no myalgias; no arthralgias; no back pain  Psychiatric               no depressed mood; no anxiety    Hematologic   no tender lymph nodes; + RLE swelling    Endocrine               no hot flashes; no heat/cold intolerance         Neurological              no tremor; no numbness and tingling; no headaches; no difficulty sleeping; +dizziness    Past Medical History:    Past Medical History:   Diagnosis Date     Cervical cancer (H)          Past Surgical History:    Past Surgical History:   Procedure Laterality Date     CARPAL TUNNEL RELEASE RT/LT Right      TUBAL LIGATION           Health Maintenance Due   Topic Date Due     NICOTINE/TOBACCO CESSATION COUNSELING Q 1 YR  Never done     YEARLY PREVENTIVE VISIT  Never done     ADVANCE CARE PLANNING  Never done     HEPATITIS B IMMUNIZATION (1 of 3 - 3-dose series) Never done     COVID-19 Vaccine (1) Never done     Pneumococcal Vaccine: Pediatrics (0 to 5 Years) and At-Risk Patients (6 to 64 Years) (1 - PCV) Never done     HEPATITIS C SCREENING  Never done     INFLUENZA VACCINE (1) 09/01/2022     PHQ-2 (once per calendar year)  Never done     PAP  08/13/2023       Current Medications:     Current Outpatient Medications   Medication Sig Dispense Refill      diazepam (VALIUM) 2 MG tablet Take 1 tablet (2 mg) by mouth every 12 hours as needed for anxiety 12 tablet 0     levothyroxine (SYNTHROID/LEVOTHROID) 100 MCG tablet Take 1 tablet (100 mcg) by mouth daily 30 tablet 0     magnesium oxide (MAG-OX) 400 MG tablet Take 1 tablet (400 mg) by mouth daily 30 tablet 1     meclizine (ANTIVERT) 25 MG tablet Take 0.5-1 tablets (12.5-25 mg) by mouth 3 times daily as needed for dizziness 30 tablet 1     acetaminophen (TYLENOL) 500 MG tablet Take 2 tablets (1,000 mg) by mouth 3 times daily as needed for mild pain (Patient not taking: Reported on 12/5/2022) 90 tablet 3     acetaminophen (TYLENOL) 500 MG tablet Take 500-1,000 mg by mouth every 6 hours as needed for mild pain (Patient not taking: Reported on 12/5/2022)       ibuprofen (ADVIL/MOTRIN) 600 MG tablet Take 600 mg by mouth every 6 hours as needed for moderate pain (Patient not taking: Reported on 12/5/2022)       ondansetron (ZOFRAN ODT) 4 MG ODT tab Take 2 tablets (8 mg) by mouth every 8 hours as needed for nausea (Patient not taking: Reported on 12/5/2022) 20 tablet 3     polyethylene glycol (MIRALAX) 17 GM/Dose powder Take 17 g (1 capful) by mouth daily (Patient not taking: Reported on 12/5/2022) 850 g 3     prochlorperazine (COMPAZINE) 10 MG tablet Take 1 tablet (10 mg) by mouth every 6 hours as needed for nausea or vomiting 30 tablet 2     SENNA-docusate sodium (SENNA S) 8.6-50 MG tablet Take 1 tablet by mouth At Bedtime Can increase up to two tablets twice per day. (Patient not taking: Reported on 12/5/2022) 60 tablet 3         Allergies:        Allergies   Allergen Reactions     Clindamycin Hives        Social History:     Social History     Tobacco Use     Smoking status: Every Day     Packs/day: 1.00     Types: Cigarettes     Smokeless tobacco: Never     Tobacco comments:     9/11/2020 1/2 pack/day, tying tp quit   Substance Use Topics     Alcohol use: Yes     Comment: occ.       History   Drug Use Unknown          Family History:     The patient's family history is notable for:    Family History   Problem Relation Age of Onset     Breast Cancer Mother         8/2020 newly dx         Physical Exam:     /70 (BP Location: Right arm, Patient Position: Sitting, Cuff Size: Adult Regular)   Pulse 83   Temp 97.8  F (36.6  C) (Oral)   Resp 16   Wt 65.3 kg (144 lb)   SpO2 100%   BMI 25.51 kg/m    Body mass index is 25.51 kg/m .    General Appearance: healthy and alert, no distress     HEENT: no palpable nodules or masses        Cardiovascular: regular rate and rhythm, no gallops, rubs or murmurs     Respiratory: lungs clear, no rales, rhonchi or wheezes    Musculoskeletal: extremities non tender and slight edema in RLE    Skin: no lesions or rashes     Neurological: normal gait, no gross defects     Psychiatric: appropriate mood and affect                               Hematological: normal cervical and supraclavicular lymph nodes     Gastrointestinal:       abdomen soft, non-tender, non-distended    Genitourinary: Deferred.     WBC Count 4.8 - 10.8 10(3)/uL 5.7    RBC Count 4.20 - 5.40 10(6)/uL 3.26 Low     Hemoglobin 12.0 - 16.0 g/dL 11.0 Low     Hematocrit 37.0 - 47.0 % 34.6 Low     MCV 81.0 - 99.0 fL 106.1 High     MCH 27.0 - 33.0 pg 33.7 High     MCHC 33.0 - 37.0 g/dL 31.8 Low     RDW 11.5 - 14.5 % 15.9 High     Platelets 130 - 400 10(3)/uL 159    MPV 7.4 - 10.4 fL 9.6    % Neutrophils 42.2 - 75.2 % 71.8    % Lymphocytes 20.5 - 51.1 % 16.8 Low     % Monocytes 1.7 - 9.3 % 9.4 High     % Eosinophils 0.9 - 7.0 % 1.2    % Basophils 0.2 - 1.0 % 0.5    % Immature Granulocytes 0.0 - 5.0 % 0.3    Abs Neutrophils 1.4 - 6.5 10(3)/uL 4.1    Abs Lymphocytes 1.2 - 3.4 10(3)/uL 1.0 Low     Abs Monocytes 0.1 - 0.6 10(3)/uL 0.5    Abs Eosinophils 0.0 - 0.6 10(3)/uL 0.1    Abs Basophils 0.0 - 0.1 10(3)/uL 0.0    Abs Immature Granulocytes 0.00 - 0.50 10(3)/uL 0.02    Resulting Agency  [facility]   Specimen  Collected: 03/01/23  6:59 AM Last Resulted: 03/01/23  7:03 AM     Sodium 136 - 145 mmol/L 139     Potassium 3.5 - 5.1 mmol/L 4.0     Chloride 98 - 107 mmol/L 101     CO2 21 - 32 mmol/L 30     Anion Gap 10.0 - 20.0 mmol/L 12.0     Creatinine 0.59 - 1.28 mg/dL 0.80     Blood Urea Nitrogen 7.0 - 18.0 mg/dL 15.2     BUN/Creatinine Ratio 11.70 - 22.90 ratio 19.00     Calcium 8.5 - 10.1 mg/dL 8.6     Glucose 74 - 106 mg/dL 93     eGFR >=60 mL/min/1.73m(2) >60  Calculation based on the Chronic Kidney Disease Epidemiology Collaboration (CKD-EPI) equation refit without adjustment for race.   Total Protein 6.4 - 8.2 g/dL 7.5     Albumin 3.4 - 5.0 g/dL 3.4     Globulin 2.6 - 4.6 g/dL 4.1     Albumin/Globulin Ratio 1.0 - 2.0 0.8 Low      Aspartate Aminotransferase (AST) 15 - 37 U/L 34     Alanine Aminotransferase (ALT) 14 - 63 U/L 32     Alkaline Phosphatase 46 - 116 U/L 82     Bilirubin, Total 0.2 - 1.0 mg/dL 0.2     eCrCl (Rx) - Adults mL/min 93.7     Fasting Status  No     Resulting Agency  [facility]    Specimen Collected: 03/01/23  6:59 AM Last Resulted: 03/01/23  7:28 AM     Magnesium 1.8 - 2.4 mg/dL 1.7 Low     Resulting Agency  [facility]   Specimen Collected: 03/01/23  6:59 AM Last Resulted: 03/01/23  7:28 AM     Phosphorus 2.6 - 4.7 mg/dL 4.4    Resulting Agency  [facility]   Specimen Collected: 03/01/23  6:59 AM Last Resulted: 03/01/23  7:28 AM     TSH 0.36 - 3.74 uIU/mL 80.52 High     Resulting Agency  [facility]   Specimen Collected: 03/01/23  6:59 AM Last Resulted: 03/01/23  7:28 AM     Free T4 0.80 - 1.50 ng/dL 0.30 Low     Resulting Agency  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  She is here today for follow up and disease  management.     40 minutes spent on the date of the encounter doing chart review, history and exam, documentation, and further activities as noted above.      Plan:     1.)        Cervical cancer:   OK to proceed with planned treatment today as labs and BP are WDL.  RTC as scheduled for CT and follow-up with Dr. Brambila.  Lab and infusion scheduled for 3 weeks for planned transition to maintenance Keytruda/Avastin.  Reviewed signs and symptoms for when she should contact the clinic or seek additional care.  Patient to contact the clinic with any questions or concerns in the interim.        Genetic risk factors were assessed and she does not meet qualification for referral.       Labs and/or tests ordered include:  CBC. CMP. Mag. Urine protein. TSH with reflex to T4.                2.)        Health maintenance:  Issues addressed today include following up with PCP for annual health maintenance and non-gynecologic issues.      3.)        RLE edema: Scant edema noted today but per patient this will increase significantly throughout the day.  Symptoms stable per patient.  Prior ultrasounds negative for DVT.  Orders for lymphedema therapy previously sent to [facility], patient plans to call and schedule.       4.)        Vertigo: Vertigo with prior chemotherapy treatments. Not well controlled with 12.5 to 25 mg meclizine but did tolerate diazepam 2 mg with improvement in her symptoms. Previously discussed that this is not a typical treatment for vertigo however so far it has been the only treatment that has helped her symptoms.  She did use both meclizine and diazepam last cycle, spacing out by an hr or more and only as needed.   rechecked without any other high risk medications.  Prescription for diazepam 2 mg every 12 hrs, 12 tabs sent to pharmacy.  Meclizine also refilled.  No vertigo previously with Keytruda/Avastin so do not anticipate needing to refill these with upcoming  treatments.     5.)        Hypomagnesemia: Magnesium this week was 1.7 which is slightly low but improved.  No need for magnesium replacement today.  Continue magnesium oxide 400 mg daily.         6.)        Hypothyroid: This week TSH 80.52 and T4 0.3.  No symptoms currently.  Continue synthroid 100 mcg daily based on weight; one month refill today.  Will continue to check TSH which each treatment but will not proceed with dose adjustments for 6-8 weeks.  She has a PCP that can take over synthroid management encouraged her to contact them for an appointment as she will likely need dose adjustment after this months prescription.         Mounika Ritchie, KYLE, APRN, FNP-C  Nurse Practitioner  Division of Gynecologic Oncology  Pager: [phone]     CC  Patient Care Team:  Suzy Brambila MD as PCP - General (Gynecologic Oncology)  Suzy Brambila MD as MD (Gynecologic Oncology)  Kenneth Shahid as Referring Physician  Liliam Paniagua, RN as Specialty Care Coordinator (Hematology & Oncology)  Mounika Ritchie APRN CNP as Assigned Cancer Care Provider  SELF, REFERRED

## 2023-03-03 ENCOUNTER — ONCOLOGY VISIT (OUTPATIENT)
Dept: ONCOLOGY | Facility: CLINIC | Age: 38
End: 2023-03-03
Attending: OBSTETRICS & GYNECOLOGY
Payer: COMMERCIAL

## 2023-03-03 ENCOUNTER — APPOINTMENT (OUTPATIENT)
Dept: LAB | Facility: CLINIC | Age: 38
End: 2023-03-03
Attending: OBSTETRICS & GYNECOLOGY
Payer: COMMERCIAL

## 2023-03-03 VITALS
OXYGEN SATURATION: 100 % | SYSTOLIC BLOOD PRESSURE: 105 MMHG | DIASTOLIC BLOOD PRESSURE: 70 MMHG | RESPIRATION RATE: 16 BRPM | TEMPERATURE: 97.8 F | BODY MASS INDEX: 25.51 KG/M2 | HEART RATE: 83 BPM | WEIGHT: 144 LBS

## 2023-03-03 DIAGNOSIS — R42 VERTIGO: ICD-10-CM

## 2023-03-03 DIAGNOSIS — E03.2 HYPOTHYROIDISM DUE TO MEDICATION: ICD-10-CM

## 2023-03-03 DIAGNOSIS — E83.42 HYPOMAGNESEMIA: ICD-10-CM

## 2023-03-03 DIAGNOSIS — C53.8 MALIGNANT NEOPLASM OF OVERLAPPING SITES OF CERVIX (H): Primary | ICD-10-CM

## 2023-03-03 DIAGNOSIS — Z51.11 ENCOUNTER FOR ANTINEOPLASTIC CHEMOTHERAPY: ICD-10-CM

## 2023-03-03 LAB — ALBUMIN UR-MCNC: 10 MG/DL

## 2023-03-03 PROCEDURE — 258N000003 HC RX IP 258 OP 636: Performed by: NURSE PRACTITIONER

## 2023-03-03 PROCEDURE — 250N000011 HC RX IP 250 OP 636: Performed by: NURSE PRACTITIONER

## 2023-03-03 PROCEDURE — 99215 OFFICE O/P EST HI 40 MIN: CPT | Performed by: NURSE PRACTITIONER

## 2023-03-03 PROCEDURE — 96367 TX/PROPH/DG ADDL SEQ IV INF: CPT

## 2023-03-03 PROCEDURE — 96415 CHEMO IV INFUSION ADDL HR: CPT

## 2023-03-03 PROCEDURE — 81003 URINALYSIS AUTO W/O SCOPE: CPT | Performed by: NURSE PRACTITIONER

## 2023-03-03 PROCEDURE — G0463 HOSPITAL OUTPT CLINIC VISIT: HCPCS | Mod: 25 | Performed by: NURSE PRACTITIONER

## 2023-03-03 PROCEDURE — 250N000013 HC RX MED GY IP 250 OP 250 PS 637: Performed by: NURSE PRACTITIONER

## 2023-03-03 PROCEDURE — 96413 CHEMO IV INFUSION 1 HR: CPT

## 2023-03-03 PROCEDURE — 96417 CHEMO IV INFUS EACH ADDL SEQ: CPT

## 2023-03-03 PROCEDURE — 96375 TX/PRO/DX INJ NEW DRUG ADDON: CPT

## 2023-03-03 RX ORDER — MEPERIDINE HYDROCHLORIDE 25 MG/ML
25 INJECTION INTRAMUSCULAR; INTRAVENOUS; SUBCUTANEOUS EVERY 30 MIN PRN
Status: CANCELLED | OUTPATIENT
Start: 2023-03-03

## 2023-03-03 RX ORDER — HEPARIN SODIUM,PORCINE 10 UNIT/ML
5 VIAL (ML) INTRAVENOUS
Status: CANCELLED | OUTPATIENT
Start: 2023-03-03

## 2023-03-03 RX ORDER — PALONOSETRON 0.05 MG/ML
0.25 INJECTION, SOLUTION INTRAVENOUS ONCE
Status: COMPLETED | OUTPATIENT
Start: 2023-03-03 | End: 2023-03-03

## 2023-03-03 RX ORDER — DIPHENHYDRAMINE HCL 25 MG
50 CAPSULE ORAL ONCE
Status: COMPLETED | OUTPATIENT
Start: 2023-03-03 | End: 2023-03-03

## 2023-03-03 RX ORDER — PALONOSETRON 0.05 MG/ML
0.25 INJECTION, SOLUTION INTRAVENOUS ONCE
Status: CANCELLED | OUTPATIENT
Start: 2023-03-03

## 2023-03-03 RX ORDER — EPINEPHRINE 1 MG/ML
0.3 INJECTION, SOLUTION INTRAMUSCULAR; SUBCUTANEOUS EVERY 5 MIN PRN
Status: CANCELLED | OUTPATIENT
Start: 2023-03-03

## 2023-03-03 RX ORDER — ALBUTEROL SULFATE 90 UG/1
1-2 AEROSOL, METERED RESPIRATORY (INHALATION)
Status: CANCELLED
Start: 2023-03-03

## 2023-03-03 RX ORDER — LEVOTHYROXINE SODIUM 100 UG/1
100 TABLET ORAL DAILY
Qty: 30 TABLET | Refills: 0 | Status: SHIPPED | OUTPATIENT
Start: 2023-03-03 | End: 2023-05-03

## 2023-03-03 RX ORDER — DIAZEPAM 2 MG
2 TABLET ORAL EVERY 12 HOURS PRN
Qty: 12 TABLET | Refills: 0 | Status: SHIPPED | OUTPATIENT
Start: 2023-03-03 | End: 2023-06-12

## 2023-03-03 RX ORDER — DIPHENHYDRAMINE HYDROCHLORIDE 50 MG/ML
50 INJECTION INTRAMUSCULAR; INTRAVENOUS
Status: CANCELLED
Start: 2023-03-03

## 2023-03-03 RX ORDER — LORAZEPAM 2 MG/ML
0.5 INJECTION INTRAMUSCULAR EVERY 4 HOURS PRN
Status: CANCELLED | OUTPATIENT
Start: 2023-03-03

## 2023-03-03 RX ORDER — MAGNESIUM OXIDE 400 MG/1
400 TABLET ORAL DAILY
Qty: 30 TABLET | Refills: 1 | Status: SHIPPED | OUTPATIENT
Start: 2023-03-03 | End: 2023-05-03

## 2023-03-03 RX ORDER — HEPARIN SODIUM (PORCINE) LOCK FLUSH IV SOLN 100 UNIT/ML 100 UNIT/ML
5 SOLUTION INTRAVENOUS
Status: CANCELLED | OUTPATIENT
Start: 2023-03-03

## 2023-03-03 RX ORDER — MECLIZINE HYDROCHLORIDE 25 MG/1
12.5-25 TABLET ORAL 3 TIMES DAILY PRN
Qty: 30 TABLET | Refills: 0 | Status: SHIPPED | OUTPATIENT
Start: 2023-03-03 | End: 2023-05-03

## 2023-03-03 RX ORDER — ALBUTEROL SULFATE 0.83 MG/ML
2.5 SOLUTION RESPIRATORY (INHALATION)
Status: CANCELLED | OUTPATIENT
Start: 2023-03-03

## 2023-03-03 RX ORDER — METHYLPREDNISOLONE SODIUM SUCCINATE 125 MG/2ML
125 INJECTION, POWDER, LYOPHILIZED, FOR SOLUTION INTRAMUSCULAR; INTRAVENOUS
Status: CANCELLED
Start: 2023-03-03

## 2023-03-03 RX ORDER — DIPHENHYDRAMINE HCL 25 MG
50 CAPSULE ORAL ONCE
Status: CANCELLED | OUTPATIENT
Start: 2023-03-03

## 2023-03-03 RX ADMIN — FOSAPREPITANT: 150 INJECTION, POWDER, LYOPHILIZED, FOR SOLUTION INTRAVENOUS at 08:02

## 2023-03-03 RX ADMIN — SODIUM CHLORIDE 1000 MG: 9 INJECTION, SOLUTION INTRAVENOUS at 12:49

## 2023-03-03 RX ADMIN — SODIUM CHLORIDE 200 MG: 9 INJECTION, SOLUTION INTRAVENOUS at 08:31

## 2023-03-03 RX ADMIN — FAMOTIDINE 20 MG: 10 INJECTION INTRAVENOUS at 07:59

## 2023-03-03 RX ADMIN — SODIUM CHLORIDE 250 ML: 9 INJECTION, SOLUTION INTRAVENOUS at 07:55

## 2023-03-03 RX ADMIN — DIPHENHYDRAMINE HYDROCHLORIDE 50 MG: 25 CAPSULE ORAL at 07:52

## 2023-03-03 RX ADMIN — PACLITAXEL 230 MG: 6 INJECTION, SOLUTION INTRAVENOUS at 09:05

## 2023-03-03 RX ADMIN — PALONOSETRON HYDROCHLORIDE 0.25 MG: 0.25 INJECTION INTRAVENOUS at 07:55

## 2023-03-03 RX ADMIN — CARBOPLATIN 450 MG: 10 INJECTION, SOLUTION INTRAVENOUS at 11:59

## 2023-03-03 ASSESSMENT — PAIN SCALES - GENERAL: PAINLEVEL: NO PAIN (0)

## 2023-03-03 NOTE — LETTER
3/3/2023         RE: Suni Zuluaga  [address]  [place] [address]        Dear Colleague,    Thank you for referring your patient, Suni Zuluaga, to the [facility]. Please see a copy of my visit note below.    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  She is here today for follow up and disease management.      Oncology History:  2015:HSIL Pap at the beginning of pregnancy. Repeat pap postpartum was ASCUS. Colposcopy was negative.   20: Pelvic US. Uterine fibroids, normal appearance of uterus and ovaries  2020: Pap smear which confirmed squamous cell carcinoma of the cervix. Referred to GYN  ONC  2020: GYN ONC Consult [facility]. Cervical biopsy, RADHA III, unable to identify invasion  2020: Repeat cervical biopsy confirms invasive SCC  2020: Started primary chemoRT for locally advanced cervical cancer. Stage IIIC1(r), large primary tumor with bilateral parametrial invasion and clinical evidence of posterior vaginal involvement. Pelvic lymphadenopathy involving external iliac, perirectal and internal iliac nodes.   20-10/30/20: ChemoRT.   20-20: T&R brachytherapy  2020: Followup with radonc (Dr Hernández)   22: CT A/P with nonspecific increased density in the para-aortic region, which could be seen in the setting of retroperitoneal fibrosis, inflammation, or potentially lymphoma/desmoplastic reaction. New varix from right renal vein extending down into the RLQ.   22: CT abdomen retroperitoneal bx   Final Diagnosis  A and B.  Soft tissue, right retroperitoneum and retroperitoneum, CT-guided needle biopsies:  -Metastatic squamous cell carcinoma.  Addendum 2  CPS score is  30-40.     RESULT FOR IMMUNOHISTOCHEMICAL VENTANA CLONE  PD-L1 ASSAY  TUMOR PROPORTION SCORE (TPS):  30%  INTERPRETATION: LOW PD-L1 EXPRESSION (TPS >/=1-49%)     10/6/22: PET CT Whole body: IMPRESSION: This patient with history of squamous cell carcinoma of  the cervix there is evidence of disease progression:  1. Large 5 cm right retroperitoneal mass previously biopsied as  metastatic squamous cell carcinoma demonstrates FDG avidity. No  definite significant disease within the pelvis.  2. New metastatic nodes within the retroperitoneum and mediastinum.  3. 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  4. Diffusely increased FDG avidity throughout the terminal ileum and  large intestine without associated mass lesion may represent vascular  congestion or diffuse colitis.  5. Diffuse soft tissue edema.     Plan: Paclitaxel, Carboplatin, Bevacizumab, and pembrolizumab added due to PD-L1 expression every 3 weeks.      10/13/22: Cycle 1 Paclitaxel 175 mg/m2, Carboplatin AUC 6, Bevacizumab 15 mg/m2  11/4/22: Cycle 2 Paclitaxel 175 mg/m2, Carboplatin AUC 5, Bevacizumab 15 mg/m2. Pembrolizumab on hold while awaiting insurance coverage.     11/8/22: ED for nausea (no vomiting) and dizziness, discharged with valium for vertigo.     11/25/22: Cycle 3 Paclitaxel 175 mg/m2, Carboplatin AUC 5, Bevacizumab 15 mg/m2, and pembrolizumab 200 mg.  Defer x 1 week for thrombocytopenia (plts 82).  Decrease Paclitaxel 135 mg/m2, Carboplatin AUC 5, Bevacizumab 15 mg/m2, and pembrolizumab 200 mg, given 12/5.     12/19/22 CT CAP IMPRESSION:  1.  Metastatic squamous cell carcinoma of the cervix with decreased size of previously FDG avid thoracoabdominal lymph nodes compared to 10/06/2022. No new or worsening disease.   2.  Unchanged tumoral encasement and occlusion of the infrarenal IVC.   3.  Rectal wall thickening and fat stranding suggestive  of postradiation inflammation.     Plan: Continue paclitaxel, carboplatin, bevacizumab, and pembrolizumab with CT/MD after 3 more cycles (6 total)     12/30/22: Cycle 4 Paclitaxel 135 mg/m2, Carboplatin AUC 5, Bevacizumab 15 mg/m2, and pembrolizumab 200 mg, held carboplatin and paclitaxel due to thrombocytopenia (plts 39), not given this cycle.  Bevacizumab and Pembrolizumab given.  1/20/23: Cycle 5 Paclitaxel 135 mg/m2, Carboplatin AUC 4, Bevacizumab 15 mg/m2, and pembrolizumab 200 mg.     1/20/23: BLE US  IMPRESSION: No deep venous thrombosis demonstrated in either leg.     2/10/23: Cycle 6 Paclitaxel 135 mg/m2, Carboplatin AUC 4, Bevacizumab 15 mg/m2, and pembrolizumab 200 mg.    Plan: Continue paclitaxel, carboplatin, bevacizumab, and pembrolizumab x 1 more cycle as she did not have chemotherapy for cycle 4.  Followed by CT and follow up with Dr. Brambila.    3/3/23: Cycle 7 Paclitaxel 135 mg/m2, Carboplatin AUC 4, Bevacizumab 15 mg/m2, and pembrolizumab 200 mg.                      Today she comes to clinic feeling well overall and ready for her next treatment.  She notes that she was able to work the full week after treatment as her vertigo was controlled with the diazepam, she did occasionally also use the meclizine.  She would like refills for both of these.  She also needs refills for her synthroid and magnesium.  She has a pcp but has not contacted them about scheduling to take over synthroid prescription.  She is not having any pain and is not using oxycodone so does not need a refill of these.  She is not having any nausea or vomiting after chemo.  She is eating and drinking without difficulty and has been gaining some weight.  Her right leg swelling is about the same, slight swelling in the morning and throughout the day the swelling will increase.  She did receive a message from [employer] about scheduling for lymphedema therapy; plans to call today to schedule.  No diarrhea.  No constipation.  No  numbness or tingling in her fingers.  She will have some numbness in her right toes when squatting for long periods.  She attributes this to the swelling she gets in her right leg. No fevers, no chest pain, and shortness of breath.                   Systemic               +weight gain; no fever; no chills; no night sweats; no appetite changes  Skin               no rashes, or lesions  Eye               no irritation; no changes in vision  Sunil-Laryngeal               no dysphagia; no hoarseness   Pulmonary               no cough; no shortness of breath  Cardiovascular               no chest pain; no palpitations  Gastrointestinal               no diarrhea; no constipation; no abdominal pain; no changes in bowel habits; no blood in stool  Genitourinary              no urinary frequency; no urinary urgency; no dysuria; no pain; no abnormal vaginal discharge; no abnormal vaginal bleeding  Breast   no breast discharge; no breast changes; no breast pain  Musculoskeletal               no myalgias; no arthralgias; no back pain  Psychiatric               no depressed mood; no anxiety    Hematologic   no tender lymph nodes; + RLE swelling    Endocrine               no hot flashes; no heat/cold intolerance         Neurological              no tremor; no numbness and tingling; no headaches; no difficulty sleeping; +dizziness    Past Medical History:    Past Medical History:   Diagnosis Date     Cervical cancer (H)          Past Surgical History:    Past Surgical History:   Procedure Laterality Date     CARPAL TUNNEL RELEASE RT/LT Right      TUBAL LIGATION           Health Maintenance Due   Topic Date Due     NICOTINE/TOBACCO CESSATION COUNSELING Q 1 YR  Never done     YEARLY PREVENTIVE VISIT  Never done     ADVANCE CARE PLANNING  Never done     HEPATITIS B IMMUNIZATION (1 of 3 - 3-dose series) Never done     COVID-19 Vaccine (1) Never done     Pneumococcal Vaccine: Pediatrics (0 to 5 Years) and At-Risk Patients (6 to 64 Years)  (1 - PCV) Never done     HEPATITIS C SCREENING  Never done     INFLUENZA VACCINE (1) 09/01/2022     PHQ-2 (once per calendar year)  Never done     PAP  08/13/2023       Current Medications:     Current Outpatient Medications   Medication Sig Dispense Refill     diazepam (VALIUM) 2 MG tablet Take 1 tablet (2 mg) by mouth every 12 hours as needed for anxiety 12 tablet 0     levothyroxine (SYNTHROID/LEVOTHROID) 100 MCG tablet Take 1 tablet (100 mcg) by mouth daily 30 tablet 0     magnesium oxide (MAG-OX) 400 MG tablet Take 1 tablet (400 mg) by mouth daily 30 tablet 1     meclizine (ANTIVERT) 25 MG tablet Take 0.5-1 tablets (12.5-25 mg) by mouth 3 times daily as needed for dizziness 30 tablet 1     acetaminophen (TYLENOL) 500 MG tablet Take 2 tablets (1,000 mg) by mouth 3 times daily as needed for mild pain (Patient not taking: Reported on 12/5/2022) 90 tablet 3     acetaminophen (TYLENOL) 500 MG tablet Take 500-1,000 mg by mouth every 6 hours as needed for mild pain (Patient not taking: Reported on 12/5/2022)       ibuprofen (ADVIL/MOTRIN) 600 MG tablet Take 600 mg by mouth every 6 hours as needed for moderate pain (Patient not taking: Reported on 12/5/2022)       ondansetron (ZOFRAN ODT) 4 MG ODT tab Take 2 tablets (8 mg) by mouth every 8 hours as needed for nausea (Patient not taking: Reported on 12/5/2022) 20 tablet 3     polyethylene glycol (MIRALAX) 17 GM/Dose powder Take 17 g (1 capful) by mouth daily (Patient not taking: Reported on 12/5/2022) 850 g 3     prochlorperazine (COMPAZINE) 10 MG tablet Take 1 tablet (10 mg) by mouth every 6 hours as needed for nausea or vomiting 30 tablet 2     SENNA-docusate sodium (SENNA S) 8.6-50 MG tablet Take 1 tablet by mouth At Bedtime Can increase up to two tablets twice per day. (Patient not taking: Reported on 12/5/2022) 60 tablet 3         Allergies:        Allergies   Allergen Reactions     Clindamycin Hives        Social History:     Social History     Tobacco Use      Smoking status: Every Day     Packs/day: 1.00     Types: Cigarettes     Smokeless tobacco: Never     Tobacco comments:     9/11/2020 1/2 pack/day, tying tp quit   Substance Use Topics     Alcohol use: Yes     Comment: occ.       History   Drug Use Unknown         Family History:     The patient's family history is notable for:    Family History   Problem Relation Age of Onset     Breast Cancer Mother         8/2020 newly dx         Physical Exam:     /70 (BP Location: Right arm, Patient Position: Sitting, Cuff Size: Adult Regular)   Pulse 83   Temp 97.8  F (36.6  C) (Oral)   Resp 16   Wt 65.3 kg (144 lb)   SpO2 100%   BMI 25.51 kg/m    Body mass index is 25.51 kg/m .    General Appearance: healthy and alert, no distress     HEENT: no palpable nodules or masses        Cardiovascular: regular rate and rhythm, no gallops, rubs or murmurs     Respiratory: lungs clear, no rales, rhonchi or wheezes    Musculoskeletal: extremities non tender and slight edema in RLE    Skin: no lesions or rashes     Neurological: normal gait, no gross defects     Psychiatric: appropriate mood and affect                               Hematological: normal cervical and supraclavicular lymph nodes     Gastrointestinal:       abdomen soft, non-tender, non-distended    Genitourinary: Deferred.     WBC Count 4.8 - 10.8 10(3)/uL 5.7    RBC Count 4.20 - 5.40 10(6)/uL 3.26 Low     Hemoglobin 12.0 - 16.0 g/dL 11.0 Low     Hematocrit 37.0 - 47.0 % 34.6 Low     MCV 81.0 - 99.0 fL 106.1 High     MCH 27.0 - 33.0 pg 33.7 High     MCHC 33.0 - 37.0 g/dL 31.8 Low     RDW 11.5 - 14.5 % 15.9 High     Platelets 130 - 400 10(3)/uL 159    MPV 7.4 - 10.4 fL 9.6    % Neutrophils 42.2 - 75.2 % 71.8    % Lymphocytes 20.5 - 51.1 % 16.8 Low     % Monocytes 1.7 - 9.3 % 9.4 High     % Eosinophils 0.9 - 7.0 % 1.2    % Basophils 0.2 - 1.0 % 0.5    % Immature Granulocytes 0.0 - 5.0 % 0.3    Abs Neutrophils 1.4 - 6.5 10(3)/uL 4.1    Abs Lymphocytes 1.2 - 3.4  10(3)/uL 1.0 Low     Abs Monocytes 0.1 - 0.6 10(3)/uL 0.5    Abs Eosinophils 0.0 - 0.6 10(3)/uL 0.1    Abs Basophils 0.0 - 0.1 10(3)/uL 0.0    Abs Immature Granulocytes 0.00 - 0.50 10(3)/uL 0.02    Resulting Agency  [facility]   Specimen Collected: 03/01/23  6:59 AM Last Resulted: 03/01/23  7:03 AM     Sodium 136 - 145 mmol/L 139     Potassium 3.5 - 5.1 mmol/L 4.0     Chloride 98 - 107 mmol/L 101     CO2 21 - 32 mmol/L 30     Anion Gap 10.0 - 20.0 mmol/L 12.0     Creatinine 0.59 - 1.28 mg/dL 0.80     Blood Urea Nitrogen 7.0 - 18.0 mg/dL 15.2     BUN/Creatinine Ratio 11.70 - 22.90 ratio 19.00     Calcium 8.5 - 10.1 mg/dL 8.6     Glucose 74 - 106 mg/dL 93     eGFR >=60 mL/min/1.73m(2) >60  Calculation based on the Chronic Kidney Disease Epidemiology Collaboration (CKD-EPI) equation refit without adjustment for race.   Total Protein 6.4 - 8.2 g/dL 7.5     Albumin 3.4 - 5.0 g/dL 3.4     Globulin 2.6 - 4.6 g/dL 4.1     Albumin/Globulin Ratio 1.0 - 2.0 0.8 Low      Aspartate Aminotransferase (AST) 15 - 37 U/L 34     Alanine Aminotransferase (ALT) 14 - 63 U/L 32     Alkaline Phosphatase 46 - 116 U/L 82     Bilirubin, Total 0.2 - 1.0 mg/dL 0.2     eCrCl (Rx) - Adults mL/min 93.7     Fasting Status  No     Resulting Agency  [facility]    Specimen Collected: 03/01/23  6:59 AM Last Resulted: 03/01/23  7:28 AM     Magnesium 1.8 - 2.4 mg/dL 1.7 Low     Resulting [facility]ORAT[facility]   Specimen Collected: 03/01/23  6:59 AM Last Resulted: 03/01/23  7:28 AM     Phosphorus 2.6 - 4.7 mg/dL 4.4    Resulting Agency  [facility]   Specimen Collected: 03/01/23  6:59 AM Last Resulted: 03/01/23  7:28 AM     TSH 0.36 - 3.74 uIU/mL 80.52 High     Resulting Agency  [facility]   Specimen Collected: 03/01/23  6:59 AM Last Resulted: 03/01/23  7:28 AM     Free T4 0.80 - 1.50 ng/dL 0.30 Low     Resulting [facility]  LABORATORY   Specimen Collected: 03/01/23  6:59 AM Last Resulted: 03/01/23  7:28 AM       Assessment:    Suni Zuluaga is a 37 year old woman with 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  She is here today for follow up and disease management.     40 minutes spent on the date of the encounter doing chart review, history and exam, documentation, and further activities as noted above.      Plan:     1.)        Cervical cancer:   OK to proceed with planned treatment today as labs and BP are WDL.  RTC as scheduled for CT and follow-up with Dr. Brambila.  Lab and infusion scheduled for 3 weeks for planned transition to maintenance Keytruda/Avastin.  Reviewed signs and symptoms for when she should contact the clinic or seek additional care.  Patient to contact the clinic with any questions or concerns in the interim.        Genetic risk factors were assessed and she does not meet qualification for referral.       Labs and/or tests ordered include:  CBC. CMP. Mag. Urine protein. TSH with reflex to T4.                2.)        Health maintenance:  Issues addressed today include following up with PCP for annual health maintenance and non-gynecologic issues.      3.)        RLE edema: Scant edema noted today but per patient this will increase significantly throughout the day.  Symptoms stable per patient.  Prior ultrasounds negative for DVT.  Orders for lymphedema therapy previously sent to [facility], patient plans to call and schedule.       4.)        Vertigo: Vertigo with prior chemotherapy treatments. Not well controlled with 12.5 to 25 mg meclizine but did tolerate diazepam 2 mg with improvement in her symptoms. Previously discussed that this is not a typical treatment for vertigo however so far it has been the only treatment that has helped her symptoms.  She did use both meclizine and diazepam last cycle, spacing out by an hr or more and only as needed.   rechecked  without any other high risk medications.  Prescription for diazepam 2 mg every 12 hrs, 12 tabs sent to pharmacy.  Meclizine also refilled.  No vertigo previously with Keytruda/Avastin so do not anticipate needing to refill these with upcoming treatments.     5.)        Hypomagnesemia: Magnesium this week was 1.7 which is slightly low but improved.  No need for magnesium replacement today.  Continue magnesium oxide 400 mg daily.         6.)        Hypothyroid: This week TSH 80.52 and T4 0.3.  No symptoms currently.  Continue synthroid 100 mcg daily based on weight; one month refill today.  Will continue to check TSH which each treatment but will not proceed with dose adjustments for 6-8 weeks.  She has a PCP that can take over synthroid management encouraged her to contact them for an appointment as she will likely need dose adjustment after this months prescription.         Mounika Ritchie, KYLE, APRN, FNP-C  Nurse Practitioner  Division of Gynecologic Oncology  Pager: [phone]     CC  Patient Care Team:  Suzy Brambila MD as PCP - General (Gynecologic Oncology)  Suzy Brambila MD as MD (Gynecologic Oncology)  Kenneth Shahid as Referring Physician  Liliam Paniagua, RN as Specialty Care Coordinator (Hematology & Oncology)  Mounika Ritchie APRN CNP as Assigned Cancer Care Provider  SELF, REFERRED        Again, thank you for allowing me to participate in the care of your patient.        Sincerely,        LAURA Sanders CNP

## 2023-03-03 NOTE — PATIENT INSTRUCTIONS
Contact Numbers    [facility] (for Scheduling/Triage/After Hours Nurse Line): [phone]    Please call the [facility] nurse triage or the after hours nurse line if you experience a temperature greater than or equal to 100.4, shaking chills, have uncontrolled nausea, vomiting and/or diarrhea, dizziness, lightheadedness, shortness of breath, chest pain, bleeding, unexplained bruising, or if you have any other new/concerning symptoms, questions or concerns.     If you are having any concerning symptoms or wish to speak to a provider before your next infusion visit, please call your care coordinator or triage to notify them so we can adequately serve you.     If you need any refills on medications (narcotics or other medications), please call before your infusion appointment.      March 2023 Sunday Monday Tuesday Wednesday Thursday Friday Saturday                  1     2     3    LAB PERIPHERAL   6:30 AM   (15 min.)   [facility] DRAW   [facility]    RETURN   6:45 AM   (45 min.)   Mounika Ritchie APRN CNP   [facility]    ONC INFUSION 6 HR (360 MIN)   8:30 AM   (360 min.)    ONC INFUSION NURSE   [facility] 4       5     6     7     8     9     10     11       12     13     14     15     16     17     18       19     20  Happy Birthday!    CT CHEST/ABDOMEN/PELVIS W   8:30 AM   (20 min.)   [facility]   [facility] [place] 21     22    RETURN CCSL   4:00 PM   (20 min.)   Suzy Brambila MD   [facility] 23     24    LAB PERIPHERAL   7:00 AM   (15 min.)   [facility] DRAW   [facility]    ONC INFUSION 2 HR (120 MIN)   7:30 AM   (120 min.)    ONC INFUSION NURSE   [facility] 25       26     27     28     29     30     31 April 2023 Sunday Monday Tuesday Wednesday Thursday  Friday Saturday                                 1       2     3     4     5     6     7     8       9     10     11     12     13     14     15       16     17     18     19     20     21     22       23     24     25     26     27     28     29       30                                                     Lab Results:  Recent Results (from the past 12 hour(s))   Protein qualitative urine    Collection Time: 03/03/23  6:41 AM   Result Value Ref Range    Protein Albumin Urine 10 (A) Negative mg/dL

## 2023-03-03 NOTE — PROGRESS NOTES
Infusion Nursing Note:  Suni Zuluaga presents today for Cycle 7 Day 1 Keytruda/Taxol/Carboplatin (#6)/Bevacizumab-bvzr (Zirabev).    Patient seen by provider today: Yes: Mounika Ritchie NP   present during visit today: Not Applicable.    Note: Patient arrives with significant other. Reports feeling well with no further concerns after LAURENCE visit.    Per ROSS Ritchie NP/Luma Ackerman RN @0740 3/3/23:  - I forgot to ask patient if she followed up with the [facility] for his lymphedema issue. Can you see if she has see them? I don't see any notes.    Pt stated she she received a call from them yesterday and will follow-up with them today. Relayed update to NP.    Educated patient on risk of hypersensitivity reaction given Carbo cycle number and Cisplatin history. Reviewed S/S of hypersensitivity reaction and pt verbalized understanding.    Intravenous Access:  Peripheral IV placed.    Treatment Conditions:    Labs done locally 3/1. Okay to proceed using 3/1 labs per ROSS Rtichie NP/Luma Ackerman RN @0741 3/3/23.                  UA RESULTS:  Recent Labs   Lab Test 03/03/23  0641   PROTEIN 10*       Results reviewed, labs MET treatment parameters, ok to proceed with treatment.  Urine protein 10.  /70.    Post Infusion Assessment:  Patient tolerated infusions without incident.  Blood return noted pre and post infusion.  Site patent and intact, free from redness, edema or discomfort.  No evidence of extravasations.  Access discontinued per protocol.     Discharge Plan:   Patient declined prescription refills.  Discharge instructions reviewed with: Patient and SO.  Patient and/or family verbalized understanding of discharge instructions and all questions answered.  AVS printed and given to patient. Patient will return 3/24 for next appointment.   Patient discharged in stable condition accompanied by: significant other.  Departure Mode: Ambulatory.      Luma Ackerman  RN

## 2023-03-03 NOTE — NURSING NOTE
Chief Complaint   Patient presents with     Lab Only     PIV placed by RN. Vitals taken.     PIV started by RN. Pt tolerated well. Vitals taken. Pt checked into next appointment.    Kathryn Morgan RN

## 2023-03-03 NOTE — NURSING NOTE
"Oncology Rooming Note    March 3, 2023 6:52 AM   Suni Zuluaga is a 37 year old female who presents for:    Chief Complaint   Patient presents with     Lab Only     PIV placed by RN. Vitals taken.     Oncology Clinic Visit     Cervical cancer     Initial Vitals: /70 (BP Location: Right arm, Patient Position: Sitting, Cuff Size: Adult Regular)   Pulse 83   Temp 97.8  F (36.6  C) (Oral)   Resp 16   Wt 65.3 kg (144 lb)   SpO2 100%   BMI 25.51 kg/m   Estimated body mass index is 25.51 kg/m  as calculated from the following:    Height as of 10/13/22: 1.6 m (5' 3\").    Weight as of this encounter: 65.3 kg (144 lb). Body surface area is 1.7 meters squared.  No Pain (0) Comment: Data Unavailable   No LMP recorded.  Allergies reviewed: Yes  Medications reviewed: Yes    Medications: Meclizine, levothyroxine, magnesium, diazepam, oxy    Pharmacy name entered into EPIC: [facility] [place] - [place], [place] - [address]    Clinical concerns: none       Lynne Bender CMA            "

## 2023-03-03 NOTE — LETTER
Date:March 6, 2023      Patient was self referred, no letter generated. Do not send.        [facility] Health Information

## 2023-03-09 LAB — T4 FREE SERPL-MCNC: >7.77 NG/DL (ref 0.9–1.7)

## 2023-03-20 ENCOUNTER — ANCILLARY PROCEDURE (OUTPATIENT)
Dept: CT IMAGING | Facility: CLINIC | Age: 38
End: 2023-03-20
Attending: OBSTETRICS & GYNECOLOGY
Payer: COMMERCIAL

## 2023-03-20 DIAGNOSIS — C53.9 SQUAMOUS CELL CARCINOMA OF CERVIX (H): ICD-10-CM

## 2023-03-20 DIAGNOSIS — C53.8 MALIGNANT NEOPLASM OF OVERLAPPING SITES OF CERVIX (H): ICD-10-CM

## 2023-03-20 PROCEDURE — 74177 CT ABD & PELVIS W/CONTRAST: CPT | Performed by: RADIOLOGY

## 2023-03-20 PROCEDURE — 71260 CT THORAX DX C+: CPT | Performed by: RADIOLOGY

## 2023-03-20 RX ORDER — IOPAMIDOL 755 MG/ML
80 INJECTION, SOLUTION INTRAVASCULAR ONCE
Status: COMPLETED | OUTPATIENT
Start: 2023-03-20 | End: 2023-03-20

## 2023-03-20 RX ADMIN — IOPAMIDOL 80 ML: 755 INJECTION, SOLUTION INTRAVASCULAR at 09:01

## 2023-03-22 ENCOUNTER — VIRTUAL VISIT (OUTPATIENT)
Dept: ONCOLOGY | Facility: CLINIC | Age: 38
End: 2023-03-22
Attending: OBSTETRICS & GYNECOLOGY
Payer: COMMERCIAL

## 2023-03-22 DIAGNOSIS — C53.8 MALIGNANT NEOPLASM OF OVERLAPPING SITES OF CERVIX (H): Primary | ICD-10-CM

## 2023-03-22 PROCEDURE — G0463 HOSPITAL OUTPT CLINIC VISIT: HCPCS | Mod: PN,GT | Performed by: OBSTETRICS & GYNECOLOGY

## 2023-03-22 PROCEDURE — 99214 OFFICE O/P EST MOD 30 MIN: CPT | Mod: VID | Performed by: OBSTETRICS & GYNECOLOGY

## 2023-03-22 NOTE — PROGRESS NOTES
Virtual Visit Details    Type of service:  Video Visit   Video Start Time: 4:10  Video End Time:4:30    Originating Location (pt. Location): Home  Distant Location (provider location):  On-site  Platform used for Video Visit: [place]        GYN Oncology Established Patient Visit    RE: Suni Zuluaga  : 1985    3/22/2023       CC: Cervical cancer , recurrent    HPI: Ms Suni Zuluaga is a 38 year old  female who presents to discuss cervical cancer treatment planning and toxicity assessment        Treatment History:   :HSIL Pap at the beginning of pregnancy. Repeat pap postpartum was ASCUS. Colposcopy was negative.   20: Pelvic US. Uterine fibroids, normal appearance of uterus and ovaries  2020: Pap smear which confirmed squamous cell carcinoma of the cervix. Referred to GYN  ONC  2020: GYN ONC Consult [facility]. Cervical biopsy, RADHA III, unable to identify invasion  2020: Repeat cervical biopsy confirms invasive SCC  2020: Started primary chemoRT for locally advanced cervical cancer. Stage IIIC1(r), large primary tumor with bilateral parametrial invasion and clinical evidence of posterior vaginal involvement. Pelvic lymphadenopathy involving external iliac, perirectal and internal iliac nodes.   20-10/30/20: ChemoRT.   20-20: T&R brachytherapy  2020: Followup with radonc (Dr Hernández)   22: CT A/P with nonspecific increased density in the para-aortic region, which could be seen in the setting of retroperitoneal fibrosis, inflammation, or potentially lymphoma/desmoplastic reaction. New varix from right renal vein extending down into the RLQ.   10/6/22: PET CT Whole body with large 5cm retroperitoneal mass with FDG avidity. New nodes in retroperitoneum and mediastinum  Plan: Paclitaxel, Carboplatin, Bevacizumab, and pembrolizumab added due to PD-L1 expression every 3 weeks.     10/13/22: Cycle 1 Paclitaxel 175 mg/m2, Carboplatin AUC 6, Bevacizumab 15  mg/m2  11/4/22: Cycle 2 Paclitaxel 175 mg/m2, Carboplatin AUC 5, Bevacizumab 15 mg/m2. Pembrolizumab on hold while awaiting insurance coverage.  11/8/22: ED for nausea (no vomiting) and dizziness, discharged with valium for vertigo.  11/25/22: Cycle 3 Paclitaxel 175 mg/m2, Carboplatin AUC 5, Bevacizumab 15 mg/m2, and pembrolizumab 200 mg.  Defer x 1 week for thrombocytopenia (plts 82).  Decrease Paclitaxel 135 mg/m2, Carboplatin AUC 5, Bevacizumab 15 mg/m2, and pembrolizumab 200 mg, given 12/5.  12/19/22 CT CAP IMPRESSION:  1.  Metastatic squamous cell carcinoma of the cervix with decreased size of previously FDG avid thoracoabdominal lymph nodes compared to 10/06/2022. No new or worsening disease.   2.  Unchanged tumoral encasement and occlusion of the infrarenal IVC.   3.  Rectal wall thickening and fat stranding suggestive of postradiation inflammation.    Plan: Continue paclitaxel, carboplatin, bevacizumab, and pembrolizumab with CT/MD after 3 more cycles (6 total)     12/30/22: Cycle 4 Paclitaxel 135 mg/m2, Carboplatin AUC 5, Bevacizumab 15 mg/m2, and pembrolizumab 200 mg, held carboplatin and paclitaxel due to thrombocytopenia (plts 39), not given this cycle.  Bevacizumab and Pembrolizumab given.  1/20/23: Cycle 5 Paclitaxel 135 mg/m2, Carboplatin AUC 4, Bevacizumab 15 mg/m2, and pembrolizumab 200 mg.     1/20/23: BLE US  IMPRESSION: No deep venous thrombosis demonstrated in either leg.     2/10/23: Cycle 6 Paclitaxel 135 mg/m2, Carboplatin AUC 4, Bevacizumab 15 mg/m2, and pembrolizumab 200 mg.     Plan: Continue paclitaxel, carboplatin, bevacizumab, and pembrolizumab x 1 more cycle as she did not have chemotherapy for cycle 4.  Followed by CT and follow up with Dr. Brambila.     3/3/23: Cycle 7 Paclitaxel 135 mg/m2, Carboplatin AUC 4, Bevacizumab 15 mg/m2, and pembrolizumab 200 mg.     3/20/2023: CT C/A/P with stable disease (still with retroperitoneal adenopathy encasing aorta/IVC, occulusion of IVC,  cystitis/proctitis)     3/24/23: Plan cycle one of johny/pembro maintenance    Interval history:   Regarding vertigo, this has improved.   Still on synthroid for new hypothyroidism from keytruda.   RLE edema with negative LE USs, likely lymphedema. (last US 2023)  Still working full time  CT scan with stable disease (see above)     Past Medical History:   Diagnosis Date     Cervical cancer (H)      Past Surgical History:   Procedure Laterality Date     CARPAL TUNNEL RELEASE RT/LT Right      TUBAL LIGATION         OBGYN history and Health Maintenance:  , completed childbearing , had tubal ligation   Last Pap Smear: per HPI   Last Mammogram: never  Last Colonoscopy: never    Meds reviewed in EPIC     Allergies   Allergen Reactions     Clindamycin Hives     Social History:  Social History     Tobacco Use     Smoking status: Every Day     Packs/day: 1.00     Types: Cigarettes     Smokeless tobacco: Never     Tobacco comments:     2020 1/2 pack/day, tying tp quit   Substance Use Topics     Alcohol use: Yes     Comment: occ.     Work: manufacturing, manual labor  Preferred language: English  Lives at home with: Partner and 4 kids  Long smoking history since age 13. Smokes a pack daily. Denies any substance abuse     Family History:   No family history of cancer     Physical Exam:   Vitals reviewed in epic  There were no vitals taken for this visit.  General: No acute distress. Some temporal wasting which has been stable  Remainder of exam deferred      ECOG performance status: 1       Assessment:  Suni Zuluaga is a 38 year old woman with Stage IIIC1(r) squamous cell carcinoma of the cervix , now with recurrence. PDL1+    Plan:     1.)   Cervical cancer : Stage IIIC1r SCC of the cervix (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  Completed primary chemoRT without complication. Posttreatment PET (6 months, delayed due to insurance issue) with minimal residual cervical disease. We were unable to get further PET scans covered. Now with  biopsy proven recurrence. Has received 7 cycles of carbo/taxol/johny/pembro. Required dose reductions due to neutropenia, thrombocytopenia. Side effects include moderate fatigue, mild neuropathy, anemia, thrombocytopenia, leukopenia, alopecia. Most recent CT scan with stable disease (4x2cm lymph node conglomerate)     -Recommend continuation of therapy, but will drop chemo and continue with pembro 200mg IV q 3 weeks and avastin 15 mg/kg q 3 weeks  - CT C/A/P after 3 more cycles  - She prefers to get infusions closer to home. We discussed transferring her care to med onc in [facility] which I think is very reasonable. Will send in a referral and also give my contact information for any questions (Pager [phone])  -If progression, would plan tisotumab vedotin (Tivdak)    2.) Hot flashes:  menopause from RT. Not an HRT candidate given smoking      Total time today was 30 minutes including reviewing imaging, counseling patient, and documentation.     Suzy Brambila MD  Gynecologic Oncology  Pager [phone]

## 2023-03-22 NOTE — Clinical Note
3/22/2023         RE: Suni Zuluaga  [address]  [place] [address]        Dear Colleague,    Thank you for referring your patient, Suni Zuluaga, to the [facility]. Please see a copy of my visit note below.    Virtual Visit Details    Type of service:  Video Visit   Video Start Time: 4:10  Video End Time:4:30    Originating Location (pt. Location): Home  {PROVIDER LOCATION On-site should be selected for visits conducted from your clinic location or adjoining [facility] hospital, academic office, or other nearby [facility] building. Off-site should be selected for all other provider locations, including home:[id number]}  Distant Location (provider location):  On-site  Platform used for Video Visit: [facility]        GYN Oncology Established Patient Visit    RE: Suni Zuluaga  : 1985    3/22/2023       CC: Cervical cancer , recurrent    HPI: Ms Suni Zuluaga is a 38 year old  female who presents to discuss cervical cancer treatment planning and toxicity assessment        Treatment History:   2015:HSIL Pap at the beginning of pregnancy. Repeat pap postpartum was ASCUS. Colposcopy was negative.   20: Pelvic US. Uterine fibroids, normal appearance of uterus and ovaries  2020: Pap smear which confirmed squamous cell carcinoma of the cervix. Referred to GYN  ONC  2020: GYN ONC Consult [facility]. Cervical biopsy, RADHA III, unable to identify invasion  2020: Repeat cervical biopsy confirms invasive SCC  2020: Started primary chemoRT for locally advanced cervical cancer. Stage IIIC1(r), large primary tumor with bilateral parametrial invasion and clinical evidence of posterior vaginal involvement. Pelvic lymphadenopathy involving external iliac, perirectal and internal iliac nodes.   20-10/30/20: ChemoRT.   20-20: T&R brachytherapy  2020: Followup with radonc (Dr Hernández)   22: CT A/P with nonspecific increased density in the para-aortic  region, which could be seen in the setting of retroperitoneal fibrosis, inflammation, or potentially lymphoma/desmoplastic reaction. New varix from right renal vein extending down into the RLQ.   10/6/22: PET CT Whole body with large 5cm retroperitoneal mass with FDG avidity. New nodes in retroperitoneum and mediastinum  Plan: Paclitaxel, Carboplatin, Bevacizumab, and pembrolizumab added due to PD-L1 expression every 3 weeks.     10/13/22: Cycle 1 Paclitaxel 175 mg/m2, Carboplatin AUC 6, Bevacizumab 15 mg/m2  11/4/22: Cycle 2 Paclitaxel 175 mg/m2, Carboplatin AUC 5, Bevacizumab 15 mg/m2. Pembrolizumab on hold while awaiting insurance coverage.  11/8/22: ED for nausea (no vomiting) and dizziness, discharged with valium for vertigo.  11/25/22: Cycle 3 Paclitaxel 175 mg/m2, Carboplatin AUC 5, Bevacizumab 15 mg/m2, and pembrolizumab 200 mg.  Defer x 1 week for thrombocytopenia (plts 82).  Decrease Paclitaxel 135 mg/m2, Carboplatin AUC 5, Bevacizumab 15 mg/m2, and pembrolizumab 200 mg, given 12/5.  12/19/22 CT CAP IMPRESSION:  1.  Metastatic squamous cell carcinoma of the cervix with decreased size of previously FDG avid thoracoabdominal lymph nodes compared to 10/06/2022. No new or worsening disease.   2.  Unchanged tumoral encasement and occlusion of the infrarenal IVC.   3.  Rectal wall thickening and fat stranding suggestive of postradiation inflammation.    Plan: Continue paclitaxel, carboplatin, bevacizumab, and pembrolizumab with CT/MD after 3 more cycles (6 total)     12/30/22: Cycle 4 Paclitaxel 135 mg/m2, Carboplatin AUC 5, Bevacizumab 15 mg/m2, and pembrolizumab 200 mg, held carboplatin and paclitaxel due to thrombocytopenia (plts 39), not given this cycle.  Bevacizumab and Pembrolizumab given.  1/20/23: Cycle 5 Paclitaxel 135 mg/m2, Carboplatin AUC 4, Bevacizumab 15 mg/m2, and pembrolizumab 200 mg.     1/20/23: BLE US  IMPRESSION: No deep venous thrombosis demonstrated in either leg.     2/10/23: Cycle  6 Paclitaxel 135 mg/m2, Carboplatin AUC 4, Bevacizumab 15 mg/m2, and pembrolizumab 200 mg.     Plan: Continue paclitaxel, carboplatin, bevacizumab, and pembrolizumab x 1 more cycle as she did not have chemotherapy for cycle 4.  Followed by CT and follow up with Dr. Brambila.     3/3/23: Cycle 7 Paclitaxel 135 mg/m2, Carboplatin AUC 4, Bevacizumab 15 mg/m2, and pembrolizumab 200 mg.     3/20/2023: CT C/A/P with stable disease (still with retroperitoneal adenopathy encasing aorta/IVC, occulusion of IVC, cystitis/proctitis)       Interval history:   Regarding vertigo, this has improved.   Still on synthroid for new hypothyroidism from keytruda.   RLE edema with negative LE USs, likely lymphedema. (last US 2023)  Still working full time  CT scan with stable disease (see above)     Past Medical History:   Diagnosis Date     Cervical cancer (H)      Past Surgical History:   Procedure Laterality Date     CARPAL TUNNEL RELEASE RT/LT Right      TUBAL LIGATION         OBGYN history and Health Maintenance:  , completed childbearing , had tubal ligation   Last Pap Smear: per HPI   Last Mammogram: never  Last Colonoscopy: never    Meds reviewed in EPIC     Allergies   Allergen Reactions     Clindamycin Hives     Social History:  Social History     Tobacco Use     Smoking status: Every Day     Packs/day: 1.00     Types: Cigarettes     Smokeless tobacco: Never     Tobacco comments:     2020 1/2 pack/day, tying tp quit   Substance Use Topics     Alcohol use: Yes     Comment: occ.     Work: manufacturing, manual labor  Preferred language: English  Lives at home with: Partner and 4 kids  Long smoking history since age 13. Smokes a pack daily. Denies any substance abuse     Family History:   No family history of cancer     Physical Exam:   Vitals reviewed in epic  There were no vitals taken for this visit.  General: No acute distress. Some temporal wasting which has been stable  Remainder of exam deferred      ECOG  performance status: 1       Assessment:  Suni Zuluaga is a 38 year old woman with Stage IIIC1(r) squamous cell carcinoma of the cervix , now with recurrence. PDL1+    Plan:     1.)   Cervical cancer : Stage IIIC1r SCC of the cervix (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  Completed primary chemoRT without complication. Posttreatment PET (6 months, delayed due to insurance issue) with minimal residual cervical disease. We were unable to get further PET scans covered. Now with biopsy proven recurrence. Has received 7 cycles of carbo/taxol/johny/pembro. Required dose reductions due to neutropenia, thrombocytopenia. Side effects include moderate fatigue, mild neuropathy, anemia, thrombocytopenia, leukopenia, alopecia. Most recent CT scan with stable disease (4x2cm lymph node conglomerate)     -Recommend continuation of therapy, but will drop chemo and continue with pembro 200mg IV q 3 weeks and avastin 15 mg/kg q 3 weeks  - CT C/A/P after 3 more cycles  - She prefers to get infusions closer to home. We discussed transferring her care to med onc in [facility] which I think is very reasonable. Will send in a referral and also give my contact information for any questions (Pager [phone])  -If progression, would plan tisotumab vedotin (Tivdak)    2.) Hot flashes:  menopause from RT. Not an HRT candidate given smoking      Total time today was 30 minutes including reviewing imaging, counseling patient, and documentation.     Suzy Brambila MD  Gynecologic Oncology  Pager [phone]        Again, thank you for allowing me to participate in the care of your patient.        Sincerely,        Suzy Brambila MD

## 2023-03-22 NOTE — NURSING NOTE
Is the patient currently in the state of [place]? YES    Visit mode:VIDEO    If the visit is dropped, the patient can be reconnected by: VIDEO VISIT: Text to cell phone: [phone]    Will anyone else be joining the visit? NO      How would you like to obtain your AVS? MyChart    Are changes needed to the allergy or medication list? NO    Reason for visit: follow up

## 2023-03-24 ENCOUNTER — INFUSION THERAPY VISIT (OUTPATIENT)
Dept: ONCOLOGY | Facility: CLINIC | Age: 38
End: 2023-03-24
Attending: OBSTETRICS & GYNECOLOGY
Payer: COMMERCIAL

## 2023-03-24 ENCOUNTER — APPOINTMENT (OUTPATIENT)
Dept: LAB | Facility: CLINIC | Age: 38
End: 2023-03-24
Attending: OBSTETRICS & GYNECOLOGY
Payer: COMMERCIAL

## 2023-03-24 VITALS
DIASTOLIC BLOOD PRESSURE: 77 MMHG | HEART RATE: 77 BPM | BODY MASS INDEX: 25.37 KG/M2 | RESPIRATION RATE: 16 BRPM | TEMPERATURE: 98.8 F | WEIGHT: 143.2 LBS | OXYGEN SATURATION: 100 % | SYSTOLIC BLOOD PRESSURE: 111 MMHG

## 2023-03-24 DIAGNOSIS — C53.8 MALIGNANT NEOPLASM OF OVERLAPPING SITES OF CERVIX (H): Primary | ICD-10-CM

## 2023-03-24 LAB
ALBUMIN SERPL BCG-MCNC: 4.2 G/DL (ref 3.5–5.2)
ALBUMIN UR-MCNC: NEGATIVE MG/DL
ALP SERPL-CCNC: 91 U/L (ref 35–104)
ALT SERPL W P-5'-P-CCNC: 23 U/L (ref 10–35)
ANION GAP SERPL CALCULATED.3IONS-SCNC: 10 MMOL/L (ref 7–15)
AST SERPL W P-5'-P-CCNC: 35 U/L (ref 10–35)
BASOPHILS # BLD AUTO: 0 10E3/UL (ref 0–0.2)
BASOPHILS NFR BLD AUTO: 1 %
BILIRUB SERPL-MCNC: 0.2 MG/DL
BUN SERPL-MCNC: 11 MG/DL (ref 6–20)
CALCIUM SERPL-MCNC: 9.4 MG/DL (ref 8.6–10)
CHLORIDE SERPL-SCNC: 104 MMOL/L (ref 98–107)
CREAT SERPL-MCNC: 0.69 MG/DL (ref 0.51–0.95)
DEPRECATED HCO3 PLAS-SCNC: 26 MMOL/L (ref 22–29)
EOSINOPHIL # BLD AUTO: 0 10E3/UL (ref 0–0.7)
EOSINOPHIL NFR BLD AUTO: 0 %
ERYTHROCYTE [DISTWIDTH] IN BLOOD BY AUTOMATED COUNT: 16.7 % (ref 10–15)
GFR SERPL CREATININE-BSD FRML MDRD: >90 ML/MIN/1.73M2
GLUCOSE SERPL-MCNC: 98 MG/DL (ref 70–99)
HCT VFR BLD AUTO: 34.1 % (ref 35–47)
HGB BLD-MCNC: 10.9 G/DL (ref 11.7–15.7)
IMM GRANULOCYTES # BLD: 0 10E3/UL
IMM GRANULOCYTES NFR BLD: 0 %
LYMPHOCYTES # BLD AUTO: 0.9 10E3/UL (ref 0.8–5.3)
LYMPHOCYTES NFR BLD AUTO: 11 %
MAGNESIUM SERPL-MCNC: 2.1 MG/DL (ref 1.7–2.3)
MCH RBC QN AUTO: 33.9 PG (ref 26.5–33)
MCHC RBC AUTO-ENTMCNC: 32 G/DL (ref 31.5–36.5)
MCV RBC AUTO: 106 FL (ref 78–100)
MONOCYTES # BLD AUTO: 0.8 10E3/UL (ref 0–1.3)
MONOCYTES NFR BLD AUTO: 10 %
NEUTROPHILS # BLD AUTO: 6.4 10E3/UL (ref 1.6–8.3)
NEUTROPHILS NFR BLD AUTO: 78 %
NRBC # BLD AUTO: 0 10E3/UL
NRBC BLD AUTO-RTO: 0 /100
PLATELET # BLD AUTO: 138 10E3/UL (ref 150–450)
POTASSIUM SERPL-SCNC: 4.3 MMOL/L (ref 3.4–5.3)
PROT SERPL-MCNC: 7.7 G/DL (ref 6.4–8.3)
RBC # BLD AUTO: 3.22 10E6/UL (ref 3.8–5.2)
SODIUM SERPL-SCNC: 140 MMOL/L (ref 136–145)
T4 FREE SERPL-MCNC: 0.77 NG/DL (ref 0.9–1.7)
TSH SERPL DL<=0.005 MIU/L-ACNC: 59.05 UIU/ML (ref 0.3–4.2)
WBC # BLD AUTO: 8.3 10E3/UL (ref 4–11)

## 2023-03-24 PROCEDURE — 250N000011 HC RX IP 250 OP 636: Performed by: OBSTETRICS & GYNECOLOGY

## 2023-03-24 PROCEDURE — 96417 CHEMO IV INFUS EACH ADDL SEQ: CPT

## 2023-03-24 PROCEDURE — 258N000003 HC RX IP 258 OP 636: Performed by: OBSTETRICS & GYNECOLOGY

## 2023-03-24 PROCEDURE — 84439 ASSAY OF FREE THYROXINE: CPT

## 2023-03-24 PROCEDURE — 80053 COMPREHEN METABOLIC PANEL: CPT | Performed by: OBSTETRICS & GYNECOLOGY

## 2023-03-24 PROCEDURE — 81003 URINALYSIS AUTO W/O SCOPE: CPT | Performed by: OBSTETRICS & GYNECOLOGY

## 2023-03-24 PROCEDURE — 85025 COMPLETE CBC W/AUTO DIFF WBC: CPT

## 2023-03-24 PROCEDURE — 36415 COLL VENOUS BLD VENIPUNCTURE: CPT | Performed by: OBSTETRICS & GYNECOLOGY

## 2023-03-24 PROCEDURE — 83735 ASSAY OF MAGNESIUM: CPT | Performed by: OBSTETRICS & GYNECOLOGY

## 2023-03-24 PROCEDURE — 84443 ASSAY THYROID STIM HORMONE: CPT

## 2023-03-24 PROCEDURE — 96413 CHEMO IV INFUSION 1 HR: CPT

## 2023-03-24 RX ORDER — HEPARIN SODIUM,PORCINE 10 UNIT/ML
5 VIAL (ML) INTRAVENOUS
Status: CANCELLED | OUTPATIENT
Start: 2023-03-24

## 2023-03-24 RX ORDER — LORAZEPAM 2 MG/ML
0.5 INJECTION INTRAMUSCULAR EVERY 4 HOURS PRN
Status: CANCELLED | OUTPATIENT
Start: 2023-03-24

## 2023-03-24 RX ORDER — HEPARIN SODIUM (PORCINE) LOCK FLUSH IV SOLN 100 UNIT/ML 100 UNIT/ML
5 SOLUTION INTRAVENOUS
Status: CANCELLED | OUTPATIENT
Start: 2023-03-24

## 2023-03-24 RX ORDER — ALBUTEROL SULFATE 0.83 MG/ML
2.5 SOLUTION RESPIRATORY (INHALATION)
Status: CANCELLED | OUTPATIENT
Start: 2023-03-24

## 2023-03-24 RX ORDER — DIPHENHYDRAMINE HYDROCHLORIDE 50 MG/ML
50 INJECTION INTRAMUSCULAR; INTRAVENOUS
Status: CANCELLED
Start: 2023-03-24

## 2023-03-24 RX ORDER — ALBUTEROL SULFATE 90 UG/1
1-2 AEROSOL, METERED RESPIRATORY (INHALATION)
Status: CANCELLED
Start: 2023-03-24

## 2023-03-24 RX ORDER — MEPERIDINE HYDROCHLORIDE 25 MG/ML
25 INJECTION INTRAMUSCULAR; INTRAVENOUS; SUBCUTANEOUS EVERY 30 MIN PRN
Status: CANCELLED | OUTPATIENT
Start: 2023-03-24

## 2023-03-24 RX ORDER — EPINEPHRINE 1 MG/ML
0.3 INJECTION, SOLUTION INTRAMUSCULAR; SUBCUTANEOUS EVERY 5 MIN PRN
Status: CANCELLED | OUTPATIENT
Start: 2023-03-24

## 2023-03-24 RX ORDER — METHYLPREDNISOLONE SODIUM SUCCINATE 125 MG/2ML
125 INJECTION, POWDER, LYOPHILIZED, FOR SOLUTION INTRAMUSCULAR; INTRAVENOUS
Status: CANCELLED
Start: 2023-03-24

## 2023-03-24 RX ADMIN — SODIUM CHLORIDE 200 MG: 9 INJECTION, SOLUTION INTRAVENOUS at 09:24

## 2023-03-24 RX ADMIN — SODIUM CHLORIDE 1000 MG: 9 INJECTION, SOLUTION INTRAVENOUS at 10:11

## 2023-03-24 ASSESSMENT — PAIN SCALES - GENERAL: PAINLEVEL: NO PAIN (0)

## 2023-03-24 NOTE — PROGRESS NOTES
Infusion Nursing Note:  Suni Zuluaga presents today for Cycle 8 Day 1 Pembrolizumab/Bevaczumab-bvzr    Patient seen by provider today: No; Dr. Brambila 3/22   present during visit today: Not Applicable.    Note: Suni comes into the clinic today doing well overall and has no concerns to offer following her provider visit 3/22. She has no pain and denies s/s of infection.    Intravenous Access:  Peripheral IV placed.    Treatment Conditions:   Latest Reference Range & Units 03/24/23 07:33   Sodium 136 - 145 mmol/L 140   Potassium 3.4 - 5.3 mmol/L 4.3   Chloride 98 - 107 mmol/L 104   Carbon Dioxide (CO2) 22 - 29 mmol/L 26   Urea Nitrogen 6.0 - 20.0 mg/dL 11.0   Creatinine 0.51 - 0.95 mg/dL 0.69   GFR Estimate >60 mL/min/1.73m2 >90   Calcium 8.6 - 10.0 mg/dL 9.4   Anion Gap 7 - 15 mmol/L 10   Magnesium 1.7 - 2.3 mg/dL 2.1   Albumin 3.5 - 5.2 g/dL 4.2   Protein Total 6.4 - 8.3 g/dL 7.7   Alkaline Phosphatase 35 - 104 U/L 91   ALT 10 - 35 U/L 23   AST 10 - 35 U/L 35   Bilirubin Total <=1.2 mg/dL 0.2   Glucose 70 - 99 mg/dL 98   WBC 4.0 - 11.0 10e3/uL 8.3   Hemoglobin 11.7 - 15.7 g/dL 10.9 (L)   Hematocrit 35.0 - 47.0 % 34.1 (L)   Platelet Count 150 - 450 10e3/uL 138 (L)   RBC Count 3.80 - 5.20 10e6/uL 3.22 (L)   MCV 78 - 100 fL 106 (H)   MCH 26.5 - 33.0 pg 33.9 (H)   MCHC 31.5 - 36.5 g/dL 32.0   RDW 10.0 - 15.0 % 16.7 (H)   % Neutrophils % 78   % Lymphocytes % 11   % Monocytes % 10   % Eosinophils % 0   % Basophils % 1   Absolute Basophils 0.0 - 0.2 10e3/uL 0.0   Absolute Eosinophils 0.0 - 0.7 10e3/uL 0.0   Absolute Immature Granulocytes <=0.4 10e3/uL 0.0   Absolute Lymphocytes 0.8 - 5.3 10e3/uL 0.9   Absolute Monocytes 0.0 - 1.3 10e3/uL 0.8   % Immature Granulocytes % 0   Absolute Neutrophils 1.6 - 8.3 10e3/uL 6.4   Absolute NRBCs 10e3/uL 0.0   NRBCs per 100 WBC <1 /100 0   Protein Albumin Urine Negative mg/dL Negative     Results reviewed, labs MET treatment parameters, ok to proceed with  treatment.  Urine protein negative  BP: 111/77    Post Infusion Assessment:  Patient tolerated infusion without incident.  Blood return noted pre and post infusion.  Site patent and intact, free from redness, edema or discomfort.  No evidence of extravasations.  Access discontinued per protocol.     Discharge Plan:   Patient declined prescription refills.  Discharge instructions reviewed with: Patient.  Patient and/or family verbalized understanding of discharge instructions and all questions answered.  AVS printed and reviewed with patient. Message sent to scheduling for future appointment to be scheduled.  Patient discharged in stable condition accompanied by: self.  Departure Mode: Ambulatory.      Daniela Sheldon RN

## 2023-03-24 NOTE — NURSING NOTE
Chief Complaint   Patient presents with     Blood Draw     Vitals taken, [facility] by [facility] with [facility], labs drawn, saline locked, checked into next appt     /77 (BP Location: Left arm, Patient Position: Sitting, Cuff Size: Adult Large)   Pulse 77   Temp 98.8  F (37.1  C) (Oral)   Resp 16   Wt 65 kg (143 lb 3.2 oz)   SpO2 100%   BMI 25.37 kg/m    Dustin Flower RN on 3/24/2023 at 7:38 AM

## 2023-03-24 NOTE — PATIENT INSTRUCTIONS
[facility] Triage and after hours / weekends / holidays:  [phone]    Please call the triage or after hours line if you experience a temperature greater than or equal to 100.4, shaking chills, have uncontrolled nausea, vomiting and/or diarrhea, dizziness, shortness of breath, chest pain, bleeding, unexplained bruising, or if you have any other new/concerning symptoms, questions or concerns.      If you are having any concerning symptoms or wish to speak to a provider before your next infusion visit, please call triage to notify them so we can adequately serve you.     If you need a refill on a narcotic prescription or other medication, please call before your infusion appointment.      Latest Reference Range & Units 03/24/23 07:33   Sodium 136 - 145 mmol/L 140   Potassium 3.4 - 5.3 mmol/L 4.3   Chloride 98 - 107 mmol/L 104   Carbon Dioxide (CO2) 22 - 29 mmol/L 26   Urea Nitrogen 6.0 - 20.0 mg/dL 11.0   Creatinine 0.51 - 0.95 mg/dL 0.69   GFR Estimate >60 mL/min/1.73m2 >90   Calcium 8.6 - 10.0 mg/dL 9.4   Anion Gap 7 - 15 mmol/L 10   Magnesium 1.7 - 2.3 mg/dL 2.1   Albumin 3.5 - 5.2 g/dL 4.2   Protein Total 6.4 - 8.3 g/dL 7.7   Alkaline Phosphatase 35 - 104 U/L 91   ALT 10 - 35 U/L 23   AST 10 - 35 U/L 35   Bilirubin Total <=1.2 mg/dL 0.2   Glucose 70 - 99 mg/dL 98   WBC 4.0 - 11.0 10e3/uL 8.3   Hemoglobin 11.7 - 15.7 g/dL 10.9 (L)   Hematocrit 35.0 - 47.0 % 34.1 (L)   Platelet Count 150 - 450 10e3/uL 138 (L)   RBC Count 3.80 - 5.20 10e6/uL 3.22 (L)   MCV 78 - 100 fL 106 (H)   MCH 26.5 - 33.0 pg 33.9 (H)   MCHC 31.5 - 36.5 g/dL 32.0   RDW 10.0 - 15.0 % 16.7 (H)   % Neutrophils % 78   % Lymphocytes % 11   % Monocytes % 10   % Eosinophils % 0   % Basophils % 1   Absolute Basophils 0.0 - 0.2 10e3/uL 0.0   Absolute Eosinophils 0.0 - 0.7 10e3/uL 0.0   Absolute Immature Granulocytes <=0.4 10e3/uL 0.0   Absolute Lymphocytes 0.8 - 5.3 10e3/uL 0.9   Absolute Monocytes 0.0 - 1.3 10e3/uL 0.8   % Immature Granulocytes % 0    Absolute Neutrophils 1.6 - 8.3 10e3/uL 6.4   Absolute NRBCs 10e3/uL 0.0   NRBCs per 100 WBC <1 /100 0   Protein Albumin Urine Negative mg/dL Negative

## 2023-03-30 ENCOUNTER — PATIENT OUTREACH (OUTPATIENT)
Dept: ONCOLOGY | Facility: CLINIC | Age: 38
End: 2023-03-30
Payer: COMMERCIAL

## 2023-03-30 NOTE — PROGRESS NOTES
Faxed over referral to [facility] [place] [facility] to establish care for Suni to continue her infusions ( Pembro/Avastin every 3 weeks) closer to home.  She will have next infusion at [facility] on 4/14, but then would like to have next 2 cycles closer to home ( due 5/5 then 5/26 then CT scan).    Thanks    Odalys Paniagua RN Care Coordinator  [facility]  Ph.[phone] Fax. [phone]

## 2023-04-13 DIAGNOSIS — C53.8 MALIGNANT NEOPLASM OF OVERLAPPING SITES OF CERVIX (H): Primary | ICD-10-CM

## 2023-04-13 RX ORDER — ALBUTEROL SULFATE 90 UG/1
1-2 AEROSOL, METERED RESPIRATORY (INHALATION)
Status: CANCELLED
Start: 2023-04-14

## 2023-04-13 RX ORDER — EPINEPHRINE 1 MG/ML
0.3 INJECTION, SOLUTION, CONCENTRATE INTRAVENOUS EVERY 5 MIN PRN
Status: CANCELLED | OUTPATIENT
Start: 2023-04-14

## 2023-04-13 RX ORDER — MEPERIDINE HYDROCHLORIDE 25 MG/ML
25 INJECTION INTRAMUSCULAR; INTRAVENOUS; SUBCUTANEOUS EVERY 30 MIN PRN
Status: CANCELLED | OUTPATIENT
Start: 2023-04-14

## 2023-04-13 RX ORDER — LORAZEPAM 2 MG/ML
0.5 INJECTION INTRAMUSCULAR EVERY 4 HOURS PRN
Status: CANCELLED | OUTPATIENT
Start: 2023-04-14

## 2023-04-13 RX ORDER — DIPHENHYDRAMINE HYDROCHLORIDE 50 MG/ML
50 INJECTION INTRAMUSCULAR; INTRAVENOUS
Status: CANCELLED
Start: 2023-04-14

## 2023-04-13 RX ORDER — METHYLPREDNISOLONE SODIUM SUCCINATE 125 MG/2ML
125 INJECTION, POWDER, LYOPHILIZED, FOR SOLUTION INTRAMUSCULAR; INTRAVENOUS
Status: CANCELLED
Start: 2023-04-14

## 2023-04-13 RX ORDER — HEPARIN SODIUM (PORCINE) LOCK FLUSH IV SOLN 100 UNIT/ML 100 UNIT/ML
5 SOLUTION INTRAVENOUS
Status: CANCELLED | OUTPATIENT
Start: 2023-04-14

## 2023-04-13 RX ORDER — ALBUTEROL SULFATE 0.83 MG/ML
2.5 SOLUTION RESPIRATORY (INHALATION)
Status: CANCELLED | OUTPATIENT
Start: 2023-04-14

## 2023-04-13 RX ORDER — HEPARIN SODIUM,PORCINE 10 UNIT/ML
5 VIAL (ML) INTRAVENOUS
Status: CANCELLED | OUTPATIENT
Start: 2023-04-14

## 2023-04-14 ENCOUNTER — LAB (OUTPATIENT)
Dept: LAB | Facility: CLINIC | Age: 38
End: 2023-04-14
Attending: OBSTETRICS & GYNECOLOGY
Payer: COMMERCIAL

## 2023-04-14 ENCOUNTER — INFUSION THERAPY VISIT (OUTPATIENT)
Dept: ONCOLOGY | Facility: CLINIC | Age: 38
End: 2023-04-14
Attending: OBSTETRICS & GYNECOLOGY
Payer: COMMERCIAL

## 2023-04-14 VITALS
RESPIRATION RATE: 18 BRPM | HEART RATE: 91 BPM | BODY MASS INDEX: 25.54 KG/M2 | WEIGHT: 144.2 LBS | TEMPERATURE: 98.2 F | OXYGEN SATURATION: 99 % | SYSTOLIC BLOOD PRESSURE: 122 MMHG | DIASTOLIC BLOOD PRESSURE: 83 MMHG

## 2023-04-14 DIAGNOSIS — C53.8 MALIGNANT NEOPLASM OF OVERLAPPING SITES OF CERVIX (H): ICD-10-CM

## 2023-04-14 DIAGNOSIS — C53.8 MALIGNANT NEOPLASM OF OVERLAPPING SITES OF CERVIX (H): Primary | ICD-10-CM

## 2023-04-14 LAB
ALBUMIN SERPL BCG-MCNC: 4.2 G/DL (ref 3.5–5.2)
ALBUMIN UR-MCNC: NEGATIVE MG/DL
ALP SERPL-CCNC: 81 U/L (ref 35–104)
ALT SERPL W P-5'-P-CCNC: 17 U/L (ref 10–35)
ANION GAP SERPL CALCULATED.3IONS-SCNC: 12 MMOL/L (ref 7–15)
AST SERPL W P-5'-P-CCNC: 27 U/L (ref 10–35)
BASOPHILS # BLD AUTO: 0 10E3/UL (ref 0–0.2)
BASOPHILS NFR BLD AUTO: 1 %
BILIRUB SERPL-MCNC: 0.2 MG/DL
BUN SERPL-MCNC: 10.4 MG/DL (ref 6–20)
CALCIUM SERPL-MCNC: 9.2 MG/DL (ref 8.6–10)
CHLORIDE SERPL-SCNC: 104 MMOL/L (ref 98–107)
CREAT SERPL-MCNC: 0.66 MG/DL (ref 0.51–0.95)
DEPRECATED HCO3 PLAS-SCNC: 23 MMOL/L (ref 22–29)
EOSINOPHIL # BLD AUTO: 0.1 10E3/UL (ref 0–0.7)
EOSINOPHIL NFR BLD AUTO: 2 %
ERYTHROCYTE [DISTWIDTH] IN BLOOD BY AUTOMATED COUNT: 16.4 % (ref 10–15)
GFR SERPL CREATININE-BSD FRML MDRD: >90 ML/MIN/1.73M2
GLUCOSE SERPL-MCNC: 104 MG/DL (ref 70–99)
HCT VFR BLD AUTO: 34.2 % (ref 35–47)
HGB BLD-MCNC: 11.2 G/DL (ref 11.7–15.7)
IMM GRANULOCYTES # BLD: 0 10E3/UL
IMM GRANULOCYTES NFR BLD: 0 %
LYMPHOCYTES # BLD AUTO: 1 10E3/UL (ref 0.8–5.3)
LYMPHOCYTES NFR BLD AUTO: 18 %
MAGNESIUM SERPL-MCNC: 2.2 MG/DL (ref 1.7–2.3)
MCH RBC QN AUTO: 33.4 PG (ref 26.5–33)
MCHC RBC AUTO-ENTMCNC: 32.7 G/DL (ref 31.5–36.5)
MCV RBC AUTO: 102 FL (ref 78–100)
MONOCYTES # BLD AUTO: 0.6 10E3/UL (ref 0–1.3)
MONOCYTES NFR BLD AUTO: 11 %
NEUTROPHILS # BLD AUTO: 3.8 10E3/UL (ref 1.6–8.3)
NEUTROPHILS NFR BLD AUTO: 68 %
NRBC # BLD AUTO: 0 10E3/UL
NRBC BLD AUTO-RTO: 0 /100
PLATELET # BLD AUTO: 159 10E3/UL (ref 150–450)
POTASSIUM SERPL-SCNC: 3.7 MMOL/L (ref 3.4–5.3)
PROT SERPL-MCNC: 7.7 G/DL (ref 6.4–8.3)
RBC # BLD AUTO: 3.35 10E6/UL (ref 3.8–5.2)
SODIUM SERPL-SCNC: 139 MMOL/L (ref 136–145)
T4 FREE SERPL-MCNC: 0.52 NG/DL (ref 0.9–1.7)
TSH SERPL DL<=0.005 MIU/L-ACNC: 65.74 UIU/ML (ref 0.3–4.2)
WBC # BLD AUTO: 5.4 10E3/UL (ref 4–11)

## 2023-04-14 PROCEDURE — 85025 COMPLETE CBC W/AUTO DIFF WBC: CPT

## 2023-04-14 PROCEDURE — 84443 ASSAY THYROID STIM HORMONE: CPT

## 2023-04-14 PROCEDURE — 84439 ASSAY OF FREE THYROXINE: CPT

## 2023-04-14 PROCEDURE — 258N000003 HC RX IP 258 OP 636: Performed by: OBSTETRICS & GYNECOLOGY

## 2023-04-14 PROCEDURE — 81003 URINALYSIS AUTO W/O SCOPE: CPT | Performed by: OBSTETRICS & GYNECOLOGY

## 2023-04-14 PROCEDURE — 80053 COMPREHEN METABOLIC PANEL: CPT

## 2023-04-14 PROCEDURE — 83735 ASSAY OF MAGNESIUM: CPT

## 2023-04-14 PROCEDURE — 96417 CHEMO IV INFUS EACH ADDL SEQ: CPT

## 2023-04-14 PROCEDURE — 36415 COLL VENOUS BLD VENIPUNCTURE: CPT

## 2023-04-14 PROCEDURE — 96413 CHEMO IV INFUSION 1 HR: CPT

## 2023-04-14 PROCEDURE — 250N000011 HC RX IP 250 OP 636: Performed by: OBSTETRICS & GYNECOLOGY

## 2023-04-14 PROCEDURE — 82310 ASSAY OF CALCIUM: CPT

## 2023-04-14 RX ADMIN — SODIUM CHLORIDE 1000 MG: 9 INJECTION, SOLUTION INTRAVENOUS at 09:18

## 2023-04-14 RX ADMIN — SODIUM CHLORIDE 250 ML: 9 INJECTION, SOLUTION INTRAVENOUS at 08:45

## 2023-04-14 RX ADMIN — SODIUM CHLORIDE 200 MG: 9 INJECTION, SOLUTION INTRAVENOUS at 08:45

## 2023-04-14 ASSESSMENT — PAIN SCALES - GENERAL: PAINLEVEL: NO PAIN (0)

## 2023-04-14 NOTE — PROGRESS NOTES
Infusion Nursing Note:  Suni Zuluaga presents today for Cycle 9, Day 1 Keytruda, Bevacizumab-bvzr.    Patient seen by provider today: No   present during visit today: Not Applicable.    Note: Pt assessed upon arrival to infusion suite. Denies fever, chills, cough, SOB or other signs/symptoms of infection. No new issues or concerns to report.    Intravenous Access:  Peripheral IV placed.    Treatment Conditions:  Lab Results   Component Value Date    HGB 11.2 (L) 04/14/2023    WBC 5.4 04/14/2023    ANEU 2.5 10/27/2020    ANEUTAUTO 3.8 04/14/2023     04/14/2023      Lab Results   Component Value Date     04/14/2023    POTASSIUM 3.7 04/14/2023    MAG 2.2 04/14/2023    CR 0.66 04/14/2023    HARRISON 9.2 04/14/2023    BILITOTAL 0.2 04/14/2023    ALBUMIN 4.2 04/14/2023    ALT 17 04/14/2023    AST 27 04/14/2023     Results reviewed, labs MET treatment parameters, ok to proceed with treatment.  Urine Protein - Negative     Post Infusion Assessment:  Patient tolerated infusion without incident.  Blood return noted pre and post infusion.  Site patent and intact, free from redness, edema or discomfort.  No evidence of extravasations.  Access discontinued per protocol.     Discharge Plan:   Patient declined prescription refills.  AVS to patient via [facility].  Per Dr. Brambila's last note, patient is going to transfer care to closer to home.   Patient discharged in stable condition accompanied by: self.  Departure Mode: Ambulatory.      Karin Kevin RN

## 2023-04-23 ENCOUNTER — HEALTH MAINTENANCE LETTER (OUTPATIENT)
Age: 38
End: 2023-04-23

## 2023-04-26 ENCOUNTER — PATIENT OUTREACH (OUTPATIENT)
Dept: ONCOLOGY | Facility: CLINIC | Age: 38
End: 2023-04-26
Payer: COMMERCIAL

## 2023-04-26 DIAGNOSIS — C53.8 MALIGNANT NEOPLASM OF OVERLAPPING SITES OF CERVIX (H): Primary | ICD-10-CM

## 2023-04-26 DIAGNOSIS — E03.2 HYPOTHYROIDISM DUE TO MEDICATION: ICD-10-CM

## 2023-04-26 DIAGNOSIS — N13.4 HYDROURETER ON RIGHT: Primary | ICD-10-CM

## 2023-04-26 DIAGNOSIS — C53.8 MALIGNANT NEOPLASM OF OVERLAPPING SITES OF CERVIX (H): ICD-10-CM

## 2023-04-26 PROCEDURE — 99207 PR NO BILLABLE SERVICE THIS VISIT: CPT | Performed by: OBSTETRICS & GYNECOLOGY

## 2023-04-26 RX ORDER — OXYCODONE HYDROCHLORIDE 5 MG/1
5 TABLET ORAL EVERY 6 HOURS PRN
Qty: 30 TABLET | Refills: 0 | Status: SHIPPED | OUTPATIENT
Start: 2023-04-26 | End: 2023-05-03

## 2023-04-26 NOTE — PROGRESS NOTES
Suni called in having extreme pain in her belly and back.  Has been lasting for last week and getting worse -(in tears) only been taking ibuprofen which is not working.  She cannot remember if she is taking her thyroid medication but has been taking her Magnesium which is causing some diarrhea.  She also says she has weird pain in her rectum like she needs to have bowel movement but does not need to go.  I told her most likely we need to get her to ER which I verified with Dr. Brambila.  Suni will head to [facility] ER in [place].  I told Suni to tell ER her Oncologist is recommending CT scan abdomen/pelvis and she must get her Thyroid labs checked.  Requested Suni keep us updated.    Thanks    Odalys Paniagua RN Care Coordinator  [facility]  Ph.[phone] Fax. [phone]

## 2023-04-26 NOTE — CONFIDENTIAL NOTE
Received call from Dr. Chun who saw Suni in [place] ER today for her abdominal and back pain, her work up was notable for CT with new right moderate hydroureter with narrowing of distal right ureter, Cr normal 0.79, UA with +leuk esterace, mod bacteria, + RBCs and WBCs. She otherwise had normal vitals, WBC, and pain improved with medications. We requested at TSH, not done yet. She was stable for discharge with empiric antibiotics for UTI and we recommended follow up with Urology for possible stent. They don't have Urology at [place], and patient has appoitment next week 5/5 for chemo, therefore Urology referral ordered through [facility] system. Urology team paged who will message clinic to expedite her follow up.     Discussed with Dr. Villegas.     Pebbles Sparks MD PGY4  Obstetrics & Gynecology  04/26/23

## 2023-04-27 ENCOUNTER — TELEPHONE (OUTPATIENT)
Dept: UROLOGY | Facility: CLINIC | Age: 38
End: 2023-04-27
Payer: COMMERCIAL

## 2023-04-27 ENCOUNTER — HOSPITAL ENCOUNTER (INPATIENT)
Dept: GENERAL RADIOLOGY | Facility: CLINIC | Age: 38
Discharge: HOME | End: 2023-04-27
Attending: OBSTETRICS & GYNECOLOGY
Payer: COMMERCIAL

## 2023-04-27 ENCOUNTER — ANCILLARY PROCEDURE (OUTPATIENT)
Dept: RADIOLOGY | Facility: CLINIC | Age: 38
End: 2023-04-27
Payer: COMMERCIAL

## 2023-04-27 DIAGNOSIS — C53.8 MALIGNANT NEOPLASM OF OVERLAPPING SITES OF CERVIX (H): ICD-10-CM

## 2023-04-27 DIAGNOSIS — C53.8 MALIGNANT NEOPLASM OF OVERLAPPING SITES OF CERVIX (H): Primary | ICD-10-CM

## 2023-04-27 RX ORDER — LEVOTHYROXINE SODIUM 150 UG/1
150 TABLET ORAL DAILY
Qty: 30 TABLET | Refills: 3 | Status: SHIPPED | OUTPATIENT
Start: 2023-04-27 | End: 2023-07-07

## 2023-04-27 NOTE — TELEPHONE ENCOUNTER
[facility] Call Center    Phone Message    May a detailed message be left on voicemail: yes     Reason for Call: Other: pt calling back and says she will take appt for tomorrow, please advis     Action Taken: Other: urology    Travel Screening: Not Applicable

## 2023-04-27 NOTE — TELEPHONE ENCOUNTER
From referral: Hydroureter on right, Cervical cancer with new right hydroureter with distal narrowing, normal Cr    Hydro: rayne perez borofsky, zager  Ureteral narrowing: treat as a stricture, antonio jonas

## 2023-04-27 NOTE — TELEPHONE ENCOUNTER
MEDICAL RECORDS REQUEST   [facility]  Urology Clinic  [address] [place] [address]  PHONE: [phone]  Fax: [phone]        FUTURE VISIT INFORMATION                                                   Suni Zuluaga : 1985 scheduled for future visit at [facility]    APPOINTMENT INFORMATION:    Date: 23    Provider:  Robert Valladares    Reason for Visit/Diagnosis: hydronephrosis, ureteral narrowing    REFERRAL INFORMATION:    Referring provider:  Pebbles Sparks MD    Specialty: obgyn    Referring providers clinic:  [facility]    RECORDS REQUESTED FOR VISIT                                                     NOTES  STATUS/DETAILS   DISCHARGE REPORT from the ER  yes ED visit 23   MEDICATION LIST  yes   LABS     URINALYSIS (UA)  yes 23   IMAGING (IMAGES & REPORT)  yes  CT ABD 23  CT Chest/abd 3/20/23  CT Chest/abd 22     PRE-VISIT CHECKLIST      Record collection complete Yes

## 2023-04-27 NOTE — TELEPHONE ENCOUNTER
----- Message from Malick Herron MD sent at 4/26/2023  6:00 PM CDT -----  Regarding: Scheduling follow-up  To whom it may concern,    One of the Gyn-Onc residents paged me today about this patient who has cervical cancer and presented to an outside hospital with new hydronephrosis and possible right ureteral narrowing. They would like her to be seen somewhat urgently. They are placing a outpatient referral, but is there a way we can have her seen in our clinic in the next week or two? Could you help set this up?     Thanks,  Malick

## 2023-04-28 ENCOUNTER — VIRTUAL VISIT (OUTPATIENT)
Dept: UROLOGY | Facility: CLINIC | Age: 38
End: 2023-04-28
Payer: COMMERCIAL

## 2023-04-28 ENCOUNTER — PREP FOR PROCEDURE (OUTPATIENT)
Dept: UROLOGY | Facility: CLINIC | Age: 38
End: 2023-04-28

## 2023-04-28 ENCOUNTER — PRE VISIT (OUTPATIENT)
Dept: UROLOGY | Facility: CLINIC | Age: 38
End: 2023-04-28

## 2023-04-28 DIAGNOSIS — N13.39 OTHER HYDRONEPHROSIS: ICD-10-CM

## 2023-04-28 DIAGNOSIS — N13.4 HYDROURETER ON RIGHT: ICD-10-CM

## 2023-04-28 DIAGNOSIS — N13.5 STRICTURE OR KINKING OF URETER: Primary | ICD-10-CM

## 2023-04-28 PROCEDURE — 99204 OFFICE O/P NEW MOD 45 MIN: CPT | Mod: VID | Performed by: UROLOGY

## 2023-04-28 RX ORDER — CEFAZOLIN SODIUM 2 G/50ML
2 SOLUTION INTRAVENOUS SEE ADMIN INSTRUCTIONS
Status: CANCELLED | OUTPATIENT
Start: 2023-04-28

## 2023-04-28 RX ORDER — CEFAZOLIN SODIUM 2 G/50ML
2 SOLUTION INTRAVENOUS
Status: CANCELLED | OUTPATIENT
Start: 2023-04-28

## 2023-04-28 NOTE — NURSING NOTE
Is the patient currently in the state of [place]? YES    Visit mode:VIDEO    If the visit is dropped, the patient can be reconnected by: VIDEO VISIT: Text to cell phone: [phone]    Will anyone else be joining the visit? NO      How would you like to obtain your AVS? MyChart    Are changes needed to the allergy or medication list? NO    Reason for visit: Video Visit (New urology, hydronephrosis, ureteral narrowing)

## 2023-04-28 NOTE — LETTER
4/28/2023       RE: Suni Zuluaga  [address]  [facility] [address]     Dear Colleague,    Thank you for referring your patient, Suni Zuluaga, to the [facility] [place] at [facility]. Please see a copy of my visit note below.      Urology Clinic  RAMÓN Valladares MD  Apr 28, 2023  38 year old female    Assessment and Plan    Cervical cancer status post chemo and radiation including brachytherapy  2022 CTs show significant retroperitoneal fibrosis and occlusion of IVC.    Right hydronephrosis   3/20/23 CT shows mild hydronephrosis  4/26/23 CT shows moderate hydronephrosis on [facility] report.    During counseling for this visit, we covered the natural history of ureteral obstruction including possible damage to the kidney and loss of function, the risk of progression to symptomatic pain/infection, and the possibility of infection.      We covered treatment options including observation (risk of total renal failure, pain, and infection) versus treatment options such as nephrostomy tube or ureteral stent.      She is aware that observation will not change the risk of symptomatic pain, symptomatic infection, and loss of kidney function.    We covered the risks of nephrostomy tube or stent placement which include but are not limited to heart attack, stroke, blood clot in the legs or lungs, death, injury to surrounding organs (intestine, liver, spleen, pancreas, lung, muscles, nerves), infection, injury to urethra, injury to ureter, stent pain, and injury to bladder.  We also discussed how even with nephrostomy tube or ureteral stent there is still a risk of kidney damage and infection.      Plan  Bilateral retrograde pyelogram and right possible left stent in near future.  __________________________________________________    HPI  She is referred by Dr. Brambila for increasing right hydronephrosis in the setting of cervical  "cancer status post chemoradiation.  She denies any hematuria flank pain or infections at this time.    Per Dr Brambila 3/22/23 note  \"2015:HSIL Pap at the beginning of pregnancy. Repeat pap postpartum was ASCUS. Colposcopy was negative.   8/13/20: Pelvic US. Uterine fibroids, normal appearance of uterus and ovaries  8/27/2020: Pap smear which confirmed squamous cell carcinoma of the cervix. Referred to GYN  ONC  9/1/2020: GYN ONC Consult [facility]. Cervical biopsy, RADHA III, unable to identify invasion  9/9/2020: Repeat cervical biopsy confirms invasive SCC  9/28/2020: Started primary chemoRT for locally advanced cervical cancer. Stage IIIC1(r), large primary tumor with bilateral parametrial invasion and clinical evidence of posterior vaginal involvement. Pelvic lymphadenopathy involving external iliac, perirectal and internal iliac nodes.   9/28/20-10/30/20: ChemoRT.   11/6/20-11/16/20: T&R brachytherapy  11/25/2020: Followup with radonc (Dr Hernández)   8/23/22: CT A/P with nonspecific increased density in the para-aortic region, which could be seen in the setting of retroperitoneal fibrosis, inflammation, or potentially lymphoma/desmoplastic reaction. New varix from right renal vein extending down into the RLQ.   10/6/22: PET CT Whole body with large 5cm retroperitoneal mass with FDG avidity. New nodes in retroperitoneum and mediastinum  Plan: Paclitaxel, Carboplatin, Bevacizumab, and pembrolizumab added due to PD-L1 expression every 3 weeks.      10/13/22: Cycle 1 Paclitaxel 175 mg/m2, Carboplatin AUC 6, Bevacizumab 15 mg/m2  11/4/22: Cycle 2 Paclitaxel 175 mg/m2, Carboplatin AUC 5, Bevacizumab 15 mg/m2. Pembrolizumab on hold while awaiting insurance coverage.  11/8/22: ED for nausea (no vomiting) and dizziness, discharged with valium for vertigo.  11/25/22: Cycle 3 Paclitaxel 175 mg/m2, Carboplatin AUC 5, Bevacizumab 15 mg/m2, and pembrolizumab 200 mg.  Defer x 1 week for thrombocytopenia (plts 82).  Decrease Paclitaxel " 135 mg/m2, Carboplatin AUC 5, Bevacizumab 15 mg/m2, and pembrolizumab 200 mg, given 12/5.  12/19/22 CT CAP IMPRESSION:  1.  Metastatic squamous cell carcinoma of the cervix with decreased size of previously FDG avid thoracoabdominal lymph nodes compared to 10/06/2022. No new or worsening disease.   2.  Unchanged tumoral encasement and occlusion of the infrarenal IVC.   3.  Rectal wall thickening and fat stranding suggestive of postradiation inflammation.     Plan: Continue paclitaxel, carboplatin, bevacizumab, and pembrolizumab with CT/MD after 3 more cycles (6 total)     12/30/22: Cycle 4 Paclitaxel 135 mg/m2, Carboplatin AUC 5, Bevacizumab 15 mg/m2, and pembrolizumab 200 mg, held carboplatin and paclitaxel due to thrombocytopenia (plts 39), not given this cycle.  Bevacizumab and Pembrolizumab given.  1/20/23: Cycle 5 Paclitaxel 135 mg/m2, Carboplatin AUC 4, Bevacizumab 15 mg/m2, and pembrolizumab 200 mg.     1/20/23: BLE US  IMPRESSION: No deep venous thrombosis demonstrated in either leg.     2/10/23: Cycle 6 Paclitaxel 135 mg/m2, Carboplatin AUC 4, Bevacizumab 15 mg/m2, and pembrolizumab 200 mg.     Plan: Continue paclitaxel, carboplatin, bevacizumab, and pembrolizumab x 1 more cycle as she did not have chemotherapy for cycle 4.  Followed by CT and follow up with Dr. Brambila.     3/3/23: Cycle 7 Paclitaxel 135 mg/m2, Carboplatin AUC 4, Bevacizumab 15 mg/m2, and pembrolizumab 200 mg.      3/20/2023: CT C/A/P with stable disease (still with retroperitoneal adenopathy encasing aorta/IVC, occulusion of IVC, cystitis/proctitis)      3/24/23: Plan cycle one of johny/pembro maintenance       3/20/23 CT Abdomen/Pelvis with and without contrast   I reviewed the radiologic images and report from this radiologic exam.  My independent interpretation is:  Mild right hydronephrosis.      I reviewed the following laboratory data and went over findings with patient:  Recent Labs   Lab Test 04/14/23  0647 03/24/23  0733  01/20/23  0739 12/30/22  0710   WBC 5.4 8.3 4.2 4.7   HGB 11.2* 10.9* 9.4* 9.4*    138* 219 39*     Recent Labs   Lab Test 04/14/23  0647 03/24/23  0733 01/20/23  0739 12/30/22  0710 04/30/21  0845 10/27/20  0746 10/20/20  0809 10/12/20  0826 10/05/20  0633   CR 0.66 0.69 0.51 0.41*   < > 0.57 0.51* 0.49* 0.62   GFRESTIMATED >90 >90 >90 >90   < > >90 >90 >90 >90   GFRESTBLACK  --   --   --   --   --  >90 >90 >90 >90   * 98 85 96   < > 118* 102* 98 100*    < > = values in this interval not displayed.         CC  Patient Care Team:  Marsha Manrique MD as PCP - General (Gynecologic Oncology)  Marsha Manrique MD as MD (Gynecologic Oncology)  Kenneth Shahid as Referring Physician  Liliam Paniauga, RN as Specialty Care Coordinator (Hematology & Oncology)  Mounika Ritchie APRN CNP as Assigned Cancer Care Provider  MARSHA MANRIQUE    Copy to patient  [address]  [facility] [address]        Again, thank you for allowing me to participate in the care of your patient.      Sincerely,    Robert Valladares MD

## 2023-04-28 NOTE — PROGRESS NOTES
"  Urology Clinic  RAMÓN Valladares MD  Apr 28, 2023  38 year old female    Assessment and Plan    Cervical cancer status post chemo and radiation including brachytherapy    2022 CTs show significant retroperitoneal fibrosis and occlusion of IVC.    Right hydronephrosis     3/20/23 CT shows mild hydronephrosis    4/26/23 CT shows moderate hydronephrosis on [facility] report.    During counseling for this visit, we covered the natural history of ureteral obstruction including possible damage to the kidney and loss of function, the risk of progression to symptomatic pain/infection, and the possibility of infection.      We covered treatment options including observation (risk of total renal failure, pain, and infection) versus treatment options such as nephrostomy tube or ureteral stent.      She is aware that observation will not change the risk of symptomatic pain, symptomatic infection, and loss of kidney function.    We covered the risks of nephrostomy tube or stent placement which include but are not limited to heart attack, stroke, blood clot in the legs or lungs, death, injury to surrounding organs (intestine, liver, spleen, pancreas, lung, muscles, nerves), infection, injury to urethra, injury to ureter, stent pain, and injury to bladder.  We also discussed how even with nephrostomy tube or ureteral stent there is still a risk of kidney damage and infection.      Plan    Bilateral retrograde pyelogram and right possible left stent in near future.  __________________________________________________    HPI  She is referred by Dr. Brambila for increasing right hydronephrosis in the setting of cervical cancer status post chemoradiation.  She denies any hematuria flank pain or infections at this time.    Per Dr Brambila 3/22/23 note  \"2015:HSIL Pap at the beginning of pregnancy. Repeat pap postpartum was ASCUS. Colposcopy was negative.   8/13/20: Pelvic US. Uterine fibroids, normal appearance of uterus and " ovaries  8/27/2020: Pap smear which confirmed squamous cell carcinoma of the cervix. Referred to GYN  ONC  9/1/2020: GYN ONC Consult [facility]. Cervical biopsy, RADHA III, unable to identify invasion  9/9/2020: Repeat cervical biopsy confirms invasive SCC  9/28/2020: Started primary chemoRT for locally advanced cervical cancer. Stage IIIC1(r), large primary tumor with bilateral parametrial invasion and clinical evidence of posterior vaginal involvement. Pelvic lymphadenopathy involving external iliac, perirectal and internal iliac nodes.   9/28/20-10/30/20: ChemoRT.   11/6/20-11/16/20: T&R brachytherapy  11/25/2020: Followup with radon (Dr Hernández)   8/23/22: CT A/P with nonspecific increased density in the para-aortic region, which could be seen in the setting of retroperitoneal fibrosis, inflammation, or potentially lymphoma/desmoplastic reaction. New varix from right renal vein extending down into the RLQ.   10/6/22: PET CT Whole body with large 5cm retroperitoneal mass with FDG avidity. New nodes in retroperitoneum and mediastinum  Plan: Paclitaxel, Carboplatin, Bevacizumab, and pembrolizumab added due to PD-L1 expression every 3 weeks.      10/13/22: Cycle 1 Paclitaxel 175 mg/m2, Carboplatin AUC 6, Bevacizumab 15 mg/m2  11/4/22: Cycle 2 Paclitaxel 175 mg/m2, Carboplatin AUC 5, Bevacizumab 15 mg/m2. Pembrolizumab on hold while awaiting insurance coverage.  11/8/22: ED for nausea (no vomiting) and dizziness, discharged with valium for vertigo.  11/25/22: Cycle 3 Paclitaxel 175 mg/m2, Carboplatin AUC 5, Bevacizumab 15 mg/m2, and pembrolizumab 200 mg.  Defer x 1 week for thrombocytopenia (plts 82).  Decrease Paclitaxel 135 mg/m2, Carboplatin AUC 5, Bevacizumab 15 mg/m2, and pembrolizumab 200 mg, given 12/5.  12/19/22 CT CAP IMPRESSION:  1.  Metastatic squamous cell carcinoma of the cervix with decreased size of previously FDG avid thoracoabdominal lymph nodes compared to 10/06/2022. No new or worsening disease.   2.   Unchanged tumoral encasement and occlusion of the infrarenal IVC.   3.  Rectal wall thickening and fat stranding suggestive of postradiation inflammation.     Plan: Continue paclitaxel, carboplatin, bevacizumab, and pembrolizumab with CT/MD after 3 more cycles (6 total)     12/30/22: Cycle 4 Paclitaxel 135 mg/m2, Carboplatin AUC 5, Bevacizumab 15 mg/m2, and pembrolizumab 200 mg, held carboplatin and paclitaxel due to thrombocytopenia (plts 39), not given this cycle.  Bevacizumab and Pembrolizumab given.  1/20/23: Cycle 5 Paclitaxel 135 mg/m2, Carboplatin AUC 4, Bevacizumab 15 mg/m2, and pembrolizumab 200 mg.     1/20/23: BLE US  IMPRESSION: No deep venous thrombosis demonstrated in either leg.     2/10/23: Cycle 6 Paclitaxel 135 mg/m2, Carboplatin AUC 4, Bevacizumab 15 mg/m2, and pembrolizumab 200 mg.     Plan: Continue paclitaxel, carboplatin, bevacizumab, and pembrolizumab x 1 more cycle as she did not have chemotherapy for cycle 4.  Followed by CT and follow up with Dr. Brambila.     3/3/23: Cycle 7 Paclitaxel 135 mg/m2, Carboplatin AUC 4, Bevacizumab 15 mg/m2, and pembrolizumab 200 mg.      3/20/2023: CT C/A/P with stable disease (still with retroperitoneal adenopathy encasing aorta/IVC, occulusion of IVC, cystitis/proctitis)      3/24/23: Plan cycle one of johny/pembro maintenance       3/20/23 CT Abdomen/Pelvis with and without contrast   I reviewed the radiologic images and report from this radiologic exam.  My independent interpretation is:    Mild right hydronephrosis.      I reviewed the following laboratory data and went over findings with patient:  Recent Labs   Lab Test 04/14/23  0647 03/24/23  0733 01/20/23  0739 12/30/22  0710   WBC 5.4 8.3 4.2 4.7   HGB 11.2* 10.9* 9.4* 9.4*    138* 219 39*     Recent Labs   Lab Test 04/14/23  0647 03/24/23  0733 01/20/23  0739 12/30/22  0710 04/30/21  0845 10/27/20  0746 10/20/20  0809 10/12/20  0826 10/05/20  0633   CR 0.66 0.69 0.51 0.41*   < > 0.57 0.51*  0.49* 0.62   GFRESTIMATED >90 >90 >90 >90   < > >90 >90 >90 >90   GFRESTBLACK  --   --   --   --   --  >90 >90 >90 >90   * 98 85 96   < > 118* 102* 98 100*    < > = values in this interval not displayed.         CC  Patient Care Team:  Marsha Manrique MD as PCP - General (Gynecologic Oncology)  Marsha Manrique MD as MD (Gynecologic Oncology)  Kenneth Shahid as Referring Physician  Liliam Paniagua, RN as Specialty Care Coordinator (Hematology & Oncology)  Mounika Ritchie, APRN CNP as Assigned Cancer Care Provider  MARSHA MANRIQUE    Copy to patient  ISIDRO KELLER DARWIN  [address] [place] [address] [address]  [facility] [address]

## 2023-05-03 ENCOUNTER — VIRTUAL VISIT (OUTPATIENT)
Dept: ONCOLOGY | Facility: CLINIC | Age: 38
End: 2023-05-03
Attending: OBSTETRICS & GYNECOLOGY
Payer: COMMERCIAL

## 2023-05-03 DIAGNOSIS — E83.42 HYPOMAGNESEMIA: Primary | ICD-10-CM

## 2023-05-03 DIAGNOSIS — C53.8 MALIGNANT NEOPLASM OF OVERLAPPING SITES OF CERVIX (H): ICD-10-CM

## 2023-05-03 PROCEDURE — 99215 OFFICE O/P EST HI 40 MIN: CPT | Mod: VID | Performed by: OBSTETRICS & GYNECOLOGY

## 2023-05-03 PROCEDURE — G0463 HOSPITAL OUTPT CLINIC VISIT: HCPCS | Mod: PN,GT | Performed by: OBSTETRICS & GYNECOLOGY

## 2023-05-03 RX ORDER — MAGNESIUM OXIDE 400 MG/1
400 TABLET ORAL DAILY
Qty: 30 TABLET | Refills: 1 | Status: SHIPPED | OUTPATIENT
Start: 2023-05-03 | End: 2023-06-09

## 2023-05-03 RX ORDER — OXYCODONE HYDROCHLORIDE 5 MG/1
5 TABLET ORAL EVERY 6 HOURS PRN
Qty: 30 TABLET | Refills: 0 | Status: SHIPPED | OUTPATIENT
Start: 2023-05-03 | End: 2023-06-06

## 2023-05-03 NOTE — PROGRESS NOTES
Virtual Visit Details    Type of service:  Video Visit   Video Start Time: 10:02 AM  Video End Time:10:15    Originating Location (pt. Location): Home    Distant Location (provider location):  On-site  Platform used for Video Visit: Well       GYN Oncology Established Patient Visit    RE: Suni Zuluaga  : 1985    5/3/2023     CC: Cervical cancer , recurrent    HPI: Ms Suni Zuluaga is a 38 year old  female who presents to discuss cervical cancer treatment planning and toxicity assessment      Treatment History:   :HSIL Pap at the beginning of pregnancy. Repeat pap postpartum was ASCUS. Colposcopy was negative.   20: Pelvic US. Uterine fibroids, normal appearance of uterus and ovaries  2020: Pap smear which confirmed squamous cell carcinoma of the cervix. Referred to GYN  ONC  2020: GYN ONC Consult [facility]. Cervical biopsy, RADHA III, unable to identify invasion  2020: Repeat cervical biopsy confirms invasive SCC  2020: Started primary chemoRT for locally advanced cervical cancer. Stage IIIC1(r), large primary tumor with bilateral parametrial invasion and clinical evidence of posterior vaginal involvement. Pelvic lymphadenopathy involving external iliac, perirectal and internal iliac nodes.   20-10/30/20: ChemoRT.   20-20: T&R brachytherapy  2020: Followup with radonc (Dr Hernández)   22: CT A/P with nonspecific increased density in the para-aortic region, which could be seen in the setting of retroperitoneal fibrosis, inflammation, or potentially lymphoma/desmoplastic reaction. New varix from right renal vein extending down into the RLQ.   10/6/22: PET CT Whole body with large 5cm retroperitoneal mass with FDG avidity. New nodes in retroperitoneum and mediastinum    Plan: Paclitaxel, Carboplatin, Bevacizumab, and pembrolizumab added due to PD-L1 expression every 3 weeks.   10/13/22: Cycle 1 Paclitaxel 175 mg/m2, Carboplatin AUC 6, Bevacizumab 15  mg/m2  11/4/22: Cycle 2 Paclitaxel 175 mg/m2, Carboplatin AUC 5, Bevacizumab 15 mg/m2. Pembrolizumab on hold while awaiting insurance coverage.  11/8/22: ED for nausea (no vomiting) and dizziness, discharged with valium for vertigo.  11/25/22: Cycle 3 Paclitaxel 175 mg/m2, Carboplatin AUC 5, Bevacizumab 15 mg/m2, and pembrolizumab 200 mg.  Defer x 1 week for thrombocytopenia (plts 82).  Decrease Paclitaxel 135 mg/m2, Carboplatin AUC 5, Bevacizumab 15 mg/m2, and pembrolizumab 200 mg, given 12/5.  12/19/22 CT CAP with  response    Plan: Continue paclitaxel, carboplatin, bevacizumab, and pembrolizumab with CT/MD after 3 more cycles (6 total)  12/30/22: Cycle 4 Paclitaxel 135 mg/m2, Carboplatin AUC 5, Bevacizumab 15 mg/m2, and pembrolizumab 200 mg, held carboplatin and paclitaxel due to thrombocytopenia (plts 39), not given this cycle.  Bevacizumab and Pembrolizumab given.  1/20/23: Cycle 5 Paclitaxel 135 mg/m2, Carboplatin AUC 4, Bevacizumab 15 mg/m2, and pembrolizumab 200 mg.  2/10/23: Cycle 6 Paclitaxel 135 mg/m2, Carboplatin AUC 4, Bevacizumab 15 mg/m2, and pembrolizumab 200 mg.     Plan: Continue paclitaxel, carboplatin, bevacizumab, and pembrolizumab x 1 more cycle as she did not have chemotherapy for cycle 4.  Followed by CT and follow up with Dr. Brambila.  3/3/23: Cycle 7 Paclitaxel 135 mg/m2, Carboplatin AUC 4, Bevacizumab 15 mg/m2, and pembrolizumab 200 mg.   3/20/2023: CT C/A/P with stable disease (still with retroperitoneal adenopathy encasing aorta/IVC, occulusion of IVC, cystitis/proctitis)   3/24/23: Cycle one of johny/pembro maintenance (cycle 8 overall)  4/14/23: C2 pembro/avastin  (cycle 9 overall)   4/26/23: OSH ED visit for pain, found to have new hydro   4/28/23: Met with urology (Dr Valladares) for hydronephrosis. Plan possible stent placement in the future for hydro.   5/5/23: Due for cycle 3 maintenance pembro/avastin (cycle 10 overall)         Interval history:     Total she reports severe LLQ pain  which has not improved   CT scan at the ED shows new right hydro.   She has normal BMs  She is taking regular oxycodone now for the pain.   She is now taking synthroid 150 mcg/day    Edema has almost completely resolved.   Regarding vertigo, this has improved.   Still working full time  CT scan with likely stable disease, awaiting [facility] overread.     Past Medical History:   Diagnosis Date     Cervical cancer (H)      Tobacco abuse      Past Surgical History:   Procedure Laterality Date     CARPAL TUNNEL RELEASE RT/LT Right      COMBINED CYSTOSCOPY, INSERT STENT URETER(S) Bilateral 5/10/2023    Procedure: CYSTOSCOPY, WITH RIGHT URETERAL STENT INSERTION, Bilateral retrograde pyelogram.;  Surgeon: Robert Valladares MD;  Location: [facility] OR     TUBAL LIGATION         OBGYN history and Health Maintenance:  , completed childbearing , had tubal ligation   Last Pap Smear: per HPI   Last Mammogram: never  Last Colonoscopy: never    Meds reviewed in EPIC     Allergies   Allergen Reactions     Clindamycin Hives     Social History:  Social History     Tobacco Use     Smoking status: Every Day     Packs/day: 1.00     Types: Cigarettes     Smokeless tobacco: Never     Tobacco comments:     2020 1/2 pack/day, tying tp quit   Vaping Use     Vaping status: Not on file   Substance Use Topics     Alcohol use: Yes     Comment: occ.     Work: manufacturing, manual labor  Preferred language: English  Lives at home with: Partner and 4 kids  Long smoking history since age 13. Smokes a pack daily. Denies any substance abuse     Family History:   No family history of cancer     Physical Exam:   Vitals reviewed in epic  There were no vitals taken for this visit.  General: No acute distress. Some temporal wasting which has been stable  Remainder of exam deferred      ECOG performance status: 1       Assessment:  Suni Zuluaga is a 38 year old woman with Stage IIIC1(r) squamous cell carcinoma of the cervix , now with recurrence.  PDL1+    Plan:     1.)   Cervical cancer : Stage IIIC1r SCC of the cervix (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  Completed primary chemoRT without complication. Posttreatment PET (6 months, delayed due to insurance issue) with minimal residual cervical disease. We were unable to get further PET scans covered. Now with biopsy proven recurrence. Received 7 cycles of carbo/taxol/johny/pembro. Required dose reductions due to neutropenia, thrombocytopenia. Has now received 2 cycles of maintenance johny/pembro. Recent ED visit for worsening pain. Although no specific findings on CT scan to suggest origin of pain, it is most likely tumor related.       - [facility] scan overread  - Refer to palliative care for assistance with pain management.   -Recommend continuation of therapy,  pembro 200mg IV q 3 weeks and avastin 15 mg/kg q 3 weeks  - CT C/A/P after 3 more cycles (maintenance cycle 3-5)   -If progression, would plan tisotumab vedotin (Tivdak)    2.) Hot flashes:  menopause from RT. Not an HRT candidate given smoking    3.) Hydro: Needs stent, will reach out to Dr Valladares about scheduling the stent (saw him in April)     Total time today was 40 minutes including reviewing imaging, counseling patient, and documentation.     Suzy Brambila MD  Gynecologic Oncology  Pager [phone]

## 2023-05-03 NOTE — NURSING NOTE
Is the patient currently in the state of [place]? YES    Visit mode:VIDEO    If the visit is dropped, the patient can be reconnected by: VIDEO VISIT: Text to cell phone: [phone]    Will anyone else be joining the visit? NO      How would you like to obtain your AVS? MyChart    Are changes needed to the allergy or medication list? NO    Reason for visit: Video Visit      LOUANN Baca

## 2023-05-03 NOTE — Clinical Note
5/3/2023         RE: Suni Zuluaga  [address]  [place] [address]        Dear Colleague,    Thank you for referring your patient, Suni Zuluaga, to the [facility]. Please see a copy of my visit note below.    Virtual Visit Details    Type of service:  Video Visit   Video Start Time: 10:02 AM  Video End Time:10:15    Originating Location (pt. Location): Home  {PROVIDER LOCATION On-site should be selected for visits conducted from your clinic location or adjoining [facility] hospital, academic office, or other nearby [facility] building. Off-site should be selected for all other provider locations, including home:[id number]}  Distant Location (provider location):  On-site  Platform used for Video Visit: [facility]       GYN Oncology Established Patient Visit    RE: Suni Zuluaga  : 1985    5/3/2023     CC: Cervical cancer , recurrent    HPI: Ms Suni Zuluaga is a 38 year old  female who presents to discuss cervical cancer treatment planning and toxicity assessment      Treatment History:   2015:HSIL Pap at the beginning of pregnancy. Repeat pap postpartum was ASCUS. Colposcopy was negative.   20: Pelvic US. Uterine fibroids, normal appearance of uterus and ovaries  2020: Pap smear which confirmed squamous cell carcinoma of the cervix. Referred to GYN  ONC  2020: GYN ONC Consult [facility]. Cervical biopsy, RADHA III, unable to identify invasion  2020: Repeat cervical biopsy confirms invasive SCC  2020: Started primary chemoRT for locally advanced cervical cancer. Stage IIIC1(r), large primary tumor with bilateral parametrial invasion and clinical evidence of posterior vaginal involvement. Pelvic lymphadenopathy involving external iliac, perirectal and internal iliac nodes.   20-10/30/20: ChemoRT.   20-20: T&R brachytherapy  2020: Followup with radonc (Dr Hernández)   22: CT A/P with nonspecific increased density in the para-aortic  region, which could be seen in the setting of retroperitoneal fibrosis, inflammation, or potentially lymphoma/desmoplastic reaction. New varix from right renal vein extending down into the RLQ.   10/6/22: PET CT Whole body with large 5cm retroperitoneal mass with FDG avidity. New nodes in retroperitoneum and mediastinum    Plan: Paclitaxel, Carboplatin, Bevacizumab, and pembrolizumab added due to PD-L1 expression every 3 weeks.   10/13/22: Cycle 1 Paclitaxel 175 mg/m2, Carboplatin AUC 6, Bevacizumab 15 mg/m2  11/4/22: Cycle 2 Paclitaxel 175 mg/m2, Carboplatin AUC 5, Bevacizumab 15 mg/m2. Pembrolizumab on hold while awaiting insurance coverage.  11/8/22: ED for nausea (no vomiting) and dizziness, discharged with valium for vertigo.  11/25/22: Cycle 3 Paclitaxel 175 mg/m2, Carboplatin AUC 5, Bevacizumab 15 mg/m2, and pembrolizumab 200 mg.  Defer x 1 week for thrombocytopenia (plts 82).  Decrease Paclitaxel 135 mg/m2, Carboplatin AUC 5, Bevacizumab 15 mg/m2, and pembrolizumab 200 mg, given 12/5.  12/19/22 CT CAP with  response    Plan: Continue paclitaxel, carboplatin, bevacizumab, and pembrolizumab with CT/MD after 3 more cycles (6 total)  12/30/22: Cycle 4 Paclitaxel 135 mg/m2, Carboplatin AUC 5, Bevacizumab 15 mg/m2, and pembrolizumab 200 mg, held carboplatin and paclitaxel due to thrombocytopenia (plts 39), not given this cycle.  Bevacizumab and Pembrolizumab given.  1/20/23: Cycle 5 Paclitaxel 135 mg/m2, Carboplatin AUC 4, Bevacizumab 15 mg/m2, and pembrolizumab 200 mg.  2/10/23: Cycle 6 Paclitaxel 135 mg/m2, Carboplatin AUC 4, Bevacizumab 15 mg/m2, and pembrolizumab 200 mg.     Plan: Continue paclitaxel, carboplatin, bevacizumab, and pembrolizumab x 1 more cycle as she did not have chemotherapy for cycle 4.  Followed by CT and follow up with Dr. Brambila.  3/3/23: Cycle 7 Paclitaxel 135 mg/m2, Carboplatin AUC 4, Bevacizumab 15 mg/m2, and pembrolizumab 200 mg.   3/20/2023: CT C/A/P with stable disease (still with  retroperitoneal adenopathy encasing aorta/IVC, occulusion of IVC, cystitis/proctitis)   3/24/23: Cycle one of johny/pembro maintenance (cycle 8 overall)  23: C2 pembro/avastin  (cycle 9 overall)   23: OSH ED visit for pain, found to have new hydro   23: Met with urology (Dr Valladares) for hydronephrosis. Plan possible stent placement in the future for hydro.   23: Due for cycle 3 maintenance pembro/avastin (cycle 10 overall)         Interval history:     Total she reports severe LLQ pain which has not improved   CT scan at the ED shows new right hydro.   She has normal BMs  She is taking regular oxycodone now for the pain.   She is now taking synthroid 150 mcg/day    Edema has almost completely resolved.   Regarding vertigo, this has improved.   Still working full time  CT scan with likely stable disease, awaiting [facility] overread.     Past Medical History:   Diagnosis Date     Cervical cancer (H)      Tobacco abuse      Past Surgical History:   Procedure Laterality Date     CARPAL TUNNEL RELEASE RT/LT Right      COMBINED CYSTOSCOPY, INSERT STENT URETER(S) Bilateral 5/10/2023    Procedure: CYSTOSCOPY, WITH RIGHT URETERAL STENT INSERTION, Bilateral retrograde pyelogram.;  Surgeon: Robert Valladares MD;  Location: [facility] OR     TUBAL LIGATION         OBGYN history and Health Maintenance:  , completed childbearing , had tubal ligation   Last Pap Smear: per HPI   Last Mammogram: never  Last Colonoscopy: never    Meds reviewed in EPIC     Allergies   Allergen Reactions     Clindamycin Hives     Social History:  Social History     Tobacco Use     Smoking status: Every Day     Packs/day: 1.00     Types: Cigarettes     Smokeless tobacco: Never     Tobacco comments:     2020 1/2 pack/day, tying tp quit   Vaping Use     Vaping status: Not on file   Substance Use Topics     Alcohol use: Yes     Comment: occ.     Work: manufacturing, manual labor  Preferred language: English  Lives at home with: Partner  and 4 kids  Long smoking history since age 13. Smokes a pack daily. Denies any substance abuse     Family History:   No family history of cancer     Physical Exam:   Vitals reviewed in epic  There were no vitals taken for this visit.  General: No acute distress. Some temporal wasting which has been stable  Remainder of exam deferred      ECOG performance status: 1       Assessment:  Suni Zuluaga is a 38 year old woman with Stage IIIC1(r) squamous cell carcinoma of the cervix , now with recurrence. PDL1+    Plan:     1.)   Cervical cancer : Stage IIIC1r SCC of the cervix (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  Completed primary chemoRT without complication. Posttreatment PET (6 months, delayed due to insurance issue) with minimal residual cervical disease. We were unable to get further PET scans covered. Now with biopsy proven recurrence. Received 7 cycles of carbo/taxol/johny/pembro. Required dose reductions due to neutropenia, thrombocytopenia. Has now received 2 cycles of maintenance johny/pembro. Recent ED visit for worsening pain. Although no specific findings on CT scan to suggest origin of pain, it is most likely tumor related.       - [facility] overread  - Refer to palliative care for assistance with pain management.   -Recommend continuation of therapy,  pembro 200mg IV q 3 weeks and avastin 15 mg/kg q 3 weeks  - CT C/A/P after 3 more cycles (maintenance cycle 3-5)   -If progression, would plan tisotumab vedotin (Tivdak)    2.) Hot flashes:  menopause from RT. Not an HRT candidate given smoking    3.) Hydro: Needs stent, will reach out to Dr Valladares about scheduling the stent (saw him in April)     Total time today was 40 minutes including reviewing imaging, counseling patient, and documentation.     Suzy Brambila MD  Gynecologic Oncology  Pager [phone]        Again, thank you for allowing me to participate in the care of your patient.        Sincerely,        Suzy Brambila MD

## 2023-05-05 ENCOUNTER — INFUSION THERAPY VISIT (OUTPATIENT)
Dept: ONCOLOGY | Facility: CLINIC | Age: 38
End: 2023-05-05
Attending: NURSE PRACTITIONER
Payer: COMMERCIAL

## 2023-05-05 ENCOUNTER — APPOINTMENT (OUTPATIENT)
Dept: LAB | Facility: CLINIC | Age: 38
End: 2023-05-05
Attending: OBSTETRICS & GYNECOLOGY
Payer: COMMERCIAL

## 2023-05-05 VITALS
RESPIRATION RATE: 16 BRPM | DIASTOLIC BLOOD PRESSURE: 82 MMHG | OXYGEN SATURATION: 98 % | WEIGHT: 142.4 LBS | TEMPERATURE: 98.4 F | BODY MASS INDEX: 25.23 KG/M2 | HEART RATE: 83 BPM | SYSTOLIC BLOOD PRESSURE: 121 MMHG

## 2023-05-05 DIAGNOSIS — R30.0 DYSURIA: ICD-10-CM

## 2023-05-05 DIAGNOSIS — C53.8 MALIGNANT NEOPLASM OF OVERLAPPING SITES OF CERVIX (H): Primary | ICD-10-CM

## 2023-05-05 DIAGNOSIS — D69.6 THROMBOCYTOPENIA (H): ICD-10-CM

## 2023-05-05 PROBLEM — N13.5 STRICTURE OR KINKING OF URETER: Status: ACTIVE | Noted: 2023-05-05

## 2023-05-05 LAB
ALBUMIN SERPL BCG-MCNC: 4.2 G/DL (ref 3.5–5.2)
ALBUMIN UR-MCNC: 20 MG/DL
ALP SERPL-CCNC: 87 U/L (ref 35–104)
ALT SERPL W P-5'-P-CCNC: 16 U/L (ref 10–35)
ANION GAP SERPL CALCULATED.3IONS-SCNC: 9 MMOL/L (ref 7–15)
AST SERPL W P-5'-P-CCNC: 29 U/L (ref 10–35)
BASOPHILS # BLD AUTO: 0 10E3/UL (ref 0–0.2)
BASOPHILS NFR BLD AUTO: 0 %
BILIRUB SERPL-MCNC: 0.2 MG/DL
BUN SERPL-MCNC: 17.3 MG/DL (ref 6–20)
CALCIUM SERPL-MCNC: 9.3 MG/DL (ref 8.6–10)
CHLORIDE SERPL-SCNC: 103 MMOL/L (ref 98–107)
CREAT SERPL-MCNC: 0.92 MG/DL (ref 0.51–0.95)
DEPRECATED HCO3 PLAS-SCNC: 27 MMOL/L (ref 22–29)
EOSINOPHIL # BLD AUTO: 0.1 10E3/UL (ref 0–0.7)
EOSINOPHIL NFR BLD AUTO: 1 %
ERYTHROCYTE [DISTWIDTH] IN BLOOD BY AUTOMATED COUNT: 15.8 % (ref 10–15)
GFR SERPL CREATININE-BSD FRML MDRD: 80 ML/MIN/1.73M2
GLUCOSE SERPL-MCNC: 123 MG/DL (ref 70–99)
HCT VFR BLD AUTO: 35.1 % (ref 35–47)
HGB BLD-MCNC: 11.4 G/DL (ref 11.7–15.7)
IMM GRANULOCYTES # BLD: 0 10E3/UL
IMM GRANULOCYTES NFR BLD: 0 %
LYMPHOCYTES # BLD AUTO: 0.8 10E3/UL (ref 0.8–5.3)
LYMPHOCYTES NFR BLD AUTO: 11 %
MAGNESIUM SERPL-MCNC: 2.2 MG/DL (ref 1.7–2.3)
MCH RBC QN AUTO: 33.7 PG (ref 26.5–33)
MCHC RBC AUTO-ENTMCNC: 32.5 G/DL (ref 31.5–36.5)
MCV RBC AUTO: 104 FL (ref 78–100)
MONOCYTES # BLD AUTO: 0.6 10E3/UL (ref 0–1.3)
MONOCYTES NFR BLD AUTO: 8 %
NEUTROPHILS # BLD AUTO: 5.5 10E3/UL (ref 1.6–8.3)
NEUTROPHILS NFR BLD AUTO: 80 %
NRBC # BLD AUTO: 0 10E3/UL
NRBC BLD AUTO-RTO: 0 /100
PLATELET # BLD AUTO: 240 10E3/UL (ref 150–450)
POTASSIUM SERPL-SCNC: 4.2 MMOL/L (ref 3.4–5.3)
PROT SERPL-MCNC: 7.7 G/DL (ref 6.4–8.3)
RBC # BLD AUTO: 3.38 10E6/UL (ref 3.8–5.2)
SODIUM SERPL-SCNC: 139 MMOL/L (ref 136–145)
T4 FREE SERPL-MCNC: 0.87 NG/DL (ref 0.9–1.7)
TSH SERPL DL<=0.005 MIU/L-ACNC: 81.33 UIU/ML (ref 0.3–4.2)
WBC # BLD AUTO: 6.9 10E3/UL (ref 4–11)

## 2023-05-05 PROCEDURE — 36415 COLL VENOUS BLD VENIPUNCTURE: CPT

## 2023-05-05 PROCEDURE — G0463 HOSPITAL OUTPT CLINIC VISIT: HCPCS | Mod: 25

## 2023-05-05 PROCEDURE — 84443 ASSAY THYROID STIM HORMONE: CPT

## 2023-05-05 PROCEDURE — 84439 ASSAY OF FREE THYROXINE: CPT

## 2023-05-05 PROCEDURE — 96413 CHEMO IV INFUSION 1 HR: CPT

## 2023-05-05 PROCEDURE — 80053 COMPREHEN METABOLIC PANEL: CPT

## 2023-05-05 PROCEDURE — 81003 URINALYSIS AUTO W/O SCOPE: CPT | Performed by: NURSE PRACTITIONER

## 2023-05-05 PROCEDURE — 258N000003 HC RX IP 258 OP 636: Performed by: OBSTETRICS & GYNECOLOGY

## 2023-05-05 PROCEDURE — 96417 CHEMO IV INFUS EACH ADDL SEQ: CPT

## 2023-05-05 PROCEDURE — 85025 COMPLETE CBC W/AUTO DIFF WBC: CPT

## 2023-05-05 PROCEDURE — 83735 ASSAY OF MAGNESIUM: CPT

## 2023-05-05 PROCEDURE — 51798 US URINE CAPACITY MEASURE: CPT

## 2023-05-05 PROCEDURE — 250N000011 HC RX IP 250 OP 636: Performed by: OBSTETRICS & GYNECOLOGY

## 2023-05-05 RX ORDER — METHYLPREDNISOLONE SODIUM SUCCINATE 125 MG/2ML
125 INJECTION, POWDER, LYOPHILIZED, FOR SOLUTION INTRAMUSCULAR; INTRAVENOUS
Status: CANCELLED
Start: 2023-05-05

## 2023-05-05 RX ORDER — TAMSULOSIN HYDROCHLORIDE 0.4 MG/1
0.4 CAPSULE ORAL DAILY
Qty: 30 CAPSULE | Refills: 0 | Status: SHIPPED | OUTPATIENT
Start: 2023-05-05 | End: 2023-06-09

## 2023-05-05 RX ORDER — ALBUTEROL SULFATE 90 UG/1
1-2 AEROSOL, METERED RESPIRATORY (INHALATION)
Status: CANCELLED
Start: 2023-05-05

## 2023-05-05 RX ORDER — EPINEPHRINE 1 MG/ML
0.3 INJECTION, SOLUTION INTRAMUSCULAR; SUBCUTANEOUS EVERY 5 MIN PRN
Status: CANCELLED | OUTPATIENT
Start: 2023-05-05

## 2023-05-05 RX ORDER — HEPARIN SODIUM (PORCINE) LOCK FLUSH IV SOLN 100 UNIT/ML 100 UNIT/ML
5 SOLUTION INTRAVENOUS
Status: CANCELLED | OUTPATIENT
Start: 2023-05-05

## 2023-05-05 RX ORDER — MEPERIDINE HYDROCHLORIDE 25 MG/ML
25 INJECTION INTRAMUSCULAR; INTRAVENOUS; SUBCUTANEOUS EVERY 30 MIN PRN
Status: CANCELLED | OUTPATIENT
Start: 2023-05-05

## 2023-05-05 RX ORDER — ALBUTEROL SULFATE 0.83 MG/ML
2.5 SOLUTION RESPIRATORY (INHALATION)
Status: CANCELLED | OUTPATIENT
Start: 2023-05-05

## 2023-05-05 RX ORDER — PHENAZOPYRIDINE HYDROCHLORIDE 100 MG/1
100 TABLET, FILM COATED ORAL 3 TIMES DAILY PRN
Qty: 30 TABLET | Refills: 1 | Status: SHIPPED | OUTPATIENT
Start: 2023-05-05 | End: 2023-06-09

## 2023-05-05 RX ORDER — DIPHENHYDRAMINE HYDROCHLORIDE 50 MG/ML
50 INJECTION INTRAMUSCULAR; INTRAVENOUS
Status: CANCELLED
Start: 2023-05-05

## 2023-05-05 RX ORDER — LORAZEPAM 2 MG/ML
0.5 INJECTION INTRAMUSCULAR EVERY 4 HOURS PRN
Status: CANCELLED | OUTPATIENT
Start: 2023-05-05

## 2023-05-05 RX ORDER — HEPARIN SODIUM,PORCINE 10 UNIT/ML
5 VIAL (ML) INTRAVENOUS
Status: CANCELLED | OUTPATIENT
Start: 2023-05-05

## 2023-05-05 RX ADMIN — SODIUM CHLORIDE 250 ML: 9 INJECTION, SOLUTION INTRAVENOUS at 14:13

## 2023-05-05 RX ADMIN — SODIUM CHLORIDE 1000 MG: 9 INJECTION, SOLUTION INTRAVENOUS at 14:44

## 2023-05-05 RX ADMIN — SODIUM CHLORIDE 200 MG: 9 INJECTION, SOLUTION INTRAVENOUS at 14:13

## 2023-05-05 ASSESSMENT — PAIN SCALES - GENERAL: PAINLEVEL: EXTREME PAIN (8)

## 2023-05-05 NOTE — PATIENT INSTRUCTIONS
[facility] Triage and after hours / weekends / holidays:  [phone]    Please call the triage or after hours line if you experience a temperature greater than or equal to 100.4, shaking chills, have uncontrolled nausea, vomiting and/or diarrhea, dizziness, shortness of breath, chest pain, bleeding, unexplained bruising, or if you have any other new/concerning symptoms, questions or concerns.      If you are having any concerning symptoms or wish to speak to a provider before your next infusion visit, please call triage to notify them so we can adequately serve you.     If you need a refill on a narcotic prescription or other medication, please call before your infusion appointment.                 May 2023      [place] Monday Tuesday Wednesday Thursday Friday Saturday        1     2     3    RETURN CCSL   9:40 AM   (20 min.)   Suzy Brambila MD   [facility] 4     5    LAB PERIPHERAL   1:30 PM   (15 min.)   [facility] LAB DRAW   [facility]    ONC INFUSION 2 HR (120 MIN)   2:00 PM   (120 min.)    ONC INFUSION NURSE   [facility] 6       7     8     9     10     11     12     13       14     15     16     17     18     19     20       21     22     23     24     25     26     27       28     29     30     31                                  June 2023 Sunday Monday Tuesday Wednesday Thursday Friday Saturday                       1     2     3       4     5     6     7     8     9     10       11     12     13     14     15     16     17       18     19     20     21     22     23     24       25     26     27     28     29     30                            Lab Results:  Recent Results (from the past 12 hour(s))   Magnesium    Collection Time: 05/05/23 12:43 PM   Result Value Ref Range    Magnesium 2.2 1.7 - 2.3 mg/dL   Comprehensive metabolic panel (BMP + Alb, Alk Phos, ALT, AST, Total. Bili, TP)    Collection Time: 05/05/23  12:43 PM   Result Value Ref Range    Sodium 139 136 - 145 mmol/L    Potassium 4.2 3.4 - 5.3 mmol/L    Chloride 103 98 - 107 mmol/L    Carbon Dioxide (CO2) 27 22 - 29 mmol/L    Anion Gap 9 7 - 15 mmol/L    Urea Nitrogen 17.3 6.0 - 20.0 mg/dL    Creatinine 0.92 0.51 - 0.95 mg/dL    Calcium 9.3 8.6 - 10.0 mg/dL    Glucose 123 (H) 70 - 99 mg/dL    Alkaline Phosphatase 87 35 - 104 U/L    AST 29 10 - 35 U/L    ALT 16 10 - 35 U/L    Protein Total 7.7 6.4 - 8.3 g/dL    Albumin 4.2 3.5 - 5.2 g/dL    Bilirubin Total 0.2 <=1.2 mg/dL    GFR Estimate 80 >60 mL/min/1.73m2   T4, free    Collection Time: 05/05/23 12:43 PM   Result Value Ref Range    Free T4 0.87 (L) 0.90 - 1.70 ng/dL   TSH    Collection Time: 05/05/23 12:43 PM   Result Value Ref Range    TSH 81.33 (H) 0.30 - 4.20 uIU/mL   Protein qualitative urine    Collection Time: 05/05/23 12:43 PM   Result Value Ref Range    Protein Albumin Urine 20 (A) Negative mg/dL   CBC with platelets and differential    Collection Time: 05/05/23 12:43 PM   Result Value Ref Range    WBC Count 6.9 4.0 - 11.0 10e3/uL    RBC Count 3.38 (L) 3.80 - 5.20 10e6/uL    Hemoglobin 11.4 (L) 11.7 - 15.7 g/dL    Hematocrit 35.1 35.0 - 47.0 %     (H) 78 - 100 fL    MCH 33.7 (H) 26.5 - 33.0 pg    MCHC 32.5 31.5 - 36.5 g/dL    RDW 15.8 (H) 10.0 - 15.0 %    Platelet Count 240 150 - 450 10e3/uL    % Neutrophils 80 %    % Lymphocytes 11 %    % Monocytes 8 %    % Eosinophils 1 %    % Basophils 0 %    % Immature Granulocytes 0 %    NRBCs per 100 WBC 0 <1 /100    Absolute Neutrophils 5.5 1.6 - 8.3 10e3/uL    Absolute Lymphocytes 0.8 0.8 - 5.3 10e3/uL    Absolute Monocytes 0.6 0.0 - 1.3 10e3/uL    Absolute Eosinophils 0.1 0.0 - 0.7 10e3/uL    Absolute Basophils 0.0 0.0 - 0.2 10e3/uL    Absolute Immature Granulocytes 0.0 <=0.4 10e3/uL    Absolute NRBCs 0.0 10e3/uL

## 2023-05-05 NOTE — NURSING NOTE
Chief Complaint   Patient presents with     Blood Draw     Labs drawn with PIV start by RN. Vitals taken.     Labs drawn with PIV start by RN. Pt tolerated well. Vitals taken. Pt checked into next appointment.    Kathryn Morgan RN

## 2023-05-05 NOTE — PROGRESS NOTES
"Infusion Nursing Note:  Suni Zuluaga presents today for Day 1 Cycle 10 Keytruda, Zirabev.    Patient seen by provider today: No   present during visit today: Not Applicable.    Note: Patient presents to infusion feeling ok with the following continued symptoms:  1. Burning and urine retention with voiding. Patient states she has completed her 3 days of antibiotic with no changes in urinary symptoms. Pt bladder scanned at 96mls 1.5 hours after she voided for urine protein lab specimen. Message sent to Dr. Valladares about continued symptoms.  2. Continued intermittent chest pain, dizziness, and lightheadedness with no exacerbation. Pt states symptoms occur at work that is a fast paced environment. Patient went to the ER on 4/12 for chest pain with the following statement from progress note \"Your chest pain is not due to a heart or lung problem today. Consider the possibility of a muscle strain, gastric reflux (heartburn), or stress/anxiety. Continue your usual medications, but you could try OTC Pepcid twice a day for 1-2 weeks and/or Tylenol to see if that helps. Followup at your clinic as needed\". Pt states she has not tried Pepcid.  3. Awaiting on when stent placement will take place. Scheduling was called and appt made for stent placement for 5/10/2023    Paged Mounika Ritchie CNP to update on symptoms above and to clarify whether its ok to give Zirabev given upcoming stent placement.  TORB. 5/5/2023. 1400. Mounika Ritchie CNP. Joce Dorado RN. Proceed with Zirabev given upcoming stent placement. Flomax and Pyridium ordered for symptom management.    TSH elevated. Pt states she just started the dose increase of 150mcg Synthroid yesterday.     Intravenous Access:  Peripheral IV placed.    Treatment Conditions:  Lab Results   Component Value Date    HGB 11.4 (L) 05/05/2023    WBC 6.9 05/05/2023    ANEU 2.5 10/27/2020    ANEUTAUTO 5.5 05/05/2023     05/05/2023      Lab Results   Component Value " Date     05/05/2023    POTASSIUM 4.2 05/05/2023    MAG 2.2 05/05/2023    CR 0.92 05/05/2023    HARRISON 9.3 05/05/2023    BILITOTAL 0.2 05/05/2023    ALBUMIN 4.2 05/05/2023    ALT 16 05/05/2023    AST 29 05/05/2023     Results reviewed, labs MET treatment parameters, ok to proceed with treatment.  Urine Protein: 20       Post Infusion Assessment:  Patient tolerated infusion without incident.  Blood return noted pre and post infusion.  Site patent and intact, free from redness, edema or discomfort.  No evidence of extravasations.  Access discontinued per protocol.       Discharge Plan:   Prescription refills given for Flomax, Pyridium .  Discharge instructions reviewed with: Patient.  Patient and/or family verbalized understanding of discharge instructions and all questions answered.  AVS to patient via [employer].  Patient will return in 3 weeks for next appointment.   Patient discharged in stable condition accompanied by: family.  Departure Mode: Ambulatory.  Face to Face time: 0 minutes.      Joce Dorado RN

## 2023-05-08 ENCOUNTER — ANESTHESIA EVENT (OUTPATIENT)
Dept: SURGERY | Facility: AMBULATORY SURGERY CENTER | Age: 38
End: 2023-05-08
Payer: COMMERCIAL

## 2023-05-09 NOTE — ANESTHESIA PREPROCEDURE EVALUATION
Anesthesia Pre-Procedure Evaluation    Patient: Suni Zuluaga   MRN: [id number] : 1985        Procedure : Procedure(s):  CYSTOSCOPY, WITH URETERAL STENT INSERTION, retrograde pyelogram.          Past Medical History:   Diagnosis Date     Cervical cancer (H)       Past Surgical History:   Procedure Laterality Date     CARPAL TUNNEL RELEASE RT/LT Right      TUBAL LIGATION        Allergies   Allergen Reactions     Clindamycin Hives      Social History     Tobacco Use     Smoking status: Every Day     Packs/day: 1.00     Types: Cigarettes     Smokeless tobacco: Never     Tobacco comments:     2020 1/2 pack/day, tying tp quit   Vaping Use     Vaping status: Not on file   Substance Use Topics     Alcohol use: Yes     Comment: occ.      Wt Readings from Last 1 Encounters:   23 64.6 kg (142 lb 6.4 oz)           Physical Exam    Airway        Mallampati: II   TM distance: > 3 FB   Neck ROM: full   Mouth opening: > 3 cm    Respiratory Devices and Support         Dental       (+) Multiple visibly decayed, broken teeth      Cardiovascular   cardiovascular exam normal          Pulmonary   pulmonary exam normal                OUTSIDE LABS:  CBC:   Lab Results   Component Value Date    WBC 6.9 2023    WBC 5.4 2023    HGB 11.4 (L) 2023    HGB 11.2 (L) 2023    HCT 35.1 2023    HCT 34.2 (L) 2023     2023     2023     BMP:   Lab Results   Component Value Date     2023     2023    POTASSIUM 4.2 2023    POTASSIUM 3.7 2023    CHLORIDE 103 2023    CHLORIDE 104 2023    CO2 27 2023    CO2 23 2023    BUN 17.3 2023    BUN 10.4 2023    CR 0.92 2023    CR 0.66 2023     (H) 2023     (H) 2023     COAGS:   Lab Results   Component Value Date    PTT 30 2022    INR 1.06 2022     POC: No results found for: BGM, HCG, HCGS  HEPATIC:   Lab Results    Component Value Date    ALBUMIN 4.2 05/05/2023    PROTTOTAL 7.7 05/05/2023    ALT 16 05/05/2023    AST 29 05/05/2023    ALKPHOS 87 05/05/2023    BILITOTAL 0.2 05/05/2023     OTHER:   Lab Results   Component Value Date    HARRISON 9.3 05/05/2023    MAG 2.2 05/05/2023    TSH 81.33 (H) 05/05/2023    T4 0.87 (L) 05/05/2023       Anesthesia Plan    ASA Status:  2   NPO Status:  NPO Appropriate    Anesthesia Type: MAC.     - Reason for MAC: straight local not clinically adequate   Induction: Intravenous, Propofol.   Maintenance: TIVA.        Consents    Anesthesia Plan(s) and associated risks, benefits, and realistic alternatives discussed. Questions answered and patient/representative(s) expressed understanding.    - Discussed:     - Discussed with:  Patient      - Extended Intubation/Ventilatory Support Discussed: No.      - Patient is DNR/DNI Status: No    Use of blood products discussed: No .     Postoperative Care    Pain management: IV analgesics, Oral pain medications, Multi-modal analgesia.   PONV prophylaxis: Dexamethasone or Solumedrol, Ondansetron (or other 5HT-3), Background Propofol Infusion     Comments:           H&P reviewed: Unable to attach H&P to encounter due to EHR limitations. H&P Update: appropriate H&P reviewed, patient examined. No interval changes since H&P (within 30 days).         Aj Marin MD

## 2023-05-10 ENCOUNTER — ANCILLARY PROCEDURE (OUTPATIENT)
Dept: RADIOLOGY | Facility: AMBULATORY SURGERY CENTER | Age: 38
End: 2023-05-10
Attending: UROLOGY
Payer: COMMERCIAL

## 2023-05-10 ENCOUNTER — TELEPHONE (OUTPATIENT)
Dept: UROLOGY | Facility: CLINIC | Age: 38
End: 2023-05-10

## 2023-05-10 ENCOUNTER — ANCILLARY ORDERS (OUTPATIENT)
Dept: UROLOGY | Facility: CLINIC | Age: 38
End: 2023-05-10

## 2023-05-10 ENCOUNTER — ANESTHESIA (OUTPATIENT)
Dept: SURGERY | Facility: AMBULATORY SURGERY CENTER | Age: 38
End: 2023-05-10
Payer: COMMERCIAL

## 2023-05-10 ENCOUNTER — HOSPITAL ENCOUNTER (OUTPATIENT)
Facility: AMBULATORY SURGERY CENTER | Age: 38
Discharge: HOME | End: 2023-05-10
Attending: UROLOGY
Payer: COMMERCIAL

## 2023-05-10 VITALS
BODY MASS INDEX: 25.16 KG/M2 | HEART RATE: 76 BPM | DIASTOLIC BLOOD PRESSURE: 91 MMHG | SYSTOLIC BLOOD PRESSURE: 123 MMHG | OXYGEN SATURATION: 100 % | HEIGHT: 63 IN | RESPIRATION RATE: 16 BRPM | WEIGHT: 142 LBS | TEMPERATURE: 97.2 F

## 2023-05-10 DIAGNOSIS — N13.5 STRICTURE OR KINKING OF URETER: ICD-10-CM

## 2023-05-10 DIAGNOSIS — N13.39 OTHER HYDRONEPHROSIS: Primary | ICD-10-CM

## 2023-05-10 LAB
HCG UR QL: NEGATIVE
INTERNAL QC OK POCT: NORMAL
POCT KIT EXPIRATION DATE: NORMAL
POCT KIT LOT NUMBER: NORMAL

## 2023-05-10 PROCEDURE — 52332 CYSTOSCOPY AND TREATMENT: CPT | Mod: RT

## 2023-05-10 PROCEDURE — 74420 UROGRAPHY RTRGR +-KUB: CPT | Mod: TC

## 2023-05-10 PROCEDURE — 74420 UROGRAPHY RTRGR +-KUB: CPT | Mod: 26 | Performed by: UROLOGY

## 2023-05-10 PROCEDURE — 52332 CYSTOSCOPY AND TREATMENT: CPT | Mod: RT | Performed by: UROLOGY

## 2023-05-10 PROCEDURE — 81025 URINE PREGNANCY TEST: CPT | Performed by: PATHOLOGY

## 2023-05-10 DEVICE — STENT URETERAL PERCUFLEX PLUS 6FRX26CM M0061752630: Type: IMPLANTABLE DEVICE | Site: URETER | Status: FUNCTIONAL

## 2023-05-10 RX ORDER — HYDROMORPHONE HYDROCHLORIDE 1 MG/ML
0.2 INJECTION, SOLUTION INTRAMUSCULAR; INTRAVENOUS; SUBCUTANEOUS EVERY 5 MIN PRN
Status: DISCONTINUED | OUTPATIENT
Start: 2023-05-10 | End: 2023-05-10 | Stop reason: HOSPADM

## 2023-05-10 RX ORDER — PROPOFOL 10 MG/ML
INJECTION, EMULSION INTRAVENOUS CONTINUOUS PRN
Status: DISCONTINUED | OUTPATIENT
Start: 2023-05-10 | End: 2023-05-10

## 2023-05-10 RX ORDER — LIDOCAINE HYDROCHLORIDE 20 MG/ML
INJECTION, SOLUTION INFILTRATION; PERINEURAL PRN
Status: DISCONTINUED | OUTPATIENT
Start: 2023-05-10 | End: 2023-05-10

## 2023-05-10 RX ORDER — OXYCODONE HYDROCHLORIDE 5 MG/1
5 TABLET ORAL
Status: DISCONTINUED | OUTPATIENT
Start: 2023-05-10 | End: 2023-05-11 | Stop reason: HOSPADM

## 2023-05-10 RX ORDER — ONDANSETRON 4 MG/1
4 TABLET, ORALLY DISINTEGRATING ORAL EVERY 30 MIN PRN
Status: DISCONTINUED | OUTPATIENT
Start: 2023-05-10 | End: 2023-05-10 | Stop reason: HOSPADM

## 2023-05-10 RX ORDER — SODIUM CHLORIDE, SODIUM LACTATE, POTASSIUM CHLORIDE, CALCIUM CHLORIDE 600; 310; 30; 20 MG/100ML; MG/100ML; MG/100ML; MG/100ML
INJECTION, SOLUTION INTRAVENOUS CONTINUOUS
Status: DISCONTINUED | OUTPATIENT
Start: 2023-05-10 | End: 2023-05-10 | Stop reason: HOSPADM

## 2023-05-10 RX ORDER — FENTANYL CITRATE 50 UG/ML
25 INJECTION, SOLUTION INTRAMUSCULAR; INTRAVENOUS EVERY 5 MIN PRN
Status: DISCONTINUED | OUTPATIENT
Start: 2023-05-10 | End: 2023-05-10 | Stop reason: HOSPADM

## 2023-05-10 RX ORDER — FENTANYL CITRATE 50 UG/ML
50 INJECTION, SOLUTION INTRAMUSCULAR; INTRAVENOUS EVERY 5 MIN PRN
Status: DISCONTINUED | OUTPATIENT
Start: 2023-05-10 | End: 2023-05-10 | Stop reason: HOSPADM

## 2023-05-10 RX ORDER — LIDOCAINE 40 MG/G
CREAM TOPICAL
Status: DISCONTINUED | OUTPATIENT
Start: 2023-05-10 | End: 2023-05-10 | Stop reason: HOSPADM

## 2023-05-10 RX ORDER — FENTANYL CITRATE 50 UG/ML
INJECTION, SOLUTION INTRAMUSCULAR; INTRAVENOUS PRN
Status: DISCONTINUED | OUTPATIENT
Start: 2023-05-10 | End: 2023-05-10

## 2023-05-10 RX ORDER — OXYCODONE HYDROCHLORIDE 5 MG/1
10 TABLET ORAL
Status: DISCONTINUED | OUTPATIENT
Start: 2023-05-10 | End: 2023-05-11 | Stop reason: HOSPADM

## 2023-05-10 RX ORDER — ONDANSETRON 2 MG/ML
4 INJECTION INTRAMUSCULAR; INTRAVENOUS EVERY 30 MIN PRN
Status: DISCONTINUED | OUTPATIENT
Start: 2023-05-10 | End: 2023-05-10 | Stop reason: HOSPADM

## 2023-05-10 RX ORDER — CEFAZOLIN SODIUM 2 G/50ML
2 SOLUTION INTRAVENOUS SEE ADMIN INSTRUCTIONS
Status: DISCONTINUED | OUTPATIENT
Start: 2023-05-10 | End: 2023-05-10 | Stop reason: HOSPADM

## 2023-05-10 RX ORDER — HYDROMORPHONE HYDROCHLORIDE 1 MG/ML
0.4 INJECTION, SOLUTION INTRAMUSCULAR; INTRAVENOUS; SUBCUTANEOUS EVERY 5 MIN PRN
Status: DISCONTINUED | OUTPATIENT
Start: 2023-05-10 | End: 2023-05-10 | Stop reason: HOSPADM

## 2023-05-10 RX ORDER — ONDANSETRON 2 MG/ML
INJECTION INTRAMUSCULAR; INTRAVENOUS PRN
Status: DISCONTINUED | OUTPATIENT
Start: 2023-05-10 | End: 2023-05-10

## 2023-05-10 RX ORDER — FUROSEMIDE 10 MG/ML
INJECTION INTRAMUSCULAR; INTRAVENOUS PRN
Status: DISCONTINUED | OUTPATIENT
Start: 2023-05-10 | End: 2023-05-10

## 2023-05-10 RX ORDER — PROPOFOL 10 MG/ML
INJECTION, EMULSION INTRAVENOUS PRN
Status: DISCONTINUED | OUTPATIENT
Start: 2023-05-10 | End: 2023-05-10

## 2023-05-10 RX ORDER — CEFAZOLIN SODIUM 2 G/50ML
2 SOLUTION INTRAVENOUS
Status: COMPLETED | OUTPATIENT
Start: 2023-05-10 | End: 2023-05-10

## 2023-05-10 RX ORDER — ACETAMINOPHEN 325 MG/1
975 TABLET ORAL ONCE
Status: COMPLETED | OUTPATIENT
Start: 2023-05-10 | End: 2023-05-10

## 2023-05-10 RX ORDER — LABETALOL HYDROCHLORIDE 5 MG/ML
10 INJECTION, SOLUTION INTRAVENOUS
Status: DISCONTINUED | OUTPATIENT
Start: 2023-05-10 | End: 2023-05-10 | Stop reason: HOSPADM

## 2023-05-10 RX ADMIN — ONDANSETRON 4 MG: 2 INJECTION INTRAMUSCULAR; INTRAVENOUS at 10:35

## 2023-05-10 RX ADMIN — ACETAMINOPHEN 975 MG: 325 TABLET ORAL at 08:37

## 2023-05-10 RX ADMIN — FENTANYL CITRATE 25 MCG: 50 INJECTION, SOLUTION INTRAMUSCULAR; INTRAVENOUS at 10:59

## 2023-05-10 RX ADMIN — FUROSEMIDE 10 MG: 10 INJECTION INTRAMUSCULAR; INTRAVENOUS at 11:08

## 2023-05-10 RX ADMIN — FENTANYL CITRATE 25 MCG: 50 INJECTION, SOLUTION INTRAMUSCULAR; INTRAVENOUS at 11:00

## 2023-05-10 RX ADMIN — SODIUM CHLORIDE, SODIUM LACTATE, POTASSIUM CHLORIDE, CALCIUM CHLORIDE: 600; 310; 30; 20 INJECTION, SOLUTION INTRAVENOUS at 10:27

## 2023-05-10 RX ADMIN — SODIUM CHLORIDE, SODIUM LACTATE, POTASSIUM CHLORIDE, CALCIUM CHLORIDE: 600; 310; 30; 20 INJECTION, SOLUTION INTRAVENOUS at 08:51

## 2023-05-10 RX ADMIN — CEFAZOLIN SODIUM 2 G: 2 SOLUTION INTRAVENOUS at 10:40

## 2023-05-10 RX ADMIN — PROPOFOL 50 MG: 10 INJECTION, EMULSION INTRAVENOUS at 10:35

## 2023-05-10 RX ADMIN — LIDOCAINE HYDROCHLORIDE 40 MG: 20 INJECTION, SOLUTION INFILTRATION; PERINEURAL at 10:35

## 2023-05-10 RX ADMIN — PROPOFOL 150 MCG/KG/MIN: 10 INJECTION, EMULSION INTRAVENOUS at 10:35

## 2023-05-10 NOTE — ANESTHESIA CARE TRANSFER NOTE
Patient: Suni Zuluaga    Procedure: Procedure(s):  CYSTOSCOPY, WITH RIGHT URETERAL STENT INSERTION, Bilateral retrograde pyelogram.       Diagnosis: Stricture or kinking of ureter [N13.5]  Diagnosis Additional Information: No value filed.    Anesthesia Type:   MAC     Note:    Oropharynx: oropharynx clear of all foreign objects  Level of Consciousness: awake  Oxygen Supplementation: room air    Independent Airway: airway patency satisfactory and stable  Dentition: dentition unchanged  Vital Signs Stable: post-procedure vital signs reviewed and stable  Report to RN Given: handoff report given  Patient transferred to: Phase II    Handoff Report: Identifed the Patient, Identified the Reponsible Provider, Reviewed the pertinent medical history, Discussed the surgical course, Reviewed Intra-OP anesthesia mangement and issues during anesthesia, Set expectations for post-procedure period and Allowed opportunity for questions and acknowledgement of understanding      Vitals:  Vitals Value Taken Time   BP 97/61 05/10/23 1125   Temp 36.4  C (97.5  F) 05/10/23 1125   Pulse 73 05/10/23 1125   Resp 16 05/10/23 1125   SpO2 94 % 05/10/23 1125       Electronically Signed By: LAURA GARCIA CRNA  May 10, 2023  11:27 AM

## 2023-05-10 NOTE — TELEPHONE ENCOUNTER
[facility] Call Center    Phone Message    May a detailed message be left on voicemail: yes     Reason for Call:   Pt would like to have Renal Ultrasound orders sent to:    [facility] Kellie SORENSON  Thank you    Action Taken: Message routed to:  [facility] Uro    Travel Screening: Not Applicable

## 2023-05-10 NOTE — ANESTHESIA POSTPROCEDURE EVALUATION
Patient: Suni Zuluaga    Procedure: Procedure(s):  CYSTOSCOPY, WITH RIGHT URETERAL STENT INSERTION, Bilateral retrograde pyelogram.       Anesthesia Type:  MAC    Note:  Disposition: Outpatient   Postop Pain Control: Uneventful            Sign Out: Well controlled pain   PONV: No   Neuro/Psych: Uneventful            Sign Out: Acceptable/Baseline neuro status   Airway/Respiratory: Uneventful            Sign Out: Acceptable/Baseline resp. status   CV/Hemodynamics: Uneventful            Sign Out: Acceptable CV status; No obvious hypovolemia; No obvious fluid overload   Other NRE:    DID A NON-ROUTINE EVENT OCCUR?            Last vitals:  Vitals Value Taken Time   /91 05/10/23 1215   Temp 36.2  C (97.2  F) 05/10/23 1215   Pulse 76 05/10/23 1140   Resp 16 05/10/23 1215   SpO2 100 % 05/10/23 1215       Electronically Signed By: Aj Marin MD  May 10, 2023  2:00 PM

## 2023-05-10 NOTE — OP NOTE
PROCEDURES PERFORMED:     Cystourethroscopy    Bilateral  retrograde pyelography    Placement of right  ureteral stent    Intraoperative interpretation of fluoroscopic imaging    PREOPERATIVE DIAGNOSIS:  Stricture or kinking of ureter [N13.5]   POSTOPERATIVE DIAGNOSIS:  Same  STAFF SURGEON: RAMÓN Valladares MD  RESIDENT: None  ANESTHESIA: MAC  ESTIMATED BLOOD LOSS: 0 mL.   COMPLICATIONS: None  SPECIMENS:    None  FINDINGS:     Retrograde pyelogram showed severe right hydronephrosis and no left hydronephrosis   DRAINS:    Right 6x26cm ureteral stent    OPERATIVE INDICATIONS:   Suni Zuluaga is a 38 year old female with new right hydronephrosis in the setting of cervical cancer.    DESCRIPTION OF PROCEDURE:    After informed consent was obtained, the patient was taken to the operating room, and moved to the operating table.  After adequate anesthesia was induced, the patient was repositioned in dorsal lithotomy position and prepped and draped in the usual sterile fashion. A timeout was taken to confirm correct patient, procedure and laterality.     A 22-Kazakh rigid cystoscope was inserted into a well lubricated urethra. The urethra was unremarkable.   The bladder was surveyed and found to be free of tumors, stones or diverticuli.  The media was clear.  The bilateral ureteral orifices were orthotopic.      A 5-Kazakh open ended ureteral catheter was advanced into the right  ureter and a retrograde pyelogram was taken.  Please see the FINDINGS section for details of retrograde pyelogram results.      Using the open ended catheter, a guidewire was replaced and a double-J stent placed to the kidney using fluoroscopic guidance. Please see DRAINS section for stent details. A good curl was noted in the renal pelvis and bladder after guidewire removal.  The stent placement was moderately difficult    We then catheterized the left ureteral orifice.  A retrograde pyelogram was done with the findings as above.  No stent was  felt to be necessary given the lack of hydronephrosis.       The bladder was then drained.    The patient tolerated the procedure well.  There were no complications.      PLAN:     Follow up in 3 months for repeat stent exchange.    Renal ultrasound and video visit in 2 weeks.

## 2023-05-10 NOTE — PROGRESS NOTES
[facility] OR [address]  [address]LO[address]  [facility] [id number] [phone]    5/10/2023    Suni Zuluaga  [address]  [place] [facility] [address]  [phone] (home) [phone] (work)    :  1985    To Whom it May Concern:    Suni Zuluaga had procedure today. As a result patient will not be able to work 23 and 23. Ok to return to work on 23.    Please contact Dr. Valladares for any questions or concerns.[phone]    Sincerely,  Mariella Alas

## 2023-05-10 NOTE — DISCHARGE INSTRUCTIONS
FOLLOW-UP  Call the urology clinic to schedule your next stent exchange in 3 months.  You will have a renal ultrasound and video visit scheduled for 2 weeks to make sure your kidney has improved.  Follow-up in clinic and needed if you have problems.  Call or return sooner than your regularly scheduled visit if you develop any of the following:    Fever  Uncontrolled pain  Uncontrolled nausea or vomiting  Shortness of breath  Chest pain  Any other symptoms that are worrisome to you    ACTIVITY  No strenuous exercise for 2 days.  Do not drive until you are off of narcotic pain medication and can press the brake pedal quickly and fully without pain.   Do not operate a motor vehicle while taking narcotic pain medications.     PAIN MEDICATIONS  Take pain medication as needed for pain.    Do not take any additional Tylenol (acetaminophen) while using Percocet or Vicodin  Do not take more than 3,000mg of Tylenol (acetaminophen) in any 24 hour period, as this can cause liver damage.  Wean yourself off of narcotic pain medications as tolerated    PREVENTION OF CONSTIPATION  Narcotic pain medication can cause constipation.  To prevent this, use the following measures.  Start with the treatments at the top and progress down the list until you have relief.  Prevention is important as this problem is easier to prevent than treat.    Stool softener such as Senna or Colace.  This will typically be prescribed for your  Fiber products such as Metamucil.  This is available over the counter without a prescription.  Miralax.  This is available over the counter without a prescription.  Do not use this product if you have significant renal failure.  Enema.  This is available over the counter without a prescription.  Some enemas may not be used if you have significant renal failure.        QUESTIONS  You may call the Urology Clinic during daytime hours at [phone].  If after hours, call [phone] and ask to speak with the Urology  resident on call.         [facility]  Home Care Following Anesthesia  For 24 hours after surgery:  Get plenty of rest.  A responsible adult must stay with you for at least 24 hours after you leave the surgery center.  Do not drive or use heavy equipment.  If you have weakness or tingling, don't drive or use heavy equipment until this feeling goes away.   Do not drink alcohol.   Avoid strenuous or risky activities.  Ask for help when climbing stairs.  You may feel lightheaded.  IF so, sit for a few minutes before standing.  Have someone help you get up.   If you have nausea (feel sick to your stomach): Drink only clear liquids such as apple juice, ginger ale, broth or 7-Up.  Rest may also help.  Be sure to drink enough fluids.  Move to a regular diet as you feel able.   You may have a slight fever.  Call the doctor if your fever is over 100 F (37.7 C) (taken under the tongue) or lasts longer than 24 hours.  You may have a dry mouth, a sore throat, muscle aches or trouble sleeping. These should go away after 24 hours.  Do not make important or legal decisions.   It is recommended to avoid smoking.               Tips for taking pain medications  To get the best pain relief possible, remember these points:  Take pain medications as directed, before pain becomes severe.  Pain medication can upset your stomach: taking it with food may help.  Constipation is a common side effect of pain medication. Drink plenty of  fluids.  Eat foods high in fiber. Take a stool softener if recommended by your doctor or pharmacist.  Do not drink alcohol, drive or operate machinery while taking pain medications.  Ask about other ways to control pain, such as with heat, ice or relaxation.    Tylenol/Acetaminophen Consumption  To help encourage the safe use of acetaminophen, the makers of TYLENOL  have lowered the maximum daily dose for single-ingredient Extra Strength TYLENOL  (acetaminophen) products  sold in the [place] from 8 pills per day (4,000 mg) to 6 pills per day (3,000 mg). The dosing interval has also changed from 2 pills every 4-6 hours to 2 pills every 6 hours.  If you feel your pain relief is insufficient, you may take Tylenol/Acetaminophen in addition to your narcotic pain medication.   Be careful not to exceed 3,000 mg of Tylenol/Acetaminophen in a 24 hour period from all sources.  If you are taking extra strength Tylenol/acetaminophen (500 mg), the maximum dose is 6 tablets in 24 hours.  If you are taking regular strength acetaminophen (325 mg), the maximum dose is 9 tablets in 24 hours.  You received 975 mg of Tylenol at 8:37 AM today. Next dose will be available at 2:37 PM as needed per package instruction.    Call a doctor for any of the following:  Signs of infection (fever, growing tenderness at the surgery site, a large amount of drainage or bleeding, severe pain, foul-smelling drainage, redness, swelling).  It has been over 8 to 10 hours since surgery and you are still not able to urinate (pass water).  Headache for over 24 hours.  Numbness, tingling or weakness the day after surgery (if you had spinal anesthesia).  Signs of Covid-19 infection (temperature over 100 degrees, shortness of breath, cough, loss of taste/smell, generalized body aches, persistent headache, chills, sore throat, nausea/vomiting/diarrhea)  Your doctor is:  Dr. Robert Valladares, Prostate and Urology: [phone]                    Or dial [phone] and ask for the resident on call for:  Prostate Urology  For emergency care, call the:  [place]:  [phone] (TTY for hearing impaired: [phone])

## 2023-05-19 ENCOUNTER — VIRTUAL VISIT (OUTPATIENT)
Dept: PALLIATIVE CARE | Facility: CLINIC | Age: 38
End: 2023-05-19
Attending: OBSTETRICS & GYNECOLOGY
Payer: COMMERCIAL

## 2023-05-19 DIAGNOSIS — G89.3 NEOPLASM RELATED PAIN: ICD-10-CM

## 2023-05-19 DIAGNOSIS — C53.8 MALIGNANT NEOPLASM OF OVERLAPPING SITES OF CERVIX (H): Primary | ICD-10-CM

## 2023-05-19 DIAGNOSIS — R45.86 MOOD AND AFFECT DISTURBANCE: ICD-10-CM

## 2023-05-19 PROCEDURE — 99205 OFFICE O/P NEW HI 60 MIN: CPT | Mod: VID | Performed by: INTERNAL MEDICINE

## 2023-05-19 RX ORDER — ACETAMINOPHEN 500 MG
1000 TABLET ORAL EVERY 6 HOURS PRN
Qty: 120 TABLET | Refills: 1 | Status: SHIPPED | OUTPATIENT
Start: 2023-05-19 | End: 2023-08-04

## 2023-05-19 RX ORDER — CELECOXIB 100 MG/1
100 CAPSULE ORAL 2 TIMES DAILY
Qty: 60 CAPSULE | Refills: 1 | Status: ON HOLD | OUTPATIENT
Start: 2023-05-19 | End: 2023-08-09

## 2023-05-19 RX ORDER — ACETAMINOPHEN 500 MG
1000 TABLET ORAL EVERY 6 HOURS PRN
COMMUNITY
End: 2023-05-19

## 2023-05-19 NOTE — NURSING NOTE
Is the patient currently in the state of [place]? YES    Visit mode:VIDEO    If the visit is dropped, the patient can be reconnected by: VIDEO VISIT: Text to cell phone: [phone]    Will anyone else be joining the visit? NO      How would you like to obtain your AVS? MyChart    Are changes needed to the allergy or medication list? NO    Reason for visit: Oncology Palliative Care Video Visit New (Consult, Malignant neoplasm of overlapping sites of cervix )  Suni Zuluaga 38 year old female presents today for palliative care consultation evaluation for cervical cancer and pain management.  Vangie Blankenship, Virtual Facilitator

## 2023-05-19 NOTE — PATIENT INSTRUCTIONS
Thank you for meeting with us in the [facility].    How to get a hold of us:  For non-urgent matters, MyChart works best.    For more urgent matters, or if you prefer not to use MyChart, call our clinic nurse Amparo Dye at [phone].    We have an on-call number for evenings and weekends. Please call this only if you are having uncontrolled symptoms or serious side effects from your medicines: [phone].     For refills, please give us a week (5 working days) notice. We don't always have providers available everyday to do refills. If you call the day you run out of your medicine, we may not be able to refill it in time, so call 5 days in advance!

## 2023-05-19 NOTE — PROGRESS NOTES
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 Recurred 10/2022 -- 5cm retroperitoneal mass & mediastinal mets. Received carbo/paclitaxel/beva/pembro then through 3/2023 when she started pembro/beva maintenance. She is comanaged here and at [facility] oncology in [facility].  4/2023 new R hydronephrosis s/p stenting    Social - Has 3 kids-- 6 yo who lives with her dad, 21, 22 year old. Has a SO.  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    Care Planning - Understands her cancer is life-limiting      History:  History gathered today from: patient, medical chart, outside onc records    Reviewed with her the roles of our program    Referred for pain  Has had pain for a few months  Crept up; lately staying the same  Has leg numbness   Pain in L lower abd around to her L back--all to the back even R side of her back  Warm baths make it better  Nothing clearly makes it worse  toothe ache like pain  No change with stooling or urination  Wakes her up at night; change position  Walker slower bc of the leg numbness    Also R leg numb since cancer recurred last Fall  Walking briskly or an incline worsens  Mild edema there--was worse previously    Ureteral stenting didn't improve her L pain    Meds  Ibuprofen--helps, doesn't take all the time  Oxycodone 5 mg prn--1-2x a day on most days  Pretty effective--lasts 'half a day'.  No cognitive side fx  She has chronic constipation even prior to cancer--not worse with oxycodone--going every other, no straining  Acetaminophen 1000 mg bid most days    PE: LMP  (LMP Unknown)    Wt Readings from Last 3 Encounters:   05/10/23 64.4 kg (142 lb)   05/05/23 64.6 kg (142 lb 6.4 oz)   04/14/23 65.4 kg (144 lb 3.2 oz)     Alert NAD  Clear sensorium    Data reviewed:  I reviewed recent labs and imaging, my comments:  Cr 0.9  Plt 240    Outside CT April images personally reviewed  New R  hydronephrosis  Stable cervical/adenopathy thickening--nothing new    IMPRESSION:   1.   New moderate right hydroureteronephrosis with abrupt narrowing of the   distal right ureter. There is associated soft tissue density surrounding the   adjacent right gonadal veins concerning for malignancy and possible etiology of   ureteral obstruction.     2.  Additional retroperitoneal and left paragonadal vein soft tissue thickening   concerning for residual malignancy.     3.  Persistent intra and extrahepatic biliary ductal dilatation.  Correlate   with LFTs and recommend MRCP as clinically indicated.     4.  Diffusely thickened bladder wall.  Differential includes cystitis, outlet   obstruction, and less likely malignancy.     5.  Circumferential rectal wall thickening.  Correlate with history of   locoregional treatment and signs of proctitis.       database reviewed: y      Impression & Recommendations:  39 yo with recurrent and metastatic cervical cancer currently on maintenance systemic therapy    Subacute onset of new LLQ radiating to back pain--not entirely clear why--agree with Dr Brambila that it's probably tumor related and the patient understands this.  D/w her realistic goals of pain tx--making pain manageable and not functionally impairing but we can't eliminate it entirely with meds.  She's tolerating 5-10 mg /day oxycodone and can continue that  Continue 1 g APAP bid to tid prn and d/w her acetaminophen safety  She's understandably reluctant to take a lot of ibuprofen due to side effects; I rec'd 100 mg bid celecoxib instead, it's safer, enouraged her to take it bid scheduled at least for a few weeks    Mood  Low--grief, worry about her longevity, what's going to happen; rule out depression  I offered her counseling etc--she's not interested at the moment    Follow-up with me 1 mo    Over 60 minutes spent on the date of the encounter doing chart review, history and exam, patient education & counseling,  documentation and other activities as noted above.    Thank you for involving us in the patient's care.   Nir May MD / Palliative Medicine / [facility] via Phon[employer] - Touchalize      Video-Visit Details  Video Start Time: 1:10 PM  Video End Time:1:42 PM  Originating Location (pt. Location): Home  Distant Location (provider location):  On-site  Platform used for Video Visit: Jackson

## 2023-05-19 NOTE — LETTER
5/19/2023       RE: Suni Zuluaga  [address]  [place] [address]       Dear Colleague,    Thank you for referring your patient, Suni Zuluaga, to the [facility] at [facility]. Please see a copy of my visit note below.    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 Recurred 10/2022 -- 5cm retroperitoneal mass & mediastinal mets. Received carbo/paclitaxel/beva/pembro then through 3/2023 when she started pembro/beva maintenance. She is comanaged here and at [facility] in [facility].  4/2023 new R hydronephrosis s/p stenting    Social - Has 3 kids-- 8 yo who lives with her dad, 21, 22 year old. Has a SO.  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    Care Planning - Understands her cancer is life-limiting      History:  History gathered today from: patient, medical chart, outside onc records    Reviewed with her the roles of our program    Referred for pain  Has had pain for a few months  Crept up; lately staying the same  Has leg numbness   Pain in L lower abd around to her L back--all to the back even R side of her back  Warm baths make it better  Nothing clearly makes it worse  toothe ache like pain  No change with stooling or urination  Wakes her up at night; change position  Walker slower bc of the leg numbness    Also R leg numb since cancer recurred last Fall  Walking briskly or an incline worsens  Mild edema there--was worse previously    Ureteral stenting didn't improve her L pain    Meds  Ibuprofen--helps, doesn't take all the time  Oxycodone 5 mg prn--1-2x a day on most days  Pretty effective--lasts 'half a day'.  No cognitive side fx  She has chronic constipation even prior to cancer--not worse with oxycodone--going every other, no straining  Acetaminophen 1000 mg bid most days    PE: LMP  (LMP Unknown)     Wt Readings from Last 3 Encounters:   05/10/23 64.4 kg (142 lb)   05/05/23 64.6 kg (142 lb 6.4 oz)   04/14/23 65.4 kg (144 lb 3.2 oz)     Alert NAD  Clear sensorium    Data reviewed:  I reviewed recent labs and imaging, my comments:  Cr 0.9  Plt 240    Outside CT April images personally reviewed  New R hydronephrosis  Stable cervical/adenopathy thickening--nothing new    IMPRESSION:   1.   New moderate right hydroureteronephrosis with abrupt narrowing of the   distal right ureter. There is associated soft tissue density surrounding the   adjacent right gonadal veins concerning for malignancy and possible etiology of   ureteral obstruction.     2.  Additional retroperitoneal and left paragonadal vein soft tissue thickening   concerning for residual malignancy.     3.  Persistent intra and extrahepatic biliary ductal dilatation.  Correlate   with LFTs and recommend MRCP as clinically indicated.     4.  Diffusely thickened bladder wall.  Differential includes cystitis, outlet   obstruction, and less likely malignancy.     5.  Circumferential rectal wall thickening.  Correlate with history of   locoregional treatment and signs of proctitis.       database reviewed: y      Impression & Recommendations:  39 yo with recurrent and metastatic cervical cancer currently on maintenance systemic therapy    Subacute onset of new LLQ radiating to back pain--not entirely clear why--agree with Dr Brambila that it's probably tumor related and the patient understands this.  D/w her realistic goals of pain tx--making pain manageable and not functionally impairing but we can't eliminate it entirely with meds.  She's tolerating 5-10 mg /day oxycodone and can continue that  Continue 1 g APAP bid to tid prn and d/w her acetaminophen safety  She's understandably reluctant to take a lot of ibuprofen due to side effects; I rec'd 100 mg bid celecoxib instead, it's safer, enouraged her to take it bid scheduled at least for a few  weeks    Mood  Low--grief, worry about her longevity, what's going to happen; rule out depression  I offered her counseling etc--she's not interested at the moment    Follow-up with me 1 mo    Over 60 minutes spent on the date of the encounter doing chart review, history and exam, patient education & counseling, documentation and other activities as noted above.    Thank you for involving us in the patient's care.   Nir May MD / Palliative Medicine / Text me via Fam[employer]          Again, thank you for allowing me to participate in the care of your patient.      Sincerely,    Nir May MD

## 2023-05-25 NOTE — PROGRESS NOTES
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  She is here today for follow up and disease management.      Oncology History:  2015:HSIL Pap at the beginning of pregnancy. Repeat pap postpartum was ASCUS. Colposcopy was negative.   20: Pelvic US. Uterine fibroids, normal appearance of uterus and ovaries  2020: Pap smear which confirmed squamous cell carcinoma of the cervix. Referred to GYN  ONC  2020: GYN ONC Consult [facility]. Cervical biopsy, RADHA III, unable to identify invasion  2020: Repeat cervical biopsy confirms invasive SCC  2020: Started primary chemoRT for locally advanced cervical cancer. Stage IIIC1(r), large primary tumor with bilateral parametrial invasion and clinical evidence of posterior vaginal involvement. Pelvic lymphadenopathy involving external iliac, perirectal and internal iliac nodes.   20-10/30/20: ChemoRT.   20-20: T&R brachytherapy  2020: Followup with radonc (Dr Hernández)   22: CT A/P with nonspecific increased density in the para-aortic region, which could be seen in the setting of retroperitoneal fibrosis, inflammation, or potentially lymphoma/desmoplastic reaction. New varix from right renal vein extending down into the RLQ.   22: CT abdomen retroperitoneal bx   Final Diagnosis  A and B.  Soft tissue, right retroperitoneum and retroperitoneum, CT-guided needle biopsies:  -Metastatic squamous cell carcinoma.  Addendum 2  CPS score is 30-40.     RESULT FOR IMMUNOHISTOCHEMICAL VENTANA CLONE  PD-L1 ASSAY  TUMOR PROPORTION SCORE (TPS):  30%  INTERPRETATION: LOW PD-L1 EXPRESSION (TPS >/=1-49%)     10/6/22: PET CT Whole body: IMPRESSION: This patient with history of squamous cell  carcinoma of  the cervix there is evidence of disease progression:  1. Large 5 cm right retroperitoneal mass previously biopsied as  metastatic squamous cell carcinoma demonstrates FDG avidity. No  definite significant disease within the pelvis.  2. New metastatic nodes within the retroperitoneum and mediastinum.  3. 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  4. Diffusely increased FDG avidity throughout the terminal ileum and  large intestine without associated mass lesion may represent vascular  congestion or diffuse colitis.  5. Diffuse soft tissue edema.     Plan: Paclitaxel, Carboplatin, Bevacizumab, and pembrolizumab added due to PD-L1 expression every 3 weeks.      10/13/22: Cycle 1 Paclitaxel 175 mg/m2, Carboplatin AUC 6, Bevacizumab 15 mg/m2  11/4/22: Cycle 2 Paclitaxel 175 mg/m2, Carboplatin AUC 5, Bevacizumab 15 mg/m2. Pembrolizumab on hold while awaiting insurance coverage.     11/8/22: ED for nausea (no vomiting) and dizziness, discharged with valium for vertigo.     11/25/22: Cycle 3 Paclitaxel 175 mg/m2, Carboplatin AUC 5, Bevacizumab 15 mg/m2, and pembrolizumab 200 mg.  Defer x 1 week for thrombocytopenia (plts 82).  Decrease Paclitaxel 135 mg/m2, Carboplatin AUC 5, Bevacizumab 15 mg/m2, and pembrolizumab 200 mg, given 12/5.     12/19/22 CT CAP IMPRESSION:  1.  Metastatic squamous cell carcinoma of the cervix with decreased size of previously FDG avid thoracoabdominal lymph nodes compared to 10/06/2022. No new or worsening disease.   2.  Unchanged tumoral encasement and occlusion of the infrarenal IVC.   3.  Rectal wall thickening and fat stranding suggestive of postradiation inflammation.     Plan: Continue paclitaxel, carboplatin, bevacizumab, and pembrolizumab with CT/MD after 3 more cycles (6 total)     12/30/22: Cycle 4 Paclitaxel 135 mg/m2, Carboplatin AUC 5, Bevacizumab 15 mg/m2, and pembrolizumab  "200 mg, held carboplatin and paclitaxel due to thrombocytopenia (plts 39), not given this cycle.  Bevacizumab and Pembrolizumab given.  1/20/23: Cycle 5 Paclitaxel 135 mg/m2, Carboplatin AUC 4, Bevacizumab 15 mg/m2, and pembrolizumab 200 mg.     1/20/23: BLE US  IMPRESSION: No deep venous thrombosis demonstrated in either leg.     2/10/23: Cycle 6 Paclitaxel 135 mg/m2, Carboplatin AUC 4, Bevacizumab 15 mg/m2, and pembrolizumab 200 mg.     Plan: Continue paclitaxel, carboplatin, bevacizumab, and pembrolizumab x 1 more cycle as she did not have chemotherapy for cycle 4.  Followed by CT and follow up with Dr. Brambila.     3/3/23: Cycle 7 Paclitaxel 135 mg/m2, Carboplatin AUC 4, Bevacizumab 15 mg/m2, and pembrolizumab 200 mg.   3/20/2023: CT C/A/P with stable disease (still with retroperitoneal adenopathy encasing aorta/IVC, occulusion of IVC, cystitis/proctitis)   3/24/23: Cycle one of johny/pembro maintenance (cycle 8 overall)  4/14/23: C2 pembro/avastin  (cycle 9 overall)   4/26/23: OSH ED visit for pain, found to have new hydro   4/28/23: Met with urology (Dr Valladares) for hydronephrosis. Plan possible stent placement in the future for hydro.   5/5/23: Due for cycle 3 maintenance pembro/avastin (cycle 10 overall)   5/26/23: Cycle 4 maintenance pembro/avastin (cycle 11 overall).               Today she comes to clinic feeling ready for her next cycle of treatment.  She continues with LLQ pain that is \"horrible\".  She did see Dr. May with palliative care who recommended celecoxib and refilled tylenol.  She just picked up these medications today.  She will have occasional nausea, no vomiting. Not using antiemetics but does have them at home if needed.  She is eating small meals.  Her weight has been stable.  No vaginal bleeding or discharge.  No changes in her bowel habits.  No fevers or chills.  No chest pain or SOB.  Her RLE edema has been minimal if at all.  She does get numbness in her RLE with exertion.  She " has been using the Valium (prescribed for vertigo) once per day, she is not having any vertigo symptoms.  She has seen the [facility] and they are working on getting insurance coverage for treatment at that facility.            Review of Systems:    Systemic           no weight changes; no fever; no chills; no night sweats; no appetite changes  Skin           no rashes, or lesions  Eye           no irritation; no changes in vision  Sunil-Laryngeal           no dysphagia; no hoarseness   Pulmonary    no cough; no shortness of breath  Cardiovascular    no chest pain; no palpitations  Gastrointestinal    no diarrhea; no constipation; + abdominal pain; no changes in bowel habits; no blood in stool  Genitourinary   no urinary frequency; no urinary urgency; no dysuria; no pain; no abnormal vaginal discharge; no abnormal vaginal bleeding  Breast   no breast discharge; no breast changes; no breast pain  Musculoskeletal    no myalgias; no arthralgias; no back pain  Psychiatric           no depressed mood; no anxiety    Hematologic   no tender lymph nodes; no noticeable swellings or lumps   Endocrine    no hot flashes; no heat/cold intolerance         Neurological   no tremor; + numbness and tingling; no headaches; no difficulty sleeping      Past Medical History:    Past Medical History:   Diagnosis Date     Cervical cancer (H)      Tobacco abuse          Past Surgical History:    Past Surgical History:   Procedure Laterality Date     CARPAL TUNNEL RELEASE RT/LT Right      COMBINED CYSTOSCOPY, INSERT STENT URETER(S) Bilateral 5/10/2023    Procedure: CYSTOSCOPY, WITH RIGHT URETERAL STENT INSERTION, Bilateral retrograde pyelogram.;  Surgeon: Robert Valladares MD;  Location: [facility] OR     TUBAL LIGATION           Health Maintenance Due   Topic Date Due     NICOTINE/TOBACCO CESSATION COUNSELING Q 1 YR  Never done     YEARLY PREVENTIVE VISIT  Never done     ADVANCE CARE PLANNING  Never done     HEPATITIS B IMMUNIZATION  (1 of 3 - 3-dose series) Never done     COVID-19 Vaccine (1) Never done     Pneumococcal Vaccine: Pediatrics (0 to 5 Years) and At-Risk Patients (6 to 64 Years) (1 - PCV) Never done     HEPATITIS C SCREENING  Never done     INFLUENZA VACCINE (1) 09/01/2022     PAP  08/13/2023       Current Medications:     Current Outpatient Medications   Medication Sig Dispense Refill     acetaminophen (TYLENOL) 500 MG tablet Take 2 tablets (1,000 mg) by mouth every 6 hours as needed for mild pain 120 tablet 1     Ascorbic Acid (VITAMIN C PO)        bisacodyl (DULCOLAX) 5 MG EC tablet Take 5 mg by mouth       celecoxib (CELEBREX) 100 MG capsule Take 1 capsule (100 mg) by mouth 2 times daily . Stop ibuprofen. 60 capsule 1     diazepam (VALIUM) 2 MG tablet Take 1 tablet (2 mg) by mouth every 12 hours as needed for anxiety 12 tablet 0     escitalopram (LEXAPRO) 10 MG tablet Take 1 tablet by mouth daily       levothyroxine (SYNTHROID/LEVOTHROID) 150 MCG tablet Take 1 tablet (150 mcg) by mouth daily 30 tablet 3     magnesium oxide (MAG-OX) 400 MG tablet Take 1 tablet (400 mg) by mouth daily 30 tablet 1     oxyCODONE (ROXICODONE) 5 MG tablet Take 1 tablet (5 mg) by mouth every 6 hours as needed for pain 30 tablet 0     phenazopyridine (PYRIDIUM) 100 MG tablet Take 1 tablet (100 mg) by mouth 3 times daily as needed for urinary tract discomfort 30 tablet 1     tamsulosin (FLOMAX) 0.4 MG capsule Take 1 capsule (0.4 mg) by mouth daily 30 capsule 0     vitamin B-12 (CYANOCOBALAMIN) 500 MCG tablet Take 500 mcg by mouth       ibuprofen (ADVIL/MOTRIN) 200 MG tablet Take 200 mg by mouth           Allergies:        Allergies   Allergen Reactions     Clindamycin Hives        Social History:     Social History     Tobacco Use     Smoking status: Every Day     Packs/day: 1.00     Types: Cigarettes     Smokeless tobacco: Never     Tobacco comments:     9/11/2020 1/2 pack/day, tying ag quit   Vaping Use     Vaping status: Not on file   Substance Use  Topics     Alcohol use: Yes     Comment: occ.       History   Drug Use Unknown         Family History:     The patient's family history is notable for:    Family History   Problem Relation Age of Onset     Breast Cancer Mother         8/2020 newly dx         Physical Exam:     /70   Pulse 85   Temp 97.5  F (36.4  C)   Resp 16   Wt 62.3 kg (137 lb 6.4 oz)   LMP  (LMP Unknown)   SpO2 100%   BMI 24.34 kg/m    Body mass index is 24.34 kg/m .    General Appearance: healthy and alert, no distress     HEENT: no palpable nodules or masses        Cardiovascular: regular rate and rhythm, no gallops, rubs or murmurs     Respiratory: lungs clear, no rales, rhonchi or wheezes    Musculoskeletal: extremities non tender and without edema    Skin: no lesions or rashes     Neurological: normal gait, no gross defects     Psychiatric: appropriate mood and affect                               Hematological: normal cervical and supraclavicular lymph nodes     Gastrointestinal:       abdomen soft, non-tender, non-distended    Genitourinary: Deferred.     Last Comprehensive Metabolic Panel:  Sodium   Date Value Ref Range Status   05/26/2023 143 136 - 145 mmol/L Final   10/27/2020 138 133 - 144 mmol/L Final     Potassium   Date Value Ref Range Status   05/26/2023 3.6 3.4 - 5.3 mmol/L Final   10/27/2020 3.3 (L) 3.4 - 5.3 mmol/L Final     Chloride   Date Value Ref Range Status   05/26/2023 106 98 - 107 mmol/L Final   10/27/2020 105 94 - 109 mmol/L Final     Carbon Dioxide   Date Value Ref Range Status   10/27/2020 27 20 - 32 mmol/L Final     Carbon Dioxide (CO2)   Date Value Ref Range Status   05/26/2023 26 22 - 29 mmol/L Final     Anion Gap   Date Value Ref Range Status   05/26/2023 11 7 - 15 mmol/L Final   10/27/2020 6 3 - 14 mmol/L Final     Glucose   Date Value Ref Range Status   05/26/2023 130 (H) 70 - 99 mg/dL Final   10/06/2022 99 70 - 99 mg/dL Final     Urea Nitrogen   Date Value Ref Range Status   05/26/2023 13.8 6.0 -  20.0 mg/dL Final   10/27/2020 9 7 - 30 mg/dL Final     Creatinine   Date Value Ref Range Status   05/26/2023 0.61 0.51 - 0.95 mg/dL Final   10/27/2020 0.57 0.52 - 1.04 mg/dL Final     GFR Estimate   Date Value Ref Range Status   05/26/2023 >90 >60 mL/min/1.73m2 Final     Comment:     eGFR calculated using 2021 CKD-EPI equation.   10/27/2020 >90 >60 mL/min/[1.73_m2] Final     Comment:     Non  GFR Calc  Starting 12/18/2018, serum creatinine based estimated GFR (eGFR) will be   calculated using the Chronic Kidney Disease Epidemiology Collaboration   (CKD-EPI) equation.       Calcium   Date Value Ref Range Status   05/26/2023 9.4 8.6 - 10.0 mg/dL Final   10/27/2020 8.8 8.5 - 10.1 mg/dL Final     Bilirubin Total   Date Value Ref Range Status   05/26/2023 0.2 <=1.2 mg/dL Final   10/27/2020 0.3 0.2 - 1.3 mg/dL Final     Alkaline Phosphatase   Date Value Ref Range Status   05/26/2023 101 35 - 104 U/L Final   10/27/2020 64 40 - 150 U/L Final     ALT   Date Value Ref Range Status   05/26/2023 18 10 - 35 U/L Final   10/27/2020 21 0 - 50 U/L Final     AST   Date Value Ref Range Status   05/26/2023 25 10 - 35 U/L Final   10/27/2020 18 0 - 45 U/L Final     Lab Results   Component Value Date    ALBUMIN 4.1 05/26/2023    ALBUMIN 3.8 10/27/2020     Lab Results   Component Value Date    PROTTOTAL 7.8 05/26/2023    PROTTOTAL 7.7 10/27/2020     Magnesium   Date Value Ref Range Status   05/26/2023 1.9 1.7 - 2.3 mg/dL Final   10/27/2020 1.6 1.6 - 2.3 mg/dL Final      5/26/2023  11:55 AM   Protein Albumin Urine --      Comment: Interfering substances, unable to perform test.    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  She is here today for follow up and disease  management.     40 minutes spent on the date of the encounter doing chart review, history and exam, documentation, and further activities as noted above.      Plan:     1.)        Cervical cancer:   OK to proceed with planned treatment today as labs and BP are WDL.  Lab could not process urine protein due to Pyridium.  OK to continue with treatment today without this results.  RTC 3 weeks for labs, provider visit, and next cycle; this is already scheduled.  Plan for CT and follow up with Dr. Brambila after cycle 12. Reviewed signs and symptoms for when she should contact the clinic or seek additional care.  Patient to contact the clinic with any questions or concerns in the interim.        Genetic risk factors were assessed and she does not meet qualification for referral.       Labs and/or tests ordered include:  CMP. Mag. Urine protein. TSH with reflex to T4.                2.)        Health maintenance:  Issues addressed today include following up with PCP for annual health maintenance and non-gynecologic issues.      3.)       Hypomagnesemia: Magnesium today 1.9.  Continue magnesium oxide 400 mg daily.         4.)        Hypothyroid: This week TSH 30.63 and T4 1.39.  No symptoms currently.  Synthroid 150 mcg started 4/27.  As it has been 4 weeks since her last dose adjustment I will increase her to 175 mcg daily to start tomorrow morning.  Recheck with her next cycle.    5.) Right hydronephrosis: Moderate right hydronephrosis noted on CT 4/26/23 with [facility]. Right ureteral stent placed 5/10/23 at [facility].  She will need stent exchange in 3 months.    6.) Pain: LLQ pain managed with oxycodone and tylenol.  Starting on celebrex per palliative care.  Follow up with palliative as recommended.    7.) Vertigo: Was having vertigo symptoms after chemotherapy improved with valium.  As she is no longer having vertigo and not longer on chemotherapy I will not send in a refill for valium today.  She understands  this.    Mounika Ritchie, KYLE, APRN, FNP-C  Nurse Practitioner  Division of Gynecologic Oncology  Pager: [phone]     CC  Patient Care Team:  Suzy Brambila MD as PCP - General (Gynecologic Oncology)  Suzy Brambila MD as MD (Gynecologic Oncology)  Kenneth Shahid as Referring Physician  Liliam Paniagua, RN as Specialty Care Coordinator (Hematology & Oncology)  Mounika Ritchie, LAURA CNP as Assigned Cancer Care Provider  Robert Valladares MD as Assigned Surgical Provider  SELF, REFERRED

## 2023-05-26 ENCOUNTER — INFUSION THERAPY VISIT (OUTPATIENT)
Dept: ONCOLOGY | Facility: CLINIC | Age: 38
End: 2023-05-26
Attending: OBSTETRICS & GYNECOLOGY
Payer: COMMERCIAL

## 2023-05-26 ENCOUNTER — APPOINTMENT (OUTPATIENT)
Dept: LAB | Facility: CLINIC | Age: 38
End: 2023-05-26
Attending: OBSTETRICS & GYNECOLOGY
Payer: COMMERCIAL

## 2023-05-26 VITALS
WEIGHT: 137.4 LBS | TEMPERATURE: 97.5 F | HEART RATE: 85 BPM | OXYGEN SATURATION: 100 % | SYSTOLIC BLOOD PRESSURE: 135 MMHG | BODY MASS INDEX: 24.34 KG/M2 | RESPIRATION RATE: 16 BRPM | DIASTOLIC BLOOD PRESSURE: 70 MMHG

## 2023-05-26 DIAGNOSIS — R30.0 DYSURIA: ICD-10-CM

## 2023-05-26 DIAGNOSIS — E03.2 HYPOTHYROIDISM DUE TO MEDICATION: ICD-10-CM

## 2023-05-26 DIAGNOSIS — C53.8 MALIGNANT NEOPLASM OF OVERLAPPING SITES OF CERVIX (H): Primary | ICD-10-CM

## 2023-05-26 DIAGNOSIS — Z51.12 ENCOUNTER FOR ANTINEOPLASTIC IMMUNOTHERAPY: ICD-10-CM

## 2023-05-26 DIAGNOSIS — C53.8 MALIGNANT NEOPLASM OF OVERLAPPING SITES OF CERVIX (H): ICD-10-CM

## 2023-05-26 DIAGNOSIS — R42 VERTIGO: ICD-10-CM

## 2023-05-26 DIAGNOSIS — E83.42 HYPOMAGNESEMIA: ICD-10-CM

## 2023-05-26 LAB
ALBUMIN SERPL BCG-MCNC: 4.1 G/DL (ref 3.5–5.2)
ALBUMIN UR-MCNC: NORMAL G/DL
ALP SERPL-CCNC: 101 U/L (ref 35–104)
ALT SERPL W P-5'-P-CCNC: 18 U/L (ref 10–35)
ANION GAP SERPL CALCULATED.3IONS-SCNC: 11 MMOL/L (ref 7–15)
AST SERPL W P-5'-P-CCNC: 25 U/L (ref 10–35)
BILIRUB SERPL-MCNC: 0.2 MG/DL
BUN SERPL-MCNC: 13.8 MG/DL (ref 6–20)
CALCIUM SERPL-MCNC: 9.4 MG/DL (ref 8.6–10)
CHLORIDE SERPL-SCNC: 106 MMOL/L (ref 98–107)
CREAT SERPL-MCNC: 0.61 MG/DL (ref 0.51–0.95)
DEPRECATED HCO3 PLAS-SCNC: 26 MMOL/L (ref 22–29)
GFR SERPL CREATININE-BSD FRML MDRD: >90 ML/MIN/1.73M2
GLUCOSE SERPL-MCNC: 130 MG/DL (ref 70–99)
MAGNESIUM SERPL-MCNC: 1.9 MG/DL (ref 1.7–2.3)
POTASSIUM SERPL-SCNC: 3.6 MMOL/L (ref 3.4–5.3)
PROT SERPL-MCNC: 7.8 G/DL (ref 6.4–8.3)
SODIUM SERPL-SCNC: 143 MMOL/L (ref 136–145)
T4 FREE SERPL-MCNC: 1.39 NG/DL (ref 0.9–1.7)
TSH SERPL DL<=0.005 MIU/L-ACNC: 31.66 UIU/ML (ref 0.3–4.2)

## 2023-05-26 PROCEDURE — 84443 ASSAY THYROID STIM HORMONE: CPT

## 2023-05-26 PROCEDURE — 83735 ASSAY OF MAGNESIUM: CPT | Performed by: NURSE PRACTITIONER

## 2023-05-26 PROCEDURE — 96417 CHEMO IV INFUS EACH ADDL SEQ: CPT

## 2023-05-26 PROCEDURE — 80053 COMPREHEN METABOLIC PANEL: CPT | Performed by: NURSE PRACTITIONER

## 2023-05-26 PROCEDURE — 250N000011 HC RX IP 250 OP 636: Performed by: NURSE PRACTITIONER

## 2023-05-26 PROCEDURE — 99215 OFFICE O/P EST HI 40 MIN: CPT | Performed by: NURSE PRACTITIONER

## 2023-05-26 PROCEDURE — 81003 URINALYSIS AUTO W/O SCOPE: CPT | Performed by: NURSE PRACTITIONER

## 2023-05-26 PROCEDURE — 36415 COLL VENOUS BLD VENIPUNCTURE: CPT | Performed by: NURSE PRACTITIONER

## 2023-05-26 PROCEDURE — 258N000003 HC RX IP 258 OP 636: Performed by: NURSE PRACTITIONER

## 2023-05-26 PROCEDURE — 96413 CHEMO IV INFUSION 1 HR: CPT

## 2023-05-26 PROCEDURE — 84439 ASSAY OF FREE THYROXINE: CPT

## 2023-05-26 PROCEDURE — G0463 HOSPITAL OUTPT CLINIC VISIT: HCPCS | Performed by: NURSE PRACTITIONER

## 2023-05-26 RX ORDER — HEPARIN SODIUM,PORCINE 10 UNIT/ML
5 VIAL (ML) INTRAVENOUS
Status: CANCELLED | OUTPATIENT
Start: 2023-05-26

## 2023-05-26 RX ORDER — ALBUTEROL SULFATE 90 UG/1
1-2 AEROSOL, METERED RESPIRATORY (INHALATION)
Status: CANCELLED
Start: 2023-05-26

## 2023-05-26 RX ORDER — IBUPROFEN 200 MG
200 TABLET ORAL EVERY 4 HOURS PRN
Status: ON HOLD | COMMUNITY
End: 2023-08-03

## 2023-05-26 RX ORDER — METHYLPREDNISOLONE SODIUM SUCCINATE 125 MG/2ML
125 INJECTION, POWDER, LYOPHILIZED, FOR SOLUTION INTRAMUSCULAR; INTRAVENOUS
Status: CANCELLED
Start: 2023-05-26

## 2023-05-26 RX ORDER — EPINEPHRINE 1 MG/ML
0.3 INJECTION, SOLUTION INTRAMUSCULAR; SUBCUTANEOUS EVERY 5 MIN PRN
Status: CANCELLED | OUTPATIENT
Start: 2023-05-26

## 2023-05-26 RX ORDER — ESCITALOPRAM OXALATE 10 MG/1
1 TABLET ORAL DAILY
Status: ON HOLD | COMMUNITY
Start: 2021-09-11 | End: 2023-07-31

## 2023-05-26 RX ORDER — MEPERIDINE HYDROCHLORIDE 25 MG/ML
25 INJECTION INTRAMUSCULAR; INTRAVENOUS; SUBCUTANEOUS EVERY 30 MIN PRN
Status: CANCELLED | OUTPATIENT
Start: 2023-05-26

## 2023-05-26 RX ORDER — DIPHENHYDRAMINE HYDROCHLORIDE 50 MG/ML
50 INJECTION INTRAMUSCULAR; INTRAVENOUS
Status: CANCELLED
Start: 2023-05-26

## 2023-05-26 RX ORDER — BISACODYL 5 MG/1
5 TABLET, DELAYED RELEASE ORAL DAILY PRN
COMMUNITY
End: 2023-08-04

## 2023-05-26 RX ORDER — LORAZEPAM 2 MG/ML
0.5 INJECTION INTRAMUSCULAR EVERY 4 HOURS PRN
Status: CANCELLED | OUTPATIENT
Start: 2023-05-26

## 2023-05-26 RX ORDER — DIAZEPAM 2 MG
2 TABLET ORAL EVERY 12 HOURS PRN
Qty: 12 TABLET | Refills: 0 | OUTPATIENT
Start: 2023-05-26

## 2023-05-26 RX ORDER — LEVOTHYROXINE SODIUM 175 UG/1
175 TABLET ORAL DAILY
Qty: 30 TABLET | Refills: 0 | Status: ON HOLD | OUTPATIENT
Start: 2023-05-26 | End: 2023-07-31

## 2023-05-26 RX ORDER — ALBUTEROL SULFATE 0.83 MG/ML
2.5 SOLUTION RESPIRATORY (INHALATION)
Status: CANCELLED | OUTPATIENT
Start: 2023-05-26

## 2023-05-26 RX ORDER — HEPARIN SODIUM (PORCINE) LOCK FLUSH IV SOLN 100 UNIT/ML 100 UNIT/ML
5 SOLUTION INTRAVENOUS
Status: CANCELLED | OUTPATIENT
Start: 2023-05-26

## 2023-05-26 RX ADMIN — SODIUM CHLORIDE 200 MG: 9 INJECTION, SOLUTION INTRAVENOUS at 13:43

## 2023-05-26 RX ADMIN — SODIUM CHLORIDE 250 ML: 9 INJECTION, SOLUTION INTRAVENOUS at 13:43

## 2023-05-26 RX ADMIN — SODIUM CHLORIDE 900 MG: 9 INJECTION, SOLUTION INTRAVENOUS at 14:21

## 2023-05-26 ASSESSMENT — PAIN SCALES - GENERAL: PAINLEVEL: NO PAIN (0)

## 2023-05-26 NOTE — PROGRESS NOTES
"Infusion Nursing Note:  Suni Zuluaga presents today for Cycle 11, Day 1- Keytruda and Bevacizumab-bvzr.    Patient seen by provider today: Yes: Mounika Ritchie CNP   present during visit today: Not Applicable.    Note: Patient reports feeling well on arrival to infusion suite. Denies signs of infection: no fever, cough, chills, rash, chest pain, or shortness of breath. No new questions or concerns since provider visit. Feels ready for treatment today.     Patients urine sample result read \"interfering substances, unable to perform test.\" Called lab who said sample was bright orange and they believe a medication is interfering with urine protein test. Provider notified.     Written communication: 5/26/23 @ 2094: Mounika Ritchie CNP/ Vangie Owens RN   -OK to proceed with treatment today without urine protein result  -Provider increasing patients Synthroid dose, writer informed patient of this change    Did not require electrolyte replacement today.     Intravenous Access:  Peripheral IV placed.    Treatment Conditions:  Lab Results   Component Value Date    HGB 11.4 (L) 05/05/2023    WBC 6.9 05/05/2023    ANEU 2.5 10/27/2020    ANEUTAUTO 5.5 05/05/2023     05/05/2023      Lab Results   Component Value Date     05/26/2023    POTASSIUM 3.6 05/26/2023    MAG 1.9 05/26/2023    CR 0.61 05/26/2023    HARRISON 9.4 05/26/2023    BILITOTAL 0.2 05/26/2023    ALBUMIN 4.1 05/26/2023    ALT 18 05/26/2023    AST 25 05/26/2023     Results reviewed, labs MET treatment parameters, ok to proceed with treatment.      Post Infusion Assessment:  Patient tolerated infusion without incident.  Blood return noted pre and post infusion.  Site patent and intact, free from redness, edema or discomfort.  No evidence of extravasations.  Access discontinued per protocol.       Discharge Plan:   Prescription refills given for Synthroid sent to local pharmacy.  Discharge instructions reviewed with: Patient and Family.  Patient " and/or family verbalized understanding of discharge instructions and all questions answered.  AVS to patient via [employer].  Patient will return 6/16/23 for next appointment.   Patient discharged in stable condition accompanied by: .  Departure Mode: Ambulatory.       Vangie Owens RN

## 2023-05-26 NOTE — NURSING NOTE
Chief Complaint   Patient presents with     Labs Only     Venipuncture, vitals checked, PIV placed     Manuela English RN on 5/26/2023 at 11:58 AM

## 2023-05-26 NOTE — LETTER
2023         RE: Suni Zuluaga  [address]  [facility] [address]        Dear Colleague,    Thank you for referring your patient, Suni Zuluaga, to the [facility]. Please see a copy of my visit note below.    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  She is here today for follow up and disease management.      Oncology History:  2015:HSIL Pap at the beginning of pregnancy. Repeat pap postpartum was ASCUS. Colposcopy was negative.   20: Pelvic US. Uterine fibroids, normal appearance of uterus and ovaries  2020: Pap smear which confirmed squamous cell carcinoma of the cervix. Referred to GYN  ONC  2020: GYN ONC Consult [facility]. Cervical biopsy, RADHA III, unable to identify invasion  2020: Repeat cervical biopsy confirms invasive SCC  2020: Started primary chemoRT for locally advanced cervical cancer. Stage IIIC1(r), large primary tumor with bilateral parametrial invasion and clinical evidence of posterior vaginal involvement. Pelvic lymphadenopathy involving external iliac, perirectal and internal iliac nodes.   20-10/30/20: ChemoRT.   20-20: T&R brachytherapy  2020: Followup with radonvern (Dr Hernández)   22: CT A/P with nonspecific increased density in the para-aortic region, which could be seen in the setting of retroperitoneal fibrosis, inflammation, or potentially lymphoma/desmoplastic reaction. New varix from right renal vein extending down into the RLQ.   22: CT abdomen retroperitoneal bx   Final Diagnosis  A and B.  Soft tissue, right retroperitoneum and retroperitoneum, CT-guided needle biopsies:  -Metastatic squamous cell carcinoma.  Addendum  2  CPS score is 30-40.     RESULT FOR IMMUNOHISTOCHEMICAL VENTANA CLONE  PD-L1 ASSAY  TUMOR PROPORTION SCORE (TPS):  30%  INTERPRETATION: LOW PD-L1 EXPRESSION (TPS >/=1-49%)     10/6/22: PET CT Whole body: IMPRESSION: This patient with history of squamous cell carcinoma of  the cervix there is evidence of disease progression:  1. Large 5 cm right retroperitoneal mass previously biopsied as  metastatic squamous cell carcinoma demonstrates FDG avidity. No  definite significant disease within the pelvis.  2. New metastatic nodes within the retroperitoneum and mediastinum.  3. 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  4. Diffusely increased FDG avidity throughout the terminal ileum and  large intestine without associated mass lesion may represent vascular  congestion or diffuse colitis.  5. Diffuse soft tissue edema.     Plan: Paclitaxel, Carboplatin, Bevacizumab, and pembrolizumab added due to PD-L1 expression every 3 weeks.      10/13/22: Cycle 1 Paclitaxel 175 mg/m2, Carboplatin AUC 6, Bevacizumab 15 mg/m2  11/4/22: Cycle 2 Paclitaxel 175 mg/m2, Carboplatin AUC 5, Bevacizumab 15 mg/m2. Pembrolizumab on hold while awaiting insurance coverage.     11/8/22: ED for nausea (no vomiting) and dizziness, discharged with valium for vertigo.     11/25/22: Cycle 3 Paclitaxel 175 mg/m2, Carboplatin AUC 5, Bevacizumab 15 mg/m2, and pembrolizumab 200 mg.  Defer x 1 week for thrombocytopenia (plts 82).  Decrease Paclitaxel 135 mg/m2, Carboplatin AUC 5, Bevacizumab 15 mg/m2, and pembrolizumab 200 mg, given 12/5.     12/19/22 CT CAP IMPRESSION:  1.  Metastatic squamous cell carcinoma of the cervix with decreased size of previously FDG avid thoracoabdominal lymph nodes compared to 10/06/2022. No new or worsening disease.   2.  Unchanged tumoral encasement and occlusion of the infrarenal IVC.   3.  Rectal wall thickening and fat  "stranding suggestive of postradiation inflammation.     Plan: Continue paclitaxel, carboplatin, bevacizumab, and pembrolizumab with CT/MD after 3 more cycles (6 total)     12/30/22: Cycle 4 Paclitaxel 135 mg/m2, Carboplatin AUC 5, Bevacizumab 15 mg/m2, and pembrolizumab 200 mg, held carboplatin and paclitaxel due to thrombocytopenia (plts 39), not given this cycle.  Bevacizumab and Pembrolizumab given.  1/20/23: Cycle 5 Paclitaxel 135 mg/m2, Carboplatin AUC 4, Bevacizumab 15 mg/m2, and pembrolizumab 200 mg.     1/20/23: BLE US  IMPRESSION: No deep venous thrombosis demonstrated in either leg.     2/10/23: Cycle 6 Paclitaxel 135 mg/m2, Carboplatin AUC 4, Bevacizumab 15 mg/m2, and pembrolizumab 200 mg.     Plan: Continue paclitaxel, carboplatin, bevacizumab, and pembrolizumab x 1 more cycle as she did not have chemotherapy for cycle 4.  Followed by CT and follow up with Dr. Brambila.     3/3/23: Cycle 7 Paclitaxel 135 mg/m2, Carboplatin AUC 4, Bevacizumab 15 mg/m2, and pembrolizumab 200 mg.   3/20/2023: CT C/A/P with stable disease (still with retroperitoneal adenopathy encasing aorta/IVC, occulusion of IVC, cystitis/proctitis)   3/24/23: Cycle one of johny/pembro maintenance (cycle 8 overall)  4/14/23: C2 pembro/avastin  (cycle 9 overall)   4/26/23: OSH ED visit for pain, found to have new hydro   4/28/23: Met with urology (Dr Valladares) for hydronephrosis. Plan possible stent placement in the future for hydro.   5/5/23: Due for cycle 3 maintenance pembro/avastin (cycle 10 overall)   5/26/23: Cycle 4 maintenance pembro/avastin (cycle 11 overall).               Today she comes to clinic feeling ready for her next cycle of treatment.  She continues with LLQ pain that is \"horrible\".  She did see Dr. May with palliative care who recommended celecoxib and refilled tylenol.  She just picked up these medications today.  She will have occasional nausea, no vomiting. Not using antiemetics but does have them at home if " needed.  She is eating small meals.  Her weight has been stable.  No vaginal bleeding or discharge.  No changes in her bowel habits.  No fevers or chills.  No chest pain or SOB.  Her RLE edema has been minimal if at all.  She does get numbness in her RLE with exertion.  She has been using the Valium (prescribed for vertigo) once per day, she is not having any vertigo symptoms.  [facility] has seen the [facility] and they are working on getting insurance coverage for treatment at that facility.            Review of Systems:    Systemic           no weight changes; no fever; no chills; no night sweats; no appetite changes  Skin           no rashes, or lesions  Eye           no irritation; no changes in vision  Sunil-Laryngeal           no dysphagia; no hoarseness   Pulmonary    no cough; no shortness of breath  Cardiovascular    no chest pain; no palpitations  Gastrointestinal    no diarrhea; no constipation; + abdominal pain; no changes in bowel habits; no blood in stool  Genitourinary   no urinary frequency; no urinary urgency; no dysuria; no pain; no abnormal vaginal discharge; no abnormal vaginal bleeding  Breast   no breast discharge; no breast changes; no breast pain  Musculoskeletal    no myalgias; no arthralgias; no back pain  Psychiatric           no depressed mood; no anxiety    Hematologic   no tender lymph nodes; no noticeable swellings or lumps   Endocrine    no hot flashes; no heat/cold intolerance         Neurological   no tremor; + numbness and tingling; no headaches; no difficulty sleeping      Past Medical History:    Past Medical History:   Diagnosis Date    Cervical cancer (H)     Tobacco abuse          Past Surgical History:    Past Surgical History:   Procedure Laterality Date    CARPAL TUNNEL RELEASE RT/LT Right     COMBINED CYSTOSCOPY, INSERT STENT URETER(S) Bilateral 5/10/2023    Procedure: CYSTOSCOPY, WITH RIGHT URETERAL STENT INSERTION, Bilateral retrograde pyelogram.;  Surgeon: Blaine  Robert Edge MD;  Location: [facility] OR    TUBAL LIGATION           Health Maintenance Due   Topic Date Due    NICOTINE/TOBACCO CESSATION COUNSELING Q 1 YR  Never done    YEARLY PREVENTIVE VISIT  Never done    ADVANCE CARE PLANNING  Never done    HEPATITIS B IMMUNIZATION (1 of 3 - 3-dose series) Never done    COVID-19 Vaccine (1) Never done    Pneumococcal Vaccine: Pediatrics (0 to 5 Years) and At-Risk Patients (6 to 64 Years) (1 - PCV) Never done    HEPATITIS C SCREENING  Never done    INFLUENZA VACCINE (1) 09/01/2022    PAP  08/13/2023       Current Medications:     Current Outpatient Medications   Medication Sig Dispense Refill    acetaminophen (TYLENOL) 500 MG tablet Take 2 tablets (1,000 mg) by mouth every 6 hours as needed for mild pain 120 tablet 1    Ascorbic Acid (VITAMIN C PO)       bisacodyl (DULCOLAX) 5 MG EC tablet Take 5 mg by mouth      celecoxib (CELEBREX) 100 MG capsule Take 1 capsule (100 mg) by mouth 2 times daily . Stop ibuprofen. 60 capsule 1    diazepam (VALIUM) 2 MG tablet Take 1 tablet (2 mg) by mouth every 12 hours as needed for anxiety 12 tablet 0    escitalopram (LEXAPRO) 10 MG tablet Take 1 tablet by mouth daily      levothyroxine (SYNTHROID/LEVOTHROID) 150 MCG tablet Take 1 tablet (150 mcg) by mouth daily 30 tablet 3    magnesium oxide (MAG-OX) 400 MG tablet Take 1 tablet (400 mg) by mouth daily 30 tablet 1    oxyCODONE (ROXICODONE) 5 MG tablet Take 1 tablet (5 mg) by mouth every 6 hours as needed for pain 30 tablet 0    phenazopyridine (PYRIDIUM) 100 MG tablet Take 1 tablet (100 mg) by mouth 3 times daily as needed for urinary tract discomfort 30 tablet 1    tamsulosin (FLOMAX) 0.4 MG capsule Take 1 capsule (0.4 mg) by mouth daily 30 capsule 0    vitamin B-12 (CYANOCOBALAMIN) 500 MCG tablet Take 500 mcg by mouth      ibuprofen (ADVIL/MOTRIN) 200 MG tablet Take 200 mg by mouth           Allergies:        Allergies   Allergen Reactions    Clindamycin Hives        Social History:     Social  History     Tobacco Use    Smoking status: Every Day     Packs/day: 1.00     Types: Cigarettes    Smokeless tobacco: Never    Tobacco comments:     9/11/2020 1/2 pack/day, tying tp quit   Vaping Use    Vaping status: Not on file   Substance Use Topics    Alcohol use: Yes     Comment: occ.       History   Drug Use Unknown         Family History:     The patient's family history is notable for:    Family History   Problem Relation Age of Onset    Breast Cancer Mother         8/2020 newly dx         Physical Exam:     /70   Pulse 85   Temp 97.5  F (36.4  C)   Resp 16   Wt 62.3 kg (137 lb 6.4 oz)   LMP  (LMP Unknown)   SpO2 100%   BMI 24.34 kg/m    Body mass index is 24.34 kg/m .    General Appearance: healthy and alert, no distress     HEENT: no palpable nodules or masses        Cardiovascular: regular rate and rhythm, no gallops, rubs or murmurs     Respiratory: lungs clear, no rales, rhonchi or wheezes    Musculoskeletal: extremities non tender and without edema    Skin: no lesions or rashes     Neurological: normal gait, no gross defects     Psychiatric: appropriate mood and affect                               Hematological: normal cervical and supraclavicular lymph nodes     Gastrointestinal:       abdomen soft, non-tender, non-distended    Genitourinary: Deferred.     Last Comprehensive Metabolic Panel:  Sodium   Date Value Ref Range Status   05/26/2023 143 136 - 145 mmol/L Final   10/27/2020 138 133 - 144 mmol/L Final     Potassium   Date Value Ref Range Status   05/26/2023 3.6 3.4 - 5.3 mmol/L Final   10/27/2020 3.3 (L) 3.4 - 5.3 mmol/L Final     Chloride   Date Value Ref Range Status   05/26/2023 106 98 - 107 mmol/L Final   10/27/2020 105 94 - 109 mmol/L Final     Carbon Dioxide   Date Value Ref Range Status   10/27/2020 27 20 - 32 mmol/L Final     Carbon Dioxide (CO2)   Date Value Ref Range Status   05/26/2023 26 22 - 29 mmol/L Final     Anion Gap   Date Value Ref Range Status   05/26/2023 11 7 -  15 mmol/L Final   10/27/2020 6 3 - 14 mmol/L Final     Glucose   Date Value Ref Range Status   05/26/2023 130 (H) 70 - 99 mg/dL Final   10/06/2022 99 70 - 99 mg/dL Final     Urea Nitrogen   Date Value Ref Range Status   05/26/2023 13.8 6.0 - 20.0 mg/dL Final   10/27/2020 9 7 - 30 mg/dL Final     Creatinine   Date Value Ref Range Status   05/26/2023 0.61 0.51 - 0.95 mg/dL Final   10/27/2020 0.57 0.52 - 1.04 mg/dL Final     GFR Estimate   Date Value Ref Range Status   05/26/2023 >90 >60 mL/min/1.73m2 Final     Comment:     eGFR calculated using 2021 CKD-EPI equation.   10/27/2020 >90 >60 mL/min/[1.73_m2] Final     Comment:     Non  GFR Calc  Starting 12/18/2018, serum creatinine based estimated GFR (eGFR) will be   calculated using the Chronic Kidney Disease Epidemiology Collaboration   (CKD-EPI) equation.       Calcium   Date Value Ref Range Status   05/26/2023 9.4 8.6 - 10.0 mg/dL Final   10/27/2020 8.8 8.5 - 10.1 mg/dL Final     Bilirubin Total   Date Value Ref Range Status   05/26/2023 0.2 <=1.2 mg/dL Final   10/27/2020 0.3 0.2 - 1.3 mg/dL Final     Alkaline Phosphatase   Date Value Ref Range Status   05/26/2023 101 35 - 104 U/L Final   10/27/2020 64 40 - 150 U/L Final     ALT   Date Value Ref Range Status   05/26/2023 18 10 - 35 U/L Final   10/27/2020 21 0 - 50 U/L Final     AST   Date Value Ref Range Status   05/26/2023 25 10 - 35 U/L Final   10/27/2020 18 0 - 45 U/L Final     Lab Results   Component Value Date    ALBUMIN 4.1 05/26/2023    ALBUMIN 3.8 10/27/2020     Lab Results   Component Value Date    PROTTOTAL 7.8 05/26/2023    PROTTOTAL 7.7 10/27/2020     Magnesium   Date Value Ref Range Status   05/26/2023 1.9 1.7 - 2.3 mg/dL Final   10/27/2020 1.6 1.6 - 2.3 mg/dL Final      5/26/2023  11:55 AM   Protein Albumin Urine --      Comment: Interfering substances, unable to perform test.    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  She is here today for follow up and disease management.     40 minutes spent on the date of the encounter doing chart review, history and exam, documentation, and further activities as noted above.      Plan:     1.)        Cervical cancer:   OK to proceed with planned treatment today as labs and BP are WDL.  Lab could not process urine protein due to Pyridium.  OK to continue with treatment today without this results.  RTC 3 weeks for labs, provider visit, and next cycle; this is already scheduled.  Plan for CT and follow up with Dr. Brambila after cycle 12. Reviewed signs and symptoms for when she should contact the clinic or seek additional care.  Patient to contact the clinic with any questions or concerns in the interim.      Genetic risk factors were assessed and she does not meet qualification for referral.     Labs and/or tests ordered include:  CMP. Mag. Urine protein. TSH with reflex to T4.                2.)        Health maintenance:  Issues addressed today include following up with PCP for annual health maintenance and non-gynecologic issues.      3.)       Hypomagnesemia: Magnesium today 1.9.  Continue magnesium oxide 400 mg daily.         4.)        Hypothyroid: This week TSH 30.63 and T4 1.39.  No symptoms currently.  Synthroid 150 mcg started 4/27.  As it has been 4 weeks since her last dose adjustment I will increase her to 175 mcg daily to start tomorrow morning.  Recheck with her next cycle.    5.) Right hydronephrosis: Moderate right hydronephrosis noted on CT 4/26/23 with [facility]. Right ureteral stent placed 5/10/23 at [facility].  She will need stent exchange in 3 months.    6.) Pain: LLQ pain managed with oxycodone and tylenol.  Starting on celebrex per palliative care.  Follow up with palliative as  recommended.    7.) Vertigo: Was having vertigo symptoms after chemotherapy improved with valium.  As she is no longer having vertigo and not longer on chemotherapy I will not send in a refill for valium today.  She understands this.    Mounika Ritchie, DNP, APRN, FNP-C  Nurse Practitioner  Division of Gynecologic Oncology  Pager: [phone]     CC  Patient Care Team:  Suzy Brambila MD as PCP - General (Gynecologic Oncology)

## 2023-05-26 NOTE — PATIENT INSTRUCTIONS
-Provider increased your Synthroid to start tomorrow morning, pick this up at your local pharmacy    [facility] Triage and after hours / weekends / holidays:  [phone]    Please call the triage or after hours line if you experience a temperature greater than or equal to 100.4, shaking chills, have uncontrolled nausea, vomiting and/or diarrhea, dizziness, shortness of breath, chest pain, bleeding, unexplained bruising, or if you have any other new/concerning symptoms, questions or concerns.      If you are having any concerning symptoms or wish to speak to a provider before your next infusion visit, please call triage to notify them so we can adequately serve you.     If you need a refill on a narcotic prescription or other medication, please call before your infusion appointment.                May 2023      [place] Monday Tuesday Wednesday Thursday Friday Saturday        1     2     3    RETURN CCSL   9:40 AM   (20 min.)   Suzy Brambila MD   [facility] 4     5    LAB PERIPHERAL   1:30 PM   (15 min.)   [facility] LAB DRAW   [facility]    ONC INFUSION 2 HR (120 MIN)   2:00 PM   (120 min.)    ONC INFUSION NURSE   [facility] 6       7     8     9     10    Outpatient Visit   7:24 AM   [facility]    CYSTOSCOPY, WITH URETERAL STENT INSERTION   9:50 AM   Robert Valladares MD   [facility] OR 11     12     13       14     15     16     17     18     19    NEW ONCOLOGY  12:45 PM   (80 min.)   Nir May MD   [facility] 20       21     22     23     24     25     26    LAB PERIPHERAL  11:45 AM   (15 min.)   [facility] LAB DRAW   [facility]    RETURN CCSL  12:15 PM   (45 min.)   Mounika Ritchie APRN CNP   [facility]    ONC INFUSION 1.5 HR (90 MIN)   2:30 PM   (90 min.)    ONC INFUSION NURSE   [facility]  [facility] 27       28     29     30     31                                  June 2023 Sunday Monday Tuesday Wednesday Thursday Friday Saturday                       1     2     3       4     5     6     7     8    RETURN PATIENT  11:15 AM   (30 min.)   Javon Valladares PA-C   [facility] 9     10       11     12     13     14     15     16    LAB PERIPHERAL  10:45 AM   (15 min.)    [facility] DRAW   [facility]    RETURN CCSL  11:15 AM   (45 min.)   Mounika Ritchie APRN CNP   [facility]    ONC INFUSION 1.5 HR (90 MIN)  12:00 PM   (90 min.)    ONC INFUSION NURSE   [facility] 17       18     19     20     21     22     23    CT CHEST/ABDOMEN/PELVIS W  11:30 AM   (20 min.)   [id number] 24       25     26     27     28    RETURN CCSL   2:00 PM   (30 min.)   Suzy Brambila MD   [facility] 29     30                             Recent Results (from the past 24 hour(s))   Comprehensive metabolic panel    Collection Time: 05/26/23 11:55 AM   Result Value Ref Range    Sodium 143 136 - 145 mmol/L    Potassium 3.6 3.4 - 5.3 mmol/L    Chloride 106 98 - 107 mmol/L    Carbon Dioxide (CO2) 26 22 - 29 mmol/L    Anion Gap 11 7 - 15 mmol/L    Urea Nitrogen 13.8 6.0 - 20.0 mg/dL    Creatinine 0.61 0.51 - 0.95 mg/dL    Calcium 9.4 8.6 - 10.0 mg/dL    Glucose 130 (H) 70 - 99 mg/dL    Alkaline Phosphatase 101 35 - 104 U/L    AST 25 10 - 35 U/L    ALT 18 10 - 35 U/L    Protein Total 7.8 6.4 - 8.3 g/dL    Albumin 4.1 3.5 - 5.2 g/dL    Bilirubin Total 0.2 <=1.2 mg/dL    GFR Estimate >90 >60 mL/min/1.73m2   Magnesium    Collection Time: 05/26/23 11:55 AM   Result Value Ref Range    Magnesium 1.9 1.7 - 2.3 mg/dL   Protein qualitative urine    Collection Time: 05/26/23 11:55 AM   Result Value Ref Range    Protein  Albumin Urine     TSH with free T4 reflex    Collection Time: 05/26/23 11:55 AM   Result Value Ref Range    TSH 31.66 (H) 0.30 - 4.20 uIU/mL   T4 free    Collection Time: 05/26/23 11:55 AM   Result Value Ref Range    Free T4 1.39 0.90 - 1.70 ng/dL

## 2023-05-26 NOTE — TELEPHONE ENCOUNTER
Diazepam 2mg tabs  Last prescribing provider: Mounika Ritchie on 3/3/23     Last clinic visit date: 5/26/23 w/ Mounika; prior was 5/3/23 w/ Dr. Brambila    Recommendations for requested medication (if none, N/A): NA    Any other pertinent information (if none, N/A): NA    Refilled: Y/N, if NO, why?    Routed to Mounika Ritchie.

## 2023-05-26 NOTE — NURSING NOTE
"Oncology Rooming Note    May 26, 2023 12:14 PM   Suni Zuluaga is a 38 year old female who presents for:    Chief Complaint   Patient presents with     Labs Only     Venipuncture, vitals checked, PIV placed     Oncology Clinic Visit     Return- cervical cancer     Initial Vitals: /70   Pulse 85   Temp 97.5  F (36.4  C)   Resp 16   LMP  (LMP Unknown)   SpO2 100%  Estimated body mass index is 25.15 kg/m  as calculated from the following:    Height as of 5/10/23: 1.6 m (5' 3\").    Weight as of 5/10/23: 64.4 kg (142 lb). There is no height or weight on file to calculate BSA.  No Pain (0) Comment: Data Unavailable   No LMP recorded (lmp unknown). (Menstrual status: Tubal ).  Allergies reviewed: Yes  Medications reviewed: Yes    Medications: Medication refills not needed today.  Pharmacy name entered into EPIC: [facility] - [place], [place] - [address]    Clinical concerns: No new clinical concerns other than reason for visit today.      Felisha Youngblood            "

## 2023-06-06 RX ORDER — OXYCODONE HYDROCHLORIDE 5 MG/1
5 TABLET ORAL EVERY 6 HOURS PRN
Qty: 30 TABLET | Refills: 0 | Status: SHIPPED | OUTPATIENT
Start: 2023-06-06 | End: 2023-06-16

## 2023-06-09 RX ORDER — TAMSULOSIN HYDROCHLORIDE 0.4 MG/1
0.4 CAPSULE ORAL DAILY
Qty: 30 CAPSULE | Refills: 0 | Status: ON HOLD | OUTPATIENT
Start: 2023-06-09 | End: 2023-07-27

## 2023-06-09 RX ORDER — MAGNESIUM OXIDE 400 MG/1
400 TABLET ORAL DAILY
Qty: 30 TABLET | Refills: 1 | Status: ON HOLD | OUTPATIENT
Start: 2023-06-09 | End: 2023-09-08

## 2023-06-09 RX ORDER — PHENAZOPYRIDINE HYDROCHLORIDE 100 MG/1
100 TABLET, FILM COATED ORAL 3 TIMES DAILY PRN
Qty: 30 TABLET | Refills: 1 | Status: SHIPPED | OUTPATIENT
Start: 2023-06-09 | End: 2023-08-04

## 2023-06-12 DIAGNOSIS — E03.2 HYPOTHYROIDISM DUE TO MEDICATION: ICD-10-CM

## 2023-06-12 DIAGNOSIS — C53.8 MALIGNANT NEOPLASM OF OVERLAPPING SITES OF CERVIX (H): ICD-10-CM

## 2023-06-12 DIAGNOSIS — R42 VERTIGO: ICD-10-CM

## 2023-06-12 RX ORDER — DIAZEPAM 2 MG
TABLET ORAL
Qty: 12 TABLET | Refills: 0 | Status: SHIPPED | OUTPATIENT
Start: 2023-06-12 | End: 2023-07-12

## 2023-06-15 NOTE — PROGRESS NOTES
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  She is here today for follow up and disease management.      Oncology History:  2015: HSIL Pap at the beginning of pregnancy. Repeat pap postpartum was ASCUS. Colposcopy was negative.   20: Pelvic US. Uterine fibroids, normal appearance of uterus and ovaries  2020: Pap smear which confirmed squamous cell carcinoma of the cervix. Referred to GYN  ONC  2020: GYN ONC Consult [facility]. Cervical biopsy, RADHA III, unable to identify invasion  2020: Repeat cervical biopsy confirms invasive SCC  2020: Started primary chemoRT for locally advanced cervical cancer. Stage IIIC1(r), large primary tumor with bilateral parametrial invasion and clinical evidence of posterior vaginal involvement. Pelvic lymphadenopathy involving external iliac, perirectal and internal iliac nodes.   20-10/30/20: ChemoRT.   20-20: T&R brachytherapy  2020: Followup with radonc (Dr Hernández)   22: CT A/P with nonspecific increased density in the para-aortic region, which could be seen in the setting of retroperitoneal fibrosis, inflammation, or potentially lymphoma/desmoplastic reaction. New varix from right renal vein extending down into the RLQ.   22: CT abdomen retroperitoneal bx   Final Diagnosis  A and B.  Soft tissue, right retroperitoneum and retroperitoneum, CT-guided needle biopsies:  -Metastatic squamous cell carcinoma.  Addendum 2  CPS score is 30-40.     RESULT FOR IMMUNOHISTOCHEMICAL VENTANA CLONE  PD-L1 ASSAY  TUMOR PROPORTION SCORE (TPS):  30%  INTERPRETATION: LOW PD-L1 EXPRESSION (TPS >/=1-49%)     10/6/22: PET CT Whole body: IMPRESSION: This patient with history of squamous cell  carcinoma of  the cervix there is evidence of disease progression:  1. Large 5 cm right retroperitoneal mass previously biopsied as  metastatic squamous cell carcinoma demonstrates FDG avidity. No  definite significant disease within the pelvis.  2. New metastatic nodes within the retroperitoneum and mediastinum.  3. 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  4. Diffusely increased FDG avidity throughout the terminal ileum and  large intestine without associated mass lesion may represent vascular  congestion or diffuse colitis.  5. Diffuse soft tissue edema.     Plan: Paclitaxel, Carboplatin, Bevacizumab, and pembrolizumab added due to PD-L1 expression every 3 weeks.      10/13/22: Cycle 1 Paclitaxel 175 mg/m2, Carboplatin AUC 6, Bevacizumab 15 mg/m2  11/4/22: Cycle 2 Paclitaxel 175 mg/m2, Carboplatin AUC 5, Bevacizumab 15 mg/m2. Pembrolizumab on hold while awaiting insurance coverage.     11/8/22: ED for nausea (no vomiting) and dizziness, discharged with valium for vertigo.     11/25/22: Cycle 3 Paclitaxel 175 mg/m2, Carboplatin AUC 5, Bevacizumab 15 mg/m2, and pembrolizumab 200 mg.  Defer x 1 week for thrombocytopenia (plts 82).  Decrease Paclitaxel 135 mg/m2, Carboplatin AUC 5, Bevacizumab 15 mg/m2, and pembrolizumab 200 mg, given 12/5.     12/19/22 CT CAP IMPRESSION:  1.  Metastatic squamous cell carcinoma of the cervix with decreased size of previously FDG avid thoracoabdominal lymph nodes compared to 10/06/2022. No new or worsening disease.   2.  Unchanged tumoral encasement and occlusion of the infrarenal IVC.   3.  Rectal wall thickening and fat stranding suggestive of postradiation inflammation.     Plan: Continue paclitaxel, carboplatin, bevacizumab, and pembrolizumab with CT/MD after 3 more cycles (6 total)     12/30/22: Cycle 4 Paclitaxel 135 mg/m2, Carboplatin AUC 5, Bevacizumab 15 mg/m2, and pembrolizumab  "200 mg, held carboplatin and paclitaxel due to thrombocytopenia (plts 39), not given this cycle.  Bevacizumab and Pembrolizumab given.  1/20/23: Cycle 5 Paclitaxel 135 mg/m2, Carboplatin AUC 4, Bevacizumab 15 mg/m2, and pembrolizumab 200 mg.     1/20/23: BLE US  IMPRESSION: No deep venous thrombosis demonstrated in either leg.     2/10/23: Cycle 6 Paclitaxel 135 mg/m2, Carboplatin AUC 4, Bevacizumab 15 mg/m2, and pembrolizumab 200 mg.     Plan: Continue paclitaxel, carboplatin, bevacizumab, and pembrolizumab x 1 more cycle as she did not have chemotherapy for cycle 4.  Followed by CT and follow up with Dr. Brambila.     3/3/23: Cycle 7 Paclitaxel 135 mg/m2, Carboplatin AUC 4, Bevacizumab 15 mg/m2, and pembrolizumab 200 mg.   3/20/2023: CT C/A/P with stable disease (still with retroperitoneal adenopathy encasing aorta/IVC, occulusion of IVC, cystitis/proctitis)   3/24/23: Cycle one of johny/pembro maintenance (cycle 8 overall)  4/14/23: C2 pembro/avastin  (cycle 9 overall)   4/26/23: OSH ED visit for pain, found to have new hydro   4/28/23: Met with urology (Dr Valladares) for hydronephrosis. Plan possible stent placement in the future for hydro.   5/5/23: Due for cycle 3 maintenance pembro/avastin (cycle 10 overall)   5/26/23: Cycle 4 maintenance pembro/avastin (cycle 11 overall).  6/16/23: Cycle 5 maintenance pembro/avastin (cycle 12 overall).              Today she comes to clinic with her boyfriend feeling \"about the same\".  She does report severe urethral pain and urinary incontinence that started when her ureteral stent was placed 4/28/2023.  She was scheduled to meet with urology earlier this week but she was at work so missed the appointment.  This has not been rescheduled yet.  She is taking Pyridium 3 times daily and Flomax as prescribed without improvement.  She feels her LLQ pain is about the same perhaps slightly better.  She is taking Tylenol, Celebrex, oxycodone.  She does need a refill on the oxycodone.  " She has not scheduled with follow-up for palliative care per their recommendation.  She is having no nausea or vomiting.  Endorses poor p.o. intake.  She attributes this mostly to pain and discomfort.  She has drink minimal fluids today.  Weight loss of about 14 pounds since last cycle.  No diarrhea or constipation.  No leg swelling.  She continues with slight weakness in right leg with steep inclines or fast ambulation that has not changed.  No fevers.  Some chills on and off which are stable.  No chest pain or SOB.                Review of Systems:    Systemic           +weight loss; no fever; + chills; no night sweats; +decreased appetite  Skin           no rashes, or lesions  Eye           no irritation; no changes in vision  Sunil-Laryngeal           no dysphagia; no hoarseness   Pulmonary    no cough; no shortness of breath  Cardiovascular    no chest pain; no palpitations  Gastrointestinal    no diarrhea; no constipation; + abdominal pain; no changes in bowel habits; no blood in stool  Genitourinary   no urinary frequency; + urinary urgency; + dysuria; +urethral pain; no abnormal vaginal discharge; no abnormal vaginal bleeding  Breast   no breast discharge; no breast changes; no breast pain  Musculoskeletal    no myalgias; no arthralgias; no back pain  Psychiatric           no depressed mood; no anxiety    Hematologic   no tender lymph nodes; no noticeable swellings or lumps   Endocrine    no hot flashes; no heat/cold intolerance         Neurological   no tremor; no numbness and tingling; no headaches; no difficulty sleeping      Past Medical History:    Past Medical History:   Diagnosis Date     Cervical cancer (H)      Tobacco abuse          Past Surgical History:    Past Surgical History:   Procedure Laterality Date     CARPAL TUNNEL RELEASE RT/LT Right      COMBINED CYSTOSCOPY, INSERT STENT URETER(S) Bilateral 5/10/2023    Procedure: CYSTOSCOPY, WITH RIGHT URETERAL STENT INSERTION, Bilateral retrograde  pyelogram.;  Surgeon: Robert Valladares MD;  Location: [facility] OR     TUBAL LIGATION           Health Maintenance Due   Topic Date Due     NICOTINE/TOBACCO CESSATION COUNSELING Q 1 YR  Never done     YEARLY PREVENTIVE VISIT  Never done     ADVANCE CARE PLANNING  Never done     HEPATITIS B IMMUNIZATION (1 of 3 - 3-dose series) Never done     COVID-19 Vaccine (1) Never done     Pneumococcal Vaccine: Pediatrics (0 to 5 Years) and At-Risk Patients (6 to 64 Years) (1 - PCV) Never done     HEPATITIS C SCREENING  Never done     PAP  08/13/2023       Current Medications:     Current Outpatient Medications   Medication Sig Dispense Refill     Ascorbic Acid (VITAMIN C PO)        bisacodyl (DULCOLAX) 5 MG EC tablet Take 5 mg by mouth       celecoxib (CELEBREX) 100 MG capsule Take 1 capsule (100 mg) by mouth 2 times daily . Stop ibuprofen. 60 capsule 1     diazepam (VALIUM) 2 MG tablet TAKE ONE TABLET BY MOUTH EVERY 12 HOURS AS NEEDED FOR ANXIETY 12 tablet 0     escitalopram (LEXAPRO) 10 MG tablet Take 1 tablet by mouth daily       levothyroxine (SYNTHROID/LEVOTHROID) 150 MCG tablet Take 1 tablet (150 mcg) by mouth daily 30 tablet 3     levothyroxine (SYNTHROID/LEVOTHROID) 175 MCG tablet Take 1 tablet (175 mcg) by mouth daily 30 tablet 0     magnesium oxide (MAG-OX) 400 MG tablet Take 1 tablet (400 mg) by mouth daily 30 tablet 1     oxybutynin ER (DITROPAN XL) 5 MG 24 hr tablet Take 1 tablet (5 mg) by mouth daily 30 tablet 0     oxyCODONE (ROXICODONE) 5 MG tablet Take 1 tablet (5 mg) by mouth every 6 hours as needed for pain 30 tablet 0     phenazopyridine (PYRIDIUM) 100 MG tablet Take 1 tablet (100 mg) by mouth 3 times daily as needed for urinary tract discomfort 30 tablet 1     tamsulosin (FLOMAX) 0.4 MG capsule Take 1 capsule (0.4 mg) by mouth daily 30 capsule 0     vitamin B-12 (CYANOCOBALAMIN) 500 MCG tablet Take 500 mcg by mouth       acetaminophen (TYLENOL) 500 MG tablet Take 2 tablets (1,000 mg) by mouth every 6 hours  as needed for mild pain (Patient not taking: Reported on 6/16/2023) 120 tablet 1     ibuprofen (ADVIL/MOTRIN) 200 MG tablet Take 200 mg by mouth (Patient not taking: Reported on 6/16/2023)           Allergies:        Allergies   Allergen Reactions     Clindamycin Hives        Social History:     Social History     Tobacco Use     Smoking status: Every Day     Packs/day: 1.00     Types: Cigarettes     Smokeless tobacco: Never     Tobacco comments:     9/11/2020 1/2 pack/day, tying tp quit   Vaping Use     Vaping status: Not on file   Substance Use Topics     Alcohol use: Yes     Comment: occ.       History   Drug Use Unknown         Family History:     The patient's family history is notable for:    Family History   Problem Relation Age of Onset     Breast Cancer Mother         8/2020 newly dx         Physical Exam:     /77 (BP Location: Right arm, Patient Position: Sitting, Cuff Size: Adult Regular)   Pulse 104   Temp 97.5  F (36.4  C) (Oral)   Resp 16   Wt 56.1 kg (123 lb 11.2 oz)   LMP  (LMP Unknown)   SpO2 96%   BMI 21.91 kg/m    Body mass index is 21.91 kg/m .    General Appearance: Alert, appears uncomfortable and hunched over during majority of visit, thin     HEENT: no palpable nodules or masses        Cardiovascular: regular rate and rhythm, no gallops, rubs or murmurs     Respiratory: lungs clear, no rales, rhonchi or wheezes    Musculoskeletal: extremities non tender and without edema    Skin: no lesions or rashes     Neurological: normal gait, no gross defects     Psychiatric: appropriate mood and affect                               Hematological: normal cervical and supraclavicular lymph nodes     Gastrointestinal:       abdomen soft, non-tender, non-distended    Genitourinary: Deferred.     Last Comprehensive Metabolic Panel:  Sodium   Date Value Ref Range Status   06/16/2023 135 (L) 136 - 145 mmol/L Final   10/27/2020 138 133 - 144 mmol/L Final     Potassium   Date Value Ref Range Status    06/16/2023 4.1 3.4 - 5.3 mmol/L Final   10/27/2020 3.3 (L) 3.4 - 5.3 mmol/L Final     Chloride   Date Value Ref Range Status   06/16/2023 97 (L) 98 - 107 mmol/L Final   10/27/2020 105 94 - 109 mmol/L Final     Carbon Dioxide   Date Value Ref Range Status   10/27/2020 27 20 - 32 mmol/L Final     Carbon Dioxide (CO2)   Date Value Ref Range Status   06/16/2023 28 22 - 29 mmol/L Final     Anion Gap   Date Value Ref Range Status   06/16/2023 10 7 - 15 mmol/L Final   10/27/2020 6 3 - 14 mmol/L Final     Glucose   Date Value Ref Range Status   06/16/2023 134 (H) 70 - 99 mg/dL Final   10/06/2022 99 70 - 99 mg/dL Final     Urea Nitrogen   Date Value Ref Range Status   06/16/2023 15.0 6.0 - 20.0 mg/dL Final   10/27/2020 9 7 - 30 mg/dL Final     Creatinine   Date Value Ref Range Status   06/16/2023 0.70 0.51 - 0.95 mg/dL Final   10/27/2020 0.57 0.52 - 1.04 mg/dL Final     GFR Estimate   Date Value Ref Range Status   06/16/2023 >90 >60 mL/min/1.73m2 Final     Comment:     eGFR calculated using 2021 CKD-EPI equation.   10/27/2020 >90 >60 mL/min/[1.73_m2] Final     Comment:     Non  GFR Calc  Starting 12/18/2018, serum creatinine based estimated GFR (eGFR) will be   calculated using the Chronic Kidney Disease Epidemiology Collaboration   (CKD-EPI) equation.       Calcium   Date Value Ref Range Status   06/16/2023 9.7 8.6 - 10.0 mg/dL Final   10/27/2020 8.8 8.5 - 10.1 mg/dL Final     Bilirubin Total   Date Value Ref Range Status   06/16/2023 0.2 <=1.2 mg/dL Final   10/27/2020 0.3 0.2 - 1.3 mg/dL Final     Alkaline Phosphatase   Date Value Ref Range Status   06/16/2023 120 (H) 35 - 104 U/L Final   10/27/2020 64 40 - 150 U/L Final     ALT   Date Value Ref Range Status   06/16/2023 15 0 - 50 U/L Final     Comment:     Reference intervals for this test were updated on 6/12/2023 to more accurately reflect our healthy population. There may be differences in the flagging of prior results with similar values performed  with this method. Interpretation of those prior results can be made in the context of the updated reference intervals.     10/27/2020 21 0 - 50 U/L Final     AST   Date Value Ref Range Status   06/16/2023 18 0 - 45 U/L Final     Comment:     Reference intervals for this test were updated on 6/12/2023 to more accurately reflect our healthy population. There may be differences in the flagging of prior results with similar values performed with this method. Interpretation of those prior results can be made in the context of the updated reference intervals.   10/27/2020 18 0 - 45 U/L Final     Lab Results   Component Value Date    ALBUMIN 3.8 06/16/2023    ALBUMIN 3.8 10/27/2020     Lab Results   Component Value Date    PROTTOTAL 8.3 06/16/2023    PROTTOTAL 7.7 10/27/2020     Magnesium   Date Value Ref Range Status   06/16/2023 2.2 1.7 - 2.3 mg/dL Final   10/27/2020 1.6 1.6 - 2.3 mg/dL Final     TSH   Date Value Ref Range Status   06/16/2023 11.86 (H) 0.30 - 4.20 uIU/mL Final       Component      Latest Ref Rng 6/16/2023  11:25 AM   Protein Albumin Urine      Negative mg/dL >=2000 !    Protein Albumin Urine      Negative mg/dL 300 !    Color Urine      Colorless, Straw, Light Yellow, Yellow  Brown !    Appearance Urine      Clear  Cloudy !    Glucose Urine      Negative mg/dL Negative    Bilirubin Urine      Negative  Small !    Ketones Urine      Negative mg/dL Negative    Specific Gravity Urine      1.003 - 1.035  1.020    Blood Urine      Negative  Large !    pH Urine      5.0 - 7.0  7.5 (H)    Urobilinogen mg/dL      Normal, 2.0 mg/dL Normal    Nitrite Urine      Negative  Negative    Leukocyte Esterase Urine      Negative  Small !    Bacteria Urine      None Seen /HPF Many !    WBC Clumps      None Seen /HPF Present !    Triple Phosphates      None Seen /HPF Few !    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  She is here today for follow up and disease management.     60 minutes spent on the date of the encounter doing chart review, history and exam, documentation, and further activities as noted above.      Plan:     1.)        Cervical cancer:   Discussed patient with Dr. Brambila and plan made in collaboration with her.  Suspect urine protein results are falsely elevated due to pyridium.  OK to proceed with planned treatment today of both Avastin and Keytruda.  Does not need to complete a 24 hr urine.  RTC as scheduled for CT and follow up with Dr. Brambila. Reviewed signs and symptoms for when she should contact the clinic or seek additional care.  Patient to contact the clinic with any questions or concerns in the interim.        Genetic risk factors were assessed and she does not meet qualification for referral.       Labs and/or tests ordered include:  CMP. Mag. Urine protein. TSH with reflex to T4.  UA with reflex to UC.                2.)        Health maintenance:  Issues addressed today include following up with PCP for annual health maintenance and non-gynecologic issues.      3.)       Hypomagnesemia: Magnesium 2.2 today which is WDL.  Continue magnesium oxide 400 mg daily.         4.)        Hypothyroid: This week TSH 11.86 and T4 2.78.  No symptoms currently.  Synthroid 175 mcg started 5/26/23.  Recheck with her next cycle.     5.)        Right hydronephrosis: Moderate right hydronephrosis noted on CT 4/26/23 with [facility]. Right ureteral stent placed 5/10/23 at [facility].  She reports significant dysuria since her stent placement with urinary incontinence.  Currently taking pyridium and flomax as prescribed.  She should continue pyridium and flomax.  Prescription for Ditropan XL 5 mg daily sent to her local  pharmacy.  UA/UC added on today.  UA with only small leukocyte esterase and nitrites negative.  Culture pending but suspicion for UTI is low.  Suspect urine is very concentrated due to poor water intake today.  She missed her follow up with urology 6/8/23, message sent to urgently reschedule.  Urology scheduling phone number provided.     6.)        Pain: LLQ pain and stent pain managed with oxycodone, tylenol, and celebrex.  Saw Dr. May with palliative care 5/19 with plan for follow up in 1 month, request sent for scheduling.  Refill for oxycodone 30 tabs sent to her local pharmacy.  Provided palliative care contact information.    7.) Weight loss: Wgt loss of 14 lbs since last cycle and 20 lbs over the last 2 months.  Reports overall decreased in appetite due to pain and discomfort.  No nausea or vomiting since her last cycle.  Likely also related to disease.  Encouraged small frequent meals high in calories and protein.  Can also supplement meals with protein shakes.     Mounika Ritchie, DNP, APRN, FNP-C  Nurse Practitioner  Division of Gynecologic Oncology  Pager: [phone]     CC  Patient Care Team:  Suzy Brambila MD as PCP - General (Gynecologic Oncology)  Suzy Brambila MD as MD (Gynecologic Oncology)  Kenneth Shahid as Referring Physician  Liliam Paniagua, RN as Specialty Care Coordinator (Hematology & Oncology)  Mounika Ritchie APRN CNP as Assigned Cancer Care Provider  Robert Valladares MD as Assigned Surgical Provider  Nir May MD as Assigned Palliative Care Provider  SELF, REFERRED

## 2023-06-16 ENCOUNTER — ONCOLOGY VISIT (OUTPATIENT)
Dept: ONCOLOGY | Facility: CLINIC | Age: 38
End: 2023-06-16
Attending: OBSTETRICS & GYNECOLOGY
Payer: COMMERCIAL

## 2023-06-16 ENCOUNTER — APPOINTMENT (OUTPATIENT)
Dept: LAB | Facility: CLINIC | Age: 38
End: 2023-06-16
Attending: OBSTETRICS & GYNECOLOGY
Payer: COMMERCIAL

## 2023-06-16 VITALS
WEIGHT: 123.7 LBS | DIASTOLIC BLOOD PRESSURE: 77 MMHG | RESPIRATION RATE: 16 BRPM | BODY MASS INDEX: 21.91 KG/M2 | OXYGEN SATURATION: 96 % | SYSTOLIC BLOOD PRESSURE: 109 MMHG | TEMPERATURE: 97.5 F | HEART RATE: 104 BPM

## 2023-06-16 VITALS — SYSTOLIC BLOOD PRESSURE: 110 MMHG | HEART RATE: 94 BPM | DIASTOLIC BLOOD PRESSURE: 71 MMHG | OXYGEN SATURATION: 97 %

## 2023-06-16 DIAGNOSIS — R30.0 DYSURIA: ICD-10-CM

## 2023-06-16 DIAGNOSIS — Z51.12 ENCOUNTER FOR ANTINEOPLASTIC IMMUNOTHERAPY: ICD-10-CM

## 2023-06-16 DIAGNOSIS — N13.4 HYDROURETER ON RIGHT: ICD-10-CM

## 2023-06-16 DIAGNOSIS — C53.1 MALIGNANT NEOPLASM OF EXOCERVIX (H): Primary | ICD-10-CM

## 2023-06-16 DIAGNOSIS — C53.8 MALIGNANT NEOPLASM OF OVERLAPPING SITES OF CERVIX (H): Primary | ICD-10-CM

## 2023-06-16 DIAGNOSIS — C53.8 MALIGNANT NEOPLASM OF OVERLAPPING SITES OF CERVIX (H): ICD-10-CM

## 2023-06-16 LAB
ALBUMIN SERPL BCG-MCNC: 3.8 G/DL (ref 3.5–5.2)
ALBUMIN UR-MCNC: 300 MG/DL
ALBUMIN UR-MCNC: >=2000 MG/DL
ALP SERPL-CCNC: 120 U/L (ref 35–104)
ALT SERPL W P-5'-P-CCNC: 15 U/L (ref 0–50)
ANION GAP SERPL CALCULATED.3IONS-SCNC: 10 MMOL/L (ref 7–15)
APPEARANCE UR: ABNORMAL
AST SERPL W P-5'-P-CCNC: 18 U/L (ref 0–45)
BACTERIA #/AREA URNS HPF: ABNORMAL /HPF
BILIRUB SERPL-MCNC: 0.2 MG/DL
BILIRUB UR QL STRIP: ABNORMAL
BUN SERPL-MCNC: 15 MG/DL (ref 6–20)
CALCIUM SERPL-MCNC: 9.7 MG/DL (ref 8.6–10)
CHLORIDE SERPL-SCNC: 97 MMOL/L (ref 98–107)
COLOR UR AUTO: ABNORMAL
CREAT SERPL-MCNC: 0.7 MG/DL (ref 0.51–0.95)
DEPRECATED HCO3 PLAS-SCNC: 28 MMOL/L (ref 22–29)
GFR SERPL CREATININE-BSD FRML MDRD: >90 ML/MIN/1.73M2
GLUCOSE SERPL-MCNC: 134 MG/DL (ref 70–99)
GLUCOSE UR STRIP-MCNC: NEGATIVE MG/DL
GRANULAR CAST: 35 /LPF
HGB UR QL STRIP: ABNORMAL
HYALINE CASTS: 69 /LPF
KETONES UR STRIP-MCNC: NEGATIVE MG/DL
LEUKOCYTE ESTERASE UR QL STRIP: ABNORMAL
MAGNESIUM SERPL-MCNC: 2.2 MG/DL (ref 1.7–2.3)
NITRATE UR QL: NEGATIVE
PH UR STRIP: 7.5 [PH] (ref 5–7)
POTASSIUM SERPL-SCNC: 4.1 MMOL/L (ref 3.4–5.3)
PROT SERPL-MCNC: 8.3 G/DL (ref 6.4–8.3)
RBC URINE: >182 /HPF
SODIUM SERPL-SCNC: 135 MMOL/L (ref 136–145)
SP GR UR STRIP: 1.02 (ref 1–1.03)
SQUAMOUS EPITHELIAL: 4 /HPF
T4 FREE SERPL-MCNC: 2.78 NG/DL (ref 0.9–1.7)
TRI-PHOS CRY #/AREA URNS HPF: ABNORMAL /HPF
TSH SERPL DL<=0.005 MIU/L-ACNC: 11.86 UIU/ML (ref 0.3–4.2)
UROBILINOGEN UR STRIP-MCNC: NORMAL MG/DL
WBC CLUMPS #/AREA URNS HPF: PRESENT /HPF
WBC URINE: >182 /HPF

## 2023-06-16 PROCEDURE — 258N000003 HC RX IP 258 OP 636: Performed by: NURSE PRACTITIONER

## 2023-06-16 PROCEDURE — 80053 COMPREHEN METABOLIC PANEL: CPT | Performed by: NURSE PRACTITIONER

## 2023-06-16 PROCEDURE — 99215 OFFICE O/P EST HI 40 MIN: CPT | Performed by: NURSE PRACTITIONER

## 2023-06-16 PROCEDURE — 250N000011 HC RX IP 250 OP 636: Performed by: NURSE PRACTITIONER

## 2023-06-16 PROCEDURE — 84443 ASSAY THYROID STIM HORMONE: CPT

## 2023-06-16 PROCEDURE — 96413 CHEMO IV INFUSION 1 HR: CPT

## 2023-06-16 PROCEDURE — 36415 COLL VENOUS BLD VENIPUNCTURE: CPT | Performed by: NURSE PRACTITIONER

## 2023-06-16 PROCEDURE — 81001 URINALYSIS AUTO W/SCOPE: CPT

## 2023-06-16 PROCEDURE — 87086 URINE CULTURE/COLONY COUNT: CPT

## 2023-06-16 PROCEDURE — 83735 ASSAY OF MAGNESIUM: CPT | Performed by: NURSE PRACTITIONER

## 2023-06-16 PROCEDURE — 81003 URINALYSIS AUTO W/O SCOPE: CPT | Performed by: NURSE PRACTITIONER

## 2023-06-16 PROCEDURE — G0463 HOSPITAL OUTPT CLINIC VISIT: HCPCS | Performed by: NURSE PRACTITIONER

## 2023-06-16 PROCEDURE — 84439 ASSAY OF FREE THYROXINE: CPT

## 2023-06-16 PROCEDURE — 96417 CHEMO IV INFUS EACH ADDL SEQ: CPT

## 2023-06-16 RX ORDER — OXYCODONE HYDROCHLORIDE 5 MG/1
5 TABLET ORAL EVERY 6 HOURS PRN
Qty: 30 TABLET | Refills: 0 | Status: ON HOLD | OUTPATIENT
Start: 2023-06-16 | End: 2023-07-31

## 2023-06-16 RX ORDER — EPINEPHRINE 1 MG/ML
0.3 INJECTION, SOLUTION INTRAMUSCULAR; SUBCUTANEOUS EVERY 5 MIN PRN
Status: CANCELLED | OUTPATIENT
Start: 2023-06-16

## 2023-06-16 RX ORDER — METHYLPREDNISOLONE SODIUM SUCCINATE 125 MG/2ML
125 INJECTION, POWDER, LYOPHILIZED, FOR SOLUTION INTRAMUSCULAR; INTRAVENOUS
Status: CANCELLED
Start: 2023-06-16

## 2023-06-16 RX ORDER — DIPHENHYDRAMINE HYDROCHLORIDE 50 MG/ML
50 INJECTION INTRAMUSCULAR; INTRAVENOUS
Status: CANCELLED
Start: 2023-06-16

## 2023-06-16 RX ORDER — LORAZEPAM 2 MG/ML
0.5 INJECTION INTRAMUSCULAR EVERY 4 HOURS PRN
Status: CANCELLED | OUTPATIENT
Start: 2023-06-16

## 2023-06-16 RX ORDER — ALBUTEROL SULFATE 0.83 MG/ML
2.5 SOLUTION RESPIRATORY (INHALATION)
Status: CANCELLED | OUTPATIENT
Start: 2023-06-16

## 2023-06-16 RX ORDER — ALBUTEROL SULFATE 90 UG/1
1-2 AEROSOL, METERED RESPIRATORY (INHALATION)
Status: CANCELLED
Start: 2023-06-16

## 2023-06-16 RX ORDER — MEPERIDINE HYDROCHLORIDE 25 MG/ML
25 INJECTION INTRAMUSCULAR; INTRAVENOUS; SUBCUTANEOUS EVERY 30 MIN PRN
Status: CANCELLED | OUTPATIENT
Start: 2023-06-16

## 2023-06-16 RX ORDER — OXYBUTYNIN CHLORIDE 5 MG/1
5 TABLET, EXTENDED RELEASE ORAL DAILY
Qty: 30 TABLET | Refills: 0 | Status: SHIPPED | OUTPATIENT
Start: 2023-06-16

## 2023-06-16 RX ORDER — HEPARIN SODIUM,PORCINE 10 UNIT/ML
5 VIAL (ML) INTRAVENOUS
Status: CANCELLED | OUTPATIENT
Start: 2023-06-16

## 2023-06-16 RX ORDER — HEPARIN SODIUM (PORCINE) LOCK FLUSH IV SOLN 100 UNIT/ML 100 UNIT/ML
5 SOLUTION INTRAVENOUS
Status: CANCELLED | OUTPATIENT
Start: 2023-06-16

## 2023-06-16 RX ADMIN — SODIUM CHLORIDE 800 MG: 9 INJECTION, SOLUTION INTRAVENOUS at 14:12

## 2023-06-16 RX ADMIN — SODIUM CHLORIDE 200 MG: 9 INJECTION, SOLUTION INTRAVENOUS at 13:31

## 2023-06-16 RX ADMIN — SODIUM CHLORIDE 1000 ML: 9 INJECTION, SOLUTION INTRAVENOUS at 12:45

## 2023-06-16 ASSESSMENT — PAIN SCALES - GENERAL: PAINLEVEL: EXTREME PAIN (8)

## 2023-06-16 NOTE — NURSING NOTE
"Oncology Rooming Note    June 16, 2023 11:31 AM   Suni Zuluaga is a 38 year old female who presents for:    Chief Complaint   Patient presents with     Oncology Clinic Visit     Malignant neoplasm of exocervix (H) (Primary Dx); Encounter for antineoplastic immunotherapy; Malignant neoplasm of overlapping sites of cervix     Blood Draw     Labs drawn with piv start by rn.  VS taken.     Initial Vitals: /77 (BP Location: Right arm, Patient Position: Sitting, Cuff Size: Adult Regular)   Pulse 104   Temp 97.5  F (36.4  C) (Oral)   Resp 16   Wt 56.1 kg (123 lb 11.2 oz)   LMP  (LMP Unknown)   SpO2 96%   BMI 21.91 kg/m   Estimated body mass index is 21.91 kg/m  as calculated from the following:    Height as of 5/10/23: 1.6 m (5' 3\").    Weight as of this encounter: 56.1 kg (123 lb 11.2 oz). Body surface area is 1.58 meters squared.  Extreme Pain (8) Comment: Data Unavailable   No LMP recorded (lmp unknown). (Menstrual status: Tubal ).  Allergies reviewed: Yes  Medications reviewed: Yes    Medications: Refill oyxcodone (3 month supply)   Pharmacy name entered into EPIC: [facility] - [place], [place] - [address]    Clinical concerns:  none      Liliam Wu            "

## 2023-06-16 NOTE — LETTER
2023         RE: Suni Zuluaga  [address]  [place] [facility] [address]        Dear Colleague,    Thank you for referring your patient, Suni Zuluaga, to the [facility]. Please see a copy of my visit note below.    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  She is here today for follow up and disease management.      Oncology History:  2015: HSIL Pap at the beginning of pregnancy. Repeat pap postpartum was ASCUS. Colposcopy was negative.   20: Pelvic US. Uterine fibroids, normal appearance of uterus and ovaries  2020: Pap smear which confirmed squamous cell carcinoma of the cervix. Referred to GYN  ONC  2020: GYN ONC Consult [facility]. Cervical biopsy, RADHA III, unable to identify invasion  2020: Repeat cervical biopsy confirms invasive SCC  2020: Started primary chemoRT for locally advanced cervical cancer. Stage IIIC1(r), large primary tumor with bilateral parametrial invasion and clinical evidence of posterior vaginal involvement. Pelvic lymphadenopathy involving external iliac, perirectal and internal iliac nodes.   20-10/30/20: ChemoRT.   20-20: T&R brachytherapy  2020: Followup with radonvern (Dr Hernández)   22: CT A/P with nonspecific increased density in the para-aortic region, which could be seen in the setting of retroperitoneal fibrosis, inflammation, or potentially lymphoma/desmoplastic reaction. New varix from right renal vein extending down into the RLQ.   22: CT abdomen retroperitoneal bx   Final Diagnosis  A and B.  Soft tissue, right retroperitoneum and retroperitoneum, CT-guided needle biopsies:  -Metastatic squamous cell carcinoma.  Addendum   [facility]  [address] [place] [address]  Phone: [phone]    Name:  Gogo Gonis  Current Date: 2017  : 1998  MRN: [id number]   SINDHU: [id number]    Visit Date: 2017  Address[address] [facility] [address]  Phone: [phone]    Primary Care Provider     Name: Yusef Shannon MD    Phone: [phone]    The staff of [facility] would like to thank you for allowing us to assist you with your healthcare needs. The following includes patient education materials and information on how best to care for your illness/injury at home and when to see a physician. If you need to locate a Doctor or clinic close to you, please call the Doctor Referral Service at [phone]. The Service is available Monday through Thursday from 8 AM to 8 PM and  from 8 AM to 4 PM.    Patients Please Note: If further time off is required, or a medical clearance to return to work is required, it must be obtained through your primary physician.  Return to work clearances and extensions of \"Time-Off\" will not be given by the Emergency Department.     We hope that you leave our Emergency Department believing that we provided you with very good care.   Your Opinion Matters To Us  If you receive a patient satisfaction survey in the mail, please complete and return it in the postage-paid envelope.  We truly value and appreciate your feedback.  Emergency Department Care Providers   Physician:Justin Holden DO    Advanced Practice Provider:  No providers found     RN_________________ ED Tech__________________ Clerical_________________         2  CPS score is 30-40.     RESULT FOR IMMUNOHISTOCHEMICAL VENTANA CLONE  PD-L1 ASSAY  TUMOR PROPORTION SCORE (TPS):  30%  INTERPRETATION: LOW PD-L1 EXPRESSION (TPS >/=1-49%)     10/6/22: PET CT Whole body: IMPRESSION: This patient with history of squamous cell carcinoma of  the cervix there is evidence of disease progression:  1. Large 5 cm right retroperitoneal mass previously biopsied as  metastatic squamous cell carcinoma demonstrates FDG avidity. No  definite significant disease within the pelvis.  2. New metastatic nodes within the retroperitoneum and mediastinum.  3. 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  4. Diffusely increased FDG avidity throughout the terminal ileum and  large intestine without associated mass lesion may represent vascular  congestion or diffuse colitis.  5. Diffuse soft tissue edema.     Plan: Paclitaxel, Carboplatin, Bevacizumab, and pembrolizumab added due to PD-L1 expression every 3 weeks.      10/13/22: Cycle 1 Paclitaxel 175 mg/m2, Carboplatin AUC 6, Bevacizumab 15 mg/m2  11/4/22: Cycle 2 Paclitaxel 175 mg/m2, Carboplatin AUC 5, Bevacizumab 15 mg/m2. Pembrolizumab on hold while awaiting insurance coverage.     11/8/22: ED for nausea (no vomiting) and dizziness, discharged with valium for vertigo.     11/25/22: Cycle 3 Paclitaxel 175 mg/m2, Carboplatin AUC 5, Bevacizumab 15 mg/m2, and pembrolizumab 200 mg.  Defer x 1 week for thrombocytopenia (plts 82).  Decrease Paclitaxel 135 mg/m2, Carboplatin AUC 5, Bevacizumab 15 mg/m2, and pembrolizumab 200 mg, given 12/5.     12/19/22 CT CAP IMPRESSION:  1.  Metastatic squamous cell carcinoma of the cervix with decreased size of previously FDG avid thoracoabdominal lymph nodes compared to 10/06/2022. No new or worsening disease.   2.  Unchanged tumoral encasement and occlusion of the infrarenal IVC.   3.  Rectal wall thickening and fat  "stranding suggestive of postradiation inflammation.     Plan: Continue paclitaxel, carboplatin, bevacizumab, and pembrolizumab with CT/MD after 3 more cycles (6 total)     12/30/22: Cycle 4 Paclitaxel 135 mg/m2, Carboplatin AUC 5, Bevacizumab 15 mg/m2, and pembrolizumab 200 mg, held carboplatin and paclitaxel due to thrombocytopenia (plts 39), not given this cycle.  Bevacizumab and Pembrolizumab given.  1/20/23: Cycle 5 Paclitaxel 135 mg/m2, Carboplatin AUC 4, Bevacizumab 15 mg/m2, and pembrolizumab 200 mg.     1/20/23: BLE US  IMPRESSION: No deep venous thrombosis demonstrated in either leg.     2/10/23: Cycle 6 Paclitaxel 135 mg/m2, Carboplatin AUC 4, Bevacizumab 15 mg/m2, and pembrolizumab 200 mg.     Plan: Continue paclitaxel, carboplatin, bevacizumab, and pembrolizumab x 1 more cycle as she did not have chemotherapy for cycle 4.  Followed by CT and follow up with Dr. Brambila.     3/3/23: Cycle 7 Paclitaxel 135 mg/m2, Carboplatin AUC 4, Bevacizumab 15 mg/m2, and pembrolizumab 200 mg.   3/20/2023: CT C/A/P with stable disease (still with retroperitoneal adenopathy encasing aorta/IVC, occulusion of IVC, cystitis/proctitis)   3/24/23: Cycle one of johny/pembro maintenance (cycle 8 overall)  4/14/23: C2 pembro/avastin  (cycle 9 overall)   4/26/23: OSH ED visit for pain, found to have new hydro   4/28/23: Met with urology (Dr Valladares) for hydronephrosis. Plan possible stent placement in the future for hydro.   5/5/23: Due for cycle 3 maintenance pembro/avastin (cycle 10 overall)   5/26/23: Cycle 4 maintenance pembro/avastin (cycle 11 overall).  6/16/23: Cycle 5 maintenance pembro/avastin (cycle 12 overall).              Today she comes to clinic with her boyfriend feeling \"about the same\".  She does report severe urethral pain and urinary incontinence that started when her ureteral stent was placed 4/28/2023.  She was scheduled to meet with urology earlier this week but she was at work so missed the appointment.  " This has not been rescheduled yet.  She is taking Pyridium 3 times daily and Flomax as prescribed without improvement.  She feels her LLQ pain is about the same perhaps slightly better.  She is taking Tylenol, Celebrex, oxycodone.  She does need a refill on the oxycodone.  She has not scheduled with follow-up for palliative care per their recommendation.  She is having no nausea or vomiting.  Endorses poor p.o. intake.  She attributes this mostly to pain and discomfort.  She has drink minimal fluids today.  Weight loss of about 14 pounds since last cycle.  No diarrhea or constipation.  No leg swelling.  She continues with slight weakness in right leg with steep inclines or fast ambulation that has not changed.  No fevers.  Some chills on and off which are stable.  No chest pain or SOB.                Review of Systems:    Systemic           +weight loss; no fever; + chills; no night sweats; +decreased appetite  Skin           no rashes, or lesions  Eye           no irritation; no changes in vision  Sunil-Laryngeal           no dysphagia; no hoarseness   Pulmonary    no cough; no shortness of breath  Cardiovascular    no chest pain; no palpitations  Gastrointestinal    no diarrhea; no constipation; + abdominal pain; no changes in bowel habits; no blood in stool  Genitourinary   no urinary frequency; + urinary urgency; + dysuria; +urethral pain; no abnormal vaginal discharge; no abnormal vaginal bleeding  Breast   no breast discharge; no breast changes; no breast pain  Musculoskeletal    no myalgias; no arthralgias; no back pain  Psychiatric           no depressed mood; no anxiety    Hematologic   no tender lymph nodes; no noticeable swellings or lumps   Endocrine    no hot flashes; no heat/cold intolerance         Neurological   no tremor; no numbness and tingling; no headaches; no difficulty sleeping      Past Medical History:    Past Medical History:   Diagnosis Date    Cervical cancer (H)     Tobacco abuse           Past Surgical History:    Past Surgical History:   Procedure Laterality Date    CARPAL TUNNEL RELEASE RT/LT Right     COMBINED CYSTOSCOPY, INSERT STENT URETER(S) Bilateral 5/10/2023    Procedure: CYSTOSCOPY, WITH RIGHT URETERAL STENT INSERTION, Bilateral retrograde pyelogram.;  Surgeon: Robert Valladares MD;  Location: [facility] OR    TUBAL LIGATION           Health Maintenance Due   Topic Date Due    NICOTINE/TOBACCO CESSATION COUNSELING Q 1 YR  Never done    YEARLY PREVENTIVE VISIT  Never done    ADVANCE CARE PLANNING  Never done    HEPATITIS B IMMUNIZATION (1 of 3 - 3-dose series) Never done    COVID-19 Vaccine (1) Never done    Pneumococcal Vaccine: Pediatrics (0 to 5 Years) and At-Risk Patients (6 to 64 Years) (1 - PCV) Never done    HEPATITIS C SCREENING  Never done    PAP  08/13/2023       Current Medications:     Current Outpatient Medications   Medication Sig Dispense Refill    Ascorbic Acid (VITAMIN C PO)       bisacodyl (DULCOLAX) 5 MG EC tablet Take 5 mg by mouth      celecoxib (CELEBREX) 100 MG capsule Take 1 capsule (100 mg) by mouth 2 times daily . Stop ibuprofen. 60 capsule 1    diazepam (VALIUM) 2 MG tablet TAKE ONE TABLET BY MOUTH EVERY 12 HOURS AS NEEDED FOR ANXIETY 12 tablet 0    escitalopram (LEXAPRO) 10 MG tablet Take 1 tablet by mouth daily      levothyroxine (SYNTHROID/LEVOTHROID) 150 MCG tablet Take 1 tablet (150 mcg) by mouth daily 30 tablet 3    levothyroxine (SYNTHROID/LEVOTHROID) 175 MCG tablet Take 1 tablet (175 mcg) by mouth daily 30 tablet 0    magnesium oxide (MAG-OX) 400 MG tablet Take 1 tablet (400 mg) by mouth daily 30 tablet 1    oxybutynin ER (DITROPAN XL) 5 MG 24 hr tablet Take 1 tablet (5 mg) by mouth daily 30 tablet 0    oxyCODONE (ROXICODONE) 5 MG tablet Take 1 tablet (5 mg) by mouth every 6 hours as needed for pain 30 tablet 0    phenazopyridine (PYRIDIUM) 100 MG tablet Take 1 tablet (100 mg) by mouth 3 times daily as needed for urinary tract discomfort 30 tablet 1     tamsulosin (FLOMAX) 0.4 MG capsule Take 1 capsule (0.4 mg) by mouth daily 30 capsule 0    vitamin B-12 (CYANOCOBALAMIN) 500 MCG tablet Take 500 mcg by mouth      acetaminophen (TYLENOL) 500 MG tablet Take 2 tablets (1,000 mg) by mouth every 6 hours as needed for mild pain (Patient not taking: Reported on 6/16/2023) 120 tablet 1    ibuprofen (ADVIL/MOTRIN) 200 MG tablet Take 200 mg by mouth (Patient not taking: Reported on 6/16/2023)           Allergies:        Allergies   Allergen Reactions    Clindamycin Hives        Social History:     Social History     Tobacco Use    Smoking status: Every Day     Packs/day: 1.00     Types: Cigarettes    Smokeless tobacco: Never    Tobacco comments:     9/11/2020 1/2 pack/day, tying tp quit   Vaping Use    Vaping status: Not on file   Substance Use Topics    Alcohol use: Yes     Comment: occ.       History   Drug Use Unknown         Family History:     The patient's family history is notable for:    Family History   Problem Relation Age of Onset    Breast Cancer Mother         8/2020 newly dx         Physical Exam:     /77 (BP Location: Right arm, Patient Position: Sitting, Cuff Size: Adult Regular)   Pulse 104   Temp 97.5  F (36.4  C) (Oral)   Resp 16   Wt 56.1 kg (123 lb 11.2 oz)   LMP  (LMP Unknown)   SpO2 96%   BMI 21.91 kg/m    Body mass index is 21.91 kg/m .    General Appearance: Alert, appears uncomfortable and hunched over during majority of visit, thin     HEENT: no palpable nodules or masses        Cardiovascular: regular rate and rhythm, no gallops, rubs or murmurs     Respiratory: lungs clear, no rales, rhonchi or wheezes    Musculoskeletal: extremities non tender and without edema    Skin: no lesions or rashes     Neurological: normal gait, no gross defects     Psychiatric: appropriate mood and affect                               Hematological: normal cervical and supraclavicular lymph nodes     Gastrointestinal:       abdomen soft, non-tender,  non-distended    Genitourinary: Deferred.     Last Comprehensive Metabolic Panel:  Sodium   Date Value Ref Range Status   06/16/2023 135 (L) 136 - 145 mmol/L Final   10/27/2020 138 133 - 144 mmol/L Final     Potassium   Date Value Ref Range Status   06/16/2023 4.1 3.4 - 5.3 mmol/L Final   10/27/2020 3.3 (L) 3.4 - 5.3 mmol/L Final     Chloride   Date Value Ref Range Status   06/16/2023 97 (L) 98 - 107 mmol/L Final   10/27/2020 105 94 - 109 mmol/L Final     Carbon Dioxide   Date Value Ref Range Status   10/27/2020 27 20 - 32 mmol/L Final     Carbon Dioxide (CO2)   Date Value Ref Range Status   06/16/2023 28 22 - 29 mmol/L Final     Anion Gap   Date Value Ref Range Status   06/16/2023 10 7 - 15 mmol/L Final   10/27/2020 6 3 - 14 mmol/L Final     Glucose   Date Value Ref Range Status   06/16/2023 134 (H) 70 - 99 mg/dL Final   10/06/2022 99 70 - 99 mg/dL Final     Urea Nitrogen   Date Value Ref Range Status   06/16/2023 15.0 6.0 - 20.0 mg/dL Final   10/27/2020 9 7 - 30 mg/dL Final     Creatinine   Date Value Ref Range Status   06/16/2023 0.70 0.51 - 0.95 mg/dL Final   10/27/2020 0.57 0.52 - 1.04 mg/dL Final     GFR Estimate   Date Value Ref Range Status   06/16/2023 >90 >60 mL/min/1.73m2 Final     Comment:     eGFR calculated using 2021 CKD-EPI equation.   10/27/2020 >90 >60 mL/min/[1.73_m2] Final     Comment:     Non  GFR Calc  Starting 12/18/2018, serum creatinine based estimated GFR (eGFR) will be   calculated using the Chronic Kidney Disease Epidemiology Collaboration   (CKD-EPI) equation.       Calcium   Date Value Ref Range Status   06/16/2023 9.7 8.6 - 10.0 mg/dL Final   10/27/2020 8.8 8.5 - 10.1 mg/dL Final     Bilirubin Total   Date Value Ref Range Status   06/16/2023 0.2 <=1.2 mg/dL Final   10/27/2020 0.3 0.2 - 1.3 mg/dL Final     Alkaline Phosphatase   Date Value Ref Range Status   06/16/2023 120 (H) 35 - 104 U/L Final   10/27/2020 64 40 - 150 U/L Final     ALT   Date Value Ref Range Status    06/16/2023 15 0 - 50 U/L Final     Comment:     Reference intervals for this test were updated on 6/12/2023 to more accurately reflect our healthy population. There may be differences in the flagging of prior results with similar values performed with this method. Interpretation of those prior results can be made in the context of the updated reference intervals.     10/27/2020 21 0 - 50 U/L Final     AST   Date Value Ref Range Status   06/16/2023 18 0 - 45 U/L Final     Comment:     Reference intervals for this test were updated on 6/12/2023 to more accurately reflect our healthy population. There may be differences in the flagging of prior results with similar values performed with this method. Interpretation of those prior results can be made in the context of the updated reference intervals.   10/27/2020 18 0 - 45 U/L Final     Lab Results   Component Value Date    ALBUMIN 3.8 06/16/2023    ALBUMIN 3.8 10/27/2020     Lab Results   Component Value Date    PROTTOTAL 8.3 06/16/2023    PROTTOTAL 7.7 10/27/2020     Magnesium   Date Value Ref Range Status   06/16/2023 2.2 1.7 - 2.3 mg/dL Final   10/27/2020 1.6 1.6 - 2.3 mg/dL Final     TSH   Date Value Ref Range Status   06/16/2023 11.86 (H) 0.30 - 4.20 uIU/mL Final       Component      Latest Ref Rng 6/16/2023  11:25 AM   Protein Albumin Urine      Negative mg/dL >=2000 !    Protein Albumin Urine      Negative mg/dL 300 !    Color Urine      Colorless, Straw, Light Yellow, Yellow  Brown !    Appearance Urine      Clear  Cloudy !    Glucose Urine      Negative mg/dL Negative    Bilirubin Urine      Negative  Small !    Ketones Urine      Negative mg/dL Negative    Specific Gravity Urine      1.003 - 1.035  1.020    Blood Urine      Negative  Large !    pH Urine      5.0 - 7.0  7.5 (H)    Urobilinogen mg/dL      Normal, 2.0 mg/dL Normal    Nitrite Urine      Negative  Negative    Leukocyte Esterase Urine      Negative  Small !    Bacteria Urine      None Seen /HPF  Many !    WBC Clumps      None Seen /HPF Present !    Triple Phosphates      None Seen /HPF Few !    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  She is here today for follow up and disease management.     60 minutes spent on the date of the encounter doing chart review, history and exam, documentation, and further activities as noted above.      Plan:     1.)        Cervical cancer:   Discussed patient with Dr. Brambila and plan made in collaboration with her.  Suspect urine protein results are falsely elevated due to pyridium.  OK to proceed with planned treatment today of both Avastin and Keytruda.  Does not need to complete a 24 hr urine.  RTC as scheduled for CT and follow up with Dr. Brambila. Reviewed signs and symptoms for when she should contact the clinic or seek additional care.  Patient to contact the clinic with any questions or concerns in the interim.      Genetic risk factors were assessed and she does not meet qualification for referral.     Labs and/or tests ordered include:  CMP. Mag. Urine protein. TSH with reflex to T4.  UA with reflex to UC.                2.)        Health maintenance:  Issues addressed today include following up with PCP for annual health maintenance and non-gynecologic issues.      3.)       Hypomagnesemia: Magnesium 2.2 today which is WDL.  Continue magnesium oxide 400 mg daily.         4.)        Hypothyroid: This week TSH 11.86 and T4 2.78.  No symptoms currently.  Synthroid 175 mcg started 5/26/23.  Recheck with her next cycle.     5.)        Right hydronephrosis: Moderate right hydronephrosis noted on CT 4/26/23 with [facility]. Right ureteral stent placed 5/10/23 at [facility].   She reports significant dysuria since her stent placement with urinary incontinence.  Currently taking pyridium and flomax as prescribed.  She should continue pyridium and flomax.  Prescription for Ditropan XL 5 mg daily sent to her local pharmacy.  UA/UC added on today.  UA with only small leukocyte esterase and nitrites negative.  Culture pending but suspicion for UTI is low.  Suspect urine is very concentrated due to poor water intake today.  She missed her follow up with urology 6/8/23, message sent to urgently reschedule.  Urology scheduling phone number provided.     6.)        Pain: LLQ pain and stent pain managed with oxycodone, tylenol, and celebrex.  Saw Dr. May with palliative care 5/19 with plan for follow up in 1 month, request sent for scheduling.  Refill for oxycodone 30 tabs sent to her local pharmacy.  Provided palliative care contact information.    7.) Weight loss: Wgt loss of 14 lbs since last cycle and 20 lbs over the last 2 months.  Reports overall decreased in appetite due to pain and discomfort.  No nausea or vomiting since her last cycle.  Likely also related to disease.  Encouraged small frequent meals high in calories and protein.  Can also supplement meals with protein shakes.     Mounika Ritchie, DNP, APRN, FNP-C  Nurse Practitioner  Division of Gynecologic Oncology  Pager: [phone]     CC  Patient Care Team:  Suzy Brambila MD as PCP - General (Gynecologic Oncology)

## 2023-06-16 NOTE — NURSING NOTE
Chief Complaint   Patient presents with     Oncology Clinic Visit     Malignant neoplasm of exocervix (H) (Primary Dx); Encounter for antineoplastic immunotherapy; Malignant neoplasm of overlapping sites of cervix     Blood Draw     Labs drawn with piv start by rn.  VS taken.     Labs drawn with PIV start by rn.  Pt tolerated well.  VS taken and pt checked in for next appt.    Char Harper RN

## 2023-06-16 NOTE — PROGRESS NOTES
Infusion Nursing Note:  Suni Zuluaga presents today for 1L NS. Day 1 Cycle 12 Keytruda, Zirabev  Patient seen by provider today: Yes: Mounika Ritchie CNP   present during visit today: Not Applicable.    Note: Pt presents to infusion feeling ok. Pt denies new acute discomfort rating abdomen and vaginal discomfort a 8/10. Pt denies acute complaints or concerns not addressed by LAURENCE today. Pt confirms no scheduled surgeries in regards to Zirabev administration today.    TORB. 1236. 6/16/2023. Mounika Ritchie CNP. Joce Dorado RN. Proceed with Zirabev today given urine protein today. No need for 24 hour urine collection prior to next cycle.    TORB. 1343 6/16/2023. Mounika Ritchie CNP. Joce Dorado RN. UA is being processed to culture. Pt will be called if antibiotics are needed.     Pt voices understanding of the above orders and consents to infusion plan today.      Intravenous Access:  Peripheral IV placed.    Treatment Conditions:  Lab Results   Component Value Date    HGB 11.4 (L) 05/05/2023    WBC 6.9 05/05/2023    ANEU 2.5 10/27/2020    ANEUTAUTO 5.5 05/05/2023     05/05/2023      Lab Results   Component Value Date     (L) 06/16/2023    POTASSIUM 4.1 06/16/2023    MAG 2.2 06/16/2023    CR 0.70 06/16/2023    HARRISON 9.7 06/16/2023    BILITOTAL 0.2 06/16/2023    ALBUMIN 3.8 06/16/2023    ALT 15 06/16/2023    AST 18 06/16/2023     Results reviewed, labs did not meet  treatment parameters, however ok to proceed with treatment per TORB.  Urine greater then 2000.      Post Infusion Assessment:  Patient tolerated infusion without incident.  Blood return noted pre and post infusion.  Site patent and intact, free from redness, edema or discomfort.  No evidence of extravasations.  Access discontinued per protocol.       Discharge Plan:   Patient declined prescription refills.  Discharge instructions reviewed with: Patient.  Patient and/or family verbalized understanding of discharge instructions and  all questions answered.  Copy of AVS reviewed with patient and/or family.  Patient will return 7/7 for next appointment.  Patient discharged in stable condition accompanied by: Friend.  Departure Mode: Ambulatory.  Face to Face time: 0 minutes.      Joce Dorado RN

## 2023-06-16 NOTE — PATIENT INSTRUCTIONS
Urology [phone]    Palliative care contact information  How to get a hold of us:  For non-urgent matters, MyChart works best.     For more urgent matters, or if you prefer not to use MyChart, call our clinic nurse Amparo Dye at [phone].     We have an on-call number for evenings and weekends. Please call this only if you are having uncontrolled symptoms or serious side effects from your medicines: [phone].      For refills, please give us a week (5 working days) notice. We don't always have providers available everyday to do refills. If you call the day you run out of your medicine, we may not be able to refill it in time, so call 5 days in advance!

## 2023-06-16 NOTE — PATIENT INSTRUCTIONS
[facility] Triage and after hours / weekends / holidays:  [phone]    Please call the triage or after hours line if you experience a temperature greater than or equal to 100.4, shaking chills, have uncontrolled nausea, vomiting and/or diarrhea, dizziness, shortness of breath, chest pain, bleeding, unexplained bruising, or if you have any other new/concerning symptoms, questions or concerns.      If you are having any concerning symptoms or wish to speak to a provider before your next infusion visit, please call triage to notify them so we can adequately serve you.     If you need a refill on a narcotic prescription or other medication, please call before your infusion appointment.                 June 2023 Sunday Monday Tuesday Wednesday Thursday Friday Saturday                       1     2     3       4     5     6     7     8    RETURN PATIENT  11:15 AM   (30 min.)   Javon Valladares PA-C   [facility] [place] 9     10       11     12     13     14     15     16    LAB PERIPHERAL  10:45 AM   (15 min.)   [facility]   [facility]    RETURN CCSL  11:15 AM   (45 min.)   Mounika Ritchie APRN CNP   [facility]    ONC INFUSION 1.5 HR (90 MIN)  12:00 PM   (90 min.)    ONC INFUSION NURSE   [facility] 17       18     19     20     21     22     23    CT CHEST/ABDOMEN/PELVIS W  11:30 AM   (20 min.)   [email or web address]   [facility] [place] [facility] [place] 24       25     26     27     28    RETURN CCSL   2:00 PM   (30 min.)   Suzy Brambila MD   [facility] [place] [address] 30 July 2023 Sunday Monday Tuesday Wednesday Thursday Friday Saturday                                 1       2     3     4     5     6     7    LAB PERIPHERAL  11:00 AM   (15 min.)   [facility]   [facility]    ONC INFUSION 1.5 HR  (90 MIN)  11:30 AM   (90 min.)    ONC INFUSION NURSE   [facility] 8       9     10     11     12     13     14     15       16     17     18     19     20     21     22       23     24     25     26     27     28     29       30     31                                                Lab Results:  Recent Results (from the past 12 hour(s))   Comprehensive metabolic panel    Collection Time: 06/16/23 11:25 AM   Result Value Ref Range    Sodium 135 (L) 136 - 145 mmol/L    Potassium 4.1 3.4 - 5.3 mmol/L    Chloride 97 (L) 98 - 107 mmol/L    Carbon Dioxide (CO2) 28 22 - 29 mmol/L    Anion Gap 10 7 - 15 mmol/L    Urea Nitrogen 15.0 6.0 - 20.0 mg/dL    Creatinine 0.70 0.51 - 0.95 mg/dL    Calcium 9.7 8.6 - 10.0 mg/dL    Glucose 134 (H) 70 - 99 mg/dL    Alkaline Phosphatase 120 (H) 35 - 104 U/L    AST 18 0 - 45 U/L    ALT 15 0 - 50 U/L    Protein Total 8.3 6.4 - 8.3 g/dL    Albumin 3.8 3.5 - 5.2 g/dL    Bilirubin Total 0.2 <=1.2 mg/dL    GFR Estimate >90 >60 mL/min/1.73m2   Magnesium    Collection Time: 06/16/23 11:25 AM   Result Value Ref Range    Magnesium 2.2 1.7 - 2.3 mg/dL   Protein qualitative urine    Collection Time: 06/16/23 11:25 AM   Result Value Ref Range    Protein Albumin Urine >=2000 (A) Negative mg/dL   TSH with free T4 reflex    Collection Time: 06/16/23 11:25 AM   Result Value Ref Range    TSH 11.86 (H) 0.30 - 4.20 uIU/mL   UA with Microscopic reflex to Culture    Collection Time: 06/16/23 11:25 AM    Specimen: Urine, Midstream   Result Value Ref Range    Color Urine Brown (A) Colorless, Straw, Light Yellow, Yellow    Appearance Urine Cloudy (A) Clear    Glucose Urine Negative Negative mg/dL    Bilirubin Urine Small (A) Negative    Ketones Urine Negative Negative mg/dL    Specific Gravity Urine 1.020 1.003 - 1.035    Blood Urine Large (A) Negative    pH Urine 7.5 (H) 5.0 - 7.0    Protein Albumin Urine 300 (A) Negative mg/dL    Urobilinogen Urine Normal Normal, 2.0 mg/dL     Nitrite Urine Negative Negative    Leukocyte Esterase Urine Small (A) Negative    Bacteria Urine Many (A) None Seen /HPF    WBC Clumps Urine Present (A) None Seen /HPF    Triple Phosphate Crystals Urine Few (A) None Seen /HPF    RBC Urine >182 (H) <=2 /HPF    WBC Urine >182 (H) <=5 /HPF    Squamous Epithelials Urine 4 (H) <=1 /HPF    Hyaline Casts Urine 69 (H) <=2 /LPF    Granular Casts Urine 35 (H) None Seen /LPF   T4 free    Collection Time: 06/16/23 11:25 AM   Result Value Ref Range    Free T4 2.78 (H) 0.90 - 1.70 ng/dL

## 2023-06-17 LAB — BACTERIA UR CULT: NORMAL

## 2023-06-21 ENCOUNTER — TELEPHONE (OUTPATIENT)
Dept: UROLOGY | Facility: CLINIC | Age: 38
End: 2023-06-21
Payer: COMMERCIAL

## 2023-06-21 NOTE — TELEPHONE ENCOUNTER
Attempted to call pt. Left detailed message to call clinic back at [phone].   To discuss symptoms.     Marva Andrew, MSN RN

## 2023-06-23 ENCOUNTER — ANCILLARY PROCEDURE (OUTPATIENT)
Dept: CT IMAGING | Facility: CLINIC | Age: 38
End: 2023-06-23
Attending: OBSTETRICS & GYNECOLOGY
Payer: COMMERCIAL

## 2023-06-23 DIAGNOSIS — C53.8 MALIGNANT NEOPLASM OF OVERLAPPING SITES OF CERVIX (H): ICD-10-CM

## 2023-06-23 PROCEDURE — 74177 CT ABD & PELVIS W/CONTRAST: CPT | Performed by: RADIOLOGY

## 2023-06-23 PROCEDURE — 71260 CT THORAX DX C+: CPT | Performed by: RADIOLOGY

## 2023-06-23 RX ORDER — IOPAMIDOL 755 MG/ML
68 INJECTION, SOLUTION INTRAVASCULAR ONCE
Status: COMPLETED | OUTPATIENT
Start: 2023-06-23 | End: 2023-06-23

## 2023-06-23 RX ADMIN — IOPAMIDOL 68 ML: 755 INJECTION, SOLUTION INTRAVASCULAR at 13:00

## 2023-06-28 ENCOUNTER — VIRTUAL VISIT (OUTPATIENT)
Dept: ONCOLOGY | Facility: CLINIC | Age: 38
End: 2023-06-28
Attending: OBSTETRICS & GYNECOLOGY
Payer: COMMERCIAL

## 2023-06-28 DIAGNOSIS — C53.1 MALIGNANT NEOPLASM OF EXOCERVIX (H): Primary | ICD-10-CM

## 2023-06-28 PROCEDURE — 99215 OFFICE O/P EST HI 40 MIN: CPT | Mod: 95 | Performed by: OBSTETRICS & GYNECOLOGY

## 2023-06-28 NOTE — PROGRESS NOTES
Virtual Visit Details    Type of service:  Video Visit   Video Start Time: 2:15  Video End Time:2:45    Originating Location (pt. Location): Home    Distant Location (provider location):  On-site  Platform used for Video Visit: [place]

## 2023-06-28 NOTE — NURSING NOTE
Is the patient currently in the state of [place]? NO    Visit mode:VIDEO    If the visit is dropped, the patient can be reconnected by: VIDEO VISIT: Text to cell phone: [phone]    Will anyone else be joining the visit? NO      How would you like to obtain your AVS? MyChart    Are changes needed to the allergy or medication list? Patient declined individual  medication review by support staff because they deny any changes and state that all information remains accurate since last reviewed/verified.    Reason for visit: RECHECK      No pt vitals to report per pt    Felisha Ho VF

## 2023-06-28 NOTE — Clinical Note
2023         RE: Suni Zuluaga  [address]  [place] [address]        Dear Colleague,    Thank you for referring your patient, Suni Zuluaga, to the [facility]. Please see a copy of my visit note below.    Virtual Visit Details    Type of service:  Video Visit   Video Start Time: 2:15  Video End Time:2:45    Originating Location (pt. Location): Home  {PROVIDER LOCATION On-site should be selected for visits conducted from your clinic location or adjoining [facility] hospital, academic office, or other nearby [facility] building. Off-site should be selected for all other provider locations, including home:[id number]}  Distant Location (provider location):  On-site  Platform used for Video Visit: [facility]    GYN Oncology Established Patient Visit    RE: Suni Zuluaga  : 1985    2023     CC: Cervical cancer , recurrent    HPI: Ms Suni Zuluaga is a 38 year old  female who presents to discuss cervical cancer treatment planning and toxicity assessment      Treatment History:   2015:HSIL Pap at the beginning of pregnancy. Repeat pap postpartum was ASCUS. Colposcopy was negative.   20: Pelvic US. Uterine fibroids, normal appearance of uterus and ovaries  2020: Pap smear which confirmed squamous cell carcinoma of the cervix. Referred to GYN  ONC  2020: GYN ONC Consult [facility]. Cervical biopsy, RADHA III, unable to identify invasion  2020: Repeat cervical biopsy confirms invasive SCC  2020: Started primary chemoRT for locally advanced cervical cancer. Stage IIIC1(r), large primary tumor with bilateral parametrial invasion and clinical evidence of posterior vaginal involvement. Pelvic lymphadenopathy involving external iliac, perirectal and internal iliac nodes.   20-10/30/20: ChemoRT.   20-20: T&R brachytherapy  2020: Followup with radonc (Dr Hernández)   22: CT A/P with nonspecific increased density in the para-aortic region,  which could be seen in the setting of retroperitoneal fibrosis, inflammation, or potentially lymphoma/desmoplastic reaction. New varix from right renal vein extending down into the RLQ.   10/6/22: PET CT Whole body with large 5cm retroperitoneal mass with FDG avidity. New nodes in retroperitoneum and mediastinum    Plan: Paclitaxel, Carboplatin, Bevacizumab, and pembrolizumab added due to PD-L1 expression every 3 weeks.   10/13/22: Cycle 1 Paclitaxel 175 mg/m2, Carboplatin AUC 6, Bevacizumab 15 mg/m2  11/4/22: Cycle 2 Paclitaxel 175 mg/m2, Carboplatin AUC 5, Bevacizumab 15 mg/m2. Pembrolizumab on hold while awaiting insurance coverage.  11/8/22: ED for nausea (no vomiting) and dizziness, discharged with valium for vertigo.  11/25/22: Cycle 3 Paclitaxel 175 mg/m2, Carboplatin AUC 5, Bevacizumab 15 mg/m2, and pembrolizumab 200 mg.  Defer x 1 week for thrombocytopenia (plts 82).  Decrease Paclitaxel 135 mg/m2, Carboplatin AUC 5, Bevacizumab 15 mg/m2, and pembrolizumab 200 mg, given 12/5.  12/19/22 CT CAP with  response    Plan: Continue paclitaxel, carboplatin, bevacizumab, and pembrolizumab with CT/MD after 3 more cycles (6 total)  12/30/22: Cycle 4 Paclitaxel 135 mg/m2, Carboplatin AUC 5, Bevacizumab 15 mg/m2, and pembrolizumab 200 mg, held carboplatin and paclitaxel due to thrombocytopenia (plts 39), not given this cycle.  Bevacizumab and Pembrolizumab given.  1/20/23: Cycle 5 Paclitaxel 135 mg/m2, Carboplatin AUC 4, Bevacizumab 15 mg/m2, and pembrolizumab 200 mg.  2/10/23: Cycle 6 Paclitaxel 135 mg/m2, Carboplatin AUC 4, Bevacizumab 15 mg/m2, and pembrolizumab 200 mg.     Plan: Continue paclitaxel, carboplatin, bevacizumab, and pembrolizumab x 1 more cycle as she did not have chemotherapy for cycle 4.  Followed by CT and follow up with Dr. Brambila.  3/3/23: Cycle 7 Paclitaxel 135 mg/m2, Carboplatin AUC 4, Bevacizumab 15 mg/m2, and pembrolizumab 200 mg.   3/20/2023: CT C/A/P with stable disease (still with  retroperitoneal adenopathy encasing aorta/IVC, occulusion of IVC, cystitis/proctitis)   3/24/23: Cycle one of johny/pembro maintenance (cycle 8 overall)  23: C2 pembro/avastin  (cycle 9 overall)   23: OSH ED visit for pain, found to have new hydro   23: Met with urology (Dr Valladares) for hydronephrosis. Plan possible stent placement in the future for hydro.   23:cycle 3 maintenance pembro/avastin (cycle 10 overall)   23: Cycle 4 maintenance pembro/avastin (cycle 11 overall).  23: Cycle 5 maintenance pembro/avastin (cycle 12 overall).  2023: Due for Cycle 6 maintenance    Interval history:     Feels she has a worsening UTI that is not getting better for weeks. She feels fatigued, burning when she urinates, urinary frequency. She also has increase in vaginal discharge.   23 had a GPS >100k colony UTI.   LE swelling improved.   Not taking pain meds every day.   Taking synthroid 150 mcg/day.   CT scan with stable lymphatic disease but possible new fistula        Past Medical History:   Diagnosis Date     Cervical cancer (H)      Tobacco abuse      Past Surgical History:   Procedure Laterality Date     CARPAL TUNNEL RELEASE RT/LT Right      COMBINED CYSTOSCOPY, INSERT STENT URETER(S) Bilateral 5/10/2023    Procedure: CYSTOSCOPY, WITH RIGHT URETERAL STENT INSERTION, Bilateral retrograde pyelogram.;  Surgeon: Robert Valladares MD;  Location: [facility] OR     TUBAL LIGATION         OBGYN history and Health Maintenance:  , completed childbearing , had tubal ligation   Last Pap Smear: per HPI   Last Mammogram: never  Last Colonoscopy: never    Meds reviewed in EPIC     Allergies   Allergen Reactions     Clindamycin Hives     Social History:  Social History     Tobacco Use     Smoking status: Every Day     Packs/day: 1.00     Types: Cigarettes     Smokeless tobacco: Never     Tobacco comments:     2020 1/2 pack/day, tying tp quit   Substance Use Topics     Alcohol use: Yes      Comment: occ.     Work: manufacturing, manual labor  Preferred language: English  Lives at home with: Partner and 4 kids  Long smoking history since age 13. Smokes a pack daily. Denies any substance abuse     Family History:   No family history of cancer     Physical Exam:   Vitals reviewed in epic  LMP  (LMP Unknown)   General: No acute distress. Some temporal wasting which has been stable  Remainder of exam deferred      ECOG performance status: 1     Labs/imaging (Personally reviewed today,  6/28/2023)    CT 6/29/23:                                                                      IMPRESSION:  1.  Posttreatment changes in the cervix with new fluid which appears to be within and/or adjacent to the vagina, communication with adjacent bladder, ureter and/or colon is possible, recommend correlation with vaginal discharge.  2.  Interval placement of right ureteral stent with resolution of hydronephrosis.  3.  No significant change in retroperitoneal lymphadenopathy with encasement and occlusion of the inferior vena cava.  4.  No evidence of metastatic disease in the chest.      Assessment:  Suni Zuluaga is a 38 year old woman with Stage IIIC1(r) squamous cell carcinoma of the cervix , now with recurrence. PDL1+    Plan:     1.)   Cervical cancer : Stage IIIC1r SCC of the cervix (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  Completed primary chemoRT without complication. Recurrence in 10/2022.  Received 7 cycles of carbo/taxol/johny/pembro. Required dose reductions due to neutropenia, thrombocytopenia. Has now received 5 cycles of maintenance johny/pembro.   Now with imaging and clinical findings concerning for rectovaginal fistula and possible colo-ureteral or colo-vesicular fistula. Discussed these unfortunate findings with Suni at length. Discussed possible diverting palliative colostomy for fistula management. Need further studies to determine extent of fistula(s). Discussed with radiology as well.       - Plan Fluoro gastrograffin  study (as per radiology recs) to better understand fistula  - Will discuss with urology best way to manage possible fistula into bladder/ureters  -Will stop avastin given fistulas, but continue pembro maintenance therapy given stable disease (plan 3 more cycles and then repeat imaging).   -If progression, would plan tisotumab vedotin (Tivdak)  - Followup July 12, 2023.     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 Gave Suni my pager number to pass along to providers at [facility] for questions regarding her care.       Total time today was 42 minutes including reviewing imaging, counseling patient, and documentation. Discussing with radiology and urology.     Suzy Brambila MD  Gynecologic Oncology  Pager [phone]      Again, thank you for allowing me to participate in the care of your patient.        Sincerely,        Suzy Brambila MD

## 2023-06-28 NOTE — PROGRESS NOTES
GYN Oncology Established Patient Visit    RE: Suni Zuluaga  : 1985    2023     CC: Cervical cancer , recurrent    HPI: Ms Suni Zuluaga is a 38 year old  female who presents to discuss cervical cancer treatment planning and toxicity assessment      Treatment History:   2015:HSIL Pap at the beginning of pregnancy. Repeat pap postpartum was ASCUS. Colposcopy was negative.   20: Pelvic US. Uterine fibroids, normal appearance of uterus and ovaries  2020: Pap smear which confirmed squamous cell carcinoma of the cervix. Referred to GYN  ONC  2020: GYN ONC Consult [facility]. Cervical biopsy, RADHA III, unable to identify invasion  2020: Repeat cervical biopsy confirms invasive SCC  2020: Started primary chemoRT for locally advanced cervical cancer. Stage IIIC1(r), large primary tumor with bilateral parametrial invasion and clinical evidence of posterior vaginal involvement. Pelvic lymphadenopathy involving external iliac, perirectal and internal iliac nodes.   20-10/30/20: ChemoRT.   20-20: T&R brachytherapy  2020: Followup with [facility] (Dr Hernández)   22: CT A/P with nonspecific increased density in the para-aortic region, which could be seen in the setting of retroperitoneal fibrosis, inflammation, or potentially lymphoma/desmoplastic reaction. New varix from right renal vein extending down into the RLQ.   10/6/22: PET CT Whole body with large 5cm retroperitoneal mass with FDG avidity. New nodes in retroperitoneum and mediastinum    Plan: Paclitaxel, Carboplatin, Bevacizumab, and pembrolizumab added due to PD-L1 expression every 3 weeks.   10/13/22: Cycle 1 Paclitaxel 175 mg/m2, Carboplatin AUC 6, Bevacizumab 15 mg/m2  22: Cycle 2 Paclitaxel 175 mg/m2, Carboplatin AUC 5, Bevacizumab 15 mg/m2. Pembrolizumab on hold while awaiting insurance coverage.  22: ED for nausea (no vomiting) and dizziness, discharged with valium for vertigo.  22: Cycle  3 Paclitaxel 175 mg/m2, Carboplatin AUC 5, Bevacizumab 15 mg/m2, and pembrolizumab 200 mg.  Defer x 1 week for thrombocytopenia (plts 82).  Decrease Paclitaxel 135 mg/m2, Carboplatin AUC 5, Bevacizumab 15 mg/m2, and pembrolizumab 200 mg, given 12/5.  12/19/22 CT CAP with  response    Plan: Continue paclitaxel, carboplatin, bevacizumab, and pembrolizumab with CT/MD after 3 more cycles (6 total)  12/30/22: Cycle 4 Paclitaxel 135 mg/m2, Carboplatin AUC 5, Bevacizumab 15 mg/m2, and pembrolizumab 200 mg, held carboplatin and paclitaxel due to thrombocytopenia (plts 39), not given this cycle.  Bevacizumab and Pembrolizumab given.  1/20/23: Cycle 5 Paclitaxel 135 mg/m2, Carboplatin AUC 4, Bevacizumab 15 mg/m2, and pembrolizumab 200 mg.  2/10/23: Cycle 6 Paclitaxel 135 mg/m2, Carboplatin AUC 4, Bevacizumab 15 mg/m2, and pembrolizumab 200 mg.     Plan: Continue paclitaxel, carboplatin, bevacizumab, and pembrolizumab x 1 more cycle as she did not have chemotherapy for cycle 4.  Followed by CT and follow up with Dr. Brambila.  3/3/23: Cycle 7 Paclitaxel 135 mg/m2, Carboplatin AUC 4, Bevacizumab 15 mg/m2, and pembrolizumab 200 mg.   3/20/2023: CT C/A/P with stable disease (still with retroperitoneal adenopathy encasing aorta/IVC, occulusion of IVC, cystitis/proctitis)   3/24/23: Cycle one of johny/pembro maintenance (cycle 8 overall)  4/14/23: C2 pembro/avastin  (cycle 9 overall)   4/26/23: OSH ED visit for pain, found to have new hydro   4/28/23: Met with urology (Dr Valladares) for hydronephrosis. Plan possible stent placement in the future for hydro.   5/5/23:cycle 3 maintenance pembro/avastin (cycle 10 overall)   5/26/23: Cycle 4 maintenance pembro/avastin (cycle 11 overall).  6/16/23: Cycle 5 maintenance pembro/avastin (cycle 12 overall).  7/7/2023: Due for Cycle 6 maintenance    Interval history:     Feels she has a worsening UTI that is not getting better for weeks. She feels fatigued, burning when she urinates, urinary  frequency. She also has increase in vaginal discharge.   23 had a GPS >100k colony UTI.   LE swelling improved.   Not taking pain meds every day.   Taking synthroid 150 mcg/day.   CT scan with stable lymphatic disease but possible new fistula        Past Medical History:   Diagnosis Date     Cervical cancer (H)      Tobacco abuse      Past Surgical History:   Procedure Laterality Date     CARPAL TUNNEL RELEASE RT/LT Right      COMBINED CYSTOSCOPY, INSERT STENT URETER(S) Bilateral 5/10/2023    Procedure: CYSTOSCOPY, WITH RIGHT URETERAL STENT INSERTION, Bilateral retrograde pyelogram.;  Surgeon: Robert Valladares MD;  Location: [facility] OR     TUBAL LIGATION         OBGYN history and Health Maintenance:  , completed childbearing , had tubal ligation   Last Pap Smear: per HPI   Last Mammogram: never  Last Colonoscopy: never    Meds reviewed in EPIC     Allergies   Allergen Reactions     Clindamycin Hives     Social History:  Social History     Tobacco Use     Smoking status: Every Day     Packs/day: 1.00     Types: Cigarettes     Smokeless tobacco: Never     Tobacco comments:     2020 1/2 pack/day, tying tp quit   Substance Use Topics     Alcohol use: Yes     Comment: occ.     Work: manufacturing, manual labor  Preferred language: English  Lives at home with: Partner and 4 kids  Long smoking history since age 13. Smokes a pack daily. Denies any substance abuse     Family History:   No family history of cancer     Physical Exam:   Vitals reviewed in epic  LMP  (LMP Unknown)   General: No acute distress. Some temporal wasting which has been stable  Remainder of exam deferred      ECOG performance status: 1     Labs/imaging (Personally reviewed today,  2023)    CT 23:                                                                      IMPRESSION:  1.  Posttreatment changes in the cervix with new fluid which appears to be within and/or adjacent to the vagina, communication with adjacent bladder,  ureter and/or colon is possible, recommend correlation with vaginal discharge.  2.  Interval placement of right ureteral stent with resolution of hydronephrosis.  3.  No significant change in retroperitoneal lymphadenopathy with encasement and occlusion of the inferior vena cava.  4.  No evidence of metastatic disease in the chest.      Assessment:  Suni Zuluaga is a 38 year old woman with Stage IIIC1(r) squamous cell carcinoma of the cervix , now with recurrence. PDL1+    Plan:     1.)   Cervical cancer : Stage IIIC1r SCC of the cervix (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  Completed primary chemoRT without complication. Recurrence in 10/2022.  Received 7 cycles of carbo/taxol/johny/pembro. Required dose reductions due to neutropenia, thrombocytopenia. Has now received 5 cycles of maintenance johny/pembro.   Now with imaging and clinical findings concerning for rectovaginal fistula and possible colo-ureteral or colo-vesicular fistula. Discussed these unfortunate findings with Suni at length. Discussed possible diverting palliative colostomy for fistula management. Need further studies to determine extent of fistula(s). Discussed with radiology as well.       - Plan Fluoro gastrograffin study (as per radiology recs) to better understand fistula  - Will discuss with urology best way to manage possible fistula into bladder/ureters  -Will stop avastin given fistulas, but continue pembro maintenance therapy given stable disease (plan 3 more cycles and then repeat imaging).   -If progression, would plan tisotumab vedotin (Tivdak)  - Followup July 12, 2023.     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 Gave Suni my pager number to pass along to providers at [facility] for questions regarding her care.       Total time  today was 42 minutes including reviewing imaging, counseling patient, and documentation. Discussing with radiology and urology.     Suzy Brambila MD  Gynecologic Oncology  Pager [phone]

## 2023-06-29 ENCOUNTER — PATIENT OUTREACH (OUTPATIENT)
Dept: CARE COORDINATION | Facility: CLINIC | Age: 38
End: 2023-06-29
Payer: COMMERCIAL

## 2023-06-29 NOTE — PROGRESS NOTES
Social Work - Distress Screen Intervention  [facility]    Identified Concern and Score from Distress Screenin. How concerned are you about your ability to eat? 7     2. How concerned are you about unintended weight loss or your current weight? 7 (losing weight per pt)     3. How concerned are you about feeling depressed or very sad?  0     4. How concerned are you about feeling anxious or very scared?  0     5. Do you struggle with the loss of meaning and milagro in your life?  Not at all     6. How concerned are you about work and home life issues that may be affected by your cancer?  (!) 10 (work and home life concerns per pt)     7. How concerned are you about knowing what resources are available to help you?  (!) 10 (would like more resources per pt)     8. Do you currently have what you would describe as Gnosticist or spiritual struggles? Not at all     9. If you want to be contacted by one of our professionals, I can send a message to them right now.  -- (declined per pt)       Date of Distress Screen: 23  Data: At time of last visit, patient scored positive on distress screening.  outreached to patient today to follow up on elevated distress and introduce psychosocial services and support. Suni is a 38-year-old woman with a diagnosis of recurrent cervical cancer who is followed by Dr. Brambila, and receives infusions at [facility].   Intervention/Education provided:  contacted patient by phone to discuss distress screening results. Suni reported that she is under more stress due to finances. 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 Suni reported that she would like  to mail resources for her to consider. SUSAN mailed the following:   - Pay It Forward application  - [employer]  - [employer]   (with information about M[place]hoices assessment)  - [place] page    Follow-up Required:  will remain available to patient for support as needed. Suni has been previously given oncology social work contact information.    JOELLE Gómez, LICSW, OSW-C  Clinical - Adult Oncology  She/Her/Hers  Phone: [phone]  [facility]: M, Thu  *every other Tue, 8am-4:30pm  [facility]: W, F, *every other Tue, 8am-4:30pm

## 2023-06-30 ENCOUNTER — PREP FOR PROCEDURE (OUTPATIENT)
Dept: UROLOGY | Facility: CLINIC | Age: 38
End: 2023-06-30
Payer: COMMERCIAL

## 2023-06-30 DIAGNOSIS — N13.5 STRICTURE OR KINKING OF URETER: Primary | ICD-10-CM

## 2023-06-30 RX ORDER — ACETAMINOPHEN 325 MG/1
975 TABLET ORAL ONCE
Status: CANCELLED | OUTPATIENT
Start: 2023-06-30 | End: 2023-06-30

## 2023-06-30 RX ORDER — CEFAZOLIN SODIUM 2 G/50ML
2 SOLUTION INTRAVENOUS
Status: CANCELLED | OUTPATIENT
Start: 2023-06-30

## 2023-06-30 RX ORDER — CEFAZOLIN SODIUM 2 G/50ML
2 SOLUTION INTRAVENOUS SEE ADMIN INSTRUCTIONS
Status: CANCELLED | OUTPATIENT
Start: 2023-06-30

## 2023-07-03 ENCOUNTER — TELEPHONE (OUTPATIENT)
Dept: UROLOGY | Facility: CLINIC | Age: 38
End: 2023-07-03
Payer: COMMERCIAL

## 2023-07-03 NOTE — CONFIDENTIAL NOTE
LVM for patient regarding questions about upcoming surgery on 7/5/23 withj Dr. Valladares.  This author's direct line provided for future questions/concerns.    Trae Griggs, RN  RN Care Coordinator - Urology

## 2023-07-03 NOTE — TELEPHONE ENCOUNTER
Patient aware procedure will be scheduled for this Wednesday July 5th with Dr. Valladares, was in the ED recently Dr. Valladares to update H&P if needed. Patient has questions if the procedure urgently being scheduled is related to her symptoms she has been experiencing, patient would like to speak to KORIN Aguillon on 7/3/2023 at 11:48 AM

## 2023-07-05 ENCOUNTER — ANESTHESIA (OUTPATIENT)
Dept: SURGERY | Facility: AMBULATORY SURGERY CENTER | Age: 38
End: 2023-07-05
Payer: COMMERCIAL

## 2023-07-05 ENCOUNTER — ANCILLARY PROCEDURE (OUTPATIENT)
Dept: RADIOLOGY | Facility: AMBULATORY SURGERY CENTER | Age: 38
End: 2023-07-05
Attending: UROLOGY
Payer: COMMERCIAL

## 2023-07-05 ENCOUNTER — ANCILLARY ORDERS (OUTPATIENT)
Dept: UROLOGY | Facility: CLINIC | Age: 38
End: 2023-07-05

## 2023-07-05 ENCOUNTER — HOSPITAL ENCOUNTER (OUTPATIENT)
Facility: AMBULATORY SURGERY CENTER | Age: 38
Discharge: HOME | End: 2023-07-05
Attending: UROLOGY
Payer: COMMERCIAL

## 2023-07-05 ENCOUNTER — ANESTHESIA EVENT (OUTPATIENT)
Dept: SURGERY | Facility: AMBULATORY SURGERY CENTER | Age: 38
End: 2023-07-05
Payer: COMMERCIAL

## 2023-07-05 VITALS
RESPIRATION RATE: 14 BRPM | SYSTOLIC BLOOD PRESSURE: 103 MMHG | DIASTOLIC BLOOD PRESSURE: 76 MMHG | HEIGHT: 63 IN | HEART RATE: 90 BPM | OXYGEN SATURATION: 96 % | WEIGHT: 123 LBS | TEMPERATURE: 97.2 F | BODY MASS INDEX: 21.79 KG/M2

## 2023-07-05 DIAGNOSIS — N13.5 STRICTURE OR KINKING OF URETER: ICD-10-CM

## 2023-07-05 DIAGNOSIS — N13.5 STRICTURE OR KINKING OF URETER: Primary | ICD-10-CM

## 2023-07-05 PROCEDURE — 52000 CYSTOURETHROSCOPY: CPT | Mod: GC | Performed by: UROLOGY

## 2023-07-05 PROCEDURE — 52000 CYSTOURETHROSCOPY: CPT

## 2023-07-05 RX ORDER — CEFAZOLIN SODIUM 2 G/50ML
2 SOLUTION INTRAVENOUS SEE ADMIN INSTRUCTIONS
Status: DISCONTINUED | OUTPATIENT
Start: 2023-07-05 | End: 2023-07-05 | Stop reason: HOSPADM

## 2023-07-05 RX ORDER — FENTANYL CITRATE 50 UG/ML
50 INJECTION, SOLUTION INTRAMUSCULAR; INTRAVENOUS EVERY 5 MIN PRN
Status: DISCONTINUED | OUTPATIENT
Start: 2023-07-05 | End: 2023-07-06 | Stop reason: HOSPADM

## 2023-07-05 RX ORDER — FENTANYL CITRATE 50 UG/ML
25 INJECTION, SOLUTION INTRAMUSCULAR; INTRAVENOUS EVERY 5 MIN PRN
Status: DISCONTINUED | OUTPATIENT
Start: 2023-07-05 | End: 2023-07-06 | Stop reason: HOSPADM

## 2023-07-05 RX ORDER — HYDROMORPHONE HYDROCHLORIDE 1 MG/ML
0.2 INJECTION, SOLUTION INTRAMUSCULAR; INTRAVENOUS; SUBCUTANEOUS EVERY 5 MIN PRN
Status: DISCONTINUED | OUTPATIENT
Start: 2023-07-05 | End: 2023-07-06 | Stop reason: HOSPADM

## 2023-07-05 RX ORDER — FENTANYL CITRATE 50 UG/ML
INJECTION, SOLUTION INTRAMUSCULAR; INTRAVENOUS PRN
Status: DISCONTINUED | OUTPATIENT
Start: 2023-07-05 | End: 2023-07-05

## 2023-07-05 RX ORDER — CEFAZOLIN SODIUM 2 G/50ML
2 SOLUTION INTRAVENOUS
Status: COMPLETED | OUTPATIENT
Start: 2023-07-05 | End: 2023-07-05

## 2023-07-05 RX ORDER — ONDANSETRON 2 MG/ML
4 INJECTION INTRAMUSCULAR; INTRAVENOUS EVERY 30 MIN PRN
Status: DISCONTINUED | OUTPATIENT
Start: 2023-07-05 | End: 2023-07-06 | Stop reason: HOSPADM

## 2023-07-05 RX ORDER — PROPOFOL 10 MG/ML
INJECTION, EMULSION INTRAVENOUS CONTINUOUS PRN
Status: DISCONTINUED | OUTPATIENT
Start: 2023-07-05 | End: 2023-07-05

## 2023-07-05 RX ORDER — ONDANSETRON 4 MG/1
4 TABLET, ORALLY DISINTEGRATING ORAL EVERY 30 MIN PRN
Status: DISCONTINUED | OUTPATIENT
Start: 2023-07-05 | End: 2023-07-06 | Stop reason: HOSPADM

## 2023-07-05 RX ORDER — SODIUM CHLORIDE, SODIUM LACTATE, POTASSIUM CHLORIDE, CALCIUM CHLORIDE 600; 310; 30; 20 MG/100ML; MG/100ML; MG/100ML; MG/100ML
INJECTION, SOLUTION INTRAVENOUS CONTINUOUS PRN
Status: DISCONTINUED | OUTPATIENT
Start: 2023-07-05 | End: 2023-07-05

## 2023-07-05 RX ORDER — ACETAMINOPHEN 325 MG/1
975 TABLET ORAL ONCE
Status: COMPLETED | OUTPATIENT
Start: 2023-07-05 | End: 2023-07-05

## 2023-07-05 RX ORDER — HYDROMORPHONE HYDROCHLORIDE 1 MG/ML
0.4 INJECTION, SOLUTION INTRAMUSCULAR; INTRAVENOUS; SUBCUTANEOUS EVERY 5 MIN PRN
Status: DISCONTINUED | OUTPATIENT
Start: 2023-07-05 | End: 2023-07-06 | Stop reason: HOSPADM

## 2023-07-05 RX ORDER — PROPOFOL 10 MG/ML
INJECTION, EMULSION INTRAVENOUS PRN
Status: DISCONTINUED | OUTPATIENT
Start: 2023-07-05 | End: 2023-07-05

## 2023-07-05 RX ORDER — SODIUM CHLORIDE, SODIUM LACTATE, POTASSIUM CHLORIDE, CALCIUM CHLORIDE 600; 310; 30; 20 MG/100ML; MG/100ML; MG/100ML; MG/100ML
INJECTION, SOLUTION INTRAVENOUS CONTINUOUS
Status: DISCONTINUED | OUTPATIENT
Start: 2023-07-05 | End: 2023-07-06 | Stop reason: HOSPADM

## 2023-07-05 RX ORDER — ONDANSETRON 2 MG/ML
INJECTION INTRAMUSCULAR; INTRAVENOUS PRN
Status: DISCONTINUED | OUTPATIENT
Start: 2023-07-05 | End: 2023-07-05

## 2023-07-05 RX ORDER — CIPROFLOXACIN 500 MG/1
500 TABLET, FILM COATED ORAL 2 TIMES DAILY
Qty: 6 TABLET | Refills: 0 | Status: ON HOLD | OUTPATIENT
Start: 2023-07-05 | End: 2023-08-03

## 2023-07-05 RX ADMIN — FENTANYL CITRATE 50 MCG: 50 INJECTION, SOLUTION INTRAMUSCULAR; INTRAVENOUS at 13:23

## 2023-07-05 RX ADMIN — PROPOFOL 150 MCG/KG/MIN: 10 INJECTION, EMULSION INTRAVENOUS at 13:23

## 2023-07-05 RX ADMIN — PROPOFOL 50 MG: 10 INJECTION, EMULSION INTRAVENOUS at 13:23

## 2023-07-05 RX ADMIN — ACETAMINOPHEN 975 MG: 325 TABLET ORAL at 12:11

## 2023-07-05 RX ADMIN — SODIUM CHLORIDE, SODIUM LACTATE, POTASSIUM CHLORIDE, CALCIUM CHLORIDE: 600; 310; 30; 20 INJECTION, SOLUTION INTRAVENOUS at 12:23

## 2023-07-05 RX ADMIN — ONDANSETRON 4 MG: 2 INJECTION INTRAMUSCULAR; INTRAVENOUS at 13:23

## 2023-07-05 RX ADMIN — CEFAZOLIN SODIUM 2 G: 2 SOLUTION INTRAVENOUS at 13:17

## 2023-07-05 NOTE — DISCHARGE INSTRUCTIONS
[facility]  Home Care Following Anesthesia  For 24 hours after surgery:  Get plenty of rest.  A responsible adult must stay with you for at least 24 hours after you leave the surgery center.  Do not drive or use heavy equipment.  If you have weakness or tingling, don't drive or use heavy equipment until this feeling goes away.   Do not drink alcohol.   Avoid strenuous or risky activities.  Ask for help when climbing stairs.  You may feel lightheaded.  IF so, sit for a few minutes before standing.  Have someone help you get up.   If you have nausea (feel sick to your stomach): Drink only clear liquids such as apple juice, ginger ale, broth or 7-Up.  Rest may also help.  Be sure to drink enough fluids.  Move to a regular diet as you feel able.   You may have a slight fever.  Call the doctor if your fever is over 100 F (37.7 C) (taken under the tongue) or lasts longer than 24 hours.  You may have a dry mouth, a sore throat, muscle aches or trouble sleeping. These should go away after 24 hours.  Do not make important or legal decisions.   It is recommended to avoid smoking.               Tips for taking pain medications  To get the best pain relief possible, remember these points:  Take pain medications as directed, before pain becomes severe.  Pain medication can upset your stomach: taking it with food may help.  Constipation is a common side effect of pain medication. Drink plenty of  fluids.  Eat foods high in fiber. Take a stool softener if recommended by your doctor or pharmacist.  Do not drink alcohol, drive or operate machinery while taking pain medications.  Ask about other ways to control pain, such as with heat, ice or relaxation.    Tylenol/Acetaminophen Consumption    If you feel your pain relief is insufficient, you may take Tylenol/Acetaminophen in addition to your narcotic pain medication.   Be careful not to exceed 4,000 mg of Tylenol/Acetaminophen in a 24 hour  period from all sources.  If you are taking extra strength Tylenol/acetaminophen (500 mg), the maximum dose is 8 tablets in 24 hours.  If you are taking regular strength acetaminophen (325 mg), the maximum dose is 12 tablets in 24 hours.    Call a doctor for any of the following:  Signs of infection (fever, growing tenderness at the surgery site, a large amount of drainage or bleeding, severe pain, foul-smelling drainage, redness, swelling).  It has been over 8 to 10 hours since surgery and you are still not able to urinate (pass water).  Headache for over 24 hours.  Numbness, tingling or weakness the day after surgery (if you had spinal anesthesia).  Signs of Covid-19 infection (temperature over 100 degrees, shortness of breath, cough, loss of taste/smell, generalized body aches, persistent headache, chills, sore throat, nausea/vomiting/diarrhea)  Your doctor is:  Dr. Robert Valladares, Prostate and Urology: [phone]                    Or dial [phone] and ask for the resident on call for:  Prostate Urology  For emergency care, call the:  [facility]:  [phone] (TTY for hearing impaired: [phone])

## 2023-07-05 NOTE — CONSULTS
Outpatient IR Drain Referral  07/05/23    Referring Provider: Dr. Valladares Urology   IR Referral Request: Right PNT placement and ureteral stent removal  Procedure Approved for: Right PNT placement and ureteral stent removal same day.    Brief History:    Suni Zuluaga is a 38 year old female with history of tobacco use, cervical cancer, hydronephrosis, kinking of ureter, s/p right ureteral stent placed 5/10/23 by Dr. 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 She has right ureteral obstruction with a right ureteral stent, this is now hanging from her vagina. She would be better managed with a R PNT.     Pertinent Imaging Reviewed:  CT 6/23/23  PELVIC ORGANS: Posttreatment changes again noted in the cervix with new fluid and gas in the vagina which may extend into the pouch of Lj, although difficult to definitively visualize a defect in the vaginal wall. The right ureteral stent appears to pass through this collection which measures approximately 5.4 x 2.7 cm (series 3 image 526). No large volume pneumoperitoneum.        IMPRESSION:  1.  Posttreatment changes in the cervix with new fluid which appears to be within and/or adjacent to the vagina, communication with adjacent bladder, ureter and/or colon is possible, recommend correlation with vaginal discharge.  2.  Interval placement of right ureteral stent with resolution of hydronephrosis.  3.  No significant change in retroperitoneal lymphadenopathy with encasement and occlusion of the inferior vena cava.  4.  No evidence of metastatic disease in the chest.    Case and imaging CT 6/23/23 and Cystoscopy images were reviewed with Dr. Willett from IR and Right PNT placement with Right ureteral stent removal same day approved.     Procedure order placed.    Requesting team, Dr. Valladares Urology, made aware of IR recommendations via Epic messaging.      LAURA Cabral CNP  Interventional Radiolog

## 2023-07-05 NOTE — ANESTHESIA PREPROCEDURE EVALUATION
Anesthesia Pre-Procedure Evaluation    Patient: Suni Zuluaga   MRN: [id number] : 1985        Procedure : Procedure(s):  CYSTOSCOPY, WITH Bilateral RETROGRADE PYELOGRAM AND Right URETERAL STENT REPLACEMENT          Past Medical History:   Diagnosis Date     Cervical cancer (H)      Tobacco abuse       Past Surgical History:   Procedure Laterality Date     CARPAL TUNNEL RELEASE RT/LT Right      COMBINED CYSTOSCOPY, INSERT STENT URETER(S) Bilateral 5/10/2023    Procedure: CYSTOSCOPY, WITH RIGHT URETERAL STENT INSERTION, Bilateral retrograde pyelogram.;  Surgeon: Robert Valladares MD;  Location: [facility] OR     TUBAL LIGATION        Allergies   Allergen Reactions     Clindamycin Hives      Social History     Tobacco Use     Smoking status: Every Day     Packs/day: 1.00     Types: Cigarettes     Smokeless tobacco: Never     Tobacco comments:     2020 1/2 pack/day, tying tp quit   Substance Use Topics     Alcohol use: Yes     Comment: occ.      Wt Readings from Last 1 Encounters:   23 55.8 kg (123 lb)        Anesthesia Evaluation   Pt has had prior anesthetic.     No history of anesthetic complications       ROS/MED HX  ENT/Pulmonary:  - neg pulmonary ROS     Neurologic:  - neg neurologic ROS     Cardiovascular:  - neg cardiovascular ROS  (-) hypertension   METS/Exercise Tolerance: >4 METS    Hematologic:  - neg hematologic  ROS     Musculoskeletal:  - neg musculoskeletal ROS     GI/Hepatic:  - neg GI/hepatic ROS     Renal/Genitourinary:     (+) renal disease,     Endo:  - neg endo ROS     Psychiatric/Substance Use:  - neg psychiatric ROS     Infectious Disease:  - neg infectious disease ROS     Malignancy:   (+) Malignancy, History of Other.Other CA Cervical Cancer Active status post.    Other:  - neg other ROS          Physical Exam    Airway        Mallampati: I       Respiratory Devices and Support         Dental       (+) Multiple visibly decayed, broken teeth      Cardiovascular    cardiovascular exam normal       Rhythm and rate: regular and normal     Pulmonary           breath sounds clear to auscultation           OUTSIDE LABS:  CBC:   Lab Results   Component Value Date    WBC 6.9 05/05/2023    WBC 5.4 04/14/2023    HGB 11.4 (L) 05/05/2023    HGB 11.2 (L) 04/14/2023    HCT 35.1 05/05/2023    HCT 34.2 (L) 04/14/2023     05/05/2023     04/14/2023     BMP:   Lab Results   Component Value Date     (L) 06/16/2023     05/26/2023    POTASSIUM 4.1 06/16/2023    POTASSIUM 3.6 05/26/2023    CHLORIDE 97 (L) 06/16/2023    CHLORIDE 106 05/26/2023    CO2 28 06/16/2023    CO2 26 05/26/2023    BUN 15.0 06/16/2023    BUN 13.8 05/26/2023    CR 0.70 06/16/2023    CR 0.61 05/26/2023     (H) 06/16/2023     (H) 05/26/2023     COAGS:   Lab Results   Component Value Date    PTT 30 09/28/2022    INR 1.06 09/28/2022     POC:   Lab Results   Component Value Date    HCG Negative 05/10/2023     HEPATIC:   Lab Results   Component Value Date    ALBUMIN 3.8 06/16/2023    PROTTOTAL 8.3 06/16/2023    ALT 15 06/16/2023    AST 18 06/16/2023    ALKPHOS 120 (H) 06/16/2023    BILITOTAL 0.2 06/16/2023     OTHER:   Lab Results   Component Value Date    HARRISON 9.7 06/16/2023    MAG 2.2 06/16/2023    TSH 11.86 (H) 06/16/2023    T4 2.78 (H) 06/16/2023       Anesthesia Plan    ASA Status:  2   NPO Status:  NPO Appropriate    Anesthesia Type: MAC.   Induction: Intravenous.   Maintenance: Balanced.        Consents    Anesthesia Plan(s) and associated risks, benefits, and realistic alternatives discussed. Questions answered and patient/representative(s) expressed understanding.    - Discussed: Risks, Benefits and Alternatives for BOTH SEDATION and the PROCEDURE were discussed     - Discussed with:          Postoperative Care    Pain management: IV analgesics.   PONV prophylaxis: Ondansetron (or other 5HT-3)     Comments:                Alf Palmer MD

## 2023-07-05 NOTE — ANESTHESIA CARE TRANSFER NOTE
Patient: Suni Zuluaga    Procedure: Procedure(s):  CYSTOSCOPY with fluoroscopy       Diagnosis: Stricture or kinking of ureter [N13.5]  Diagnosis Additional Information: No value filed.    Anesthesia Type:   MAC     Note:    Oropharynx: oropharynx clear of all foreign objects and spontaneously breathing  Level of Consciousness: drowsy  Oxygen Supplementation: room air    Independent Airway: airway patency satisfactory and stable  Dentition: dentition unchanged  Vital Signs Stable: post-procedure vital signs reviewed and stable  Report to RN Given: handoff report given  Patient transferred to: Phase II  Comments: Uneventful transport   Report to RN  Exchanging well; color natl  Pt responds appropriately to command; sleepy - - same as preop baseline  IV patent  Lips/teeth/dentition as preop status  Questions answered  Spo2 96-97% @ 1348  Handoff Report: Identifed the Patient, Identified the Reponsible Provider, Reviewed the pertinent medical history, Discussed the surgical course, Reviewed Intra-OP anesthesia mangement and issues during anesthesia, Set expectations for post-procedure period and Allowed opportunity for questions and acknowledgement of understanding      Vitals:  Vitals Value Taken Time   /78 07/05/23 1345   Temp 36.8  C (98.2  F) 07/05/23 1345   Pulse 98 07/05/23 1345   Resp 14 07/05/23 1345   SpO2 94 % 07/05/23 1345       Electronically Signed By: LAURA REDD CRNA  July 5, 2023  1:50 PM

## 2023-07-05 NOTE — ANESTHESIA POSTPROCEDURE EVALUATION
Patient: Suin Zuluaga    Procedure: Procedure(s):  CYSTOSCOPY with fluoroscopy       Anesthesia Type:  MAC    Note:  Disposition: Outpatient   Postop Pain Control: Uneventful            Sign Out: Well controlled pain   PONV: No   Neuro/Psych: Uneventful            Sign Out: Acceptable/Baseline neuro status   Airway/Respiratory: Uneventful            Sign Out: Acceptable/Baseline resp. status   CV/Hemodynamics: Uneventful            Sign Out: Acceptable CV status; No obvious hypovolemia; No obvious fluid overload   Other NRE: NONE   DID A NON-ROUTINE EVENT OCCUR? No           Last vitals:  Vitals Value Taken Time   /76 07/05/23 1414   Temp 36.2  C (97.2  F) 07/05/23 1414   Pulse 90 07/05/23 1414   Resp 14 07/05/23 1414   SpO2 96 % 07/05/23 1414       Electronically Signed By: Alf Palmer MD  July 5, 2023  2:18 PM

## 2023-07-05 NOTE — OP NOTE
PROCEDURES PERFORMED:     Cystourethroscopy    Fluoroscopy with intraoperative interpretation of imaging     PREOPERATIVE DIAGNOSIS:  Stricture or kinking of ureter [N13.5]   POSTOPERATIVE DIAGNOSIS:  Same, rectovaginal fistula, vesicovaginal fistula  STAFF SURGEON: RAMÓN Valladares MD  RESIDENT: Harshal Marcial MD  ANESTHESIA: MAC  ESTIMATED BLOOD LOSS: 0 mL.   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    DESCRIPTION OF PROCEDURE:    After informed consent was obtained, the patient was taken to the operating room, and moved to the operating table.  After adequate anesthesia was induced, the patient was repositioned in dorsal lithotomy position and prepped and draped in the usual sterile fashion. A timeout was taken to confirm correct patient, procedure and laterality.     A 22-Welsh rigid cystoscope was inserted into a well lubricated urethra. The urethra was unremarkable. Upon entry into the bladder it was very edematous with poor visualization. The cystoscope then promptly was advanced what appeared to be additional bladder mucosa but was ultimately a large vesicovaginal fistula as the cystoscope was palpated in the vagina. A digital exam as then performed which demonstrated about 5cm of stent including the distal curl in the vagina, a 3-4cm apical anterior vesicovaginal fistula, and likely a 2cm posterior recto  vs colonic vaginal fistula. Fluoroscopy was performed which demonstrated the right ureteral stent was in appropriate position in the right renal pelvis and the distal curl was in the vagina. At this time we decided to not proceed with stent exchange given her multiple fistulas and long-term recommendation of a right PNT. This concluded the procedure.     The patient tolerated the procedure well.  There were no complications.      PLAN:   -Schedule for R PNT placement with right stent removal at that time (can be removed by IR per vagina)  -F/u w/ urology prn  -3 days of cipro or infx ppx    Harshal Marcial  PGY-4  [phone]

## 2023-07-07 ENCOUNTER — INFUSION THERAPY VISIT (OUTPATIENT)
Dept: ONCOLOGY | Facility: CLINIC | Age: 38
End: 2023-07-07
Attending: OBSTETRICS & GYNECOLOGY
Payer: COMMERCIAL

## 2023-07-07 ENCOUNTER — LAB (OUTPATIENT)
Dept: LAB | Facility: CLINIC | Age: 38
End: 2023-07-07
Attending: OBSTETRICS & GYNECOLOGY
Payer: COMMERCIAL

## 2023-07-07 VITALS
SYSTOLIC BLOOD PRESSURE: 98 MMHG | RESPIRATION RATE: 16 BRPM | TEMPERATURE: 98 F | OXYGEN SATURATION: 95 % | BODY MASS INDEX: 20.55 KG/M2 | DIASTOLIC BLOOD PRESSURE: 70 MMHG | HEART RATE: 114 BPM | WEIGHT: 116 LBS

## 2023-07-07 DIAGNOSIS — R52 PAIN: ICD-10-CM

## 2023-07-07 DIAGNOSIS — Z51.12 ENCOUNTER FOR ANTINEOPLASTIC IMMUNOTHERAPY: ICD-10-CM

## 2023-07-07 DIAGNOSIS — C53.8 MALIGNANT NEOPLASM OF OVERLAPPING SITES OF CERVIX (H): ICD-10-CM

## 2023-07-07 DIAGNOSIS — G89.3 CANCER ASSOCIATED PAIN: ICD-10-CM

## 2023-07-07 DIAGNOSIS — C53.8 MALIGNANT NEOPLASM OF OVERLAPPING SITES OF CERVIX (H): Primary | ICD-10-CM

## 2023-07-07 DIAGNOSIS — E03.2 HYPOTHYROIDISM DUE TO MEDICATION: ICD-10-CM

## 2023-07-07 DIAGNOSIS — D69.6 THROMBOCYTOPENIA (H): ICD-10-CM

## 2023-07-07 LAB
ALBUMIN SERPL BCG-MCNC: 3.8 G/DL (ref 3.5–5.2)
ALBUMIN UR-MCNC: NEGATIVE MG/DL
ALP SERPL-CCNC: 81 U/L (ref 35–104)
ALT SERPL W P-5'-P-CCNC: 16 U/L (ref 0–50)
ANION GAP SERPL CALCULATED.3IONS-SCNC: 12 MMOL/L (ref 7–15)
AST SERPL W P-5'-P-CCNC: 17 U/L (ref 0–45)
BILIRUB SERPL-MCNC: 0.2 MG/DL
BUN SERPL-MCNC: 16.4 MG/DL (ref 6–20)
CALCIUM SERPL-MCNC: 9.7 MG/DL (ref 8.6–10)
CHLORIDE SERPL-SCNC: 100 MMOL/L (ref 98–107)
CREAT SERPL-MCNC: 0.56 MG/DL (ref 0.51–0.95)
DEPRECATED HCO3 PLAS-SCNC: 26 MMOL/L (ref 22–29)
GFR SERPL CREATININE-BSD FRML MDRD: >90 ML/MIN/1.73M2
GLUCOSE SERPL-MCNC: 103 MG/DL (ref 70–99)
HOLD SPECIMEN: NORMAL
MAGNESIUM SERPL-MCNC: 1.6 MG/DL (ref 1.7–2.3)
POTASSIUM SERPL-SCNC: 3.8 MMOL/L (ref 3.4–5.3)
PROT SERPL-MCNC: 7.9 G/DL (ref 6.4–8.3)
SODIUM SERPL-SCNC: 138 MMOL/L (ref 136–145)
T4 FREE SERPL-MCNC: 2.31 NG/DL (ref 0.9–1.7)
TSH SERPL DL<=0.005 MIU/L-ACNC: 8.18 UIU/ML (ref 0.3–4.2)

## 2023-07-07 PROCEDURE — 250N000011 HC RX IP 250 OP 636: Performed by: OBSTETRICS & GYNECOLOGY

## 2023-07-07 PROCEDURE — 250N000011 HC RX IP 250 OP 636: Performed by: NURSE PRACTITIONER

## 2023-07-07 PROCEDURE — 83735 ASSAY OF MAGNESIUM: CPT | Performed by: OBSTETRICS & GYNECOLOGY

## 2023-07-07 PROCEDURE — 96413 CHEMO IV INFUSION 1 HR: CPT

## 2023-07-07 PROCEDURE — 84443 ASSAY THYROID STIM HORMONE: CPT

## 2023-07-07 PROCEDURE — 96375 TX/PRO/DX INJ NEW DRUG ADDON: CPT

## 2023-07-07 PROCEDURE — 999N000285 HC STATISTIC VASC ACCESS LAB DRAW WITH PIV START

## 2023-07-07 PROCEDURE — 999N000248 HC STATISTIC IV INSERT WITH US BY RN

## 2023-07-07 PROCEDURE — 36415 COLL VENOUS BLD VENIPUNCTURE: CPT | Performed by: OBSTETRICS & GYNECOLOGY

## 2023-07-07 PROCEDURE — 81003 URINALYSIS AUTO W/O SCOPE: CPT

## 2023-07-07 PROCEDURE — 84439 ASSAY OF FREE THYROXINE: CPT

## 2023-07-07 PROCEDURE — 258N000003 HC RX IP 258 OP 636: Performed by: NURSE PRACTITIONER

## 2023-07-07 PROCEDURE — 80053 COMPREHEN METABOLIC PANEL: CPT | Performed by: OBSTETRICS & GYNECOLOGY

## 2023-07-07 RX ORDER — HEPARIN SODIUM (PORCINE) LOCK FLUSH IV SOLN 100 UNIT/ML 100 UNIT/ML
5 SOLUTION INTRAVENOUS
Status: CANCELLED | OUTPATIENT
Start: 2023-07-07

## 2023-07-07 RX ORDER — HYDROMORPHONE HYDROCHLORIDE 1 MG/ML
0.3 INJECTION, SOLUTION INTRAMUSCULAR; INTRAVENOUS; SUBCUTANEOUS ONCE
Status: COMPLETED | OUTPATIENT
Start: 2023-07-07 | End: 2023-07-07

## 2023-07-07 RX ORDER — METHYLPREDNISOLONE SODIUM SUCCINATE 125 MG/2ML
125 INJECTION, POWDER, LYOPHILIZED, FOR SOLUTION INTRAMUSCULAR; INTRAVENOUS
Status: CANCELLED
Start: 2023-07-07

## 2023-07-07 RX ORDER — ALBUTEROL SULFATE 90 UG/1
1-2 AEROSOL, METERED RESPIRATORY (INHALATION)
Status: CANCELLED
Start: 2023-07-07

## 2023-07-07 RX ORDER — EPINEPHRINE 1 MG/ML
0.3 INJECTION, SOLUTION INTRAMUSCULAR; SUBCUTANEOUS EVERY 5 MIN PRN
Status: CANCELLED | OUTPATIENT
Start: 2023-07-07

## 2023-07-07 RX ORDER — MEPERIDINE HYDROCHLORIDE 25 MG/ML
25 INJECTION INTRAMUSCULAR; INTRAVENOUS; SUBCUTANEOUS EVERY 30 MIN PRN
Status: CANCELLED | OUTPATIENT
Start: 2023-07-07

## 2023-07-07 RX ORDER — HYDROMORPHONE HYDROCHLORIDE 2 MG/1
2 TABLET ORAL EVERY 8 HOURS PRN
Qty: 20 TABLET | Refills: 0 | Status: SHIPPED | OUTPATIENT
Start: 2023-07-07 | End: 2023-07-24

## 2023-07-07 RX ORDER — HEPARIN SODIUM,PORCINE 10 UNIT/ML
5 VIAL (ML) INTRAVENOUS
Status: CANCELLED | OUTPATIENT
Start: 2023-07-07

## 2023-07-07 RX ORDER — LORAZEPAM 2 MG/ML
0.5 INJECTION INTRAMUSCULAR EVERY 4 HOURS PRN
Status: CANCELLED | OUTPATIENT
Start: 2023-07-07

## 2023-07-07 RX ORDER — LEVOTHYROXINE SODIUM 200 UG/1
200 TABLET ORAL DAILY
Qty: 30 TABLET | Refills: 0 | Status: SHIPPED | OUTPATIENT
Start: 2023-07-07 | End: 2023-08-10

## 2023-07-07 RX ORDER — DIPHENHYDRAMINE HYDROCHLORIDE 50 MG/ML
50 INJECTION INTRAMUSCULAR; INTRAVENOUS
Status: CANCELLED
Start: 2023-07-07

## 2023-07-07 RX ORDER — MAGNESIUM SULFATE HEPTAHYDRATE 40 MG/ML
2 INJECTION, SOLUTION INTRAVENOUS ONCE
Status: COMPLETED | OUTPATIENT
Start: 2023-07-07 | End: 2023-07-07

## 2023-07-07 RX ORDER — ALBUTEROL SULFATE 0.83 MG/ML
2.5 SOLUTION RESPIRATORY (INHALATION)
Status: CANCELLED | OUTPATIENT
Start: 2023-07-07

## 2023-07-07 RX ADMIN — SODIUM CHLORIDE 250 ML: 9 INJECTION, SOLUTION INTRAVENOUS at 12:31

## 2023-07-07 RX ADMIN — SODIUM CHLORIDE 200 MG: 9 INJECTION, SOLUTION INTRAVENOUS at 13:55

## 2023-07-07 RX ADMIN — HYDROMORPHONE HYDROCHLORIDE 0.3 MG: 0.5 INJECTION, SOLUTION INTRAMUSCULAR; INTRAVENOUS; SUBCUTANEOUS at 12:34

## 2023-07-07 RX ADMIN — MAGNESIUM SULFATE 2 G: 2 INJECTION INTRAVENOUS at 12:38

## 2023-07-07 ASSESSMENT — PAIN SCALES - GENERAL: PAINLEVEL: WORST PAIN (10)

## 2023-07-07 NOTE — PATIENT INSTRUCTIONS
[facility] Triage and after hours / weekends / holidays:  [phone]    Please call the triage or after hours line if you experience a temperature greater than or equal to 100.4, shaking chills, have uncontrolled nausea, vomiting and/or diarrhea, dizziness, shortness of breath, chest pain, bleeding, unexplained bruising, or if you have any other new/concerning symptoms, questions or concerns.      If you are having any concerning symptoms or wish to speak to a provider before your next infusion visit, please call triage to notify them so we can adequately serve you.     If you need a refill on a narcotic prescription or other medication, please call before your infusion appointment.                July 2023 Sunday Monday Tuesday Wednesday Thursday Friday Saturday                                 1       2     3     4     5    Outpatient Visit  11:22 AM   [facility]    CYSTOSCOPY, WITH RETROGRADE PYELOGRAM AND URETERAL STENT REPLACEMENT   1:15 PM   Robert Valladares MD   [facility] OR 6     7    LAB PERIPHERAL  11:00 AM   (15 min.)   [facility] LAB DRAW   [facility]    ONC INFUSION 1.5 HR (90 MIN)  11:30 AM   (90 min.)    ONC INFUSION NURSE   [facility] 8       9     10     11     12     13     14     15       16     17     18     19     20     21     22       23     24     25    PROCEDURE - 4.5 HR   7:30 AM   (270 min.)   U2A ROOM 16   [facility] [address] [place]    IR NEPHROSTOMY TUBE PLCMT RT   7:30 AM   (120 min.)   UUIR4   [facility] Interventional Radiology 26     27     28    LAB PERIPHERAL   2:30 PM   (15 min.)   [facility] LAB DRAW   [facility]    ONC INFUSION 1.5 HR (90 MIN)   3:00 PM   (90 min.)    ONC INFUSION NURSE   [facility] 29 30 31 August 2023 Sunday Monday Tuesday  Wednesday Thursday Friday Saturday             1     2     3     4     5       6     7     8     9     10     11     12       13     14     15     16     17     18     19       20     21     22     23     24     25     26       27     28     29     30     31                                  Recent Results (from the past 24 hour(s))   Extra Urine Collection    Collection Time: 07/07/23 11:20 AM   Result Value Ref Range    Hold Specimen [facility]    Comprehensive metabolic panel    Collection Time: 07/07/23 11:33 AM   Result Value Ref Range    Sodium 138 136 - 145 mmol/L    Potassium 3.8 3.4 - 5.3 mmol/L    Chloride 100 98 - 107 mmol/L    Carbon Dioxide (CO2) 26 22 - 29 mmol/L    Anion Gap 12 7 - 15 mmol/L    Urea Nitrogen 16.4 6.0 - 20.0 mg/dL    Creatinine 0.56 0.51 - 0.95 mg/dL    Calcium 9.7 8.6 - 10.0 mg/dL    Glucose 103 (H) 70 - 99 mg/dL    Alkaline Phosphatase 81 35 - 104 U/L    AST 17 0 - 45 U/L    ALT 16 0 - 50 U/L    Protein Total 7.9 6.4 - 8.3 g/dL    Albumin 3.8 3.5 - 5.2 g/dL    Bilirubin Total 0.2 <=1.2 mg/dL    GFR Estimate >90 >60 mL/min/1.73m2   Magnesium    Collection Time: 07/07/23 11:33 AM   Result Value Ref Range    Magnesium 1.6 (L) 1.7 - 2.3 mg/dL   TSH with free T4 reflex    Collection Time: 07/07/23 11:33 AM   Result Value Ref Range    TSH 8.18 (H) 0.30 - 4.20 uIU/mL   T4 free    Collection Time: 07/07/23 11:33 AM   Result Value Ref Range    Free T4 2.31 (H) 0.90 - 1.70 ng/dL   Protein qualitative urine    Collection Time: 07/07/23 11:38 AM   Result Value Ref Range    Protein Albumin Urine Negative Negative mg/dL

## 2023-07-07 NOTE — NURSING NOTE
Chief Complaint   Patient presents with     Blood Draw     IV placement with blood draw by lab RN. Vitals taken and appointment arrived   IV placed by vascular ultrasound    Urine sample collected by patient - no order. [facility] label printed and urine sent downstairs.   Aliza Feng RN

## 2023-07-07 NOTE — PROGRESS NOTES
Infusion Nursing Note:  Suni Zuluaga presents today for Cycle 13, Day 1- Keytruda.    Patient seen by provider today: No   present during visit today: Not Applicable.    Note: Patient reporting severe perineal pain on arrival to infusion suite. Rates this pain at a 10/10, states it burns to be in any position other than lying down. States the pyridium and oxycodone she has at home has not been helping. Reports she continues to have stool mixed with urine d/t fistula. Reports continued vaginal bleeding, occasionally passing clots. Continues to have fatigue, relieved by rest. Has numbness/tingling in her hands and feet at times. Appetite is fair per patient statement. Has urinary frequency and pain. Continues to have generalized weakness. Arrives via wheelchair today. Patient using hot packs and [place]aw for pain control, provider paged for medication.     Requires 2g of magnesium replacement today.     Mounika Ritchie CNP wrote for one time dose of 0.3mg of IV Dilaudid for pain today. On reassessment pain is at 0/10. Secure message sent to Mounika Ritchie CNP to see if patients home oxycodone prescription could be changed to PO Dilaudid.     Intravenous Access:  Peripheral IV placed.    Treatment Conditions:  Lab Results   Component Value Date     07/07/2023    POTASSIUM 3.8 07/07/2023    MAG 1.6 (L) 07/07/2023    CR 0.56 07/07/2023    HARRISON 9.7 07/07/2023    BILITOTAL 0.2 07/07/2023    ALBUMIN 3.8 07/07/2023    ALT 16 07/07/2023    AST 17 07/07/2023     Results reviewed, labs MET treatment parameters, ok to proceed with treatment.      Post Infusion Assessment:  Patient tolerated infusion without incident.  Blood return noted pre and post infusion.  Site patent and intact, free from redness, edema or discomfort.  No evidence of extravasations.  Access discontinued per protocol.       Discharge Plan:   Prescription refills given for Synthroid (dose change) and Dilaudid-sent to patients home  pharmacy.  Patient declined prescription refills.  Discharge instructions reviewed with: Patient and Family.  Patient and/or family verbalized understanding of discharge instructions and all questions answered.  Copy of AVS reviewed with patient and/or family.  Patient will return 7/28/23 for next appointment.  Patient discharged in stable condition accompanied by: boyfriend.  Departure Mode: Wheelchair.      Vangie Owens RN

## 2023-07-09 DIAGNOSIS — R42 VERTIGO: ICD-10-CM

## 2023-07-09 DIAGNOSIS — E03.2 HYPOTHYROIDISM DUE TO MEDICATION: ICD-10-CM

## 2023-07-09 DIAGNOSIS — C53.8 MALIGNANT NEOPLASM OF OVERLAPPING SITES OF CERVIX (H): ICD-10-CM

## 2023-07-12 RX ORDER — DIAZEPAM 2 MG
TABLET ORAL
Qty: 12 TABLET | Refills: 0 | Status: ON HOLD | OUTPATIENT
Start: 2023-07-12 | End: 2023-08-03

## 2023-07-17 ENCOUNTER — VIRTUAL VISIT (OUTPATIENT)
Dept: ONCOLOGY | Facility: CLINIC | Age: 38
End: 2023-07-17
Attending: OBSTETRICS & GYNECOLOGY
Payer: COMMERCIAL

## 2023-07-17 VITALS — BODY MASS INDEX: 20.38 KG/M2 | HEIGHT: 63 IN | WEIGHT: 115 LBS

## 2023-07-17 DIAGNOSIS — N82.3 RECTOVAGINAL FISTULA: Primary | ICD-10-CM

## 2023-07-17 PROCEDURE — 99214 OFFICE O/P EST MOD 30 MIN: CPT | Mod: VID | Performed by: OBSTETRICS & GYNECOLOGY

## 2023-07-17 ASSESSMENT — PAIN SCALES - GENERAL: PAINLEVEL: NO PAIN (0)

## 2023-07-17 NOTE — LETTER
2023         RE: uSni Zuluaga  [address]  [place] [address]        Dear Colleague,    Thank you for referring your patient, Suni Zuluaga, to the [facility]. Please see a copy of my visit note below.    Virtual Visit Details    Type of service:  Video Visit     Originating Location (pt. Location): Home  {PROVIDER LOCATION On-site should be selected for visits conducted from your clinic location or adjoining [facility] hospital, academic office, or other nearby [facility] building. Off-site should be selected for all other provider locations, including home:[id number]}  Distant Location (provider location):  On-site  Platform used for Video Visit: [facility]    GYN Oncology Established Patient Visit    RE: Suni Zuluaga  : 1985    2023     CC: Cervical cancer , recurrent    HPI: Ms Suni Zuluaga is a 38 year old  female who presents to discuss cervical cancer treatment planning and toxicity assessment      Treatment History:   2015:HSIL Pap at the beginning of pregnancy. Repeat pap postpartum was ASCUS. Colposcopy was negative.   20: Pelvic US. Uterine fibroids, normal appearance of uterus and ovaries  2020: Pap smear which confirmed squamous cell carcinoma of the cervix. Referred to GYN  ONC  2020: GYN ONC Consult [facility]. Cervical biopsy, RADHA III, unable to identify invasion  2020: Repeat cervical biopsy confirms invasive SCC  2020: Started primary chemoRT for locally advanced cervical cancer. Stage IIIC1(r), large primary tumor with bilateral parametrial invasion and clinical evidence of posterior vaginal involvement. Pelvic lymphadenopathy involving external iliac, perirectal and internal iliac nodes.   20-10/30/20: ChemoRT.   20-20: T&R brachytherapy  2020: Followup with radonc (Dr Hernández)   22: CT A/P with nonspecific increased density in the para-aortic region, which could be seen in the setting of  retroperitoneal fibrosis, inflammation, or potentially lymphoma/desmoplastic reaction. New varix from right renal vein extending down into the RLQ.   10/6/22: PET CT Whole body with large 5cm retroperitoneal mass with FDG avidity. New nodes in retroperitoneum and mediastinum    Plan: Paclitaxel, Carboplatin, Bevacizumab, and pembrolizumab added due to PD-L1 expression every 3 weeks.   10/13/22: Cycle 1 Paclitaxel 175 mg/m2, Carboplatin AUC 6, Bevacizumab 15 mg/m2  11/4/22: Cycle 2 Paclitaxel 175 mg/m2, Carboplatin AUC 5, Bevacizumab 15 mg/m2. Pembrolizumab on hold while awaiting insurance coverage.  11/8/22: ED for nausea (no vomiting) and dizziness, discharged with valium for vertigo.  11/25/22: Cycle 3 Paclitaxel 175 mg/m2, Carboplatin AUC 5, Bevacizumab 15 mg/m2, and pembrolizumab 200 mg.  Defer x 1 week for thrombocytopenia (plts 82).  Decrease Paclitaxel 135 mg/m2, Carboplatin AUC 5, Bevacizumab 15 mg/m2, and pembrolizumab 200 mg, given 12/5.  12/19/22 CT CAP with  response    Plan: Continue paclitaxel, carboplatin, bevacizumab, and pembrolizumab with CT/MD after 3 more cycles (6 total)  12/30/22: Cycle 4 Paclitaxel 135 mg/m2, Carboplatin AUC 5, Bevacizumab 15 mg/m2, and pembrolizumab 200 mg, held carboplatin and paclitaxel due to thrombocytopenia (plts 39), not given this cycle.  Bevacizumab and Pembrolizumab given.  1/20/23: Cycle 5 Paclitaxel 135 mg/m2, Carboplatin AUC 4, Bevacizumab 15 mg/m2, and pembrolizumab 200 mg.  2/10/23: Cycle 6 Paclitaxel 135 mg/m2, Carboplatin AUC 4, Bevacizumab 15 mg/m2, and pembrolizumab 200 mg.     Plan: Continue paclitaxel, carboplatin, bevacizumab, and pembrolizumab x 1 more cycle as she did not have chemotherapy for cycle 4.  Followed by CT and follow up with Dr. Brambila.  3/3/23: Cycle 7 Paclitaxel 135 mg/m2, Carboplatin AUC 4, Bevacizumab 15 mg/m2, and pembrolizumab 200 mg.   3/20/2023: CT C/A/P with stable disease (still with retroperitoneal adenopathy encasing aorta/IVC,  "occulusion of IVC, cystitis/proctitis)   3/24/23: Cycle one of johny/pembro maintenance (cycle 8 overall)  23: C2 pembro/avastin  (cycle 9 overall)   23: OSH ED visit for pain, found to have new hydro   23: Met with urology (Dr Valladares) for hydronephrosis. Plan possible stent placement in the future for hydro.   23:cycle 3 maintenance pembro/avastin (cycle 10 overall)   23: Cycle 4 maintenance pembro/avastin (cycle 11 overall).  23: Cycle 5 maintenance pembro/avastin (cycle 12 overall).  2023:Cycle 6 maintenance pembro  23: Scheduled for Cycle 7 maintenance pembro    Interval history:       Suni is feeling terrible  She is \"couch bound\"  Sometimes has pelvic pain  Leaking urine and stool per vagina, wearing depends all the time  Tolerating PO OK  No edema    Taking synthroid 150 mcg/day.   CT scan with stable lymphatic disease but possible new fistula, reviewed at last visit.         Past Medical History:   Diagnosis Date     Cervical cancer (H)      Tobacco abuse      Past Surgical History:   Procedure Laterality Date     CARPAL TUNNEL RELEASE RT/LT Right      COMBINED CYSTOSCOPY, INSERT STENT URETER(S) Bilateral 5/10/2023    Procedure: CYSTOSCOPY, WITH RIGHT URETERAL STENT INSERTION, Bilateral retrograde pyelogram.;  Surgeon: Robert Valladares MD;  Location: [facility] OR     COMBINED CYSTOSCOPY, RETROGRADES, EXCHANGE STENT URETER(S) Right 2023    Procedure: CYSTOSCOPY with fluoroscopy;  Surgeon: Robert Valladares MD;  Location: [facility] OR     TUBAL LIGATION         OBGYN history and Health Maintenance:  , completed childbearing , had tubal ligation   Last Pap Smear: per HPI   Last Mammogram: never  Last Colonoscopy: never    Meds reviewed in EPIC     Allergies   Allergen Reactions     Clindamycin Hives     Social History:  Social History     Tobacco Use     Smoking status: Every Day     Packs/day: 1.00     Types: Cigarettes     Smokeless tobacco: Never     Tobacco " "comments:     9/11/2020 1/2 pack/day, tying tp quit   Substance Use Topics     Alcohol use: Yes     Comment: occ.     Work: manufacturing, manual labor  Preferred language: English  Lives at home with: Partner and 4 kids  Long smoking history since age 13. Smokes a pack daily. Denies any substance abuse     Family History:   No family history of cancer     Physical Exam:   Vitals reviewed in epic  Ht 1.6 m (5' 3\")   Wt 52.2 kg (115 lb)   LMP  (LMP Unknown)   BMI 20.37 kg/m    General: No acute distress. Some temporal wasting which has been stable  Remainder of exam deferred      ECOG performance status: 3 (given fistulas)          Assessment:  Suni Zuluaga is a 38 year old woman with Stage IIIC1(r) squamous cell carcinoma of the cervix , now with recurrence. PDL1+    Plan:     1.)   Cervical cancer : Stage IIIC1r SCC of the cervix (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  Completed primary chemoRT without complication. Recurrence in 10/2022.  Received 7 cycles of carbo/taxol/johny/pembro. Required dose reductions due to neutropenia, thrombocytopenia. Has now received 5 cycles of maintenance johny/pembro.     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       -Will stop avastin given fistulas, but continue pembro maintenance therapy given stable disease (plan 3 more cycles and then repeat imaging).   -If progression, would plan tisotumab vedotin (Tivdak)      2.) Likely rectovaginal vistual, concern for colo-ureteral or colo vesicular fistula. DIscussed treatmetn options. Discussed  Primary Surgical fix is not possible given chronic RT damage. Discussed risks and benefits of colostomy. Discussed open and laparoscopic approach. Would schedule with one of my partners for laparoscopic approach if possible to improve surgical  Recovery.       - Plan Fluoro gastrograffin study (as per radiology recs) to better understand fistula. Scheduled for 7/18/23.   - Plan bilateral nephrostomy tubes, has this scheduled 7/25/23.   - " Plan likely end colostomy. Awaiting fistula study to verify this is an RV fisula.  Discussed with Dr Castano today as well regarding laparoscopic approach vs open   - May need to hold pembro depending on timing of end colostomy       Total time today was 32 minutes including surgical counseling and planning, treatment planning. Documentation, discussion with Dr Collins.     Suzy Brambila MD  Gynecologic Oncology  Pager [phone]      Addendum:   Will hold pembro immediately prior to surgery (7/28/23)      Again, thank you for allowing me to participate in the care of your patient.        Sincerely,        Suzy Brambila MD

## 2023-07-17 NOTE — PROGRESS NOTES
GYN Oncology Established Patient Visit    RE: Suni Zuluaga  : 1985    2023     CC: Cervical cancer , recurrent    HPI: Ms Suni Zuluaga is a 38 year old  female who presents to discuss cervical cancer treatment planning and toxicity assessment      Treatment History:   2015:HSIL Pap at the beginning of pregnancy. Repeat pap postpartum was ASCUS. Colposcopy was negative.   20: Pelvic US. Uterine fibroids, normal appearance of uterus and ovaries  2020: Pap smear which confirmed squamous cell carcinoma of the cervix. Referred to GYN  ONC  2020: GYN ONC Consult [facility]. Cervical biopsy, RADHA III, unable to identify invasion  2020: Repeat cervical biopsy confirms invasive SCC  2020: Started primary chemoRT for locally advanced cervical cancer. Stage IIIC1(r), large primary tumor with bilateral parametrial invasion and clinical evidence of posterior vaginal involvement. Pelvic lymphadenopathy involving external iliac, perirectal and internal iliac nodes.   20-10/30/20: ChemoRT.   20-20: T&R brachytherapy  2020: Followup with [facility] (Dr Hernández)   22: CT A/P with nonspecific increased density in the para-aortic region, which could be seen in the setting of retroperitoneal fibrosis, inflammation, or potentially lymphoma/desmoplastic reaction. New varix from right renal vein extending down into the RLQ.   10/6/22: PET CT Whole body with large 5cm retroperitoneal mass with FDG avidity. New nodes in retroperitoneum and mediastinum    Plan: Paclitaxel, Carboplatin, Bevacizumab, and pembrolizumab added due to PD-L1 expression every 3 weeks.   10/13/22: Cycle 1 Paclitaxel 175 mg/m2, Carboplatin AUC 6, Bevacizumab 15 mg/m2  22: Cycle 2 Paclitaxel 175 mg/m2, Carboplatin AUC 5, Bevacizumab 15 mg/m2. Pembrolizumab on hold while awaiting insurance coverage.  22: ED for nausea (no vomiting) and dizziness, discharged with valium for vertigo.  22: Cycle  "3 Paclitaxel 175 mg/m2, Carboplatin AUC 5, Bevacizumab 15 mg/m2, and pembrolizumab 200 mg.  Defer x 1 week for thrombocytopenia (plts 82).  Decrease Paclitaxel 135 mg/m2, Carboplatin AUC 5, Bevacizumab 15 mg/m2, and pembrolizumab 200 mg, given 12/5.  12/19/22 CT CAP with  response    Plan: Continue paclitaxel, carboplatin, bevacizumab, and pembrolizumab with CT/MD after 3 more cycles (6 total)  12/30/22: Cycle 4 Paclitaxel 135 mg/m2, Carboplatin AUC 5, Bevacizumab 15 mg/m2, and pembrolizumab 200 mg, held carboplatin and paclitaxel due to thrombocytopenia (plts 39), not given this cycle.  Bevacizumab and Pembrolizumab given.  1/20/23: Cycle 5 Paclitaxel 135 mg/m2, Carboplatin AUC 4, Bevacizumab 15 mg/m2, and pembrolizumab 200 mg.  2/10/23: Cycle 6 Paclitaxel 135 mg/m2, Carboplatin AUC 4, Bevacizumab 15 mg/m2, and pembrolizumab 200 mg.     Plan: Continue paclitaxel, carboplatin, bevacizumab, and pembrolizumab x 1 more cycle as she did not have chemotherapy for cycle 4.  Followed by CT and follow up with Dr. Brambila.  3/3/23: Cycle 7 Paclitaxel 135 mg/m2, Carboplatin AUC 4, Bevacizumab 15 mg/m2, and pembrolizumab 200 mg.   3/20/2023: CT C/A/P with stable disease (still with retroperitoneal adenopathy encasing aorta/IVC, occulusion of IVC, cystitis/proctitis)   3/24/23: Cycle one of johny/pembro maintenance (cycle 8 overall)  4/14/23: C2 pembro/avastin  (cycle 9 overall)   4/26/23: OSH ED visit for pain, found to have new hydro   4/28/23: Met with urology (Dr Valladares) for hydronephrosis. Plan possible stent placement in the future for hydro.   5/5/23:cycle 3 maintenance pembro/avastin (cycle 10 overall)   5/26/23: Cycle 4 maintenance pembro/avastin (cycle 11 overall).  6/16/23: Cycle 5 maintenance pembro/avastin (cycle 12 overall).  7/7/2023:Cycle 6 maintenance pembro  7/28/23: Scheduled for Cycle 7 maintenance pembro    Interval history:       Suni is feeling terrible  She is \"couch bound\"  Sometimes has pelvic " "pain  Leaking urine and stool per vagina, wearing depends all the time  Tolerating PO OK  No edema    Taking synthroid 150 mcg/day.   CT scan with stable lymphatic disease but possible new fistula, reviewed at last visit.         Past Medical History:   Diagnosis Date     Cervical cancer (H)      Tobacco abuse      Past Surgical History:   Procedure Laterality Date     CARPAL TUNNEL RELEASE RT/LT Right      COMBINED CYSTOSCOPY, INSERT STENT URETER(S) Bilateral 5/10/2023    Procedure: CYSTOSCOPY, WITH RIGHT URETERAL STENT INSERTION, Bilateral retrograde pyelogram.;  Surgeon: Robert Valladares MD;  Location: [facility] OR     COMBINED CYSTOSCOPY, RETROGRADES, EXCHANGE STENT URETER(S) Right 2023    Procedure: CYSTOSCOPY with fluoroscopy;  Surgeon: Robert Valladares MD;  Location: [facility] OR     TUBAL LIGATION         OBGYN history and Health Maintenance:  , completed childbearing , had tubal ligation   Last Pap Smear: per HPI   Last Mammogram: never  Last Colonoscopy: never    Meds reviewed in EPIC     Allergies   Allergen Reactions     Clindamycin Hives     Social History:  Social History     Tobacco Use     Smoking status: Every Day     Packs/day: 1.00     Types: Cigarettes     Smokeless tobacco: Never     Tobacco comments:     2020 1/2 pack/day, tying tp quit   Substance Use Topics     Alcohol use: Yes     Comment: occ.     Work: manufacturing, manual labor  Preferred language: English  Lives at home with: Partner and 4 kids  Long smoking history since age 13. Smokes a pack daily. Denies any substance abuse     Family History:   No family history of cancer     Physical Exam:   Vitals reviewed in epic  Ht 1.6 m (5' 3\")   Wt 52.2 kg (115 lb)   LMP  (LMP Unknown)   BMI 20.37 kg/m    General: No acute distress. Some temporal wasting which has been stable  Remainder of exam deferred      ECOG performance status: 3 (given fistulas)          Assessment:  Suni Zuluaga is a 38 year old woman with Stage " IIIC1(r) squamous cell carcinoma of the cervix , now with recurrence. PDL1+    Plan:     1.)   Cervical cancer : Stage IIIC1r SCC of the cervix (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  Completed primary chemoRT without complication. Recurrence in 10/2022.  Received 7 cycles of carbo/taxol/johny/pembro. Required dose reductions due to neutropenia, thrombocytopenia. Has now received 5 cycles of maintenance johny/pembro.     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       -Will stop avastin given fistulas, but continue pembro maintenance therapy given stable disease (plan 3 more cycles and then repeat imaging).   -If progression, would plan tisotumab vedotin (Tivdak)      2.) Likely rectovaginal vistual, concern for colo-ureteral or colo vesicular fistula. DIscussed treatmetn options. Discussed  Primary Surgical fix is not possible given chronic RT damage. Discussed risks and benefits of colostomy. Discussed open and laparoscopic approach. Would schedule with one of my partners for laparoscopic approach if possible to improve surgical  Recovery.       - Plan Fluoro gastrograffin study (as per radiology recs) to better understand fistula. Scheduled for 7/18/23.   - Plan bilateral nephrostomy tubes, has this scheduled 7/25/23.   - Plan likely end colostomy. Awaiting fistula study to verify this is an RV fisula.  Discussed with Dr Castano today as well regarding laparoscopic approach vs open   - May need to hold pembro depending on timing of end colostomy       Total time today was 32 minutes including surgical counseling and planning, treatment planning. Documentation, discussion with Dr Collins.     Suzy Brambila MD  Gynecologic Oncology  Pager [phone]      Addendum:   Will hold pembro immediately prior to surgery (7/28/23)

## 2023-07-17 NOTE — NURSING NOTE
Is the patient currently in the state of [place]? YES    Visit mode:VIDEO    If the visit is dropped, the patient can be reconnected by: VIDEO VISIT: Text to cell phone: [phone]    Will anyone else be joining the visit? NO      How would you like to obtain your AVS? MyChart    Are changes needed to the allergy or medication list? NO  Patient verified medications and allergies are correct via eCheck-in. Patient confirms no changes at this time and/or since last reviewed by clinic staff.    Reason for visit: RECHECK (Oncology Video Visit Return, Malignant neoplasm of overlapping sites of cervix )

## 2023-07-17 NOTE — PROGRESS NOTES
Virtual Visit Details    Type of service:  Video Visit     Originating Location (pt. Location): Home    Distant Location (provider location):  On-site  Platform used for Video Visit: [place]

## 2023-07-18 ENCOUNTER — ANCILLARY PROCEDURE (OUTPATIENT)
Dept: GENERAL RADIOLOGY | Facility: CLINIC | Age: 38
End: 2023-07-18
Attending: OBSTETRICS & GYNECOLOGY
Payer: COMMERCIAL

## 2023-07-18 DIAGNOSIS — C53.1 MALIGNANT NEOPLASM OF EXOCERVIX (H): ICD-10-CM

## 2023-07-18 PROCEDURE — 74270 X-RAY XM COLON 1CNTRST STD: CPT | Mod: GC | Performed by: RADIOLOGY

## 2023-07-18 RX ORDER — HEPARIN SODIUM 10000 [USP'U]/ML
5000 INJECTION, SOLUTION INTRAVENOUS; SUBCUTANEOUS
Status: CANCELLED | OUTPATIENT
Start: 2023-07-18

## 2023-07-18 RX ORDER — METRONIDAZOLE 500 MG/100ML
500 INJECTION, SOLUTION INTRAVENOUS
Status: CANCELLED | OUTPATIENT
Start: 2023-07-18

## 2023-07-18 RX ADMIN — DIATRIZOATE MEGLUMINE AND DIATRIZOATE SODIUM 480 ML: 660; 100 SOLUTION ORAL; RECTAL at 14:53

## 2023-07-19 ENCOUNTER — TELEPHONE (OUTPATIENT)
Dept: INTERVENTIONAL RADIOLOGY/VASCULAR | Facility: CLINIC | Age: 38
End: 2023-07-19
Payer: COMMERCIAL

## 2023-07-19 ENCOUNTER — PREP FOR PROCEDURE (OUTPATIENT)
Dept: ONCOLOGY | Facility: CLINIC | Age: 38
End: 2023-07-19
Payer: COMMERCIAL

## 2023-07-19 ENCOUNTER — TELEPHONE (OUTPATIENT)
Dept: ONCOLOGY | Facility: CLINIC | Age: 38
End: 2023-07-19
Payer: COMMERCIAL

## 2023-07-19 NOTE — TELEPHONE ENCOUNTER
Called patient to schedule surgery with: Dr. Brambila and Dr. Castano     Surgery Date: 7/31/23     Location: [facility]    H&P: to be completed by surgeon, will update DOS as needed      Post-op: will be scheduled by the clinic    Patient will receive a phone call from pre-admission nurses 1-2 days prior to surgery with arrival and start time.    Patient aware times are subject to change up until day before surgery.     Patient questions/concerns: N/A     Surgery packet was sent via US mail on 7/19/23      Deonna Solorio on 7/19/2023 at 1:05 PM

## 2023-07-24 DIAGNOSIS — G89.3 CANCER ASSOCIATED PAIN: ICD-10-CM

## 2023-07-24 DIAGNOSIS — C53.8 MALIGNANT NEOPLASM OF OVERLAPPING SITES OF CERVIX (H): ICD-10-CM

## 2023-07-24 RX ORDER — HYDROMORPHONE HYDROCHLORIDE 2 MG/1
2 TABLET ORAL EVERY 8 HOURS PRN
Qty: 20 TABLET | Refills: 0 | Status: ON HOLD | OUTPATIENT
Start: 2023-07-24 | End: 2023-08-03

## 2023-07-25 ENCOUNTER — HOSPITAL ENCOUNTER (OUTPATIENT)
Facility: CLINIC | Age: 38
Discharge: HOME | End: 2023-07-25
Attending: UROLOGY | Admitting: RADIOLOGY
Payer: COMMERCIAL

## 2023-07-25 ENCOUNTER — APPOINTMENT (OUTPATIENT)
Dept: INTERVENTIONAL RADIOLOGY/VASCULAR | Facility: CLINIC | Age: 38
End: 2023-07-25
Attending: UROLOGY
Payer: COMMERCIAL

## 2023-07-25 ENCOUNTER — APPOINTMENT (OUTPATIENT)
Dept: MEDSURG UNIT | Facility: CLINIC | Age: 38
End: 2023-07-25
Attending: UROLOGY
Payer: COMMERCIAL

## 2023-07-25 VITALS
HEIGHT: 63 IN | DIASTOLIC BLOOD PRESSURE: 70 MMHG | HEART RATE: 96 BPM | TEMPERATURE: 97.7 F | SYSTOLIC BLOOD PRESSURE: 101 MMHG | WEIGHT: 110 LBS | BODY MASS INDEX: 19.49 KG/M2 | OXYGEN SATURATION: 97 % | RESPIRATION RATE: 20 BRPM

## 2023-07-25 DIAGNOSIS — N13.5 STRICTURE OR KINKING OF URETER: ICD-10-CM

## 2023-07-25 LAB
BASOPHILS # BLD AUTO: 0 10E3/UL (ref 0–0.2)
BASOPHILS NFR BLD AUTO: 1 %
EOSINOPHIL # BLD AUTO: 0.1 10E3/UL (ref 0–0.7)
EOSINOPHIL NFR BLD AUTO: 1 %
ERYTHROCYTE [DISTWIDTH] IN BLOOD BY AUTOMATED COUNT: 16.4 % (ref 10–15)
HCG UR QL: NEGATIVE
HCT VFR BLD AUTO: 36.6 % (ref 35–47)
HGB BLD-MCNC: 11 G/DL (ref 11.7–15.7)
IMM GRANULOCYTES # BLD: 0 10E3/UL
IMM GRANULOCYTES NFR BLD: 0 %
INR PPP: 1.09 (ref 0.85–1.15)
LYMPHOCYTES # BLD AUTO: 1 10E3/UL (ref 0.8–5.3)
LYMPHOCYTES NFR BLD AUTO: 15 %
MCH RBC QN AUTO: 27.4 PG (ref 26.5–33)
MCHC RBC AUTO-ENTMCNC: 30.1 G/DL (ref 31.5–36.5)
MCV RBC AUTO: 91 FL (ref 78–100)
MONOCYTES # BLD AUTO: 0.8 10E3/UL (ref 0–1.3)
MONOCYTES NFR BLD AUTO: 11 %
NEUTROPHILS # BLD AUTO: 4.8 10E3/UL (ref 1.6–8.3)
NEUTROPHILS NFR BLD AUTO: 72 %
NRBC # BLD AUTO: 0 10E3/UL
NRBC BLD AUTO-RTO: 0 /100
PLATELET # BLD AUTO: 267 10E3/UL (ref 150–450)
RBC # BLD AUTO: 4.02 10E6/UL (ref 3.8–5.2)
WBC # BLD AUTO: 6.7 10E3/UL (ref 4–11)

## 2023-07-25 PROCEDURE — 999N000142 HC STATISTIC PROCEDURE PREP ONLY

## 2023-07-25 PROCEDURE — 85610 PROTHROMBIN TIME: CPT | Performed by: NURSE PRACTITIONER

## 2023-07-25 PROCEDURE — 255N000002 HC RX 255 OP 636: Performed by: RADIOLOGY

## 2023-07-25 PROCEDURE — C1769 GUIDE WIRE: HCPCS

## 2023-07-25 PROCEDURE — 36415 COLL VENOUS BLD VENIPUNCTURE: CPT | Performed by: NURSE PRACTITIONER

## 2023-07-25 PROCEDURE — 250N000011 HC RX IP 250 OP 636: Performed by: STUDENT IN AN ORGANIZED HEALTH CARE EDUCATION/TRAINING PROGRAM

## 2023-07-25 PROCEDURE — C1773 RET DEV, INSERTABLE: HCPCS

## 2023-07-25 PROCEDURE — 258N000003 HC RX IP 258 OP 636: Performed by: NURSE PRACTITIONER

## 2023-07-25 PROCEDURE — 81025 URINE PREGNANCY TEST: CPT | Performed by: NURSE PRACTITIONER

## 2023-07-25 PROCEDURE — C1729 CATH, DRAINAGE: HCPCS

## 2023-07-25 PROCEDURE — 99152 MOD SED SAME PHYS/QHP 5/>YRS: CPT

## 2023-07-25 PROCEDURE — 50432 PLMT NEPHROSTOMY CATHETER: CPT | Mod: RT | Performed by: RADIOLOGY

## 2023-07-25 PROCEDURE — 85014 HEMATOCRIT: CPT | Performed by: NURSE PRACTITIONER

## 2023-07-25 PROCEDURE — 999N000132 HC STATISTIC PP CARE STAGE 1

## 2023-07-25 PROCEDURE — 250N000009 HC RX 250: Performed by: STUDENT IN AN ORGANIZED HEALTH CARE EDUCATION/TRAINING PROGRAM

## 2023-07-25 PROCEDURE — 99152 MOD SED SAME PHYS/QHP 5/>YRS: CPT | Performed by: RADIOLOGY

## 2023-07-25 PROCEDURE — 250N000011 HC RX IP 250 OP 636: Performed by: NURSE PRACTITIONER

## 2023-07-25 RX ORDER — IODIXANOL 320 MG/ML
10 INJECTION, SOLUTION INTRAVASCULAR ONCE
Status: COMPLETED | OUTPATIENT
Start: 2023-07-25 | End: 2023-07-25

## 2023-07-25 RX ORDER — NALOXONE HYDROCHLORIDE 0.4 MG/ML
0.4 INJECTION, SOLUTION INTRAMUSCULAR; INTRAVENOUS; SUBCUTANEOUS
Status: DISCONTINUED | OUTPATIENT
Start: 2023-07-25 | End: 2023-07-25 | Stop reason: HOSPADM

## 2023-07-25 RX ORDER — AMPICILLIN 1 G/1
1 INJECTION, POWDER, FOR SOLUTION INTRAMUSCULAR; INTRAVENOUS
Status: COMPLETED | OUTPATIENT
Start: 2023-07-25 | End: 2023-07-25

## 2023-07-25 RX ORDER — NALOXONE HYDROCHLORIDE 0.4 MG/ML
0.2 INJECTION, SOLUTION INTRAMUSCULAR; INTRAVENOUS; SUBCUTANEOUS
Status: DISCONTINUED | OUTPATIENT
Start: 2023-07-25 | End: 2023-07-25 | Stop reason: HOSPADM

## 2023-07-25 RX ORDER — FENTANYL CITRATE 50 UG/ML
25-50 INJECTION, SOLUTION INTRAMUSCULAR; INTRAVENOUS EVERY 5 MIN PRN
Status: DISCONTINUED | OUTPATIENT
Start: 2023-07-25 | End: 2023-07-25 | Stop reason: HOSPADM

## 2023-07-25 RX ORDER — SODIUM CHLORIDE 9 MG/ML
INJECTION, SOLUTION INTRAVENOUS CONTINUOUS
Status: DISCONTINUED | OUTPATIENT
Start: 2023-07-25 | End: 2023-07-25 | Stop reason: HOSPADM

## 2023-07-25 RX ORDER — LIDOCAINE 40 MG/G
CREAM TOPICAL
Status: DISCONTINUED | OUTPATIENT
Start: 2023-07-25 | End: 2023-07-25 | Stop reason: HOSPADM

## 2023-07-25 RX ORDER — FLUMAZENIL 0.1 MG/ML
0.2 INJECTION, SOLUTION INTRAVENOUS
Status: DISCONTINUED | OUTPATIENT
Start: 2023-07-25 | End: 2023-07-25 | Stop reason: HOSPADM

## 2023-07-25 RX ADMIN — GENTAMICIN SULFATE 85 MG: 40 INJECTION, SOLUTION INTRAMUSCULAR; INTRAVENOUS at 08:55

## 2023-07-25 RX ADMIN — IODIXANOL 10 ML: 320 INJECTION, SOLUTION INTRAVASCULAR at 10:28

## 2023-07-25 RX ADMIN — FENTANYL CITRATE 50 MCG: 50 INJECTION, SOLUTION INTRAMUSCULAR; INTRAVENOUS at 09:52

## 2023-07-25 RX ADMIN — AMPICILLIN SODIUM 1 G: 1 INJECTION, POWDER, FOR SOLUTION INTRAMUSCULAR; INTRAVENOUS at 08:14

## 2023-07-25 RX ADMIN — MIDAZOLAM HYDROCHLORIDE 0.5 MG: 1 INJECTION, SOLUTION INTRAMUSCULAR; INTRAVENOUS at 10:06

## 2023-07-25 RX ADMIN — FENTANYL CITRATE 25 MCG: 50 INJECTION, SOLUTION INTRAMUSCULAR; INTRAVENOUS at 10:00

## 2023-07-25 RX ADMIN — MIDAZOLAM HYDROCHLORIDE 1 MG: 1 INJECTION, SOLUTION INTRAMUSCULAR; INTRAVENOUS at 09:52

## 2023-07-25 RX ADMIN — FENTANYL CITRATE 25 MCG: 50 INJECTION, SOLUTION INTRAMUSCULAR; INTRAVENOUS at 10:06

## 2023-07-25 RX ADMIN — LIDOCAINE HYDROCHLORIDE 15 ML: 10 INJECTION, SOLUTION EPIDURAL; INFILTRATION; INTRACAUDAL; PERINEURAL at 10:11

## 2023-07-25 RX ADMIN — SODIUM CHLORIDE: 9 INJECTION, SOLUTION INTRAVENOUS at 08:13

## 2023-07-25 RX ADMIN — MIDAZOLAM HYDROCHLORIDE 0.5 MG: 1 INJECTION, SOLUTION INTRAMUSCULAR; INTRAVENOUS at 10:00

## 2023-07-25 ASSESSMENT — ACTIVITIES OF DAILY LIVING (ADL)
ADLS_ACUITY_SCORE: 35
ADLS_ACUITY_SCORE: 35

## 2023-07-25 NOTE — PROGRESS NOTES
Pt prepped for ureteral stent exhange to a PNT placement.PIV placed and labs sent and ABX started.Consent signed and questions answered.

## 2023-07-25 NOTE — PROCEDURES
[facility]    Procedure: IR Procedure Note    Date/Time: 7/25/2023 10:18 AM    Performed by: Benita Conner DO  Authorized by: Warren Alvarado MD  IR Fellow Physician: Benita Conner      UNIVERSAL PROTOCOL   Site Marked: NA  Prior Images Obtained and Reviewed:  Yes  Required items: Required blood products, implants, devices and special equipment available    Patient identity confirmed:  Verbally with patient, arm band, provided demographic data and hospital-assigned identification number  Patient was reevaluated immediately before administering moderate or deep sedation or anesthesia  Confirmation Checklist:  Patient's identity using two indicators, relevant allergies, procedure was appropriate and matched the consent or emergent situation and correct equipment/implants were available  Time out: Immediately prior to the procedure a time out was called    Universal Protocol: the Joint Commission Universal Protocol was followed    Preparation: Patient was prepped and draped in usual sterile fashion    ESBL (mL):  1     ANESTHESIA    Anesthesia: Local infiltration  Local Anesthetic:  Lidocaine 1% without epinephrine  Anesthetic Total (mL):  15      SEDATION  Patient Sedated: Yes    Sedation Type:  Moderate (conscious) sedation  Sedation:  Fentanyl and midazolam  Vital signs: Vital signs monitored during sedation    See dictated procedure note for full details.  Findings: 8 Fr locking pigtail catheter placed in right renal collecting system.    Specimens: none    Complications: None    Condition: Stable    Plan: -1 hour bedrest  - Right PCN to gravity drainage.  -routine exchange in 3 months or sooner if needed.      PROCEDURE    Patient Tolerance:  Patient tolerated the procedure well with no immediate complications  Length of time physician/provider present for 1:1 monitoring during sedation: 20

## 2023-07-25 NOTE — PROGRESS NOTES
Patient Name: Suni Zuluaga  Medical Record Number: [id number]  Today's Date: 7/25/2023    Procedure: Right sided Nephrostomy Tube Placement with imaging guidance  Proceduralist: MD Brian, RPeSammie, CDTanya  Pathology present: N/A    Procedure Start: 0952  Procedure end:1014  Sedation medications administered: Fentanyl 100 mcg, Versed 2 mg    Report given to: FELICIA Jha RN  : KEVYN    D: Pt arrived to IR room 3 from . Consent reviewed. Pt denies any questions or concerns regarding procedure. Pt positioned prone. VS baseline upon arrival; sinus tachycardic.Patient has history of abdominal pain and cannot remember what pain medications she is prescribed; she has not taken any today.  I: Sedated and monitored patient per protocol.   A: Pt tolerated procedure without any noted complications. VS baseline throughout. Insertion site is clean, dry, and intact. [place] bag attached.  P: Pt transferred back to . Pt care to continue on 2A until discharge.

## 2023-07-25 NOTE — PROGRESS NOTES
Pt returned from IR via liter accompanied by nurse at 1025.Right PNT site is C/D/I no hematoma and denies pain.The urine from the bag is slightly pink.Pt tolerating food and fluids.

## 2023-07-25 NOTE — PRE-PROCEDURE
GENERAL PRE-PROCEDURE:   Procedure:  Right nephrostomy tube placement  Date/Time:  7/25/2023 9:18 AM    Verbal consent obtained?: Yes    Written consent obtained?: Yes    Risks and benefits: Risks, benefits and alternatives were discussed    Consent given by:  Patient  Patient states understanding of procedure being performed: Yes    Patient's understanding of procedure matches consent: Yes    Procedure consent matches procedure scheduled: Yes    Expected level of sedation:  Moderate  Appropriately NPO:  Yes  ASA Class:  2  Mallampati  :  Grade 1- soft palate, uvula, tonsillar pillars, and posterior pharyngeal wall visible  Lungs:  Lungs clear with good breath sounds bilaterally  Heart:  Normal heart sounds and rate  History & Physical reviewed:  History and physical reviewed and no updates needed  Statement of review:  I have reviewed the lab findings, diagnostic data, medications, and the plan for sedation     Left hip/sciatic pain that started 3-4 days after no known injury.  States there is a bump and has pain in that left hip and having a hard time moving that left leg

## 2023-07-25 NOTE — PROGRESS NOTES
Patient tolerated recovery stage well. VSS,right back PNT site clean/dry/intact, no hematoma, and denies pain but urine is slightly red in color. Patient tolerated PO food and fluids. Pt stated that she called pt learning and they did not call her back so I did the teaching for caring for the PNT and also gave her some supplies.I encouraged her to call her primary MD and get a home health nurse to visit for a couple times to see how she is doing with the care and also tokd her to call and get prescriptions for supplies. Teaching was done with BREN Fernandez at bedside.Discharge instructions were given. Patient ambulated, voided, and PIV was removed. Patient discharged from the hospital via wheel chair to home with Jim.

## 2023-07-26 ENCOUNTER — TELEPHONE (OUTPATIENT)
Dept: INTERVENTIONAL RADIOLOGY/VASCULAR | Facility: CLINIC | Age: 38
End: 2023-07-26
Payer: COMMERCIAL

## 2023-07-26 NOTE — TELEPHONE ENCOUNTER
POST CALL      Call attempt: 1  Message left: Yes    IR nurse triage line number provided: Yes    Post call completed.   July 26, 2023 9:24 AM  Yanely Harvey, RN

## 2023-07-27 ENCOUNTER — TELEPHONE (OUTPATIENT)
Dept: ONCOLOGY | Facility: CLINIC | Age: 38
End: 2023-07-27
Payer: COMMERCIAL

## 2023-07-27 DIAGNOSIS — C53.8 MALIGNANT NEOPLASM OF OVERLAPPING SITES OF CERVIX (H): ICD-10-CM

## 2023-07-27 DIAGNOSIS — R30.0 DYSURIA: ICD-10-CM

## 2023-07-27 NOTE — TELEPHONE ENCOUNTER
Tamsulosin 0.4mg cap  Last prescribing provider: Mounika Ritchie on 6/9/23     Last clinic visit date: 7/17/23 w/ Dr. Brambila    Recommendations for requested medication (if none, N/A): NA    Any other pertinent information (if none, N/A): NA    Refilled: Y/N, if NO, why?

## 2023-07-28 ENCOUNTER — ANESTHESIA EVENT (OUTPATIENT)
Dept: SURGERY | Facility: CLINIC | Age: 38
End: 2023-07-28
Payer: COMMERCIAL

## 2023-07-31 ENCOUNTER — HOSPITAL ENCOUNTER (INPATIENT)
Facility: CLINIC | Age: 38
LOS: 3 days | Discharge: HOME HEALTH | End: 2023-08-03
Attending: OBSTETRICS & GYNECOLOGY | Admitting: OBSTETRICS & GYNECOLOGY
Payer: COMMERCIAL

## 2023-07-31 ENCOUNTER — APPOINTMENT (OUTPATIENT)
Dept: GENERAL RADIOLOGY | Facility: CLINIC | Age: 38
End: 2023-07-31
Payer: COMMERCIAL

## 2023-07-31 ENCOUNTER — ANESTHESIA (OUTPATIENT)
Dept: SURGERY | Facility: CLINIC | Age: 38
End: 2023-07-31
Payer: COMMERCIAL

## 2023-07-31 DIAGNOSIS — E03.2 HYPOTHYROIDISM DUE TO MEDICATION: ICD-10-CM

## 2023-07-31 DIAGNOSIS — N82.3 RECTOVAGINAL FISTULA: ICD-10-CM

## 2023-07-31 DIAGNOSIS — R42 VERTIGO: ICD-10-CM

## 2023-07-31 DIAGNOSIS — Z93.3 S/P COLOSTOMY (H): ICD-10-CM

## 2023-07-31 DIAGNOSIS — C53.8 MALIGNANT NEOPLASM OF OVERLAPPING SITES OF CERVIX (H): Primary | ICD-10-CM

## 2023-07-31 LAB
ABO/RH(D): NORMAL
ANTIBODY SCREEN: NEGATIVE
GLUCOSE BLDC GLUCOMTR-MCNC: 125 MG/DL (ref 70–99)
SPECIMEN EXPIRATION DATE: NORMAL

## 2023-07-31 PROCEDURE — 258N000003 HC RX IP 258 OP 636

## 2023-07-31 PROCEDURE — 86850 RBC ANTIBODY SCREEN: CPT | Performed by: OBSTETRICS & GYNECOLOGY

## 2023-07-31 PROCEDURE — 250N000011 HC RX IP 250 OP 636: Mod: JZ | Performed by: OBSTETRICS & GYNECOLOGY

## 2023-07-31 PROCEDURE — 0TJB8ZZ INSPECTION OF BLADDER, VIA NATURAL OR ARTIFICIAL OPENING ENDOSCOPIC: ICD-10-PCS | Performed by: UROLOGY

## 2023-07-31 PROCEDURE — 74018 RADEX ABDOMEN 1 VIEW: CPT | Mod: 26 | Performed by: RADIOLOGY

## 2023-07-31 PROCEDURE — 258N000003 HC RX IP 258 OP 636: Performed by: STUDENT IN AN ORGANIZED HEALTH CARE EDUCATION/TRAINING PROGRAM

## 2023-07-31 PROCEDURE — 250N000011 HC RX IP 250 OP 636: Performed by: ANESTHESIOLOGY

## 2023-07-31 PROCEDURE — 0D1E4Z4 BYPASS LARGE INTESTINE TO CUTANEOUS, PERCUTANEOUS ENDOSCOPIC APPROACH: ICD-10-PCS | Performed by: OBSTETRICS & GYNECOLOGY

## 2023-07-31 PROCEDURE — 250N000013 HC RX MED GY IP 250 OP 250 PS 637

## 2023-07-31 PROCEDURE — 710N000010 HC RECOVERY PHASE 1, LEVEL 2, PER MIN: Performed by: OBSTETRICS & GYNECOLOGY

## 2023-07-31 PROCEDURE — 120N000002 HC R&B MED SURG/OB UMMC

## 2023-07-31 PROCEDURE — 999N000141 HC STATISTIC PRE-PROCEDURE NURSING ASSESSMENT: Performed by: OBSTETRICS & GYNECOLOGY

## 2023-07-31 PROCEDURE — 999N000065 XR ABDOMEN PORT 1 VIEW

## 2023-07-31 PROCEDURE — 250N000011 HC RX IP 250 OP 636

## 2023-07-31 PROCEDURE — 250N000009 HC RX 250

## 2023-07-31 PROCEDURE — 99207 PR NO BILLABLE SERVICE THIS VISIT: CPT | Performed by: OBSTETRICS & GYNECOLOGY

## 2023-07-31 PROCEDURE — 250N000011 HC RX IP 250 OP 636: Performed by: OBSTETRICS & GYNECOLOGY

## 2023-07-31 PROCEDURE — 360N000077 HC SURGERY LEVEL 4, PER MIN: Performed by: OBSTETRICS & GYNECOLOGY

## 2023-07-31 PROCEDURE — 370N000017 HC ANESTHESIA TECHNICAL FEE, PER MIN: Performed by: OBSTETRICS & GYNECOLOGY

## 2023-07-31 PROCEDURE — 272N000001 HC OR GENERAL SUPPLY STERILE: Performed by: OBSTETRICS & GYNECOLOGY

## 2023-07-31 PROCEDURE — 52000 CYSTOURETHROSCOPY: CPT | Performed by: UROLOGY

## 2023-07-31 RX ORDER — CEFAZOLIN SODIUM/WATER 2 G/20 ML
2 SYRINGE (ML) INTRAVENOUS SEE ADMIN INSTRUCTIONS
Status: DISCONTINUED | OUTPATIENT
Start: 2023-07-31 | End: 2023-07-31 | Stop reason: HOSPADM

## 2023-07-31 RX ORDER — IBUPROFEN 200 MG
600 TABLET ORAL EVERY 6 HOURS
Status: DISCONTINUED | OUTPATIENT
Start: 2023-07-31 | End: 2023-07-31

## 2023-07-31 RX ORDER — ACETAMINOPHEN 325 MG/1
650 TABLET ORAL EVERY 6 HOURS
Status: DISCONTINUED | OUTPATIENT
Start: 2023-07-31 | End: 2023-08-03 | Stop reason: HOSPADM

## 2023-07-31 RX ORDER — HYDROMORPHONE HCL IN WATER/PF 6 MG/30 ML
0.2 PATIENT CONTROLLED ANALGESIA SYRINGE INTRAVENOUS EVERY 5 MIN PRN
Status: DISCONTINUED | OUTPATIENT
Start: 2023-07-31 | End: 2023-07-31 | Stop reason: HOSPADM

## 2023-07-31 RX ORDER — OXYCODONE HYDROCHLORIDE 5 MG/1
5 TABLET ORAL EVERY 4 HOURS PRN
Status: DISCONTINUED | OUTPATIENT
Start: 2023-07-31 | End: 2023-07-31

## 2023-07-31 RX ORDER — SIMETHICONE 80 MG
80 TABLET,CHEWABLE ORAL 4 TIMES DAILY PRN
Status: DISCONTINUED | OUTPATIENT
Start: 2023-07-31 | End: 2023-08-03 | Stop reason: HOSPADM

## 2023-07-31 RX ORDER — CEFAZOLIN SODIUM/WATER 2 G/20 ML
2 SYRINGE (ML) INTRAVENOUS
Status: COMPLETED | OUTPATIENT
Start: 2023-07-31 | End: 2023-07-31

## 2023-07-31 RX ORDER — METRONIDAZOLE 500 MG/100ML
500 INJECTION, SOLUTION INTRAVENOUS
Status: COMPLETED | OUTPATIENT
Start: 2023-07-31 | End: 2023-07-31

## 2023-07-31 RX ORDER — HYDROMORPHONE HYDROCHLORIDE 2 MG/1
2-4 TABLET ORAL
Status: DISCONTINUED | OUTPATIENT
Start: 2023-07-31 | End: 2023-08-03 | Stop reason: HOSPADM

## 2023-07-31 RX ORDER — AMPICILLIN 1 G/1
1 INJECTION, POWDER, FOR SOLUTION INTRAMUSCULAR; INTRAVENOUS
Status: CANCELLED | OUTPATIENT
Start: 2023-07-31

## 2023-07-31 RX ORDER — ONDANSETRON 2 MG/ML
4 INJECTION INTRAMUSCULAR; INTRAVENOUS EVERY 30 MIN PRN
Status: DISCONTINUED | OUTPATIENT
Start: 2023-07-31 | End: 2023-07-31 | Stop reason: HOSPADM

## 2023-07-31 RX ORDER — NALOXONE HYDROCHLORIDE 0.4 MG/ML
0.4 INJECTION, SOLUTION INTRAMUSCULAR; INTRAVENOUS; SUBCUTANEOUS
Status: DISCONTINUED | OUTPATIENT
Start: 2023-07-31 | End: 2023-08-03 | Stop reason: HOSPADM

## 2023-07-31 RX ORDER — TAMSULOSIN HYDROCHLORIDE 0.4 MG/1
0.4 CAPSULE ORAL DAILY
Qty: 30 CAPSULE | Refills: 0 | Status: SHIPPED | OUTPATIENT
Start: 2023-07-31

## 2023-07-31 RX ORDER — LIDOCAINE HYDROCHLORIDE 20 MG/ML
INJECTION, SOLUTION INFILTRATION; PERINEURAL PRN
Status: DISCONTINUED | OUTPATIENT
Start: 2023-07-31 | End: 2023-07-31

## 2023-07-31 RX ORDER — SODIUM CHLORIDE, SODIUM LACTATE, POTASSIUM CHLORIDE, CALCIUM CHLORIDE 600; 310; 30; 20 MG/100ML; MG/100ML; MG/100ML; MG/100ML
INJECTION, SOLUTION INTRAVENOUS CONTINUOUS PRN
Status: DISCONTINUED | OUTPATIENT
Start: 2023-07-31 | End: 2023-07-31

## 2023-07-31 RX ORDER — ENOXAPARIN SODIUM 100 MG/ML
40 INJECTION SUBCUTANEOUS EVERY 24 HOURS
Status: DISCONTINUED | OUTPATIENT
Start: 2023-08-01 | End: 2023-08-01

## 2023-07-31 RX ORDER — LIDOCAINE 40 MG/G
CREAM TOPICAL
Status: DISCONTINUED | OUTPATIENT
Start: 2023-07-31 | End: 2023-08-03 | Stop reason: HOSPADM

## 2023-07-31 RX ORDER — LIDOCAINE 40 MG/G
CREAM TOPICAL
Status: DISCONTINUED | OUTPATIENT
Start: 2023-07-31 | End: 2023-07-31 | Stop reason: HOSPADM

## 2023-07-31 RX ORDER — LEVOTHYROXINE SODIUM 200 UG/1
200 TABLET ORAL DAILY
Status: DISCONTINUED | OUTPATIENT
Start: 2023-07-31 | End: 2023-08-03 | Stop reason: HOSPADM

## 2023-07-31 RX ORDER — ONDANSETRON 4 MG/1
4 TABLET, ORALLY DISINTEGRATING ORAL EVERY 30 MIN PRN
Status: DISCONTINUED | OUTPATIENT
Start: 2023-07-31 | End: 2023-07-31 | Stop reason: HOSPADM

## 2023-07-31 RX ORDER — NALOXONE HYDROCHLORIDE 0.4 MG/ML
0.2 INJECTION, SOLUTION INTRAMUSCULAR; INTRAVENOUS; SUBCUTANEOUS
Status: DISCONTINUED | OUTPATIENT
Start: 2023-07-31 | End: 2023-08-03 | Stop reason: HOSPADM

## 2023-07-31 RX ORDER — PROPOFOL 10 MG/ML
INJECTION, EMULSION INTRAVENOUS PRN
Status: DISCONTINUED | OUTPATIENT
Start: 2023-07-31 | End: 2023-07-31

## 2023-07-31 RX ORDER — SODIUM CHLORIDE, SODIUM LACTATE, POTASSIUM CHLORIDE, CALCIUM CHLORIDE 600; 310; 30; 20 MG/100ML; MG/100ML; MG/100ML; MG/100ML
INJECTION, SOLUTION INTRAVENOUS CONTINUOUS
Status: DISCONTINUED | OUTPATIENT
Start: 2023-07-31 | End: 2023-07-31 | Stop reason: HOSPADM

## 2023-07-31 RX ORDER — DEXAMETHASONE SODIUM PHOSPHATE 4 MG/ML
INJECTION, SOLUTION INTRA-ARTICULAR; INTRALESIONAL; INTRAMUSCULAR; INTRAVENOUS; SOFT TISSUE PRN
Status: DISCONTINUED | OUTPATIENT
Start: 2023-07-31 | End: 2023-07-31

## 2023-07-31 RX ORDER — OXYCODONE HYDROCHLORIDE 10 MG/1
10 TABLET ORAL EVERY 4 HOURS PRN
Status: DISCONTINUED | OUTPATIENT
Start: 2023-07-31 | End: 2023-07-31

## 2023-07-31 RX ORDER — SODIUM CHLORIDE, SODIUM LACTATE, POTASSIUM CHLORIDE, CALCIUM CHLORIDE 600; 310; 30; 20 MG/100ML; MG/100ML; MG/100ML; MG/100ML
INJECTION, SOLUTION INTRAVENOUS CONTINUOUS
Status: DISCONTINUED | OUTPATIENT
Start: 2023-07-31 | End: 2023-08-01

## 2023-07-31 RX ORDER — FENTANYL CITRATE 50 UG/ML
25 INJECTION, SOLUTION INTRAMUSCULAR; INTRAVENOUS EVERY 5 MIN PRN
Status: DISCONTINUED | OUTPATIENT
Start: 2023-07-31 | End: 2023-07-31 | Stop reason: HOSPADM

## 2023-07-31 RX ORDER — ESCITALOPRAM OXALATE 10 MG/1
10 TABLET ORAL DAILY
Status: DISCONTINUED | OUTPATIENT
Start: 2023-07-31 | End: 2023-08-01

## 2023-07-31 RX ORDER — HYDROMORPHONE HCL IN WATER/PF 6 MG/30 ML
0.4 PATIENT CONTROLLED ANALGESIA SYRINGE INTRAVENOUS EVERY 5 MIN PRN
Status: DISCONTINUED | OUTPATIENT
Start: 2023-07-31 | End: 2023-07-31 | Stop reason: HOSPADM

## 2023-07-31 RX ORDER — FENTANYL CITRATE 50 UG/ML
50 INJECTION, SOLUTION INTRAMUSCULAR; INTRAVENOUS EVERY 5 MIN PRN
Status: DISCONTINUED | OUTPATIENT
Start: 2023-07-31 | End: 2023-07-31 | Stop reason: HOSPADM

## 2023-07-31 RX ORDER — ALBUTEROL SULFATE 90 UG/1
AEROSOL, METERED RESPIRATORY (INHALATION) PRN
Status: DISCONTINUED | OUTPATIENT
Start: 2023-07-31 | End: 2023-07-31

## 2023-07-31 RX ORDER — LABETALOL HYDROCHLORIDE 5 MG/ML
10 INJECTION, SOLUTION INTRAVENOUS
Status: DISCONTINUED | OUTPATIENT
Start: 2023-07-31 | End: 2023-07-31 | Stop reason: HOSPADM

## 2023-07-31 RX ORDER — ONDANSETRON 2 MG/ML
INJECTION INTRAMUSCULAR; INTRAVENOUS PRN
Status: DISCONTINUED | OUTPATIENT
Start: 2023-07-31 | End: 2023-07-31

## 2023-07-31 RX ORDER — ONDANSETRON 4 MG/1
4 TABLET, ORALLY DISINTEGRATING ORAL EVERY 6 HOURS PRN
Status: DISCONTINUED | OUTPATIENT
Start: 2023-07-31 | End: 2023-08-03 | Stop reason: HOSPADM

## 2023-07-31 RX ORDER — FENTANYL CITRATE 50 UG/ML
INJECTION, SOLUTION INTRAMUSCULAR; INTRAVENOUS PRN
Status: DISCONTINUED | OUTPATIENT
Start: 2023-07-31 | End: 2023-07-31

## 2023-07-31 RX ORDER — HEPARIN SODIUM 5000 [USP'U]/.5ML
5000 INJECTION, SOLUTION INTRAVENOUS; SUBCUTANEOUS
Status: COMPLETED | OUTPATIENT
Start: 2023-07-31 | End: 2023-07-31

## 2023-07-31 RX ORDER — KETOROLAC TROMETHAMINE 30 MG/ML
INJECTION, SOLUTION INTRAMUSCULAR; INTRAVENOUS PRN
Status: DISCONTINUED | OUTPATIENT
Start: 2023-07-31 | End: 2023-07-31

## 2023-07-31 RX ORDER — IBUPROFEN 200 MG
600 TABLET ORAL EVERY 6 HOURS
Status: DISCONTINUED | OUTPATIENT
Start: 2023-07-31 | End: 2023-08-03 | Stop reason: HOSPADM

## 2023-07-31 RX ORDER — POLYETHYLENE GLYCOL 3350 17 G/17G
17 POWDER, FOR SOLUTION ORAL DAILY
Status: DISCONTINUED | OUTPATIENT
Start: 2023-08-01 | End: 2023-08-03 | Stop reason: HOSPADM

## 2023-07-31 RX ORDER — KETAMINE HYDROCHLORIDE 10 MG/ML
INJECTION INTRAMUSCULAR; INTRAVENOUS PRN
Status: DISCONTINUED | OUTPATIENT
Start: 2023-07-31 | End: 2023-07-31

## 2023-07-31 RX ADMIN — IBUPROFEN 600 MG: 200 TABLET, FILM COATED ORAL at 18:19

## 2023-07-31 RX ADMIN — ACETAMINOPHEN 650 MG: 325 TABLET, FILM COATED ORAL at 12:39

## 2023-07-31 RX ADMIN — KETOROLAC TROMETHAMINE 15 MG: 30 INJECTION, SOLUTION INTRAMUSCULAR at 10:05

## 2023-07-31 RX ADMIN — Medication 10 MG: at 09:31

## 2023-07-31 RX ADMIN — PROPOFOL 150 MCG/KG/MIN: 10 INJECTION, EMULSION INTRAVENOUS at 08:35

## 2023-07-31 RX ADMIN — SODIUM CHLORIDE, POTASSIUM CHLORIDE, SODIUM LACTATE AND CALCIUM CHLORIDE: 600; 310; 30; 20 INJECTION, SOLUTION INTRAVENOUS at 08:28

## 2023-07-31 RX ADMIN — HEPARIN SODIUM 5000 UNITS: 5000 INJECTION, SOLUTION INTRAVENOUS; SUBCUTANEOUS at 07:52

## 2023-07-31 RX ADMIN — PROPOFOL 100 MG: 10 INJECTION, EMULSION INTRAVENOUS at 08:34

## 2023-07-31 RX ADMIN — PHENYLEPHRINE HYDROCHLORIDE 100 MCG: 10 INJECTION INTRAVENOUS at 09:10

## 2023-07-31 RX ADMIN — ONDANSETRON 4 MG: 2 INJECTION INTRAMUSCULAR; INTRAVENOUS at 10:29

## 2023-07-31 RX ADMIN — IBUPROFEN 600 MG: 200 TABLET, FILM COATED ORAL at 23:23

## 2023-07-31 RX ADMIN — METRONIDAZOLE 500 MG: 500 INJECTION, SOLUTION INTRAVENOUS at 07:51

## 2023-07-31 RX ADMIN — Medication 20 MG: at 09:02

## 2023-07-31 RX ADMIN — Medication 50 MG: at 08:35

## 2023-07-31 RX ADMIN — SODIUM CHLORIDE, POTASSIUM CHLORIDE, SODIUM LACTATE AND CALCIUM CHLORIDE: 600; 310; 30; 20 INJECTION, SOLUTION INTRAVENOUS at 12:33

## 2023-07-31 RX ADMIN — Medication 20 MG: at 09:09

## 2023-07-31 RX ADMIN — ONDANSETRON 4 MG: 2 INJECTION INTRAMUSCULAR; INTRAVENOUS at 10:05

## 2023-07-31 RX ADMIN — HYDROMORPHONE HYDROCHLORIDE 4 MG: 2 TABLET ORAL at 23:35

## 2023-07-31 RX ADMIN — ESCITALOPRAM OXALATE 10 MG: 10 TABLET ORAL at 13:44

## 2023-07-31 RX ADMIN — SODIUM CHLORIDE, POTASSIUM CHLORIDE, SODIUM LACTATE AND CALCIUM CHLORIDE: 600; 310; 30; 20 INJECTION, SOLUTION INTRAVENOUS at 09:53

## 2023-07-31 RX ADMIN — Medication 2 G: at 08:34

## 2023-07-31 RX ADMIN — PHENYLEPHRINE HYDROCHLORIDE 100 MCG: 10 INJECTION INTRAVENOUS at 08:46

## 2023-07-31 RX ADMIN — FENTANYL CITRATE 50 MCG: 50 INJECTION, SOLUTION INTRAMUSCULAR; INTRAVENOUS at 08:34

## 2023-07-31 RX ADMIN — SUGAMMADEX 200 MG: 100 INJECTION, SOLUTION INTRAVENOUS at 10:05

## 2023-07-31 RX ADMIN — FENTANYL CITRATE 50 MCG: 50 INJECTION, SOLUTION INTRAMUSCULAR; INTRAVENOUS at 10:20

## 2023-07-31 RX ADMIN — SODIUM CHLORIDE, POTASSIUM CHLORIDE, SODIUM LACTATE AND CALCIUM CHLORIDE 500 ML: 600; 310; 30; 20 INJECTION, SOLUTION INTRAVENOUS at 18:20

## 2023-07-31 RX ADMIN — Medication 10 MG: at 10:01

## 2023-07-31 RX ADMIN — FENTANYL CITRATE 50 MCG: 50 INJECTION, SOLUTION INTRAMUSCULAR; INTRAVENOUS at 10:01

## 2023-07-31 RX ADMIN — DEXAMETHASONE SODIUM PHOSPHATE 8 MG: 4 INJECTION, SOLUTION INTRA-ARTICULAR; INTRALESIONAL; INTRAMUSCULAR; INTRAVENOUS; SOFT TISSUE at 08:34

## 2023-07-31 RX ADMIN — Medication 10 MG: at 09:53

## 2023-07-31 RX ADMIN — FENTANYL CITRATE 50 MCG: 50 INJECTION, SOLUTION INTRAMUSCULAR; INTRAVENOUS at 10:32

## 2023-07-31 RX ADMIN — HYDROMORPHONE HYDROCHLORIDE 4 MG: 2 TABLET ORAL at 20:40

## 2023-07-31 RX ADMIN — LIDOCAINE HYDROCHLORIDE 60 MG: 20 INJECTION, SOLUTION INFILTRATION; PERINEURAL at 08:34

## 2023-07-31 RX ADMIN — ALBUTEROL SULFATE 8 PUFF: 108 INHALANT RESPIRATORY (INHALATION) at 10:12

## 2023-07-31 RX ADMIN — LEVOTHYROXINE SODIUM 200 MCG: 200 TABLET ORAL at 13:45

## 2023-07-31 RX ADMIN — SODIUM CHLORIDE, POTASSIUM CHLORIDE, SODIUM LACTATE AND CALCIUM CHLORIDE: 600; 310; 30; 20 INJECTION, SOLUTION INTRAVENOUS at 23:47

## 2023-07-31 RX ADMIN — FENTANYL CITRATE 50 MCG: 50 INJECTION, SOLUTION INTRAMUSCULAR; INTRAVENOUS at 08:22

## 2023-07-31 RX ADMIN — MIDAZOLAM 1 MG: 1 INJECTION INTRAMUSCULAR; INTRAVENOUS at 08:22

## 2023-07-31 RX ADMIN — ACETAMINOPHEN 650 MG: 325 TABLET, FILM COATED ORAL at 20:22

## 2023-07-31 RX ADMIN — FENTANYL CITRATE 50 MCG: 50 INJECTION, SOLUTION INTRAMUSCULAR; INTRAVENOUS at 10:40

## 2023-07-31 ASSESSMENT — ACTIVITIES OF DAILY LIVING (ADL)
ADLS_ACUITY_SCORE: 43
ADLS_ACUITY_SCORE: 37
ADLS_ACUITY_SCORE: 43
ADLS_ACUITY_SCORE: 43

## 2023-07-31 ASSESSMENT — LIFESTYLE VARIABLES: TOBACCO_USE: 1

## 2023-07-31 ASSESSMENT — COPD QUESTIONNAIRES: COPD: 0

## 2023-07-31 NOTE — PROGRESS NOTES
Notified Team:  Patient has only had about 60ml of urine output since noon. Any interventions? Thanks

## 2023-07-31 NOTE — ANESTHESIA CARE TRANSFER NOTE
Patient: Suni Zuluaga    Procedure: Procedure(s):  Pelvic Exam under anesthesia with laparoscopic colostomy  Cystoscopy       Diagnosis: Rectovaginal fistula [N82.3]  Diagnosis Additional Information: No value filed.    Anesthesia Type:   General     Note:    Oropharynx: oropharynx clear of all foreign objects and spontaneously breathing  Level of Consciousness: drowsy  Oxygen Supplementation: nasal cannula  Level of Supplemental Oxygen (L/min / FiO2): 3  Independent Airway: airway patency satisfactory and stable  Dentition: dentition unchanged  Vital Signs Stable: post-procedure vital signs reviewed and stable  Report to RN Given: handoff report given  Patient transferred to: PACU    Handoff Report: Identifed the Patient, Identified the Reponsible Provider, Reviewed the pertinent medical history, Discussed the surgical course, Reviewed Intra-OP anesthesia mangement and issues during anesthesia, Set expectations for post-procedure period and Allowed opportunity for questions and acknowledgement of understanding      Vitals:  Vitals Value Taken Time   /80 07/31/23 1016   Temp     Pulse 86 07/31/23 1022   Resp 17 07/31/23 1022   SpO2 96 % 07/31/23 1022   Vitals shown include unvalidated device data.    Electronically Signed By: LAURA Mcdonough CRNA  July 31, 2023  10:23 AM

## 2023-07-31 NOTE — ANESTHESIA PREPROCEDURE EVALUATION
Anesthesia Pre-Procedure Evaluation    Patient: Suni Zuluaga   MRN: [id number] : 1985        Procedure : Procedure(s):  Pelvic Exam under anesthesia with laparoscopic colostomy          Past Medical History:   Diagnosis Date    Cervical cancer (H)     Tobacco abuse       Past Surgical History:   Procedure Laterality Date    CARPAL TUNNEL RELEASE RT/LT Right     COMBINED CYSTOSCOPY, INSERT STENT URETER(S) Bilateral 5/10/2023    Procedure: CYSTOSCOPY, WITH RIGHT URETERAL STENT INSERTION, Bilateral retrograde pyelogram.;  Surgeon: Robert Valladares MD;  Location: [facility] OR    COMBINED CYSTOSCOPY, RETROGRADES, EXCHANGE STENT URETER(S) Right 2023    Procedure: CYSTOSCOPY with fluoroscopy;  Surgeon: Robert Valladares MD;  Location: [facility] OR    IR NEPHROSTOMY TUBE PLACEMENT RIGHT  2023    TUBAL LIGATION        Allergies   Allergen Reactions    Clindamycin Hives      Social History     Tobacco Use    Smoking status: Every Day     Packs/day: 1.00     Types: Cigarettes    Smokeless tobacco: Never    Tobacco comments:     2020 1/2 pack/day, tying tp quit   Substance Use Topics    Alcohol use: Yes     Comment: occ.      Wt Readings from Last 1 Encounters:   23 49.9 kg (110 lb)        Anesthesia Evaluation   Pt has had prior anesthetic. Type: General.    No history of anesthetic complications       ROS/MED HX  ENT/Pulmonary:     (+)                tobacco use, Current use,                   (-) asthma, COPD and sleep apnea   Neurologic:       Cardiovascular:  - neg cardiovascular ROS     METS/Exercise Tolerance:     Hematologic:     (+)      anemia,          Musculoskeletal:  - neg musculoskeletal ROS     GI/Hepatic:    (-) GERD   Renal/Genitourinary:       Endo:     (+)          thyroid problem, hypothyroidism,           Psychiatric/Substance Use:     (+)     : denies.    Infectious Disease:  - neg infectious disease ROS     Malignancy:   (+) Malignancy,     Other:          /77 (BP  "Location: Left arm)   Pulse (!) 130   Temp 36.7  C (98  F) (Oral)   Resp 20   Ht 1.6 m (5' 3\")   Wt 49.9 kg (110 lb)   SpO2 99%   BMI 19.49 kg/m        Physical Exam    Airway        Mallampati: II   TM distance: > 3 FB   Neck ROM: full   Mouth opening: > 3 cm    Respiratory Devices and Support         Dental     Comment: Very poor dentition, denies any loose teeth    (+) Multiple visibly decayed, broken teeth    B=Bridge, C=Chipped, L=Loose, M=Missing    Cardiovascular             Pulmonary                   OUTSIDE LABS:  CBC:   Lab Results   Component Value Date    WBC 6.7 07/25/2023    WBC 6.9 05/05/2023    HGB 11.0 (L) 07/25/2023    HGB 11.4 (L) 05/05/2023    HCT 36.6 07/25/2023    HCT 35.1 05/05/2023     07/25/2023     05/05/2023     BMP:   Lab Results   Component Value Date     07/07/2023     (L) 06/16/2023    POTASSIUM 3.8 07/07/2023    POTASSIUM 4.1 06/16/2023    CHLORIDE 100 07/07/2023    CHLORIDE 97 (L) 06/16/2023    CO2 26 07/07/2023    CO2 28 06/16/2023    BUN 16.4 07/07/2023    BUN 15.0 06/16/2023    CR 0.56 07/07/2023    CR 0.70 06/16/2023     (H) 07/31/2023     (H) 07/07/2023     COAGS:   Lab Results   Component Value Date    PTT 30 09/28/2022    INR 1.09 07/25/2023     POC:   Lab Results   Component Value Date    HCG Negative 07/25/2023     HEPATIC:   Lab Results   Component Value Date    ALBUMIN 3.8 07/07/2023    PROTTOTAL 7.9 07/07/2023    ALT 16 07/07/2023    AST 17 07/07/2023    ALKPHOS 81 07/07/2023    BILITOTAL 0.2 07/07/2023     OTHER:   Lab Results   Component Value Date    HARRISON 9.7 07/07/2023    MAG 1.6 (L) 07/07/2023    TSH 8.18 (H) 07/07/2023    T4 2.31 (H) 07/07/2023       Anesthesia Plan    ASA Status:  2    NPO Status:  NPO Appropriate    Anesthesia Type: General.     - Airway: ETT   Induction: Intravenous.   Maintenance: Balanced.   Techniques and Equipment:     - Lines/Monitors: 2nd IV     Consents    Anesthesia Plan(s) and associated " risks, benefits, and realistic alternatives discussed. Questions answered and patient/representative(s) expressed understanding.     - Discussed:     - Discussed with:  Patient      - Extended Intubation/Ventilatory Support Discussed: No.      - Patient is DNR/DNI Status: No     Use of blood products discussed: Yes.     - Discussed with: Patient.     - Consented: consented to blood products            Reason for refusal: other.     Postoperative Care    Pain management: IV analgesics, Oral pain medications.   PONV prophylaxis: Ondansetron (or other 5HT-3), Dexamethasone or Solumedrol, Background Propofol Infusion     Comments:                Kavin Payne MD

## 2023-07-31 NOTE — CONSULTS
"    Interventional Radiology  [facility] Consult Service Note  07/31/23   11:21 AM    Consult Requested: \"Left PNT placement for vesicovaginal fistula\"    Recommendations/Plan:    -Patient is on IR schedule tomorrow for placement of a left PNT.   -Labs WNL for procedure.  -Orders entered for procedure, NPO status starting at midnight, and pre procedure IV antibiotics ordered  -Medications to be held include: enoxaparin has been held.  -Consent will be done prior to procedure.     Please contact the IR charge RN at [phone] for estimated time of procedure.     Case and imaging discussed with IR attending, Dr. Cox. Recommendations were reviewed with requesting team.    This is a 38 year old female with past medical history of cervical cancer and hypothyroidism who presented today for a scheduled EUA and laparoscopic colosctomy with Dr. Brambila and Dr. Castano. Findings revealed a large vesicovaginal fistula. Patient had a right PCN tube placed by IR on 7/25. IR has been asked to place a left PCN tube due to vesicovaginal fistula. Positioning for procedure may be challenging due to her recent colostomy placement 7/31.    Pertinent Imaging Reviewed: CT imaging from 6/23    Expected date of discharge: TBD      Vitals:   BP 99/68   Pulse 91   Temp 97.3  F (36.3  C) (Oral)   Resp 17   Ht 1.6 m (5' 3\")   Wt 49.9 kg (110 lb)   SpO2 95%   BMI 19.49 kg/m      Pertinent Labs:   Lab Results   Component Value Date    WBC 6.7 07/25/2023    WBC 6.9 05/05/2023    WBC 5.4 04/14/2023    WBC 3.0 (L) 10/27/2020    WBC 2.7 (L) 10/20/2020    WBC 3.6 (L) 10/12/2020     Lab Results   Component Value Date    HGB 11.0 07/25/2023    HGB 11.4 05/05/2023    HGB 11.2 04/14/2023    HGB 11.0 10/27/2020    HGB 10.4 10/20/2020    HGB 10.2 10/12/2020     Lab Results   Component Value Date     07/25/2023     05/05/2023     04/14/2023    PLT 95 10/27/2020     10/20/2020     10/12/2020 "     Lab Results   Component Value Date    INR 1.09 07/25/2023    INR 1.10 09/09/2020    PTT 30 09/28/2022    PTT 28 09/09/2020     Lab Results   Component Value Date    POTASSIUM 3.8 07/07/2023    POTASSIUM 3.3 (L) 10/27/2020        COVID-19 Antibody Results, Testing for Immunity         No data to display            COVID-19 PCR Results         No data to display                Germania Rapp PA-C  Interventional Radiology  Pager: [phone]

## 2023-07-31 NOTE — BRIEF OP NOTE
**Please page Gyn Onc [phone] with questions or concerns**   [facility]    Brief Operative Note    Pre-operative diagnosis: Rectovaginal fistula [N82.3]  Post-operative diagnosis Same as pre-operative diagnosis    Procedure: Procedure(s):  Pelvic Exam under anesthesia with laparoscopic colostomy  Cystoscopy  Surgeon: Surgeon(s) and Role:  Panel 1:     * Suzy Brambila MD - Primary     * Robert Valladares MD - Assisting     * Ivy Dent MD - Assisting     * Aliza Uribe MD - Resident - Assisting     * Debo Cash MD - Fellow - Assisting  Panel 2:     * Robert Valladares MD - Primary  Anesthesia: General   Estimated Blood Loss: 5mL  UOP: not measured  IVF: 1000mL    Drains: None  Specimens: * No specimens in log *  Findings:   Exam under anesthesia revealed large vesicovaginal fistula approximately 4x3cm, unable to palpate a ureteral stent. Also with large 2-3cm rectovaginal fistula in more proximal vagina. Firm nodularity of upper vagina concerning for disease recurrence. No visualization of ureteral stent on cystoscopy with Urology. No trauma on laparoscopic entry, uterus and distal sigmoid colon adherent in the pelvis. Descending and rectosigmoid colon redundant and mobile. Tension free end descending colostomy, patent to 2 finger breadths. Hemostasis at end of case. .  Complications: None.  Implants: * No implants in log *    Please see full operative note for more details:    Plan:   - KUB in PACU due to no visualization of ureteral stent on cystoscopy (with urology)  - Toradol OK  - Admit to Gyn onc  - Advance diet as tolerated    Debo Cash MD  Gynecology Oncology Fellow  July 31, 2023 , 10:16 AM      **Please page Gyn Onc [phone] with questions or concerns**

## 2023-07-31 NOTE — PROGRESS NOTES
Gynecology Oncology Progress Note  07/31/2023    HD#1/POD#1 s/p pelvic exam under anesthesia with laparoscopic colostomy, cystoscopy.     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  Pain is controlled.  Tolerating minimal PO. Right PNT draining giuliana urine. Inc of urine. Up with assist. Denies chest pain, SOB, nausea/vomiting, fevers/chills, dizziness.    Objective:   Vitals:    07/31/23 1200 07/31/23 1239   BP: 96/59 93/64   BP Location:     Pulse: 101 84   Resp: 22 21   Temp: 98.4  F (36.9  C)    TempSrc: Oral    SpO2: 96% 97%   Weight:     Height:         General: NAD, appears comfortable in bed  CV: RRR  Resp: CTAB anteriorly.  Abdomen: Soft, appropriately tender, nondistended  Incision: Lap sites c/d/I  Extremities: warm, well-perfused, nontender, no edema    I/O last 3 completed shifts:  In: 1793.33 [I.V.:1793.33]  Out: 85 [Urine:85]    New labs/imaging-  Abd Xray: Impression:   1. No evidence of retained ureteral stent.  2. Right nephrostomy tube and left lower quadrant colostomy bag.  3. Nonobstructive bowel gas pattern.    Assessment: Suni Zuluaga is a 38 year old female POD#1 with known history of advanced squamous cell carcinoma of the cervix complicated by rectovaginal fistula. Patient underwent above stated procedure. Discussed procedure with patient, diet, importance of pain control, and post op course. Also reviewed Dr Brambila's recommendation of placing left PNT to assist with urine leakage. Patient reports she does not want to proceed with PNT placement.     Active Problem list:  - Rectovaginal fistula   - SSC of the cervix  - Vesicovaginal fistula  - Hypothyroidism     Plan:    Dz: stage IIIC1 cervical SCC, s/p chemo RT (9-10/20), T&R brachy (11/20). Recurrence 10/22, s/p 7C carbo/taxol/johny/pembro (10/22-3/23), 5C pembro/johny (3-6/23), 1C pembro (7/23), fluoro gastrograffin study (7/23) w/ c/f  rectovaginal fistula    #Routine post-op  FEN: Regular diet. Labs in am.  Pain: Sched tylenol, toradol > ibu. PRN oxy, IV dilaudid  Heme: Hgb 11.0> EBL 5 ML. CBC in am  CV: NI  Pulm: NI  GI: Bowel regimen, antiemetics PRN  : Right hydronephrosis hx of ureteral stent recently had diverting right PNTplaced in IR. Unable to visualize ureteral stent on cysto per urology. Confirmed with KUB xray: impression no evidence of retained ureteral stent. Discussed placement of diverting left PNT due to leaking from fistula. Patient declines placement. Gyn Onc fellow updated and plans to speak with the patient.     ID: S/p patrick-op abx  Endo: NI  Psych/Neuro/MSK: NI  PPX: SCDs, IS. Plan for ppx anticoagulation to start tomorrow if hgb stable.    #SSC of the cervix  Palliative care has been consulted for recurrent cervical cancer.    #Rectovaginal fistula  S/p laparoscopy colostomy with robles pouch placement. Continue regular diet, bowel regimen, and await return of bowel function. Will plan to send home on anticoagulation.     #Vesicovaginal fistula  Patient had right PNT placement per IR as an outpatient due to hydronephrosis strictures. A left PNT was not placed at that time. See .  Urology was consulted during her procedure for stent removal. Stent was not visualized during cystoscopy. KUB was ordered and showed no stent in place.       #Hypothyroidism   Continue levothyroxine    Missy Saravia, APRN, DNP, CNP  7/31/2023 5:45 PM      ADDENDUM:   Note entry delayed by other patient cares - patient assessed at 1745 and again at 2030.     Doing well, some abdominal discomfort but no nausea or vomiting. Tolerating PO. Ostomy with gas and stool ouput. Denies any CP or SOB. Confirms that she would prefer NOT to proceed with L PNT placement as scheduled tomorrow. Understands that she will continue to have leaking of urine from vesicovaginal fistula, but feels like there's no point in L PNT placement.     Patient Vitals for the  "past 24 hrs:   BP Temp Temp src Pulse Resp SpO2 Height Weight   07/31/23 2018 92/55 98.3  F (36.8  C) Oral 73 14 95 % -- --   07/31/23 1646 (!) 86/52 98.7  F (37.1  C) Axillary 68 16 96 % -- --   07/31/23 1453 (!) 84/52 98.1  F (36.7  C) -- 80 17 97 % -- --   07/31/23 1400 (!) 83/52 -- -- 82 19 97 % -- --   07/31/23 1239 93/64 -- -- 84 21 97 % -- --   07/31/23 1200 96/59 98.4  F (36.9  C) Oral 101 22 96 % -- --   07/31/23 1130 97/67 -- -- 86 18 95 % -- --   07/31/23 1115 99/68 -- -- 91 17 95 % -- --   07/31/23 1100 94/65 97.3  F (36.3  C) Oral 87 21 95 % -- --   07/31/23 1045 96/70 -- -- 87 23 97 % -- --   07/31/23 1040 96/70 -- -- 86 22 96 % -- --   07/31/23 1030 115/81 -- -- 84 30 97 % -- --   07/31/23 1015 122/80 97.9  F (36.6  C) Axillary 85 25 99 % -- --   07/31/23 0740 105/77 98  F (36.7  C) Oral (!) 130 20 99 % 1.6 m (5' 3\") 49.9 kg (110 lb)     Gen: Tired appearing, NAD  CV: Warm and well perfused  Pulm: Breathing comfortably on room air  Abd: Soft, non-distended, appropriately tender. Ostomy in LLQ, gas and stool noted   Ext: SCDs in place     Intake/Output:    ml  IVF 2043 ml + 500 ml   UOP: 170 ml > 85 ml from R PNT, leaking of urine on pad weight     A/P: Suni Zuluaga POD#0 s/p laparoscopic colostomy, cystoscopy. Doing well in postoperative period. Initially with some low BP and UOP, now improved after additional 500 ml bolus. Continue to monitor overnight, continue mIVF. Pt had been scheduled with IR for L PNT placement tomorrow for vesicovaginal fistula, but now prefers NOT to proceed with this. Will inform IR in AM regarding this change in plan.       Renuka Hopson MD PGY-4  [facility] Gynecology Oncology   Gyn Onc Pager: [phone]  07/31/23 9:25 PM     "

## 2023-07-31 NOTE — OP NOTE
Procedure Date: 07/31/2023    PREOPERATIVE DIAGNOSES:     1.  Recurrent metastatic cervical cancer.  2.  Rectovaginal fistula.  3.  Vesicovaginal fistula.  4.  Severe malnutrition.  5.  Tobacco abuse.    POSTOPERATIVE DIAGNOSES:     1.  Recurrent metastatic cervical cancer.  2.  Rectovaginal fistula.  3.  Vesicovaginal fistula.  4.  Severe malnutrition.  5.  Tobacco abuse.    PROCEDURES PERFORMED:    1.  Exam under anesthesia.  2.  Cystoscopy by Dr. Minesh Valladares.  3.  Laparoscopic end colostomy.    SURGEON:  Suzy Brambila MD    Assistants:   1.  Dr. Debo Cash (GYN oncology fellow).  3.  Dr. Aliza Uribe (OB/GYN resident).    ESTIMATED BLOOD LOSS:  5 mL.    COMPLICATIONS:  None.    SPECIMENS REMOVED:  None.    INDICATIONS:  The patient is a 38-year-old woman well known to me with a history of locally advanced cervical cancer.  She received primary chemoradiation and initially did have a complete response, however, in 2022, she had a recurrence, specifically to the periaortic region.  She received multiple cycles of palliative chemotherapy.  Most recently, her CT scan was concerning for possible fistula and clinically, she was having severe leakage of urine and stool per vagina.  Last week, she had a right percutaneous nephrostomy tube placed given the fact that the right ureteral stent was not able to be replaced and also given this new vesicovaginal fistula.  She presented to the hospital today for palliative management of her rectovaginal fistula.    FINDINGS:  On bimanual exam, she had normal external genitalia.  She had evidence of radiation to the entire vagina.  She had a very large anterior defect in the vagina communicating directly with the bladder.  This was easily palpable.  She also had a large approximately 2 cm right posterior defect of the vagina communicating directly with the rectum.  She had gross stool per vagina as well as urinary leakage.  There was no stent palpated in the  bladder.  She did have nodularity along the left upper vagina concerning for likely cervical cancer recurrence.  The cervix itself was smooth.    DESCRIPTION OF PROCEDURE:  After informed consent, the patient was brought to the operating room where general anesthesia was administered.  She was prepped and draped in the dorsal lithotomy position.  A surgical timeout was performed, ensuring correct patient and procedure.  She was given Ancef and Flagyl for preoperative prophylactic antibiotics.    I started by performing an exam with the above findings noted.  Given the concern for possible residual right ureteral stent, I did call my colleague, Dr. Minesh Valladares, with urology and to assess her bladder to ensure that there was no residual stent in the right collecting system.  He performed cystoscopy and no stent was visualized.  Please see his note for full details.      At this point, attention was turned to the abdomen and we prepared for laparoscopy.  I placed a Veress needle through her umbilicus.  Opening pressure was 0.  Pneumoperitoneum was established.  I placed a 5 mm port through the umbilicus.  Under direct visualization, I placed a right lower quadrant 5 mm port and ultimately placed a left mid abdominal 10 mm port, which was later converted to her ostomy site.  The patient was placed in steep Trendelenburg.  I inspected the upper abdomen.  No carcinomatosis was noted.  Her sigmoid colon was relatively mobile.  I did take down some adhesions against the left pelvic sidewall, which were very filmy in nature.  She had a redundant sigmoid colon. I identified an area of good mobility and lifted it to the anterior abdominal wall to assess length and I felt this was in appropriate place for the colostomy site.  I then used the LigaSure device to make a defect in the mesentery.  I then removed the left-sided trocar and placed a Lagrange clamp through the port site.  I grasped the loop of sigmoid colon and pulled  this through the abdominal wall.  The abdomen and pelvis were inspected and very hemostatic.  I then released the pneumoperitoneum and removed the residual trocars.  I closed the umbilical and right-sided trocar site.    I then proceeded to transect the colon.  I used a SARAHI stapling device with a blue load across the sigmoid colon.  I then dropped the distal end of the colon back into the abdomen.  It should be noted that prior to removing the pneumoperitoneum, I did verify the fact that the correct (proximal) end was remaining through the ostomy site and the distal end had, in fact, been dropped into the pelvis.    At this point, I matured the ostomy.  I cut a larger incision over the port site.  I cut off the staple line.   I then used the rosebud suture technique to alis the colonic mucosa and used additional interrupted sutures to further mature the ostomy.  We then placed an ostomy bag over the ostomy.  Excellent hemostasis was noted.  The patient was then awoken from anesthesia and brought to recovery room in stable condition.    Suzy Brambila MD        D: 2023   T: 2023   MT: clementine    Name:     ISIDRO GRANADOS  MRN:      [id number]        Account:        [id number]   :      1985           Procedure Date: 2023     Document: [id number]

## 2023-07-31 NOTE — PROGRESS NOTES
Admitted/transferred from: PACU    2 RN full   skin assessment completed by Lolis Mendenhall, RN and Eleonora Holt RN.  Skin assessment finding: issues found Lap sites X2, Colostomy, Right PNT    Interventions/actions: skin interventions Continue to monitor      Will continue to monitor.

## 2023-07-31 NOTE — PLAN OF CARE
Patient came to 5A from the PACU this afternoon, had a lap colostomy, has a pre-existing PNT drain. Patient gets up with a walker and 1 assist. Went to the bathroom X2, scant liquid brown output each time. Pain is managed with Tylenol. Is on a regular diet, no nausea reported. BP's soft, capno on, will continue to monitor.

## 2023-07-31 NOTE — ANESTHESIA POSTPROCEDURE EVALUATION
Patient: Suni Zuluaga    Procedure: Procedure(s):  Pelvic Exam under anesthesia with laparoscopic colostomy  Cystoscopy       Anesthesia Type:  General    Note:  Disposition: Admission   Postop Pain Control: Uneventful            Sign Out: Well controlled pain   PONV: No   Neuro/Psych: Uneventful            Sign Out: Acceptable/Baseline neuro status   Airway/Respiratory: Uneventful            Sign Out: Acceptable/Baseline resp. status   CV/Hemodynamics: Uneventful            Sign Out: Acceptable CV status; No obvious hypovolemia; No obvious fluid overload   Other NRE: NONE   DID A NON-ROUTINE EVENT OCCUR?            Last vitals:  Vitals Value Taken Time   BP 97/67 07/31/23 1130   Temp 36.3  C (97.3  F) 07/31/23 1100   Pulse 88 07/31/23 1143   Resp 18 07/31/23 1143   SpO2 95 % 07/31/23 1142   Vitals shown include unvalidated device data.    Electronically Signed By: Kavin Payne MD  July 31, 2023  1:22 PM

## 2023-07-31 NOTE — H&P
[facility] GYN Oncology  Pre-op History and Physical    Suni Zuluaga MRN# [id number]   Age: 38 year old YOB: 1985     Date of Admission:  2023    Primary care provider: Suzy Brambila    HPI:  Suni Zuluaga is a 38 year old  with PMH of cervical cancer and hypothyroidism who presents for scheduled EUA and laparoscopic cholosctomy with Dr. Brambila and Dr. Castano.     She is feeling well today and denies recent illness, F/C, loss of taste/smell, cough, sore throat, chest pain, shortness of breath, nausea/vomiting. She had a R PNT placed on , but otherwise her health has not changed since her last visit with Dr. Galicia on 23. She denies history of intolerance of anesthesia.     OB/GYN History:      PMH:  Past Medical History:   Diagnosis Date    Cervical cancer (H)     Tobacco abuse        PSH:  Past Surgical History:   Procedure Laterality Date    CARPAL TUNNEL RELEASE RT/LT Right     COMBINED CYSTOSCOPY, INSERT STENT URETER(S) Bilateral 5/10/2023    Procedure: CYSTOSCOPY, WITH RIGHT URETERAL STENT INSERTION, Bilateral retrograde pyelogram.;  Surgeon: Robert Valladares MD;  Location: [facility] OR    COMBINED CYSTOSCOPY, RETROGRADES, EXCHANGE STENT URETER(S) Right 2023    Procedure: CYSTOSCOPY with fluoroscopy;  Surgeon: Robert Valladares MD;  Location: [facility] OR    IR NEPHROSTOMY TUBE PLACEMENT RIGHT  2023    TUBAL LIGATION         Medications:  lidocaine (PF) (XYLOCAINE) 1 % injection 10 mL  lidocaine 1% with EPINEPHrine 1:100,000 injection 10 mL    acetaminophen (TYLENOL) 500 MG tablet, Take 2 tablets (1,000 mg) by mouth every 6 hours as needed for mild pain  Ascorbic Acid (VITAMIN C PO),   bisacodyl (DULCOLAX) 5 MG EC tablet, Take 5 mg by mouth  celecoxib (CELEBREX) 100 MG capsule, Take 1 capsule (100 mg) by mouth 2 times daily . Stop ibuprofen.  ciprofloxacin (CIPRO) 500 MG tablet, Take 1 tablet (500 mg) by mouth 2 times daily  diazepam (VALIUM) 2 MG  "tablet, TAKE ONE TABLET BY MOUTH TWICE A DAY AS NEEDED FOR ANXIETY  escitalopram (LEXAPRO) 10 MG tablet, Take 1 tablet by mouth daily  ibuprofen (ADVIL/MOTRIN) 200 MG tablet, Take 200 mg by mouth  levothyroxine (SYNTHROID/LEVOTHROID) 175 MCG tablet, Take 1 tablet (175 mcg) by mouth daily  levothyroxine (SYNTHROID/LEVOTHROID) 200 MCG tablet, Take 1 tablet (200 mcg) by mouth daily  magnesium oxide (MAG-OX) 400 MG tablet, Take 1 tablet (400 mg) by mouth daily  oxyCODONE (ROXICODONE) 5 MG tablet, Take 1 tablet (5 mg) by mouth every 6 hours as needed for pain  phenazopyridine (PYRIDIUM) 100 MG tablet, Take 1 tablet (100 mg) by mouth 3 times daily as needed for urinary tract discomfort  vitamin B-12 (CYANOCOBALAMIN) 500 MCG tablet, Take 500 mcg by mouth  oxybutynin ER (DITROPAN XL) 5 MG 24 hr tablet, Take 1 tablet (5 mg) by mouth daily        Social History:  Social History     Tobacco Use    Smoking status: Every Day     Packs/day: 1.00     Types: Cigarettes    Smokeless tobacco: Never    Tobacco comments:     9/11/2020 1/2 pack/day, tying tp quit   Substance Use Topics    Alcohol use: Yes     Comment: occ.    Drug use: Not Currently       Family History:  Family History   Problem Relation Age of Onset    Breast Cancer Mother         8/2020 newly dx       Allergies:  Allergies   Allergen Reactions    Clindamycin Hives       Physical Exam    /77 (BP Location: Left arm)   Pulse (!) 130   Temp 98  F (36.7  C) (Oral)   Resp 20   Ht 1.6 m (5' 3\")   Wt 49.9 kg (110 lb)   SpO2 99%   BMI 19.49 kg/m      Gen: NAD, sitting in bed  CV: RRR, no m/r/g  Resp: NWOB, CTAB  Abd: Soft, tender to palpation in lower quadrants, non-distended  Ext: Warm, well perfused, trace edema  Neuro: Moving all 4 extremeities    Labs:  Results for orders placed or performed during the hospital encounter of 07/31/23 (from the past 24 hour(s))   Glucose by meter   Result Value Ref Range    GLUCOSE BY METER POCT 125 (H) 70 - 99 mg/dL "         Studies:  Fluoro gastrograffin study :  Impression:   Gastrografin enema with fistulous connection from the rectosigmoid  junction to the vagina and urinary bladder.     Assessment/Plan:  Suni Zuluaga is a 38 year old  with PMH of cervical cancer and hypothyroidism who presents for scheduled EUA and laparoscopic cholostomy with Dr. Castano and Dr. Brambila. She had a R PNT placed on , but otherwise her health is unchanged from recent visit with Dr. Brambila on .      Plan to proceed with procedure as scheduled. Informed consent signed and all questions answered. Anticipate admission after procedure.    #Cervical cancer  #Rectovaginal fistula  - To OR for EUA, laparoscopic colostomy    #Hypothyroidism  - Continue PTA synthroid     Discussed with Dr. Brambila.    Aliza Uribe MD  OB/GYN Resident, PGY-3

## 2023-07-31 NOTE — OP NOTE
PREOPERATIVE DIAGNOSIS:  Bladder fistula, Possible Stent  POSTOPERATIVE DIAGNOSIS:  Same    PROCEDURES:  Cystoscopy.   SURGEON: Robert Valladares MD   RESIDENT: None  ANESTHESIA: General  EBL: 0cc  FLUIDS: See dictated anesthesia record for details  SPECIMEN:  None  DRAINS AND TUBES:  None  FINDINGS:   No stent visible    INDICATIONS:   Suni Zuluaga is a 38 year old female with a history of cervical cancer who has had a previous stent placement.  She is here today for a colostomy to be placed by Dr Brambila.  Since she had a percutaneous nephrostomy tube placed in the interim, our plan was to remove the stent today.    DESCRIPTION OF PROCEDURE:   After obtaining informed consent, the patient was brought to the operating room and placed in the supine position on the operating room table. All pressure points were adequately cushioned and pneumatic compression devices were applied to the patient's bilateral lower extremities. Appropriate preoperative antibiotics were administered. Anesthesia was induced without difficulty. The patient was repositioned into the dorsal lithotomy position. The patient was then prepped and draped in the usual sterile fashion. A timeout was performed to confirm the correct patient, positioning, procedure, and laterality.   Procedure was initiated with insertion of a 22 F rigid cystoscope into the bladder. The urethra was normal without stricture. At this time a full cystoscopy was performed.  Urethra was open.  There was a large vesicovaginal fistula.  No stent was visible.  No stent was visible on vaginal exam.  At this point I turned the procedure over to Dr Brambila.    PLAN:  KUB to confirm stent has been removed.  Addendum  On my review of post op KUB, there is no stent visible.  Right percutaneous nephrostomy tube is visible.

## 2023-07-31 NOTE — ANESTHESIA PROCEDURE NOTES
Airway       Patient location during procedure: OR       Procedure Start/Stop Times: 7/31/2023 8:39 AM  Staff -        Anesthesiologist:  Kavin Payne MD       CRNA: Tima Celaya APRN CRNA       Performed By: CRNA  Consent for Airway        Urgency: elective  Indications and Patient Condition       Indications for airway management: patrick-procedural       Induction type:intravenous       Mask difficulty assessment: 1 - vent by mask    Final Airway Details       Final airway type: endotracheal airway       Successful airway: ETT - single and Oral  Endotracheal Airway Details        ETT size (mm): 6.5       Cuffed: yes       Successful intubation technique: direct laryngoscopy       DL Blade Type: Belcher 2       Grade View of Cords: 1       Adjucts: stylet       Position: Right       Measured from: lips       Secured at (cm): 22       Bite block used: None    Post intubation assessment        Placement verified by: capnometry and equal breath sounds        Number of attempts at approach: 1       Number of other approaches attempted: 0       Secured with: silk tape       Ease of procedure: easy       Dentition: Unchanged    Medication(s) Administered   Medication Administration Time: 7/31/2023 8:39 AM

## 2023-07-31 NOTE — PHARMACY-ADMISSION MEDICATION HISTORY
Pharmacist Admission Medication History    Admission medication history is complete. The information provided in this note is only as accurate as the sources available at the time of the update.    Medication reconciliation/reorder completed by provider prior to medication history? Yes    Information Source(s): Clinic records and CareEverywhere/SureScripts    Pertinent Information: Confirmed levothyroxine dose was increased to 200 mcg daily on 7/7/23 per chart notes and dispense report    Changes made to PTA medication list:  Added: None    Deleted:   Escitalopram 10 mg PO daily (not on dispense report and patient previously reported not taking - original Rx was from 2021)  Levothyroxine duplicate old dose  Oxycodone 5mg PO q6h PRN pain (last dispensed in June, more recently received hydromorphone)     Changed:  Updated dose and directions for ibuprofen and ascorbic acid    Allergies reviewed with patient and updates made in EHR: unable to assess - patient did not  phone and was peacefully resting after procedure when writer approached her room. However chart notes and dispense report were thorough and adequate to review medication list.    Medication History Completed By: Luis Foote RPH 7/31/2023 5:14 PM    Prior to Admission medications    Medication Sig Last Dose Taking? Auth Provider Long Term End Date   acetaminophen (TYLENOL) 500 MG tablet Take 2 tablets (1,000 mg) by mouth every 6 hours as needed for mild pain 7/30/2023 at PM Yes Nir May MD     Ascorbic Acid (VITAMIN C PO) Take 500 mg by mouth daily Past Week at AM Yes Reported, Patient     bisacodyl (DULCOLAX) 5 MG EC tablet Take 5 mg by mouth daily as needed for constipation Past Week at PRN Yes Reported, Patient     celecoxib (CELEBREX) 100 MG capsule Take 1 capsule (100 mg) by mouth 2 times daily . Stop ibuprofen. 7/27/2023 at PM Yes Nir May MD Yes    ciprofloxacin (CIPRO) 500 MG tablet Take 1 tablet (500 mg) by mouth  2 times daily 7/30/2023 at PM Yes Robert Valladares MD     diazepam (VALIUM) 2 MG tablet TAKE ONE TABLET BY MOUTH TWICE A DAY AS NEEDED FOR ANXIETY 7/30/2023 at PM Yes Suzy Brambila MD     HYDROmorphone (DILAUDID) 2 MG tablet Take 1 tablet (2 mg) by mouth every 8 hours as needed for pain or severe pain 7/30/2023 at PM Yes Ivy Dent MD     ibuprofen (ADVIL/MOTRIN) 200 MG tablet Take 200 mg by mouth every 4 hours as needed for mild pain Past Month Yes Reported, Patient     levothyroxine (SYNTHROID/LEVOTHROID) 200 MCG tablet Take 1 tablet (200 mcg) by mouth daily 7/30/2023 at AM Yes Mounika Ritchie APRN CNP Yes    magnesium oxide (MAG-OX) 400 MG tablet Take 1 tablet (400 mg) by mouth daily Past Week at AM Yes Mounika Ritchie APRN CNP     phenazopyridine (PYRIDIUM) 100 MG tablet Take 1 tablet (100 mg) by mouth 3 times daily as needed for urinary tract discomfort 7/30/2023 at PM Yes Mounika Ritchie APRN CNP     tamsulosin (FLOMAX) 0.4 MG capsule Take 1 capsule (0.4 mg) by mouth daily 7/30/2023 at AM Yes Suzy Brambila MD     vitamin B-12 (CYANOCOBALAMIN) 500 MCG tablet Take 500 mcg by mouth Past Week at AM Yes Reported, Patient     oxybutynin ER (DITROPAN XL) 5 MG 24 hr tablet Take 1 tablet (5 mg) by mouth daily 7/30/23 at AM Yes Mounika Ritchie APRN CNP

## 2023-08-01 LAB
ANION GAP SERPL CALCULATED.3IONS-SCNC: 9 MMOL/L (ref 7–15)
BUN SERPL-MCNC: 20.3 MG/DL (ref 6–20)
CALCIUM SERPL-MCNC: 8.9 MG/DL (ref 8.6–10)
CHLORIDE SERPL-SCNC: 103 MMOL/L (ref 98–107)
CREAT SERPL-MCNC: 0.67 MG/DL (ref 0.51–0.95)
CREAT SERPL-MCNC: 0.67 MG/DL (ref 0.51–0.95)
DEPRECATED HCO3 PLAS-SCNC: 25 MMOL/L (ref 22–29)
ERYTHROCYTE [DISTWIDTH] IN BLOOD BY AUTOMATED COUNT: 16.4 % (ref 10–15)
GFR SERPL CREATININE-BSD FRML MDRD: >90 ML/MIN/1.73M2
GFR SERPL CREATININE-BSD FRML MDRD: >90 ML/MIN/1.73M2
GLUCOSE SERPL-MCNC: 98 MG/DL (ref 70–99)
HCT VFR BLD AUTO: 28.6 % (ref 35–47)
HGB BLD-MCNC: 8.3 G/DL (ref 11.7–15.7)
HGB BLD-MCNC: 8.7 G/DL (ref 11.7–15.7)
HGB BLD-MCNC: 8.7 G/DL (ref 11.7–15.7)
MCH RBC QN AUTO: 26.6 PG (ref 26.5–33)
MCHC RBC AUTO-ENTMCNC: 29 G/DL (ref 31.5–36.5)
MCV RBC AUTO: 92 FL (ref 78–100)
PLATELET # BLD AUTO: 201 10E3/UL (ref 150–450)
POTASSIUM SERPL-SCNC: 4.4 MMOL/L (ref 3.4–5.3)
RBC # BLD AUTO: 3.12 10E6/UL (ref 3.8–5.2)
SODIUM SERPL-SCNC: 137 MMOL/L (ref 136–145)
WBC # BLD AUTO: 6.8 10E3/UL (ref 4–11)

## 2023-08-01 PROCEDURE — 36415 COLL VENOUS BLD VENIPUNCTURE: CPT

## 2023-08-01 PROCEDURE — 250N000013 HC RX MED GY IP 250 OP 250 PS 637

## 2023-08-01 PROCEDURE — 85018 HEMOGLOBIN: CPT

## 2023-08-01 PROCEDURE — 80048 BASIC METABOLIC PNL TOTAL CA: CPT | Performed by: STUDENT IN AN ORGANIZED HEALTH CARE EDUCATION/TRAINING PROGRAM

## 2023-08-01 PROCEDURE — 258N000003 HC RX IP 258 OP 636: Performed by: STUDENT IN AN ORGANIZED HEALTH CARE EDUCATION/TRAINING PROGRAM

## 2023-08-01 PROCEDURE — 36415 COLL VENOUS BLD VENIPUNCTURE: CPT | Performed by: STUDENT IN AN ORGANIZED HEALTH CARE EDUCATION/TRAINING PROGRAM

## 2023-08-01 PROCEDURE — 85018 HEMOGLOBIN: CPT | Performed by: STUDENT IN AN ORGANIZED HEALTH CARE EDUCATION/TRAINING PROGRAM

## 2023-08-01 PROCEDURE — 258N000003 HC RX IP 258 OP 636

## 2023-08-01 PROCEDURE — 99222 1ST HOSP IP/OBS MODERATE 55: CPT | Performed by: CLINICAL NURSE SPECIALIST

## 2023-08-01 PROCEDURE — 250N000013 HC RX MED GY IP 250 OP 250 PS 637: Performed by: OBSTETRICS & GYNECOLOGY

## 2023-08-01 PROCEDURE — 85014 HEMATOCRIT: CPT

## 2023-08-01 PROCEDURE — 82565 ASSAY OF CREATININE: CPT

## 2023-08-01 PROCEDURE — 99024 POSTOP FOLLOW-UP VISIT: CPT | Performed by: OBSTETRICS & GYNECOLOGY

## 2023-08-01 PROCEDURE — G0463 HOSPITAL OUTPT CLINIC VISIT: HCPCS

## 2023-08-01 PROCEDURE — 120N000002 HC R&B MED SURG/OB UMMC

## 2023-08-01 RX ADMIN — HYDROMORPHONE HYDROCHLORIDE 2 MG: 2 TABLET ORAL at 16:27

## 2023-08-01 RX ADMIN — SODIUM CHLORIDE, POTASSIUM CHLORIDE, SODIUM LACTATE AND CALCIUM CHLORIDE 500 ML: 600; 310; 30; 20 INJECTION, SOLUTION INTRAVENOUS at 00:04

## 2023-08-01 RX ADMIN — HYDROMORPHONE HYDROCHLORIDE 2 MG: 2 TABLET ORAL at 05:45

## 2023-08-01 RX ADMIN — HYDROMORPHONE HYDROCHLORIDE 2 MG: 2 TABLET ORAL at 21:54

## 2023-08-01 RX ADMIN — ACETAMINOPHEN 650 MG: 325 TABLET, FILM COATED ORAL at 08:41

## 2023-08-01 RX ADMIN — IBUPROFEN 600 MG: 200 TABLET, FILM COATED ORAL at 16:27

## 2023-08-01 RX ADMIN — ESCITALOPRAM OXALATE 10 MG: 10 TABLET ORAL at 08:41

## 2023-08-01 RX ADMIN — ACETAMINOPHEN 650 MG: 325 TABLET, FILM COATED ORAL at 13:31

## 2023-08-01 RX ADMIN — SODIUM CHLORIDE, POTASSIUM CHLORIDE, SODIUM LACTATE AND CALCIUM CHLORIDE: 600; 310; 30; 20 INJECTION, SOLUTION INTRAVENOUS at 05:51

## 2023-08-01 RX ADMIN — ACETAMINOPHEN 650 MG: 325 TABLET, FILM COATED ORAL at 02:58

## 2023-08-01 RX ADMIN — ACETAMINOPHEN 650 MG: 325 TABLET, FILM COATED ORAL at 20:03

## 2023-08-01 RX ADMIN — IBUPROFEN 600 MG: 200 TABLET, FILM COATED ORAL at 11:00

## 2023-08-01 RX ADMIN — APIXABAN 2.5 MG: 2.5 TABLET, FILM COATED ORAL at 20:04

## 2023-08-01 RX ADMIN — IBUPROFEN 600 MG: 200 TABLET, FILM COATED ORAL at 21:54

## 2023-08-01 RX ADMIN — LEVOTHYROXINE SODIUM 200 MCG: 200 TABLET ORAL at 08:41

## 2023-08-01 RX ADMIN — IBUPROFEN 600 MG: 200 TABLET, FILM COATED ORAL at 05:45

## 2023-08-01 ASSESSMENT — ACTIVITIES OF DAILY LIVING (ADL)
ADLS_ACUITY_SCORE: 45
ADLS_ACUITY_SCORE: 44
ADLS_ACUITY_SCORE: 42
ADLS_ACUITY_SCORE: 45
ADLS_ACUITY_SCORE: 45
ADLS_ACUITY_SCORE: 44
DEPENDENT_IADLS:: INDEPENDENT;COOKING
ADLS_ACUITY_SCORE: 44
ADLS_ACUITY_SCORE: 44
ADLS_ACUITY_SCORE: 45
ADLS_ACUITY_SCORE: 44
ADLS_ACUITY_SCORE: 45
ADLS_ACUITY_SCORE: 45

## 2023-08-01 NOTE — PROGRESS NOTES
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 Reports of moderate cramping and pain that alternates. Rates her pain as a 5/10. Tolerating PO, voiding spontaneously, and ambulating without issues. There is air and stool in her colostomy bag. Denies chest pain, SOB, nausea/vomiting, fevers/chills, dizziness.    Objective:   Patient Vitals for the past 24 hrs:   BP Temp Temp src Pulse Resp SpO2 Height Weight   08/01/23 0229 96/61 98.4  F (36.9  C) Oral 63 16 96 % -- --   07/31/23 2230 93/60 98  F (36.7  C) Oral 68 16 96 % -- --   07/31/23 2018 92/55 98.3  F (36.8  C) Oral 73 14 95 % -- --   07/31/23 1646 (!) 86/52 98.7  F (37.1  C) Axillary 68 16 96 % -- --   07/31/23 1453 (!) 84/52 98.1  F (36.7  C) -- 80 17 97 % -- --   07/31/23 1400 (!) 83/52 -- -- 82 19 97 % -- --   07/31/23 1239 93/64 -- -- 84 21 97 % -- --   07/31/23 1200 96/59 98.4  F (36.9  C) Oral 101 22 96 % -- --   07/31/23 1130 97/67 -- -- 86 18 95 % -- --   07/31/23 1115 99/68 -- -- 91 17 95 % -- --   07/31/23 1100 94/65 97.3  F (36.3  C) Oral 87 21 95 % -- --   07/31/23 1045 96/70 -- -- 87 23 97 % -- --   07/31/23 1040 96/70 -- -- 86 22 96 % -- --   07/31/23 1030 115/81 -- -- 84 30 97 % -- --   07/31/23 1015 122/80 97.9  F (36.6  C) Axillary 85 25 99 % -- --   07/31/23 0740 105/77 98  F (36.7  C) Oral (!) 130 20 99 % 1.6 m (5' 3\") 49.9 kg (110 lb)     General: NAD, appears comfortable in bed  CV: RRR, no m/r/g  Resp: CTAB, no wheezes  Abdomen: Soft, mildly tender, nondistended; LLQ ostomy with stool and air present  Incision: C/D/I, no erythema or drainage  Extremities: Warm, well-perfused, nontender, no edema, SCDs in place      New labs/imaging:    Latest " Reference Range & Units 08/01/23 06:25   Sodium 136 - 145 mmol/L 137   Potassium 3.4 - 5.3 mmol/L 4.4   Chloride 98 - 107 mmol/L 103   Carbon Dioxide (CO2) 22 - 29 mmol/L 25   Urea Nitrogen 6.0 - 20.0 mg/dL 20.3 (H)   Creatinine 0.51 - 0.95 mg/dL 0.67   GFR Estimate >60 mL/min/1.73m2 >90   Calcium 8.6 - 10.0 mg/dL 8.9   Anion Gap 7 - 15 mmol/L 9   (H): Data is abnormally high     Latest Reference Range & Units 07/25/23 08:04 08/01/23 00:05 08/01/23 06:25   WBC 4.0 - 11.0 10e3/uL 6.7  6.8   Hemoglobin 11.7 - 15.7 g/dL 11.0 (L) 8.7 (L) 8.3 (L)   Hematocrit 35.0 - 47.0 % 36.6  28.6 (L)   Platelet Count 150 - 450 10e3/uL 267  201   (L): Data is abnormally low    Assessment: Suni Zuluaga is a 38 year old female with known history of advanced squamous cell carcinoma of the cervix complicated by rectovaginal fistula who is POD#1 s/p laparoscopic colostomy, cystoscopy. Doing well in postoperative period. Had planned to have L PNT placed by IR today, however patient prefers not to pursue this at this time. IR updated.      Plan:    Dz: stage IIIC1 cervical SCC, s/p chemo RT (9-10/20), T&R brachy (11/20). Recurrence 10/22, s/p 7C carbo/taxol/johny/pembro (10/22-3/23), 5C pembro/johny (3-6/23), 1C pembro (7/23), fluoro gastrograffin study (7/23) w/ c/f rectovaginal fistula     # Routine post-op  # Rectovaginal fistula  # Acute blood loss anemia   S/p laparoscopy colostomy with robles pouch placement. Continue regular diet, bowel regimen, and await return of bowel function. Will plan to send home on anticoagulation.   - FEN: Regular diet,  ml/h > to discontinue today when tolerating more PO   - Pain: Sched tylenol, toradol > ibu. PRN oxy, IV dilaudid  - Heme: Acute blood loss anemia, pt asymptomatic, no intervention   - GI: Bowel regimen, antiemetics PRN  - ID: S/p patrick-op abx    #Vesicovaginal fistula  - Patient had right PNT placement per IR as an outpatient due to hydronephrosis strictures. A left PNT was not placed at  that time but has since been recommended due to ongoing leaking from vesicovaginal fistula. Pt currently declining placement.   - IR update re:patient decision not to proceed with PNT placement   - Consider removal of R PNT per patient preference, not yet discussed with patient     # SSC of the cervix  Palliative care has been consulted for recurrent cervical cancer.    # Hypothyroidism   Continue levothyroxine    Diet: Regular   PPX: SCDs, IS. Plan for ppx anticoagulation to start later today if Hgb stable.  Lines/Drains: PIV, R PNT    Dispo: Discharge to home pending ostomy cares, stable Hgb      Renuka Hopson MD PGY-4  [facility] Gynecology Oncology   Gyn Onc Pager: [phone]  08/01/23 8:26 AM       I, Hebert Castano MD personally examined and evaluated this patient on 08/01/23.  I discussed the patient with the resident and care team, and agree with the assessment and plan of care as documented in the residents note above.    I personally reviewed vital signs, laboratory values and imaging results.    Pt doing well POD#1.  Colostomy already with output.  Pt declines second PNT for urinary diversion.  Routine post-operative cares    Ivy Castano MD  Gynecologic Oncology  [facility]

## 2023-08-01 NOTE — PROGRESS NOTES
IR follow-up note.    Pt was on IR schedule 8/1 for left PNT placement. Pt is reportedly refusing this procedure despite conversations with gyn/onc. Pt cancelled from IR schedule.     IR charge RN updated.    Elisabeth Palomares DNP, APRN  Interventional Radiology   IR on-call pager: [phone]

## 2023-08-01 NOTE — PLAN OF CARE
Goal Outcome Evaluation:      Plan of Care Reviewed With: patient    Overall Patient Progress: improving    POD 1 s/p Pelvic Exam under anesthesia with laparoscopic colostomy, Cystoscopy  Alert, oriented, up with SBA/W.  AVSS.  Tolerating small amts regular diet, denies nausea.  Colostomy with gas and small amt stool.    R PNT with small amts urine, pt is also incontinent of urine in brief.  Up to the bathroom 3x, sm amt pink/brown drainage from vagina each time.  Pain managed with PO dilaudid, tylenol and ibuprofen.    PLAN: See WOC for new colostomy, full ROBF, I/Os.

## 2023-08-01 NOTE — PLAN OF CARE
"POD 0 Pelvic Exam under anesthesia with laparoscopic colostomy  Cystoscopy  VS: BP (!) 86/52 (BP Location: Left arm)   Pulse 68   Temp 98.7  F (37.1  C) (Axillary)   Resp 16   Ht 1.6 m (5' 3\")   Wt 49.9 kg (110 lb)   SpO2 96%   BMI 19.49 kg/m   Soft BP, team aware  Neuro: A&O x4, able to make needs known   Respiratory: On 2L via NC, no report of SOB  GI/: PNT with scant output, colostomy with no gas or output  Diet/appetite: Regular diet with good appetite, no report of nausea. NPO at midnight  Activity: Up with SBA and walker. Ambulated to the bathroom  Pain: Mild abdominal pain managed with ibuprofen  Skin/drains: 2 lap sites with skin glue, R PNT, dressing CDI  Lines: Nick PIV, R is infusing, L saline locked.   Continue to monitor for ROBF and  continue POC.       "

## 2023-08-01 NOTE — PLAN OF CARE
"Post op day #1 with a new colostomy. The WOC nurse saw the patient and changed the bag. Right PNT dressing changed, small amounts of urine output. Patient reports \"a little\" incontinence, also has brownish liquid output when sits on the toilet. Ambulates with a walker. Pain managed with current regime. Eating a regular diet. Flat affect, doesn't offer much conversation. Patient declined IR for PNT, states. \"I don't need it\". Team aware.                        "

## 2023-08-01 NOTE — PLAN OF CARE
Cared for pt from [id number].  Pt reported uncontrolled pain of 8/10 at start of shift.  Writer noted she had not been receiving PRN.  Pt denied asking for it despite high pain, pt's flat affect seems to be masking her pain somewhat.  Pt was educated on requesting PRNs when in pain, but RNs may have to try and offer as well.  Pt is possibly having low UOP, Providers ordered weighing incontinence products for better accuracy w/ I&Os.  R PNT is putting out urine as well.  Pt is passing gas and scant stool through ostomy.  Continue POC

## 2023-08-01 NOTE — CONSULTS
"Palliative Care Consultation Note  [facility]      Patient: Suni Zuluaga  Date of Admission:  7/31/2023    Requesting Clinician / Team: Missy Saravia APRN CNP   Reason for consult: Symptom management  Goals of care       Recommendations & Counseling     Introduction to palliative care today, patient was informed of the services here in the hospital and outpatient  Expresses no needs from our team today and no needs outpatient  Would have ongoing conversations in clinic with her oncology team and can send referral to PC as needed outpatient      Palliative Care was consulted for decisional support and goals of care. At this time goals are clear and there is minimal symptom burden, so we will sign off. Please feel free to reconsult us if we can be helpful in the future. Thank you for the opportunity to be involved in the care of this patient.    These recommendations have been discussed with primary team.    LAURA De La Rosa  Securely message with [email or web address])  Text page via [facility] Paging/Directory       Palliative Summary/HPI     Suni Zuluaga is a 38 year old female with a past medical history of advanced squamous cell carcinoma of the cervix complicated by rectovaginal fistula and vesicovaginal fistula who presented on 7/31 and is s/p laprascopic colostomy, cystoscopy with ongoing hydronephrosis and not pursuing L PNT at this time and currently has R PNT in place with consideration of removal as well.       Per her gyn/onc team her course has been the following: \"stage IIIC1 cervical SCC, s/p chemo RT (9-10/20), T&R brachy (11/20). Recurrence 10/22, s/p 7C carbo/taxol/johny/pembro (10/22-3/23), 5C pembro/johny (3-6/23), 1C pembro (7/23), fluoro gastrograffin study (7/23) w/ c/f rectovaginal fistula \"    Today, the patient was seen for:  Advanced squamous cell carcinoma of the cervix  Rectovaginal fistula  Vesicovaginal fistula    Palliative " Care Summary:   Met with Suni today.   I introduced our role as an extra layer of support and how we help patients and families dealing with serious, potentially life-limiting illnesses. I explained the composition of the palliative care team.  Palliative care helps patients and families navigate their care while focusing on the whole person; providing emotional, social and spiritual support  Palliative care often assists with symptom management, information sharing about what to expect from the illness, available treatment options and what effect those options may have on the disease course, and provide effective communication and caring support.    Prognosis, Goals, & Planning:    Functional Status just prior to this current hospitalization:  Not discussed. Patient did not engage in a visit beyond a introduction to PC services.        Patient has decision-making capacity today for complex decisions:Intact            Coping, Meaning, & Spirituality:   Mood, coping, and/or meaning in the context of serious illness were addressed today: Yes. Coping ok. Did not engage any further.    Social:   Unable to assess due to lack of engagement during visit.    Medications:  I have reviewed this patient's medication profile and medications from this hospitalization. Notable medications:  Acetaminophen 650 mg q6h  Lexapro 10 mg daily  Iburpofen 600 mg q6h  Miralax daily  Hydromorphone PRN- 10 mg over last 24 hours  Ketorolac PRN- x1    ROS:  Comprehensive ROS is reviewed and is negative except as here & per HPI:     Physical Exam   Vital Signs with Ranges  Temp:  [97.3  F (36.3  C)-98.7  F (37.1  C)] 98.4  F (36.9  C)  Pulse:  [] 61  Resp:  [14-30] 16  BP: ()/(52-81) 91/52  SpO2:  [93 %-99 %] 93 %  110 lbs 0 oz    PHYSICAL EXAM:  Constitutional: alert and no distress   Respiratory: negative  Head: Normocephalic. No masses, lesions, tenderness or abnormalities  NEURO: negative  SKIN: no suspicious lesions or  rashes  JOINT/EXTREMITIES: extremities normal- no gross deformities noted    Data reviewed:  Recent Labs   Lab 08/01/23  0625 08/01/23  0005 07/31/23  0745   WBC 6.8  --   --    HGB 8.3* 8.7*  --    MCV 92  --   --      --   --      --   --    POTASSIUM 4.4  --   --    CHLORIDE 103  --   --    CO2 25  --   --    BUN 20.3*  --   --    CR 0.67 0.67  --    ANIONGAP 9  --   --    HARRISON 8.9  --   --    GLC 98  --  125*       Medical Decision Making       55 MINUTES SPENT BY ME on the date of service doing chart review, history, exam, documentation & further activities per the note.

## 2023-08-01 NOTE — PROGRESS NOTES
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 Pain is okay, does feel like it gets better with pain medications when she takes them. Tolerating PO without nausea or vomiting. Ostomy has had stool and gas output. Ambulated to the bathroom yesterday, denies dizziness or lightheadedness. Denies chest pain, SOB, or other symptoms.     Declined palliative care yesterday, feels like pain is doing okay. Declines home health and does not want to meet with PT or OT this admission. Lives alone.      Objective:   Vitals:    08/01/23 0747 08/01/23 1214 08/01/23 1534 08/01/23 2202   BP: 91/52 99/63 95/51 98/59   BP Location: Left arm Left arm Left arm Left arm   Pulse: 61 60 67 78   Resp: 16 16 16 16   Temp: 98.4  F (36.9  C) 99.1  F (37.3  C) 98.8  F (37.1  C) 97.8  F (36.6  C)   TempSrc: Temporal Temporal Oral Oral   SpO2: 93% 95% 95% 98%   Weight:       Height:         General: NAD, appears comfortable in bed  CV: RRR. Well-perfused  Resp: CTAB. Breathing comfortably on room air  Abdomen: Soft, nontender, non distended; LLQ ostomy with stool and air present  Incision: c/d/i, no erythema or drainage  Extremities: Warm, well-perfused, nontender, no edema, SCDs in place      New labs/imaging:   Latest Reference Range & Units 08/02/23 05:32   Sodium 136 - 145 mmol/L 139   Potassium 3.4 - 5.3 mmol/L 4.1   Chloride 98 - 107 mmol/L 105   Carbon Dioxide (CO2) 22 - 29 mmol/L 26   Urea Nitrogen 6.0 - 20.0 mg/dL 20.6 (H)   Creatinine 0.51 - 0.95 mg/dL 0.66   GFR Estimate >60 mL/min/1.73m2 >90   Calcium 8.6 - 10.0 mg/dL 8.8   Anion Gap 7 - 15 mmol/L  8   (H): Data is abnormally high     Latest Reference Range & Units 08/01/23 06:25 08/01/23 13:38 08/02/23 05:32   WBC 4.0 - 11.0 10e3/uL 6.8  5.9   Hemoglobin 11.7 - 15.7 g/dL 8.3 (L) 8.7 (L) 8.4 (L)   Hematocrit 35.0 - 47.0 % 28.6 (L)  28.9 (L)   Platelet Count 150 - 450 10e3/uL 201  196   (L): Data is abnormally low    Assessment: Suni Zuluaga is a 38 year old female with known history of advanced squamous cell carcinoma of the cervix complicated by rectovaginal fistula who is POD#2 s/p palliative laparoscopic colostomy, cystoscopy. Doing well in postoperative period.      Dz: stage IIIC1 cervical SCC, s/p chemo RT (9-10/20), T&R brachy (11/20). Recurrence 10/22, s/p 7C carbo/taxol/johny/pembro (10/22-3/23), 5C pembro/johny (3-6/23), 1C pembro (7/23), fluoro gastrograffin study (7/23) w/ c/f rectovaginal fistula     # Routine post-op  # Rectovaginal fistula  # Acute blood loss anemia   S/p laparoscopy colostomy with robles pouch placement. Continue regular diet, bowel regimen.   - FEN: Regular diet  - Pain: Sched tylenol, toradol > ibu. PRN oxy  - Heme: Asymptomatic acute blood loss anemia    - GI: Bowel regimen, antiemetics PRN       #Vesicovaginal fistula  - Patient had right PNT placement per IR as an outpatient due to hydronephrosis secondary to strictures. We have discussed left PNT for urinary diversion but pt prefers not to proceed at this time       # Hypothyroidism   Continue levothyroxine     Diet: Regular   PPX: SCDs, IS. Plan for ppx anticoagulation to start today  Lines/Drains: PIV, R PNT     Dispo: Discharge to home pending pt comfort with ostomy.  She has declined home services so we want to ensure she is comfortable with ostomy cares.    Sommer Belcher MS4      I was present with the medical student who participated in the service and in the documentation of this note.  I have verified the history and personally performed the physical exam and medical decision making, and have verified the content  of the note, which accurately reflect my assessment of the patient and the plan of care.     Renuka Hopson MD PGY-4  [facility] Gynecology Oncology   Gyn Onc Pager: [phone]  08/02/23 7:24 AM     I, Hebert Castano MD personally examined and evaluated this patient on 08/02/23.  I discussed the patient with the resident and care team, and agree with the assessment and plan of care as documented in the residents note above.    I personally reviewed vital signs, laboratory values and imaging results.    Pt doing well post-operatively.  Pt to continue to work with WOC to become comfortable with cares as she has declined home cares.    Ivy Castano MD  Gynecologic Oncology  [facility]

## 2023-08-01 NOTE — DISCHARGE SUMMARY
Gynecologic Oncology Discharge Summary    Suni Zuluaga  [id number]    Admit Date: 7/31/2023  Discharge Date: 8/3/2023  Admitting Provider: Suzy Brambila MD  Discharge Provider: Ivy Marley MD     Admission Dx:   - Recurrent metastatic cervical cancer  - Rectovaginal fistula  - Vesicovaginal fistula  - Severe malnutrition   - Tobacco use     Discharge Dx:  - Recurrent metastatic cervical cancer  - Rectovaginal fistula, s/p end colostomy   - Vesicovaginal fistula  - Severe malnutrition   - Tobacco use    Patient Active Problem List   Diagnosis    Malignant neoplasm of overlapping sites of cervix (H)    Cervical cancer (H)    Other hydronephrosis    Stricture or kinking of ureter    Rectovaginal fistula     Procedures:   - Laparoscopic colostomy, cystoscopy     Prior to Admission Medications:  Medications Prior to Admission   Medication Sig Dispense Refill Last Dose    acetaminophen (TYLENOL) 500 MG tablet Take 2 tablets (1,000 mg) by mouth every 6 hours as needed for mild pain 120 tablet 1 7/30/2023 at PM    Ascorbic Acid (VITAMIN C PO) Take 500 mg by mouth daily   Past Week at AM    bisacodyl (DULCOLAX) 5 MG EC tablet Take 5 mg by mouth daily as needed for constipation   Past Week at PRN    celecoxib (CELEBREX) 100 MG capsule Take 1 capsule (100 mg) by mouth 2 times daily . Stop ibuprofen. 60 capsule 1 7/27/2023 at PM    ciprofloxacin (CIPRO) 500 MG tablet Take 1 tablet (500 mg) by mouth 2 times daily 6 tablet 0 7/30/2023 at PM    diazepam (VALIUM) 2 MG tablet TAKE ONE TABLET BY MOUTH TWICE A DAY AS NEEDED FOR ANXIETY 12 tablet 0 7/30/2023 at PM    HYDROmorphone (DILAUDID) 2 MG tablet Take 1 tablet (2 mg) by mouth every 8 hours as needed for pain or severe pain 20 tablet 0 7/30/2023 at PM    ibuprofen (ADVIL/MOTRIN) 200 MG tablet Take 200 mg by mouth every 4 hours as needed for mild pain   Past Month    levothyroxine (SYNTHROID/LEVOTHROID) 200 MCG tablet Take 1 tablet (200 mcg) by mouth daily 30 tablet  0 7/30/2023 at AM    magnesium oxide (MAG-OX) 400 MG tablet Take 1 tablet (400 mg) by mouth daily 30 tablet 1 Past Week at AM    oxybutynin ER (DITROPAN XL) 5 MG 24 hr tablet Take 1 tablet (5 mg) by mouth daily 30 tablet 0 7/30/2023 at AM    phenazopyridine (PYRIDIUM) 100 MG tablet Take 1 tablet (100 mg) by mouth 3 times daily as needed for urinary tract discomfort 30 tablet 1 7/30/2023 at PM    vitamin B-12 (CYANOCOBALAMIN) 500 MCG tablet Take 500 mcg by mouth   Past Week at AM       Discharge Medications:     Review of your medicines        UNREVIEWED medicines. Ask your doctor about these medicines        Dose / Directions   acetaminophen 500 MG tablet  Commonly known as: TYLENOL  Used for: Malignant neoplasm of overlapping sites of cervix (H), Neoplasm related pain      Dose: 1,000 mg  Take 2 tablets (1,000 mg) by mouth every 6 hours as needed for mild pain  Quantity: 120 tablet  Refills: 1     bisacodyl 5 MG EC tablet  Commonly known as: DULCOLAX      Dose: 5 mg  Take 5 mg by mouth daily as needed for constipation  Refills: 0     celecoxib 100 MG capsule  Commonly known as: celeBREX  Used for: Malignant neoplasm of overlapping sites of cervix (H), Neoplasm related pain      Dose: 100 mg  Take 1 capsule (100 mg) by mouth 2 times daily . Stop ibuprofen.  Quantity: 60 capsule  Refills: 1     ciprofloxacin 500 MG tablet  Commonly known as: CIPRO  Used for: Stricture or kinking of ureter      Dose: 500 mg  Take 1 tablet (500 mg) by mouth 2 times daily  Quantity: 6 tablet  Refills: 0     diazepam 2 MG tablet  Commonly known as: VALIUM  Used for: Malignant neoplasm of overlapping sites of cervix (H), Hypothyroidism due to medication, Vertigo      TAKE ONE TABLET BY MOUTH TWICE A DAY AS NEEDED FOR ANXIETY  Quantity: 12 tablet  Refills: 0     HYDROmorphone 2 MG tablet  Commonly known as: DILAUDID  Used for: Malignant neoplasm of overlapping sites of cervix (H), Cancer associated pain      Dose: 2 mg  Take 1 tablet (2  mg) by mouth every 8 hours as needed for pain or severe pain  Quantity: 20 tablet  Refills: 0     ibuprofen 200 MG tablet  Commonly known as: ADVIL/MOTRIN      Dose: 200 mg  Take 200 mg by mouth every 4 hours as needed for mild pain  Refills: 0     levothyroxine 200 MCG tablet  Commonly known as: SYNTHROID/LEVOTHROID  Used for: Malignant neoplasm of overlapping sites of cervix (H), Encounter for antineoplastic immunotherapy, Hypothyroidism due to medication  Ask about: Which instructions should I use?      Dose: 200 mcg  Take 1 tablet (200 mcg) by mouth daily  Quantity: 30 tablet  Refills: 0     magnesium oxide 400 MG tablet  Commonly known as: MAG-OX  Used for: Hypomagnesemia      Dose: 400 mg  Take 1 tablet (400 mg) by mouth daily  Quantity: 30 tablet  Refills: 1     oxybutynin ER 5 MG 24 hr tablet  Commonly known as: DITROPAN XL  Used for: Dysuria, Hydroureter on right      Dose: 5 mg  Take 1 tablet (5 mg) by mouth daily  Quantity: 30 tablet  Refills: 0     phenazopyridine 100 MG tablet  Commonly known as: PYRIDIUM  Used for: Malignant neoplasm of overlapping sites of cervix (H), Dysuria      Dose: 100 mg  Take 1 tablet (100 mg) by mouth 3 times daily as needed for urinary tract discomfort  Quantity: 30 tablet  Refills: 1     tamsulosin 0.4 MG capsule  Commonly known as: FLOMAX  Used for: Dysuria, Malignant neoplasm of overlapping sites of cervix (H)  Ask about: Which instructions should I use?      Dose: 0.4 mg  Take 1 capsule (0.4 mg) by mouth daily  Quantity: 30 capsule  Refills: 0     vitamin B-12 500 MCG tablet  Commonly known as: CYANOCOBALAMIN      Dose: 500 mcg  Take 500 mcg by mouth  Refills: 0     VITAMIN C PO      Dose: 500 mg  Take 500 mg by mouth daily  Refills: 0               Where to get your medicines        These medications were sent to [facility] - [place], [place] - [address]  [address], [facility] [address]      Hours: test RX sent satisfactorily 8/22/03  Phone: [phone]  "  tamsulosin 0.4 MG capsule       Consultations:  - Urology   - Palliative    Brief History of Illness:  Suni Zuluaga is a 38-year-old woman well known to me with a history of locally advanced cervical cancer. She received primary chemoradiation and initially did have a complete response, however, in 2022, she had a recurrence, specifically to the periaortic region. She received multiple cycles of palliative chemotherapy. Most recently, her CT scan was concerning for possible fistula and clinically, she was having severe leakage of urine and stool per vagina. Last week, she had a right percutaneous nephrostomy tube placed given the fact that the right ureteral stent was not able to be replaced and also given this new vesicovaginal fistula. She presented to the hospital today for palliative management of her rectovaginal fistula.     Hospital Course:  Dz:   - Preoperative diagnosis was rectovaginal fistula secondary to recurrent metastatic cervical cancer. She will follow-up postoperatively for a care plan.  FEN:   - She was maintained on IVF until POD#0, when her diet was slowly advanced. By discharge, she was tolerating a regular diet without nausea and vomiting and able to maintain her hydration without IVF supplementation. Ht: 5' 3\" Wt: 49.9 kg BMI: 19.49. Per flowsheet oral intake 25-75% at meals/fair appetite  Pain:   - Her pain was initially controlled on IV pain medications. Once tolerating PO pain meds, she was transitioned to a PO pain regimen.  Her pain was adequately controlled on this and she was discharged home with these medications.  - She was seen by palliative care for pain management, but she declined their services.  CV:   - She has no history of CV issues.  Her vital signs were stable while in house and she had no acute CV issues.  PULM:   - She has no history of pulmonary issues. She was initially given O2 supplementation in to maintain her O2 sats in the immediate postop period and was " transitioned off of this without difficulty.  By discharge, her O2 sats were greater than 94% on RA.  She was encouraged to use her bedside IS while in house.  She had no acute pulmonary issues while in house.  HEME:   - She had a higher than anticipated drop in her hemoglobin after surgery but remained hemodynamically stable. Her hemoglobin was trended and it was stable at the time of discharge.  She had no acute heme issues while in house.  GI:   - She was made NPO prior to the procedure.  On POD#0, her diet was advanced slowly as tolerated.  At the time of discharge, she was tolerating a regular diet without nausea and vomiting.  She was passing flatus and emptying into her colostomy.   - She was seen by [facility] for teaching about how to manage her ostomy bag. Is discharging with home health support and feels comfortable with ability to manage her colostomy.  :    - Patient has a PNT on the right. She was offered a left PNT but declined at this time. Throughout admission she was voiding without difficulty.  ID:   - The patient was afebrile during her hospitalization.  She received standard preoperative antibiotics without incident.   ENDO:   - No issues  PSYCH/NEURO:   - She has a history of MDD for which she takes lexapro, which was continued during her admission.  PPX:    - SCDs, IS and apixaban during her hospital course.  She tolerated these prophylactic interventions without incident. She will continue to take apixaban for 28 days after her surgery.      Discharge Instructions and Follow up:  Suni Zuluaga was discharged from the hospital with follow up with Dr. Brambila.    Discharge Diet: Regular  Discharge Activity: Lifting restricted to 15 pounds, no driving while on narcotics.    Discharge Disposition:  Discharged to home    Discharge Staff: MD Shannan Rodriguez MD  Obstetrics & Gynecology, PGY-1  08/03/2023 9:15 AM       Ivy Castano MD  Gynecologic Oncology  [facility]  Physicians

## 2023-08-01 NOTE — CONSULTS
[facility] Nurse Inpatient Assessment     Consulted for: new colostomy     Summary: having trouble adjusting     Patient History (according to provider note(s):      Suni Zuluaga is a 38 year old  with PMH of cervical cancer and hypothyroidism who presents for scheduled EUA and laparoscopic cholosctomy with Dr. Brambila and Dr. Castano.      She is feeling well today and denies recent illness, F/C, loss of taste/smell, cough, sore throat, chest pain, shortness of breath, nausea/vomiting. She had a R PNT placed on , but otherwise her health has not changed since her last visit with Dr. Galicia on 23. She denies history of intolerance of anesthesia.       Procedure Date: 2023     PREOPERATIVE DIAGNOSES:     1.  Recurrent metastatic cervical cancer.  2.  Rectovaginal fistula.  3.  Vesicovaginal fistula.  4.  Severe malnutrition.  5.  Tobacco abuse.     POSTOPERATIVE DIAGNOSES:     1.  Recurrent metastatic cervical cancer.  2.  Rectovaginal fistula.  3.  Vesicovaginal fistula.  4.  Severe malnutrition.  5.  Tobacco abuse.          Assessment:      Areas visualized during today's visit: Focused:    Assessment of new end Colostomy:  Diagnosis Pertinent to Stoma: Cancer - Colon or Rectum      Surgery Date:   Surgeon:Suzy Brambila MD        Hospital: [facility]  Pouching system in place on assessment today: Sveta one piece, cut to fit, barrier ring, and skin prep   Pouch barrier status: intact and Minimal melting  Pouch last changed/wear time: ,24 hours   Reason for pouch change today: ostomy education and initial post-op assessment  Effectiveness of current pouching/ supply plan: Recommend continuing current plan  Change made with ostomy management today: Yes  Pouching system placed today: Sveta one piece, cut to fit, flat, barrier ring, and skin prep   Supplies: gathered, at bedside, supplies stored on unit, and discussed with patient    Last photo: none taken   Stoma location: [facility]  Stoma size: 1 3/8 inches, none round   Stoma appearance: round, edematous, and protruberant  Mucocutaneous junction:  intact  Peristomal complication(s): none   Output: gas, formed, hard, and brown  Output volume emptied during visit: 10ml  Abdominal assessment: Soft  Surgical site(s): open to air  NG still in place? No  Pain: Dull ache  Is patient still on a PCA? No    Ostomy education assessment:  Participant of teaching session today: patient   Education completed today: Adjustment of pouching plan, Pouch change demonstration, and Odor/flatus management   Educational materials/methods: Verbal, Written, Demonstration, and FOD [place] education hand out  Education still needed: Peristomal skin care, Pouch emptying demonstration, Pouch emptying return demonstration, Intake and output recording, Fluid and electrolyte balance , Importance of hydration, When to seek medical attention, Low fiber diet , Odor/flatus management , Infection prevention/hygiene , Hernia prevention, Lifestyle adjustments , and Discharge instructions  Learning Comprehension:   Psychosocial assessment: she iis unhappy with the smell and the po  Patient readiness for education today: distracted and in significant pain  Following today's visit: patient  is able to demonstrate;         1. How to empty their pouch? No and Demo provided         2. How to change their pouch? No and Demo provided         3. How to read and record intake and output correctly? No and Demo provided   Preparation for discharge completed: No discharge preparation started yet  Preparation for discharge still needed: No discharge preparation started yet  Pt support system on discharge: noone   WOC recommend home care? Yes and By WOC RN if possible  Face to face time: > 60 minutes          Treatment Plan:     Q Colostomy pouching plan:   Pouching system: ostomy supplies pouches: Sveta Flat FECAL (885930) ostomy supplies  "barrier: none   Accessories used: [facility] ostomy accessories: 2\" Cera Barrier Ring (163590) and Cavilon no sting barrier film ([id number])   Frequency of pouch changes: Twice weekly  WOC follow up plan: Daily Monday-Friday (as able)  Bedside RN interventions: Notify WOC for ongoing pouch leakage, Document stoma appearance and output volume, color, and consistency every shift, and Encourage patient to empty pouch with assist     Orders: Written    RECOMMEND PRIMARY TEAM ORDER: None, at this time  Education provided: plan of care  Discussed plan of care with: Patient  WOC nurse follow-up plan: daily  Notify WOC if wound(s) deteriorate.  Nursing to notify the Provider(s) and re-consult the WOC Nurse if new skin concern.    DATA:     Current support surface: Standard  Standard gel/foam mattress (IsoFlex, Atmos air, etc)  Containment of urine/stool: Continent of bladder and Colostomy pouch  BMI: Body mass index is 19.49 kg/m .   Active diet order: Orders Placed This Encounter      Regular Diet Adult     Output: I/O last 3 completed shifts:  In: 3918.33 [P.O.:475; I.V.:2443.33; IV Piggyback:1000]  Out: 315 [Urine:315]     Labs:   Recent Labs   Lab 08/01/23  1338 08/01/23  0625   HGB 8.7* 8.3*   WBC  --  6.8     Pressure injury risk assessment:   Sensory Perception: 4-->no impairment  Moisture: 3-->occasionally moist  Activity: 3-->walks occasionally  Mobility: 3-->slightly limited  Nutrition: 3-->adequate (ate all of breakfast)  Friction and Shear: 3-->no apparent problem  Arnie Score: 19    CAREN Little RN CWOCN  Pager no longer is use, please contact through [employer] group: [facility] Nurse [place]  Dept. Office Number: *[phone]   "

## 2023-08-01 NOTE — CONSULTS
Care Management Initial Consult    General Information  Assessment completed with: Patient,    Type of CM/SW Visit: Initial Assessment  Primary Care Provider verified and updated as needed: Yes   Readmission within the last 30 days: no previous admission in last 30 days      Reason for Consult: discharge planning  Advance Care Planning: Advance Care Planning Reviewed: no concerns identified        Communication Assessment  Patient's communication style: spoken language (English or Bilingual)        Cognitive  Cognitive/Neuro/Behavioral: WDL  Level of Consciousness: lethargic  Arousal Level: arouses to touch/gentle shaking  Orientation: oriented x 4  Mood/Behavior: calm  Best Language: 0 - No aphasia  Speech: logical, spontaneous    Living Environment:   People in home: alone     Current living Arrangements: house      Able to return to prior arrangements: yes       Family/Social Support:  Care provided by: self  Provides care for: no one  Marital Status: Single  None          Description of Support System: Uninvolved    Support Assessment: Limited social contact and support, Lacks necessary supervision and assistance    Current Resources:   Patient receiving home care services: No  Community Resources: None  Equipment currently used at home:    Supplies currently used at home: None    Employment/Financial:  Employment Status: employed full-time     Financial Concerns: No concerns identified   Referral to Financial Worker: No     Does the patient's insurance plan have a 3 day qualifying hospital stay waiver?  No    Lifestyle & Psychosocial Needs: (pulled from chart)  Social Determinants of Health     Tobacco Use: High Risk (8/1/2023)    Patient History     Smoking Tobacco Use: Every Day     Smokeless Tobacco Use: Never     Passive Exposure: Not on file   Alcohol Use: Not on file   Financial Resource Strain: Not on file   Food Insecurity: Not on file   Transportation Needs: Not on file   Physical Activity: Not on file    Stress: Not on file   Social Connections: Not on file   Intimate Partner Violence: Not on file   Depression: Not at risk (4/28/2023)    PHQ-2     PHQ-2 Score: 0   Housing Stability: Not on file       Functional Status:  Prior to admission patient needed assistance:   Dependent ADLs:: Independent  Dependent IADLs:: Independent       Mental Health Status:  Mental Health Status: No Current Concerns       Chemical Dependency Status:  Chemical Dependency Status: No Current Concerns             Values/Beliefs:  Spiritual, Cultural Beliefs, Mormon Practices, Values that affect care: no               Additional Information:  Pt is a 38 year old female with known history of 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    RNCC completed CM assessment due to need for discharge planning. RNCC met with pt at bedside and introduced RNCC role. Pt states she lives alone in a 1 story home. Pt has 4 steps with railing to get into home. Pt states she was independent with all ADLS and IADLs but did get some assistance with cooking from others from time to time. Pt has a walker at home that she uses regularly. Pt declines any home services and states she was employed before hospitalization. Pt is working on short term disability paperwork with employer. RNCC notified pt that Gyn/Onc clinic will assist with filling out FMLA paperwork once pt obtains it. Pt states she has no support from anybody else and cares for herself independently. Pt's vehicle is parked in parking ramp of hospital so pt will be driving self home once she is medically ready.    RNCC spoke with pt regarding new colostomy and primary team recommendations of home care. Pt agreeable to getting SN assistance with ostomy. RNCC placed home care referral  to [facility]. Pt states she was managing her right PNT independently before hospitalization and has no problems managing it upon discharge. RNCC will remain available to assist as discharge needs arise.     Frankie Nguyen RN BSN  5A RN Care Coordinator   Ph: [phone]  Pager: [phone]

## 2023-08-02 LAB
ANION GAP SERPL CALCULATED.3IONS-SCNC: 8 MMOL/L (ref 7–15)
BUN SERPL-MCNC: 20.6 MG/DL (ref 6–20)
CALCIUM SERPL-MCNC: 8.8 MG/DL (ref 8.6–10)
CHLORIDE SERPL-SCNC: 105 MMOL/L (ref 98–107)
CREAT SERPL-MCNC: 0.66 MG/DL (ref 0.51–0.95)
DEPRECATED HCO3 PLAS-SCNC: 26 MMOL/L (ref 22–29)
ERYTHROCYTE [DISTWIDTH] IN BLOOD BY AUTOMATED COUNT: 16.6 % (ref 10–15)
GFR SERPL CREATININE-BSD FRML MDRD: >90 ML/MIN/1.73M2
GLUCOSE SERPL-MCNC: 86 MG/DL (ref 70–99)
HCT VFR BLD AUTO: 28.9 % (ref 35–47)
HGB BLD-MCNC: 8.4 G/DL (ref 11.7–15.7)
MCH RBC QN AUTO: 26.8 PG (ref 26.5–33)
MCHC RBC AUTO-ENTMCNC: 29.1 G/DL (ref 31.5–36.5)
MCV RBC AUTO: 92 FL (ref 78–100)
PLATELET # BLD AUTO: 196 10E3/UL (ref 150–450)
POTASSIUM SERPL-SCNC: 4.1 MMOL/L (ref 3.4–5.3)
RBC # BLD AUTO: 3.13 10E6/UL (ref 3.8–5.2)
SODIUM SERPL-SCNC: 139 MMOL/L (ref 136–145)
WBC # BLD AUTO: 5.9 10E3/UL (ref 4–11)

## 2023-08-02 PROCEDURE — 85027 COMPLETE CBC AUTOMATED: CPT

## 2023-08-02 PROCEDURE — 250N000013 HC RX MED GY IP 250 OP 250 PS 637: Performed by: OBSTETRICS & GYNECOLOGY

## 2023-08-02 PROCEDURE — 80048 BASIC METABOLIC PNL TOTAL CA: CPT

## 2023-08-02 PROCEDURE — 99024 POSTOP FOLLOW-UP VISIT: CPT | Performed by: OBSTETRICS & GYNECOLOGY

## 2023-08-02 PROCEDURE — 36415 COLL VENOUS BLD VENIPUNCTURE: CPT

## 2023-08-02 PROCEDURE — 250N000013 HC RX MED GY IP 250 OP 250 PS 637

## 2023-08-02 PROCEDURE — 999N000198 HC STATISTIC WOC PT EDUCATION, 16-30 MIN

## 2023-08-02 PROCEDURE — 120N000002 HC R&B MED SURG/OB UMMC

## 2023-08-02 RX ORDER — ESCITALOPRAM OXALATE 10 MG/1
10 TABLET ORAL DAILY
Status: DISCONTINUED | OUTPATIENT
Start: 2023-08-02 | End: 2023-08-03 | Stop reason: HOSPADM

## 2023-08-02 RX ADMIN — ESCITALOPRAM OXALATE 10 MG: 10 TABLET ORAL at 09:34

## 2023-08-02 RX ADMIN — SIMETHICONE 80 MG: 80 TABLET, CHEWABLE ORAL at 09:37

## 2023-08-02 RX ADMIN — SIMETHICONE 80 MG: 80 TABLET, CHEWABLE ORAL at 17:48

## 2023-08-02 RX ADMIN — HYDROMORPHONE HYDROCHLORIDE 2 MG: 2 TABLET ORAL at 04:46

## 2023-08-02 RX ADMIN — HYDROMORPHONE HYDROCHLORIDE 4 MG: 2 TABLET ORAL at 14:26

## 2023-08-02 RX ADMIN — IBUPROFEN 600 MG: 200 TABLET, FILM COATED ORAL at 11:21

## 2023-08-02 RX ADMIN — ACETAMINOPHEN 650 MG: 325 TABLET, FILM COATED ORAL at 07:36

## 2023-08-02 RX ADMIN — IBUPROFEN 600 MG: 200 TABLET, FILM COATED ORAL at 17:55

## 2023-08-02 RX ADMIN — SIMETHICONE 80 MG: 80 TABLET, CHEWABLE ORAL at 01:09

## 2023-08-02 RX ADMIN — APIXABAN 2.5 MG: 2.5 TABLET, FILM COATED ORAL at 07:36

## 2023-08-02 RX ADMIN — ACETAMINOPHEN 650 MG: 325 TABLET, FILM COATED ORAL at 14:22

## 2023-08-02 RX ADMIN — IBUPROFEN 600 MG: 200 TABLET, FILM COATED ORAL at 22:40

## 2023-08-02 RX ADMIN — HYDROMORPHONE HYDROCHLORIDE 4 MG: 2 TABLET ORAL at 17:48

## 2023-08-02 RX ADMIN — ACETAMINOPHEN 650 MG: 325 TABLET, FILM COATED ORAL at 20:20

## 2023-08-02 RX ADMIN — ACETAMINOPHEN 650 MG: 325 TABLET, FILM COATED ORAL at 01:09

## 2023-08-02 RX ADMIN — HYDROMORPHONE HYDROCHLORIDE 2 MG: 2 TABLET ORAL at 01:09

## 2023-08-02 RX ADMIN — LEVOTHYROXINE SODIUM 200 MCG: 200 TABLET ORAL at 07:36

## 2023-08-02 RX ADMIN — IBUPROFEN 600 MG: 200 TABLET, FILM COATED ORAL at 04:46

## 2023-08-02 RX ADMIN — APIXABAN 2.5 MG: 2.5 TABLET, FILM COATED ORAL at 20:20

## 2023-08-02 RX ADMIN — HYDROMORPHONE HYDROCHLORIDE 4 MG: 2 TABLET ORAL at 07:42

## 2023-08-02 SDOH — HEALTH STABILITY: MENTAL HEALTH: HOW OFTEN DO YOU HAVE SIX OR MORE DRINKS ON ONE OCCASION?: NEVER

## 2023-08-02 SDOH — SOCIAL STABILITY: SOCIAL INSECURITY: WITHIN THE LAST YEAR, HAVE YOU BEEN HUMILIATED OR EMOTIONALLY ABUSED IN OTHER WAYS BY YOUR PARTNER OR EX-PARTNER?: NO

## 2023-08-02 SDOH — HEALTH STABILITY: MENTAL HEALTH: HOW OFTEN DO YOU HAVE A DRINK CONTAINING ALCOHOL?: NEVER

## 2023-08-02 SDOH — SOCIAL STABILITY: SOCIAL NETWORK: IN A TYPICAL WEEK, HOW MANY TIMES DO YOU TALK ON THE PHONE WITH FAMILY, FRIENDS, OR NEIGHBORS?: NEVER

## 2023-08-02 SDOH — SOCIAL STABILITY: SOCIAL INSECURITY
WITHIN THE LAST YEAR, HAVE YOU BEEN RAPED OR FORCED TO HAVE ANY KIND OF SEXUAL ACTIVITY BY YOUR PARTNER OR EX-PARTNER?: NO

## 2023-08-02 SDOH — HEALTH STABILITY: PHYSICAL HEALTH: ON AVERAGE, HOW MANY DAYS PER WEEK DO YOU ENGAGE IN MODERATE TO STRENUOUS EXERCISE (LIKE A BRISK WALK)?: 0 DAYS

## 2023-08-02 SDOH — ECONOMIC STABILITY: HOUSING INSECURITY: IN THE LAST 12 MONTHS, HOW MANY PLACES HAVE YOU LIVED?: 1

## 2023-08-02 SDOH — SOCIAL STABILITY: SOCIAL NETWORK: HOW OFTEN DO YOU GET TOGETHER WITH FRIENDS OR RELATIVES?: NEVER

## 2023-08-02 SDOH — SOCIAL STABILITY: SOCIAL INSECURITY
WITHIN THE LAST YEAR, HAVE YOU BEEN KICKED, HIT, SLAPPED, OR OTHERWISE PHYSICALLY HURT BY YOUR PARTNER OR EX-PARTNER?: NO

## 2023-08-02 SDOH — SOCIAL STABILITY: SOCIAL NETWORK
DO YOU BELONG TO ANY CLUBS OR ORGANIZATIONS SUCH AS CHURCH GROUPS, UNIONS, FRATERNAL OR ATHLETIC GROUPS, OR SCHOOL GROUPS?: NO

## 2023-08-02 SDOH — ECONOMIC STABILITY: FOOD INSECURITY
WITHIN THE PAST 12 MONTHS, YOU WORRIED THAT YOUR FOOD WOULD RUN OUT BEFORE YOU GOT THE MONEY TO BUY MORE.: SOMETIMES TRUE

## 2023-08-02 SDOH — HEALTH STABILITY: MENTAL HEALTH
DO YOU FEEL STRESS - TENSE, RESTLESS, NERVOUS, OR ANXIOUS, OR UNABLE TO SLEEP AT NIGHT BECAUSE YOUR MIND IS TROUBLED ALL THE TIME - THESE DAYS?: TO SOME EXTENT

## 2023-08-02 SDOH — ECONOMIC STABILITY: TRANSPORTATION INSECURITY: IN THE PAST 12 MONTHS, HAS LACK OF TRANSPORTATION KEPT YOU FROM MEDICAL APPOINTMENTS OR FROM GETTING MEDICATIONS?: NO

## 2023-08-02 SDOH — ECONOMIC STABILITY: FOOD INSECURITY: HOW HARD IS IT FOR YOU TO PAY FOR THE VERY BASICS LIKE FOOD, HOUSING, MEDICAL CARE, AND HEATING?: HARD

## 2023-08-02 SDOH — SOCIAL STABILITY: SOCIAL NETWORK: HOW OFTEN DO YOU ATTEND CHURCH OR RELIGIOUS SERVICES?: NEVER

## 2023-08-02 SDOH — ECONOMIC STABILITY: FOOD INSECURITY: WITHIN THE PAST 12 MONTHS, THE FOOD YOU BOUGHT JUST DIDN'T LAST AND YOU DIDN'T HAVE MONEY TO GET MORE.: SOMETIMES TRUE

## 2023-08-02 SDOH — SOCIAL STABILITY: SOCIAL INSECURITY: ARE YOU MARRIED, WIDOWED, DIVORCED, SEPARATED, NEVER MARRIED, OR LIVING WITH A PARTNER?: DIVORCED

## 2023-08-02 SDOH — SOCIAL STABILITY: SOCIAL INSECURITY: WITHIN THE LAST YEAR, HAVE YOU BEEN AFRAID OF YOUR PARTNER OR EX-PARTNER?: NO

## 2023-08-02 SDOH — HEALTH STABILITY: MENTAL HEALTH

## 2023-08-02 SDOH — ECONOMIC STABILITY: HOUSING INSECURITY: IN THE LAST 12 MONTHS, WAS THERE A TIME WHEN YOU WERE NOT ABLE TO PAY THE MORTGAGE OR RENT ON TIME?: YES

## 2023-08-02 SDOH — HEALTH STABILITY: PHYSICAL HEALTH: ON AVERAGE, HOW MANY MINUTES DO YOU ENGAGE IN EXERCISE AT THIS LEVEL?: 0 MIN

## 2023-08-02 SDOH — ECONOMIC STABILITY: HOUSING INSECURITY
IN THE LAST 12 MONTHS, WAS THERE A TIME WHEN YOU DID NOT HAVE A STEADY PLACE TO SLEEP OR SLEPT IN A SHELTER (INCLUDING NOW)?: NO

## 2023-08-02 SDOH — SOCIAL STABILITY: SOCIAL NETWORK: HOW OFTEN DO YOU ATTEND MEETINGS OF THE CLUBS OR ORGANIZATIONS YOU BELONG TO?: NEVER

## 2023-08-02 ASSESSMENT — ACTIVITIES OF DAILY LIVING (ADL)
LACK_OF_TRANSPORTATION: NO
ADLS_ACUITY_SCORE: 44
ADLS_ACUITY_SCORE: 48
ADLS_ACUITY_SCORE: 30
ADLS_ACUITY_SCORE: 48
ADLS_ACUITY_SCORE: 44
ADLS_ACUITY_SCORE: 44
ADLS_ACUITY_SCORE: 43
ADLS_ACUITY_SCORE: 44
ADLS_ACUITY_SCORE: 44
ADLS_ACUITY_SCORE: 46
ADLS_ACUITY_SCORE: 48
ADLS_ACUITY_SCORE: 44

## 2023-08-02 ASSESSMENT — PATIENT HEALTH QUESTIONNAIRE - PHQ9: SUM OF ALL RESPONSES TO PHQ QUESTIONS 1-9: 9

## 2023-08-02 NOTE — CONSULTS
Care Management Follow Up    Length of Stay (days): 2    Expected Discharge Date: 08/03/2023     Concerns to be Addressed: Complex psychosocial need   Patient plan of care discussed at interdisciplinary rounds: Yes    Anticipated Discharge Disposition: Home, Home Care     Anticipated Discharge Services: None  Anticipated Discharge DME: Other (see comment) (ostomy supplies)    Patient/family educated on Medicare website which has current facility and service quality ratings: N/A  Education Provided on the Discharge Plan: Yes  Patient/Family in Agreement with the Plan: yes    Referrals Placed by CM/SW: Internal Clinic Care Coordination  Private pay costs discussed: Not applicable    Additional Information:  CM was consulted by gyn team to met with pt for complex psychosocial need. CM met with pt at bedside and assessed for social determinants of health and mood. Pt scored a 9 on the PHQ9 assessment indicating mild depression as indicated below:     PATIENT HEALTH QUESTIONNAIRE-9 (PHQ - 9)    Over the last 2 weeks, how often have you been bothered by any of the following problems?    1. Little interest or pleasure in doing things -  More than half the days   2. Feeling down, depressed, or hopeless -  Several days   3. Trouble falling or staying asleep, or sleeping too much - Nearly every day   4. Feeling tired or having little energy -  Nearly every day   5. Poor appetite or overeating -  Not at all   6. Feeling bad about yourself - or that you are a failure or have let yourself or your family down -  Not at all   7. Trouble concentrating on things, such as reading the newspaper or watching television - Not at all   8. Moving or speaking so slowly that other people could have noticed? Or the opposite - being so fidgety or restless that you have been moving around a lot more than usual Not at all   9. Thoughts that you would be better off dead or of hurting  yourself in some way Not at all   Total Score: 9     If you  checked off any problems, how difficult have these problems made it for you to do your work, take care of things at home, or get along with other people? Somewhat difficult    Discussed support systems and pt report not having any support system and not interested in creating any. That she was , but now . CM offered psychotherapy resources to support with mild depression from PHQ9 scoring and pt declined. Inquired if pt is interested in seeing a psychologist while in the hospital and pt declined. Pt will discharge home with home care services. RNCC is supporting with HC plan. No further concern at this time.      Kavitha Dumont, 5A [facility]  P: [phone]  Pager: [phone]  F: [phone]  Weekend Pager: [phone]  Weekend Coverage: 5A; 5B; 5C

## 2023-08-02 NOTE — PROGRESS NOTES
Care Management Follow Up    Length of Stay (days): 2  Expected Discharge Date: 08/03/2023  Concerns to be Addressed: discharge planning     Patient plan of care discussed at interdisciplinary rounds: Yes  Anticipated Discharge Disposition: Home  Anticipated Discharge Services: Home Care  Anticipated Discharge DME: (ostomy supplies)  Patient/family educated on Medicare website which has current facility and service quality ratings: yes  Education Provided on the Discharge Plan: Yes  Patient/Family in Agreement with the Plan: yes  Referrals Placed by CM/SW: Internal Clinic Care Coordination  Private pay costs discussed: Not applicable    Additional Information:  RNCC was notified that [facility] Care could not accept but they have started outsourcing. [facility] care has accepted pt. RNCC added home care orders.     Frankie Nguyen RN BSN  7C RN Care Coordinator   Ph: [phone]  Pager: [phone]

## 2023-08-02 NOTE — PLAN OF CARE
Pain is managed with Motrin, Tylenol and 1 dose of oral dilaudid. Incontinent of urine, depends weighed, also has a right PNT. Up independently with her walker. Colostomy has gas and small amounts of stool. Tolerating a regular diet. Flat affect, answers questions when asked. SW saw the patient for an eval.

## 2023-08-02 NOTE — PLAN OF CARE
Goal Outcome Evaluation:    POD 2 palliative laparoscopic colostomy, cystoscopy. 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    BP soft, otherwise AVSS. Pain managed with oral Dilaudid, Ibuprofen, and Simethicone. On a regular diet, denied nausea. Abdominal incision with liquid bandage. Colostomy with gas and minimal stool. Right PNT with lower urine volumes, but patient also incontinent of urine and pads are being weighed. Up with stand by assist and walker. Ambulated in hallway x1. Continue with plan of care - awaiting full return of bowel function and safe discharge planning.

## 2023-08-02 NOTE — PLAN OF CARE
Goal Outcome Evaluation:      Plan of Care Reviewed With: patient    Overall Patient Progress: improving    POD 2 s/p Pelvic Exam under anesthesia with laparoscopic colostomy, Cystoscopy  Alert, oriented, up independently with walker.  AVSS.  Tolerating small amts regular diet, denies nausea.  Colostomy with gas and stool.    R PNT with small amts urine, pt is also incontinent of urine in brief. Brief weights charted in I/O flowsheet.  Up to the bathroom 3x, sm amt pink/brown drainage from vagina each time.  Pain managed with PO dilaudid, simethicone, tylenol and ibuprofen.     PLAN: Discharge home when meeting post op goals.

## 2023-08-02 NOTE — PROGRESS NOTES
[facility] Nurse Inpatient Assessment     Consulted for: new colostomy     Summary: having trouble adjusting     Patient History (according to provider note(s):      Suni Zuluaga is a 38 year old  with PMH of cervical cancer and hypothyroidism who presents for scheduled EUA and laparoscopic cholosctomy with Dr. Brambial and Dr. Castano.      She is feeling well today and denies recent illness, F/C, loss of taste/smell, cough, sore throat, chest pain, shortness of breath, nausea/vomiting. She had a R PNT placed on , but otherwise her health has not changed since her last visit with Dr. Galicia on 23. She denies history of intolerance of anesthesia.       Procedure Date: 2023     PREOPERATIVE DIAGNOSES:     1.  Recurrent metastatic cervical cancer.  2.  Rectovaginal fistula.  3.  Vesicovaginal fistula.  4.  Severe malnutrition.  5.  Tobacco abuse.     POSTOPERATIVE DIAGNOSES:     1.  Recurrent metastatic cervical cancer.  2.  Rectovaginal fistula.  3.  Vesicovaginal fistula.  4.  Severe malnutrition.  5.  Tobacco abuse.          Assessment:        Assessment of new end Colostomy:  Diagnosis Pertinent to Stoma: Cancer - Colon or Rectum      Surgery Date:   Surgeon:Suzy Brambila MD        Hospital: [facility]  Pouching system in place on assessment today: [place] one piece, cut to fit, barrier ring, and skin prep   Pouch barrier status: intact and Minimal melting  Pouch last changed/wear time:   Reason for pouch change today: pouch not changed today  Effectiveness of current pouching/ supply plan: Recommend continuing current plan  Change made with ostomy management today: No  Pouching system placed today: Sveta one piece, cut to fit, flat, barrier ring, and skin prep   Supplies: gathered, at bedside, supplies stored on unit, and discussed with patient   Last photo: none taken   Stoma location: Q  Stoma size: 1 3/8 inches, none  round   Stoma appearance: round, edematous, and protruberant  Mucocutaneous junction:  intact, - not visualized today barrier in place  Peristomal complication(s): none   Output: gas, formed, hard, and brown  Output volume emptied during visit: 0ml  Abdominal assessment: Soft  Surgical site(s): open to air  NG still in place? No  Pain: Dull ache  Is patient still on a PCA? No    Ostomy education assessment:  Participant of teaching session today: patient   Education completed today: Fluid and electrolyte balance , Importance of hydration, When to seek medical attention, Low fiber diet , Odor/flatus management , Infection prevention/hygiene , Hernia prevention, and Lifestyle adjustments   Educational materials/methods: Verbal, Written, Demonstration, and FOD [place] education hand out  Education still needed: Refitting of appliance , Pouch change return demonstration, Ostomy accessory product use , Peristomal skin care, Pouch emptying demonstration, Pouch emptying return demonstration, and Discharge instructions  Learning Comprehension:   Psychosocial assessment: Patient has a very flat affect.  Patient readiness for education today: observing- listening declined having any questions. Patient would often close her eyes during the education.   Following today's visit: patient  is able to demonstrate;         1. How to empty their pouch? No and Demo provided         2. How to change their pouch? No and Demo provided   Preparation for discharge completed: No discharge preparation started yet  Preparation for discharge still needed: Placed prescription recommendations in discharge navigator for MD to sign, Ensured patient has extra supplies for discharge, Discussed how to order supplies after discharge , Ordered samples from  after gaining consent from patient/caregiver, Discussed how and when to make an outpatient WOC nurse appointment after discharge, Prepared for discharge home with home care, and Discuss  "signs/symptoms of when to seek medical attention  Pt support system on discharge: noone   [facility] recommend home care? Yes and By WOC RN if possible  Face to face time: 30 minutes          Treatment Plan:     LLQ Colostomy pouching plan:   Pouching system: ostomy supplies pouches: [place] FECAL (457287) ostomy supplies barrier: none   Accessories used: [facility] ostomy accessories: 2\" Cera Barrier Ring (865213) and Cavilon no sting barrier film (708133)   Frequency of pouch changes: Twice weekly  WOC follow up plan: Daily Monday-Friday (as able)  Bedside RN interventions: Notify WOC for ongoing pouch leakage, Document stoma appearance and output volume, color, and consistency every shift, and Encourage patient to empty pouch with assist     Orders: Reviewed    RECOMMEND PRIMARY TEAM ORDER: None, at this time  Education provided: plan of care  Discussed plan of care with: Patient  WOC nurse follow-up plan: daily  Notify WOC if wound(s) deteriorate.  Nursing to notify the Provider(s) and re-consult the WOC Nurse if new skin concern.    DATA:     Current support surface: Standard  Standard gel/foam mattress (IsoFlex, Atmos air, etc)  Containment of urine/stool: Continent of bladder and Colostomy pouch  BMI: Body mass index is 19.49 kg/m .   Active diet order: Orders Placed This Encounter      Regular Diet Adult     Output: I/O last 3 completed shifts:  In: 320 [P.O.:320]  Out: 447 [Urine:412; Stool:35]     Labs:   Recent Labs   Lab 08/02/23  0532   HGB 8.4*   WBC 5.9       Pressure injury risk assessment:   Sensory Perception: 4-->no impairment  Moisture: 3-->occasionally moist  Activity: 3-->walks occasionally  Mobility: 3-->slightly limited  Nutrition: 2-->probably inadequate  Friction and Shear: 3-->no apparent problem  Arnie Score: 18    Aleida Collazo RN, CWOCN  Pager no longer is use, please contact through [email or web address] group: [facility] Nurse  Dept. Office Number: [phone]    "

## 2023-08-03 VITALS
DIASTOLIC BLOOD PRESSURE: 56 MMHG | WEIGHT: 110 LBS | OXYGEN SATURATION: 95 % | RESPIRATION RATE: 18 BRPM | BODY MASS INDEX: 19.49 KG/M2 | TEMPERATURE: 98.1 F | SYSTOLIC BLOOD PRESSURE: 87 MMHG | HEIGHT: 63 IN | HEART RATE: 61 BPM

## 2023-08-03 PROCEDURE — 250N000013 HC RX MED GY IP 250 OP 250 PS 637

## 2023-08-03 PROCEDURE — 999N000200 HC STATISTIC WOC PT EDUCATION, 46-60 MIN

## 2023-08-03 PROCEDURE — 99024 POSTOP FOLLOW-UP VISIT: CPT | Performed by: OBSTETRICS & GYNECOLOGY

## 2023-08-03 PROCEDURE — G0463 HOSPITAL OUTPT CLINIC VISIT: HCPCS

## 2023-08-03 PROCEDURE — 250N000013 HC RX MED GY IP 250 OP 250 PS 637: Performed by: OBSTETRICS & GYNECOLOGY

## 2023-08-03 RX ORDER — HYDROMORPHONE HYDROCHLORIDE 2 MG/1
2 TABLET ORAL EVERY 6 HOURS PRN
Qty: 20 TABLET | Refills: 0 | Status: ON HOLD | OUTPATIENT
Start: 2023-08-03 | End: 2023-09-08

## 2023-08-03 RX ORDER — DIAZEPAM 2 MG
2 TABLET ORAL 2 TIMES DAILY PRN
Qty: 12 TABLET | Refills: 0 | Status: ON HOLD | COMMUNITY
Start: 2023-08-03 | End: 2023-09-08

## 2023-08-03 RX ORDER — POLYETHYLENE GLYCOL 3350 17 G/17G
17 POWDER, FOR SOLUTION ORAL DAILY PRN
Qty: 510 G | Refills: 0 | Status: ON HOLD | OUTPATIENT
Start: 2023-08-03 | End: 2023-09-08

## 2023-08-03 RX ADMIN — IBUPROFEN 600 MG: 200 TABLET, FILM COATED ORAL at 11:40

## 2023-08-03 RX ADMIN — APIXABAN 2.5 MG: 2.5 TABLET, FILM COATED ORAL at 09:35

## 2023-08-03 RX ADMIN — HYDROMORPHONE HYDROCHLORIDE 4 MG: 2 TABLET ORAL at 02:57

## 2023-08-03 RX ADMIN — ACETAMINOPHEN 650 MG: 325 TABLET, FILM COATED ORAL at 02:57

## 2023-08-03 RX ADMIN — ACETAMINOPHEN 650 MG: 325 TABLET, FILM COATED ORAL at 09:36

## 2023-08-03 RX ADMIN — ESCITALOPRAM OXALATE 10 MG: 10 TABLET ORAL at 09:36

## 2023-08-03 RX ADMIN — POLYETHYLENE GLYCOL 3350 17 G: 17 POWDER, FOR SOLUTION ORAL at 09:36

## 2023-08-03 RX ADMIN — LEVOTHYROXINE SODIUM 200 MCG: 200 TABLET ORAL at 09:36

## 2023-08-03 ASSESSMENT — ACTIVITIES OF DAILY LIVING (ADL)
ADLS_ACUITY_SCORE: 30

## 2023-08-03 NOTE — DISCHARGE INSTRUCTIONS
Colostomy: What to Expect at Home  Your Recovery  After a colostomy, you can expect to feel better and stronger each day. But you may get tired quickly at first. Your belly may be sore, and you will probably need pain medicine for a week or two. Your stoma will be swollen at first. This is normal.  You may have very loose stools in your colostomy bag for a while. You may also have a lot of gas pass into your colostomy bag in the weeks after surgery. This will decrease as you heal.  How quickly you get better depends, in part, on whether you had a laparoscopic or open surgery. But you will probably need at least 6 weeks to get back to your normal routine.  This care sheet gives you a general idea about how long it will take for you to recover. But each person recovers at a different pace. Follow the steps below to get better as quickly as possible.  How can you care for yourself at home?  Activity    Rest when you feel tired. Getting enough sleep will help you recover.     Try to walk each day. Start by walking a little more than you did the day before. Bit by bit, increase the amount you walk. Walking boosts blood flow and helps prevent pneumonia.     Avoid strenuous activities until your doctor says it is okay.     For at least 6 weeks, avoid lifting anything that would make you strain. This may include heavy grocery bags and milk containers, a heavy briefcase or backpack, cat litter or dog food bags, a vacuum , or a child.         You will probably need to take 6 weeks off from work. It depends on the type of work you do and how you feel.     You can take a bath or shower as usual. You can bathe with your colostomy bag on or off.     Ask your doctor when it is okay for you to have sex.   Diet    You may need to follow a low-fiber diet for the first few weeks after your surgery.     Drink plenty of fluids (unless your doctor tells you not to).     Incision care    If you have strips of tape on the cut  (incision) the doctor made, leave the tape on for a week or until it falls off. Or follow your doctor's instructions for removing the tape.     Wash the area daily with warm, soapy water, and pat it dry. Don't use hydrogen peroxide or alcohol, which can slow healing. You may cover the area with a gauze bandage if it weeps or rubs against clothing. Change the bandage every day.     Keep the area clean and dry.   Other instructions    Keep the area around your stoma clean and dry.     Follow all instructions from your doctor or ostomy nurse.     Empty and replace your colostomy bag as often as directed by your ostomy nurse.   Follow-up care is a key part of your treatment and safety. Be sure to make and go to all appointments, and call your doctor if you are having problems. It's also a good idea to know your test results and keep a list of the medicines you take.  When should you call for help?   Call 911 anytime you think you may need emergency care. For example, call if:    You passed out (lost consciousness).     You are short of breath.   Call your doctor now or seek immediate medical care if:    You have pain that does not get better after you take your pain medicine.     You have signs of infection, such as:  Increased pain, swelling, warmth, or redness.  Red streaks leading from the stoma.  Pus draining from the stoma.  A fever.     You are sick to your stomach or cannot drink fluids.     You cannot pass stools or gas.     Bright red blood has soaked through the bandage over your incision.     You have loose stitches, or your incision comes open.     You have signs of a blood clot in your leg (called a deep vein thrombosis), such as:  Pain in your calf, back of knee, thigh, or groin.  Redness and swelling in your leg or groin.     You have a problem with your stoma.   Watch closely for changes in your health, and be sure to contact your doctor if you have any problems.  Where can you learn more?  Go to  "https://www.[employer].net/patiented  Enter W996 in the search box to learn more about \"Colostomy: What to Expect at Home.\"  Current as of: November 30, 2022               Content Version: 13.7    4271-2003 [employer]   Care instructions adapted under license by your healthcare professional. If you have questions about a medical condition or this instruction, always ask your healthcare professional. [employer] disclaims any warranty or liability for your use of this information.      [facility]     Name: Suni Zuluaga  Date: 8/3/2023    To order your ostomy supplies    The ostomy Supplier needs this supply list  to process your order. You will need to fax/deliver this list, along with your Insurance information. Your home care nurse can assist with this process.    List of Ostomy Distributors      [facility]  Ph. [phone] [phone] Fax # [phone]  [facility]   Ph. [phone] [phone]  [employer] [facility]   Ph. [phone]  [facility]   Ph. [phone] [phone]  Or Call your insurance provider for their preferred supplier    Your Medical Supplier will need your surgeon's name, phone and fax number    Clinic:                     Phone                            Fax  Gynecology Oncology:             [phone]    Verbal Order for ostomy supplies for 1 Month per:                                         Aleida Collazo, RN, CWOCN     Authorizing MD: Dr. Suzy Brambila    Quantity of pouches:    12 /mo.    Request the following supplies:      [place]    1 piece flat fecal pouch with filter opaque with viewing option ( # 9702)  Accessories  2  barrier ring #8585     Adapt powder #7906    No sting film barrier # 1150                          Adapt odor eliminator and lubricant 236ml bottle # 62562     M-9 Spray room deodorizer #4559   Sveta barrier extenders #66702                               "    Change your pouch two- three times a week, more often if leaking.   If you are cutting a hole in the wafer of your pouch, recheck stoma size and adjust pouch opening as needed every week    . Call the Ostomy Nurse at [facility] [address] [place]AS [place] : [phone]   **Schedule a follow-up visit in 2 to 4 weeks after your surgery, sooner if having problems Bring a complete set of pouch-changing supplies to this visit     Problems you should Report  - The stoma turns blue or darker in color.  - Cuts or sores around the stoma.  - Red, raw or painful skin around the stoma.  - Any bulging of the skin around the stoma.  - A pouch that leaks every day.  - Problems making the right size hole in the pouch wafer.   Please call with any questions or concerns.

## 2023-08-03 NOTE — PROGRESS NOTES
"[facility] Nurse Inpatient Assessment     Consulted for: new colostomy     Summary: having trouble adjusting     Patient History (according to provider note(s):      Suni Zuluaga is a 38 year old  with PMH of cervical cancer and hypothyroidism who presents for scheduled EUA and laparoscopic cholosctomy with Dr. Brambila and Dr. Castano.      She is feeling well today and denies recent illness, F/C, loss of taste/smell, cough, sore throat, chest pain, shortness of breath, nausea/vomiting. She had a R PNT placed on , but otherwise her health has not changed since her last visit with Dr. Galicia on 23. She denies history of intolerance of anesthesia.       Procedure Date: 2023     PREOPERATIVE DIAGNOSES:     1.  Recurrent metastatic cervical cancer.  2.  Rectovaginal fistula.  3.  Vesicovaginal fistula.  4.  Severe malnutrition.  5.  Tobacco abuse.     POSTOPERATIVE DIAGNOSES:     1.  Recurrent metastatic cervical cancer.  2.  Rectovaginal fistula.  3.  Vesicovaginal fistula.  4.  Severe malnutrition.  5.  Tobacco abuse.          Assessment:        Assessment of new end Colostomy:  Diagnosis Pertinent to Stoma: Cancer - Colon or Rectum      Surgery Date:   Surgeon:Suzy Brambila MD        Hospital: [facility]  Pouching system in place on assessment today: Sveta one piece, cut to fit, barrier ring, and skin prep   Pouch barrier status:  30% melted  Pouch last changed/wear time:   Reason for pouch change today: ostomy education and routine schedule  Effectiveness of current pouching/ supply plan: Recommend continuing current plan  Change made with ostomy management today: No  Pouching system placed today: Sveta one piece, cut to fit, flat, barrier ring, and skin prep   Supplies: gathered, at bedside, supplies stored on unit, and discussed with patient - 2 week supply provided  Stoma location: [facility]  Stoma size:  \"  Stoma appearance: " round, small necrotic cap at 10-11 o'clock, and protruberant  Mucocutaneous junction:  intact   Peristomal complication(s): none   Output: formed and brown  Output volume emptied during visit: 0ml  Abdominal assessment: Soft  Surgical site(s): open to air  NG still in place? No  Pain: Dull ache  Is patient still on a PCA? No    Ostomy education assessment:  Participant of teaching session today: patient   Education completed today: Pouching system assessment , Refitting of appliance , Pouch change return demonstration, Ostomy accessory product use , Peristomal skin care, Pouch emptying return demonstration, and Discharge instructions  Educational materials/methods: Verbal, Written, Demonstration, Return demonstration, FOD Stites education hand out, Hands on, Addressed patient/caregiver questions/concerns, and Left packet of information at bedside   Education still needed:  all topics covered  Learning Comprehension:   Psychosocial assessment: Patient has a flat affect, wary of looking at ostomy  Patient readiness for education today: attentive and active participation, declined having any questions.   Following today's visit: patient  is able to demonstrate;         1. How to empty their pouch? Able to verbalize steps- patient reported emptying pouch independently and we practiced opening and closing pouch.         2. How to change their pouch? Yes and with moderate assist  Preparation for discharge completed: Placed prescription recommendations in discharge navigator for MD to sign, Ensured patient has extra supplies for discharge, Discussed how to order supplies after discharge , Ordered samples from  after gaining consent from patient/caregiver, Discussed how and when to make an outpatient WOC nurse appointment after discharge, Prepared for discharge home with home care, and Discuss signs/symptoms of when to seek medical attention    Pt support system on discharge: no one - home care- [facility]  "per Care Coordinator note.  WOC recommend home care? Yes  Face to face time: 60 minutes          Treatment Plan:     LLQ Colostomy pouching plan:   Pouching system: ostomy supplies pouches: [place] FECAL (550044) ostomy supplies barrier: none   Accessories used: [employer] ostomy accessories: 2\" Cera Barrier Ring (966529) and Cavilon no sting barrier film (589977)   Frequency of pouch changes: Twice weekly  WOC follow up plan: Daily Monday-Friday (as able)  Bedside RN interventions: Notify WOC for ongoing pouch leakage, Document stoma appearance and output volume, color, and consistency every shift, and Encourage patient to empty pouch with assist     Orders: Reviewed    RECOMMEND PRIMARY TEAM ORDER: None, at this time  Education provided: plan of care  Discussed plan of care with: Patient  WOC nurse follow-up plan: daily  Notify WOC if wound(s) deteriorate.  Nursing to notify the Provider(s) and re-consult the WOC Nurse if new skin concern.    DATA:     Current support surface: Standard  Standard gel/foam mattress (IsoFlex, Atmos air, etc)  Containment of urine/stool: Continent of bladder and Colostomy pouch  BMI: Body mass index is 19.49 kg/m .   Active diet order: Orders Placed This Encounter      Regular Diet Adult      Diet     Output: I/O last 3 completed shifts:  In: 120 [P.O.:120]  Out: 315 [Urine:255; Stool:60]     Labs:   Recent Labs   Lab 08/02/23  0532   HGB 8.4*   WBC 5.9       Pressure injury risk assessment:   Sensory Perception: 4-->no impairment  Moisture: 3-->occasionally moist  Activity: 3-->walks occasionally  Mobility: 3-->slightly limited  Nutrition: 2-->probably inadequate  Friction and Shear: 3-->no apparent problem  Arnie Score: 18    Aleida Collazo RN, CWOCN  Pager no longer is use, please contact through [facility] group: WO Nurse  Dept. Office Number: [phone]        [facility]     Name: Suni Zuluaga  Date: 8/3/2023    To order your ostomy " supplies    The ostomy Supplier needs this supply list  to process your order. You will need to fax/deliver this list, along with your Insurance information. Your home care nurse can assist with this process.    List of Ostomy Distributors      [facility] [employer]  Ph. [phone] Fax # [phone]  [employer]   Ph. [phone] [phone]  [employer]   Ph. [phone]  [facility]   Ph. [phone] [phone]  Or Call your insurance provider for their preferred supplier    Your Medical Supplier will need your surgeon's name, phone and fax number    Clinic:                     Phone                            Fax  Gynecology Oncology:             [phone]    Verbal Order for ostomy supplies for 1 Month per:                                         Aleida Collazo, RN, CWOCN     Authorizing MD: Dr. Suzy Brambila    Quantity of pouches:    12 /mo.    Request the following supplies:      [employer]    1 piece flat fecal pouch with filter opaque with viewing option ( # 6575)  Accessories  2  barrier ring #[id number]     Adapt powder #[id number]    No sting film barrier # [id number]                          Adapt odor eliminator and lubricant 236ml bottle # [id number]     M-9 Spray room deodorizer #[id number]   [employer] barrier extenders #99983                                  Change your pouch two- three times a week, more often if leaking.   If you are cutting a hole in the wafer of your pouch, recheck stoma size and adjust pouch opening as needed every week    . Call the Ostomy Nurse at [facility]       [address] [place] [address], [place] : [phone]   **Schedule a follow-up visit in 2 to 4 weeks after your surgery, sooner if having problems Bring a complete set of pouch-changing supplies to this visit     Problems you should Report  - The stoma turns blue or darker in color.  - Cuts or sores around the stoma.  - Red, raw or painful skin around the stoma.  - Any  bulging of the skin around the stoma.  - A pouch that leaks every day.  - Problems making the right size hole in the pouch wafer.   Please call with any questions or concerns.

## 2023-08-03 NOTE — PROGRESS NOTES
Shift: 1900-0730  Change: Ptnt emptied colostomy by self with RN observing.    Neuro: A&Ox4. Calm, cooperative, flat affect. Friend at bedside participating in care, flew in from [place] today.  Cardiac: Telemetry not orered, apical pulse reg. VSS  Respiratory: RA, tolerating well.  GI/: PNT patent with low UO, weighing pads for incontinence. Colostomy with some output.   Diet/Appetite: Diet: Regular Diet Adult      appetite good.  Activity: Up independently. Walked to bathroom this shift.  Pain: Abdominal/incisional pain continued.   Skin: No new deficits noted.  Lines: R PIV- SL  PRN: Dilaudid x1    I/O this shift:  In: 120 [P.O.:120]  Out: 275 [Urine:225; Stool:50]    P: Ptnt needs safe discharge plan before going home.

## 2023-08-03 NOTE — PROGRESS NOTES
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 Spacing out dilaudid so that she can drive herself home later today. Denies any nausea or vomiting. Tolerating PO. UOP is normal, still leaking. Good output from ostomy, feeling more comfortable with caring for it and okay with home health RN coming to check on her.       Objective:   Vitals:    08/02/23 1950 08/02/23 2224 08/02/23 2240 08/03/23 0300   BP: 95/60 (!) 80/47 92/54 101/64   BP Location: Right arm Left arm  Left arm   Pulse: 75 63  65   Resp: 16 18     Temp: 98.2  F (36.8  C) 98.8  F (37.1  C)     TempSrc: Oral Oral     SpO2: 97% 96%  96%   Weight:       Height:         General: NAD, appears comfortable in bed  CV: RRR. Well-perfused  Resp: CTAB. Breathing comfortably on room air  Abdomen: Soft, nontender, non distended; LLQ ostomy with stool and air present  Incision: c/d/i, no erythema or drainage  Extremities: Warm, well-perfused, nontender, no edema, no SCDs     New labs/imaging:  No new labs    Assessment: Suni Zuluaga is a 38 year old female with known history of advanced squamous cell carcinoma of the cervix complicated by rectovaginal fistula who is POD#3 s/p palliative laparoscopic colostomy, cystoscopy. Doing well in postoperative period, meeting goals for discharge home.    Dz: stage IIIC1 cervical SCC, s/p chemo RT (9-10/20), T&R brachy (11/20). Recurrence 10/22, s/p 7C carbo/taxol/johny/pembro (10/22-3/23), 5C pembro/johny (3-6/23), 1C pembro (7/23), fluoro gastrograffin study (7/23) w/ c/f rectovaginal fistula     # Routine post-op  # Rectovaginal fistula  # Acute blood loss anemia   S/p laparoscopy colostomy with  robles pouch placement. Continue regular diet, bowel regimen.   - FEN: Regular diet  - Pain: Sched tylenol, ibuprofen, PRN oxy  - Heme: Asymptomatic acute blood loss anemia    - GI: Bowel regimen, antiemetics PRN  - Plan for home health visits for assistance with colostomy and PNT cares      #Vesicovaginal fistula  - Patient had right PNT placement per IR as an outpatient due to hydronephrosis secondary to strictures. We have discussed left PNT for urinary diversion but pt prefers not to proceed at this time     # Hypothyroidism   Continue levothyroxine     Diet: Regular   PPX: SCDs, IS, apixaban ppx   Lines/Drains: PIV, R PNT     Dispo: Discharge to home today       Renuka Hopson MD PGY-4  [facility] Gynecology Oncology   Gyn Onc Pager: [phone]  08/03/23 5:58 AM        IHebert MD personally examined and evaluated this patient on 08/03/23.  I discussed the patient with the resident and care team, and agree with the assessment and plan of care as documented in the residents note above.    I personally reviewed vital signs, laboratory values and imaging results.    Pt meeting post-op goals.  Home today with home cares.    Ivy Castano MD  Gynecologic Oncology  [facility]

## 2023-08-03 NOTE — PLAN OF CARE
Goal Outcome Evaluation:      Plan of Care Reviewed With: patient    POD#3 s/p palliative laparoscopic colostomy, cystoscopy     A/Ox4. VSS, on room air. Denies nausea. Reg-diet. Pain managed with scheduled Ibuprofen and tylenol. SBA with walker with activity. Abdominal incision CDI. PIV removed per orders. PNT patent with low urine output and weighing pads for incontinence. Colostomy intact. Pt discharged to home with ostomy care supplies.

## 2023-08-04 ENCOUNTER — HOSPITAL ENCOUNTER (INPATIENT)
Facility: CLINIC | Age: 38
LOS: 5 days | Discharge: HOME | End: 2023-08-09
Attending: STUDENT IN AN ORGANIZED HEALTH CARE EDUCATION/TRAINING PROGRAM | Admitting: OBSTETRICS & GYNECOLOGY
Payer: COMMERCIAL

## 2023-08-04 ENCOUNTER — APPOINTMENT (OUTPATIENT)
Dept: CT IMAGING | Facility: CLINIC | Age: 38
End: 2023-08-04
Attending: STUDENT IN AN ORGANIZED HEALTH CARE EDUCATION/TRAINING PROGRAM
Payer: COMMERCIAL

## 2023-08-04 DIAGNOSIS — N82.3 RECTOVAGINAL FISTULA: ICD-10-CM

## 2023-08-04 DIAGNOSIS — C53.8 MALIGNANT NEOPLASM OF OVERLAPPING SITES OF CERVIX (H): Primary | ICD-10-CM

## 2023-08-04 LAB
ALBUMIN SERPL BCG-MCNC: 3.5 G/DL (ref 3.5–5.2)
ALP SERPL-CCNC: 81 U/L (ref 35–104)
ALT SERPL W P-5'-P-CCNC: 10 U/L (ref 0–50)
ANION GAP SERPL CALCULATED.3IONS-SCNC: 11 MMOL/L (ref 7–15)
APTT PPP: 30 SECONDS (ref 22–38)
AST SERPL W P-5'-P-CCNC: 17 U/L (ref 0–45)
BASOPHILS # BLD AUTO: 0 10E3/UL (ref 0–0.2)
BASOPHILS NFR BLD AUTO: 0 %
BILIRUB SERPL-MCNC: 0.2 MG/DL
BUN SERPL-MCNC: 9.4 MG/DL (ref 6–20)
CALCIUM SERPL-MCNC: 9.4 MG/DL (ref 8.6–10)
CHLORIDE SERPL-SCNC: 103 MMOL/L (ref 98–107)
CREAT SERPL-MCNC: 0.51 MG/DL (ref 0.51–0.95)
DEPRECATED HCO3 PLAS-SCNC: 26 MMOL/L (ref 22–29)
EOSINOPHIL # BLD AUTO: 0.1 10E3/UL (ref 0–0.7)
EOSINOPHIL NFR BLD AUTO: 2 %
ERYTHROCYTE [DISTWIDTH] IN BLOOD BY AUTOMATED COUNT: 16.6 % (ref 10–15)
GFR SERPL CREATININE-BSD FRML MDRD: >90 ML/MIN/1.73M2
GLUCOSE SERPL-MCNC: 97 MG/DL (ref 70–99)
HCT VFR BLD AUTO: 34.2 % (ref 35–47)
HGB BLD-MCNC: 10.4 G/DL (ref 11.7–15.7)
IMM GRANULOCYTES # BLD: 0 10E3/UL
IMM GRANULOCYTES NFR BLD: 0 %
INR PPP: 1.13 (ref 0.85–1.15)
LYMPHOCYTES # BLD AUTO: 0.9 10E3/UL (ref 0.8–5.3)
LYMPHOCYTES NFR BLD AUTO: 17 %
MCH RBC QN AUTO: 27.4 PG (ref 26.5–33)
MCHC RBC AUTO-ENTMCNC: 30.4 G/DL (ref 31.5–36.5)
MCV RBC AUTO: 90 FL (ref 78–100)
MONOCYTES # BLD AUTO: 0.5 10E3/UL (ref 0–1.3)
MONOCYTES NFR BLD AUTO: 9 %
NEUTROPHILS # BLD AUTO: 3.9 10E3/UL (ref 1.6–8.3)
NEUTROPHILS NFR BLD AUTO: 72 %
NRBC # BLD AUTO: 0 10E3/UL
NRBC BLD AUTO-RTO: 0 /100
PLATELET # BLD AUTO: 239 10E3/UL (ref 150–450)
POTASSIUM SERPL-SCNC: 3.8 MMOL/L (ref 3.4–5.3)
PROT SERPL-MCNC: 7 G/DL (ref 6.4–8.3)
RBC # BLD AUTO: 3.8 10E6/UL (ref 3.8–5.2)
SODIUM SERPL-SCNC: 140 MMOL/L (ref 136–145)
WBC # BLD AUTO: 5.5 10E3/UL (ref 4–11)

## 2023-08-04 PROCEDURE — 120N000002 HC R&B MED SURG/OB UMMC

## 2023-08-04 PROCEDURE — 250N000011 HC RX IP 250 OP 636: Performed by: STUDENT IN AN ORGANIZED HEALTH CARE EDUCATION/TRAINING PROGRAM

## 2023-08-04 PROCEDURE — 96374 THER/PROPH/DIAG INJ IV PUSH: CPT | Mod: 59 | Performed by: STUDENT IN AN ORGANIZED HEALTH CARE EDUCATION/TRAINING PROGRAM

## 2023-08-04 PROCEDURE — 250N000013 HC RX MED GY IP 250 OP 250 PS 637

## 2023-08-04 PROCEDURE — 96376 TX/PRO/DX INJ SAME DRUG ADON: CPT | Performed by: STUDENT IN AN ORGANIZED HEALTH CARE EDUCATION/TRAINING PROGRAM

## 2023-08-04 PROCEDURE — 99285 EMERGENCY DEPT VISIT HI MDM: CPT | Mod: 25 | Performed by: STUDENT IN AN ORGANIZED HEALTH CARE EDUCATION/TRAINING PROGRAM

## 2023-08-04 PROCEDURE — 85730 THROMBOPLASTIN TIME PARTIAL: CPT | Performed by: STUDENT IN AN ORGANIZED HEALTH CARE EDUCATION/TRAINING PROGRAM

## 2023-08-04 PROCEDURE — 74177 CT ABD & PELVIS W/CONTRAST: CPT

## 2023-08-04 PROCEDURE — 99284 EMERGENCY DEPT VISIT MOD MDM: CPT | Performed by: STUDENT IN AN ORGANIZED HEALTH CARE EDUCATION/TRAINING PROGRAM

## 2023-08-04 PROCEDURE — 80053 COMPREHEN METABOLIC PANEL: CPT | Performed by: STUDENT IN AN ORGANIZED HEALTH CARE EDUCATION/TRAINING PROGRAM

## 2023-08-04 PROCEDURE — 74177 CT ABD & PELVIS W/CONTRAST: CPT | Mod: 26 | Performed by: RADIOLOGY

## 2023-08-04 PROCEDURE — 85025 COMPLETE CBC W/AUTO DIFF WBC: CPT | Performed by: STUDENT IN AN ORGANIZED HEALTH CARE EDUCATION/TRAINING PROGRAM

## 2023-08-04 PROCEDURE — 36415 COLL VENOUS BLD VENIPUNCTURE: CPT | Performed by: STUDENT IN AN ORGANIZED HEALTH CARE EDUCATION/TRAINING PROGRAM

## 2023-08-04 PROCEDURE — 250N000011 HC RX IP 250 OP 636: Mod: JZ | Performed by: STUDENT IN AN ORGANIZED HEALTH CARE EDUCATION/TRAINING PROGRAM

## 2023-08-04 PROCEDURE — 85610 PROTHROMBIN TIME: CPT | Performed by: STUDENT IN AN ORGANIZED HEALTH CARE EDUCATION/TRAINING PROGRAM

## 2023-08-04 RX ORDER — AMOXICILLIN 250 MG
1 CAPSULE ORAL 2 TIMES DAILY PRN
Status: DISCONTINUED | OUTPATIENT
Start: 2023-08-04 | End: 2023-08-05

## 2023-08-04 RX ORDER — MAGNESIUM OXIDE 400 MG/1
400 TABLET ORAL DAILY
Status: DISCONTINUED | OUTPATIENT
Start: 2023-08-05 | End: 2023-08-09 | Stop reason: HOSPADM

## 2023-08-04 RX ORDER — ONDANSETRON 4 MG/1
4 TABLET, ORALLY DISINTEGRATING ORAL EVERY 6 HOURS PRN
Status: DISCONTINUED | OUTPATIENT
Start: 2023-08-04 | End: 2023-08-09 | Stop reason: HOSPADM

## 2023-08-04 RX ORDER — MORPHINE SULFATE 2 MG/ML
2 INJECTION, SOLUTION INTRAMUSCULAR; INTRAVENOUS ONCE
Status: COMPLETED | OUTPATIENT
Start: 2023-08-04 | End: 2023-08-04

## 2023-08-04 RX ORDER — IBUPROFEN 400 MG/1
400 TABLET, FILM COATED ORAL EVERY 6 HOURS PRN
Status: DISCONTINUED | OUTPATIENT
Start: 2023-08-04 | End: 2023-08-07

## 2023-08-04 RX ORDER — PROCHLORPERAZINE 25 MG
25 SUPPOSITORY, RECTAL RECTAL EVERY 12 HOURS PRN
Status: DISCONTINUED | OUTPATIENT
Start: 2023-08-04 | End: 2023-08-09 | Stop reason: HOSPADM

## 2023-08-04 RX ORDER — HYDROMORPHONE HCL IN WATER/PF 6 MG/30 ML
.2-.3 PATIENT CONTROLLED ANALGESIA SYRINGE INTRAVENOUS
Status: DISCONTINUED | OUTPATIENT
Start: 2023-08-04 | End: 2023-08-09 | Stop reason: HOSPADM

## 2023-08-04 RX ORDER — POLYETHYLENE GLYCOL 3350 17 G/17G
17 POWDER, FOR SOLUTION ORAL DAILY
Status: DISCONTINUED | OUTPATIENT
Start: 2023-08-04 | End: 2023-08-05

## 2023-08-04 RX ORDER — PROCHLORPERAZINE MALEATE 10 MG
10 TABLET ORAL EVERY 6 HOURS PRN
Status: DISCONTINUED | OUTPATIENT
Start: 2023-08-04 | End: 2023-08-09 | Stop reason: HOSPADM

## 2023-08-04 RX ORDER — IOPAMIDOL 755 MG/ML
68 INJECTION, SOLUTION INTRAVASCULAR ONCE
Status: COMPLETED | OUTPATIENT
Start: 2023-08-04 | End: 2023-08-04

## 2023-08-04 RX ORDER — ACETAMINOPHEN 325 MG/1
650 TABLET ORAL EVERY 4 HOURS PRN
Status: DISCONTINUED | OUTPATIENT
Start: 2023-08-04 | End: 2023-08-09 | Stop reason: HOSPADM

## 2023-08-04 RX ORDER — LEVOTHYROXINE SODIUM 200 UG/1
200 TABLET ORAL DAILY
Status: DISCONTINUED | OUTPATIENT
Start: 2023-08-05 | End: 2023-08-09 | Stop reason: HOSPADM

## 2023-08-04 RX ORDER — PHENAZOPYRIDINE HYDROCHLORIDE 100 MG/1
100 TABLET, FILM COATED ORAL 3 TIMES DAILY PRN
Status: DISCONTINUED | OUTPATIENT
Start: 2023-08-04 | End: 2023-08-07

## 2023-08-04 RX ORDER — ONDANSETRON 2 MG/ML
4 INJECTION INTRAMUSCULAR; INTRAVENOUS EVERY 6 HOURS PRN
Status: DISCONTINUED | OUTPATIENT
Start: 2023-08-04 | End: 2023-08-09 | Stop reason: HOSPADM

## 2023-08-04 RX ORDER — MORPHINE SULFATE 4 MG/ML
4 INJECTION, SOLUTION INTRAMUSCULAR; INTRAVENOUS ONCE
Status: COMPLETED | OUTPATIENT
Start: 2023-08-04 | End: 2023-08-04

## 2023-08-04 RX ORDER — DIAZEPAM 2 MG
2 TABLET ORAL 2 TIMES DAILY PRN
Status: DISCONTINUED | OUTPATIENT
Start: 2023-08-04 | End: 2023-08-09 | Stop reason: HOSPADM

## 2023-08-04 RX ORDER — OXYBUTYNIN CHLORIDE 5 MG/1
5 TABLET, EXTENDED RELEASE ORAL DAILY
Status: DISCONTINUED | OUTPATIENT
Start: 2023-08-05 | End: 2023-08-07

## 2023-08-04 RX ORDER — OXYCODONE HYDROCHLORIDE 5 MG/1
5 TABLET ORAL EVERY 4 HOURS PRN
Status: DISCONTINUED | OUTPATIENT
Start: 2023-08-04 | End: 2023-08-09 | Stop reason: HOSPADM

## 2023-08-04 RX ORDER — SIMETHICONE 80 MG
80 TABLET,CHEWABLE ORAL EVERY 6 HOURS PRN
Status: DISCONTINUED | OUTPATIENT
Start: 2023-08-04 | End: 2023-08-05

## 2023-08-04 RX ORDER — AMOXICILLIN 250 MG
2 CAPSULE ORAL 2 TIMES DAILY PRN
Status: DISCONTINUED | OUTPATIENT
Start: 2023-08-04 | End: 2023-08-05

## 2023-08-04 RX ADMIN — OXYCODONE HYDROCHLORIDE 5 MG: 5 TABLET ORAL at 21:55

## 2023-08-04 RX ADMIN — SIMETHICONE 80 MG: 80 TABLET, CHEWABLE ORAL at 22:43

## 2023-08-04 RX ADMIN — MORPHINE SULFATE 4 MG: 4 INJECTION INTRAVENOUS at 17:34

## 2023-08-04 RX ADMIN — IOPAMIDOL 68 ML: 755 INJECTION, SOLUTION INTRAVENOUS at 15:43

## 2023-08-04 RX ADMIN — MORPHINE SULFATE 2 MG: 2 INJECTION, SOLUTION INTRAMUSCULAR; INTRAVENOUS at 14:53

## 2023-08-04 ASSESSMENT — ACTIVITIES OF DAILY LIVING (ADL)
ADLS_ACUITY_SCORE: 35

## 2023-08-04 NOTE — ED PROVIDER NOTES
ED Provider Note  [facility]      History     Chief Complaint   Patient presents with    Post-op Problem     HPI  Suni Zuluaga is a 38 year old female with PMH of malignant neoplasm of overlapping sites of the cervix due to cervical cancer, rectovaginal fistula s/p colostomy, and vertigo who presents to the ED today for evaluation of fistula. Patient had colostomy placed d/t fistula on (7-31-23) and continues to have feces coming out of her vagina. Patient also having pain.  Describing diffuse abdominal pain since last night. Dr. Munoz oncologist. Some nausea, denies vomiting, chest pain, sob, lightheadedness             Physical Exam   BP: 100/67  Pulse: 77  Temp: 97.9  F (36.6  C)  Resp: 18  SpO2: 100 %  Physical Exam  Constitutional:       General: She is not in acute distress.     Appearance: She is not toxic-appearing or diaphoretic.   HENT:      Head: Normocephalic and atraumatic.      Right Ear: External ear normal.      Left Ear: External ear normal.      Nose: Nose normal.      Mouth/Throat:      Mouth: Mucous membranes are moist.      Pharynx: Oropharynx is clear. No oropharyngeal exudate or posterior oropharyngeal erythema.   Eyes:      Extraocular Movements: Extraocular movements intact.      Conjunctiva/sclera: Conjunctivae normal.      Pupils: Pupils are equal, round, and reactive to light.   Cardiovascular:      Pulses: Normal pulses.      Heart sounds: Normal heart sounds.   Pulmonary:      Effort: Pulmonary effort is normal.      Breath sounds: Normal breath sounds. No wheezing, rhonchi or rales.   Chest:      Chest wall: No tenderness.   Abdominal:      General: Abdomen is flat. There is no distension.      Palpations: Abdomen is soft.      Tenderness: There is abdominal tenderness. There is no guarding or rebound.      Comments: Diffusely tender. Colostomy bag in place with dark brown stool.    Musculoskeletal:         General: Normal range of motion.      Cervical  back: No rigidity or tenderness.   Skin:     General: Skin is warm and dry.      Capillary Refill: Capillary refill takes less than 2 seconds.   Neurological:      General: No focal deficit present.      Mental Status: She is alert and oriented to person, place, and time.      Sensory: No sensory deficit.   Psychiatric:         Mood and Affect: Mood normal.         Behavior: Behavior normal.         ED Course, Procedures, & Data     ED Course as of 08/14/23 1759   Fri Aug 04, 2023   1719 Radiologist called reporting anastomosis intact, some distal bowel wall thickening possibly enteritis, some pneumoperitoneum likely 2/2 recent post op but cannot rule out other causes. Will consult gyn onc. Will administer additional pain meds.      Procedures                      No results found for any visits on 08/04/23.  Medications - No data to display  Labs Ordered and Resulted from Time of ED Arrival to Time of ED Departure - No data to display  No orders to display          Critical care was not performed.     Medical Decision Making  The patient's presentation was of low complexity (an acute and uncomplicated illness or injury).    The patient's evaluation involved:  review of external note(s) from 3+ sources (see separate area of note for details)  review of 2 test result(s) ordered prior to this encounter (see separate area of note for details)    The patient's management necessitated high risk (a decision regarding hospitalization).    Assessment & Plan    38 year old female with PMH of malignant neoplasm of overlapping sites of the cervix due to cervical cancer, rectovaginal fistula s/p colostomy, and vertigo who presents to the ED today for evaluation of fistula. VSS. Exam as above.  Labs, CT A/P with IV contrast.     I have reviewed the nursing notes. I have reviewed the findings, diagnosis, plan and need for follow up with the patient.    New Prescriptions    No medications on file       Final diagnoses:   None          [facility] EMERGENCY DEPARTMENT  8/4/2023     Misabh De León MD  08/14/23 1800

## 2023-08-04 NOTE — H&P
"[facility] Gyn/Onc  [facility]   History and Physical    Suni Zuluaga MRN# [id number]   Age: 38 year old YOB: 1985     Date of Admission:  2023    Primary care provider: Suzy Brambila         Chief Complaint:   Abdominal pain, increased stool from vagina         History of Present Illness:   Suni Zuluaga is a 38 year old  female with a past medical history of cervical cancer and hypothyroidism who presented to the emergency room with worsening abdominal pain and increased stool from her vagina.     Patient was recently hospitalized (23-8/3/23) for laparoscopic colostomy for rectovaginal and vesicovaginal fistula in the setting of her cervical cancer. At the time of her discharge (8/3), she had met post op goals and there was stool and air in her end colostomy.     Today, she complains of persistent fecal and fluid discharge from her vagina along with abdominal pain that has gotten worse since this morning. She denies nausea or vomiting. She has not yet changed her ostomy bag since discharge. In terms of the amount of fecal mater from her vagina, she reports that she has had \"a lot\" but is unable to quantify further. She denies chest pain, shortness of breath, dizziness or lightheadedness.           Cancer Treatment History:   2015:HSIL Pap at the beginning of pregnancy. Repeat pap postpartum was ASCUS. Colposcopy was negative.   20: Pelvic US. Uterine fibroids, normal appearance of uterus and ovaries  2020: Pap smear which confirmed squamous cell carcinoma of the cervix. Referred to GYN  ONC  2020: GYN ONC Consult [facility]. Cervical biopsy, RADHA III, unable to identify invasion  2020: Repeat cervical biopsy confirms invasive SCC  2020: Started primary chemoRT for locally advanced cervical cancer. Stage IIIC1(r), large primary tumor with bilateral parametrial invasion and clinical evidence of posterior vaginal involvement. Pelvic lymphadenopathy " involving external iliac, perirectal and internal iliac nodes.   9/28/20-10/30/20: ChemoRT.   11/6/20-11/16/20: T&R brachytherapy  11/25/2020: Followup with [facility] (Dr Hernández)   8/23/22: CT A/P with nonspecific increased density in the para-aortic region, which could be seen in the setting of retroperitoneal fibrosis, inflammation, or potentially lymphoma/desmoplastic reaction. New varix from right renal vein extending down into the RLQ.   10/6/22: PET CT Whole body with large 5cm retroperitoneal mass with FDG avidity. New nodes in retroperitoneum and mediastinum     Plan: Paclitaxel, Carboplatin, Bevacizumab, and pembrolizumab added due to PD-L1 expression every 3 weeks.   10/13/22: Cycle 1 Paclitaxel 175 mg/m2, Carboplatin AUC 6, Bevacizumab 15 mg/m2  11/4/22: Cycle 2 Paclitaxel 175 mg/m2, Carboplatin AUC 5, Bevacizumab 15 mg/m2. Pembrolizumab on hold while awaiting insurance coverage.  11/8/22: ED for nausea (no vomiting) and dizziness, discharged with valium for vertigo.  11/25/22: Cycle 3 Paclitaxel 175 mg/m2, Carboplatin AUC 5, Bevacizumab 15 mg/m2, and pembrolizumab 200 mg.  Defer x 1 week for thrombocytopenia (plts 82).  Decrease Paclitaxel 135 mg/m2, Carboplatin AUC 5, Bevacizumab 15 mg/m2, and pembrolizumab 200 mg, given 12/5.  12/19/22 CT CAP with  response     Plan: Continue paclitaxel, carboplatin, bevacizumab, and pembrolizumab with CT/MD after 3 more cycles (6 total)  12/30/22: Cycle 4 Paclitaxel 135 mg/m2, Carboplatin AUC 5, Bevacizumab 15 mg/m2, and pembrolizumab 200 mg, held carboplatin and paclitaxel due to thrombocytopenia (plts 39), not given this cycle.  Bevacizumab and Pembrolizumab given.  1/20/23: Cycle 5 Paclitaxel 135 mg/m2, Carboplatin AUC 4, Bevacizumab 15 mg/m2, and pembrolizumab 200 mg.  2/10/23: Cycle 6 Paclitaxel 135 mg/m2, Carboplatin AUC 4, Bevacizumab 15 mg/m2, and pembrolizumab 200 mg.     Plan: Continue paclitaxel, carboplatin, bevacizumab, and pembrolizumab x 1 more cycle as she  did not have chemotherapy for cycle 4.  Followed by CT and follow up with Dr. Brambila.  3/3/23: Cycle 7 Paclitaxel 135 mg/m2, Carboplatin AUC 4, Bevacizumab 15 mg/m2, and pembrolizumab 200 mg.   3/20/2023: CT C/A/P with stable disease (still with retroperitoneal adenopathy encasing aorta/IVC, occulusion of IVC, cystitis/proctitis)   3/24/23: Cycle one of johny/pembro maintenance (cycle 8 overall)  4/14/23: C2 pembro/avastin  (cycle 9 overall)   4/26/23: OSH ED visit for pain, found to have new hydro   4/28/23: Met with urology (Dr Valladares) for hydronephrosis. Plan possible stent placement in the future for hydro.   5/5/23:cycle 3 maintenance pembro/avastin (cycle 10 overall)   5/26/23: Cycle 4 maintenance pembro/avastin (cycle 11 overall).  6/16/23: Cycle 5 maintenance pembro/avastin (cycle 12 overall).  7/7/2023:Cycle 6 maintenance pembro  7/28/23: Scheduled for Cycle 7 maintenance pembro  7/31/23: laparoscopic end colostomy to address rectovaginal fistula         Past Medical History:     Past Medical History:   Diagnosis Date    Cervical cancer (H)     Tobacco abuse             Past Surgical History:      Past Surgical History:   Procedure Laterality Date    CARPAL TUNNEL RELEASE RT/LT Right     COMBINED CYSTOSCOPY, INSERT STENT URETER(S) Bilateral 5/10/2023    Procedure: CYSTOSCOPY, WITH RIGHT URETERAL STENT INSERTION, Bilateral retrograde pyelogram.;  Surgeon: Robert Valladares MD;  Location: [facility] OR    COMBINED CYSTOSCOPY, RETROGRADES, EXCHANGE STENT URETER(S) Right 7/5/2023    Procedure: CYSTOSCOPY with fluoroscopy;  Surgeon: Robert Valladares MD;  Location: [facility] OR    CYSTOSCOPY, REMOVE STENT(S), COMBINED N/A 7/31/2023    Procedure: Cystoscopy;  Surgeon: Robert Valladares MD;  Location: [facility] OR    IR NEPHROSTOMY TUBE PLACEMENT RIGHT  7/25/2023    LAPAROSCOPIC COLOSTOMY N/A 7/31/2023    Procedure: Pelvic Exam under anesthesia with laparoscopic colostomy;  Surgeon: Suzy Brambila MD;  Location:  [facility] OR    TUBAL LIGATION              Social History:     Social History     Tobacco Use    Smoking status: Every Day     Packs/day: 1.00     Types: Cigarettes    Smokeless tobacco: Never    Tobacco comments:     9/11/2020 1/2 pack/day, tying tp quit   Substance Use Topics    Alcohol use: Yes     Comment: occ.            Family History:     Family History   Problem Relation Age of Onset    Breast Cancer Mother         8/2020 newly dx            Allergies:     Allergies   Allergen Reactions    Clindamycin Hives            Medications:   lidocaine (PF) (XYLOCAINE) 1 % injection 10 mL  lidocaine 1% with EPINEPHrine 1:100,000 injection 10 mL    acetaminophen (TYLENOL) 500 MG tablet, Take 2 tablets (1,000 mg) by mouth every 6 hours as needed for mild pain  apixaban ANTICOAGULANT (ELIQUIS) 2.5 MG tablet, Take 1 tablet (2.5 mg) by mouth 2 times daily for 26 days  Ascorbic Acid (VITAMIN C PO), Take 500 mg by mouth daily  bisacodyl (DULCOLAX) 5 MG EC tablet, Take 5 mg by mouth daily as needed for constipation  celecoxib (CELEBREX) 100 MG capsule, Take 1 capsule (100 mg) by mouth 2 times daily . Stop ibuprofen.  diazepam (VALIUM) 2 MG tablet, Take 1 tablet (2 mg) by mouth 2 times daily as needed for anxiety AVOID if you are taking hydromorphone/dilaudid  HYDROmorphone (DILAUDID) 2 MG tablet, Take 1 tablet (2 mg) by mouth every 6 hours as needed for severe pain  levothyroxine (SYNTHROID/LEVOTHROID) 200 MCG tablet, Take 1 tablet (200 mcg) by mouth daily  magnesium oxide (MAG-OX) 400 MG tablet, Take 1 tablet (400 mg) by mouth daily  oxybutynin ER (DITROPAN XL) 5 MG 24 hr tablet, Take 1 tablet (5 mg) by mouth daily  phenazopyridine (PYRIDIUM) 100 MG tablet, Take 1 tablet (100 mg) by mouth 3 times daily as needed for urinary tract discomfort  polyethylene glycol (MIRALAX) 17 GM/Dose powder, Take 17 g by mouth daily as needed for constipation  tamsulosin (FLOMAX) 0.4 MG capsule, Take 1 capsule (0.4 mg) by mouth daily  vitamin B-12  (CYANOCOBALAMIN) 500 MCG tablet, Take 500 mcg by mouth             Review of Systems:     Negative except as per HPI    CONSTITUTIONAL: Negative for  fevers, chills, fatigue, anorexia and weight loss  SKIN: Negative for rashes, lumps, or bumps  HEENT: Negative for vision changes, changes in hearing, sinus drainage, sore throat  RESPIRATORY: Negative for  cough, shortness of breath, dyspnea and wheezing, hemoptysis  CARDIOVASCULAR: Negative for  chest pain, dyspnea, palpitations, edema  GASTROINTESTINAL: Negative for nausea, vomiting, change in bowel habits, diarrhea, constipation, positive for abdominal pain, abdominal distention, negative for reflux, hematemesis and hemtochezia  GENITOURINARY: Negative for frequency, dysuria, nocturia, urinary incontinence and hematuria  MUSCULOSKELETAL: Negative for muscle weakness, joint stiffness, joint swelling, back pain  NEUROLOGIC: Negative for headaches, syncope, weakness, numbness, tingling, memory problems, or incoordination  PSYCHIATRIC: Negative for anxiety and depression  HEMATOLOGIC/LYMPHATIC:  Negative for easy bruising, bleeding and lymphadenopathy  ENDOCRINE:  Negative for heat intolerance and cold intolerance         Physical Exam:     Vitals:    08/04/23 1349 08/04/23 1600   BP: 100/67 101/72   Pulse: 77 70   Resp: 18    Temp: 97.9  F (36.6  C)    TempSrc: Oral    SpO2: 100%      Constitutional: Cachectic appearing female, no acute distress  Cardiovascular: Regular rate and rhythm without murmurs, clicks, gallops or rub  Respiratory: Clear to auscultation bilaterally without crackles or wheeze  Gastrointestinal: Abdomen soft, moderately tender in LLQ near ostomy site. Ostomy reddish-purple in appearance without retraction; dark liquid stool and gas in ostomy bag; BS hyperactive. No masses, organomegaly  Pelvic Exam: deferred, patient in hallway  Neuro: moving all extremities equally  Extremities: Warm and well perfused  Skin: No suspicious lesions or  rajwinder  Psychiatric: mentation appears normal and affect slightly flat  Lines: none         Data:     Results for orders placed or performed during the hospital encounter of 08/04/23 (from the past 24 hour(s))   Comprehensive metabolic panel   Result Value Ref Range    Sodium 140 136 - 145 mmol/L    Potassium 3.8 3.4 - 5.3 mmol/L    Chloride 103 98 - 107 mmol/L    Carbon Dioxide (CO2) 26 22 - 29 mmol/L    Anion Gap 11 7 - 15 mmol/L    Urea Nitrogen 9.4 6.0 - 20.0 mg/dL    Creatinine 0.51 0.51 - 0.95 mg/dL    Calcium 9.4 8.6 - 10.0 mg/dL    Glucose 97 70 - 99 mg/dL    Alkaline Phosphatase 81 35 - 104 U/L    AST 17 0 - 45 U/L    ALT 10 0 - 50 U/L    Protein Total 7.0 6.4 - 8.3 g/dL    Albumin 3.5 3.5 - 5.2 g/dL    Bilirubin Total 0.2 <=1.2 mg/dL    GFR Estimate >90 >60 mL/min/1.73m2   CBC with platelets differential    Narrative    The following orders were created for panel order CBC with platelets differential.  Procedure                               Abnormality         Status                     ---------                               -----------         ------                     CBC with platelets and d...[[id number]]  Abnormal            Final result                 Please view results for these tests on the individual orders.   INR   Result Value Ref Range    INR 1.13 0.85 - 1.15   Partial thromboplastin time   Result Value Ref Range    aPTT 30 22 - 38 Seconds   CBC with platelets and differential   Result Value Ref Range    WBC Count 5.5 4.0 - 11.0 10e3/uL    RBC Count 3.80 3.80 - 5.20 10e6/uL    Hemoglobin 10.4 (L) 11.7 - 15.7 g/dL    Hematocrit 34.2 (L) 35.0 - 47.0 %    MCV 90 78 - 100 fL    MCH 27.4 26.5 - 33.0 pg    MCHC 30.4 (L) 31.5 - 36.5 g/dL    RDW 16.6 (H) 10.0 - 15.0 %    Platelet Count 239 150 - 450 10e3/uL    % Neutrophils 72 %    % Lymphocytes 17 %    % Monocytes 9 %    % Eosinophils 2 %    % Basophils 0 %    % Immature Granulocytes 0 %    NRBCs per 100 WBC 0 <1 /100    Absolute Neutrophils 3.9  1.6 - 8.3 10e3/uL    Absolute Lymphocytes 0.9 0.8 - 5.3 10e3/uL    Absolute Monocytes 0.5 0.0 - 1.3 10e3/uL    Absolute Eosinophils 0.1 0.0 - 0.7 10e3/uL    Absolute Basophils 0.0 0.0 - 0.2 10e3/uL    Absolute Immature Granulocytes 0.0 <=0.4 10e3/uL    Absolute NRBCs 0.0 10e3/uL   CT Abdomen Pelvis w Contrast    Narrative    EXAMINATION: CT ABDOMEN PELVIS W CONTRAST, 8/4/2023 3:51 PM    INDICATION: rectovaginal fistula, recent colostomy bag placed monday,  diffuse abdominal pain, PO intolerance, fecal matter per vagina    COMPARISON STUDY: 6/23/2023    TECHNIQUE: CT scan of the abdomen and pelvis was performed on  multidetector CT scanner using volumetric acquisition technique and  images were reconstructed in multiple planes with variable thickness  and reviewed on dedicated workstations.     CONTRAST: 68 mL Isovue-370 injected IV without oral contrast    CT scan radiation dose is optimized to minimum requisite dose using  automated dose modulation techniques.    FINDINGS:    Lower thorax: Bibasilar dependent atelectasis.    Liver: Large area of hypoattenuation along the hepatic segment 4A,  possibly transient attenuation changes of the liver versus focal fatty  deposition is unchanged from prior. No focal hepatic lesions. Mild  central hepatic biliary dilatation.    Biliary System: Normal gallbladder. Mild dilatation of the common bile  duct measuring up to 10 mm (series 3 image 25). No definite  obstructive process is visualized.    Pancreas: No mass or pancreatic ductal dilation.    Adrenal glands: No mass or nodules    Spleen: Normal.    Kidneys: No suspicious mass, obstructing calculus or hydronephrosis.   ] Centimeters nephrostomy tube. There is slight increase in size of  the right renal pelvis without steff hydronephrosis and right  urothelial enhancement, possible retrograde urinary tract infection  given recto-vaginal-vesical fistulas.    Gastrointestinal tract :Normal appendix. Normal caliber small  bowel.   There is mild wall thickening and enhancement of the distal small  bowel. Additionally, there is some distal large bowel wall thickening  at the left lower quadrant with perhaps small amount of surrounding  fat stranding (series 3 image 48). Post surgical changes of left lower  quadrant end colostomy.    Mesentery/peritoneum/retroperitoneum: Small amount of free  intraperitoneal air which is favored postsurgical given recent  laparoscopy on 7/31/2023. Small amount of free fluid in the  supravesical pouch with slightly high attenuation (HU 15-20).    Lymph nodes: Ill-defined infiltrative retroperitoneal lymphadenopathy  with initial encasement of the superior mesenteric artery and mass  effect versus tumor invasion of the left renal vein with severe  stenosis close to the IVC confluence. Overall lymphadenopathy is  similar to prior 6/23/2023.    Vasculature: Patent major abdominal vasculature.  Severe stenosis  versus tumoral invasion of the left renal vein as detailed above.    Pelvis: Extensive posttreatment/postradiation changes of the pelvis  with loss of fat planes. Known rectovaginal and vesicovaginal fistulas  are again visualized (series 3 image 70, series 7 image 40). There is  fluid and air within the vagina and bladder. There is ill-defined  transition of the vagina and uterus with suspected fecal matter. Trace  fluid within the endometrial canal.    Osseous structures: No aggressive or acute osseous lesion.   Osteopenic-appearing bones.      Soft tissues: Within normal limits.      Impression    IMPRESSION:   1. Postoperative changes of laparoscopic left lower quadrant end  colostomy with likely postsurgical residual free air and suspect small  hemoperitoneum in the pelvis.  2. Again visualized rectovaginal and vesicovaginal fistulas with  ill-defined soft tissue thickening, fluid and air collection in the  pelvis. Overall appearance is stable from 6/23/2023  3. Urothelial enhancement in the  right collecting system, possibly  retrograde urinary tract infection.  4. Nonspecific thickening of the distal small bowel as well as a  segment of terminal colon in the left lower quadrant with some  surrounding fat stranding. Findings are nonspecific and suggestive of  enteritis/colitis of indeterminate etiology.  5. Overall stable appearance of retroperitoneal lymphadenopathy with  mass effect versus tumoral invasion of the left renal vein causing  severe stenosis at the IVC confluence. There is encasement of the  superior mesenteric artery without significant central stenosis.             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 Exam reassuring, CT scan notable for large gas and stool burden with mild thickening  and fat stranding of distal colon near ostomy site, concerning for possible ileus versus early ostomy ischemia. Plan for admission for observation and serial abdominal exams.     Plan:    Dz: stage IIIC1 cervical SCC, s/p chemo RT (9-10/20), T&R brachy (11/20). Recurrence 10/22, s/p 7C carbo/taxol/johny/pembro (10/22-3/23), 5C pembro/johny (3-6/23), 1C pembro (7/23), fluoro gastrograffin study (7/23) w/ c/f rectovaginal fistula. Now s/p lsc palliative end colostomy     # Abdominal pain  # End colostomy  # Rectovaginal fistula  - S/p laparoscopy colostomy with robles pouch placement  - CT with large stool burden, possible ileus versus ostomy ischemia  - Admit for serial abdominal exams  - AM CBC, BMP  - FEN: Regular diet - not currently nauseous  - Pain: Sched tylenol, ibuprofen. PRN oxy, IV dilaudid  - Heme: continue home Apixaban    - GI: Bowel regimen, antiemetics PRN  - ID: NI     #Vesicovaginal fistula  - Patient had right PNT placement per IR as an  outpatient due to hydronephrosis strictures.     # SSC of the cervix  Palliative care has been consulted for recurrent cervical cancer.     # Hypothyroidism   Continue levothyroxine     Diet: Regular   PPX: SCDs, IS. PTA apixaban  Lines/Drains: PIV, R PNT       Patient discussed with Dr. Cash and Dr. Castano.    Svetlana Noguera MD  Ob/Gyn Resident, PGY-3  08/04/2023 8:22 PM     pt discussed with resident.  See separate note from today for more details.    Ivy Castano MD  Gynecologic Oncology  [facility]

## 2023-08-04 NOTE — MEDICATION SCRIBE - ADMISSION MEDICATION HISTORY
Medication Scribe Admission Medication History    Admission medication history is complete. The information provided in this note is only as accurate as the sources available at the time of the update.    Medication reconciliation/reorder completed by provider prior to medication history? No    Information Source(s): Patient via in-person    Pertinent Information: None    Changes made to PTA medication list:  Added: None  Deleted: Acetaminophen 500  mg, Bisacodyl 5 mg, Phenazopyridine 100 mg  Changed: None    Medication Affordability:  Not including over the counter (OTC) medications, was there a time in the past 3 months when you did not take your medications as prescribed because of cost?: No    Allergies reviewed with patient and updates made in EHR: yes    Medication History Completed By: Dawna Tilley 8/4/2023 6:15 PM    Prior to Admission medications    Medication Sig Last Dose Taking? Auth Provider Long Term End Date   apixaban ANTICOAGULANT (ELIQUIS) 2.5 MG tablet Take 1 tablet (2.5 mg) by mouth 2 times daily for 26 days 8/4/2023 at am Yes Ivy Dent MD  8/29/23   Ascorbic Acid (VITAMIN C PO) Take 500 mg by mouth daily 8/4/2023 at am Yes Reported, Patient     celecoxib (CELEBREX) 100 MG capsule Take 1 capsule (100 mg) by mouth 2 times daily . Stop ibuprofen. 8/4/2023 at am Yes Nir May MD Yes    diazepam (VALIUM) 2 MG tablet Take 1 tablet (2 mg) by mouth 2 times daily as needed for anxiety AVOID if you are taking hydromorphone/dilaudid 8/4/2023 at am Yes Missy Saravia APRN CNP     HYDROmorphone (DILAUDID) 2 MG tablet Take 1 tablet (2 mg) by mouth every 6 hours as needed for severe pain 8/3/2023 at unknown Yes Missy Saravia APRN CNP No    levothyroxine (SYNTHROID/LEVOTHROID) 200 MCG tablet Take 1 tablet (200 mcg) by mouth daily 8/4/2023 at am Yes Mounika Ritchie APRN CNP Yes    magnesium oxide (MAG-OX) 400 MG tablet Take 1 tablet (400 mg) by mouth daily 8/4/2023 at  am Yes Mounika Ritchie APRN CNP     oxybutynin ER (DITROPAN XL) 5 MG 24 hr tablet Take 1 tablet (5 mg) by mouth daily 8/4/2023 at am Yes Mounika Ritchie APRN CNP     polyethylene glycol (MIRALAX) 17 GM/Dose powder Take 17 g by mouth daily as needed for constipation Past Week at unknown Yes Missy Saravia APRN CNP     tamsulosin (FLOMAX) 0.4 MG capsule Take 1 capsule (0.4 mg) by mouth daily 8/4/2023 at am Yes Suzy Brambila MD     vitamin B-12 (CYANOCOBALAMIN) 500 MCG tablet Take 500 mcg by mouth daily 8/4/2023 at am Yes Reported, Patient

## 2023-08-04 NOTE — ED TRIAGE NOTES
Patient presents to triage for evaluation of fistula. Patient had colostomy placed d/t fistula on Monday and continues to have feces coming out of her vagina. Patient also having pain.      Triage Assessment       Row Name 08/04/23 [id number]       Triage Assessment (Adult)    Airway WDL WDL       Respiratory WDL    Respiratory WDL WDL       Skin Circulation/Temperature WDL    Skin Circulation/Temperature WDL WDL       Cardiac WDL    Cardiac WDL WDL       Peripheral/Neurovascular WDL    Peripheral Neurovascular WDL WDL       Cognitive/Neuro/Behavioral WDL    Cognitive/Neuro/Behavioral WDL WDL       [place] Coma Scale    Best Eye Response 4-->(E4) spontaneous    Best Motor Response 6-->(M6) obeys commands    Best Verbal Response 5-->(V5) oriented    [place] Coma Scale Score 15

## 2023-08-05 LAB
ANION GAP SERPL CALCULATED.3IONS-SCNC: 11 MMOL/L (ref 7–15)
BUN SERPL-MCNC: 11.6 MG/DL (ref 6–20)
CALCIUM SERPL-MCNC: 9.2 MG/DL (ref 8.6–10)
CHLORIDE SERPL-SCNC: 103 MMOL/L (ref 98–107)
CREAT SERPL-MCNC: 0.5 MG/DL (ref 0.51–0.95)
DEPRECATED HCO3 PLAS-SCNC: 25 MMOL/L (ref 22–29)
ERYTHROCYTE [DISTWIDTH] IN BLOOD BY AUTOMATED COUNT: 17.1 % (ref 10–15)
GFR SERPL CREATININE-BSD FRML MDRD: >90 ML/MIN/1.73M2
GLUCOSE SERPL-MCNC: 114 MG/DL (ref 70–99)
HCT VFR BLD AUTO: 33.4 % (ref 35–47)
HGB BLD-MCNC: 9.9 G/DL (ref 11.7–15.7)
MCH RBC QN AUTO: 26.8 PG (ref 26.5–33)
MCHC RBC AUTO-ENTMCNC: 29.6 G/DL (ref 31.5–36.5)
MCV RBC AUTO: 91 FL (ref 78–100)
PLATELET # BLD AUTO: 246 10E3/UL (ref 150–450)
POTASSIUM SERPL-SCNC: 3.7 MMOL/L (ref 3.4–5.3)
RBC # BLD AUTO: 3.69 10E6/UL (ref 3.8–5.2)
SODIUM SERPL-SCNC: 139 MMOL/L (ref 136–145)
WBC # BLD AUTO: 5.9 10E3/UL (ref 4–11)

## 2023-08-05 PROCEDURE — 36415 COLL VENOUS BLD VENIPUNCTURE: CPT

## 2023-08-05 PROCEDURE — 82435 ASSAY OF BLOOD CHLORIDE: CPT

## 2023-08-05 PROCEDURE — 250N000013 HC RX MED GY IP 250 OP 250 PS 637

## 2023-08-05 PROCEDURE — 85027 COMPLETE CBC AUTOMATED: CPT

## 2023-08-05 PROCEDURE — 99222 1ST HOSP IP/OBS MODERATE 55: CPT | Mod: GC | Performed by: OBSTETRICS & GYNECOLOGY

## 2023-08-05 PROCEDURE — 250N000013 HC RX MED GY IP 250 OP 250 PS 637: Performed by: STUDENT IN AN ORGANIZED HEALTH CARE EDUCATION/TRAINING PROGRAM

## 2023-08-05 PROCEDURE — 250N000011 HC RX IP 250 OP 636: Mod: JZ

## 2023-08-05 PROCEDURE — 120N000002 HC R&B MED SURG/OB UMMC

## 2023-08-05 RX ORDER — CALCIUM CARBONATE 500 MG/1
500 TABLET, CHEWABLE ORAL DAILY PRN
Status: DISCONTINUED | OUTPATIENT
Start: 2023-08-05 | End: 2023-08-09 | Stop reason: HOSPADM

## 2023-08-05 RX ORDER — SIMETHICONE 80 MG
80 TABLET,CHEWABLE ORAL 4 TIMES DAILY
Status: DISCONTINUED | OUTPATIENT
Start: 2023-08-05 | End: 2023-08-09 | Stop reason: HOSPADM

## 2023-08-05 RX ORDER — AMOXICILLIN 250 MG
2 CAPSULE ORAL 2 TIMES DAILY
Status: DISCONTINUED | OUTPATIENT
Start: 2023-08-05 | End: 2023-08-09 | Stop reason: HOSPADM

## 2023-08-05 RX ORDER — POLYETHYLENE GLYCOL 3350 17 G/17G
17 POWDER, FOR SOLUTION ORAL 2 TIMES DAILY
Status: DISCONTINUED | OUTPATIENT
Start: 2023-08-05 | End: 2023-08-09 | Stop reason: HOSPADM

## 2023-08-05 RX ORDER — AMOXICILLIN 250 MG
1 CAPSULE ORAL 2 TIMES DAILY
Status: DISCONTINUED | OUTPATIENT
Start: 2023-08-05 | End: 2023-08-09 | Stop reason: HOSPADM

## 2023-08-05 RX ADMIN — CALCIUM CARBONATE (ANTACID) CHEW TAB 500 MG 500 MG: 500 CHEW TAB at 23:27

## 2023-08-05 RX ADMIN — HYDROMORPHONE HYDROCHLORIDE 0.3 MG: 0.2 INJECTION, SOLUTION INTRAMUSCULAR; INTRAVENOUS; SUBCUTANEOUS at 03:20

## 2023-08-05 RX ADMIN — ONDANSETRON 4 MG: 4 TABLET, ORALLY DISINTEGRATING ORAL at 13:45

## 2023-08-05 RX ADMIN — SIMETHICONE 80 MG: 80 TABLET, CHEWABLE ORAL at 08:18

## 2023-08-05 RX ADMIN — OXYBUTYNIN CHLORIDE 5 MG: 5 TABLET, EXTENDED RELEASE ORAL at 08:18

## 2023-08-05 RX ADMIN — APIXABAN 2.5 MG: 2.5 TABLET, FILM COATED ORAL at 08:18

## 2023-08-05 RX ADMIN — LEVOTHYROXINE SODIUM 200 MCG: 200 TABLET ORAL at 08:18

## 2023-08-05 RX ADMIN — ACETAMINOPHEN 650 MG: 325 TABLET, FILM COATED ORAL at 17:44

## 2023-08-05 RX ADMIN — ACETAMINOPHEN 650 MG: 325 TABLET, FILM COATED ORAL at 03:19

## 2023-08-05 RX ADMIN — OXYCODONE HYDROCHLORIDE 5 MG: 5 TABLET ORAL at 05:36

## 2023-08-05 RX ADMIN — SIMETHICONE 80 MG: 80 TABLET, CHEWABLE ORAL at 16:29

## 2023-08-05 RX ADMIN — MAGNESIUM OXIDE TAB 400 MG (241.3 MG ELEMENTAL MG) 400 MG: 400 (241.3 MG) TAB at 08:18

## 2023-08-05 RX ADMIN — OXYCODONE HYDROCHLORIDE 5 MG: 5 TABLET ORAL at 17:45

## 2023-08-05 RX ADMIN — ONDANSETRON 4 MG: 2 INJECTION INTRAMUSCULAR; INTRAVENOUS at 03:19

## 2023-08-05 RX ADMIN — HYDROMORPHONE HYDROCHLORIDE 0.2 MG: 0.2 INJECTION, SOLUTION INTRAMUSCULAR; INTRAVENOUS; SUBCUTANEOUS at 00:13

## 2023-08-05 RX ADMIN — APIXABAN 2.5 MG: 2.5 TABLET, FILM COATED ORAL at 20:10

## 2023-08-05 RX ADMIN — POLYETHYLENE GLYCOL 3350 17 G: 17 POWDER, FOR SOLUTION ORAL at 20:10

## 2023-08-05 RX ADMIN — SENNOSIDES AND DOCUSATE SODIUM 2 TABLET: 50; 8.6 TABLET ORAL at 20:10

## 2023-08-05 RX ADMIN — SIMETHICONE 80 MG: 80 TABLET, CHEWABLE ORAL at 11:46

## 2023-08-05 RX ADMIN — SENNOSIDES AND DOCUSATE SODIUM 2 TABLET: 50; 8.6 TABLET ORAL at 08:17

## 2023-08-05 RX ADMIN — OXYCODONE HYDROCHLORIDE 5 MG: 5 TABLET ORAL at 11:46

## 2023-08-05 RX ADMIN — POLYETHYLENE GLYCOL 3350 17 G: 17 POWDER, FOR SOLUTION ORAL at 08:18

## 2023-08-05 RX ADMIN — HYDROMORPHONE HYDROCHLORIDE 0.3 MG: 0.2 INJECTION, SOLUTION INTRAMUSCULAR; INTRAVENOUS; SUBCUTANEOUS at 06:46

## 2023-08-05 RX ADMIN — SIMETHICONE 80 MG: 80 TABLET, CHEWABLE ORAL at 20:10

## 2023-08-05 ASSESSMENT — ACTIVITIES OF DAILY LIVING (ADL)
ADLS_ACUITY_SCORE: 23
DOING_ERRANDS_INDEPENDENTLY_DIFFICULTY: NO
CHANGE_IN_FUNCTIONAL_STATUS_SINCE_ONSET_OF_CURRENT_ILLNESS/INJURY: NO
NUMBER_OF_TIMES_PATIENT_HAS_FALLEN_WITHIN_LAST_SIX_MONTHS: 1
ADLS_ACUITY_SCORE: 38
WEAR_GLASSES_OR_BLIND: NO
ADLS_ACUITY_SCORE: 23
ADLS_ACUITY_SCORE: 40
CONCENTRATING,_REMEMBERING_OR_MAKING_DECISIONS_DIFFICULTY: NO
ADLS_ACUITY_SCORE: 23
ADLS_ACUITY_SCORE: 40
ADLS_ACUITY_SCORE: 40
DIFFICULTY_EATING/SWALLOWING: NO
ADLS_ACUITY_SCORE: 23
TOILETING_ISSUES: NO
WALKING_OR_CLIMBING_STAIRS_DIFFICULTY: NO
DEPENDENT_IADLS:: INDEPENDENT;COOKING
FALL_HISTORY_WITHIN_LAST_SIX_MONTHS: YES
EQUIPMENT_CURRENTLY_USED_AT_HOME: WALKER, ROLLING
ADLS_ACUITY_SCORE: 27
ADLS_ACUITY_SCORE: 23
ADLS_ACUITY_SCORE: 38
ADLS_ACUITY_SCORE: 38
DRESSING/BATHING_DIFFICULTY: NO

## 2023-08-05 NOTE — PLAN OF CARE
BP 99/62 (BP Location: Left arm)   Pulse 70   Temp 99  F (37.2  C) (Oral)   Resp 16   SpO2 93%     Care from: 0700 - 1500    VS & Pain: VSS on RA. Soft BP, provider aware & okay with it. Rates pain at 7/10, managed with PO oxycodone- partially effective   Neuro: AxO x4  Respiratory: denies shortness of breath   Cardiac: no acute distress noted   Peripheral neurovascular: wdl, denies numbness & tingling   GI/: + bowel sounds. Colostomy in place- no output. Pass present. R. PNT  in place with minimal output, incontinent of urine   Nutrition: regular. Poor appetite with marginal fluid intake. C/o nausea, zofran x1 given-- effective  Skin: no new issues noted  Musculoskeletal: generalized weakness   Lines: L. PIV- SL   Activity: SBA with walker       Goal Outcome Evaluation:  Plan of Care Reviewed With: patient  Overall Patient Progress: improving  Outcome Evaluation: Soft BP, provider aware & okay with it. Rates pain at 7/10, managed with PO oxycodone- partially effective

## 2023-08-05 NOTE — PROGRESS NOTES
Gynecology Oncology Progress Note  August 5, 2023    Ms. 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 Pain is moderately well controlled. Ambulating without dizziness. Tolerating PO without nausea or vomiting. Leaking urine and voiding into PNT tube. Denies fevers, chills, chest pain, SOB. No other questions or concerns .    Objective:   /72   Pulse 70   Temp 97.9  F (36.6  C) (Oral)   Resp 18   SpO2 100%     General: NAD, appears comfortable in bed  CV: RRR. Well-perfused  Resp: CTAB. Breathing comfortably on room air  Abdomen: Soft, minimally tender, minimally distended; LLQ ostomy bag with dark stool and air present; ostomy site dark red  Incision: c/d/i, no erythema or drainage  Extremities: Warm, well-perfused, nontender, no edema, no SCDs         New labs/imaging-  CT A/P: IMPRESSION:   1. Postoperative changes of left lower quadrant end colostomy with  likely postsurgical residual free air and suspect small hemoperitoneum  in the pelvis.  2. Again visualized rectovaginal and vesicovaginal fistulas with  ill-defined soft tissue thickening, fluid and air collection in the  pelvis. Overall appearance is stable from 6/23/2023.  3. Urothelial enhancement in the right collecting system, possibly  retrograde urinary tract infection.  4. Mild thickening of the distal colon up to the colostomy, with fat  stranding is likely postsurgical edema.  5. Overall stable appearance of retroperitoneal lymphadenopathy with  mass effect versus tumoral invasion of the left renal vein causing  severe stenosis at the IVC confluence. There is encasement of the  superior mesenteric artery without significant central  stenosis.    Assessment: Suni Zuluaga is a 38 year old female with known history of advanced squamous cell carcinoma of the cervix complicated by rectovaginal fistula who is POD#5 s/p palliative laparoscopic colostomy, cystoscopy and HD#2 for abdominal pain, concern for ileus versus ostomy ischemia. Pain unchanged overnight and no further stool output.     Plan:    Dz: stage IIIC1 cervical SCC, s/p chemo RT (9-10/20), T&R brachy (11/20). Recurrence 10/22, s/p 7C carbo/taxol/johny/pembro (10/22-3/23), 5C pembro/johny (3-6/23), 1C pembro (7/23), fluoro gastrograffin study (7/23) w/ c/f rectovaginal fistula. Now s/p lsc palliative end colostomy     # Abdominal pain secondary to post-operative ileus  -CT without concerning findings with the exception of non-specific enteritis/colitis possibly secondary to RT changes.  -Plan aggressive bowel regimen today    - FEN: Regular diet -  - Pain: Sched tylenol, ibuprofen. PRN oxy, IV dilaudid  - Heme: continue home Apixaban    - GI: Aggressive bowel regimen, antiemetics PRN  - ID: NI     #Vesicovaginal fistula  - Right PNT in place given hydronephrosis on the right and pt declined left PNT     # Hypothyroidism   Continue levothyroxine     Diet: Regular   PPX: SCDs, IS. PTA apixaban  Lines/Drains: PIV, R PNT    Svetlana Noguera MD  Ob/Gyn Resident, PGY-3  08/04/2023 10:32 PM   Gyn Onc Pager: [phone]      Hebert BAY MD personally examined and evaluated this patient on 08/05/23.  I discussed the patient with the resident and care team, and agree with the assessment and plan of care as documented in the residents note above.    I personally reviewed vital signs, laboratory values and imaging results.    Pt readmitted with increasing abdominal pain.  CT with non-specific enteritis and no other post-operative issues noted.  Likely ileus/constipation and thus will initiate aggressive bowel regimen.   Consider palliative consult again although pt has declined  previously.    Ivy Castano MD  Gynecologic Oncology  [facility]

## 2023-08-05 NOTE — DISCHARGE SUMMARY
Gynecologic Oncology Discharge Summary    Suni Zuluaga  [id number]    Admit Date: 8/4/2023  Discharge Date: 8/8/2023  Admitting Provider: Ivy Castano MD  Discharge Provider: Suzy Brambila MD    Admission Dx:   - Increased abdominal pain  - Decreased colostomy output  - Recurrent metastatic cervical cancer  - Rectovaginal fistula  - Vesicovaginal fistula  - Severe malnutrition   - Tobacco use   - Hypokalemia  - Hypocalcemia  - Hypomagnesemia  - Hypothyroidism    Discharge Dx:  - s/p right internal iliac artery coil embolization  - s/p mass transfusion protocol for hemorrhagic shock  - Recurrent metastatic cervical cancer  - Rectovaginal fistula, s/p end colostomy   - Vesicovaginal fistula  - Severe malnutrition   - Tobacco use  - Hypothyroidism    Patient Active Problem List   Diagnosis    Malignant neoplasm of overlapping sites of cervix (H)    Cervical cancer (H)    Other hydronephrosis    Stricture or kinking of ureter    Rectovaginal fistula       Procedures:   - Emergent IR embolization of R distal internal iliac pseudo-aneurysm     Prior to Admission Medications:  Medications Prior to Admission   Medication Sig Dispense Refill Last Dose    Ascorbic Acid (VITAMIN C PO) Take 500 mg by mouth daily   8/4/2023 at am    diazepam (VALIUM) 2 MG tablet Take 1 tablet (2 mg) by mouth 2 times daily as needed for anxiety AVOID if you are taking hydromorphone/dilaudid 12 tablet 0 8/4/2023 at am    HYDROmorphone (DILAUDID) 2 MG tablet Take 1 tablet (2 mg) by mouth every 6 hours as needed for severe pain 20 tablet 0 8/3/2023 at unknown    levothyroxine (SYNTHROID/LEVOTHROID) 200 MCG tablet Take 1 tablet (200 mcg) by mouth daily 30 tablet 0 8/4/2023 at am    magnesium oxide (MAG-OX) 400 MG tablet Take 1 tablet (400 mg) by mouth daily 30 tablet 1 8/4/2023 at am    oxybutynin ER (DITROPAN XL) 5 MG 24 hr tablet Take 1 tablet (5 mg) by mouth daily 30 tablet 0 8/4/2023 at am    polyethylene glycol (MIRALAX) 17 GM/Dose  powder Take 17 g by mouth daily as needed for constipation 510 g 0 Past Week at unknown    tamsulosin (FLOMAX) 0.4 MG capsule Take 1 capsule (0.4 mg) by mouth daily 30 capsule 0 8/4/2023 at am    vitamin B-12 (CYANOCOBALAMIN) 500 MCG tablet Take 500 mcg by mouth daily   8/4/2023 at am    [DISCONTINUED] apixaban ANTICOAGULANT (ELIQUIS) 2.5 MG tablet Take 1 tablet (2.5 mg) by mouth 2 times daily for 26 days 52 tablet 0 8/4/2023 at am    [DISCONTINUED] celecoxib (CELEBREX) 100 MG capsule Take 1 capsule (100 mg) by mouth 2 times daily . Stop ibuprofen. 60 capsule 1 8/4/2023 at am       Discharge Medications:     Review of your medicines        CONTINUE these medicines which have NOT CHANGED        Dose / Directions   diazepam 2 MG tablet  Commonly known as: VALIUM  Used for: Malignant neoplasm of overlapping sites of cervix (H), Vertigo, Hypothyroidism due to medication      Dose: 2 mg  Take 1 tablet (2 mg) by mouth 2 times daily as needed for anxiety AVOID if you are taking hydromorphone/dilaudid  Quantity: 12 tablet  Refills: 0     HYDROmorphone 2 MG tablet  Commonly known as: DILAUDID  Used for: Malignant neoplasm of overlapping sites of cervix (H)      Dose: 2 mg  Take 1 tablet (2 mg) by mouth every 6 hours as needed for severe pain  Quantity: 20 tablet  Refills: 0     levothyroxine 200 MCG tablet  Commonly known as: SYNTHROID/LEVOTHROID  Used for: Malignant neoplasm of overlapping sites of cervix (H), Encounter for antineoplastic immunotherapy, Hypothyroidism due to medication      Dose: 200 mcg  Take 1 tablet (200 mcg) by mouth daily  Quantity: 30 tablet  Refills: 0     magnesium oxide 400 MG tablet  Commonly known as: MAG-OX  Used for: Hypomagnesemia      Dose: 400 mg  Take 1 tablet (400 mg) by mouth daily  Quantity: 30 tablet  Refills: 1     oxyBUTYnin ER 5 MG 24 hr tablet  Commonly known as: DITROPAN XL  Used for: Dysuria, Hydroureter on right      Dose: 5 mg  Take 1 tablet (5 mg) by mouth daily  Quantity: 30  tablet  Refills: 0     polyethylene glycol 17 GM/Dose powder  Commonly known as: MIRALAX  Used for: Malignant neoplasm of overlapping sites of cervix (H)      Dose: 17 g  Take 17 g by mouth daily as needed for constipation  Quantity: 510 g  Refills: 0     tamsulosin 0.4 MG capsule  Commonly known as: FLOMAX  Used for: Dysuria, Malignant neoplasm of overlapping sites of cervix (H)      Dose: 0.4 mg  Take 1 capsule (0.4 mg) by mouth daily  Quantity: 30 capsule  Refills: 0     vitamin B-12 500 MCG tablet  Commonly known as: CYANOCOBALAMIN      Dose: 500 mcg  Take 500 mcg by mouth daily  Refills: 0     VITAMIN C PO      Dose: 500 mg  Take 500 mg by mouth daily  Refills: 0            STOP taking      apixaban ANTICOAGULANT 2.5 MG tablet  Commonly known as: ELIQUIS        celecoxib 100 MG capsule  Commonly known as: celeBREX                 Consultations:  - Surgical ICU  - IR  - WOC    Brief History of Illness:  From H&P: Suni Zuluaga is a 38 year old  female with a past medical history of cervical cancer complicated by rectovaginal and vesicovaginal fistula s/p palliative end colostomy and hypothyroidism who presented to the emergency room with worsening abdominal pain and increased stool from her vagina. During her hospital stay, she had an overnight episode of vaginal bleeding and then fell and hit her head. She proceeded to have significant hemorrhage, approximately 6-7L and received approximately 19 units of pRBCs, FFP, cryo. Patient was intubated and transferred to the SICU. CTA A/P showed evidence of tumor invading into the right internal iliac artery and she underwent emergent embolization. She has been subsequently extubated and remained stable at time of discharge. She has been doing well post procedure.     Hospital Course:  Dz:   - stage IIIC1 cervical SCC, s/p chemo RT (-10/20), T&R brachy (). Recurrence 10/22, s/p 7C carbo/taxol/johny/pembro (10/22-3/23), 5C pembro/johny (3-), 1C pembro  (7/23), fluoro gastrograffin study (7/23) w/ c/f rectovaginal fistula. Now s/p lsc palliative end colostomy.   FEN:   - She progressed up to a regular diet quickly after extubation without any nausea or vomiting. By discharge, she was tolerating a regular diet and able to maintain her hydration without IVF supplementation.  Pain:   - Her pain was initially controlled with ibuprofen, tylenol, prn oxycodone and prn dilaudid. Her pain was minimal and well managed with PO medications at time of discharge.   CV:   - She has no history of CV issues. Post embolization and transfusion, vitals have remained stable.   PULM:   - She has no history of pulmonary issues. Briefly intubated overnight while in ICU, but was weaned the following day. By discharge, her O2 sats were greater than 94% on RA.  She was encouraged to use her bedside IS while in house.  She had no acute pulmonary issues while in house.  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 Had emergent embolization. H/H stable at time of discharge.   GI:   - [facility] provided further ostomy education and ostomy pouch was changed prior to discharge. End ostomy with stool and air. At the time of discharge, she was tolerating a regular diet without nausea and vomiting.   :   - Patient has a PNT on the right. Throughout admission she was voiding without difficulty. Continues to experience vaginal leakage in the setting of vesicovaginal fistula.  ID:   - Patient had an isolated temperature of 100.5 on 8/8 but was asymptomatic at the time and has since remained afebrile. WBC has remained within normal limits. Vitals were stable at time of discharge.   ENDO:   - History of hypothyroidism on levothyroxine.   PSYCH/NEURO:   - No issues.  PPX:    - She was given SCDs, IS during her hospital course. PTA apixaban was held and will  not be restarted at discharge. She tolerated these prophylactic interventions without incident. They were discontinued at the time of her discharge.    Discharge Instructions and Follow up:  Ms. Suni Zuluaga was discharged from the hospital with follow up in clinic with Dr. Brambila to discuss further treatment options and future goals of care.     Discharge Diet: Regular  Discharge Activity: activity as tolerated  Discharge Follow up: clinic follow up within 1 week of discharge.     Discharge Disposition:  Discharged to home    Discharge Staff: MD Shannan Mcneal MD  Obstetrics & Gynecology, PGY-1  08/09/2023 11:16 AM

## 2023-08-05 NOTE — PROGRESS NOTES
Brief Progress Note    Went to assess pain management. Patient napping on arrival. States pain is still present but improved after oxycodone 5 mg. Pain now 4/10. Starts in LLQ and radiates to RLQ. Has not eaten this morning due to decreased appetite and mild nausea.    /76 (BP Location: Left arm)   Pulse 76   Temp 99  F (37.2  C) (Oral)   Resp 16   SpO2 93%      Gen: patient sleepy but able to be aroused, responds appropriately  Cardio: well perfused  Pulm: Non labored breathing on RA  Abdomen: Colostomy bag present, securely attached, stool and gas present. Abdomen soft, non distended, tender to palpation of left lower quadrant, mildly tender to RLQ.    Continue current pain management regimen. Will offer anti-emetics. Encourage PO intake. Will continue to monitor.     Zoya Romero MD  Ob/Gyn Resident, PGY-2  8/5/2023 1:27 PM

## 2023-08-05 NOTE — PROVIDER NOTIFICATION
[facility] - [id number] [ Msg Id [id number] ]    5B- 5202-01. ACE BURNETTE  FYI-- pt's BP 99/62. Can you please place vital signs parameters? Thanks.  -Mariella, #[id number]

## 2023-08-05 NOTE — PROGRESS NOTES
Observation goals        -diagnostic tests and consults   -vital signs normal: Yes  -tolerating oral intake to maintain hydration: No, minimal oral intake this shift  -adequate pain control on oral analgesics: No, pain is minimally controlled with oral meds.  .

## 2023-08-05 NOTE — PLAN OF CARE
Observation: Pt admitted from ED at 0320 today d/t pain, and BM discharge through vagina d/t fistula.   A&Ox4. VSS on RA. Intermittent nausea, no emesis. Pt has new colostomy placed 7/31, o/p & passing gas. PNT with adequate urine o/p. Pain somewhat managed with IV dilaudid, oxycodone & tylenol. Cont POC.

## 2023-08-05 NOTE — PROGRESS NOTES
/76 (BP Location: Left arm)   Pulse 76   Temp 99  F (37.2  C) (Oral)   Resp 16   SpO2 93%     Observation Goals:   -vital signs normal or at patient baseline: met   -tolerating oral intake to maintain hydration: not met, minimal intake  -adequate pain control on oral analgesics: not met  Nurse to notify provider when observation goals have been met and patient is ready for discharge.

## 2023-08-05 NOTE — PROGRESS NOTES
"Care Management Initial Consult    General Information  Assessment completed with: Patient, VM-chart review,    Type of CM/SW Visit: Initial Assessment    Primary Care Provider verified and updated as needed: Yes   Readmission within the last 30 days: previous discharge plan unsuccessful   Return Category: Progression of disease  Reason for Consult: discharge planning  Advance Care Planning: Advance Care Planning Reviewed: no concerns identified        Communication Assessment  Patient's communication style: spoken language (English or Bilingual)        Cognitive  Cognitive/Neuro/Behavioral: WDL                      Living Environment:   People in home: alone     Current living Arrangements: house      Able to return to prior arrangements: yes     Family/Social Support:  Care provided by: self  Provides care for: no one  Marital Status:   Other (specify) (\"sometimes I do, sometimes I don't\")          Description of Support System: Uninvolved    Support Assessment: Lacks necessary supervision and assistance, Lacks adequate physical care, Lacks adequate emotional support    Current Resources:   Patient receiving home care services: No     Community Resources: None  Equipment currently used at home:    Supplies currently used at home: None    Employment/Financial:  Employment Status: unemployed        Financial Concerns: other (see comments) (MADAY)      Does the patient's insurance plan have a 3 day qualifying hospital stay waiver?  No    Lifestyle & Psychosocial Needs:  Social Determinants of Health     Tobacco Use: High Risk (8/4/2023)    Patient History     Smoking Tobacco Use: Every Day     Smokeless Tobacco Use: Never     Passive Exposure: Not on file   Alcohol Use: Not At Risk (8/2/2023)    AUDIT-C     Frequency of Alcohol Consumption: Never     Average Number of Drinks: Not on file     Frequency of Binge Drinking: Never   Financial Resource Strain: High Risk (8/2/2023)    Overall Financial Resource Strain " (CARDIA)     Difficulty of Paying Living Expenses: Hard   Food Insecurity: Food Insecurity Present (8/2/2023)    Hunger Vital Sign     Worried About Running Out of Food in the Last Year: Sometimes true     Ran Out of Food in the Last Year: Sometimes true   Transportation Needs: No Transportation Needs (8/2/2023)    PRAPARE - Transportation     Lack of Transportation (Medical): No     Lack of Transportation (Non-Medical): No   Physical Activity: Inactive (8/2/2023)    Exercise Vital Sign     Days of Exercise per Week: 0 days     Minutes of Exercise per Session: 0 min   Stress: Stress Concern Present (8/2/2023)    [employer] - Occupational Stress Questionnaire     Feeling of Stress : To some extent   Social Connections: Socially Isolated (8/2/2023)    Social Connection and Isolation Panel [NHANES]     Frequency of Communication with Friends and Family: Never     Frequency of Social Gatherings with Friends and Family: Never     Attends Sabianist Services: Never     Active Member of Clubs or Organizations: No     Attends Club or Organization Meetings: Never     Marital Status:    Intimate Partner Violence: Not At Risk (8/2/2023)    Humiliation, Afraid, Rape, and Kick questionnaire     Fear of Current or Ex-Partner: No     Emotionally Abused: No     Physically Abused: No     Sexually Abused: No   Depression: At risk (7/31/2023)    PHQ-2     PHQ-2 Score: 3   Housing Stability: High Risk (8/2/2023)    Housing Stability Vital Sign     Unable to Pay for Housing in the Last Year: Yes     Number of Places Lived in the Last Year: 1     Unstable Housing in the Last Year: No       Functional Status:  Prior to admission patient needed assistance:   Dependent ADLs:: Independent  Dependent IADLs:: Independent, Cooking     Mental Health Status:  Mental Health Status: No Current Concerns       Chemical Dependency Status:  Chemical Dependency Status: No Current Concerns          "  Values/Beliefs:  Spiritual, Cultural Beliefs, Religion Practices, Values that affect care: no             Additional Information:  Background from H&P note: Suni Zuluaga is a 38 year old  female with a past medical history of cervical cancer and hypothyroidism who presented to the emergency room with worsening abdominal pain and increased stool from her vagina. Patient was recently hospitalized (23-8/3/23) for laparoscopic colostomy for rectovaginal and vesicovaginal fistula in the setting of her cervical cancer. At the time of her discharge (8/3), she had met post op goals and there was stool and air in her end colostomy. Today, she complains of persistent fecal and fluid discharge from her vagina along with abdominal pain that has gotten worse since this morning. She denies nausea or vomiting. She has not yet changed her ostomy bag since discharge. In terms of the amount of fecal mater from her vagina, she reports that she has had \"a lot\" but is unable to quantify further. She denies chest pain, shortness of breath, dizziness or lightheadedness.    Patient's status reviewed by chart. CMA is needed due to pt needs home care skilled RN to support new ostomy bag. RNCC met with the patient at bedside to complete care management assessment. RNCC introduced self, the role in care, and the nature of the care management assessment. Pt was reluctant to answer writer. She currently lives in a house alone in [place]. Her PCP and health insurance in chart is correct. When asked about her support system from family/friends, she stated \"sometimes do and sometimes don't\". She appeared doesn't want to engaging and answering the questions. She currently doesn't know who can pick her up at discharge, \"will figure out something.\" She denied current financial or substance use or depression.    Pt is aware that [facility] will provide skilled RN to support her new ostomy when she goes home.     Pt needs new " HC order at discharge. Discharge order need to fax to [facility] at discharge and call them to let them know about discharge.    Continue to monitor.    [facility]  Kathe Florentino RN  P: [phone]  F: [phone]      Sonia Valladares RN  7C RN Care Coordinator  Phone: [phone]  Pager: [phone]  [address] [place] ([phone]) Saturday & Sunday; ([phone]) [facility] Recognized Holidays   Units: 5A, 5B & 5C  Pager: [phone]  Units: 6B, 6C & 6D    Pager: [phone]  Units: 7A, 7B, 7C & 7D    Pager: [phone]  Units: 6A & ICU   Pager: [phone]  Units: 5 Ortho, 5MS & WB ED Pager: [phone]  Units: 6MS, 8A & 10 ICU  Pager [phone]

## 2023-08-05 NOTE — CONSULTS
----- Message from John Pan sent at 7/23/2018 10:05 AM CDT -----  Contact: Birtney ingram/Dr Bhargav Lobo [facility]    Britney is calling for cardiac clearance and to see if pt will be able to get some Extractions and stop talking the Plavix,    please advise  Call Back#[phone]  Thanks       Initial consult completed. See consult notes.    Sonia Valladares RN  7C RN Care Coordinator  Phone: [phone]  Pager: [phone]  [place] & [place] ([phone]) Saturday & Sunday; ([phone]) [facility] Recognized Holidays   Units: 5A, 5B & 5C  Pager: [phone]  Units: 6B, 6C & 6D    Pager: [phone]  Units: 7A, 7B, 7C & 7D    Pager: [phone]  Units: 6A & ICU   Pager: [phone]  Units: 5 Ortho, 5MS & WB ED Pager: [phone]  Units: 6MS, 8A & 10 ICU  Pager [phone]

## 2023-08-06 LAB
ABO/RH(D): NORMAL
ANION GAP SERPL CALCULATED.3IONS-SCNC: 11 MMOL/L (ref 7–15)
ANTIBODY SCREEN: NEGATIVE
BLD PROD TYP BPU: NORMAL
BLOOD COMPONENT TYPE: NORMAL
BUN SERPL-MCNC: 10.8 MG/DL (ref 6–20)
CALCIUM SERPL-MCNC: 9.7 MG/DL (ref 8.6–10)
CHLORIDE SERPL-SCNC: 99 MMOL/L (ref 98–107)
CODING SYSTEM: NORMAL
CREAT SERPL-MCNC: 0.55 MG/DL (ref 0.51–0.95)
CROSSMATCH: NORMAL
CROSSMATCH: NORMAL
DEPRECATED HCO3 PLAS-SCNC: 25 MMOL/L (ref 22–29)
ERYTHROCYTE [DISTWIDTH] IN BLOOD BY AUTOMATED COUNT: 17.2 % (ref 10–15)
GFR SERPL CREATININE-BSD FRML MDRD: >90 ML/MIN/1.73M2
GLUCOSE SERPL-MCNC: 110 MG/DL (ref 70–99)
HCT VFR BLD AUTO: 35.9 % (ref 35–47)
HGB BLD-MCNC: 10.5 G/DL (ref 11.7–15.7)
ISSUE DATE AND TIME: NORMAL
MCH RBC QN AUTO: 26.3 PG (ref 26.5–33)
MCHC RBC AUTO-ENTMCNC: 29.2 G/DL (ref 31.5–36.5)
MCV RBC AUTO: 90 FL (ref 78–100)
PLATELET # BLD AUTO: 269 10E3/UL (ref 150–450)
POTASSIUM SERPL-SCNC: 4 MMOL/L (ref 3.4–5.3)
RBC # BLD AUTO: 4 10E6/UL (ref 3.8–5.2)
SODIUM SERPL-SCNC: 135 MMOL/L (ref 136–145)
SPECIMEN EXPIRATION DATE: NORMAL
UNIT ABO/RH: NORMAL
UNIT NUMBER: NORMAL
UNIT STATUS: NORMAL
UNIT TYPE ISBT: 5100
UNIT TYPE ISBT: 5100
UNIT TYPE ISBT: 6200
UNIT TYPE ISBT: 9500
WBC # BLD AUTO: 6.5 10E3/UL (ref 4–11)

## 2023-08-06 PROCEDURE — 200N000002 HC R&B ICU UMMC

## 2023-08-06 PROCEDURE — 85384 FIBRINOGEN ACTIVITY: CPT | Performed by: PHYSICIAN ASSISTANT

## 2023-08-06 PROCEDURE — 250N000013 HC RX MED GY IP 250 OP 250 PS 637: Performed by: STUDENT IN AN ORGANIZED HEALTH CARE EDUCATION/TRAINING PROGRAM

## 2023-08-06 PROCEDURE — 86923 COMPATIBILITY TEST ELECTRIC: CPT

## 2023-08-06 PROCEDURE — 36415 COLL VENOUS BLD VENIPUNCTURE: CPT

## 2023-08-06 PROCEDURE — 250N000013 HC RX MED GY IP 250 OP 250 PS 637

## 2023-08-06 PROCEDURE — 85610 PROTHROMBIN TIME: CPT | Performed by: PHYSICIAN ASSISTANT

## 2023-08-06 PROCEDURE — 250N000011 HC RX IP 250 OP 636

## 2023-08-06 PROCEDURE — P9037 PLATE PHERES LEUKOREDU IRRAD: HCPCS

## 2023-08-06 PROCEDURE — 85730 THROMBOPLASTIN TIME PARTIAL: CPT | Performed by: PHYSICIAN ASSISTANT

## 2023-08-06 PROCEDURE — 120N000002 HC R&B MED SURG/OB UMMC

## 2023-08-06 PROCEDURE — P9016 RBC LEUKOCYTES REDUCED: HCPCS

## 2023-08-06 PROCEDURE — 86850 RBC ANTIBODY SCREEN: CPT | Performed by: PHYSICIAN ASSISTANT

## 2023-08-06 PROCEDURE — 85027 COMPLETE CBC AUTOMATED: CPT

## 2023-08-06 PROCEDURE — 250N000011 HC RX IP 250 OP 636: Mod: JZ | Performed by: OBSTETRICS & GYNECOLOGY

## 2023-08-06 PROCEDURE — 80048 BASIC METABOLIC PNL TOTAL CA: CPT

## 2023-08-06 PROCEDURE — 85014 HEMATOCRIT: CPT | Performed by: PHYSICIAN ASSISTANT

## 2023-08-06 PROCEDURE — 999N000248 HC STATISTIC IV INSERT WITH US BY RN

## 2023-08-06 PROCEDURE — P9059 PLASMA, FRZ BETWEEN 8-24HOUR: HCPCS

## 2023-08-06 PROCEDURE — 36415 COLL VENOUS BLD VENIPUNCTURE: CPT | Performed by: PHYSICIAN ASSISTANT

## 2023-08-06 PROCEDURE — 99291 CRITICAL CARE FIRST HOUR: CPT | Performed by: PHYSICIAN ASSISTANT

## 2023-08-06 PROCEDURE — 250N000011 HC RX IP 250 OP 636: Mod: JZ

## 2023-08-06 PROCEDURE — 86901 BLOOD TYPING SEROLOGIC RH(D): CPT | Performed by: PHYSICIAN ASSISTANT

## 2023-08-06 RX ORDER — TRANEXAMIC ACID 10 MG/ML
125 INJECTION, SOLUTION INTRAVENOUS CONTINUOUS
Status: DISPENSED | OUTPATIENT
Start: 2023-08-06 | End: 2023-08-07

## 2023-08-06 RX ORDER — SODIUM CHLORIDE, SODIUM LACTATE, POTASSIUM CHLORIDE, CALCIUM CHLORIDE 600; 310; 30; 20 MG/100ML; MG/100ML; MG/100ML; MG/100ML
INJECTION, SOLUTION INTRAVENOUS CONTINUOUS
Status: DISCONTINUED | OUTPATIENT
Start: 2023-08-06 | End: 2023-08-06

## 2023-08-06 RX ORDER — TRANEXAMIC ACID 10 MG/ML
1 INJECTION, SOLUTION INTRAVENOUS ONCE
Status: COMPLETED | OUTPATIENT
Start: 2023-08-06 | End: 2023-08-07

## 2023-08-06 RX ORDER — METHOCARBAMOL 100 MG/ML
500 INJECTION, SOLUTION INTRAMUSCULAR; INTRAVENOUS EVERY 8 HOURS PRN
Status: DISCONTINUED | OUTPATIENT
Start: 2023-08-06 | End: 2023-08-07

## 2023-08-06 RX ORDER — FERRIC SUBSULFATE 0.21 G/G
LIQUID TOPICAL ONCE
Status: COMPLETED | OUTPATIENT
Start: 2023-08-07 | End: 2023-08-07

## 2023-08-06 RX ADMIN — POLYETHYLENE GLYCOL 3350 17 G: 17 POWDER, FOR SOLUTION ORAL at 08:27

## 2023-08-06 RX ADMIN — PROCHLORPERAZINE MALEATE 10 MG: 10 TABLET ORAL at 09:22

## 2023-08-06 RX ADMIN — DEXTROSE AND SODIUM CHLORIDE: 5; 450 INJECTION, SOLUTION INTRAVENOUS at 20:35

## 2023-08-06 RX ADMIN — LEVOTHYROXINE SODIUM 200 MCG: 200 TABLET ORAL at 08:27

## 2023-08-06 RX ADMIN — SIMETHICONE 80 MG: 80 TABLET, CHEWABLE ORAL at 11:48

## 2023-08-06 RX ADMIN — OXYBUTYNIN CHLORIDE 5 MG: 5 TABLET, EXTENDED RELEASE ORAL at 08:27

## 2023-08-06 RX ADMIN — ONDANSETRON 4 MG: 2 INJECTION INTRAMUSCULAR; INTRAVENOUS at 20:43

## 2023-08-06 RX ADMIN — ONDANSETRON 4 MG: 4 TABLET, ORALLY DISINTEGRATING ORAL at 05:49

## 2023-08-06 RX ADMIN — MAGNESIUM OXIDE TAB 400 MG (241.3 MG ELEMENTAL MG) 400 MG: 400 (241.3 MG) TAB at 08:27

## 2023-08-06 RX ADMIN — SENNOSIDES AND DOCUSATE SODIUM 1 TABLET: 50; 8.6 TABLET ORAL at 08:27

## 2023-08-06 RX ADMIN — SIMETHICONE 80 MG: 80 TABLET, CHEWABLE ORAL at 15:47

## 2023-08-06 RX ADMIN — SIMETHICONE 80 MG: 80 TABLET, CHEWABLE ORAL at 05:49

## 2023-08-06 RX ADMIN — PROCHLORPERAZINE EDISYLATE 10 MG: 5 INJECTION INTRAMUSCULAR; INTRAVENOUS at 23:16

## 2023-08-06 RX ADMIN — HYDROMORPHONE HYDROCHLORIDE 0.3 MG: 0.2 INJECTION, SOLUTION INTRAMUSCULAR; INTRAVENOUS; SUBCUTANEOUS at 05:33

## 2023-08-06 RX ADMIN — PROCHLORPERAZINE EDISYLATE 10 MG: 5 INJECTION INTRAMUSCULAR; INTRAVENOUS at 17:01

## 2023-08-06 RX ADMIN — DEXTROSE AND SODIUM CHLORIDE: 5; 450 INJECTION, SOLUTION INTRAVENOUS at 09:25

## 2023-08-06 RX ADMIN — APIXABAN 2.5 MG: 2.5 TABLET, FILM COATED ORAL at 08:27

## 2023-08-06 ASSESSMENT — ACTIVITIES OF DAILY LIVING (ADL)
ADLS_ACUITY_SCORE: 27
ADLS_ACUITY_SCORE: 23
ADLS_ACUITY_SCORE: 27
ADLS_ACUITY_SCORE: 23
ADLS_ACUITY_SCORE: 23

## 2023-08-06 NOTE — PROGRESS NOTES
Brief GYN Oncology Progress Note    Went to evaluate pt after informed by RN about an additional episode of emesis today. Pt resting in bed, appears comfortable. States intermittent nausea present throughout the day. Has had some water but no other PO intake. Compazine has helped her nausea but doesn't completely take it away. She has noticed ongoing increased ostomy output, stool and gas, throughout the day. Denies other new sx. Discussed alternatives available for control of nausea if first line (compazine) not providing adequate relief. Modified diet to clears today and pt already seems to be self-limiting d/t feeling poorly. mIVF added as well. Encouraged by increased ostomy output however ongoing nausea c/f developing ileus. Continue CLD and treat emesis as needed.    Seble Stratton MD  OB/GYN PGY-2  8/6/2023 6:00 PM

## 2023-08-06 NOTE — PROGRESS NOTES
"Brief Progress Note    Went to check in on patient. States her pain is 0/10 currently. Did not eat much today. Says her stomach feels \"off\" and she thinks she's having reflux which makes her not want to eat. Did not ambulate today.     /71 (BP Location: Left arm)   Pulse 65   Temp 98.7  F (37.1  C) (Oral)   Resp 16   SpO2 94%      Discussed trying an antacid, and maybe trying a supplement like Ensure. Patient amenable. Concerned about her low intake. Will continue to monitor. Affect is relatively unengaged which may contribute to poor appetite; may consider social work consult.    Zoya Romero MD  Ob/Gyn Resident, PGY-2  8/5/2023 10:03 PM   "

## 2023-08-06 NOTE — PLAN OF CARE
Pt is A/Ox4, VSS on RA. Up SBA, has home walker. Regular diet with minimal oral intake, emesis x1, PRN zofran and simethicone given. Pain well controlled overnight then 10/10 pain in the morning, received PRN IV dilaudid. Right PNT with low urine output, also incontinent of urine and has discharge, wearing brief. Drops of blood (approx 5mL) in urine collection hat which pt states is new. Colostomy with scant output. Bag changed due to leaking. L PIV SL. Provider notified of emesis, low urine output, and drops of blood.

## 2023-08-06 NOTE — PROGRESS NOTES
Gynecology Oncology Progress Note  August 6, 2023    Ms. 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 Had a little bit of vaginal bleeding overnight. Denies nausea, emesis. Happy to be alive.    Objective:   BP 94/68   Pulse 89   Temp 98.7  F (37.1  C) (Oral)   Resp 15   Wt 53.6 kg (118 lb 2.7 oz)   SpO2 97%   BMI 20.93 kg/m      General: intubated and sedated; appear pale  CV: Well appearing, regular rate  Resp: intubated  Abdomen: Soft, minimally tender, LLQ ostomy bag with liquid stool and minimal air present; ostomy bag well attached  Incision: c/d/i, no erythema or drainage    New labs/imaging-  CT A/P: IMPRESSION (8/4/2023):   1. Postoperative changes of left lower quadrant end colostomy with  likely postsurgical residual free air and suspect small hemoperitoneum  in the pelvis.  2. Again visualized rectovaginal and vesicovaginal fistulas with  ill-defined soft tissue thickening, fluid and air collection in the  pelvis. Overall appearance is stable from 6/23/2023.  3. Urothelial enhancement in the right collecting system, possibly  retrograde urinary tract infection.  4. Mild thickening of the distal colon up to the colostomy, with fat  stranding is likely postsurgical edema.  5. Overall stable appearance of retroperitoneal lymphadenopathy with  mass effect versus tumoral invasion of the  left renal vein causing  severe stenosis at the IVC confluence. There is encasement of the  superior mesenteric artery without significant central stenosis.    Assessment: Suni Zuluaga is a 38 year old female with known history of advanced squamous cell carcinoma of the cervix complicated by rectovaginal fistula who is POD#8 s/p palliative laparoscopic diverting end colostomy, cystoscopy. She is now HD#5 for abdominal pain ddx includes ileus versus less likely bowel obstruction or ostomy ischemia. Overnight had episode of vaginal bleeding, ~ 900 mL and fell and hit her head on the way to the bathroom to clean herself. She was transferred to the ICU for a higher level of care and MTP was initiated. CT head negative for pathology and CTA A/P with evidence of tumor invading into the right internal iliac artery without active extravasation of contrast. She then proceeded to have a significant hemorrhage, approximately 6-7L and received ongoing MTP to approximately 16 units of pRBCs, FFP, cryo. She was transferred to IR and underwent embolization after which she began to stabilize. She is now extubated with plans to transfer to the floor today. Will plan to discuss goals of care and next steps with her.    Plan:    Dz: stage IIIC1 cervical SCC, s/p chemo RT (9-10/20), T&R brachy (11/20). Recurrence 10/22, s/p 7C carbo/taxol/johny/pembro (10/22-3/23), 5C pembro/johny (3-6/23), 1C pembro (7/23), fluoro gastrograffin study (7/23) w/ c/f rectovaginal fistula. Now s/p lsc palliative end colostomy.     #Acute blood loss, 2/2 vagino-iliac aneurism   - S/p MTP 19u PRBCs, FFP, cryo  - Admitted to SICU, plan for transfer to floor today    # Abdominal pain secondary to post-operative ileus  - CT without concerning findings with the exception of non-specific enteritis/colitis possibly secondary to RT changes.  - Stool output has significantly improved, decreased bowel regimen  - pt self-limiting diet, continue CLD today, start mIVF      #Vesicovaginal fistula  - Mild vaginal bleeding overnight, not acutely concerning  - Right PNT in place given hydronephrosis on the right and pt declined left PNT     # Hypothyroidism   - Continue levothyroxine    # Anticoagulation  - Hold PTA Apixaban (plan for 2w ppx from surgery today D#6/14, to end 8/14)    # Pain Management  Tylenol, Ibuprofen, Oxycodone, Dilaudid    Svetlana Noguera MD  Ob/Gyn Resident, PGY-3  08/08/2023 6:23 AM    ----------------------------------------    I saw and evaluated the patient. I agree with the findings and the plan of care as documented in Dr. Noguera 's note.     TOday off pressors, extubated, Hg stable. Has shown remarkable improvement in the last 12 hours.     Plan to transfer to our service today. Advance diet. Hold any ppx anti-coagulation. Ongoing goals of care and next step discussions.     Suzy Brambila MD  Gynecologic Oncology  Pager [phone]

## 2023-08-06 NOTE — PLAN OF CARE
/69 (BP Location: Left arm)   Pulse 90   Temp 98.1  F (36.7  C) (Oral)   Resp 16   SpO2 95%     Care from: 0700 - 1500    VS & Pain: VSS on RA. Soft BP, provider aware. Pt denies pain this shift  Neuro: AxO x4  Respiratory: denies shortness of breath   Cardiac: no acute distress noted   Peripheral neurovascular: wdl, denies numbness & tingling   GI/: + bowel sounds. Colostomy in place- 150 mL output. Pass present. R. PNT  in place with minimal output-- provider aware, incontinent of urine   Nutrition: clear. Very minimal fluid intake. C/o nausea, PO Compazine given 1- not effective. Pt had emesis 100 mL- provider notified   Skin: no new issues noted  Musculoskeletal: generalized weakness   Lines: L. PIV- infusing D5%+0.45%Nacl @ 100 mL/hr  Activity: SBA with walker      Plan:continue plan of care   Goal Outcome Evaluation:  Plan of Care Reviewed With: patient  Overall Patient Progress: improving  Outcome Evaluation: Pt denies pain. Colostomy output 150 mL. Pt had emesis of 100mL- provider paged.

## 2023-08-06 NOTE — PROGRESS NOTES
"Gynecology Oncology Progress Note  August 6, 2023    Ms. 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 Pain is moderately well controlled on her regimen. Ambulating once to the bathroom overnight without dizziness. Noted small amount of blood tinged mucus with void. Had very minimal PO intake yesterday and overnight. States she just doesn't have an appetite, her belly feels \"off\" and she is having reflux. States antacids improved her reflux symptoms but she still doesn't want to eat. No nausea or emesis overnight. Leaking urine and voiding into PNT tube. Noticed small amount of bleeding with void overnight. Denies fevers, chills, chest pain, SOB.    Objective:   /69 (BP Location: Left arm)   Pulse 94   Temp 98.3  F (36.8  C) (Oral)   Resp 16   SpO2 95%     General: NAD, appears comfortable in bed, flat affect  CV: Well perfused, regular rate  Resp: Breathing comfortably on room air  Abdomen: Soft, minimally tender, LLQ ostomy bag with liquid stool and minimal air present; ostomy bag well attached  Incision: c/d/i, no erythema or drainage    New labs/imaging-  CT A/P: IMPRESSION (8/4/2023):   1. Postoperative changes of left lower quadrant end colostomy with  likely postsurgical residual free air and suspect small hemoperitoneum  in the pelvis.  2. Again visualized rectovaginal and vesicovaginal fistulas with  ill-defined soft tissue thickening, fluid and air collection in the  pelvis. Overall appearance is stable from 6/23/2023.  3. Urothelial enhancement in the right collecting system, possibly  retrograde " "urinary tract infection.  4. Mild thickening of the distal colon up to the colostomy, with fat  stranding is likely postsurgical edema.  5. Overall stable appearance of retroperitoneal lymphadenopathy with  mass effect versus tumoral invasion of the left renal vein causing  severe stenosis at the IVC confluence. There is encasement of the  superior mesenteric artery without significant central stenosis.    Assessment: Suni Zuluaga is a 38 year old female with known history of advanced squamous cell carcinoma of the cervix complicated by rectovaginal fistula who is POD#6 s/p palliative laparoscopic diverting end colostomy, cystoscopy. She is now HD#3 for abdominal pain ddx includes ileus versus less likely bowel obstruction or ostomy ischemia. Pain intermittently controlled with PO pain meds. Ostomy patent with stool output. Urine output is low, will likely restart IV fluids if patient does not increase PO intake. Patient has very flat affect, which is likely impacting her lack of motivation to eat. Was updated by the nurse that patient had an episode of emesis after I left with about 200ccs out. Discussed potentially meeting with social work, to which the patient declined stating \"I'm fine.\"     Plan:    Dz: stage IIIC1 cervical SCC, s/p chemo RT (9-10/20), T&R brachy (11/20). Recurrence 10/22, s/p 7C carbo/taxol/johny/pembro (10/22-3/23), 5C pembro/johny (3-6/23), 1C pembro (7/23), fluoro gastrograffin study (7/23) w/ c/f rectovaginal fistula. Now s/p lsc palliative end colostomy.     # Abdominal pain secondary to post-operative ileus  - CT without concerning findings with the exception of non-specific enteritis/colitis possibly secondary to RT changes.  - On scheduled bowel regimen now with small amount of liquid stool  - Emesis episode this morning and continued pain concerning for ongoing ileus  - pt self-limiting diet, continue CLD today, start mIVF     #Vesicovaginal fistula  - Mild vaginal bleeding overnight, " not acutely concerning  - Right PNT in place given hydronephrosis on the right and pt declined left PNT     # Hypothyroidism   - Continue levothyroxine    # Anticoagulation  - Continue PTA Apixaban (plan for 2w ppx from surgery today D#6/14, to end 8/14)    # Pain Management  Tylenol, Ibuprofen, Oxycodone, Dilaudid    Zoya Romero MD  Ob/Gyn Resident, PGY-2  08/06/2023 7:27 AM   Gyn Onc Pager: [phone]     I saw this patient with the resident and agree with the findings and plan of care as documented in the note.  I personally reviewed vital signs, medications, and labs. The above note has been edited by me    Comments: denied pain when I saw her this morning, output in ostomy increased from yesterday, though today patient has more emesis. Plan for CLD and IVF. Etiology thought to be postoperative ileus, less concern for obstruction from CT imaging on admission and current reassuring abdominal exam. Will continue to monitor    Debo Cash MD  Gynecology Oncology Fellow  August 6, 2023 , 3:36 PM

## 2023-08-06 NOTE — PROVIDER NOTIFICATION
[employer] paging GYN ONCOLOGY RESIDENT [phone] [ Msg Id [id number] ]    Hi,  Pt had emesis of 300 ml. Thanks.  -Mariella, #[phone]

## 2023-08-06 NOTE — PLAN OF CARE
/71 (BP Location: Left arm)   Pulse 65   Temp 98.7  F (37.1  C) (Oral)   Resp 16   SpO2 94%     Care from: 1500 - 1900    VS & Pain: VSS on RA. Soft BP, provider aware & okay with it. Rates pain at 7/10, managed with PO oxycodone & Tyenol- partially effective  Neuro: AxO x4  Respiratory: denies shortness of breath   Cardiac: no acute distress noted   Peripheral neurovascular: wdl, denies numbness & tingling   GI/: + bowel sounds. Colostomy in place- no output. Pass present. R. PNT  in place with minimal output, incontinent of urine   Nutrition: regular. Poor appetite with marginal fluid intake. Denies nausea & vomiting this shift   Skin: no new issues noted  Musculoskeletal: generalized weakness   Lines: L. PIV- SL   Activity: SBA with walker     Plan: continue plan of care  Goal Outcome Evaluation:  Plan of Care Reviewed With: patient  Overall Patient Progress: improving  Outcome Evaluation: Rates pain at 7/10, managed with PO oxycodone & Tyenol- partially effective

## 2023-08-07 ENCOUNTER — APPOINTMENT (OUTPATIENT)
Dept: GENERAL RADIOLOGY | Facility: CLINIC | Age: 38
End: 2023-08-07
Payer: COMMERCIAL

## 2023-08-07 ENCOUNTER — ANESTHESIA (OUTPATIENT)
Dept: INTENSIVE CARE | Facility: CLINIC | Age: 38
End: 2023-08-07
Payer: COMMERCIAL

## 2023-08-07 ENCOUNTER — APPOINTMENT (OUTPATIENT)
Dept: CT IMAGING | Facility: CLINIC | Age: 38
End: 2023-08-07
Payer: COMMERCIAL

## 2023-08-07 ENCOUNTER — APPOINTMENT (OUTPATIENT)
Dept: INTERVENTIONAL RADIOLOGY/VASCULAR | Facility: CLINIC | Age: 38
End: 2023-08-07
Attending: STUDENT IN AN ORGANIZED HEALTH CARE EDUCATION/TRAINING PROGRAM
Payer: COMMERCIAL

## 2023-08-07 ENCOUNTER — APPOINTMENT (OUTPATIENT)
Dept: GENERAL RADIOLOGY | Facility: CLINIC | Age: 38
End: 2023-08-07
Attending: OBSTETRICS & GYNECOLOGY
Payer: COMMERCIAL

## 2023-08-07 ENCOUNTER — APPOINTMENT (OUTPATIENT)
Dept: CT IMAGING | Facility: CLINIC | Age: 38
End: 2023-08-07
Attending: PHYSICIAN ASSISTANT
Payer: COMMERCIAL

## 2023-08-07 ENCOUNTER — ANESTHESIA EVENT (OUTPATIENT)
Dept: INTENSIVE CARE | Facility: CLINIC | Age: 38
End: 2023-08-07
Payer: COMMERCIAL

## 2023-08-07 LAB
ALLEN'S TEST: ABNORMAL
ANION GAP SERPL CALCULATED.3IONS-SCNC: 18 MMOL/L (ref 7–15)
ANION GAP SERPL CALCULATED.3IONS-SCNC: 6 MMOL/L (ref 7–15)
ANION GAP SERPL CALCULATED.3IONS-SCNC: 9 MMOL/L (ref 7–15)
APTT PPP: 27 SECONDS (ref 22–38)
APTT PPP: 33 SECONDS (ref 22–38)
APTT PPP: 36 SECONDS (ref 22–38)
BASE EXCESS BLDA CALC-SCNC: -7.2 MMOL/L (ref -9.6–2)
BASE EXCESS BLDA CALC-SCNC: 1.7 MMOL/L (ref -9–1.8)
BLD PROD TYP BPU: NORMAL
BLOOD COMPONENT TYPE: NORMAL
BUN SERPL-MCNC: 10.2 MG/DL (ref 6–20)
BUN SERPL-MCNC: 10.5 MG/DL (ref 6–20)
BUN SERPL-MCNC: 11 MG/DL (ref 6–20)
BURR CELLS BLD QL SMEAR: ABNORMAL
CA-I BLD-MCNC: 1.9 MG/DL (ref 4.4–5.2)
CA-I BLD-MCNC: 2 MG/DL (ref 4.4–5.2)
CA-I BLD-MCNC: 4.2 MG/DL (ref 4.4–5.2)
CA-I BLD-MCNC: 4.6 MG/DL (ref 4.4–5.2)
CA-I BLD-MCNC: 4.9 MG/DL (ref 4.4–5.2)
CALCIUM SERPL-MCNC: 7.9 MG/DL (ref 8.6–10)
CALCIUM SERPL-MCNC: 8.3 MG/DL (ref 8.6–10)
CALCIUM SERPL-MCNC: 9.1 MG/DL (ref 8.6–10)
CF REDUC 60M P MA LENFR BLD TEG: 0.6 % (ref 0–15)
CFT BLD TEG: 2.3 MINUTE (ref 1–3)
CHLORIDE SERPL-SCNC: 105 MMOL/L (ref 98–107)
CHLORIDE SERPL-SCNC: 106 MMOL/L (ref 98–107)
CHLORIDE SERPL-SCNC: 107 MMOL/L (ref 98–107)
CI (COAGULATION INDEX)(Z) NON NATIVE: -0.2 (ref -3–3)
CLOT ANGLE BLD TEG: 61 DEGREES (ref 53–72)
CLOT INIT BLD TEG: 4.2 MINUTE (ref 5–10)
CLOT LYSIS 30M P MA LENFR BLD TEG: 0 % (ref 0–8)
CLOT STRENGTH BLD TEG: 5.9 KD/SC (ref 4.5–11)
CODING SYSTEM: NORMAL
CPB POCT: NO
CPB POCT: NO
CREAT SERPL-MCNC: 0.49 MG/DL (ref 0.51–0.95)
CREAT SERPL-MCNC: 0.57 MG/DL (ref 0.51–0.95)
CREAT SERPL-MCNC: 0.59 MG/DL (ref 0.51–0.95)
CROSSMATCH: NORMAL
DEPRECATED HCO3 PLAS-SCNC: 19 MMOL/L (ref 22–29)
DEPRECATED HCO3 PLAS-SCNC: 27 MMOL/L (ref 22–29)
DEPRECATED HCO3 PLAS-SCNC: 27 MMOL/L (ref 22–29)
ERYTHROCYTE [DISTWIDTH] IN BLOOD BY AUTOMATED COUNT: 13.3 % (ref 10–15)
ERYTHROCYTE [DISTWIDTH] IN BLOOD BY AUTOMATED COUNT: 14.8 % (ref 10–15)
ERYTHROCYTE [DISTWIDTH] IN BLOOD BY AUTOMATED COUNT: 16 % (ref 10–15)
ERYTHROCYTE [DISTWIDTH] IN BLOOD BY AUTOMATED COUNT: 17.2 % (ref 10–15)
FIBRINOGEN PPP-MCNC: 217 MG/DL (ref 170–490)
FIBRINOGEN PPP-MCNC: 235 MG/DL (ref 170–490)
FIBRINOGEN PPP-MCNC: 557 MG/DL (ref 170–490)
GFR SERPL CREATININE-BSD FRML MDRD: >90 ML/MIN/1.73M2
GLUCOSE BLD-MCNC: 194 MG/DL (ref 70–99)
GLUCOSE BLD-MCNC: 244 MG/DL (ref 70–99)
GLUCOSE BLD-MCNC: 310 MG/DL (ref 70–99)
GLUCOSE BLDC GLUCOMTR-MCNC: 110 MG/DL (ref 70–99)
GLUCOSE BLDC GLUCOMTR-MCNC: 110 MG/DL (ref 70–99)
GLUCOSE BLDC GLUCOMTR-MCNC: 135 MG/DL (ref 70–99)
GLUCOSE BLDC GLUCOMTR-MCNC: 160 MG/DL (ref 70–99)
GLUCOSE BLDC GLUCOMTR-MCNC: 185 MG/DL (ref 70–99)
GLUCOSE BLDC GLUCOMTR-MCNC: 191 MG/DL (ref 70–99)
GLUCOSE BLDC GLUCOMTR-MCNC: 259 MG/DL (ref 70–99)
GLUCOSE BLDC GLUCOMTR-MCNC: 272 MG/DL (ref 70–99)
GLUCOSE BLDC GLUCOMTR-MCNC: 77 MG/DL (ref 70–99)
GLUCOSE BLDC GLUCOMTR-MCNC: 80 MG/DL (ref 70–99)
GLUCOSE BLDC GLUCOMTR-MCNC: 82 MG/DL (ref 70–99)
GLUCOSE BLDC GLUCOMTR-MCNC: 97 MG/DL (ref 70–99)
GLUCOSE SERPL-MCNC: 108 MG/DL (ref 70–99)
GLUCOSE SERPL-MCNC: 286 MG/DL (ref 70–99)
GLUCOSE SERPL-MCNC: 85 MG/DL (ref 70–99)
HBA1C MFR BLD: 7 %
HCO3 BLD-SCNC: 24 MMOL/L (ref 21–28)
HCO3 BLDA-SCNC: 20 MMOL/L (ref 21–28)
HCO3 BLDV-SCNC: 23 MMOL/L (ref 21–28)
HCO3 BLDV-SCNC: 24 MMOL/L (ref 21–28)
HCT VFR BLD AUTO: 20.8 % (ref 35–47)
HCT VFR BLD AUTO: 27.7 % (ref 35–47)
HCT VFR BLD AUTO: 34.3 % (ref 35–47)
HCT VFR BLD AUTO: 44.8 % (ref 35–47)
HCT VFR BLD CALC: 18 % (ref 35–47)
HCT VFR BLD CALC: 24 % (ref 35–47)
HGB BLD-MCNC: 10.1 G/DL (ref 11.7–15.7)
HGB BLD-MCNC: 11.2 G/DL (ref 11.7–15.7)
HGB BLD-MCNC: 12.1 G/DL (ref 11.7–15.7)
HGB BLD-MCNC: 15.2 G/DL (ref 11.7–15.7)
HGB BLD-MCNC: 6.1 G/DL (ref 11.7–15.7)
HGB BLD-MCNC: 6.8 G/DL (ref 11.7–15.7)
HGB BLD-MCNC: 8.2 G/DL (ref 11.7–15.7)
HGB BLD-MCNC: 8.4 G/DL (ref 11.7–15.7)
HGB BLD-MCNC: 8.5 G/DL (ref 11.7–15.7)
HOLD SPECIMEN: NORMAL
INR PPP: 1.2 (ref 0.85–1.15)
INR PPP: 1.29 (ref 0.85–1.15)
INR PPP: 1.51 (ref 0.85–1.15)
INR PPP: 1.51 (ref 0.85–1.15)
ISSUE DATE AND TIME: NORMAL
LACTATE BLD-SCNC: 10.2 MMOL/L
LACTATE SERPL-SCNC: 0.8 MMOL/L (ref 0.7–2)
LACTATE SERPL-SCNC: 0.8 MMOL/L (ref 0.7–2)
LACTATE SERPL-SCNC: 3.6 MMOL/L (ref 0.7–2)
LACTATE SERPL-SCNC: 5 MMOL/L (ref 0.7–2)
LACTATE SERPL-SCNC: 7.7 MMOL/L (ref 0.7–2)
MAGNESIUM SERPL-MCNC: 1.5 MG/DL (ref 1.7–2.3)
MAGNESIUM SERPL-MCNC: 2.7 MG/DL (ref 1.7–2.3)
MCF BLD TEG: 54.1 MM (ref 50–70)
MCH RBC QN AUTO: 26.4 PG (ref 26.5–33)
MCH RBC QN AUTO: 27.1 PG (ref 26.5–33)
MCH RBC QN AUTO: 29.7 PG (ref 26.5–33)
MCH RBC QN AUTO: 30.1 PG (ref 26.5–33)
MCHC RBC AUTO-ENTMCNC: 29.4 G/DL (ref 31.5–36.5)
MCHC RBC AUTO-ENTMCNC: 30.7 G/DL (ref 31.5–36.5)
MCHC RBC AUTO-ENTMCNC: 32.7 G/DL (ref 31.5–36.5)
MCHC RBC AUTO-ENTMCNC: 33.9 G/DL (ref 31.5–36.5)
MCV RBC AUTO: 88 FL (ref 78–100)
MCV RBC AUTO: 89 FL (ref 78–100)
MCV RBC AUTO: 90 FL (ref 78–100)
MCV RBC AUTO: 91 FL (ref 78–100)
O2/TOTAL GAS SETTING VFR VENT: 100 %
O2/TOTAL GAS SETTING VFR VENT: 60 %
OXYHGB MFR BLD: 99 % (ref 92–100)
PCO2 BLD: 32 MM HG (ref 35–45)
PCO2 BLDA: 44 MM HG (ref 35–45)
PCO2 BLDV: 43 MM HG (ref 40–50)
PCO2 BLDV: 45 MM HG (ref 40–50)
PH BLD: 7.49 [PH] (ref 7.35–7.45)
PH BLDA: 7.26 [PH] (ref 7.35–7.45)
PH BLDV: 7.32 [PH] (ref 7.32–7.43)
PH BLDV: 7.36 [PH] (ref 7.32–7.43)
PLAT MORPH BLD: ABNORMAL
PLATELET # BLD AUTO: 190 10E3/UL (ref 150–450)
PLATELET # BLD AUTO: 285 10E3/UL (ref 150–450)
PLATELET # BLD AUTO: 393 10E3/UL (ref 150–450)
PLATELET # BLD AUTO: 87 10E3/UL (ref 150–450)
PO2 BLD: 269 MM HG (ref 80–105)
PO2 BLDA: 530 MM HG (ref 80–105)
PO2 BLDV: 22 MM HG (ref 25–47)
PO2 BLDV: 25 MM HG (ref 25–47)
POTASSIUM BLD-SCNC: 3 MMOL/L (ref 3.5–5)
POTASSIUM BLD-SCNC: 3.8 MMOL/L (ref 3.4–5.3)
POTASSIUM BLD-SCNC: 3.8 MMOL/L (ref 3.4–5.3)
POTASSIUM SERPL-SCNC: 3.1 MMOL/L (ref 3.4–5.3)
POTASSIUM SERPL-SCNC: 3.6 MMOL/L (ref 3.4–5.3)
POTASSIUM SERPL-SCNC: 3.9 MMOL/L (ref 3.4–5.3)
POTASSIUM SERPL-SCNC: 4.1 MMOL/L (ref 3.4–5.3)
POTASSIUM SERPL-SCNC: 4.1 MMOL/L (ref 3.4–5.3)
RBC # BLD AUTO: 2.29 10E6/UL (ref 3.8–5.2)
RBC # BLD AUTO: 3.14 10E6/UL (ref 3.8–5.2)
RBC # BLD AUTO: 3.83 10E6/UL (ref 3.8–5.2)
RBC # BLD AUTO: 5.05 10E6/UL (ref 3.8–5.2)
RBC MORPH BLD: ABNORMAL
SAO2 % BLDV: 36 % (ref 94–100)
SAO2 % BLDV: 39 % (ref 94–100)
SODIUM BLD-SCNC: 135 MMOL/L (ref 133–144)
SODIUM BLD-SCNC: 140 MMOL/L (ref 133–144)
SODIUM BLD-SCNC: 147 MMOL/L (ref 133–144)
SODIUM SERPL-SCNC: 138 MMOL/L (ref 136–145)
SODIUM SERPL-SCNC: 142 MMOL/L (ref 136–145)
SODIUM SERPL-SCNC: 144 MMOL/L (ref 136–145)
UNIT ABO/RH: NORMAL
UNIT NUMBER: NORMAL
UNIT STATUS: NORMAL
UNIT TYPE ISBT: 1700
UNIT TYPE ISBT: 5100
UNIT TYPE ISBT: 600
UNIT TYPE ISBT: 600
UNIT TYPE ISBT: 6200
UNIT TYPE ISBT: 7300
UNIT TYPE ISBT: 9500
WBC # BLD AUTO: 10.4 10E3/UL (ref 4–11)
WBC # BLD AUTO: 11.7 10E3/UL (ref 4–11)
WBC # BLD AUTO: 13.7 10E3/UL (ref 4–11)
WBC # BLD AUTO: 9.6 10E3/UL (ref 4–11)

## 2023-08-07 PROCEDURE — 99291 CRITICAL CARE FIRST HOUR: CPT | Mod: 24 | Performed by: SURGERY

## 2023-08-07 PROCEDURE — 999N000248 HC STATISTIC IV INSERT WITH US BY RN

## 2023-08-07 PROCEDURE — 85396 CLOTTING ASSAY WHOLE BLOOD: CPT

## 2023-08-07 PROCEDURE — 37244 VASC EMBOLIZE/OCCLUDE BLEED: CPT

## 2023-08-07 PROCEDURE — 250N000011 HC RX IP 250 OP 636: Mod: JZ | Performed by: OBSTETRICS & GYNECOLOGY

## 2023-08-07 PROCEDURE — 85018 HEMOGLOBIN: CPT

## 2023-08-07 PROCEDURE — 200N000002 HC R&B ICU UMMC

## 2023-08-07 PROCEDURE — 999N000285 HC STATISTIC VASC ACCESS LAB DRAW WITH PIV START

## 2023-08-07 PROCEDURE — 76937 US GUIDE VASCULAR ACCESS: CPT | Mod: 26 | Performed by: RADIOLOGY

## 2023-08-07 PROCEDURE — 3E043XZ INTRODUCTION OF VASOPRESSOR INTO CENTRAL VEIN, PERCUTANEOUS APPROACH: ICD-10-PCS | Performed by: SURGERY

## 2023-08-07 PROCEDURE — 36556 INSERT NON-TUNNEL CV CATH: CPT | Mod: GC | Performed by: SURGERY

## 2023-08-07 PROCEDURE — 85027 COMPLETE CBC AUTOMATED: CPT | Performed by: OBSTETRICS & GYNECOLOGY

## 2023-08-07 PROCEDURE — 84132 ASSAY OF SERUM POTASSIUM: CPT

## 2023-08-07 PROCEDURE — 250N000011 HC RX IP 250 OP 636: Performed by: STUDENT IN AN ORGANIZED HEALTH CARE EDUCATION/TRAINING PROGRAM

## 2023-08-07 PROCEDURE — 83036 HEMOGLOBIN GLYCOSYLATED A1C: CPT | Performed by: SPECIALIST/TECHNOLOGIST

## 2023-08-07 PROCEDURE — 85610 PROTHROMBIN TIME: CPT | Performed by: OBSTETRICS & GYNECOLOGY

## 2023-08-07 PROCEDURE — 250N000013 HC RX MED GY IP 250 OP 250 PS 637

## 2023-08-07 PROCEDURE — 82947 ASSAY GLUCOSE BLOOD QUANT: CPT

## 2023-08-07 PROCEDURE — 82803 BLOOD GASES ANY COMBINATION: CPT

## 2023-08-07 PROCEDURE — 82805 BLOOD GASES W/O2 SATURATION: CPT | Performed by: OBSTETRICS & GYNECOLOGY

## 2023-08-07 PROCEDURE — 999N000157 HC STATISTIC RCP TIME EA 10 MIN

## 2023-08-07 PROCEDURE — P9037 PLATE PHERES LEUKOREDU IRRAD: HCPCS

## 2023-08-07 PROCEDURE — 84132 ASSAY OF SERUM POTASSIUM: CPT | Performed by: OBSTETRICS & GYNECOLOGY

## 2023-08-07 PROCEDURE — 258N000003 HC RX IP 258 OP 636: Performed by: OBSTETRICS & GYNECOLOGY

## 2023-08-07 PROCEDURE — 83735 ASSAY OF MAGNESIUM: CPT

## 2023-08-07 PROCEDURE — 370N000003 HC ANESTHESIA WARD SERVICE: Performed by: ANESTHESIOLOGY

## 2023-08-07 PROCEDURE — 255N000002 HC RX 255 OP 636: Performed by: OBSTETRICS & GYNECOLOGY

## 2023-08-07 PROCEDURE — 999N000065 XR ABDOMEN PORT 1 VIEW

## 2023-08-07 PROCEDURE — 74174 CTA ABD&PLVS W/CONTRAST: CPT

## 2023-08-07 PROCEDURE — 272N000504 HC NEEDLE CR4

## 2023-08-07 PROCEDURE — 85027 COMPLETE CBC AUTOMATED: CPT | Performed by: STUDENT IN AN ORGANIZED HEALTH CARE EDUCATION/TRAINING PROGRAM

## 2023-08-07 PROCEDURE — 250N000009 HC RX 250: Performed by: SPECIALIST/TECHNOLOGIST

## 2023-08-07 PROCEDURE — 85384 FIBRINOGEN ACTIVITY: CPT | Performed by: OBSTETRICS & GYNECOLOGY

## 2023-08-07 PROCEDURE — 999N000065 XR CHEST PORT 1 VIEW

## 2023-08-07 PROCEDURE — 04LE3DZ OCCLUSION OF RIGHT INTERNAL ILIAC ARTERY WITH INTRALUMINAL DEVICE, PERCUTANEOUS APPROACH: ICD-10-PCS | Performed by: RADIOLOGY

## 2023-08-07 PROCEDURE — C1769 GUIDE WIRE: HCPCS

## 2023-08-07 PROCEDURE — C1889 IMPLANT/INSERT DEVICE, NOC: HCPCS

## 2023-08-07 PROCEDURE — 272N000143 HC KIT CR3

## 2023-08-07 PROCEDURE — 71045 X-RAY EXAM CHEST 1 VIEW: CPT | Mod: 26 | Performed by: RADIOLOGY

## 2023-08-07 PROCEDURE — P9016 RBC LEUKOCYTES REDUCED: HCPCS

## 2023-08-07 PROCEDURE — 70450 CT HEAD/BRAIN W/O DYE: CPT | Mod: 26 | Performed by: RADIOLOGY

## 2023-08-07 PROCEDURE — 85730 THROMBOPLASTIN TIME PARTIAL: CPT | Performed by: OBSTETRICS & GYNECOLOGY

## 2023-08-07 PROCEDURE — 272N000566 HC SHEATH CR3

## 2023-08-07 PROCEDURE — P9012 CRYOPRECIPITATE EACH UNIT: HCPCS

## 2023-08-07 PROCEDURE — 250N000009 HC RX 250: Performed by: PHYSICIAN ASSISTANT

## 2023-08-07 PROCEDURE — 83605 ASSAY OF LACTIC ACID: CPT | Performed by: STUDENT IN AN ORGANIZED HEALTH CARE EDUCATION/TRAINING PROGRAM

## 2023-08-07 PROCEDURE — C1760 CLOSURE DEV, VASC: HCPCS

## 2023-08-07 PROCEDURE — 36246 INS CATH ABD/L-EXT ART 2ND: CPT | Performed by: RADIOLOGY

## 2023-08-07 PROCEDURE — 82330 ASSAY OF CALCIUM: CPT

## 2023-08-07 PROCEDURE — 999N000253 HC STATISTIC WEANING TRIALS

## 2023-08-07 PROCEDURE — 250N000009 HC RX 250: Performed by: STUDENT IN AN ORGANIZED HEALTH CARE EDUCATION/TRAINING PROGRAM

## 2023-08-07 PROCEDURE — 94002 VENT MGMT INPAT INIT DAY: CPT

## 2023-08-07 PROCEDURE — 250N000011 HC RX IP 250 OP 636: Performed by: OBSTETRICS & GYNECOLOGY

## 2023-08-07 PROCEDURE — 36247 INS CATH ABD/L-EXT ART 3RD: CPT | Mod: 50

## 2023-08-07 PROCEDURE — 83605 ASSAY OF LACTIC ACID: CPT | Performed by: OBSTETRICS & GYNECOLOGY

## 2023-08-07 PROCEDURE — C9113 INJ PANTOPRAZOLE SODIUM, VIA: HCPCS | Mod: JZ

## 2023-08-07 PROCEDURE — C1887 CATHETER, GUIDING: HCPCS

## 2023-08-07 PROCEDURE — 75736 ARTERY X-RAYS PELVIS: CPT

## 2023-08-07 PROCEDURE — 76937 US GUIDE VASCULAR ACCESS: CPT

## 2023-08-07 PROCEDURE — 250N000011 HC RX IP 250 OP 636: Mod: JZ

## 2023-08-07 PROCEDURE — 999N000259 HC STATISTIC EXTUBATION

## 2023-08-07 PROCEDURE — P9059 PLASMA, FRZ BETWEEN 8-24HOUR: HCPCS

## 2023-08-07 PROCEDURE — 250N000011 HC RX IP 250 OP 636: Mod: JZ | Performed by: RADIOLOGY

## 2023-08-07 PROCEDURE — 83605 ASSAY OF LACTIC ACID: CPT

## 2023-08-07 PROCEDURE — 85610 PROTHROMBIN TIME: CPT | Performed by: STUDENT IN AN ORGANIZED HEALTH CARE EDUCATION/TRAINING PROGRAM

## 2023-08-07 PROCEDURE — 82330 ASSAY OF CALCIUM: CPT | Performed by: OBSTETRICS & GYNECOLOGY

## 2023-08-07 PROCEDURE — 83735 ASSAY OF MAGNESIUM: CPT | Performed by: OBSTETRICS & GYNECOLOGY

## 2023-08-07 PROCEDURE — 70450 CT HEAD/BRAIN W/O DYE: CPT

## 2023-08-07 PROCEDURE — 74174 CTA ABD&PLVS W/CONTRAST: CPT | Mod: 26 | Performed by: RADIOLOGY

## 2023-08-07 PROCEDURE — 85014 HEMATOCRIT: CPT | Performed by: STUDENT IN AN ORGANIZED HEALTH CARE EDUCATION/TRAINING PROGRAM

## 2023-08-07 PROCEDURE — 37244 VASC EMBOLIZE/OCCLUDE BLEED: CPT | Performed by: RADIOLOGY

## 2023-08-07 PROCEDURE — 258N000003 HC RX IP 258 OP 636

## 2023-08-07 PROCEDURE — 74018 RADEX ABDOMEN 1 VIEW: CPT | Mod: 26 | Performed by: STUDENT IN AN ORGANIZED HEALTH CARE EDUCATION/TRAINING PROGRAM

## 2023-08-07 RX ORDER — ONDANSETRON 2 MG/ML
4 INJECTION INTRAMUSCULAR; INTRAVENOUS EVERY 6 HOURS PRN
Status: CANCELLED | OUTPATIENT
Start: 2023-08-07

## 2023-08-07 RX ORDER — PROCHLORPERAZINE MALEATE 10 MG
10 TABLET ORAL EVERY 6 HOURS PRN
Status: CANCELLED | OUTPATIENT
Start: 2023-08-07

## 2023-08-07 RX ORDER — NOREPINEPHRINE BITARTRATE 0.06 MG/ML
.01-.6 INJECTION, SOLUTION INTRAVENOUS CONTINUOUS
Status: DISCONTINUED | OUTPATIENT
Start: 2023-08-07 | End: 2023-08-08

## 2023-08-07 RX ORDER — CHLORHEXIDINE GLUCONATE ORAL RINSE 1.2 MG/ML
15 SOLUTION DENTAL EVERY 12 HOURS
Status: DISCONTINUED | OUTPATIENT
Start: 2023-08-07 | End: 2023-08-07

## 2023-08-07 RX ORDER — NALOXONE HYDROCHLORIDE 0.4 MG/ML
0.4 INJECTION, SOLUTION INTRAMUSCULAR; INTRAVENOUS; SUBCUTANEOUS
Status: DISCONTINUED | OUTPATIENT
Start: 2023-08-07 | End: 2023-08-09 | Stop reason: HOSPADM

## 2023-08-07 RX ORDER — NALOXONE HYDROCHLORIDE 0.4 MG/ML
0.2 INJECTION, SOLUTION INTRAMUSCULAR; INTRAVENOUS; SUBCUTANEOUS
Status: DISCONTINUED | OUTPATIENT
Start: 2023-08-07 | End: 2023-08-09 | Stop reason: HOSPADM

## 2023-08-07 RX ORDER — MIDAZOLAM HCL IN 0.9 % NACL/PF 1 MG/ML
1-8 PLASTIC BAG, INJECTION (ML) INTRAVENOUS CONTINUOUS
Status: DISCONTINUED | OUTPATIENT
Start: 2023-08-07 | End: 2023-08-07

## 2023-08-07 RX ORDER — ETOMIDATE 2 MG/ML
INJECTION INTRAVENOUS PRN
Status: DISCONTINUED | OUTPATIENT
Start: 2023-08-07 | End: 2023-08-07

## 2023-08-07 RX ORDER — ONDANSETRON 4 MG/1
4 TABLET, ORALLY DISINTEGRATING ORAL EVERY 6 HOURS PRN
Status: CANCELLED | OUTPATIENT
Start: 2023-08-07

## 2023-08-07 RX ORDER — DEXTROSE MONOHYDRATE 25 G/50ML
25-50 INJECTION, SOLUTION INTRAVENOUS
Status: DISCONTINUED | OUTPATIENT
Start: 2023-08-07 | End: 2023-08-09 | Stop reason: HOSPADM

## 2023-08-07 RX ORDER — IODIXANOL 320 MG/ML
100 INJECTION, SOLUTION INTRAVASCULAR ONCE
Status: COMPLETED | OUTPATIENT
Start: 2023-08-07 | End: 2023-08-07

## 2023-08-07 RX ORDER — DOCUSATE SODIUM 100 MG/1
100 CAPSULE, LIQUID FILLED ORAL 2 TIMES DAILY
Status: CANCELLED | OUTPATIENT
Start: 2023-08-07

## 2023-08-07 RX ORDER — POTASSIUM CHLORIDE 29.8 MG/ML
20 INJECTION INTRAVENOUS ONCE
Status: COMPLETED | OUTPATIENT
Start: 2023-08-08 | End: 2023-08-08

## 2023-08-07 RX ORDER — NICOTINE POLACRILEX 4 MG
15-30 LOZENGE BUCCAL
Status: DISCONTINUED | OUTPATIENT
Start: 2023-08-07 | End: 2023-08-09 | Stop reason: HOSPADM

## 2023-08-07 RX ORDER — POTASSIUM CHLORIDE 29.8 MG/ML
20 INJECTION INTRAVENOUS
Status: COMPLETED | OUTPATIENT
Start: 2023-08-07 | End: 2023-08-07

## 2023-08-07 RX ORDER — PROPOFOL 10 MG/ML
5-75 INJECTION, EMULSION INTRAVENOUS CONTINUOUS
Status: DISCONTINUED | OUTPATIENT
Start: 2023-08-07 | End: 2023-08-07

## 2023-08-07 RX ORDER — MAGNESIUM SULFATE HEPTAHYDRATE 40 MG/ML
4 INJECTION, SOLUTION INTRAVENOUS ONCE
Status: COMPLETED | OUTPATIENT
Start: 2023-08-07 | End: 2023-08-07

## 2023-08-07 RX ORDER — PROCHLORPERAZINE 25 MG
25 SUPPOSITORY, RECTAL RECTAL EVERY 12 HOURS PRN
Status: CANCELLED | OUTPATIENT
Start: 2023-08-07

## 2023-08-07 RX ORDER — IOPAMIDOL 755 MG/ML
65 INJECTION, SOLUTION INTRAVASCULAR ONCE
Status: DISCONTINUED | OUTPATIENT
Start: 2023-08-07 | End: 2023-08-07

## 2023-08-07 RX ORDER — DEXTROSE MONOHYDRATE 100 MG/ML
INJECTION, SOLUTION INTRAVENOUS CONTINUOUS PRN
Status: DISCONTINUED | OUTPATIENT
Start: 2023-08-07 | End: 2023-08-09 | Stop reason: HOSPADM

## 2023-08-07 RX ORDER — IOPAMIDOL 755 MG/ML
100 INJECTION, SOLUTION INTRAVASCULAR ONCE
Status: COMPLETED | OUTPATIENT
Start: 2023-08-07 | End: 2023-08-07

## 2023-08-07 RX ORDER — HEPARIN SODIUM 200 [USP'U]/100ML
1 INJECTION, SOLUTION INTRAVENOUS CONTINUOUS PRN
Status: DISCONTINUED | OUTPATIENT
Start: 2023-08-07 | End: 2023-08-07 | Stop reason: HOSPADM

## 2023-08-07 RX ORDER — SODIUM CHLORIDE 9 MG/ML
INJECTION, SOLUTION INTRAVENOUS CONTINUOUS
Status: CANCELLED | OUTPATIENT
Start: 2023-08-07

## 2023-08-07 RX ORDER — LIDOCAINE HYDROCHLORIDE 10 MG/ML
1-30 INJECTION, SOLUTION EPIDURAL; INFILTRATION; INTRACAUDAL; PERINEURAL
Status: DISCONTINUED | OUTPATIENT
Start: 2023-08-07 | End: 2023-08-07 | Stop reason: HOSPADM

## 2023-08-07 RX ORDER — OXYBUTYNIN CHLORIDE 5 MG/1
5 TABLET ORAL 2 TIMES DAILY
Status: DISCONTINUED | OUTPATIENT
Start: 2023-08-07 | End: 2023-08-08

## 2023-08-07 RX ORDER — GUAIFENESIN 600 MG/1
15 TABLET, EXTENDED RELEASE ORAL DAILY
Status: DISCONTINUED | OUTPATIENT
Start: 2023-08-07 | End: 2023-08-08

## 2023-08-07 RX ORDER — HYDROMORPHONE HCL IN WATER/PF 6 MG/30 ML
0.4 PATIENT CONTROLLED ANALGESIA SYRINGE INTRAVENOUS ONCE
Status: COMPLETED | OUTPATIENT
Start: 2023-08-07 | End: 2023-08-07

## 2023-08-07 RX ADMIN — ETOMIDATE 10 MG: 40 INJECTION, SOLUTION INTRAVENOUS at 04:51

## 2023-08-07 RX ADMIN — POTASSIUM CHLORIDE 20 MEQ: 29.8 INJECTION, SOLUTION INTRAVENOUS at 08:14

## 2023-08-07 RX ADMIN — SODIUM CHLORIDE, POTASSIUM CHLORIDE, SODIUM LACTATE AND CALCIUM CHLORIDE 1000 ML: 600; 310; 30; 20 INJECTION, SOLUTION INTRAVENOUS at 09:04

## 2023-08-07 RX ADMIN — CHLORHEXIDINE GLUCONATE 15 ML: 1.2 SOLUTION ORAL at 09:58

## 2023-08-07 RX ADMIN — HYDROMORPHONE HYDROCHLORIDE 0.4 MG: 0.2 INJECTION, SOLUTION INTRAMUSCULAR; INTRAVENOUS; SUBCUTANEOUS at 03:00

## 2023-08-07 RX ADMIN — PANTOPRAZOLE SODIUM 40 MG: 40 INJECTION, POWDER, FOR SOLUTION INTRAVENOUS at 09:58

## 2023-08-07 RX ADMIN — OXYBUTYNIN CHLORIDE 5 MG: 5 TABLET ORAL at 20:37

## 2023-08-07 RX ADMIN — OXYBUTYNIN CHLORIDE 5 MG: 5 TABLET ORAL at 10:16

## 2023-08-07 RX ADMIN — ACETAMINOPHEN 650 MG: 325 TABLET, FILM COATED ORAL at 11:28

## 2023-08-07 RX ADMIN — ROCURONIUM BROMIDE 50 MG: 50 INJECTION, SOLUTION INTRAVENOUS at 04:54

## 2023-08-07 RX ADMIN — Medication 3 MG/HR: at 05:21

## 2023-08-07 RX ADMIN — Medication 100 MG: at 04:51

## 2023-08-07 RX ADMIN — MAGNESIUM OXIDE TAB 400 MG (241.3 MG ELEMENTAL MG) 400 MG: 400 (241.3 MG) TAB at 09:43

## 2023-08-07 RX ADMIN — THIAMINE HCL TAB 100 MG 100 MG: 100 TAB at 10:11

## 2023-08-07 RX ADMIN — POTASSIUM CHLORIDE 20 MEQ: 29.8 INJECTION, SOLUTION INTRAVENOUS at 09:11

## 2023-08-07 RX ADMIN — LEVOTHYROXINE SODIUM 200 MCG: 200 TABLET ORAL at 09:44

## 2023-08-07 RX ADMIN — Medication 3 MG/HR: at 05:30

## 2023-08-07 RX ADMIN — IODIXANOL 50 ML: 320 INJECTION, SOLUTION INTRAVASCULAR at 05:24

## 2023-08-07 RX ADMIN — IOPAMIDOL 100 ML: 755 INJECTION, SOLUTION INTRAVENOUS at 01:07

## 2023-08-07 RX ADMIN — PROPOFOL 30 MCG/KG/MIN: 10 INJECTION, EMULSION INTRAVENOUS at 10:22

## 2023-08-07 RX ADMIN — POTASSIUM CHLORIDE 20 MEQ: 29.8 INJECTION, SOLUTION INTRAVENOUS at 10:41

## 2023-08-07 RX ADMIN — EPINEPHRINE 0.1 MCG/KG/MIN: 1 INJECTION INTRAMUSCULAR; INTRAVENOUS; SUBCUTANEOUS at 03:52

## 2023-08-07 RX ADMIN — HEPARIN SODIUM 1 BAG: 200 INJECTION, SOLUTION INTRAVENOUS at 05:07

## 2023-08-07 RX ADMIN — Medication 50 MCG/HR: at 05:20

## 2023-08-07 RX ADMIN — MAGNESIUM SULFATE IN WATER 4 G: 40 INJECTION, SOLUTION INTRAVENOUS at 12:20

## 2023-08-07 RX ADMIN — MIDAZOLAM 2 MG: 1 INJECTION INTRAMUSCULAR; INTRAVENOUS at 03:30

## 2023-08-07 RX ADMIN — TRANEXAMIC ACID 1 G: 10 INJECTION, SOLUTION INTRAVENOUS at 00:40

## 2023-08-07 RX ADMIN — Medication 15 ML: at 11:28

## 2023-08-07 RX ADMIN — HEPARIN SODIUM 1 BAG: 200 INJECTION, SOLUTION INTRAVENOUS at 05:04

## 2023-08-07 RX ADMIN — INSULIN HUMAN 1.5 UNITS/HR: 1 INJECTION, SOLUTION INTRAVENOUS at 09:47

## 2023-08-07 RX ADMIN — Medication 0.35 MCG/KG/MIN: at 03:23

## 2023-08-07 ASSESSMENT — ACTIVITIES OF DAILY LIVING (ADL)
ADLS_ACUITY_SCORE: 35
ADLS_ACUITY_SCORE: 31
ADLS_ACUITY_SCORE: 37
ADLS_ACUITY_SCORE: 31
ADLS_ACUITY_SCORE: 27
ADLS_ACUITY_SCORE: 31
ADLS_ACUITY_SCORE: 31
ADLS_ACUITY_SCORE: 38
ADLS_ACUITY_SCORE: 31

## 2023-08-07 NOTE — PROGRESS NOTES
PT transferred to  SICU @ approximately 0005. On arrival pt A&O x4 and mildly lethargic. Responding to speech. MTP started upon arrival to unit. RA. Vaginal bleeding slowed to a tickle and only 2RBC's given initially. Hgb recheck 10.1. Team decided to stop MTP at this time. @ approximately 0300 Pt's HR increased to 150's, increased lethargy, found to have pooling blood draining from vagina. PNT started having steff red bloody drainage as well. MTP reactivated. Pressors started and quickly titrated up. See MAR. IR consulted. Pt remianed conscious throughout these events. Approximately 8L of BRB suctioned from bed. Transported to IR @ approximately 0430. MTP continued while in IR. Unable to read BP for 15 min. Per MD pressors titrated above upper limit. See MAR. 4L LR given in total while in IR. Once MD stopped bleeding pressors were able to wean off within 30 minutes. 's/40's when returned to SICU unit. L rad ART placed per IR. Versed and Fent started for sedation. Making purposeful movements after returning to pt room. Vaginal bleeding drastically decreased, now more pale pink drainage. PNT o/p also now more pale pink. Remains intubated. Placed on CMV settings per RT. RR 60% 18RR peep 10 Vt 380. PT synch with vent.     In total pt went through 5 coolers with the MT protocol. See paper charting for exact # of each product given.   MD's aware of all critical lab results.

## 2023-08-07 NOTE — PROGRESS NOTES
Pt admitted to ICU with hemorrhagic shock in the setting of metastatic SCCa of the cervix with likely invasion into the iliac vessels.  Was at the bedside for over 60 minutes directing a massive transfusion protocol.  Plan is for IR to attempt control of possible pseudoaneurism.  If unsuccessful, further resuscitation in my opinion will be futile.  Operative control is not possible due to the location and nature of the tumor.  CPR in the setting of PEA arrest is also not indicated as the PEA would occur in the setting of unresuscitatable hemorrhage.  Chest compressions on empty ventricles will not result in flow.  The patient is in critical condition.

## 2023-08-07 NOTE — PROVIDER NOTIFICATION
08/06/23 2300   Call Information   Date of Call 08/06/23   Time of Call 2301   Name of person requesting the team Kathy ASKEW   Title of person requesting team RN   RRT Arrival time 2304   Time RRT ended 0041   Reason for call   Type of RRT Adult   Primary reason for call Uncontrolled excessive bleeding   Was patient transferred from the ED, ICU, or PACU within last 24 hours prior to RRT call? 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  hypotension with excessive bleeding from vagina   Interventions Labs;Fluid bolus;Meds   Patient Outcome   Patient Outcome Transferred to  (4E 206)   RRT Team   Attending/Primary/Covering Physician GynOnc   Date Attending Physician notified 08/06/23   Time Attending Physician notified 2301   Physician(s) Jinny KELLER   Lead RN Derian ASKEW   RT n/a   Post RRT Intervention Assessment   Post RRT Assessment Transferred to ICU   Date Follow Up Done 08/07/23   Time Follow Up Done 0051

## 2023-08-07 NOTE — CODE/RAPID RESPONSE
Rapid Response Team Note    Assessment   In assessment a rapid response was called on Suni Zuluaga due to hypotension and bleeding. This presentation is likely due to massive vaginal bleeding.     Plan   -  MTP activated, transfusing 1 unit pRBCs now  - s/p 1L NS pressure bagged, 2nd to be given afterwards  - stat CBC, INR, platelets, fibrinogen  - CT head once patient stabilized d/t fall with head trauma   -  The Gynecology primary team was  at bedside and plan was formulated with them.  -  Disposition: The patient will be transferred to the ICU.  -  Reassessment and plan follow-up will be performed by the accepting ICU team    Suni is critically ill with hemorrhagic shock. These features are alarming and indicate that the patient is at imminent risk of life-threatening organ failure. Acute deterioration is being managed by the following critical interventions: IV fluids and blood products    Total Critical Care time spent by me, excluding procedures, was 30-74 minutes  Jinny Lewis PA-C  [facility] [place] RRT [facility] Job Code Contact #[phone]  [facility] Paging/Directory    Hospital Course   Brief Summary of events leading to rapid response:   Rapid response called for active vaginal hemorrhage, hypotension, and head trauma. Patient sat up as she felt fluid coming from her vagina.  She became lightheaded, passed out, and hit her head.   Patient with 900ml (based on pad weight) of vaginal bleeding plus 1L brisk ostomy output, pink tinged. Patient hypotensive with MAPs initially <65, however 1L fluid ran wide open with improvement in blood pressures however remains hypotensive.     Admission Diagnosis:   Rectovaginal fistula [N82.3]    Physical Exam   Temp: 98  F (36.7  C) Temp  Min: 98  F (36.7  C)  Max: 98.6  F (37  C)  Resp: 16 Resp  Min: 16  Max: 16  SpO2: 96 % SpO2  Min: 93 %  Max: 96 %  Pulse: 86 Pulse  Min: 79  Max: 94    No data recorded  BP: 121/87 Systolic (24hrs), Av , Min:102 , Max:121   Diastolic  (24hrs), Av, Min:69, Max:87     I/Os: I/O last 3 completed shifts:  In: 50 [P.O.:50]  Out: 1690 [Urine:440; Emesis/NG output:750; Stool:500]     Exam:   GENERAL: Alert and oriented x 3. Acute ill in appearance  HEENT: Anicteric sclera. Mucous membranes moist. NC. AT.   CV: tachycardia S1, S2. No murmurs appreciated.   RESPIRATORY: Effort normal on RA. Lungs CTAB with no wheezing, rales, rhonchi.   GI: Abdomen soft and non distended with normoactive bowel sounds present in all quadrants. Profuse vaginal bleeding, passing several clots. Ostomy with brisk output, 1L in 20 minutes   NEUROLOGICAL: No focal deficits. Moves all extremities.  EXTREMITIES: No peripheral edema.   SKIN: No jaundice. No rashes.      Significant Results and Procedures   Lactic Acid: No results for input(s): LACT, LACTS in the last 34814 hours.  CBC:   Recent Labs   Lab Test 23  0604 23  0707 23  1409   WBC 6.5 5.9 5.5   HGB 10.5* 9.9* 10.4*   HCT 35.9 33.4* 34.2*    246 239        Sepsis Evaluation   The patient is not known to have an infection.  NO EVIDENCE OF SEPSIS at this time.  Vital sign, physical exam, and lab findings are due to hemorrhagic shock.

## 2023-08-07 NOTE — CONSULTS
SURGICAL ICU ADMISSION NOTE  08/07/2023      PRIMARY TEAM: Gyn Onc  PRIMARY PHYSICIAN: Dr. Omaira Ambrosio  REASON FOR CRITICAL CARE ADMISSION: Massive Transfusion Protocol   ADMITTING PHYSICIAN: Dr. Cardona  Date of Service (when I saw the patient): 08/07/2023    ASSESSMENT:  Ms. Suni Zuulaga is a 38 year old HD#4 for abdominal pain and POD#7 s/p palliative laparoscopic colostomy, cystoscopy who was admitted to the SICU on 8/4/2023 s/p 1 liter of vaginal bleeding on the floor prompting MTP    PLAN:    Neurological:  # Acute pain   # Agitation   # Encephalopathy   - Monitor neurological status. Delirium preventions and precautions  - Pain: continue floor pain meds With tylenol, oxy, dilauded  - Sedation plan: none    Pulmonary:   # PAWAN  - Supplemental oxygen to keep saturation above 92%.  - Incentive spirometer every 15-30 minutes when awake.    Cardiovascular:    # Shock-distributive   - Monitor hemodynamic status. Currently off pressors with MAPs in the 70s.   - If hypotensive, assume bleeding and resume transfusion    Gastroenterology/Nutrition:  # NPO  - Meds okay  # Protein calorie deficit malnutrition   - No indication for parenteral nutrition.  #s/p end colostomy  - Has had high thin output  - Consider increasing loperamide tomorrow    Fluids/Electrolytes:   # No electrolyte abnormalities  - D51/2@100 for IV fluid hydration    Renal:  # Right sided perc neph tube  # recto-vaginal, cystovaginal and rectocysto fistulas  - Thomas would not drain bladder as bladder has multiple conduits for urine leakage  - Urine output is adequate . Will continue to monitor intake and output, assume perc neph tube output is half of total urine volume    Endocrine:  #Hx of hypothyroid  - Continue synthroid  # Stress hyperglycemia   # Diabetes mellitus   - No management indication. Goal to keep BG< 180 for optimal wound healing     ID:  -  no indications for antibiotics    Positive cultures:  - none    Heme:     # Vaginal  hemorrhage  - Will get CT scan to evaluate for any source of bleeding  - Hold perioperative eliquis  # Acute blood loss anemia   # Anemia of critical illness   - Hemoglobin 10.2 on admission  - Hgb unreliable in acute bleed  - Transfuse if hgb <7.0 or signs/symptoms of hypoperfusion or repeat bleeding. Monitor and trend.     MSK:   # Weakness and deconditioning of critical illness   - Physical and occupational therapy consult     General Cares/Prophylaxis:    DVT Prophylaxis: Pneumatic Compression Devices  GI Prophylaxis: PPI  Restraints: Restraints for medical healing needed: NO    Lines/ tubes/ drains:  - PIV x2    Disposition:  -  Surgical ICU.     Patient seen, findings and plan discussed with surgical ICU staff, Dr. Cardona.    Rush Bear MD  [facility] Resident, General Surgery  Overnight SICU/Consult Resident    - - - - - - - - - - - - - - - - - - - - - - - - - - - - - - - - - - - - - - - - - - - - - -   HISTORY PRESENTING ILLNESS: see above    REVIEW OF SYSTEMS: 10 point ROS deferred in critical care admission    PAST MEDICAL HISTORY:   Past Medical History:   Diagnosis Date    Cervical cancer (H)     Tobacco abuse        SURGICAL HISTORY:   Past Surgical History:   Procedure Laterality Date    CARPAL TUNNEL RELEASE RT/LT Right     COMBINED CYSTOSCOPY, INSERT STENT URETER(S) Bilateral 5/10/2023    Procedure: CYSTOSCOPY, WITH RIGHT URETERAL STENT INSERTION, Bilateral retrograde pyelogram.;  Surgeon: Robert Valladares MD;  Location: [facility] OR    COMBINED CYSTOSCOPY, RETROGRADES, EXCHANGE STENT URETER(S) Right 7/5/2023    Procedure: CYSTOSCOPY with fluoroscopy;  Surgeon: Robert Valladares MD;  Location: [facility] OR    CYSTOSCOPY, REMOVE STENT(S), COMBINED N/A 7/31/2023    Procedure: Cystoscopy;  Surgeon: Robert Valladares MD;  Location: [facility] OR    IR NEPHROSTOMY TUBE PLACEMENT RIGHT  7/25/2023    LAPAROSCOPIC COLOSTOMY N/A 7/31/2023    Procedure: Pelvic Exam under anesthesia with laparoscopic  colostomy;  Surgeon: Suzy Brambila MD;  Location: [facility] OR    TUBAL LIGATION         SOCIAL HISTORY:   Social History     Tobacco Use    Smoking status: Every Day     Packs/day: 1.00     Types: Cigarettes    Smokeless tobacco: Never    Tobacco comments:     9/11/2020 1/2 pack/day, tying tp quit   Substance Use Topics    Alcohol use: Yes     Comment: occ.    Drug use: Not Currently       FAMILY HISTORY:   Family History   Problem Relation Age of Onset    Breast Cancer Mother         8/2020 newly dx     deferred     ALLERGIES:   Allergies   Allergen Reactions    Clindamycin Hives       MEDICATIONS:  lidocaine (PF) (XYLOCAINE) 1 % injection 10 mL  lidocaine 1% with EPINEPHrine 1:100,000 injection 10 mL    apixaban ANTICOAGULANT (ELIQUIS) 2.5 MG tablet, Take 1 tablet (2.5 mg) by mouth 2 times daily for 26 days  Ascorbic Acid (VITAMIN C PO), Take 500 mg by mouth daily  celecoxib (CELEBREX) 100 MG capsule, Take 1 capsule (100 mg) by mouth 2 times daily . Stop ibuprofen.  diazepam (VALIUM) 2 MG tablet, Take 1 tablet (2 mg) by mouth 2 times daily as needed for anxiety AVOID if you are taking hydromorphone/dilaudid  HYDROmorphone (DILAUDID) 2 MG tablet, Take 1 tablet (2 mg) by mouth every 6 hours as needed for severe pain  levothyroxine (SYNTHROID/LEVOTHROID) 200 MCG tablet, Take 1 tablet (200 mcg) by mouth daily  magnesium oxide (MAG-OX) 400 MG tablet, Take 1 tablet (400 mg) by mouth daily  oxybutynin ER (DITROPAN XL) 5 MG 24 hr tablet, Take 1 tablet (5 mg) by mouth daily  polyethylene glycol (MIRALAX) 17 GM/Dose powder, Take 17 g by mouth daily as needed for constipation  tamsulosin (FLOMAX) 0.4 MG capsule, Take 1 capsule (0.4 mg) by mouth daily  vitamin B-12 (CYANOCOBALAMIN) 500 MCG tablet, Take 500 mcg by mouth daily        PHYSICAL EXAMINATION:  Temp:  [98  F (36.7  C)-98.6  F (37  C)] 98  F (36.7  C)  Pulse:  [79-94] 86  Resp:  [16] 16  BP: (102-121)/(69-87) 121/87  SpO2:  [93 %-96 %] 96 %    General: alert and  oriented adult female sitting up in bed in NAD   Neuro: moves extremities spontaneously, follows commands without delay or difficulty  Pulm/Resp: nonlabored breathing on RA  CV: RRR on monitor and by radial palpation  Abdomen: Soft, ntnd, no rebound tenderness or guarding. Dried blood to RLQ, LLQ ostomy with some sloughing tissue. Underlying tissue pink and well perfused (photo in chart)  : PNT catheter in place with clear yellow urine. Vaginal packing with bloody drainage. No active bleeding  MSK/Extremities: no peripheral edema, extremities wwp    LABS: Reviewed.   Arterial Blood Gases   No lab results found in last 7 days.  Complete Blood Count   Recent Labs   Lab 08/06/23  2323 08/06/23  0604 08/05/23  0707 08/04/23  1409 08/02/23  0532   WBC 9.6 6.5 5.9 5.5 5.9   HGB 10.1* 10.5* 9.9* 10.4* 8.4*   PLT  --  269 246 239 196     Basic Metabolic Panel  Recent Labs   Lab 08/06/23  0604 08/05/23  0707 08/04/23  1409 08/02/23  0532   * 139 140 139   POTASSIUM 4.0 3.7 3.8 4.1   CHLORIDE 99 103 103 105   CO2 25 25 26 26   BUN 10.8 11.6 9.4 20.6*   CR 0.55 0.50* 0.51 0.66   * 114* 97 86     Liver Function Tests  Recent Labs   Lab 08/04/23  1409   AST 17   ALT 10   ALKPHOS 81   BILITOTAL 0.2   ALBUMIN 3.5   INR 1.13     Pancreatic Enzymes  No lab results found in last 7 days.  Coagulation Profile  Recent Labs   Lab 08/04/23  1409   INR 1.13   PTT 30       IMAGING:  No results found for this or any previous visit (from the past 24 hour(s)).

## 2023-08-07 NOTE — ANESTHESIA PROCEDURE NOTES
Airway       Patient location during procedure: OR       Procedure Start/Stop Times: 8/7/2023 4:53 AM  Staff -        Anesthesiologist:  Shazia Rico MD       Resident/Fellow: Donny Matute MD       Performed By: residentIndications and Patient Condition       Indications for airway management: patrick-procedural       Induction type:RSI       Mask difficulty assessment: 0 - not attempted    Final Airway Details       Final airway type: endotracheal airway       Successful airway: ETT - single and Oral  Endotracheal Airway Details        ETT size (mm): 7.5       Cuffed: yes       Successful intubation technique: video laryngoscopy       VL Blade Size: MAC 4       Grade View of Cords: 1       Adjucts: stylet       Position: Center       Measured from: gums/teeth       Secured at (cm): 23       Bite block used: None    Post intubation assessment        Placement verified by: capnometry, equal breath sounds and chest rise        Number of attempts at approach: 1       Number of other approaches attempted: 0       Secured with: commercial tube magallon       Ease of procedure: easy       Dentition: Intact and Unchanged    Medication(s) Administered   Medication Administration Time: 8/7/2023 4:53 AM

## 2023-08-07 NOTE — PROGRESS NOTES
[id number]  Major Shift Events:  pt A&O x4. FARIAS. Pt extubated at 1240. Now on RA. No blood products given. Pressors off. Sedation off. Small amount of leakage/ bleeding from vaginal area., packing removed. Voids spontaneously. Insulin gtt off.  Plan: continue to trend labs. Floor orders tomorrow. No ICU needs. Pt is considering at home hospice care, SW to be involved. For vital signs and complete assessments, please see documentation flowsheets.   Ronaldo Medel RN on 8/7/2023 at 6:18 PM

## 2023-08-07 NOTE — PROCEDURES
[facility]    Procedure: IR Procedure Note    Date/Time: 8/7/2023 5:37 AM    Performed by: Behzadi, Heshmatzadeh, MD  Authorized by: Sher Cain MD      UNIVERSAL PROTOCOL   Site Marked: NA  Prior Images Obtained and Reviewed:  Yes  Required items: Required blood products, implants, devices and special equipment available    Patient identity confirmed:  Verbally with patient, arm band, provided demographic data and hospital-assigned identification number  Patient was reevaluated immediately before administering moderate or deep sedation or anesthesia  Confirmation Checklist:  Patient's identity using two indicators, relevant allergies, procedure was appropriate and matched the consent or emergent situation and correct equipment/implants were available  Time out: Immediately prior to the procedure a time out was called    Universal Protocol: the Joint Commission Universal Protocol was followed    Preparation: Patient was prepped and draped in usual sterile fashion       ANESTHESIA    Anesthesia: Local infiltration  Local Anesthetic:  Lidocaine 1% without epinephrine      SEDATION  Patient Sedated: Yes    Sedation Type:  Deep  Vital signs: Vital signs monitored during sedation    See dictated procedure note for full details.  Findings: See the General anesthesia note.     Specimens: none    Complications: None    Condition: Stable      PROCEDURE  Describe Procedure: 1. Ultrasound guided left common femoral arteriotomy.  2. 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  3. Catheterization and angiography of right internal iliac artery and embolization of pseudoaneurysm using 6 Giana pushable coils and NBCA Glue.  4. No active extravasation on the post embolization angiogram.   5. Catheterization and angiography of the leftt external and  internal iliac arteries which showed no active extravasation.   6.Proglide closure device of left common femoral arteriotomy.    Patient Tolerance:  Patient tolerated the procedure well with no immediate complications  Length of time physician/provider present for 1:1 monitoring during sedation: 60

## 2023-08-07 NOTE — PROGRESS NOTES
Progress Note    Called urgently to bedside ~0230 for sudden heavy vaginal bleeding and heart rate in the 130's. Upon arrival patient with large amount of clot on chux pad, very pale appearing. EBL at that time 1500 mLs. Attempted to place another roll of packing but patient was too uncomfortable. This writer stepped out of the room to call her family member, Nate Mena. Explained that she is very unstable and possible will not survive through the night. Asked if she had spoken with him about extent of interventions she would like to pursue. Family member was not sure, states he is 2 hours away and is unable to miss work in the morning, would be able to come later in the morning.    Writer then went back to bedside and discussed with patient that she was having a very significant amount of bleeding. Reviewed that she is currently full code and we are doing everything we can to keep her alive, but that if her wishes change and she instead wants to focus on comfort that we could make that shift. She expressed that she still would like us to do everything we can at this time including intubation and chest compressions. This writer verbalized her wishes to the ICU team that is caring for her.    Paged IR for potential embolization, awaiting response. At this time given the extent of tumor invasion into her right iliac artery, surgical management would be exceedingly difficult with significant morbidity.    Svetlana Noguera MD  Ob/Gyn Resident, PGY-3  08/07/2023 3:39 AM     Called pt's partner Nate again to give an update that she is losing a significant amount of blood and that patient requiring multiple pressors. He confirmed that patient does not speak with her children and has met her mother who is next of kin but doesn't know her number. I recommended Nate come to the hospital now, can give him a work letter or whatever he needs for excuse to miss work.    Suni is able to speak with me. I told her that she is  bleeding very quickly and we are doing what we can to keep up with replacement and pressor support however if interventional radiology cannot embolize then we have no other options. IR called back, they are assembling a team to attempt emergent embolization now. Suni is aware that if she arrests that CPR would be futile.    She does not have her mother's number and does not want me to call anyone else other than Don.     Will continue MTP with plan for attempt at IR embolization.     Debo Cash MD  Gynecology Oncology Fellow  August 7, 2023 , 3:52 AM

## 2023-08-07 NOTE — ANESTHESIA CARE TRANSFER NOTE
Patient: Suni Zuluaga    Procedure: * No procedures listed *       Diagnosis: * No pre-op diagnosis entered *  Diagnosis Additional Information: No value filed.    Anesthesia Type:   No value filed.     Note:    Oropharynx: endotracheal tube in place  Level of Consciousness: iatrogenic sedation    Level of Supplemental Oxygen (L/min / FiO2): 50  Independent Airway: airway patency not satisfactory and stable  Dentition: dentition unchanged  Vital Signs Stable: post-procedure vital signs reviewed and stable  Report to RN Given: handoff report given  Patient transferred to: ICU    ICU Handoff: Call for PAUSE to initiate/utilize ICU HANDOFF, Identified Patient, Identified Responsible Provider, Reviewed the Pertinent Medical History, Discussed Surgical Course, Reviewed Intra-OP Anesthesia Management and Issues during Anesthesia, Set Expectations for Post Procedure Period and Allowed Opportunity for Questions and Acknowledgement of Understanding      Vitals:  Vitals Value Taken Time   BP     Temp 34.3  C (93.74  F) 08/07/23 0700   Pulse 144 08/07/23 0700   Resp 21 08/07/23 0700   SpO2 82 % 08/07/23 0648   Vitals shown include unvalidated device data.    Electronically Signed By: Donny Matute MD  August 7, 2023  7:00 AM

## 2023-08-07 NOTE — PLAN OF CARE
/87 (BP Location: Left arm)   Pulse 86   Temp 98  F (36.7  C) (Oral)   Resp 16   SpO2 96%     Care from: 1500 - 1900    VS & Pain: VSS on RA. Soft BP, provider aware. Pt denies pain this shift  Neuro: AxO x4  Respiratory: denies shortness of breath   Cardiac: no acute distress noted   Peripheral neurovascular: wdl, denies numbness & tingling   GI/: + bowel sounds. Colostomy in place- 200 mL output this shift. Pass present. R. PNT  in place with minimal output-- provider aware, incontinent of urine   Nutrition: clear. Very minimal fluid intake. Pt had emesis of 300mL- provider paged. C/o nausea, IV Compazine given 1. Pt had emesis 100 mL- provider notified   Skin: no new issues noted  Musculoskeletal: generalized weakness   Lines: L. PIV- infusing D5%+0.45%Nacl @ 100 mL/hr  Activity: SBA with walker      Plan:continue plan of care    Goal Outcome Evaluation:  Plan of Care Reviewed With: patient, significant other  Overall Patient Progress: improving  Outcome Evaluation: Pt denies pain. Colostomy output 200 mL this shift. Pt had emesis of 300mL- provider paged.

## 2023-08-07 NOTE — PROGRESS NOTES
Cared for pt 1900 until transfer to 4E. Prior to rapid: pt A/Ox4, VSS on RA, up SBA w/home walker. Colostomy with liquid output. R PNT with low urine output, wearing brief for urine incontinence and slight vaginal discharge. Emesis of ~150mL, provider aware. PRN IV Zofran given with some relief. Colostomy bag changed due to leakage.    At approx 2300, pt pushed call light, asking to speak to RN. Upon arrival, pt upside down in bed, stated she had fallen and hit her head and pulled herself back into bed. Large amount of bleeding in brief, sheets, floor. RRT called, provider paged, see RRT note for more information. Report given to JACQUI Chu, on 4E where pt transferred.

## 2023-08-07 NOTE — PROGRESS NOTES
Please see note from RRT and Dr Noguera for more details. Came to see patient due to new onset heavy vaginal bleeding and RRT. When I arrived, patient had been transferred to the SICU per protocol when MTP was initiated. Patient received 2u pRBC, 1u FFP and 1g TXA. 1/4 roll of Vaginal packing placed by Dr Noguera    Patient Vitals for the past 24 hrs:   BP Temp Temp src Pulse Resp SpO2   08/06/23 2356 96/66 -- -- 104 12 96 %   08/06/23 2350 (!) 78/59 -- -- 102 20 96 %   08/06/23 2345 (!) 88/70 -- -- 111 11 95 %   08/06/23 1518 121/87 98  F (36.7  C) Oral 86 16 96 %   08/06/23 1227 110/69 98.1  F (36.7  C) Oral 90 16 95 %   08/06/23 0625 102/69 98.3  F (36.8  C) Oral 94 16 95 %   08/06/23 0233 112/79 98.6  F (37  C) Oral 79 16 93 %        Gen: Pt was alert and oriented, laying in bed, notes that she is nauseated but minimal abdominal pain. Frustrated with her leaking ostomy which greatly increased from earlier in the day.  Card: mildly tachycardic  Pulm: breathing easily on room air  Abd: soft, nontender, nondistended. Ostomy with thin liquid, nonbloody stool in bag. Stoma is dusky, with slight retraction at 9o'clock. Probed ostomy - finger passed easily through fascia and deep mucosa appears congested but viable. Photos placed in chart  : vaginal packing in place, appears saturated with small amt of blood on chux below.    Pt appears stable now for transport to CT for CT head and A/P. Will discontinue MTP at this time, monitor vaginal bleeding and vitals closely. Repeat labs in 4h at 0400. Continue NPO with mIVF for now, discontinue Eliquis. Plan discussed with SICU team and primary Dr Castano.     Debo Cash MD  Gynecology Oncology Fellow  August 7, 2023 , 1:11 AM      Please call [phone] Gyn Onc resident with acute questions or concerns.

## 2023-08-07 NOTE — PROGRESS NOTES
GYN ONC STAFF PROGRESS Note      Reviewed events overnight including severe hemorrhagic shock (9+ U EBL from vagina) due to erosive recurrent cervical cancer.     Tiffanie is drowsy but does open eyes to stimuli. Tachycardic, hypotensive, on pressors. Abd is distended with stool in ostomy. Having pink/red drainage from vagina but no active/brisk bleeding.     Discussed events with Tiffanie's friend/partner Nate. Tiffanie is well known to me and I have cared for her since her diagnosis in 2020. Discussed progressive cancer as the cause for these fistulas as well as massive bleeding overnight (erosion into pelvic vasculature). Discussed that there is no additional cancer treatment that I would recommend for her. Discussed that tiffanie will die from disease. If she is able to wake up, be weaned from pressors and extubated, I would recommend transition to hospice care (home hospice vs inpatient).  If she has another major event including another large bleed, I would NOT recommend aggressive interventions as these would be futile in the setting of recurrent progressive cervical cancer.       Don told me it is Tiffanie's preference to not die in the hospital and not die alone.  Very limited social support (lives alone, estranged from mother and children, has limited visitation of her 8 year old daughter).    GYN ONC Will continue to follow closely and assist with transition to end of life care.          Suzy Brambila MD  Gynecologic Oncology  Pager [phone]

## 2023-08-07 NOTE — PROGRESS NOTES
SURGICAL ICU PROGRESS NOTE  08/07/2023      PRIMARY TEAM: Gyn Onc  PRIMARY PHYSICIAN: Dr. Omaira Ambrosio  REASON FOR CRITICAL CARE ADMISSION: Massive transfusion protocol  ADMITTING PHYSICIAN: Dr. 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 She was hospitalized 7/31-8/3 post op, presented to Gyn Onc on 8/4 with persistent fecal and fluid discharge from her vagina and worsening abdominal pain, admitted for obs and serial abdominal exams d/t concern for possible ileus vs early ostomy ischemia. 8/6 RRT called in setting of heavy blood loss from vagina, MTP activated and pt transferred to SICU for further resuscitation. Now s/p right internal iliac artery pseudoaneurysm embolization 8/7.     Overnight Events:  - 8/6 2300: RN found pt on the ground with a large pool of blood near vagina (EBL 1L), pt reported hitting her head; RRT called and MTP activated, pt transferred to SICU service  - CT head w/o acute bleed, CTA A/P showed R iliac artery pseudoaneurysm and tumor invasion but no active extravasation   - 0230: sudden heavy vaginal bleeding, tachy 130s; EBL 1500 ml   - IR consulted; repeat imaging showed brisk extravasation from ruptured pseudoaneurysm, pt now s/p R internal iliac artery embolization   - Remains mechanically ventilated, sedated and on pressure support    Today's Changes  - PST -> pt extubated @ 1300  - Goals of care discussion with Gyn-Onc will influence plan for further resuscitation  - Nutrition consult    Neurological:  # Acute pain  - Monitor neurological status. Delirium preventions and precautions  - Pain: APAP q4 PRN, oxy q4 PRN  - Sedation plan: None  - Head CT 8/6: No acute intracranial pathology     Pulmonary:   Arterial Blood Gases   Recent Labs   Lab 08/07/23  0809 08/07/23  0556   PH 7.49*  7.26*   PCO2 32* 44   PO2 269* 530*   HCO3 24 20*     - Pt able to successfully PS x >30 minutes, 5/5 FiO2 40%. Extubated without complications. Saturating well on 2L NC.   - Supplemental oxygen to keep saturation above 92%.  - CXR 8/7: No pulmonary opacities    Cardiovascular:    # Shock-hemorrhagic  - S/p MTP -> 23 units RBCs, 18 FFP, 6 platelets  - Off pressors, meeting goal MAP > 65  - S/p 1L LR bolus d/t pulse pressure variation on art line waveform   - Continue to monitor HD status    Gastroenterology/Nutrition:  Liver Function Tests  Recent Labs   Lab 08/07/23  0330 08/07/23  0254 08/06/23  2323 08/04/23  1409   AST  --   --   --  17   ALT  --   --   --  10   ALKPHOS  --   --   --  81   BILITOTAL  --   --   --  0.2   ALBUMIN  --   --   --  3.5   INR 1.51* 1.29* 1.20* 1.13     # Nutrition  # Protein calorie deficit malnutrition   # S/p palliative Sifuentes procedure  - CLD OK per gyn-onc, advanced as tolerated  - No indication for parenteral nutrition  - Bowel regimen: scheduled Miralax, Senna    Fluids/Electrolytes/Renal:   I/O last 3 completed shifts:  In: 50 [P.O.:50]  Out: 1690 [Urine:440; Emesis/NG output:750; Stool:500]  Basic Metabolic Panel  Recent Labs   Lab 08/07/23  1254 08/07/23  1134 08/07/23  1046 08/07/23  0952 08/07/23  0812 08/07/23  0702 08/07/23  0634 08/07/23  0556 08/07/23  0330 08/07/23  0326 08/07/23  0250 08/07/23  0243 08/06/23  0604 08/05/23  0707 08/04/23  1409   NA  --   --   --   --   --  144  --  147*  --  140 135  --  135* 139 140   POTASSIUM  --   --   --   --   --  3.1*  --  3.0* 3.9 3.8 3.8   < > 4.0 3.7 3.8   CHLORIDE  --   --   --   --   --  107  --   --   --   --   --   --  99 103 103   CO2  --   --   --   --   --  19*  --   --   --   --   --   --  25 25 26   BUN  --   --   --   --   --  10.2  --   --   --   --   --   --  10.8 11.6 9.4   CR  --   --   --   --   --  0.49*  --   --   --   --   --   --  0.55 0.50* 0.51   GLC 82 135* 160* 191*   < > 286*   < > 310*  --  244* 194*    < > 110* 114* 97    < > = values in this interval not displayed.     M.5  Ical: 4.2    # Lactic acidosis, improving  - 7.7 -> 3.6, in setting of hemorrhagic shock requiring aggressive resuscitation and pressure support  - S/p 1L LR bolus  - Continue q6h lactate checks    # Hypokalemia  # Hypocalcemia  # Hypomagnesemia   - K 3.0, Gyn-Onc ordered 20 meq K+ replacement, recheck ordered  - Ical 4.9 -> 4.2, s/p 2.5g CaCl this AM, recheck ordered   - Mg 1.5, replaced per unit protocol  - High electrolyte repletion protocol  - Daily BMP, Mg, phos     # PAWAN, Cr 0.49      # Hx stage III C1 cervical SCC  - s/p chemo RT (-10/20), T&R brachy (). Recurrence 10/22, s/p 7C carbo/taxol/johny/pembro (10/22-3/23), 5C pembro/johny (3-), 1C pembro (), fluoro gastrograffin study () w/ c/f rectovaginal fistula. Now s/p lap palliative end colostomy.  - Appreciate supportive cares per Gyn-Onc    Endocrine:  # Stress hyperglycemia   - Glucose 185-310/24h  - Will start insulin gtt today  - Goal to keep BG< 180 for optimal wound healing     # Hypothyroidism  - Continue PTA levothyroxine 200 mcg daily    ID:  # Leukocytosis  - Afebrile  - Likely stress induced in setting of aggressive resuscitation  - No indications for antibiotics    Heme:     Complete Blood Count   Recent Labs   Lab 23  0702 23  0556 23  0330 23  0326 23  0254 23  0250 23  2323   WBC 13.7*  --  10.4  --  11.7*  --  9.6   HGB 15.2 8.4* 6.8* 6.1* 8.5*   < > 10.1*   PLT 87*  --  190  --  285  --  393    < > = values in this interval not displayed.     # Acute blood loss anemia   # Thrombocytopenia  # S/p MTP - 23 units RBCs, 18 FFP, 6 platelets  # S/p R internal iliac artery coil embolization 2023  - Hemoglobin 8.4, plt 87  - INR 1.51, PTT 33, fibrinogen 235  - No obvious ongoing bleeding  - Will get TEG to predict what blood products she may require for future resuscitation  - Continue Q6h hgb checks  -  Transfuse if hgb <7.0 or signs/symptoms of hypoperfusion. Monitor and trend.     MSK:  # Weakness and deconditioning of critical illness  - Physical and occupational therapy consult     General Cares/Prophylaxis:    DVT Prophylaxis: Periop apixaban held in setting of MTP  GI Prophylaxis: PPI  Restraints: Restraints for medical healing needed: NO    Lines/ tubes/ drains:  - RIJ CVC  - A-line, radial  - PIVx3  - Colostomy  - R nephrostomy    Disposition:  - Surgical ICU for further stabilization and/or resuscitation.     Patient seen and discussed with staff Dr. Addison.     Brandee Moses, MS4  [employer]    Resident Attestation   I, Daniela Valdez MD, was present with the medical student who participated in the service and in the documentation of the note. I have verified the history and personally performed the physical exam and medical decision making. I agree with the assessment and plan of care as documented in the note and have edited where appropriate.      Daniela Valdez MD  Orthopaedic Surgery, PGY-1  Date of Service: 08/07/2023      ====================================  SUBJECTIVE: Patient back from IR this AM. Intubated and sedated. Fiance and friend are present at bedside.     OBJECTIVE:     Temp:  [98  F (36.7  C)-98.1  F (36.7  C)] 98.1  F (36.7  C)  Pulse:  [] 112  Resp:  [11-35] 21  BP: ()/(22-87) 114/50  MAP:  [104 mmHg-112 mmHg] 112 mmHg  Arterial Line BP: (132-141)/(81-90) 141/90  FiO2 (%):  [100 %] 100 %  SpO2:  [80 %-100 %] 97 %  FiO2 (%): 100 %  Resp: 21    I/O last 3 completed shifts:  In: 50 [P.O.:50]  Out: 1690 [Urine:440; Emesis/NG output:750; Stool:500]    General/Neuro: Sedated. Able to make purposeful movements, though minimally responsive to verbal stimuli.   CV: RRR, no r/g/m  Pulm: Mechanically ventilated. Unlabored breathing on above vent settings.   Abd: soft; NT, ND. Ostomy with stool output.   Extremities: No edema.   Skin:  [facility]    LABS:   Arterial Blood Gases   Recent Labs   Lab 08/07/23  0556   PH 7.26*   PCO2 44   PO2 530*   HCO3 20*     Complete Blood Count   Recent Labs   Lab 08/07/23  0556 08/07/23 0330 08/07/23 0326 08/07/23 0254 08/07/23 0250 08/06/23 2323 08/06/23  0604   WBC  --  10.4  --  11.7*  --  9.6 6.5   HGB 8.4* 6.8* 6.1* 8.5*   < > 10.1* 10.5*   PLT  --  190  --  285  --  393 269    < > = values in this interval not displayed.     Basic Metabolic Panel  Recent Labs   Lab 08/07/23  0556 08/07/23 0330 08/07/23 0326 08/07/23 0250 08/07/23 0243 08/06/23 0604 08/05/23 0707 08/04/23  1409 08/02/23  0532   *  --  140 135  --  135* 139 140 139   POTASSIUM 3.0* 3.9 3.8 3.8  --  4.0 3.7 3.8 4.1   CHLORIDE  --   --   --   --   --  99 103 103 105   CO2  --   --   --   --   --  25 25 26 26   BUN  --   --   --   --   --  10.8 11.6 9.4 20.6*   CR  --   --   --   --   --  0.55 0.50* 0.51 0.66   *  --  244* 194* 185* 110* 114* 97 86     Liver Function Tests  Recent Labs   Lab 08/07/23 0330 08/07/23 0254 08/06/23 2323 08/04/23  1409   AST  --   --   --  17   ALT  --   --   --  10   ALKPHOS  --   --   --  81   BILITOTAL  --   --   --  0.2   ALBUMIN  --   --   --  3.5   INR 1.51* 1.29* 1.20* 1.13     Pancreatic Enzymes  No lab results found in last 7 days.  Coagulation Profile  Recent Labs   Lab 08/07/23 0330 08/07/23 0254 08/06/23 2323 08/04/23  1409   INR 1.51* 1.29* 1.20* 1.13   PTT 33  --  27 30         IMAGING:   Recent Results (from the past 24 hour(s))   CT Head w/o Contrast    Impression    RESIDENT PRELIMINARY INTERPRETATION  IMPRESSION: No CT evidence of acute intracranial pathology.      CTA Abdomen Pelvis with Contrast    Impression    RESIDENT PRELIMINARY INTERPRETATION  IMPRESSION:   1. No evidence of acute contrast extravasation. Mass invades the right  internal iliac artery with a pseudoaneurysm formation, although this  is similar to prior imaging and it is uncertain if this is the source  of  patient's hemorrhage. Recommend STAT CTA in case of a rebleed to  assess for source.  2. Hemorrhagic products are visualized in the bladder and vagina.  3. Overall unchanged appearance of known cervical malignancy with  rectovaginal and vesicovaginal fistulas.  4. Mildly enlarged large and distal small bowel with liquid stool. No  convincing evidence of obstruction. Per chart review and ordering  provider, ileostomy output has increased. 5. Improvement of urothelial  enhancement in the right renal pelvis.  6. Otherwise stable appearance of the abdomen and pelvis.   XR Chest Port 1 View    Impression    RESIDENT PRELIMINARY INTERPRETATION  Impression: Right IJ central line with the tip in the distal right  internal jugular vein. No pulmonary opacities.

## 2023-08-07 NOTE — PROGRESS NOTES
Progress Note    *Delayed entry due to patient acuity*    S: Called to bedside by RN after patient had a fall and hit her head. An RRT was called. Upon arrival patient lying on her side in a large pool of blood near vagina. Patient reports she had called for help with her ostomy bag and then noted she was bleeding from below. She tried to stand up to go to the bathroom and feel out of bed and hit her head. She reports she is currently feeling dizzy and nauseous.    O:  /73   Pulse 92   Temp 98.1  F (36.7  C) (Oral)   Resp 14   SpO2 96%      Exam:  Gen: cachectic-appearing female with large amount of blood on chux pad.  HEENT: no obvious bruising, bleeding, or deformity on forehead where patient indicates she hit her head  Cardiac: RRR, no murmurs  Pulm: clear to auscultation bilaterally  Abd: Soft, minimally tender, non-distended  Ostomy: reddish-pink stoma, ostomy bag nearly full with light brown liquid  Ext: thin, well perfused  : 300-400 mL of dark red organized clot on chux with active bleeding from vagina/rectum    Procedure:  Consent for vaginal packing placement was obtained. Approximately 1/4 roll of vaginal packing was placed before patient asked to stop due to discomfort. Monsel's was ordered by not available in hospital    EBL: 900 mL by pad weights    A/P: Suni Zuluaga is a 38 year old POD#7 s/p lsc palliative end colostomy, HD#4 abdominal pain, decreased ostomy output now with heavy vaginal bleeding and recent fall onto head. Given ongoing vaginal bleeding with high EBL, MTP was activated and vagina was packed with 1/4 roll of vaginal packing. Higher level of care necessary and transfer to ICU requested. Once stabilized will plan for STAT CT head.    #Vaginal bleeding  #Hemodynamic instability  -   - STAT CBC, INR, PTT, fibrinogen, T&S  - Vagina packed with 1/4th roll of vaginal packing, monsel's not available  - MTP activated given heavy ongoing blood loss  - CTA A/P   - To ICU  for higher level cares, appreciate assistance    #Fall, head trauma  - STAT CT head for r/o bleeding    Plan discussed with Dr. Cash and Dr. Castano. Gyn Onc will continue to follow.    Svetlana Noguera MD  Ob/Gyn Resident, PGY-3  08/07/2023 1:48 AM

## 2023-08-07 NOTE — PHARMACY-CONSULT NOTE
Pharmacy Tube Feeding Consult    Medication reviewed for administration by feeding tube and for potential food/drug interactions.    Recommendation: Changed oxybutynin from 5 mg ER daily to 5 mg IR BID (similar starting doses). No other changes are needed at this time.     Pharmacy will continue to follow as new medications are ordered.    Lelo Sampson RPH on 8/7/2023 at 12:44 PM

## 2023-08-07 NOTE — ANESTHESIA PROCEDURE NOTES
Arterial Line Procedure Note    Pre-Procedure   Staff -        Anesthesiologist:  Shazia Rico MD       Resident/Fellow: Donny Matute MD       Performed By: resident       Location: OR       Pre-Anesthestic Checklist: patient identified, IV checked, risks and benefits discussed, informed consent, monitors and equipment checked, pre-op evaluation and at physician/surgeon's request  Timeout:       Correct Patient: Yes        Correct Procedure: Yes        Correct Site: Yes        Correct Position: Yes   Line Placement:   This line was placed Post Induction  Procedure   Procedure: arterial line       Diagnosis: HD monitoring       Laterality: left       Insertion Site: radial.  Sterile Prep        Standard elements of sterile barrier followed       Skin prep: Chloraprep  Insertion/Injection        Technique: ultrasound guided        1. Ultrasound was used to evaluate the access site.       2. Artery evaluated via ultrasound for patency/adequacy.       3. Using real-time ultrasound the needle/catheter was observed entering the artery/vein.       5. The visualized structures were anatomically normal.       6. There were no apparent abnormal pathologic findings.       Catheter Type/Size: 20 G, 1.75 in/4.5 cm quick cath (integral wire)  Narrative         Secured by: suture       Tegaderm and Biopatch dressing used.       Complications: None apparent,        Arterial waveform: Yes        IBP within 10% of NIBP: Yes

## 2023-08-07 NOTE — PROGRESS NOTES
CLINICAL NUTRITION SERVICES - ASSESSMENT NOTE     Nutrition Prescription    RECOMMENDATIONS FOR MDs/PROVIDERS TO ORDER:  Monitor lytes and replace as indicated with advancement to goal TF rate pending GOC.    Malnutrition Status:    Unable to determine due to unable to obtain nutrition history or NFPE at this time d/t pt busy receiving cares from other providers    Recommendations already ordered by Registered Dietitian (RD):  EN support via OGT:  Osmolite 1.5 Jesse (or equivalent) @ goal of  35ml/hr  (840ml/day) + 1 pkt ProSource TF20 BID (2 pkts total) provides: 1420 kcals (28 kcal/kg), 92 g PRO (1.8 g pro/kg), 640 ml free H20, 171 g CHO, and 0 g fiber daily.   - Once OGT verified by AXR, initiate @ 15 mL/hr and advance by 10 mL q8hr as tolerated to goal rate.   - Do not start or advance unless K+ >/= 3, Mg++ >/=1.5, and phos >/=1.9  - 30 mL q4hr fluid flushes for tube patency. Additional fluids and/or adjustments per MD.   - Multivitamin/mineral (15 mL/day via FT) to help ensure micronutrient needs being met with suspected hypermetabolic demands and potential interruptions to TF infusions.  - Gastric access: HOB >30 degrees.     To reduce risk of refeeding, ordering 100 mg thiamine daily x7 days    Future/Additional Recommendations:  Monitor GOC.     REASON FOR ASSESSMENT  Suni Zuluaga is a/an 38 year old female assessed by the dietitian for Admission Nutrition Risk Screen for positive (wt loss, poor PO/appetite) and Provider Order - Registered Dietitian to Assess and Order TF per Medical Nutrition Therapy Protocol    NUTRITION HISTORY  Pt not previously known to Clinical Nutrition.    No food allergies noted per chart.    Attempted to meet with pt/family at bedside; however, pt busy with other providers or cares during 3 attempts. Will attempt at a future date when more appropriate. Writer notes discussions of Palliative and Hospice care.    CURRENT NUTRITION ORDERS  Diet: NPO    LABS  Labs  reviewed  -Hypokalemia  Electrolytes  Potassium (mmol/L)   Date Value   08/07/2023 3.1 (L)   08/07/2023 3.9   10/27/2020 3.3 (L)   10/20/2020 3.5   10/12/2020 3.7     Potassium POCT (mmol/L)   Date Value   08/07/2023 3.0 (L)   08/07/2023 3.8   08/07/2023 3.8    Blood Glucose  Glucose (mg/dL)   Date Value   08/07/2023 286 (H)   08/06/2023 110 (H)   08/05/2023 114 (H)   08/04/2023 97   08/02/2023 86   10/06/2022 99   04/30/2021 103 (A)   10/27/2020 118 (H)   10/20/2020 102 (H)   10/12/2020 98     GLUCOSE BY METER POCT (mg/dL)   Date Value   08/07/2023 259 (H)   08/07/2023 272 (H)   08/07/2023 185 (H)   07/31/2023 125 (H)     Glucose Whole Blood POCT (mg/dL)   Date Value   08/07/2023 310 (H)   08/07/2023 244 (H)   08/07/2023 194 (H)    Inflammatory Markers  WBC (10e9/L)   Date Value   10/27/2020 3.0 (L)   10/20/2020 2.7 (L)   10/12/2020 3.6 (L)     WBC Count (10e3/uL)   Date Value   08/07/2023 13.7 (H)   08/07/2023 10.4   08/07/2023 11.7 (H)     Albumin (g/dL)   Date Value   08/04/2023 3.5   07/07/2023 3.8   06/16/2023 3.8   10/27/2020 3.8   10/20/2020 3.2 (L)   10/12/2020 3.2 (L)      Magnesium (mg/dL)   Date Value   07/07/2023 1.6 (L)   06/16/2023 2.2   05/26/2023 1.9   10/27/2020 1.6   10/20/2020 1.7   10/12/2020 1.8     Sodium (mmol/L)   Date Value   08/07/2023 144   08/06/2023 135 (L)   08/05/2023 139   10/27/2020 138   10/20/2020 138   10/12/2020 138     Sodium POCT (mmol/L)   Date Value   08/07/2023 147 (H)   08/07/2023 140   08/07/2023 135    Renal  Urea Nitrogen (mg/dL)   Date Value   08/07/2023 10.2   08/06/2023 10.8   08/05/2023 11.6   10/27/2020 9   10/20/2020 10   10/12/2020 8     Creatinine (mg/dL)   Date Value   08/07/2023 0.49 (L)   08/06/2023 0.55   08/05/2023 0.50 (L)   10/27/2020 0.57   10/20/2020 0.51 (L)   10/12/2020 0.49 (L)     Additional  Ketones Urine (mg/dL)   Date Value   06/16/2023 Negative        MEDICATIONS  Medications reviewed  -Simethicone QID  -Insulin gtt  -Norepi  "gtt    ANTHROPOMETRICS  Height:   Ht Readings from Last 1 Encounters:   07/31/23 1.6 m (5' 3\")   Most Recent Weight: 53.6 kg (118 lb 2.7 oz)    IBW: 52.3 kg   BMI: 20.93 kg/m2; Normal BMI  Weight History: ~22.5% wt loss over past ~3 months when using drier, PTA wt of 110 lbs.  Wt Readings from Last 20 Encounters:   08/07/23 53.6 kg (118 lb 2.7 oz)   07/31/23 49.9 kg (110 lb)   07/25/23 49.9 kg (110 lb)   07/17/23 52.2 kg (115 lb)   07/07/23 52.6 kg (116 lb)   07/05/23 55.8 kg (123 lb)   06/16/23 56.1 kg (123 lb 11.2 oz)   05/26/23 62.3 kg (137 lb 6.4 oz)   05/10/23 64.4 kg (142 lb)   05/05/23 64.6 kg (142 lb 6.4 oz)   04/14/23 65.4 kg (144 lb 3.2 oz)   03/24/23 65 kg (143 lb 3.2 oz)   03/03/23 65.3 kg (144 lb)   02/10/23 64.5 kg (142 lb 4.8 oz)   01/20/23 62.1 kg (137 lb)   12/30/22 59.9 kg (132 lb)   12/05/22 58.4 kg (128 lb 12.8 oz)   11/25/22 59 kg (130 lb 1.6 oz)   11/04/22 57.7 kg (127 lb 3.2 oz)   10/13/22 63.2 kg (139 lb 6.4 oz)   Dosing Weight: 50 kg (based on suspect drier PTA wt of 49.9 kg on 7/31)    ASSESSED NUTRITION NEEDS  Estimated Energy Needs: 0536-4300 kcals/day (25 - 30 kcals/kg)  Justification: Maintenance  Estimated Protein Needs:  grams protein/day (1.5 - 2 grams of pro/kg)  Justification: Hypercatabolism with critical illness  Estimated Fluid Needs: 1 mL/kcal   Justification: Maintenance or Per provider pending fluid status    PHYSICAL FINDINGS  See malnutrition section below.  -ETT  -OGT (AXR pending)     MALNUTRITION  % Intake: Unable to assess  % Weight Loss: > 7.5% in 3 months (severe)  Subcutaneous Fat Loss: Unable to assess  Muscle Loss: Unable to assess  Fluid Accumulation/Edema: None noted  Malnutrition Diagnosis: Unable to determine due to unable to obtain nutrition history or NFPE at this time d/t pt busy receiving cares from other providers    NUTRITION DIAGNOSIS  Inadequate protein-energy intake related to NPO status in setting of intubation as evidenced by pt currently " meeting 0% minimum assessed needs (not accounting for kcal from lipid/dextrose-containing medications).    INTERVENTIONS  Implementation  -Nutrition Education: Not appropriate at this time due to patient condition   -Collaboration with other providers: Rounds  -Enteral Nutrition - Initiate  -Feeding tube flush  -Multivitamin/mineral supplement therapy     Goals  Once TF reaches goal rate, total avg nutritional intake to meet a minimum of 25 kcal/kg and 1.5 g PRO/kg daily (per dosing wt 50 kg).     Monitoring/Evaluation  Progress toward goals will be monitored and evaluated per protocol.  Chelsea Baron RD, LD  Pager: [phone]

## 2023-08-07 NOTE — IR NOTE
Patient Name: Suni Zuluaga  Medical Record Number: [id number]  Today's Date: 8/7/2023    Procedure: Visceral angiogram, pelvic arterial embolization  Proceduralist: Dr. Shrestha, Dr. Heshmatzaday Behzadi     Procedure Start: 450  Procedure end: 527  Sedation medications administered: none     Report given to: Kimberley OSMAN 4E  : na    Left femoral artery accessed.  Perclose proglide closure device deployed at 525.  2 hours flat bedrest.    Pt very unstable on arrival with unit staff/flyer RN's. Pt on MTP for previous 2 hours, maxed on 3 pressors. Massive vaginal bleeding. Pt required intubation and post intubation case managed by anesthesia staff.     Other Notes: Pt arrived to IR room 1 from . No consent, emergent. Pt positioned supine and monitored per protocol. Pt transferred back to .

## 2023-08-07 NOTE — PROGRESS NOTES
Gynecologic Oncology Progress Note  8/7/2023      Paged by SICU that patient and family are requesting to learn more about hospice options. I went to see the patient.     She was sleeping but easily arousable to verbal stimuli. Patient reports she is doing ok, feeling tired, denies pain. Has had small amount of liquids without nausea or vomiting. Ostomy is active for stool. Continues to be incontinent of urine and have passed small amount of blood and clots. RN reports vaginal packing fell out.      O:  BP 90/61   Pulse 84   Temp (P) 98.2  F (36.8  C) (Oral)   Resp (P) 16   Wt 53.6 kg (118 lb 2.7 oz)   SpO2 99%   BMI 20.93 kg/m        Gen: Easily arousable. Closes eyes during conversation.   Resp: No resp distress  : Small amount of bleeding noted on pad and mixed with inc urine leakage. No packing visualized externally. No packing felt at distal vagina. Full vaginal exam deferred due to bleeding.    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 Patient developed vaginal hemorrhage overnight and was transferred to the ICU for a higher level of care and MTP was initiated. CTA A/P revealed tumor invading into the right internal iliac artery without active extravasation of contrast. However the patient developed significant hemorrhage was started on MTP and underwent embolization by IR after which she began to stabilize. She is currently extubated and admitted to the SICU.     Discussed with patient emergent events from last night. Discussed that Dr Brambila ((patients primary oncologist) was here to see her this morning while she was intubated. Dr Brambila spoke with patient's partner Don about about the events of last night as well as her progressive cancer and that no additional cancer treatment would be recommended. Patient  reports she has not spoken with Don about this and she has not heard that there would not be additional treatment offered. Discussed that Dr Brambila would recommend best supportive care with hospice service. Reviewed that there are options as to were hospice can be done. Patient reported that she does not want to go to a facility for hospice and only wants to transition home. She would be willing to meet with social work for an informational session to learn more about hospice services. Patient closing eyes throughout the conversation and appeared to be tired to continue further discussion at this time. Agreed to continued conversation regarding care. Will also need further conversation about her code status.   Missy Saravia, APRN DNP, CNP  8/7/2023 4:02 PM

## 2023-08-07 NOTE — PROCEDURES
[facility]    Central line    Date/Time: 8/7/2023 3:41 AM    Performed by: Lolis Arambula MD  Authorized by: Lolis Arambula MD  Indications: vascular access      UNIVERSAL PROTOCOL   Site Marked: NA  Prior Images Obtained and Reviewed:  NA  Required items: Required blood products, implants, devices and special equipment available    Patient identity confirmed:  Arm band  NA - No sedation, light sedation, or local anesthesia  Confirmation Checklist:  Procedure was appropriate and matched the consent or emergent situation  Time out called: Emergent.    Universal Protocol: the Joint Commission Universal Protocol was followed    Preparation: Patient was prepped and draped in usual sterile fashion    ESBL (mL):  5     ANESTHESIA    Local Anesthetic:  Lidocaine 1% without epinephrine  Anesthetic Total (mL):  5      SEDATION    Patient Sedated: No      Preparation: skin prepped with 2% chlorhexidine  Skin prep agent dried: skin prep agent completely dried prior to procedure  Sterile barriers: all five maximum sterile barriers used - cap, mask, sterile gown, sterile gloves, and large sterile sheet  Hand hygiene: hand hygiene performed prior to central venous catheter insertion  Patient position: Trendelenburg  Catheter type: cordis  Catheter size: 8.5 Fr  Ultrasound guidance: yes  Sterile ultrasound techniques: sterile gel and sterile probe covers were used  Number of attempts: 1  Successful placement: yes  Post-procedure: line sutured and dressing applied  Assessment: blood return through all ports and placement verified by x-ray      PROCEDURE    Length of time physician/provider present for 1:1 monitoring during sedation: 0

## 2023-08-08 LAB
ANION GAP SERPL CALCULATED.3IONS-SCNC: 7 MMOL/L (ref 7–15)
ANION GAP SERPL CALCULATED.3IONS-SCNC: 8 MMOL/L (ref 7–15)
BASOPHILS # BLD AUTO: 0 10E3/UL (ref 0–0.2)
BASOPHILS NFR BLD AUTO: 0 %
BUN SERPL-MCNC: 8.2 MG/DL (ref 6–20)
BUN SERPL-MCNC: 9.5 MG/DL (ref 6–20)
CA-I BLD-MCNC: 4.5 MG/DL (ref 4.4–5.2)
CALCIUM SERPL-MCNC: 7 MG/DL (ref 8.6–10)
CALCIUM SERPL-MCNC: 8.1 MG/DL (ref 8.6–10)
CHLORIDE SERPL-SCNC: 103 MMOL/L (ref 98–107)
CHLORIDE SERPL-SCNC: 108 MMOL/L (ref 98–107)
CREAT SERPL-MCNC: 0.52 MG/DL (ref 0.51–0.95)
CREAT SERPL-MCNC: 0.59 MG/DL (ref 0.51–0.95)
DEPRECATED HCO3 PLAS-SCNC: 23 MMOL/L (ref 22–29)
DEPRECATED HCO3 PLAS-SCNC: 26 MMOL/L (ref 22–29)
EOSINOPHIL # BLD AUTO: 0.1 10E3/UL (ref 0–0.7)
EOSINOPHIL NFR BLD AUTO: 2 %
ERYTHROCYTE [DISTWIDTH] IN BLOOD BY AUTOMATED COUNT: 14.7 % (ref 10–15)
GFR SERPL CREATININE-BSD FRML MDRD: >90 ML/MIN/1.73M2
GFR SERPL CREATININE-BSD FRML MDRD: >90 ML/MIN/1.73M2
GLUCOSE BLDC GLUCOMTR-MCNC: 103 MG/DL (ref 70–99)
GLUCOSE BLDC GLUCOMTR-MCNC: 104 MG/DL (ref 70–99)
GLUCOSE BLDC GLUCOMTR-MCNC: 94 MG/DL (ref 70–99)
GLUCOSE BLDC GLUCOMTR-MCNC: 97 MG/DL (ref 70–99)
GLUCOSE BLDC GLUCOMTR-MCNC: 99 MG/DL (ref 70–99)
GLUCOSE SERPL-MCNC: 104 MG/DL (ref 70–99)
GLUCOSE SERPL-MCNC: 83 MG/DL (ref 70–99)
HCT VFR BLD AUTO: 32.2 % (ref 35–47)
HGB BLD-MCNC: 10.5 G/DL (ref 11.7–15.7)
HGB BLD-MCNC: 10.7 G/DL (ref 11.7–15.7)
HGB BLD-MCNC: 9.9 G/DL (ref 11.7–15.7)
IMM GRANULOCYTES # BLD: 0 10E3/UL
IMM GRANULOCYTES NFR BLD: 1 %
LACTATE SERPL-SCNC: 0.5 MMOL/L (ref 0.7–2)
LACTATE SERPL-SCNC: 0.6 MMOL/L (ref 0.7–2)
LYMPHOCYTES # BLD AUTO: 1 10E3/UL (ref 0.8–5.3)
LYMPHOCYTES NFR BLD AUTO: 15 %
MAGNESIUM SERPL-MCNC: 1.7 MG/DL (ref 1.7–2.3)
MCH RBC QN AUTO: 29.5 PG (ref 26.5–33)
MCHC RBC AUTO-ENTMCNC: 32.6 G/DL (ref 31.5–36.5)
MCV RBC AUTO: 90 FL (ref 78–100)
MONOCYTES # BLD AUTO: 0.7 10E3/UL (ref 0–1.3)
MONOCYTES NFR BLD AUTO: 11 %
NEUTROPHILS # BLD AUTO: 4.6 10E3/UL (ref 1.6–8.3)
NEUTROPHILS NFR BLD AUTO: 71 %
NRBC # BLD AUTO: 0 10E3/UL
NRBC BLD AUTO-RTO: 0 /100
PHOSPHATE SERPL-MCNC: 2.7 MG/DL (ref 2.5–4.5)
PLATELET # BLD AUTO: 102 10E3/UL (ref 150–450)
PLATELET # BLD AUTO: 93 10E3/UL (ref 150–450)
POTASSIUM SERPL-SCNC: 3.6 MMOL/L (ref 3.4–5.3)
POTASSIUM SERPL-SCNC: 4 MMOL/L (ref 3.4–5.3)
RBC # BLD AUTO: 3.56 10E6/UL (ref 3.8–5.2)
SODIUM SERPL-SCNC: 137 MMOL/L (ref 136–145)
SODIUM SERPL-SCNC: 138 MMOL/L (ref 136–145)
WBC # BLD AUTO: 6.5 10E3/UL (ref 4–11)

## 2023-08-08 PROCEDURE — 84100 ASSAY OF PHOSPHORUS: CPT

## 2023-08-08 PROCEDURE — 250N000009 HC RX 250: Performed by: OBSTETRICS & GYNECOLOGY

## 2023-08-08 PROCEDURE — C9113 INJ PANTOPRAZOLE SODIUM, VIA: HCPCS | Mod: JZ

## 2023-08-08 PROCEDURE — 83605 ASSAY OF LACTIC ACID: CPT

## 2023-08-08 PROCEDURE — 250N000013 HC RX MED GY IP 250 OP 250 PS 637

## 2023-08-08 PROCEDURE — 258N000003 HC RX IP 258 OP 636: Performed by: OBSTETRICS & GYNECOLOGY

## 2023-08-08 PROCEDURE — 85018 HEMOGLOBIN: CPT

## 2023-08-08 PROCEDURE — 80048 BASIC METABOLIC PNL TOTAL CA: CPT

## 2023-08-08 PROCEDURE — 85049 AUTOMATED PLATELET COUNT: CPT | Performed by: OBSTETRICS & GYNECOLOGY

## 2023-08-08 PROCEDURE — 83735 ASSAY OF MAGNESIUM: CPT

## 2023-08-08 PROCEDURE — G0463 HOSPITAL OUTPT CLINIC VISIT: HCPCS

## 2023-08-08 PROCEDURE — 36415 COLL VENOUS BLD VENIPUNCTURE: CPT

## 2023-08-08 PROCEDURE — 250N000011 HC RX IP 250 OP 636: Mod: JZ | Performed by: OBSTETRICS & GYNECOLOGY

## 2023-08-08 PROCEDURE — 82330 ASSAY OF CALCIUM: CPT | Performed by: SPECIALIST/TECHNOLOGIST

## 2023-08-08 PROCEDURE — 250N000011 HC RX IP 250 OP 636: Mod: JZ

## 2023-08-08 PROCEDURE — 200N000002 HC R&B ICU UMMC

## 2023-08-08 PROCEDURE — 250N000013 HC RX MED GY IP 250 OP 250 PS 637: Performed by: STUDENT IN AN ORGANIZED HEALTH CARE EDUCATION/TRAINING PROGRAM

## 2023-08-08 PROCEDURE — 99232 SBSQ HOSP IP/OBS MODERATE 35: CPT | Performed by: OBSTETRICS & GYNECOLOGY

## 2023-08-08 RX ORDER — OXYBUTYNIN CHLORIDE 5 MG/1
5 TABLET, EXTENDED RELEASE ORAL AT BEDTIME
Status: DISCONTINUED | OUTPATIENT
Start: 2023-08-08 | End: 2023-08-09 | Stop reason: HOSPADM

## 2023-08-08 RX ORDER — MAGNESIUM SULFATE HEPTAHYDRATE 40 MG/ML
2 INJECTION, SOLUTION INTRAVENOUS ONCE
Status: COMPLETED | OUTPATIENT
Start: 2023-08-08 | End: 2023-08-08

## 2023-08-08 RX ADMIN — LEVOTHYROXINE SODIUM 200 MCG: 200 TABLET ORAL at 08:07

## 2023-08-08 RX ADMIN — SIMETHICONE 80 MG: 80 TABLET, CHEWABLE ORAL at 19:58

## 2023-08-08 RX ADMIN — SIMETHICONE 80 MG: 80 TABLET, CHEWABLE ORAL at 06:39

## 2023-08-08 RX ADMIN — THIAMINE HCL TAB 100 MG 100 MG: 100 TAB at 08:07

## 2023-08-08 RX ADMIN — OXYBUTYNIN CHLORIDE 5 MG: 5 TABLET ORAL at 08:07

## 2023-08-08 RX ADMIN — OXYBUTYNIN CHLORIDE 5 MG: 5 TABLET, EXTENDED RELEASE ORAL at 21:59

## 2023-08-08 RX ADMIN — PANTOPRAZOLE SODIUM 40 MG: 40 INJECTION, POWDER, FOR SOLUTION INTRAVENOUS at 08:08

## 2023-08-08 RX ADMIN — MAGNESIUM SULFATE HEPTAHYDRATE 2 G: 2 INJECTION, SOLUTION INTRAVENOUS at 06:15

## 2023-08-08 RX ADMIN — POTASSIUM PHOSPHATE, MONOBASIC POTASSIUM PHOSPHATE, DIBASIC 9 MMOL: 224; 236 INJECTION, SOLUTION, CONCENTRATE INTRAVENOUS at 06:15

## 2023-08-08 RX ADMIN — SIMETHICONE 80 MG: 80 TABLET, CHEWABLE ORAL at 12:55

## 2023-08-08 RX ADMIN — POTASSIUM CHLORIDE 20 MEQ: 29.8 INJECTION, SOLUTION INTRAVENOUS at 00:21

## 2023-08-08 RX ADMIN — ACETAMINOPHEN 650 MG: 325 TABLET, FILM COATED ORAL at 15:46

## 2023-08-08 RX ADMIN — MAGNESIUM OXIDE TAB 400 MG (241.3 MG ELEMENTAL MG) 400 MG: 400 (241.3 MG) TAB at 08:08

## 2023-08-08 RX ADMIN — SIMETHICONE 80 MG: 80 TABLET, CHEWABLE ORAL at 15:46

## 2023-08-08 ASSESSMENT — ACTIVITIES OF DAILY LIVING (ADL)
ADLS_ACUITY_SCORE: 29
ADLS_ACUITY_SCORE: 31
ADLS_ACUITY_SCORE: 25
ADLS_ACUITY_SCORE: 31
ADLS_ACUITY_SCORE: 25
ADLS_ACUITY_SCORE: 25
ADLS_ACUITY_SCORE: 29
ADLS_ACUITY_SCORE: 31
ADLS_ACUITY_SCORE: 29
ADLS_ACUITY_SCORE: 29
ADLS_ACUITY_SCORE: 31
ADLS_ACUITY_SCORE: 31

## 2023-08-08 NOTE — PROGRESS NOTES
CLINICAL NUTRITION SERVICES - BRIEF NOTE      Reason for RD note: Following up on TF order/GOC.    New Findings/Chart Review:  GOC discussions ongoing. Pt extubated yesterday afternoon - TF never started. Clear liquid diet + Ensure Enlive.    Interventions:  Discontinued TF regimen, MVI/mineral, and thiamine.    Future/Additional Recommendations:  Monitor GOC    Nutrition will continue to follow per protocol.    Chelsea Baron RD, LD  Pager: [phone]

## 2023-08-08 NOTE — CONSULTS
[facility]  WO Nurse Inpatient Assessment     Consulted for: Ostomy, New End Colostomy    Summary: OR 7/31/23: Recurrent metastatic cervical cancer. Rectovaginal fistula. Vesicovaginal fistula. Severe malnutrition. Tobacco abuse.    Patient History (according to Gyn/Onc provider note(s) 8/8/23:      Ms. Suni Zuluaga is a 38 year old HD#5 for abdominal pain and POD#8 s/p palliative laparoscopic colostomy, cystoscopy, POD#1 emergent IR embolization of R distal internal iliac pseudo-aneurysm      Assessment:      Areas visualized during today's visit: Focused: and Colostomy    Assessment of new end Colostomy:  Diagnosis Pertinent to Stoma:  Cervical Cancer       Surgery Date: 07/31/23  Surgeon: SARINA Brambila      Hospital: [facility]  Pouching system in place on assessment today: [place] one piece, ostomy paste which was crusted onto skin, had difficulty removing the paste.   Pouch barrier status: intact and Minimal melting  Pouch last changed/wear time: >24 hrs  Reason for pouch change today: routine schedule  Effectiveness of current pouching/ supply plan: Recommend continuing current plan  Change made with ostomy management today: Yes, add ostomy powder for MASD areas in peristomal skin and MJC separation at 8-9 o'clock. Black/green and yellow slough with necrotic cap to stoma. Left eye with red, viable tissue  Pouching system placed today: [place] one piece, ostomy powder, and skin prep   Supplies: gathered and discussed with patient          Last photo: 8/8/23  Stoma location: LLQ   Stoma size: 1 inches,   Stoma appearance: red, round, moist, protruberant, retracted, and necrotic cap, yellow slough  Mucocutaneous junction:   at 8-9 o'clock  Peristomal complication(s): Moisture-Associated Skin Damage (MASD) due to leakage   Output: gas, liquid, toothpaste consistency, yellow, and green  Output volume emptied during visit: 100ml  Abdominal assessment:  "Soft  Surgical site(s): open to air  NG still in place? No  Pain: Dull ache  Is patient still on a PCA? No    Ostomy education assessment:  Participant of teaching session today: patient   Education completed today: Stoma assessment, Pouching system assessment , Refitting of appliance , Adjustment of pouching plan, and Peristomal skin care  Educational materials/methods: Verbal, Demonstration, and Addressed patient/caregiver questions/concerns  Education still needed: Stoma assessment, Pouching system assessment , and Peristomal skin care  Learning Comprehension:   Psychosocial assessment: observing, participating, flat affect  Patient readiness for education today: observing and active participation  Following today's visit: patient  is able to demonstrate;         1. How to empty their pouch? Yes and with minimal assist        2. How to change their pouch? Yes and with minimal assist        3. How to read and record intake and output correctly? Yes and with minimal assist  Pt support system on discharge: self  WOC recommend home care? Yes and By WOC RN if possible  Face to face time: 30 minutes          Treatment Plan:     Colostomy wound(s): Tuesday/Fridayand PRN for leakage    LLQ Colostomy pouching plan:   Pouching system: ostomy supplies pouches: [place] FECAL (821387)   Accessories used: [facility] ostomy accessories: 2\" Cera Barrier Ring ([id number]), Powder (114608), M9 Drops ([id number]), and Cavilon no sting barrier film ([id number])   Frequency of pouch changes: Twice weekly and PRN leakage  WOC follow up plan: Twice weekly and Notify WOC if leakage occurs  Bedside RN interventions: Change pouch PRN if leaking using the supplies above, Empty pouch when 1/3 to 1/2 full, ensure to clean pouch outlet after emptying to prevent odor, Notify WOC for ongoing pouch leakage, Document stoma appearance and output volume, color, and consistency every shift, Encourage patient to empty pouch independently, and Patient independent " with cares     Orders: Written    RECOMMEND PRIMARY TEAM ORDER:  colorectal consult  Education provided: importance of repositioning, plan of care, wound progress, Moisture management, Hygiene, and Off-loading pressure  Discussed plan of care with: Patient  [facility] nurse follow-up plan: Tuesday/Friday  Notify [facility] if wound(s) deteriorate.  Nursing to notify the Provider(s) and re-consult the [facility] Nurse if new skin concern.    DATA:     Current support surface: Standard  Standard gel/foam mattress (IsoFlex, Atmos air, etc)  Containment of urine/stool: Incontinence Protocol, Incontinent pad in bed, and Colostomy pouch  BMI: Body mass index is 20.93 kg/m .   Active diet order: Orders Placed This Encounter      Regular Diet Adult     Output: I/O last 3 completed shifts:  In: 3316.85 [P.O.:1490; I.V.:661.85; NG/GT:165; IV Piggyback:1000]  Out: 3125 [Urine:1205; Stool:1920]     Labs:   Recent Labs   Lab 08/08/23  1002 08/07/23  1523 08/07/23  0809 08/07/23  0702 08/05/23  0707 08/04/23  1409   ALBUMIN  --   --   --   --   --  3.5   HGB 9.9*   < >  --  15.2   < > 10.4*   INR  --   --   --  1.51*   < > 1.13   WBC  --   --   --  13.7*   < > 5.5   A1C  --   --  7.0*  --   --   --     < > = values in this interval not displayed.     Pressure injury risk assessment:   Sensory Perception: 4-->no impairment  Moisture: 3-->occasionally moist  Activity: 1-->bedfast  Mobility: 4-->no limitation  Nutrition: 2-->probably inadequate  Friction and Shear: 1-->problem  Arnie Score: 15    Jennifer Ho RN, BSN, CWON   Pager no longer is use, please contact through Shereen Regan group: [facility] Nurse [place]  Dept. Office Number: [phone]

## 2023-08-08 NOTE — PLAN OF CARE
Goal Outcome Evaluation:    Neuro: A/O x4. Obeys commands.Moves all extremities independently. 3mm and brisk pupils. Temp 100.5 @ 1600, Gyn/ONC aware.     Cardiac: NSR 70s - 80s. 2+ pulses. MAP goal >65.     Resp: RA. Clear lung sounds.     GI: Advanced diet to regular. Hyperactive BS. Ostomy - stoma pink and red - output 50 - 75mls    : R) nephrostomy tube. Voids 40 - 50 mls. Small - moderate vaginal bleeding on perineal pads.     Skin: Warm. Nephrostomy tube incision on R) lower back. Stoma present on abdomen.     Mobility: Assist of 1    Lines/drains: L) R) PIV's.      Others:   ART line and internal jugular pulled.     Transfer orders.

## 2023-08-08 NOTE — PLAN OF CARE
Night shift [id number]    D/I/A:    Neuro-  Patient denied pain overnight.    CV-  MAP remained greater than 65 (except intermittently when sleeping MAP was low to mid 60's).    GI-  Ileostomy with watery/chunky brown stool output.  Ileostomy changed x1 due to leaking.  At 0700 notified day shift SICU team of dark edges noted on ileostomy stoma when pouch was changed at 0200, these dark edges were not visible when pouch was on.    No nausea or vomiting overnight.    -  Nephrostomy tube patent.  Voided urine and nephrostomy output remained red in color.    Every 3hrs patient voided onto bedpan, small amt of bloody vaginal drainage noted on pad, and pad changed every 3hrs.    Endo-  BG monitored q2-4 hours.  BG remained stable overnight.    Labs-  Labs monitored and reviewed.  Potassium, Mg, and phosphorus replaced.    ROUTINE ICU LABS (Last four results)  CMP  Recent Labs   Lab 08/08/23  0326 08/08/23  0319 08/08/23  0020 08/07/23  2159 08/07/23  2152 08/07/23  1602 08/07/23  1523 08/07/23  0812 08/07/23  0702 08/05/23  0707 08/04/23  1409   NA  --  137  --   --  138  --  142  --  144   < > 140   POTASSIUM  --  4.0  --   --  3.6  --  4.1  4.1  --  3.1*   < > 3.8   CHLORIDE  --  103  --   --  105  --  106  --  107   < > 103   CO2  --  26  --   --  27  --  27  --  19*   < > 26   ANIONGAP  --  8  --   --  6*  --  9  --  18*   < > 11   * 104* 104* 110* 108*   < > 77  85   < > 286*   < > 97   BUN  --  9.5  --   --  10.5  --  11.0  --  10.2   < > 9.4   CR  --  0.59  --   --  0.57  --  0.59  --  0.49*   < > 0.51   GFRESTIMATED  --  >90  --   --  >90  --  >90  --  >90   < > >90   HARRISON  --  8.1*  --   --  7.9*  --  8.3*  --  9.1   < > 9.4   MAG  --  1.7  --   --   --   --  2.7*  --  1.5*  --   --    PHOS  --  2.7  --   --   --   --   --   --   --   --   --    PROTTOTAL  --   --   --   --   --   --   --   --   --   --  7.0   ALBUMIN  --   --   --   --   --   --   --   --   --   --  3.5   BILITOTAL  --   --   --   --    --   --   --   --   --   --  0.2   ALKPHOS  --   --   --   --   --   --   --   --   --   --  81   AST  --   --   --   --   --   --   --   --   --   --  17   ALT  --   --   --   --   --   --   --   --   --   --  10    < > = values in this interval not displayed.     CBC  Recent Labs   Lab 08/08/23  0319 08/07/23  2152 08/07/23  1523 08/07/23  0702 08/07/23  0556 08/07/23  0330 08/07/23  0326 08/07/23 0254 08/07/23 0250 08/06/23 2323   WBC  --   --   --  13.7*  --  10.4  --  11.7*  --  9.6   RBC  --   --   --  5.05  --  2.29*  --  3.14*  --  3.83   HGB 10.7* 11.2* 12.1 15.2   < > 6.8* 6.1* 8.5*   < > 10.1*   HCT  --   --   --  44.8  --  20.8* 18* 27.7*   < > 34.3*   MCV  --   --   --  89  --  91  --  88  --  90   MCH  --   --   --  30.1  --  29.7  --  27.1  --  26.4*   MCHC  --   --   --  33.9  --  32.7  --  30.7*  --  29.4*   RDW  --   --   --  13.3  --  14.8  --  16.0*  --  17.2*   PLT  --   --   --  87*  --  190  --  285  --  393    < > = values in this interval not displayed.     INR  Recent Labs   Lab 08/07/23 0702 08/07/23 0330 08/07/23 0254 08/06/23 2323   INR 1.51* 1.51* 1.29* 1.20*     Arterial Blood Gas  Recent Labs   Lab 08/07/23  0809 08/07/23  0556   PH 7.49* 7.26*   PCO2 32* 44   PO2 269* 530*   HCO3 24 20*   O2PER 60 100.0     0319 LA=0.9    P:  Continue to monitor.  Per day shift RN report patient is considering home hospice care.  Social work consult was put into EPIC yesterday.  See flowsheets for details.    Goal Outcome Evaluation:  Plan of Care Reviewed With: patient  Overall Patient Progress: no change

## 2023-08-09 VITALS
TEMPERATURE: 98.5 F | SYSTOLIC BLOOD PRESSURE: 121 MMHG | HEART RATE: 79 BPM | OXYGEN SATURATION: 97 % | DIASTOLIC BLOOD PRESSURE: 70 MMHG | WEIGHT: 118.17 LBS | RESPIRATION RATE: 16 BRPM | BODY MASS INDEX: 20.93 KG/M2

## 2023-08-09 LAB
ERYTHROCYTE [DISTWIDTH] IN BLOOD BY AUTOMATED COUNT: 14.8 % (ref 10–15)
HCT VFR BLD AUTO: 30.8 % (ref 35–47)
HGB BLD-MCNC: 9.8 G/DL (ref 11.7–15.7)
MAGNESIUM SERPL-MCNC: 1.7 MG/DL (ref 1.7–2.3)
MCH RBC QN AUTO: 29.3 PG (ref 26.5–33)
MCHC RBC AUTO-ENTMCNC: 31.8 G/DL (ref 31.5–36.5)
MCV RBC AUTO: 92 FL (ref 78–100)
PHOSPHATE SERPL-MCNC: 3.3 MG/DL (ref 2.5–4.5)
PLATELET # BLD AUTO: 101 10E3/UL (ref 150–450)
POTASSIUM SERPL-SCNC: 3.6 MMOL/L (ref 3.4–5.3)
RBC # BLD AUTO: 3.34 10E6/UL (ref 3.8–5.2)
WBC # BLD AUTO: 5.6 10E3/UL (ref 4–11)

## 2023-08-09 PROCEDURE — 84132 ASSAY OF SERUM POTASSIUM: CPT | Performed by: OBSTETRICS & GYNECOLOGY

## 2023-08-09 PROCEDURE — 36415 COLL VENOUS BLD VENIPUNCTURE: CPT

## 2023-08-09 PROCEDURE — 250N000013 HC RX MED GY IP 250 OP 250 PS 637: Performed by: STUDENT IN AN ORGANIZED HEALTH CARE EDUCATION/TRAINING PROGRAM

## 2023-08-09 PROCEDURE — 250N000013 HC RX MED GY IP 250 OP 250 PS 637: Performed by: OBSTETRICS & GYNECOLOGY

## 2023-08-09 PROCEDURE — 250N000011 HC RX IP 250 OP 636: Mod: JZ

## 2023-08-09 PROCEDURE — C9113 INJ PANTOPRAZOLE SODIUM, VIA: HCPCS | Mod: JZ

## 2023-08-09 PROCEDURE — 83735 ASSAY OF MAGNESIUM: CPT

## 2023-08-09 PROCEDURE — 84100 ASSAY OF PHOSPHORUS: CPT

## 2023-08-09 PROCEDURE — 999N000147 HC STATISTIC PT IP EVAL DEFER

## 2023-08-09 PROCEDURE — 85027 COMPLETE CBC AUTOMATED: CPT

## 2023-08-09 PROCEDURE — 250N000013 HC RX MED GY IP 250 OP 250 PS 637

## 2023-08-09 PROCEDURE — 999N000197 HC STATISTIC WOC PT EDUCATION, 0-15 MIN

## 2023-08-09 RX ORDER — POTASSIUM CHLORIDE 750 MG/1
20 TABLET, EXTENDED RELEASE ORAL ONCE
Status: COMPLETED | OUTPATIENT
Start: 2023-08-09 | End: 2023-08-09

## 2023-08-09 RX ORDER — MAGNESIUM OXIDE 400 MG/1
400 TABLET ORAL EVERY 4 HOURS
Status: DISCONTINUED | OUTPATIENT
Start: 2023-08-09 | End: 2023-08-09 | Stop reason: HOSPADM

## 2023-08-09 RX ADMIN — LEVOTHYROXINE SODIUM 200 MCG: 200 TABLET ORAL at 08:47

## 2023-08-09 RX ADMIN — SIMETHICONE 80 MG: 80 TABLET, CHEWABLE ORAL at 06:19

## 2023-08-09 RX ADMIN — MAGNESIUM OXIDE TAB 400 MG (241.3 MG ELEMENTAL MG) 400 MG: 400 (241.3 MG) TAB at 11:06

## 2023-08-09 RX ADMIN — PANTOPRAZOLE SODIUM 40 MG: 40 INJECTION, POWDER, FOR SOLUTION INTRAVENOUS at 08:47

## 2023-08-09 RX ADMIN — POTASSIUM CHLORIDE 20 MEQ: 750 TABLET, EXTENDED RELEASE ORAL at 11:06

## 2023-08-09 RX ADMIN — MAGNESIUM OXIDE TAB 400 MG (241.3 MG ELEMENTAL MG) 400 MG: 400 (241.3 MG) TAB at 08:47

## 2023-08-09 RX ADMIN — SIMETHICONE 80 MG: 80 TABLET, CHEWABLE ORAL at 11:06

## 2023-08-09 ASSESSMENT — ACTIVITIES OF DAILY LIVING (ADL)
ADLS_ACUITY_SCORE: 25
ADLS_ACUITY_SCORE: 21
ADLS_ACUITY_SCORE: 25
ADLS_ACUITY_SCORE: 25

## 2023-08-09 NOTE — PLAN OF CARE
Discharged to: home at approx 1200  Belongings: sent with patient   AVS (After Visit Summary) discussed with: patient     A&O x4. Denies pain. Wears glasses at baseline. Eval w/ PT- recommended walker at home. No tele orders; VSS on RA. Nephrostomy w/ clear, pale yellow output. Ileostomy w/ watery/loose output. Moderate amount of vaginal bleeding. Reg diet with good appetite. PIVs removed before discharge.

## 2023-08-09 NOTE — DISCHARGE INSTRUCTIONS
Please DO NOT take the apixaban you were prescribed when you were discharged last week. You should not be on any blood thinners unless told otherwise by Dr. Brambila.    Home Care - Nursing  [facility], later [facility]  P: [phone]  F: [phone]

## 2023-08-09 NOTE — PROGRESS NOTES
Gynecology Oncology Progress Note  August 9, 2023    Ms. Suni Zuluaga is a 38 year old HD#6 for abdominal pain and POD#9 s/p palliative laparoscopic colostomy, cystoscopy, POD#2 emergent IR embolization of R distal internal iliac pseudo-aneurysm     Dz: Advanced squamous cell carcinoma of the cervix c/b rectovaginal fistula and vesicovaginal fistula     24 hour events:   - transfer to floor       Subjective: Patient reports feeling well this morning. Had a little bit of vaginal bleeding overnight. Tolerating a regular diet without nausea, emesis.    Objective:   /65   Pulse 82   Temp 98.4  F (36.9  C) (Oral)   Resp 18   Wt 53.6 kg (118 lb 2.7 oz)   SpO2 98%   BMI 20.93 kg/m      General: intubated and sedated; appear pale  CV: Well appearing, regular rate  Resp: intubated  Abdomen: Soft, minimally tender, LLQ ostomy bag with liquid stool and minimal air present; ostomy bag well attached  Incision: c/d/i, no erythema or drainage    New labs/imaging-  CT A/P: IMPRESSION (8/4/2023):   1. Postoperative changes of left lower quadrant end colostomy with  likely postsurgical residual free air and suspect small hemoperitoneum  in the pelvis.  2. Again visualized rectovaginal and vesicovaginal fistulas with  ill-defined soft tissue thickening, fluid and air collection in the  pelvis. Overall appearance is stable from 6/23/2023.  3. Urothelial enhancement in the right collecting system, possibly  retrograde urinary tract infection.  4. Mild thickening of the distal colon up to the colostomy, with fat  stranding is likely postsurgical edema.  5. Overall stable appearance of retroperitoneal lymphadenopathy with  mass effect versus tumoral invasion of the left renal vein causing  severe stenosis at the IVC confluence. There is encasement of the  superior mesenteric artery without significant central stenosis.    Assessment: Suni Zuluaga is a 38 year old female with known history of advanced squamous cell  carcinoma of the cervix complicated by rectovaginal fistula who is POD#9 s/p palliative laparoscopic diverting end colostomy, cystoscopy. She is now HD#6 for abdominal pain ddx includes ileus versus less likely bowel obstruction or ostomy ischemia. Overnight had episode of vaginal bleeding, ~ 900 mL and fell and hit her head on the way to the bathroom to clean herself. She was transferred to the ICU for a higher level of care and MTP was initiated. CT head negative for pathology and CTA A/P with evidence of tumor invading into the right internal iliac artery without active extravasation of contrast. She then proceeded to have a significant hemorrhage, approximately 6-7L and received ongoing MTP to approximately 16 units of pRBCs, FFP, cryo. She was transferred to IR and underwent embolization after which she began to stabilize. She is now extubated and transferred to the floor yesterday. Started goals of care conversation, patient is not yet ready to discuss hospice.    Plan:    Dz: stage IIIC1 cervical SCC, s/p chemo RT (9-10/20), T&R brachy (11/20). Recurrence 10/22, s/p 7C carbo/taxol/johny/pembro (10/22-3/23), 5C pembro/johny (3-6/23), 1C pembro (7/23), fluoro gastrograffin study (7/23) w/ c/f rectovaginal fistula. Now s/p lsc palliative end colostomy.     #Acute blood loss, 2/2 vagino-iliac aneurism   - S/p MTP 19u PRBCs, FFP, cryo  - Transferred out of SICU to floor  - Started goals of care conversation, patient is not yet ready to discuss hospice.    # Abdominal pain secondary to post-operative ileus  - CT without concerning findings with the exception of non-specific enteritis/colitis possibly secondary to RT changes.  - Stool output has significantly improved, decreased bowel regimen  - pt self-limiting diet, continue CLD today, start mIVF     #Vesicovaginal fistula  - Mild vaginal bleeding overnight, not acutely concerning  - Right PNT in place given hydronephrosis on the right and pt declined left PNT     #  Hypothyroidism   - Continue levothyroxine    # Anticoagulation  - Hold PTA Apixaban (plan for 2w ppx from surgery today D#6/14, to end 8/14)    # Pain Management  Tylenol, Ibuprofen, Oxycodone, Dilaudid    Svetlana Noguera MD  Ob/Gyn Resident, PGY-3  08/09/2023 6:16 AM      I saw and evaluated the patient. I agree with the findings and the plan of care as documented in Dr. Noguera 's note.   Remarkable improvement and stable for discharge home.   Will followup within a week to discuss next steps (hospice vs palliative systemic treatment)    Suzy Brambila MD  Gynecologic Oncology  Pager [phone]

## 2023-08-09 NOTE — PLAN OF CARE
Night shift [id number]    D/I/A:      Neuro-  Patient denied pain overnight.    CV-  MAP remained greater than 65.    GI-  Ileostomy output turning thicker compared to previous night.  No leaking from ileostomy overnight.  No N/V .    -  Nephrostomy tube patent  Patient voided into commode and toilet.    Patient is wearing a brief for vaginal bleeding.  Vaginal bleeding has been small amts each time pt went to bathroom per patient.  Patient independently changed brief.      Endo-  BG monitored and BG have remained stable overnight.    Labs-  Labs monitored and reviewed.  K, Mg, & phos levels just resulted during change of shift report, day shift RN notified of K, Mg, & phos levels and day shift RN will follow up on replacements.    ROUTINE ICU LABS (Last four results)  CMP  Recent Labs   Lab 08/09/23  0610 08/08/23  2216 08/08/23  1544 08/08/23  1002 08/08/23  0755 08/08/23  0326 08/08/23  0319 08/07/23  2159 08/07/23  2152 08/07/23  1602 08/07/23  1523 08/07/23  0812 08/07/23  0702 08/05/23  0707 08/04/23  1409   NA  --   --   --  138  --   --  137  --  138  --  142  --  144   < > 140   POTASSIUM 3.6  --   --  3.6  --   --  4.0  --  3.6  --  4.1  4.1  --  3.1*   < > 3.8   CHLORIDE  --   --   --  108*  --   --  103  --  105  --  106  --  107   < > 103   CO2  --   --   --  23  --   --  26  --  27  --  27  --  19*   < > 26   ANIONGAP  --   --   --  7  --   --  8  --  6*  --  9  --  18*   < > 11   GLC  --  97 99 83 94   < > 104*   < > 108*   < > 77  85   < > 286*   < > 97   BUN  --   --   --  8.2  --   --  9.5  --  10.5  --  11.0  --  10.2   < > 9.4   CR  --   --   --  0.52  --   --  0.59  --  0.57  --  0.59  --  0.49*   < > 0.51   GFRESTIMATED  --   --   --  >90  --   --  >90  --  >90  --  >90  --  >90   < > >90   HARRISON  --   --   --  7.0*  --   --  8.1*  --  7.9*  --  8.3*  --  9.1   < > 9.4   MAG 1.7  --   --   --   --   --  1.7  --   --   --  2.7*  --  1.5*  --   --    PHOS 3.3  --   --   --   --   --  2.7  --    --   --   --   --   --   --   --    PROTTOTAL  --   --   --   --   --   --   --   --   --   --   --   --   --   --  7.0   ALBUMIN  --   --   --   --   --   --   --   --   --   --   --   --   --   --  3.5   BILITOTAL  --   --   --   --   --   --   --   --   --   --   --   --   --   --  0.2   ALKPHOS  --   --   --   --   --   --   --   --   --   --   --   --   --   --  81   AST  --   --   --   --   --   --   --   --   --   --   --   --   --   --  17   ALT  --   --   --   --   --   --   --   --   --   --   --   --   --   --  10    < > = values in this interval not displayed.     CBC  Recent Labs   Lab 08/09/23  0610 08/08/23  1735 08/08/23  1002 08/08/23  0319 08/07/23  1523 08/07/23  0702 08/07/23  0556 08/07/23  0330   WBC 5.6 6.5  --   --   --  13.7*  --  10.4   RBC 3.34* 3.56*  --   --   --  5.05  --  2.29*   HGB 9.8* 10.5* 9.9* 10.7*   < > 15.2   < > 6.8*   HCT 30.8* 32.2*  --   --   --  44.8  --  20.8*   MCV 92 90  --   --   --  89  --  91   MCH 29.3 29.5  --   --   --  30.1  --  29.7   MCHC 31.8 32.6  --   --   --  33.9  --  32.7   RDW 14.8 14.7  --   --   --  13.3  --  14.8   * 102*  --  93*  --  87*  --  190    < > = values in this interval not displayed.     INR  Recent Labs   Lab 08/07/23  0702 08/07/23  0330 08/07/23  0254 08/06/23  2323   INR 1.51* 1.51* 1.29* 1.20*     Arterial Blood Gas  Recent Labs   Lab 08/07/23  0809 08/07/23  0556   PH 7.49* 7.26*   PCO2 32* 44   PO2 269* 530*   HCO3 24 20*   O2PER 60 100.0       P:  Continue to monitor.  Patient has transfer orders to the floor and once bed is available patient will transfer.  See flowsheets for details.

## 2023-08-09 NOTE — PLAN OF CARE
Physical Therapy: Orders received and appreciated. Chart reviewed and discussed with care team.? Physical Therapy not indicated due to lack of mobility deficits. Pt demonstrated independence with in room mobility. Pt reported interest in having FWW for home mobility, pt has a 4ww for out of home mobility.? FWW order requested from team and walker distributed to pt. Defer discharge recommendations to medical team.? Will complete orders.

## 2023-08-09 NOTE — PROGRESS NOTES
Care Management Discharge Note    Discharge Date:         Discharge Disposition: Home, Home Care    Discharge Services: None    Discharge DME: Other (see comment) (ostomy supplies)    Discharge Transportation: car, drives self    Private pay costs discussed: Not applicable    Does the patient's insurance plan have a 3 day qualifying hospital stay waiver?  No    PAS Confirmation Code:  NA  Patient/family educated on Medicare website which has current facility and service quality ratings: NA    Education Provided on the Discharge Plan: Yes  Persons Notified of Discharge Plans: Patient  Patient/Family in Agreement with the Plan: yes    Handoff Referral Completed: Yes    Additional Information:  Pt medically ready to discharge home today. [facility] to follow for home nursing. Pt up independently otherwise. No further discharge needs. Family to transport.     Discharge orders faxed to [facility].   [facility]  P: [phone]  F: [phone]      Anna Field RN   Nurse Coordinator    Covering for: 4E  Phone: *[phone]    Social Work and Care Management Department       SEARCHABLE in [facility] - search CARE COORDINATOR       [employer] ([phone]) Saturday & Sunday; ([phone]) [facility] Recognized Holidays     Units: 5A, 5B & 5C  Pager: [phone]    Units: 6B, 6C & 6D    Pager: [phone]    Units: 7A, 7B, 7C & 7D    Pager: [phone]    Units: 6A & ICU   Pager: [phone]    Units: 5 Ortho, 5MS & WB ED Pager: [phone]    Units: 6MS, 8A & 10 ICU  Pager [phone]

## 2023-08-09 NOTE — PROGRESS NOTES
[facility]  WO Nurse Inpatient Assessment     Consulted for: Ostomy, New End Colostomy    Summary: OR 7/31/23: Recurrent metastatic cervical cancer. Rectovaginal fistula. Vesicovaginal fistula. Severe malnutrition. Tobacco abuse.    Patient History (according to Gyn/Onc provider note(s) 8/8/23:      Ms. Suni Zuluaga is a 38 year old HD#5 for abdominal pain and POD#8 s/p palliative laparoscopic colostomy, cystoscopy, POD#1 emergent IR embolization of R distal internal iliac pseudo-aneurysm      Assessment:      Areas visualized during today's visit: Focused: and Colostomy    Assessment of new end Colostomy:  Diagnosis Pertinent to Stoma:  Cervical Cancer       Surgery Date: 07/31/23  Surgeon: SARINA Brambila      Hospital: [facility]  Pouching system in place on assessment today: Bunnlevel one piece, ostomy paste which was crusted onto skin, had difficulty removing the paste.   Pouch barrier status: intact and Minimal melting  Pouch last changed/wear time: >24 hrs  Reason for pouch change today: routine schedule  Effectiveness of current pouching/ supply plan: Recommend continuing current plan  Change made with ostomy management today: Yes, add ostomy powder for MASD areas in peristomal skin and MJC separation at 8-9 o'clock. Black/green and yellow slough with necrotic cap to stoma. Left eye with red, viable tissue  Pouching system placed today: Bunnlevel one piece, ostomy powder, and skin prep   Supplies: gathered and discussed with patient          Last photo: 8/8/23  Stoma location: LLQ   Stoma size: 1 inches,   Stoma appearance: red, round, moist, protruberant, retracted, and necrotic cap, yellow slough  Mucocutaneous junction:   at 8-9 o'clock  Peristomal complication(s): Moisture-Associated Skin Damage (MASD) due to leakage   Output: gas, liquid, toothpaste consistency, yellow, and green  Output volume emptied during visit: 100ml  Abdominal assessment:  "Soft  Surgical site(s): open to air  NG still in place? No  Pain: Dull ache  Is patient still on a PCA? No    Ostomy education assessment:  Participant of teaching session today: patient   Education completed today: Stoma assessment, Pouching system assessment , Refitting of appliance , Adjustment of pouching plan, and Peristomal skin care  Educational materials/methods: Verbal, Demonstration, and Addressed patient/caregiver questions/concerns  Education still needed: Stoma assessment, Peristomal skin care, When to seek medical attention, and Discharge instructions  Learning Comprehension:   Psychosocial assessment: observing, participating, flat affect  Patient readiness for education today: observing and active participation  Following today's visit: patient  is able to demonstrate;         1. How to empty their pouch? Yes and with minimal assist        2. How to change their pouch? Yes and with minimal assist        3. How to read and record intake and output correctly? Yes and with minimal assist  Pt support system on discharge: self  WOC recommend home care? Yes and By WOC RN if possible  Face to face time: 30 minutes          Treatment Plan:     Colostomy wound(s): Tuesday/Fridayand PRN for leakage    LLQ Colostomy pouching plan:   Pouching system: ostomy supplies pouches: Sveta Flat FECAL (560552)   Accessories used: [facility] ostomy accessories: 2\" Cera Barrier Ring (595106), Powder (998743), M9 Drops ([id number]), and Cavilon no sting barrier film (226508)   Frequency of pouch changes: Twice weekly and PRN leakage  WOC follow up plan: Twice weekly and Notify WOC if leakage occurs  Bedside RN interventions: Change pouch PRN if leaking using the supplies above, Empty pouch when 1/3 to 1/2 full, ensure to clean pouch outlet after emptying to prevent odor, Notify WOC for ongoing pouch leakage, Document stoma appearance and output volume, color, and consistency every shift, Encourage patient to empty pouch " independently, and Patient independent with cares     Orders: Written    RECOMMEND PRIMARY TEAM ORDER:  colorectal consult  Education provided: importance of repositioning, plan of care, wound progress, Moisture management, Hygiene, and Off-loading pressure  Discussed plan of care with: Patient  [facility] nurse follow-up plan: Tuesday/Friday  Notify WOC if wound(s) deteriorate.  Nursing to notify the Provider(s) and re-consult the [facility] Nurse if new skin concern.    DATA:     Current support surface: Standard  Standard gel/foam mattress (IsoFlex, Atmos air, etc)  Containment of urine/stool: Incontinence Protocol, Incontinent pad in bed, and Colostomy pouch  BMI: Body mass index is 20.93 kg/m .   Active diet order: Orders Placed This Encounter      Regular Diet Adult     Output: I/O last 3 completed shifts:  In: 1649.2 [P.O.:1550; I.V.:99.2]  Out: 975 [Urine:540; Stool:435]     Labs:   Recent Labs   Lab 08/09/23  0610 08/07/23  1523 08/07/23  0809 08/07/23  0702 08/05/23  0707 08/04/23  1409   ALBUMIN  --   --   --   --   --  3.5   HGB 9.8*   < >  --  15.2   < > 10.4*   INR  --   --   --  1.51*   < > 1.13   WBC 5.6   < >  --  13.7*   < > 5.5   A1C  --   --  7.0*  --   --   --     < > = values in this interval not displayed.       Pressure injury risk assessment:   Sensory Perception: 4-->no impairment  Moisture: 3-->occasionally moist  Activity: 1-->bedfast  Mobility: 4-->no limitation  Nutrition: 2-->probably inadequate  Friction and Shear: 1-->problem  Arnie Score: 15    Jennifer Ho RN, BSN, CWON   Pager no longer is use, please contact through [employer]: [facility] Nurse [place]  Dept. Office Number: [phone]

## 2023-08-10 DIAGNOSIS — C53.8 MALIGNANT NEOPLASM OF OVERLAPPING SITES OF CERVIX (H): ICD-10-CM

## 2023-08-10 DIAGNOSIS — Z51.12 ENCOUNTER FOR ANTINEOPLASTIC IMMUNOTHERAPY: ICD-10-CM

## 2023-08-10 DIAGNOSIS — E03.2 HYPOTHYROIDISM DUE TO MEDICATION: ICD-10-CM

## 2023-08-10 RX ORDER — LEVOTHYROXINE SODIUM 200 UG/1
200 TABLET ORAL DAILY
Qty: 30 TABLET | Refills: 0 | Status: SHIPPED | OUTPATIENT
Start: 2023-08-10 | End: 2023-08-25

## 2023-08-10 NOTE — TELEPHONE ENCOUNTER
Levothyroxine Refill   Last prescribing provider: Mounika Ritchie     Last clinic visit date: 8/9/23  Dr Suzy Brambila     Recommendations for requested medication (if none, N/A): Copied from chart note   8/9/23 Suzy Brambila      Hypothyroidism   - Continue levothyroxine        Any other pertinent information (if none, N/A): N/A    Refilled: Y/N, if NO, why?

## 2023-08-11 ENCOUNTER — MEDICAL CORRESPONDENCE (OUTPATIENT)
Dept: HEALTH INFORMATION MANAGEMENT | Facility: CLINIC | Age: 38
End: 2023-08-11
Payer: COMMERCIAL

## 2023-08-16 ENCOUNTER — VIRTUAL VISIT (OUTPATIENT)
Dept: ONCOLOGY | Facility: CLINIC | Age: 38
End: 2023-08-16
Attending: OBSTETRICS & GYNECOLOGY
Payer: COMMERCIAL

## 2023-08-16 ENCOUNTER — PATIENT OUTREACH (OUTPATIENT)
Dept: ONCOLOGY | Facility: CLINIC | Age: 38
End: 2023-08-16
Payer: COMMERCIAL

## 2023-08-16 VITALS — WEIGHT: 110 LBS | HEIGHT: 63 IN | BODY MASS INDEX: 19.49 KG/M2

## 2023-08-16 DIAGNOSIS — C53.8 MALIGNANT NEOPLASM OF OVERLAPPING SITES OF CERVIX (H): Primary | ICD-10-CM

## 2023-08-16 PROCEDURE — 99214 OFFICE O/P EST MOD 30 MIN: CPT | Mod: 24 | Performed by: OBSTETRICS & GYNECOLOGY

## 2023-08-16 ASSESSMENT — PAIN SCALES - GENERAL: PAINLEVEL: NO PAIN (0)

## 2023-08-16 NOTE — LETTER
2023         RE: Suni Zuluaga  [address]  [place] [address]        Dear Colleague,    Thank you for referring your patient, Suni Zuluaga, to the [facility]. Please see a copy of my visit note below.    Virtual Visit Details    Type of service:  Video Visit   Video Start Time: 2:19pm  Video End Time: 2:36pm    Originating Location (pt. Location): Home  {PROVIDER LOCATION On-site should be selected for visits conducted from your clinic location or adjoining [facility] hospital, academic office, or other nearby [facility] building. Off-site should be selected for all other provider locations, including home:[id number]}  Distant Location (provider location):  On-site  Platform used for Video Visit: [facility]    GYN Oncology Established Patient Visit     RE:         Suni Zuluaga  :     1985     2023      CC: Cervical cancer, recurrent     HPI: Ms Suni Zuluaga is a 38 year old  female who presents to discuss her recent hospitalization, cervical cancer treatment planning and toxicity assessment.    Patient is at home and doing well, states she has some fatigue but in general feels much better than when she was hospitalized.  She is tolerating PO, ostomy with good output. Home health nurse is helping with ostomy care.  She is working and has no issues with mobility.      Since embolization, she has some swelling in her left thigh, but no other issues.     She had some nightmares with her hemorrhage episode. But is eager to discuss next treatment plan.          Cancer Treatment History:   2015:HSIL Pap at the beginning of pregnancy. Repeat pap postpartum was ASCUS. Colposcopy was negative.   20: Pelvic US. Uterine fibroids, normal appearance of uterus and ovaries  2020: Pap smear which confirmed squamous cell carcinoma of the cervix. Referred to GYN  ONC  2020: GYN ONC Consult [facility]. Cervical biopsy, RADHA III, unable to identify invasion  2020:  Repeat cervical biopsy confirms invasive SCC  9/28/2020: Started primary chemoRT for locally advanced cervical cancer. Stage IIIC1(r), large primary tumor with bilateral parametrial invasion and clinical evidence of posterior vaginal involvement. Pelvic lymphadenopathy involving external iliac, perirectal and internal iliac nodes.   9/28/20-10/30/20: ChemoRT.   11/6/20-11/16/20: T&R brachytherapy  11/25/2020: Followup with radonc (Dr Hernández)   8/23/22: CT A/P with nonspecific increased density in the para-aortic region, which could be seen in the setting of retroperitoneal fibrosis, inflammation, or potentially lymphoma/desmoplastic reaction. New varix from right renal vein extending down into the RLQ.   10/6/22: PET CT Whole body with large 5cm retroperitoneal mass with FDG avidity. New nodes in retroperitoneum and mediastinum     Plan: Paclitaxel, Carboplatin, Bevacizumab, and pembrolizumab added due to PD-L1 expression every 3 weeks.   10/13/22: Cycle 1 Paclitaxel 175 mg/m2, Carboplatin AUC 6, Bevacizumab 15 mg/m2  11/4/22: Cycle 2 Paclitaxel 175 mg/m2, Carboplatin AUC 5, Bevacizumab 15 mg/m2. Pembrolizumab on hold while awaiting insurance coverage.  11/8/22: ED for nausea (no vomiting) and dizziness, discharged with valium for vertigo.  11/25/22: Cycle 3 Paclitaxel 175 mg/m2, Carboplatin AUC 5, Bevacizumab 15 mg/m2, and pembrolizumab 200 mg.  Defer x 1 week for thrombocytopenia (plts 82).  Decrease Paclitaxel 135 mg/m2, Carboplatin AUC 5, Bevacizumab 15 mg/m2, and pembrolizumab 200 mg, given 12/5.  12/19/22 CT CAP with  response     Plan: Continue paclitaxel, carboplatin, bevacizumab, and pembrolizumab with CT/MD after 3 more cycles (6 total)  12/30/22: Cycle 4 Paclitaxel 135 mg/m2, Carboplatin AUC 5, Bevacizumab 15 mg/m2, and pembrolizumab 200 mg, held carboplatin and paclitaxel due to thrombocytopenia (plts 39), not given this cycle.  Bevacizumab and Pembrolizumab given.  1/20/23: Cycle 5 Paclitaxel 135 mg/m2,  Carboplatin AUC 4, Bevacizumab 15 mg/m2, and pembrolizumab 200 mg.  2/10/23: Cycle 6 Paclitaxel 135 mg/m2, Carboplatin AUC 4, Bevacizumab 15 mg/m2, and pembrolizumab 200 mg.     Plan: Continue paclitaxel, carboplatin, bevacizumab, and pembrolizumab x 1 more cycle as she did not have chemotherapy for cycle 4.  Followed by CT and follow up with Dr. Brambila.  3/3/23: Cycle 7 Paclitaxel 135 mg/m2, Carboplatin AUC 4, Bevacizumab 15 mg/m2, and pembrolizumab 200 mg.   3/20/2023: CT C/A/P with stable disease (still with retroperitoneal adenopathy encasing aorta/IVC, occulusion of IVC, cystitis/proctitis)   3/24/23: Cycle one of johny/pembro maintenance (cycle 8 overall)  4/14/23: C2 pembro/avastin  (cycle 9 overall)   4/26/23: OSH ED visit for pain, found to have new hydro   4/28/23: Met with urology (Dr Valladares) for hydronephrosis. Plan possible stent placement in the future for hydro.   5/5/23:cycle 3 maintenance pembro/avastin (cycle 10 overall)   5/26/23: Cycle 4 maintenance pembro/avastin (cycle 11 overall).  6/16/23: Cycle 5 maintenance pembro/avastin (cycle 12 overall).  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 During her hospital stay, she had an overnight episode of vaginal bleeding with significant hemorrhage, approximately 6-7L and received approximately 19 units of pRBCs, FFP, cryo. Patient was intubated and transferred to the SICU. CTA A/P showed evidence of tumor invading into the right internal iliac artery and she underwent emergent embolization. She has been subsequently extubated and remained stable at time of discharge.      Past Medical History:   Diagnosis Date     Cervical cancer (H)      Tobacco abuse      Past Surgical History:   Procedure Laterality Date     CARPAL TUNNEL RELEASE RT/LT Right      COMBINED CYSTOSCOPY,  "INSERT STENT URETER(S) Bilateral 5/10/2023    Procedure: CYSTOSCOPY, WITH RIGHT URETERAL STENT INSERTION, Bilateral retrograde pyelogram.;  Surgeon: Robert Valladares MD;  Location: [facility] OR     COMBINED CYSTOSCOPY, RETROGRADES, EXCHANGE STENT URETER(S) Right 2023    Procedure: CYSTOSCOPY with fluoroscopy;  Surgeon: Robert Valladares MD;  Location: [facility] OR     CYSTOSCOPY, REMOVE STENT(S), COMBINED N/A 2023    Procedure: Cystoscopy;  Surgeon: Robert Valladares MD;  Location: [facility] OR     IR NEPHROSTOMY TUBE PLACEMENT RIGHT  2023     IR PELVIC ARTERIAL EMBOLIZATION  2023     LAPAROSCOPIC COLOSTOMY N/A 2023    Procedure: Pelvic Exam under anesthesia with laparoscopic colostomy;  Surgeon: Suzy Brambila MD;  Location: [facility] OR     TUBAL LIGATION         OBGYN history and Health Maintenance:  , completed childbearing , had tubal ligation   Last Pap Smear: per HPI   Last Mammogram: never  Last Colonoscopy: never     Meds reviewed in EPIC    Social History     Tobacco Use     Smoking status: Every Day     Packs/day: 1.00     Types: Cigarettes     Smokeless tobacco: Never     Tobacco comments:     2020 1/2 pack/day, tying tp quit   Substance Use Topics     Alcohol use: Yes     Comment: occ.      Work: manufacturing, manual labor  Preferred language: English  Lives at home with: Partner and 4 kids  Long smoking history since age 13. Smokes a pack daily. Denies any substance abuse      Family History:   No family history of cancer     Physical Exam:   Vitals reviewed in epic  Ht 1.6 m (5' 3\")   Wt 52.2 kg (115 lb)   LMP  (LMP Unknown)   BMI 20.37 kg/m    General: No acute distress. Some temporal wasting which has been stable  Remainder of exam deferred      CBC RESULTS:   Recent Labs   Lab Test 23  0610   WBC 5.6   RBC 3.34*   HGB 9.8*   HCT 30.8*   MCV 92   MCH 29.3   MCHC 31.8   RDW 14.8   *     CT A/P 23:   IMPRESSION:   1. No evidence of acute contrast " extravasation. Mass invades the right  internal iliac artery with a pseudoaneurysm formation, although this  is similar to prior imaging and it is uncertain if this is the source  of patient's hemorrhage. Recommend STAT CTA in case of a rebleed to  assess for source.  2. Overall unchanged appearance of known cervical malignancy with  rectovaginal and vesicovaginal fistulas.  3. Mildly dilated large bowel and distal small bowel with liquid  stool. No convincing evidence of obstruction. Per chart review and  ordering provider, ileostomy output has increased.   4. Improvement of urothelial enhancement in the right renal pelvis.  5. Otherwise stable appearance of the abdomen and pelvis.     I have personally reviewed the examination and initial interpretation  and I agree with the findings.     NAVIN JAIMES MD       Assessment:  Suni Zuluaga is a 38 year old woman with Stage IIIC1(r) squamous cell carcinoma of the cervix, now with recurrence. Tumor tested PDL1+.    Plan:      1.)   Cervical cancer : Stage IIIC1r SCC of the cervix (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 Completed primary chemoRT without complication. Recurrence in 10/2022.  Received 7 cycles of carbo/taxol/johny/pembro. Required dose reductions due to neutropenia, thrombocytopenia. Then  received 5 cycles of maintenance johny/pembro. Developed rectovaginal, vesicovaginal fistulas. Now s/p end colostomy and PNTs. Massive hemorrhage during recent hospitalization given erosion of tumor into iliac vessels (right) , requiring emergent embolization.   Shockingly, is recovering very well. I had a goals of care discussion with her in the hospital while she recovered after this event, she was certain about continuing treatment,  Today we discussed that although there are not many treatment options left for her recurrence, we can start tisotumab vedotin (Tivdak), which is a tissue factor directed antibody. We discussed symptoms and side effects while on tisotumab. Major side effects are  eye related, and others involve peripheral neuropathy, bleeding, lung and skin reactions.     -Plan Tivdak q 3 weeks 2 mg/kg  - I recommended her seeing a optometrist for a baseline eye exam.  I sent her 3 eye drop rx's: 1). steroid eye drops to be used before each infusion and as prescribed for 72 hours after each infusion 2). a vasoconstrictor eye drops right before each infusion, 3). a lubricating eye drops throughout treatment and for 30 days after her last dose of tivdak.  - I sent Suni more information about tivdak to her email: [email or web address]    2.) Rectovaginal fistula -now s/p [facility] palliative end colostomy.    - Ostomy functioning well, no issues  - home health nurse helping with ostomy care     3.) VVF: Has unilateral PNT. COnsider bilateral if ongoing urine per vagina.     Total time today was 35* minutes including surgical counseling and planning, treatment planning, ordering treatment, reviewing images and labs.      Suzy Brambila MD  Gynecologic Oncology  Pager [phone]      Again, thank you for allowing me to participate in the care of your patient.        Sincerely,        Suzy Brambila MD

## 2023-08-16 NOTE — PROGRESS NOTES
Patient reached out as the county faxed paperwork to Dr Brambila but it needs to be completed urgently     Patient verified that this was sent to the [facility].     RN reached out to the [facility] location and updated them that paperwork was sent to them and the urgent need    Patient appreciative of RN time and assistance     Karin Evans RN

## 2023-08-16 NOTE — NURSING NOTE
Is the patient currently in the state of [place]? YES    Visit mode:VIDEO    If the visit is dropped, the patient can be reconnected by: VIDEO VISIT: Send to e-mail at: [email or web address]    Will anyone else be joining the visit? NO      How would you like to obtain your AVS? MyChart    Are changes needed to the allergy or medication list? NO    Reason for visit: Video Visit (Follow up )       Jojo Silva

## 2023-08-16 NOTE — PROGRESS NOTES
Virtual Visit Details    Type of service:  Video Visit   Video Start Time: 2:19pm  Video End Time: 2:36pm    Originating Location (pt. Location): Home    Distant Location (provider location):  On-site  Platform used for Video Visit: [facility]    GYN Oncology Established Patient Visit     RE:         Suni Zuluaga  :     1985     2023      CC: Cervical cancer, recurrent     HPI: Ms Suni Zuluaga is a 38 year old  female who presents to discuss her recent hospitalization, cervical cancer treatment planning and toxicity assessment.    Patient is at home and doing well, states she has some fatigue but in general feels much better than when she was hospitalized.  She is tolerating PO, ostomy with good output. Home health nurse is helping with ostomy care.  She is working and has no issues with mobility.      Since embolization, she has some swelling in her left thigh, but no other issues.     She had some nightmares with her hemorrhage episode. But is eager to discuss next treatment plan.          Cancer Treatment History:   2015:HSIL Pap at the beginning of pregnancy. Repeat pap postpartum was ASCUS. Colposcopy was negative.   20: Pelvic US. Uterine fibroids, normal appearance of uterus and ovaries  2020: Pap smear which confirmed squamous cell carcinoma of the cervix. Referred to GYN  ONC  2020: GYN ONC Consult [facility]. Cervical biopsy, RADHA III, unable to identify invasion  2020: Repeat cervical biopsy confirms invasive SCC  2020: Started primary chemoRT for locally advanced cervical cancer. Stage IIIC1(r), large primary tumor with bilateral parametrial invasion and clinical evidence of posterior vaginal involvement. Pelvic lymphadenopathy involving external iliac, perirectal and internal iliac nodes.   20-10/30/20: ChemoRT.   20-20: T&R brachytherapy  2020: Followup with anita (Dr Hernández)   22: CT A/P with nonspecific increased density in the para-aortic  region, which could be seen in the setting of retroperitoneal fibrosis, inflammation, or potentially lymphoma/desmoplastic reaction. New varix from right renal vein extending down into the RLQ.   10/6/22: PET CT Whole body with large 5cm retroperitoneal mass with FDG avidity. New nodes in retroperitoneum and mediastinum     Plan: Paclitaxel, Carboplatin, Bevacizumab, and pembrolizumab added due to PD-L1 expression every 3 weeks.   10/13/22: Cycle 1 Paclitaxel 175 mg/m2, Carboplatin AUC 6, Bevacizumab 15 mg/m2  11/4/22: Cycle 2 Paclitaxel 175 mg/m2, Carboplatin AUC 5, Bevacizumab 15 mg/m2. Pembrolizumab on hold while awaiting insurance coverage.  11/8/22: ED for nausea (no vomiting) and dizziness, discharged with valium for vertigo.  11/25/22: Cycle 3 Paclitaxel 175 mg/m2, Carboplatin AUC 5, Bevacizumab 15 mg/m2, and pembrolizumab 200 mg.  Defer x 1 week for thrombocytopenia (plts 82).  Decrease Paclitaxel 135 mg/m2, Carboplatin AUC 5, Bevacizumab 15 mg/m2, and pembrolizumab 200 mg, given 12/5.  12/19/22 CT CAP with  response     Plan: Continue paclitaxel, carboplatin, bevacizumab, and pembrolizumab with CT/MD after 3 more cycles (6 total)  12/30/22: Cycle 4 Paclitaxel 135 mg/m2, Carboplatin AUC 5, Bevacizumab 15 mg/m2, and pembrolizumab 200 mg, held carboplatin and paclitaxel due to thrombocytopenia (plts 39), not given this cycle.  Bevacizumab and Pembrolizumab given.  1/20/23: Cycle 5 Paclitaxel 135 mg/m2, Carboplatin AUC 4, Bevacizumab 15 mg/m2, and pembrolizumab 200 mg.  2/10/23: Cycle 6 Paclitaxel 135 mg/m2, Carboplatin AUC 4, Bevacizumab 15 mg/m2, and pembrolizumab 200 mg.     Plan: Continue paclitaxel, carboplatin, bevacizumab, and pembrolizumab x 1 more cycle as she did not have chemotherapy for cycle 4.  Followed by CT and follow up with Dr. Brambila.  3/3/23: Cycle 7 Paclitaxel 135 mg/m2, Carboplatin AUC 4, Bevacizumab 15 mg/m2, and pembrolizumab 200 mg.   3/20/2023: CT C/A/P with stable disease (still  with retroperitoneal adenopathy encasing aorta/IVC, occulusion of IVC, cystitis/proctitis)   3/24/23: Cycle one of johny/pembro maintenance (cycle 8 overall)  4/14/23: C2 pembro/avastin  (cycle 9 overall)   4/26/23: OSH ED visit for pain, found to have new hydro   4/28/23: Met with urology (Dr Valladares) for hydronephrosis. Plan possible stent placement in the future for hydro.   5/5/23:cycle 3 maintenance pembro/avastin (cycle 10 overall)   5/26/23: Cycle 4 maintenance pembro/avastin (cycle 11 overall).  6/16/23: Cycle 5 maintenance pembro/avastin (cycle 12 overall).  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 During her hospital stay, she had an overnight episode of vaginal bleeding with significant hemorrhage, approximately 6-7L and received approximately 19 units of pRBCs, FFP, cryo. Patient was intubated and transferred to the SICU. CTA A/P showed evidence of tumor invading into the right internal iliac artery and she underwent emergent embolization. She has been subsequently extubated and remained stable at time of discharge.      Past Medical History:   Diagnosis Date    Cervical cancer (H)     Tobacco abuse      Past Surgical History:   Procedure Laterality Date    CARPAL TUNNEL RELEASE RT/LT Right     COMBINED CYSTOSCOPY, INSERT STENT URETER(S) Bilateral 5/10/2023    Procedure: CYSTOSCOPY, WITH RIGHT URETERAL STENT INSERTION, Bilateral retrograde pyelogram.;  Surgeon: Robert Valladares MD;  Location: [facility] OR    COMBINED CYSTOSCOPY, RETROGRADES, EXCHANGE STENT URETER(S) Right 7/5/2023    Procedure: CYSTOSCOPY with fluoroscopy;  Surgeon: Robert Valladares MD;  Location: [facility] OR    CYSTOSCOPY, REMOVE STENT(S), COMBINED N/A 7/31/2023    Procedure: Cystoscopy;  Surgeon: Robert Valladares MD;  Location: [facility] OR    IR NEPHROSTOMY  "TUBE PLACEMENT RIGHT  2023    IR PELVIC ARTERIAL EMBOLIZATION  2023    LAPAROSCOPIC COLOSTOMY N/A 2023    Procedure: Pelvic Exam under anesthesia with laparoscopic colostomy;  Surgeon: Suzy Brambila MD;  Location: [facility] OR    TUBAL LIGATION         OBGYN history and Health Maintenance:  , completed childbearing , had tubal ligation   Last Pap Smear: per HPI   Last Mammogram: never  Last Colonoscopy: never     Meds reviewed in EPIC    Social History     Tobacco Use    Smoking status: Every Day     Packs/day: 1.00     Types: Cigarettes    Smokeless tobacco: Never    Tobacco comments:     2020 1/2 pack/day, tying tp quit   Substance Use Topics    Alcohol use: Yes     Comment: occ.      Work: manufacturing, manual labor  Preferred language: English  Lives at home with: Partner and 4 kids  Long smoking history since age 13. Smokes a pack daily. Denies any substance abuse      Family History:   No family history of cancer     Physical Exam:   Vitals reviewed in epic  Ht 1.6 m (5' 3\")   Wt 52.2 kg (115 lb)   LMP  (LMP Unknown)   BMI 20.37 kg/m    General: No acute distress. Some temporal wasting which has been stable  Remainder of exam deferred      CBC RESULTS:   Recent Labs   Lab Test 23  0610   WBC 5.6   RBC 3.34*   HGB 9.8*   HCT 30.8*   MCV 92   MCH 29.3   MCHC 31.8   RDW 14.8   *     CT A/P 23:   IMPRESSION:   1. No evidence of acute contrast extravasation. Mass invades the right  internal iliac artery with a pseudoaneurysm formation, although this  is similar to prior imaging and it is uncertain if this is the source  of patient's hemorrhage. Recommend STAT CTA in case of a rebleed to  assess for source.  2. Overall unchanged appearance of known cervical malignancy with  rectovaginal and vesicovaginal fistulas.  3. Mildly dilated large bowel and distal small bowel with liquid  stool. No convincing evidence of obstruction. Per chart review and  ordering provider, ileostomy " output has increased.   4. Improvement of urothelial enhancement in the right renal pelvis.  5. Otherwise stable appearance of the abdomen and pelvis.     I have personally reviewed the examination and initial interpretation  and I agree with the findings.     NAVIN JAIMES MD       Assessment:  Suni Zuluaga is a 38 year old woman with Stage IIIC1(r) squamous cell carcinoma of the cervix, now with recurrence. Tumor tested PDL1+.    Plan:      1.)   Cervical cancer : Stage IIIC1r SCC of the cervix (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 Completed primary chemoRT without complication. Recurrence in 10/2022.  Received 7 cycles of carbo/taxol/johny/pembro. Required dose reductions due to neutropenia, thrombocytopenia. Then  received 5 cycles of maintenance johny/pembro. Developed rectovaginal, vesicovaginal fistulas. Now s/p end colostomy and PNTs. Massive hemorrhage during recent hospitalization given erosion of tumor into iliac vessels (right) , requiring emergent embolization.   Shockingly, is recovering very well. I had a goals of care discussion with her in the hospital while she recovered after this event, she was certain about continuing treatment,  Today we discussed that although there are not many treatment options left for her recurrence, we can start tisotumab vedotin (Tivdak), which is a tissue factor directed antibody. We discussed symptoms and side effects while on tisotumab. Major side effects are eye related, and others involve peripheral neuropathy, bleeding, lung and skin reactions.     -Plan Tivdak q 3 weeks 2 mg/kg  - I recommended her seeing a optometrist for a baseline eye exam.  I sent her 3 eye drop rx's: 1). steroid eye drops to be used before each infusion and as prescribed for 72 hours after each infusion 2). a vasoconstrictor eye drops right before each infusion, 3). a lubricating eye drops throughout treatment and for 30 days after her last dose of tivdak.  - I sent Suni more information about tivdak to her  email: [email or web address]    2.) Rectovaginal fistula -now s/p lsc palliative end colostomy.    - Ostomy functioning well, no issues  - home health nurse helping with ostomy care     3.) VVF: Has unilateral PNT. COnsider bilateral if ongoing urine per vagina.     Total time today was 35* minutes including surgical counseling and planning, treatment planning, ordering treatment, reviewing images and labs.      Suzy Brambila MD  Gynecologic Oncology  Pager [phone]

## 2023-08-17 RX ORDER — DIPHENHYDRAMINE HYDROCHLORIDE 50 MG/ML
50 INJECTION INTRAMUSCULAR; INTRAVENOUS
Status: CANCELLED
Start: 2023-08-20

## 2023-08-17 RX ORDER — HEPARIN SODIUM (PORCINE) LOCK FLUSH IV SOLN 100 UNIT/ML 100 UNIT/ML
5 SOLUTION INTRAVENOUS
Status: CANCELLED | OUTPATIENT
Start: 2023-08-20

## 2023-08-17 RX ORDER — ONDANSETRON 2 MG/ML
8 INJECTION INTRAMUSCULAR; INTRAVENOUS ONCE
Status: CANCELLED | OUTPATIENT
Start: 2023-08-20

## 2023-08-17 RX ORDER — EPINEPHRINE 1 MG/ML
0.3 INJECTION, SOLUTION INTRAMUSCULAR; SUBCUTANEOUS EVERY 5 MIN PRN
Status: CANCELLED | OUTPATIENT
Start: 2023-08-20

## 2023-08-17 RX ORDER — MEPERIDINE HYDROCHLORIDE 25 MG/ML
25 INJECTION INTRAMUSCULAR; INTRAVENOUS; SUBCUTANEOUS EVERY 30 MIN PRN
Status: CANCELLED | OUTPATIENT
Start: 2023-08-20

## 2023-08-17 RX ORDER — LORAZEPAM 2 MG/ML
0.5 INJECTION INTRAMUSCULAR EVERY 4 HOURS PRN
Status: CANCELLED | OUTPATIENT
Start: 2023-08-20

## 2023-08-17 RX ORDER — HEPARIN SODIUM,PORCINE 10 UNIT/ML
5-20 VIAL (ML) INTRAVENOUS DAILY PRN
Status: CANCELLED | OUTPATIENT
Start: 2023-08-20

## 2023-08-17 RX ORDER — ALBUTEROL SULFATE 0.83 MG/ML
2.5 SOLUTION RESPIRATORY (INHALATION)
Status: CANCELLED | OUTPATIENT
Start: 2023-08-20

## 2023-08-17 RX ORDER — METHYLPREDNISOLONE SODIUM SUCCINATE 125 MG/2ML
125 INJECTION, POWDER, LYOPHILIZED, FOR SOLUTION INTRAMUSCULAR; INTRAVENOUS
Status: CANCELLED
Start: 2023-08-20

## 2023-08-17 RX ORDER — ALBUTEROL SULFATE 90 UG/1
1-2 AEROSOL, METERED RESPIRATORY (INHALATION)
Status: CANCELLED
Start: 2023-08-20

## 2023-08-18 ENCOUNTER — TELEPHONE (OUTPATIENT)
Dept: ONCOLOGY | Facility: CLINIC | Age: 38
End: 2023-08-18
Payer: COMMERCIAL

## 2023-08-18 NOTE — TELEPHONE ENCOUNTER
Disability form completed and faxed to [phone] with receipt confirmation via rIght fax.Pt notified.    Kasandra Matamoros RN

## 2023-08-18 NOTE — TELEPHONE ENCOUNTER
----- Message from Karin Evans RN sent at 8/16/2023  4:04 PM CDT -----  Regarding: URGENT Paperwork  Good day     Patient reached out concerned as she has [place] from the county she need filled [place] urgently. Patient was worried as [facility] is out and that is usually who takes care of her needs     Can you help the patient? I verified that it was faxed to your location     Thank you so much     Carmencita

## 2023-08-21 ENCOUNTER — MEDICAL CORRESPONDENCE (OUTPATIENT)
Dept: HEALTH INFORMATION MANAGEMENT | Facility: CLINIC | Age: 38
End: 2023-08-21
Payer: COMMERCIAL

## 2023-08-24 ENCOUNTER — TELEPHONE (OUTPATIENT)
Dept: OPHTHALMOLOGY | Facility: CLINIC | Age: 38
End: 2023-08-24
Payer: COMMERCIAL

## 2023-08-24 DIAGNOSIS — Z79.899 EYE EXAM DUE TO HIGH RISK MEDICATION, ENCOUNTER FOR: ICD-10-CM

## 2023-08-24 DIAGNOSIS — C53.1 MALIGNANT NEOPLASM OF EXOCERVIX (H): Primary | ICD-10-CM

## 2023-08-24 NOTE — TELEPHONE ENCOUNTER
Dr. Hendricks able to see at 10 AM tomorrow at [facility] location     Note to patient communicator to reach out and verify appt (tentatively scheduled appointment) and review details of appt    Trae Heck RN 3:18 PM 08/24/23

## 2023-08-24 NOTE — TELEPHONE ENCOUNTER
Called and left a voicemail     Provided the time / date / location to  her appointment for 8/25 with Dr. Hendricks     Also sent a my chart message as well     Ivy Briggs Communication Facilitator on 8/24/2023 at 3:57 PM

## 2023-08-24 NOTE — TELEPHONE ENCOUNTER
Health Call Center    Phone Message    May a detailed message be left on voicemail: yes     Reason for Call: Appointment Intake    Referring Provider Name:     Mounika Ritchie APRN CNP in  GYN ONCOLOGY     Diagnosis and/or Symptoms:     Malignant neoplasm of exocervix (H) [C53.1]  Eye exam due to high risk medication, encounter for [Z79.899]     [facility] called wondering if patient can be seen ASAP. They are wanting to start her on Tisotumab Vedotin infusion tomorrow. Oncologist is wanting her to have an eye exam prior to starting the infusion. Please reach out to patient to advise.     Action Taken: Other: eye    Travel Screening: Not Applicable

## 2023-08-24 NOTE — TELEPHONE ENCOUNTER
Spoke to pt earlier this afternoon.    Pt lives couple hours away     [place] [facility] [place] able come first thing in AM.    Will review alternative scheduling options and call back    Trae Heck RN 2:52 PM 08/24/23

## 2023-08-25 ENCOUNTER — OFFICE VISIT (OUTPATIENT)
Dept: OPHTHALMOLOGY | Facility: CLINIC | Age: 38
End: 2023-08-25
Payer: COMMERCIAL

## 2023-08-25 ENCOUNTER — VIRTUAL VISIT (OUTPATIENT)
Dept: ONCOLOGY | Facility: CLINIC | Age: 38
End: 2023-08-25
Attending: OBSTETRICS & GYNECOLOGY
Payer: COMMERCIAL

## 2023-08-25 VITALS — HEIGHT: 63 IN | WEIGHT: 101 LBS | BODY MASS INDEX: 17.89 KG/M2

## 2023-08-25 DIAGNOSIS — Z51.12 ENCOUNTER FOR ANTINEOPLASTIC IMMUNOTHERAPY: ICD-10-CM

## 2023-08-25 DIAGNOSIS — Z79.899 ENCOUNTER FOR EYE EXAM DUE TO HIGH RISK MEDICATION: Primary | ICD-10-CM

## 2023-08-25 DIAGNOSIS — C53.8 MALIGNANT NEOPLASM OF OVERLAPPING SITES OF CERVIX (H): Primary | ICD-10-CM

## 2023-08-25 DIAGNOSIS — E03.2 HYPOTHYROIDISM DUE TO MEDICATION: ICD-10-CM

## 2023-08-25 DIAGNOSIS — H53.002 AMBLYOPIA, LEFT: ICD-10-CM

## 2023-08-25 DIAGNOSIS — E46 MALNUTRITION, UNSPECIFIED TYPE (H): ICD-10-CM

## 2023-08-25 PROBLEM — E11.9 DIABETES MELLITUS, TYPE 2 (H): Status: ACTIVE | Noted: 2023-08-25

## 2023-08-25 PROCEDURE — G0463 HOSPITAL OUTPT CLINIC VISIT: HCPCS

## 2023-08-25 PROCEDURE — 92015 DETERMINE REFRACTIVE STATE: CPT

## 2023-08-25 PROCEDURE — 99215 OFFICE O/P EST HI 40 MIN: CPT | Mod: 95 | Performed by: NURSE PRACTITIONER

## 2023-08-25 PROCEDURE — 92004 COMPRE OPH EXAM NEW PT 1/>: CPT

## 2023-08-25 RX ORDER — PROCHLORPERAZINE MALEATE 10 MG
10 TABLET ORAL EVERY 6 HOURS PRN
Qty: 30 TABLET | Refills: 2 | Status: ON HOLD | OUTPATIENT
Start: 2023-08-25 | End: 2023-09-08

## 2023-08-25 RX ORDER — BRIMONIDINE TARTRATE 2 MG/ML
SOLUTION/ DROPS OPHTHALMIC
Qty: 5 ML | Refills: 1 | Status: ON HOLD | OUTPATIENT
Start: 2023-08-25 | End: 2023-09-08

## 2023-08-25 RX ORDER — LEVOTHYROXINE SODIUM 200 UG/1
200 TABLET ORAL DAILY
Qty: 30 TABLET | Refills: 0 | Status: SHIPPED | OUTPATIENT
Start: 2023-08-25

## 2023-08-25 RX ORDER — DEXAMETHASONE SODIUM PHOSPHATE 1 MG/ML
SOLUTION/ DROPS OPHTHALMIC
Qty: 5 ML | Refills: 3 | Status: ON HOLD | OUTPATIENT
Start: 2023-08-25 | End: 2023-09-08

## 2023-08-25 RX ORDER — CARBOXYMETHYLCELLULOSE SODIUM 5 MG/ML
SOLUTION/ DROPS OPHTHALMIC
Qty: 90 EACH | Refills: 3 | Status: SHIPPED | OUTPATIENT
Start: 2023-08-25

## 2023-08-25 ASSESSMENT — CONF VISUAL FIELD
OD_NORMAL: 1
OS_SUPERIOR_NASAL_RESTRICTION: 0
OS_INFERIOR_TEMPORAL_RESTRICTION: 0
OS_INFERIOR_NASAL_RESTRICTION: 0
OD_INFERIOR_TEMPORAL_RESTRICTION: 0
OS_NORMAL: 1
OS_SUPERIOR_TEMPORAL_RESTRICTION: 0
OD_INFERIOR_NASAL_RESTRICTION: 0
METHOD: COUNTING FINGERS
OD_SUPERIOR_NASAL_RESTRICTION: 0
OD_SUPERIOR_TEMPORAL_RESTRICTION: 0

## 2023-08-25 ASSESSMENT — VISUAL ACUITY
OD_SC+: -2
METHOD: SNELLEN - LINEAR
OS_SC+: -1
OS_SC: 20/50
OD_SC: 20/20

## 2023-08-25 ASSESSMENT — REFRACTION_MANIFEST
OS_CYLINDER: SPHERE
OD_CYLINDER: SPHERE
OD_SPHERE: PLANO
OS_SPHERE: +0.50

## 2023-08-25 ASSESSMENT — TONOMETRY
OD_IOP_MMHG: 09
OS_IOP_MMHG: 09
IOP_METHOD: ICARE

## 2023-08-25 ASSESSMENT — EXTERNAL EXAM - LEFT EYE: OS_EXAM: NORMAL

## 2023-08-25 ASSESSMENT — PAIN SCALES - GENERAL: PAINLEVEL: NO PAIN (0)

## 2023-08-25 ASSESSMENT — CUP TO DISC RATIO
OD_RATIO: 0.4
OS_RATIO: 0.4

## 2023-08-25 ASSESSMENT — SLIT LAMP EXAM - LIDS
COMMENTS: NORMAL
COMMENTS: NORMAL

## 2023-08-25 ASSESSMENT — EXTERNAL EXAM - RIGHT EYE: OD_EXAM: NORMAL

## 2023-08-25 NOTE — PROGRESS NOTES
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  She is here today for a disease management visit by video that was switched to phone due to technical difficulties.    Oncology History:  2015:HSIL Pap at the beginning of pregnancy. Repeat pap postpartum was ASCUS. Colposcopy was negative.   20: Pelvic US. Uterine fibroids, normal appearance of uterus and ovaries  2020: Pap smear which confirmed squamous cell carcinoma of the cervix. Referred to GYN  ONC  2020: GYN ONC Consult [facility]. Cervical biopsy, RADHA III, unable to identify invasion  2020: Repeat cervical biopsy confirms invasive SCC  2020: Started primary chemoRT for locally advanced cervical cancer. Stage IIIC1(r), large primary tumor with bilateral parametrial invasion and clinical evidence of posterior vaginal involvement. Pelvic lymphadenopathy involving external iliac, perirectal and internal iliac nodes.   20-10/30/20: ChemoRT.   20-20: T&R brachytherapy  2020: Followup with radon (Dr Hernández)   22: CT A/P with nonspecific increased density in the para-aortic region, which could be seen in the setting of retroperitoneal fibrosis, inflammation, or potentially lymphoma/desmoplastic reaction. New varix from right renal vein extending down into the RLQ.   10/6/22: PET CT Whole body with large 5cm retroperitoneal mass with FDG avidity. New nodes in retroperitoneum and mediastinum     Plan: Paclitaxel, Carboplatin, Bevacizumab, and pembrolizumab added due to PD-L1 expression every 3 weeks.   10/13/22: Cycle 1 Paclitaxel 175 mg/m2, Carboplatin AUC 6,  Bevacizumab 15 mg/m2  11/4/22: Cycle 2 Paclitaxel 175 mg/m2, Carboplatin AUC 5, Bevacizumab 15 mg/m2. Pembrolizumab on hold while awaiting insurance coverage.  11/8/22: ED for nausea (no vomiting) and dizziness, discharged with valium for vertigo.  11/25/22: Cycle 3 Paclitaxel 175 mg/m2, Carboplatin AUC 5, Bevacizumab 15 mg/m2, and pembrolizumab 200 mg.  Defer x 1 week for thrombocytopenia (plts 82).  Decrease Paclitaxel 135 mg/m2, Carboplatin AUC 5, Bevacizumab 15 mg/m2, and pembrolizumab 200 mg, given 12/5.  12/19/22 CT CAP with  response     Plan: Continue paclitaxel, carboplatin, bevacizumab, and pembrolizumab with CT/MD after 3 more cycles (6 total)  12/30/22: Cycle 4 Paclitaxel 135 mg/m2, Carboplatin AUC 5, Bevacizumab 15 mg/m2, and pembrolizumab 200 mg, held carboplatin and paclitaxel due to thrombocytopenia (plts 39), not given this cycle.  Bevacizumab and Pembrolizumab given.  1/20/23: Cycle 5 Paclitaxel 135 mg/m2, Carboplatin AUC 4, Bevacizumab 15 mg/m2, and pembrolizumab 200 mg.  2/10/23: Cycle 6 Paclitaxel 135 mg/m2, Carboplatin AUC 4, Bevacizumab 15 mg/m2, and pembrolizumab 200 mg.     Plan: Continue paclitaxel, carboplatin, bevacizumab, and pembrolizumab x 1 more cycle as she did not have chemotherapy for cycle 4.  Followed by CT and follow up with Dr. Brambila.  3/3/23: Cycle 7 Paclitaxel 135 mg/m2, Carboplatin AUC 4, Bevacizumab 15 mg/m2, and pembrolizumab 200 mg.   3/20/2023: CT C/A/P with stable disease (still with retroperitoneal adenopathy encasing aorta/IVC, occulusion of IVC, cystitis/proctitis)   3/24/23: Cycle one of johny/pembro maintenance (cycle 8 overall)  4/14/23: C2 pembro/avastin  (cycle 9 overall)   4/26/23: OSH ED visit for pain, found to have new hydro   4/28/23: Met with urology (Dr Valladares) for hydronephrosis. Plan possible stent placement in the future for hydro.   5/5/23:cycle 3 maintenance pembro/avastin (cycle 10 overall)   5/26/23: Cycle 4 maintenance pembro/avastin (cycle 11  "overall).  6/16/23: Cycle 5 maintenance pembro/avastin (cycle 12 overall).  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 During her hospital stay, she had an overnight episode of vaginal bleeding with significant hemorrhage, approximately 6-7L and received approximately 19 units of pRBCs, FFP, cryo. Patient was intubated and transferred to the SICU. CTA A/P showed evidence of tumor invading into the right internal iliac artery and she underwent emergent embolization. She has been subsequently extubated and remained stable at time of discharge.      Plan: Tisotumab vedotin (Tivdak) 2 mg/kg IV every 3 weeks.  Ophthalmology visit at baseline and prior to each infusion.    8/25/23: Opthalmology OK'ed to proceed with treatment.  8/28/23: Cycle 1 Tivdak.            Today she reports feeling well overall and ready to start her new treatment.  She is overall weaker than she used to be but is feeling stronger than when she discharged from the hospital she has ongoing hip/tailbone pain that is intermittent.  Symptoms improved with rest.  No abdominal pain.  Colostomy is working well without difficulty.  She has 1 PNT in place but is considering a second PNT as she is continuing to have urinary leakage from her vagina.  No nausea or vomiting.  She is \"eating like crazy\".  Her weight is down overall but stable recently.  She is interested in meeting with a dietitian.  She needs a refill on her Synthroid.  No recent fevers or chills.  No chest pain or shortness of breath.  No leg swelling.  No neuropathy from her prior treatments.  She is having difficulty sleeping due to her recent hospitalization and blood loss.  She is not interested in health psychology referral at this point.  She has been on leave from work and is hoping to return to work as she is " struggling financially.  She is behind on rent and all of her bills and has recently had her phone and Internet disconnected.              Review of Systems:    Systemic           +weight loss; no fever; no chills; no night sweats; no appetite changes  Skin           no rashes, or lesions  Eye           no irritation; no changes in vision  Sunil-Laryngeal           no dysphagia; no hoarseness   Pulmonary    no cough; no shortness of breath  Cardiovascular    no chest pain; no palpitations  Gastrointestinal    no diarrhea; no constipation; no abdominal pain; no changes in bowel habits; no blood in stool  Genitourinary   no urinary frequency; no urinary urgency; no dysuria; no pain; no abnormal vaginal discharge; no abnormal vaginal bleeding  Breast   no breast discharge; no breast changes; no breast pain  Musculoskeletal    no myalgias; no arthralgias; no back pain  Psychiatric           no depressed mood; no anxiety    Hematologic   no tender lymph nodes; no noticeable swellings or lumps   Endocrine    no hot flashes; no heat/cold intolerance         Neurological   no tremor; no numbness and tingling; no headaches; no difficulty sleeping      Past Medical History:    Past Medical History:   Diagnosis Date    Cervical cancer (H)     Tobacco abuse          Past Surgical History:    Past Surgical History:   Procedure Laterality Date    CARPAL TUNNEL RELEASE RT/LT Right     COMBINED CYSTOSCOPY, INSERT STENT URETER(S) Bilateral 5/10/2023    Procedure: CYSTOSCOPY, WITH RIGHT URETERAL STENT INSERTION, Bilateral retrograde pyelogram.;  Surgeon: Robert Valladares MD;  Location: [facility] OR    COMBINED CYSTOSCOPY, RETROGRADES, EXCHANGE STENT URETER(S) Right 7/5/2023    Procedure: CYSTOSCOPY with fluoroscopy;  Surgeon: Robert Valladares MD;  Location: [facility] OR    CYSTOSCOPY, REMOVE STENT(S), COMBINED N/A 7/31/2023    Procedure: Cystoscopy;  Surgeon: Robert Valladares MD;  Location: [facility] OR    IR NEPHROSTOMY TUBE PLACEMENT  RIGHT  7/25/2023    IR PELVIC ARTERIAL EMBOLIZATION  8/7/2023    LAPAROSCOPIC COLOSTOMY N/A 7/31/2023    Procedure: Pelvic Exam under anesthesia with laparoscopic colostomy;  Surgeon: Suzy Brambila MD;  Location: [facility] OR    TUBAL LIGATION           Health Maintenance Due   Topic Date Due    NICOTINE/TOBACCO CESSATION COUNSELING Q 1 YR  Never done    YEARLY PREVENTIVE VISIT  Never done    LIPID  Never done    MICROALBUMIN  Never done    DIABETIC FOOT EXAM  Never done    ADVANCE CARE PLANNING  Never done    HEPATITIS B IMMUNIZATION (1 of 3 - 3-dose series) Never done    COVID-19 Vaccine (1) Never done    Pneumococcal Vaccine: Pediatrics (0 to 5 Years) and At-Risk Patients (6 to 64 Years) (1 - PCV) Never done    HEPATITIS C SCREENING  Never done    PAP  08/13/2023    INFLUENZA VACCINE (1) 09/01/2023       Current Medications:     Current Outpatient Medications   Medication Sig Dispense Refill    Ascorbic Acid (VITAMIN C PO) Take 500 mg by mouth daily      diazepam (VALIUM) 2 MG tablet Take 1 tablet (2 mg) by mouth 2 times daily as needed for anxiety AVOID if you are taking hydromorphone/dilaudid 12 tablet 0    HYDROmorphone (DILAUDID) 2 MG tablet Take 1 tablet (2 mg) by mouth every 6 hours as needed for severe pain 20 tablet 0    levothyroxine (SYNTHROID/LEVOTHROID) 200 MCG tablet Take 1 tablet (200 mcg) by mouth daily 30 tablet 0    magnesium oxide (MAG-OX) 400 MG tablet Take 1 tablet (400 mg) by mouth daily 30 tablet 1    oxybutynin ER (DITROPAN XL) 5 MG 24 hr tablet Take 1 tablet (5 mg) by mouth daily 30 tablet 0    polyethylene glycol (MIRALAX) 17 GM/Dose powder Take 17 g by mouth daily as needed for constipation 510 g 0    tamsulosin (FLOMAX) 0.4 MG capsule Take 1 capsule (0.4 mg) by mouth daily 30 capsule 0    vitamin B-12 (CYANOCOBALAMIN) 500 MCG tablet Take 500 mcg by mouth daily      brimonidine (ALPHAGAN) 0.2 % ophthalmic solution On day of Tisotumab infusion instill 3 drops in each eye 10 minutes prior to  "infusion. 5 mL 1    carboxymethylcellulose PF (REFRESH PLUS) 0.5 % ophthalmic solution Instill 1 drop in each eye multiple times per day as needed throughout duration of treatment and for 30 days following completion of treatment. Use for dry eye or eye irritation. 90 each 3    dexAMETHasone (DECADRON) 0.1 % ophthalmic solution On day of Tisotumab infusion instill 1 drop in each eye 10 minutes prior to infusion, then 1 drop in each eye 2 times throughout remainder of day. On day 2 and 3 instill 1 drop in each eye three times a day. 5 mL 3    prochlorperazine (COMPAZINE) 10 MG tablet Take 1 tablet (10 mg) by mouth every 6 hours as needed for nausea or vomiting 30 tablet 2         Allergies:        Allergies   Allergen Reactions    Clindamycin Hives        Social History:     Social History     Tobacco Use    Smoking status: Every Day     Packs/day: 1.00     Types: Cigarettes    Smokeless tobacco: Never    Tobacco comments:     9/11/2020 1/2 pack/day, tying tp quit   Substance Use Topics    Alcohol use: Yes     Comment: occ.       History   Drug Use Unknown         Family History:     The patient's family history is notable for:    Family History   Problem Relation Age of Onset    Breast Cancer Mother         8/2020 newly dx         Physical Exam:     Ht 1.6 m (5' 3\")   Wt 45.8 kg (101 lb)   BMI 17.89 kg/m    Body mass index is 17.89 kg/m .    Respiratory: No audible wheeze, cough.      Psychiatric: appropriate mood and affect. Mentation appears normal, affect normal/bright, judgement and insight intact, normal speech       The rest of a comprehensive physical examination is deferred due phone visit restrictions.        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  She is here today for a disease management visit by video that was switched to phone due to technical difficulties.      60 minutes spent on the date of the encounter doing " chart review, history and exam, documentation, and further activities as noted above.              Plan:     1.) Cervical cancer:  Reviewed patient s diagnosis and treatment plan (including Tivdak) as recommended by Dr. Brambila. The goal of treatment is palliative intent.  OK to proceed with cycle 1 of treatment Monday if labs are WDL.  RTC in 3 weeks for eye exam, labs, provider visit, and next cycle.  Currently scheduled appointments available via My Chart.  Reviewed eye drop scheduled and sent prescriptions to her local pharmacy.  She knows she needs to bring these on Monday and with upcoming treatments but if she forgets to let us know right away.  Referral placed for SW and message sent to them about patient's financial concerns.  Reviewed signs and symptoms for when she should contact the clinic or seek additional care; and contact information of the Gynecologic Oncology Clinic.  Patient to contact the clinic with any questions or concerns in the interim.      ECO    Discussed that the administration of chemotherapy can carry certain risks, including failure to obtain the desired result, discomforts, injury, and the need for additional therapy. Reviewed possible side effects of these drugs including: Allergic reaction; Pancytopenia including Anemia causing weakness/fatigue, Low WBC causing infection, Low platelets causing bruising/bleeding; Mouth sores; Hair loss; Fatigue; Brief periods of forgetfulness; Nausea and vomiting; Neuropathy; Diarrhea/Constipation; Hair loss; Skin and nail changes; Liver effects; Heart effects; Kidney/Bladder effects; Lung effects; Loss of appetite; Weight gain or loss; Visual/Auditory changes; Sexual effects; Mood effects; Menopausal symptoms; Reproductive/Fertility Effects; Other. Discussed that the patient may have side effects from chemotherapy that have not been discussed today because each patient may respond differently to chemotherapy and could have side effects that  have not been previously reported by others.     Discussed ways to manage certain side effects at home and when to call the clinic or go to the emergency department.     Patient will be provided with a copy of [facility] drug information regarding (Tivdak drugs); [facility]'s Cancer Care packet including: Getting Ready for Chemotherapy, Comparing Chemotherapy, Immunotherapy, and targeted Therapy, Your Cancer Care team and MyChart, Understanding Clinical Trials, Honoring Choices, Cancer Care Services, and Integrative Therapies.     Reviewed resources available including nutrition services, rehabilitation and exercise, pharmacy services, tobacco cessation programs, social work services, spiritual health, cancer risk management program, cancer survivorship program, and palliative care program.     Discussed nutrition and the importance of eating small frequent meal, high protein, and drinking 8-10 glasses of noncaffeinated and nonalcoholic beverages.         Genetic risk factors were assessed and she does not meet qualification for referral.    Labs and/or tests ordered include:  CBC. CMP. Mag. TSH with reflex to T4.     2.) Health maintenance:  Issues addressed today include following up with PCP for annual health maintenance and non-gynecologic issues.     3.) Weight loss: Weight loss of about 30 lbs in the last 3 months with her progression of disease, surgery, and hospitalizations.  No nausea or vomiting.  Good appetite currently.  Referral for nutrition placed they will contact her to schedule.    4.) Hypothyroid: Currently on synthroid for hypothyroid that developed while on Keytruda.  Most recently TSH was 8.18 and T4 was 2.31.  Synthroid at that time was increased to 200 mcg daily.  She has been out of her medication.  Will plan to recheck Monday then will likely need to send in a new prescription.  As she is no longer on Keytruda she will need to have her Synthroid prescription taken over either by her  PCP or endocrine.      Mounika Ritchie, KYLE, APRN, FNP-C  Nurse Practitioner   Division of Gynecologic Oncology  Pager: [phone]     CC  Patient Care Team:  Suzy Brambila MD as PCP - General (Gynecologic Oncology)  Suzy Brambila MD as MD (Gynecologic Oncology)  Kenneth Shahid as Referring Physician  Liliam Paniagua, RN as Specialty Care Coordinator (Hematology & Oncology)  Mounika Ritchie, LAURA CNP as Assigned Cancer Care Provider  Robert Valladares MD as Assigned Surgical Provider  Nir May MD as Assigned Palliative Care Provider  SELF, REFERRED

## 2023-08-25 NOTE — NURSING NOTE
Chief Complaint(s) and History of Present Illness(es)       New Patient    In both eyes.  Since onset it is stable.  Associated symptoms include floaters.  Negative for eye pain, headache and flashes.  Pain was noted as 0/10. Additional comments: Suni Zuluaga is a 38 year old female sent for consultation by oncologist, Dr. Brambila for baseline eye exam prior to Tivdak infusions.    Suni reports she has eyeglasses but only wears them part-time.  No eye pain or discomfort.  Has noticed floaters but they appear occasionally only.   CHANCE Frank 8/25/2023 9:22 AM

## 2023-08-25 NOTE — NURSING NOTE
Is the patient currently in the state of [place]? YES    Visit mode:VIDEO    If the visit is dropped, the patient can be reconnected by: VIDEO VISIT: Send to e-mail at: [email or web address]    Will anyone else be joining the visit? NO  (If patient encounters technical issues they should call [phone])    How would you like to obtain your AVS? MyChart    Are changes needed to the allergy or medication list? No    Reason for visit: Video Visit (Follow UP )    Jojo Silva VVF       Jojo Silva

## 2023-08-25 NOTE — LETTER
2023         RE: Suni Zuluaga  [address]  [facility] [address]        Dear Colleague,    Thank you for referring your patient, Suni Zuluaga, to the [facility]. Please see a copy of my visit note below.    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  She is here today for a disease management visit by video that was switched to phone due to technical difficulties.    Oncology History:  2015:HSIL Pap at the beginning of pregnancy. Repeat pap postpartum was ASCUS. Colposcopy was negative.   20: Pelvic US. Uterine fibroids, normal appearance of uterus and ovaries  2020: Pap smear which confirmed squamous cell carcinoma of the cervix. Referred to GYN  ONC  2020: GYN ONC Consult [facility]. Cervical biopsy, RADHA III, unable to identify invasion  2020: Repeat cervical biopsy confirms invasive SCC  2020: Started primary chemoRT for locally advanced cervical cancer. Stage IIIC1(r), large primary tumor with bilateral parametrial invasion and clinical evidence of posterior vaginal involvement. Pelvic lymphadenopathy involving external iliac, perirectal and internal iliac nodes.   20-10/30/20: ChemoRT.   20-20: T&R brachytherapy  2020: Followup with radonc (Dr Hernández)   22: CT A/P with nonspecific increased density in the para-aortic region, which could be seen in the setting of retroperitoneal fibrosis, inflammation, or potentially lymphoma/desmoplastic reaction. New varix from right renal vein extending down into the RLQ.   10/6/22: PET CT Whole body with  large 5cm retroperitoneal mass with FDG avidity. New nodes in retroperitoneum and mediastinum     Plan: Paclitaxel, Carboplatin, Bevacizumab, and pembrolizumab added due to PD-L1 expression every 3 weeks.   10/13/22: Cycle 1 Paclitaxel 175 mg/m2, Carboplatin AUC 6, Bevacizumab 15 mg/m2  11/4/22: Cycle 2 Paclitaxel 175 mg/m2, Carboplatin AUC 5, Bevacizumab 15 mg/m2. Pembrolizumab on hold while awaiting insurance coverage.  11/8/22: ED for nausea (no vomiting) and dizziness, discharged with valium for vertigo.  11/25/22: Cycle 3 Paclitaxel 175 mg/m2, Carboplatin AUC 5, Bevacizumab 15 mg/m2, and pembrolizumab 200 mg.  Defer x 1 week for thrombocytopenia (plts 82).  Decrease Paclitaxel 135 mg/m2, Carboplatin AUC 5, Bevacizumab 15 mg/m2, and pembrolizumab 200 mg, given 12/5.  12/19/22 CT CAP with  response     Plan: Continue paclitaxel, carboplatin, bevacizumab, and pembrolizumab with CT/MD after 3 more cycles (6 total)  12/30/22: Cycle 4 Paclitaxel 135 mg/m2, Carboplatin AUC 5, Bevacizumab 15 mg/m2, and pembrolizumab 200 mg, held carboplatin and paclitaxel due to thrombocytopenia (plts 39), not given this cycle.  Bevacizumab and Pembrolizumab given.  1/20/23: Cycle 5 Paclitaxel 135 mg/m2, Carboplatin AUC 4, Bevacizumab 15 mg/m2, and pembrolizumab 200 mg.  2/10/23: Cycle 6 Paclitaxel 135 mg/m2, Carboplatin AUC 4, Bevacizumab 15 mg/m2, and pembrolizumab 200 mg.     Plan: Continue paclitaxel, carboplatin, bevacizumab, and pembrolizumab x 1 more cycle as she did not have chemotherapy for cycle 4.  Followed by CT and follow up with Dr. Brambila.  3/3/23: Cycle 7 Paclitaxel 135 mg/m2, Carboplatin AUC 4, Bevacizumab 15 mg/m2, and pembrolizumab 200 mg.   3/20/2023: CT C/A/P with stable disease (still with retroperitoneal adenopathy encasing aorta/IVC, occulusion of IVC, cystitis/proctitis)   3/24/23: Cycle one of johny/pembro maintenance (cycle 8 overall)  4/14/23: C2 pembro/avastin  (cycle 9 overall)   4/26/23: OSH ED visit for  "pain, found to have new hydro   4/28/23: Met with urology (Dr Valladares) for hydronephrosis. Plan possible stent placement in the future for hydro.   5/5/23:cycle 3 maintenance pembro/avastin (cycle 10 overall)   5/26/23: Cycle 4 maintenance pembro/avastin (cycle 11 overall).  6/16/23: Cycle 5 maintenance pembro/avastin (cycle 12 overall).  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 During her hospital stay, she had an overnight episode of vaginal bleeding with significant hemorrhage, approximately 6-7L and received approximately 19 units of pRBCs, FFP, cryo. Patient was intubated and transferred to the SICU. CTA A/P showed evidence of tumor invading into the right internal iliac artery and she underwent emergent embolization. She has been subsequently extubated and remained stable at time of discharge.      Plan: Tisotumab vedotin (Tivdak) 2 mg/kg IV every 3 weeks.  Ophthalmology visit at baseline and prior to each infusion.    8/25/23: Opthalmology OK'ed to proceed with treatment.  8/28/23: Cycle 1 Tivdak.            Today she reports feeling well overall and ready to start her new treatment.  She is overall weaker than she used to be but is feeling stronger than when she discharged from the hospital she has ongoing hip/tailbone pain that is intermittent.  Symptoms improved with rest.  No abdominal pain.  Colostomy is working well without difficulty.  She has 1 PNT in place but is considering a second PNT as she is continuing to have urinary leakage from her vagina.  No nausea or vomiting.  She is \"eating like crazy\".  Her weight is down overall but stable recently.  She is interested in meeting with a dietitian.  She needs a refill on her Synthroid.  No recent fevers or chills.  No chest pain or shortness of breath.  No leg swelling.  No " neuropathy from her prior treatments.  She is having difficulty sleeping due to her recent hospitalization and blood loss.  She is not interested in health psychology referral at this point.  She has been on leave from work and is hoping to return to work as she is struggling financially.  She is behind on rent and all of her bills and has recently had her phone and Internet disconnected.              Review of Systems:    Systemic           +weight loss; no fever; no chills; no night sweats; no appetite changes  Skin           no rashes, or lesions  Eye           no irritation; no changes in vision  Sunil-Laryngeal           no dysphagia; no hoarseness   Pulmonary    no cough; no shortness of breath  Cardiovascular    no chest pain; no palpitations  Gastrointestinal    no diarrhea; no constipation; no abdominal pain; no changes in bowel habits; no blood in stool  Genitourinary   no urinary frequency; no urinary urgency; no dysuria; no pain; no abnormal vaginal discharge; no abnormal vaginal bleeding  Breast   no breast discharge; no breast changes; no breast pain  Musculoskeletal    no myalgias; no arthralgias; no back pain  Psychiatric           no depressed mood; no anxiety    Hematologic   no tender lymph nodes; no noticeable swellings or lumps   Endocrine    no hot flashes; no heat/cold intolerance         Neurological   no tremor; no numbness and tingling; no headaches; no difficulty sleeping      Past Medical History:    Past Medical History:   Diagnosis Date    Cervical cancer (H)     Tobacco abuse          Past Surgical History:    Past Surgical History:   Procedure Laterality Date    CARPAL TUNNEL RELEASE RT/LT Right     COMBINED CYSTOSCOPY, INSERT STENT URETER(S) Bilateral 5/10/2023    Procedure: CYSTOSCOPY, WITH RIGHT URETERAL STENT INSERTION, Bilateral retrograde pyelogram.;  Surgeon: Robert Valladares MD;  Location: [facility] OR    COMBINED CYSTOSCOPY, RETROGRADES, EXCHANGE STENT URETER(S) Right 7/5/2023     Procedure: CYSTOSCOPY with fluoroscopy;  Surgeon: Robert Valladares MD;  Location: [facility] OR    CYSTOSCOPY, REMOVE STENT(S), COMBINED N/A 7/31/2023    Procedure: Cystoscopy;  Surgeon: Robert Valladares MD;  Location: [facility] OR    IR NEPHROSTOMY TUBE PLACEMENT RIGHT  7/25/2023    IR PELVIC ARTERIAL EMBOLIZATION  8/7/2023    LAPAROSCOPIC COLOSTOMY N/A 7/31/2023    Procedure: Pelvic Exam under anesthesia with laparoscopic colostomy;  Surgeon: Suzy Brambila MD;  Location: [facility] OR    TUBAL LIGATION           Health Maintenance Due   Topic Date Due    NICOTINE/TOBACCO CESSATION COUNSELING Q 1 YR  Never done    YEARLY PREVENTIVE VISIT  Never done    LIPID  Never done    MICROALBUMIN  Never done    DIABETIC FOOT EXAM  Never done    ADVANCE CARE PLANNING  Never done    HEPATITIS B IMMUNIZATION (1 of 3 - 3-dose series) Never done    COVID-19 Vaccine (1) Never done    Pneumococcal Vaccine: Pediatrics (0 to 5 Years) and At-Risk Patients (6 to 64 Years) (1 - PCV) Never done    HEPATITIS C SCREENING  Never done    PAP  08/13/2023    INFLUENZA VACCINE (1) 09/01/2023       Current Medications:     Current Outpatient Medications   Medication Sig Dispense Refill    Ascorbic Acid (VITAMIN C PO) Take 500 mg by mouth daily      diazepam (VALIUM) 2 MG tablet Take 1 tablet (2 mg) by mouth 2 times daily as needed for anxiety AVOID if you are taking hydromorphone/dilaudid 12 tablet 0    HYDROmorphone (DILAUDID) 2 MG tablet Take 1 tablet (2 mg) by mouth every 6 hours as needed for severe pain 20 tablet 0    levothyroxine (SYNTHROID/LEVOTHROID) 200 MCG tablet Take 1 tablet (200 mcg) by mouth daily 30 tablet 0    magnesium oxide (MAG-OX) 400 MG tablet Take 1 tablet (400 mg) by mouth daily 30 tablet 1    oxybutynin ER (DITROPAN XL) 5 MG 24 hr tablet Take 1 tablet (5 mg) by mouth daily 30 tablet 0    polyethylene glycol (MIRALAX) 17 GM/Dose powder Take 17 g by mouth daily as needed for constipation 510 g 0    tamsulosin (FLOMAX) 0.4 MG  "capsule Take 1 capsule (0.4 mg) by mouth daily 30 capsule 0    vitamin B-12 (CYANOCOBALAMIN) 500 MCG tablet Take 500 mcg by mouth daily      brimonidine (ALPHAGAN) 0.2 % ophthalmic solution On day of Tisotumab infusion instill 3 drops in each eye 10 minutes prior to infusion. 5 mL 1    carboxymethylcellulose PF (REFRESH PLUS) 0.5 % ophthalmic solution Instill 1 drop in each eye multiple times per day as needed throughout duration of treatment and for 30 days following completion of treatment. Use for dry eye or eye irritation. 90 each 3    dexAMETHasone (DECADRON) 0.1 % ophthalmic solution On day of Tisotumab infusion instill 1 drop in each eye 10 minutes prior to infusion, then 1 drop in each eye 2 times throughout remainder of day. On day 2 and 3 instill 1 drop in each eye three times a day. 5 mL 3    prochlorperazine (COMPAZINE) 10 MG tablet Take 1 tablet (10 mg) by mouth every 6 hours as needed for nausea or vomiting 30 tablet 2         Allergies:        Allergies   Allergen Reactions    Clindamycin Hives        Social History:     Social History     Tobacco Use    Smoking status: Every Day     Packs/day: 1.00     Types: Cigarettes    Smokeless tobacco: Never    Tobacco comments:     9/11/2020 1/2 pack/day, tying tp quit   Substance Use Topics    Alcohol use: Yes     Comment: occ.       History   Drug Use Unknown         Family History:     The patient's family history is notable for:    Family History   Problem Relation Age of Onset    Breast Cancer Mother         8/2020 newly dx         Physical Exam:     Ht 1.6 m (5' 3\")   Wt 45.8 kg (101 lb)   BMI 17.89 kg/m    Body mass index is 17.89 kg/m .    Respiratory: No audible wheeze, cough.      Psychiatric: appropriate mood and affect. Mentation appears normal, affect normal/bright, judgement and insight intact, normal speech       The rest of a comprehensive physical examination is deferred due phone visit restrictions.        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  She is here today for a disease management visit by video that was switched to phone due to technical difficulties.      60 minutes spent on the date of the encounter doing chart review, history and exam, documentation, and further activities as noted above.              Plan:     1.) Cervical cancer:  Reviewed patient s diagnosis and treatment plan (including Tivdak) as recommended by Dr. Brambila. The goal of treatment is palliative intent.  OK to proceed with cycle 1 of treatment Monday if labs are WDL.  RTC in 3 weeks for eye exam, labs, provider visit, and next cycle.  Currently scheduled appointments available via My Chart.  Reviewed eye drop scheduled and sent prescriptions to her local pharmacy.  She knows she needs to bring these on Monday and with upcoming treatments but if she forgets to let us know right away.  Referral placed for SW and message sent to them about patient's financial concerns.  Reviewed signs and symptoms for when she should contact the clinic or seek additional care; and contact information of the Gynecologic Oncology Clinic.  Patient to contact the clinic with any questions or concerns in the interim.      ECO    Discussed that the administration of chemotherapy can carry certain risks, including failure to obtain the desired result, discomforts, injury, and the need for additional therapy. Reviewed possible side effects of these drugs including: Allergic reaction; Pancytopenia including Anemia causing weakness/fatigue, Low WBC causing infection, Low platelets causing bruising/bleeding; Mouth sores; Hair loss; Fatigue; Brief periods of forgetfulness; Nausea and vomiting; Neuropathy; Diarrhea/Constipation; Hair loss; Skin and nail changes; Liver effects; Heart effects; Kidney/Bladder effects; Lung effects; Loss of appetite; Weight gain or loss; Visual/Auditory changes; Sexual effects; Mood  effects; Menopausal symptoms; Reproductive/Fertility Effects; Other. Discussed that the patient may have side effects from chemotherapy that have not been discussed today because each patient may respond differently to chemotherapy and could have side effects that have not been previously reported by others.     Discussed ways to manage certain side effects at home and when to call the clinic or go to the emergency department.     Patient will be provided with a copy of [facility] drug information regarding (Tivdak drugs); [facility] Cancer Care packet including: Getting Ready for Chemotherapy, Comparing Chemotherapy, Immunotherapy, and targeted Therapy, Your Cancer Care team and MyChart, Understanding Clinical Trials, Honoring Choices, Cancer Care Services, and Integrative Therapies.     Reviewed resources available including nutrition services, rehabilitation and exercise, pharmacy services, tobacco cessation programs, social work services, spiritual health, cancer risk management program, cancer survivorship program, and palliative care program.     Discussed nutrition and the importance of eating small frequent meal, high protein, and drinking 8-10 glasses of noncaffeinated and nonalcoholic beverages.         Genetic risk factors were assessed and she does not meet qualification for referral.    Labs and/or tests ordered include:  CBC. CMP. Mag. TSH with reflex to T4.     2.) Health maintenance:  Issues addressed today include following up with PCP for annual health maintenance and non-gynecologic issues.     3.) Weight loss: Weight loss of about 30 lbs in the last 3 months with her progression of disease, surgery, and hospitalizations.  No nausea or vomiting.  Good appetite currently.  Referral for nutrition placed they will contact her to schedule.    4.) Hypothyroid: Currently on synthroid for hypothyroid that developed while on Keytruda.  Most recently TSH was 8.18 and T4 was 2.31.  Synthroid at  that time was increased to 200 mcg daily.  She has been out of her medication.  Will plan to recheck Monday then will likely need to send in a new prescription.  As she is no longer on Keytruda she will need to have her Synthroid prescription taken over either by her PCP or endocrine.      Mounika Ritchie, DNP, APRN, FNP-C  Nurse Practitioner   Division of Gynecologic Oncology  Pager: [phone]     CC  Patient Care Team:  Suzy Brambila MD as PCP - General (Gynecologic Oncology)

## 2023-08-25 NOTE — PROGRESS NOTES
HPI:    Patient presents for baseline vision evaluation prior to initiating Tivdak infusions.  Rarely wears habitual glasses prescription.  She is not a contact lens wearer.    Patient notes the vision in her left eye has been reduced since childhood as she has a lazy eye.  History of patching her right eye.    She denies dryness, redness, irritation, or eye discharge.  Denies eye drop use.    Visit with Dr. Brambila 8/16/23:  Plan:      1.)   Cervical cancer : Stage IIIC1r SCC of the cervix (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 Completed primary chemoRT without complication. Recurrence in 10/2022.  Received 7 cycles of carbo/taxol/johny/pembro. Required dose reductions due to neutropenia, thrombocytopenia. Then  received 5 cycles of maintenance johny/pembro. Developed rectovaginal, vesicovaginal fistulas. Now s/p end colostomy and PNTs. Massive hemorrhage during recent hospitalization given erosion of tumor into iliac vessels (right) , requiring emergent embolization...   - Plan Tivdak q 3 weeks 2 mg/kg  - I recommended her seeing a optometrist for a baseline eye exam.  I sent her 3 eye drop rx's: 1). steroid eye drops to be used before each infusion and as prescribed for 72 hours after each infusion 2). a vasoconstrictor eye drops right before each infusion, 3). a lubricating eye drops throughout treatment and for 30 days after her last dose of tivdak.      Mitigation and management strategies for ocular events associated with tisotumab vedotin (Neena, S)  Tisotumab vedotin is a tissue factor-directed antibody-drug conjugate developed for treatment of recurrent or metastatic cervical cancer (r/correction). In the pivotal phase 2 study innovaTV 204, 101 r/correction patients received tisotumab vedotin. 138 ocular treatment-related AEs (TRAEs), predominantly Grade 1 or 2, were observed in 54 (53%) patients. The most common ocular TRAEs were conjunctivitis (26 patients [26%]), dry eye (23 patients [23%]), and keratitis (11 patients [11%]). Observed ocular  TRAEs are hypothesized to be conjunctival and inflammatory in nature, resulting in signs and symptoms readily recognizable by patients and healthcare providers. Generally, ocular TRAEs were manageable with ophthalmic care (prophylactic and symptom management) and dose modifications. Of 138 ocular TRAEs, 118 (86%) resolved within 30 days after last dose of tisotumab vedotin.     Assessment and Plan:  1) High risk medication  A: Patient to initiate Tivdak infusions, baseline exam today with a healthy ocular surface.  She is not a CL wearer.  P: Patient educated on potential ocular side effects and symptoms.  Instructed patient to proceed with eye drop schedule as prescribed by her care team to prevent/minimize adverse effects.  She should follow-up with me prior to each infusion or sooner with persistent ocular discomfort/irritation.  I provided my contact information for scheduling once she has established her treatment schedule.    2) Amblyopia, left eye  A: BCVA 20/50 JENS TINAJERO with h/o patching  P: Patient aware of mild visual acuity reduction since childhood.    Complete documentation of historical and exam elements from today's encounter can be found in the full encounter summary report (not reduplicated in this progress note). I personally obtained the chief complaint(s) and history of present illness. I confirmed and edited as necessary the review of systems, past medical/surgical history, family history, social history, and examination findings as document by others; and I examined the patient myself. I personally reviewed the relevant tests, images, and reports as documented above. I formulated and edited as necessary the assessment and plan and discussed the findings and management plan with the patient and family.    Blanca Hendricks, OD

## 2023-08-28 ENCOUNTER — APPOINTMENT (OUTPATIENT)
Dept: LAB | Facility: CLINIC | Age: 38
End: 2023-08-28
Attending: NURSE PRACTITIONER
Payer: COMMERCIAL

## 2023-08-28 ENCOUNTER — PATIENT OUTREACH (OUTPATIENT)
Dept: CARE COORDINATION | Facility: CLINIC | Age: 38
End: 2023-08-28

## 2023-08-28 ENCOUNTER — INFUSION THERAPY VISIT (OUTPATIENT)
Dept: ONCOLOGY | Facility: CLINIC | Age: 38
End: 2023-08-28
Attending: NURSE PRACTITIONER
Payer: COMMERCIAL

## 2023-08-28 DIAGNOSIS — Z51.12 ENCOUNTER FOR ANTINEOPLASTIC IMMUNOTHERAPY: ICD-10-CM

## 2023-08-28 DIAGNOSIS — E03.2 HYPOTHYROIDISM DUE TO MEDICATION: ICD-10-CM

## 2023-08-28 DIAGNOSIS — E46 MALNUTRITION, UNSPECIFIED TYPE (H): ICD-10-CM

## 2023-08-28 DIAGNOSIS — C53.8 MALIGNANT NEOPLASM OF OVERLAPPING SITES OF CERVIX (H): Primary | ICD-10-CM

## 2023-08-28 LAB
ALBUMIN SERPL BCG-MCNC: 3.8 G/DL (ref 3.5–5.2)
ALP SERPL-CCNC: 87 U/L (ref 35–104)
ALT SERPL W P-5'-P-CCNC: <5 U/L (ref 0–50)
ANION GAP SERPL CALCULATED.3IONS-SCNC: 14 MMOL/L (ref 7–15)
AST SERPL W P-5'-P-CCNC: 13 U/L (ref 0–45)
BASOPHILS # BLD AUTO: 0 10E3/UL (ref 0–0.2)
BASOPHILS NFR BLD AUTO: 1 %
BILIRUB SERPL-MCNC: 0.2 MG/DL
BUN SERPL-MCNC: 10.7 MG/DL (ref 6–20)
CALCIUM SERPL-MCNC: 10 MG/DL (ref 8.6–10)
CHLORIDE SERPL-SCNC: 100 MMOL/L (ref 98–107)
CREAT SERPL-MCNC: 0.57 MG/DL (ref 0.51–0.95)
DEPRECATED HCO3 PLAS-SCNC: 26 MMOL/L (ref 22–29)
EOSINOPHIL # BLD AUTO: 0.2 10E3/UL (ref 0–0.7)
EOSINOPHIL NFR BLD AUTO: 3 %
ERYTHROCYTE [DISTWIDTH] IN BLOOD BY AUTOMATED COUNT: 14.9 % (ref 10–15)
GFR SERPL CREATININE-BSD FRML MDRD: >90 ML/MIN/1.73M2
GLUCOSE SERPL-MCNC: 104 MG/DL (ref 70–99)
HCT VFR BLD AUTO: 37.6 % (ref 35–47)
HGB BLD-MCNC: 11.7 G/DL (ref 11.7–15.7)
IMM GRANULOCYTES # BLD: 0 10E3/UL
IMM GRANULOCYTES NFR BLD: 0 %
LYMPHOCYTES # BLD AUTO: 1.1 10E3/UL (ref 0.8–5.3)
LYMPHOCYTES NFR BLD AUTO: 16 %
MAGNESIUM SERPL-MCNC: 1.8 MG/DL (ref 1.7–2.3)
MCH RBC QN AUTO: 29 PG (ref 26.5–33)
MCHC RBC AUTO-ENTMCNC: 31.1 G/DL (ref 31.5–36.5)
MCV RBC AUTO: 93 FL (ref 78–100)
MONOCYTES # BLD AUTO: 0.6 10E3/UL (ref 0–1.3)
MONOCYTES NFR BLD AUTO: 9 %
NEUTROPHILS # BLD AUTO: 4.9 10E3/UL (ref 1.6–8.3)
NEUTROPHILS NFR BLD AUTO: 71 %
NRBC # BLD AUTO: 0 10E3/UL
NRBC BLD AUTO-RTO: 0 /100
PLATELET # BLD AUTO: 292 10E3/UL (ref 150–450)
POTASSIUM SERPL-SCNC: 3.9 MMOL/L (ref 3.4–5.3)
PROT SERPL-MCNC: 7.9 G/DL (ref 6.4–8.3)
RBC # BLD AUTO: 4.04 10E6/UL (ref 3.8–5.2)
SODIUM SERPL-SCNC: 140 MMOL/L (ref 136–145)
T4 FREE SERPL-MCNC: 0.97 NG/DL (ref 0.9–1.7)
TSH SERPL DL<=0.005 MIU/L-ACNC: 49.09 UIU/ML (ref 0.3–4.2)
WBC # BLD AUTO: 6.9 10E3/UL (ref 4–11)

## 2023-08-28 PROCEDURE — 258N000003 HC RX IP 258 OP 636: Performed by: OBSTETRICS & GYNECOLOGY

## 2023-08-28 PROCEDURE — 96413 CHEMO IV INFUSION 1 HR: CPT

## 2023-08-28 PROCEDURE — 36415 COLL VENOUS BLD VENIPUNCTURE: CPT

## 2023-08-28 PROCEDURE — 80053 COMPREHEN METABOLIC PANEL: CPT

## 2023-08-28 PROCEDURE — 85018 HEMOGLOBIN: CPT

## 2023-08-28 PROCEDURE — 84439 ASSAY OF FREE THYROXINE: CPT

## 2023-08-28 PROCEDURE — 999N000128 HC STATISTIC PERIPHERAL IV START W/O US GUIDANCE

## 2023-08-28 PROCEDURE — 83735 ASSAY OF MAGNESIUM: CPT

## 2023-08-28 PROCEDURE — 85048 AUTOMATED LEUKOCYTE COUNT: CPT

## 2023-08-28 PROCEDURE — 250N000011 HC RX IP 250 OP 636: Mod: JZ | Performed by: OBSTETRICS & GYNECOLOGY

## 2023-08-28 PROCEDURE — 84443 ASSAY THYROID STIM HORMONE: CPT

## 2023-08-28 PROCEDURE — 96375 TX/PRO/DX INJ NEW DRUG ADDON: CPT

## 2023-08-28 RX ORDER — ONDANSETRON 2 MG/ML
8 INJECTION INTRAMUSCULAR; INTRAVENOUS ONCE
Status: COMPLETED | OUTPATIENT
Start: 2023-08-28 | End: 2023-08-28

## 2023-08-28 RX ORDER — BRIMONIDINE TARTRATE 2 MG/ML
SOLUTION/ DROPS OPHTHALMIC
Qty: 5 ML | Refills: 1 | Status: ON HOLD | OUTPATIENT
Start: 2023-08-28 | End: 2023-09-08

## 2023-08-28 RX ORDER — CARBOXYMETHYLCELLULOSE SODIUM 5 MG/ML
SOLUTION/ DROPS OPHTHALMIC
Qty: 90 EACH | Refills: 3 | Status: ON HOLD | OUTPATIENT
Start: 2023-08-28 | End: 2023-09-08

## 2023-08-28 RX ORDER — DEXAMETHASONE SODIUM PHOSPHATE 1 MG/ML
SOLUTION/ DROPS OPHTHALMIC
Qty: 5 ML | Refills: 3 | Status: ON HOLD | OUTPATIENT
Start: 2023-08-28 | End: 2023-09-08

## 2023-08-28 RX ADMIN — TISOTUMAB VEDOTIN 100 MG: 40 INJECTION, POWDER, FOR SOLUTION INTRAVENOUS at 12:22

## 2023-08-28 RX ADMIN — ONDANSETRON 8 MG: 2 INJECTION INTRAMUSCULAR; INTRAVENOUS at 11:35

## 2023-08-28 RX ADMIN — SODIUM CHLORIDE 250 ML: 9 INJECTION, SOLUTION INTRAVENOUS at 11:32

## 2023-08-28 NOTE — PROGRESS NOTES
Infusion Nursing Note:  Suni Zuluaga presents today for Cycle 1 Day 1 Tivdak.    Patient seen by provider today: Brittaney, Mounika Ritchie CNP on Friday 8/25   present during visit today: Not Applicable.    Note: Patient has no new concerns to report since her visit with Mounika on Friday. Reviewed new Tivdak with brandon, all questions answered and side effects reviewed.       Intravenous Access:  Peripheral IV placed.    Treatment Conditions:  Lab Results   Component Value Date    HGB 11.7 08/28/2023    WBC 6.9 08/28/2023    ANEU 2.5 10/27/2020    ANEUTAUTO 4.9 08/28/2023     08/28/2023        Lab Results   Component Value Date     08/28/2023    POTASSIUM 3.9 08/28/2023    MAG 1.8 08/28/2023    CR 0.57 08/28/2023    HARRISON 10.0 08/28/2023    BILITOTAL 0.2 08/28/2023    ALBUMIN 3.8 08/28/2023    ALT <5 08/28/2023    AST 13 08/28/2023       Results reviewed, labs MET treatment parameters, ok to proceed with treatment.    Eye exam completed here at the [facility] by Dr. Hendricks on 8/25.  Ice packs applied to eyes along with eye drops 10 minutes prior to starting Tivdak at 1210. Cold packs remained on eyes for another 20 minutes post Tivdak infusion for a total of 60 minutes. Eye drop scheduled reviewed with patient. 0      Post Infusion Assessment:  Patient tolerated infusion without incident.  Blood return noted pre and post infusion.  Access discontinued per protocol.       Discharge Plan:   Prescription refills given for dexamethasone, brimonidine, artificial tears drops.   AVS to patient via [facility].  Patient will return 3 weeks for next appointment. Scheduling to make appointments.   Patient discharged in stable condition accompanied by: self.  Departure Mode: Ambulatory.  Face to Face time: 0.      Liane Belcher RN

## 2023-08-28 NOTE — PROGRESS NOTES
Social Work - Intervention  [facility]  Data/Intervention:    Patient Name: Suni Zuluaga Goes By: Suni PLAZA/Age: 1985 (38 year old)     Visit Type: telephone  Referral Source: [facility]  Reason for Referral: Financial Assistance    Collaborated With:    -Patient, via phone call     Psychosocial Information/Concerns:  SW requested to connect with patient as they have some financial concerns and needing assistance.     Intervention/Education/Resources Provided:  SW called patient, introduced self and explained reason for calling. Patient reported to be doing well. SW received financial assistance programs available to them as cancer patient in active treatment. SW did receive medical verification form from [employer], completed and sent back. Patient should be receiving that barb soon. Patient unsure if they ever applied for Cancer Care Financial Assistance and [employer]. Patient stated that they have no income at this time. SW can assist with [employer] application and will e-mail information on Cancer Care. Patient denied any other questions or needs at this time.     Assessment/Plan:  SW will complete [employer] application and send patient e-mail about the Cancer Care Financial Assistance Program. SW will continue to remain available as needed. Provided patient/family with contact information and availability.    JOELLE Crowley,LGSUSAN  Hematology/Oncology Social Worker  Phone:[phone] Pager: [phone]

## 2023-08-28 NOTE — ADDENDUM NOTE
Addended by: MAXIMO RUIZ on: 8/28/2023 09:23 AM     Modules accepted: Orders     Thank you! Thank you for allowing me to care for you in the emergency department. It is my goal to provide you with excellent care. If you have not received excellent quality care, please ask to speak to the nurse manager. Please fill out the survey that will come to you by mail or email since we listen to your feedback! Below you will find a list of your tests from today's visit. Should you have any questions, please do not hesitate to call the emergency department. Labs  Recent Results (from the past 12 hour(s))   COVID-19 WITH INFLUENZA A/B    Collection Time: 11/17/22 11:24 AM   Result Value Ref Range    SARS-CoV-2 by PCR DETECTED (A) Not Detected      Influenza A by PCR Not Detected Not Detected      Influenza B by PCR Not Detected Not Detected         Radiologic Studies  XR CHEST PORT   Final Result      No acute process on portable chest.           CT Results  (Last 48 hours)      None          CXR Results  (Last 48 hours)                 11/17/22 1057  XR CHEST PORT Final result    Impression:      No acute process on portable chest.           Narrative:  EXAM:  XR CHEST PORT       INDICATION: Cough       COMPARISON: none       TECHNIQUE: portable chest AP view       FINDINGS: The cardiac silhouette is within normal limits. The pulmonary   vasculature is within normal limits. The lungs and pleural spaces are clear. Degenerative changes are seen in the   thoracic spine.                 ------------------------------------------------------------------------------------------------------------  The exam and treatment you received in the Emergency Department were for an urgent problem and are not intended as complete care. It is important that you follow-up with a doctor, nurse practitioner, or physician assistant to:  (1) confirm your diagnosis,  (2) re-evaluation of changes in your illness and treatment, and  (3) for ongoing care.  Please take your discharge instructions with you when you go to your follow-up appointment. If you have any problem arranging a follow-up appointment, contact the Emergency Department. If your symptoms become worse or you do not improve as expected and you are unable to reach your health care provider, please return to the Emergency Department. We are available 24 hours a day. If a prescription has been provided, please have it filled as soon as possible to prevent a delay in treatment. If you have any questions or reservations about taking the medication due to side effects or interactions with other medications, please call your primary care provider or contact the ER.

## 2023-08-29 ENCOUNTER — NURSE TRIAGE (OUTPATIENT)
Dept: NURSING | Facility: CLINIC | Age: 38
End: 2023-08-29
Payer: COMMERCIAL

## 2023-08-29 DIAGNOSIS — E11.9 DIABETIC EYE EXAM (H): Primary | ICD-10-CM

## 2023-08-29 DIAGNOSIS — Z01.00 DIABETIC EYE EXAM (H): Primary | ICD-10-CM

## 2023-08-29 DIAGNOSIS — C53.8 MALIGNANT NEOPLASM OF OVERLAPPING SITES OF CERVIX (H): ICD-10-CM

## 2023-08-29 DIAGNOSIS — T45.1X5S ADVERSE EFFECT OF ANTINEOPLASTIC AND IMMUNOSUPPRESSIVE DRUGS, SEQUELA: ICD-10-CM

## 2023-08-29 DIAGNOSIS — E03.2 HYPOTHYROIDISM DUE TO MEDICATION: Primary | ICD-10-CM

## 2023-08-29 NOTE — CONFIDENTIAL NOTE
RECORDS RECEIVED FROM: internal    DATE RECEIVED: 9/18/23    NOTES (FOR ALL VISITS) STATUS DETAILS   OFFICE NOTES from referring provider internal  Mounika Ritchie      ED NOTES internal  7.31.23 [place]      MEDICATION LIST internal     IMAGING        XR (Chest) internal  8/7/23, 6.22.23, 4.12.23   CT (HEAD/NECK/CHEST/ABDOMEN) internal  8/7/23, 8/4/23, 6.23.23, 4.26.23, 3.20.23, 12.19.22, 9.28.22, 9.15.22     LABS     DIABETES: HBGA1C, CREATININE, FASTING LIPIDS, MICROALBUMIN URINE, POTASSIUM, TSH, T4    THYROID: TSH, T4, CBC, THYRODLONULIN, TOTAL T3, FREE T4, CALCITONIN, CEA internal  T4- 8.28.23   TSH- 8.28.23   Cbc- 8.28.23   CMP- 8.28.23   POTASSIUM- 8/9/23  Glucose meter- 8/8/23  Bmp- 8/8/23  HBGA1C- 8/7/23  Received labs- 3.1.23

## 2023-08-30 NOTE — TELEPHONE ENCOUNTER
"Pt reports she is having vaginal bleeding today. Pt reports \"changed one pad in past two hours\". Pt reports she has a colostomy and urinary bag and has vaginal discharge normally. Pt very concerned her bleeding will be similar to a previous episode of vaginal bleeding which was severe and occurred in hospital per ER notes. Pt denies fever or other acute symptoms including abdominal pain and states she has only used one pad today.     Writer advised pt amount of bleeding she is having does not sound urgent at this point and pt can monitor symptoms at home and contact clinic in the morning. Advised pt if bleeding increases, fever or abdominal pain occur she should call back or be seen in ER tonight, another adult should drive.     Pt reports she is two hours from a  hospital and \"don't want to wait til last minute, afraid might happen again but also don't want to go in if it's nothing\".    Writer advised pt Writer is unable to diagnose but considering her location and history ER visit reasonable, another adult should drive. Call back if needing further assistance.     Pt verbalizes understanding and agrees to plan.       Reason for Disposition   Bleeding occurring > 12 months after menopause   Postmenopausal vaginal bleeding    Additional Information   Negative: Shock suspected (e.g., cold/pale/clammy skin, too weak to stand, low BP, rapid pulse)   Negative: Difficult to awaken or acting confused (e.g., disoriented, slurred speech)   Negative: Passed out (i.e., lost consciousness, collapsed and was not responding)   Negative: Sounds like a life-threatening emergency to the triager   Negative: Sounds like a life-threatening emergency to the triager   Negative: Followed a genital area injury (e.g., vagina, vulva)   Negative: [1] Vaginal discharge is main symptom AND [2] small amount of blood   Negative: SEVERE abdominal pain   Negative: SEVERE dizziness (e.g., unable to stand, requires support to walk, feels like " passing out now)   Negative: Sexual assault or rape (sexual intercourse or activity occurs without freely given consent), known or suspected   Negative: SEVERE vaginal bleeding (e.g., soaking 2 pads or tampons per hour and present 2 or more hours; 1 menstrual cup every 2 hours)   Negative: Patient sounds very sick or weak to the triager   Negative: MODERATE vaginal bleeding (e.g., soaking 1 pad or tampon per hour and present > 6 hours; 1 menstrual cup every 6 hours)   Negative: Pale skin (pallor) of new-onset or worsening   Negative: [1] Constant abdominal pain AND [2] present > 2 hours   Negative: Taking Coumadin (warfarin) or other strong blood thinner, or known bleeding disorder (e.g., thrombocytopenia)   Negative: Bleeding with > 6 soaked pads or tampons per day   Negative: Bleeding lasts for > 7 days   Negative: [1] Bleeding/spotting after procedure (e.g., biopsy) or pelvic examination (e.g., pap smear) AND [2] lasts > 7 days    Protocols used: Vaginal Bleeding - [id number], Vaginal Bleeding - [id number]

## 2023-08-30 NOTE — TELEPHONE ENCOUNTER
"Pt called back asking \"I want to know what to do\".     Writer advised pt if she wants to go to the ER she can go. Advised pt Writer already discussed with her.    Pt verbalized understanding and ended phone call.     Writer unsure if pt completely understood advice so called pt back. Writer advised pt if she is concerned and/or bleeding is heavier she should be seen in ER tonight, another adult should drive. Advised pt if she feels weak or dizzy or bleeding very heavy she should call 911.     Pt verbalizes understanding and has no further questions or concerns.   "

## 2023-09-04 ENCOUNTER — APPOINTMENT (OUTPATIENT)
Dept: CT IMAGING | Facility: CLINIC | Age: 38
End: 2023-09-04
Attending: OBSTETRICS & GYNECOLOGY
Payer: COMMERCIAL

## 2023-09-04 ENCOUNTER — HOSPITAL ENCOUNTER (INPATIENT)
Facility: CLINIC | Age: 38
LOS: 1 days | Discharge: HOSPICE | End: 2023-09-05
Attending: OBSTETRICS & GYNECOLOGY | Admitting: OBSTETRICS & GYNECOLOGY
Payer: COMMERCIAL

## 2023-09-04 ENCOUNTER — TRANSFERRED RECORDS (OUTPATIENT)
Dept: HEALTH INFORMATION MANAGEMENT | Facility: CLINIC | Age: 38
End: 2023-09-04

## 2023-09-04 VITALS
RESPIRATION RATE: 20 BRPM | DIASTOLIC BLOOD PRESSURE: 57 MMHG | TEMPERATURE: 98.2 F | HEART RATE: 127 BPM | SYSTOLIC BLOOD PRESSURE: 78 MMHG | OXYGEN SATURATION: 100 %

## 2023-09-04 PROBLEM — N93.9 VAGINAL BLEEDING: Status: ACTIVE | Noted: 2023-09-04

## 2023-09-04 LAB
ABO/RH(D): NORMAL
ALBUMIN SERPL BCG-MCNC: 3.4 G/DL (ref 3.5–5.2)
ALP SERPL-CCNC: 77 U/L (ref 35–104)
ALT SERPL W P-5'-P-CCNC: 7 U/L (ref 0–50)
ANION GAP SERPL CALCULATED.3IONS-SCNC: 11 MMOL/L (ref 7–15)
ANTIBODY SCREEN: NEGATIVE
APTT PPP: 27 SECONDS (ref 22–38)
APTT PPP: 28 SECONDS (ref 22–38)
AST SERPL W P-5'-P-CCNC: 15 U/L (ref 0–45)
BILIRUB SERPL-MCNC: 0.2 MG/DL
BLD PROD TYP BPU: NORMAL
BLOOD COMPONENT TYPE: NORMAL
BUN SERPL-MCNC: 12.4 MG/DL (ref 6–20)
CALCIUM SERPL-MCNC: 9.1 MG/DL (ref 8.6–10)
CHLORIDE SERPL-SCNC: 102 MMOL/L (ref 98–107)
CODING SYSTEM: NORMAL
CREAT SERPL-MCNC: 0.54 MG/DL (ref 0.51–0.95)
CROSSMATCH: NORMAL
DEPRECATED HCO3 PLAS-SCNC: 24 MMOL/L (ref 22–29)
ERYTHROCYTE [DISTWIDTH] IN BLOOD BY AUTOMATED COUNT: 16.1 % (ref 10–15)
ERYTHROCYTE [DISTWIDTH] IN BLOOD BY AUTOMATED COUNT: 17.1 % (ref 10–15)
FIBRINOGEN PPP-MCNC: 500 MG/DL (ref 170–490)
GFR SERPL CREATININE-BSD FRML MDRD: >90 ML/MIN/1.73M2
GLUCOSE SERPL-MCNC: 114 MG/DL (ref 70–99)
HCT VFR BLD AUTO: 33.6 % (ref 35–47)
HCT VFR BLD AUTO: 34.8 % (ref 35–47)
HGB BLD-MCNC: 10.5 G/DL (ref 11.7–15.7)
HGB BLD-MCNC: 10.7 G/DL (ref 11.7–15.7)
INR PPP: 1.08 (ref 0.85–1.15)
INR PPP: 1.08 (ref 0.85–1.15)
ISSUE DATE AND TIME: NORMAL
LACTATE SERPL-SCNC: 1.3 MMOL/L (ref 0.7–2)
MCH RBC QN AUTO: 27.9 PG (ref 26.5–33)
MCH RBC QN AUTO: 27.9 PG (ref 26.5–33)
MCHC RBC AUTO-ENTMCNC: 30.7 G/DL (ref 31.5–36.5)
MCHC RBC AUTO-ENTMCNC: 31.3 G/DL (ref 31.5–36.5)
MCV RBC AUTO: 89 FL (ref 78–100)
MCV RBC AUTO: 91 FL (ref 78–100)
PLATELET # BLD AUTO: 226 10E3/UL (ref 150–450)
PLATELET # BLD AUTO: 246 10E3/UL (ref 150–450)
POTASSIUM SERPL-SCNC: 4.2 MMOL/L (ref 3.4–5.3)
PROT SERPL-MCNC: 6.6 G/DL (ref 6.4–8.3)
RADIOLOGIST FLAGS: ABNORMAL
RBC # BLD AUTO: 3.76 10E6/UL (ref 3.8–5.2)
RBC # BLD AUTO: 3.84 10E6/UL (ref 3.8–5.2)
SODIUM SERPL-SCNC: 137 MMOL/L (ref 136–145)
SPECIMEN EXPIRATION DATE: NORMAL
UNIT ABO/RH: NORMAL
UNIT NUMBER: NORMAL
UNIT STATUS: NORMAL
UNIT TYPE ISBT: 5100
WBC # BLD AUTO: 6.1 10E3/UL (ref 4–11)
WBC # BLD AUTO: 6.2 10E3/UL (ref 4–11)

## 2023-09-04 PROCEDURE — 2Y44X5Z PACKING OF FEMALE GENITAL TRACT USING PACKING MATERIAL: ICD-10-PCS | Performed by: OBSTETRICS & GYNECOLOGY

## 2023-09-04 PROCEDURE — 86900 BLOOD TYPING SEROLOGIC ABO: CPT | Performed by: OBSTETRICS & GYNECOLOGY

## 2023-09-04 PROCEDURE — 36415 COLL VENOUS BLD VENIPUNCTURE: CPT | Performed by: STUDENT IN AN ORGANIZED HEALTH CARE EDUCATION/TRAINING PROGRAM

## 2023-09-04 PROCEDURE — 258N000003 HC RX IP 258 OP 636

## 2023-09-04 PROCEDURE — 83605 ASSAY OF LACTIC ACID: CPT | Performed by: STUDENT IN AN ORGANIZED HEALTH CARE EDUCATION/TRAINING PROGRAM

## 2023-09-04 PROCEDURE — 258N000003 HC RX IP 258 OP 636: Performed by: INTERNAL MEDICINE

## 2023-09-04 PROCEDURE — 85384 FIBRINOGEN ACTIVITY: CPT | Performed by: STUDENT IN AN ORGANIZED HEALTH CARE EDUCATION/TRAINING PROGRAM

## 2023-09-04 PROCEDURE — 80053 COMPREHEN METABOLIC PANEL: CPT | Performed by: STUDENT IN AN ORGANIZED HEALTH CARE EDUCATION/TRAINING PROGRAM

## 2023-09-04 PROCEDURE — 74174 CTA ABD&PLVS W/CONTRAST: CPT | Mod: 26 | Performed by: RADIOLOGY

## 2023-09-04 PROCEDURE — 120N000002 HC R&B MED SURG/OB UMMC

## 2023-09-04 PROCEDURE — 85610 PROTHROMBIN TIME: CPT | Performed by: STUDENT IN AN ORGANIZED HEALTH CARE EDUCATION/TRAINING PROGRAM

## 2023-09-04 PROCEDURE — 99291 CRITICAL CARE FIRST HOUR: CPT | Mod: 24 | Performed by: INTERNAL MEDICINE

## 2023-09-04 PROCEDURE — 250N000013 HC RX MED GY IP 250 OP 250 PS 637

## 2023-09-04 PROCEDURE — 250N000011 HC RX IP 250 OP 636

## 2023-09-04 PROCEDURE — 85027 COMPLETE CBC AUTOMATED: CPT | Performed by: STUDENT IN AN ORGANIZED HEALTH CARE EDUCATION/TRAINING PROGRAM

## 2023-09-04 PROCEDURE — P9016 RBC LEUKOCYTES REDUCED: HCPCS | Performed by: STUDENT IN AN ORGANIZED HEALTH CARE EDUCATION/TRAINING PROGRAM

## 2023-09-04 PROCEDURE — 250N000011 HC RX IP 250 OP 636: Performed by: OBSTETRICS & GYNECOLOGY

## 2023-09-04 PROCEDURE — 86923 COMPATIBILITY TEST ELECTRIC: CPT | Performed by: STUDENT IN AN ORGANIZED HEALTH CARE EDUCATION/TRAINING PROGRAM

## 2023-09-04 PROCEDURE — 85014 HEMATOCRIT: CPT | Performed by: STUDENT IN AN ORGANIZED HEALTH CARE EDUCATION/TRAINING PROGRAM

## 2023-09-04 PROCEDURE — 74174 CTA ABD&PLVS W/CONTRAST: CPT

## 2023-09-04 PROCEDURE — 99223 1ST HOSP IP/OBS HIGH 75: CPT | Mod: 24 | Performed by: OBSTETRICS & GYNECOLOGY

## 2023-09-04 PROCEDURE — 71275 CT ANGIOGRAPHY CHEST: CPT | Mod: 26 | Performed by: RADIOLOGY

## 2023-09-04 PROCEDURE — 250N000011 HC RX IP 250 OP 636: Performed by: STUDENT IN AN ORGANIZED HEALTH CARE EDUCATION/TRAINING PROGRAM

## 2023-09-04 PROCEDURE — 85730 THROMBOPLASTIN TIME PARTIAL: CPT | Performed by: STUDENT IN AN ORGANIZED HEALTH CARE EDUCATION/TRAINING PROGRAM

## 2023-09-04 PROCEDURE — 250N000013 HC RX MED GY IP 250 OP 250 PS 637: Performed by: STUDENT IN AN ORGANIZED HEALTH CARE EDUCATION/TRAINING PROGRAM

## 2023-09-04 RX ORDER — IOPAMIDOL 755 MG/ML
85 INJECTION, SOLUTION INTRAVASCULAR ONCE
Status: COMPLETED | OUTPATIENT
Start: 2023-09-04 | End: 2023-09-04

## 2023-09-04 RX ORDER — HYDROMORPHONE HYDROCHLORIDE 1 MG/ML
.5-1 INJECTION, SOLUTION INTRAMUSCULAR; INTRAVENOUS; SUBCUTANEOUS EVERY 30 MIN PRN
Status: DISCONTINUED | OUTPATIENT
Start: 2023-09-04 | End: 2023-09-05 | Stop reason: HOSPADM

## 2023-09-04 RX ORDER — LORAZEPAM 2 MG/ML
1 INJECTION INTRAMUSCULAR
Status: DISCONTINUED | OUTPATIENT
Start: 2023-09-04 | End: 2023-09-05 | Stop reason: HOSPADM

## 2023-09-04 RX ORDER — HYDROMORPHONE HYDROCHLORIDE 1 MG/ML
.5-1 INJECTION, SOLUTION INTRAMUSCULAR; INTRAVENOUS; SUBCUTANEOUS
Status: DISCONTINUED | OUTPATIENT
Start: 2023-09-04 | End: 2023-09-04

## 2023-09-04 RX ORDER — LIDOCAINE 40 MG/G
CREAM TOPICAL
Status: DISCONTINUED | OUTPATIENT
Start: 2023-09-04 | End: 2023-09-05 | Stop reason: HOSPADM

## 2023-09-04 RX ORDER — AMOXICILLIN 250 MG
2 CAPSULE ORAL 2 TIMES DAILY
Status: DISCONTINUED | OUTPATIENT
Start: 2023-09-04 | End: 2023-09-04

## 2023-09-04 RX ORDER — OXYCODONE HYDROCHLORIDE 5 MG/1
5 TABLET ORAL EVERY 4 HOURS PRN
Status: DISCONTINUED | OUTPATIENT
Start: 2023-09-04 | End: 2023-09-05 | Stop reason: HOSPADM

## 2023-09-04 RX ORDER — LORAZEPAM 0.5 MG/1
1 TABLET ORAL
Status: DISCONTINUED | OUTPATIENT
Start: 2023-09-04 | End: 2023-09-05 | Stop reason: HOSPADM

## 2023-09-04 RX ORDER — PROCHLORPERAZINE MALEATE 5 MG
10 TABLET ORAL EVERY 6 HOURS PRN
Status: DISCONTINUED | OUTPATIENT
Start: 2023-09-04 | End: 2023-09-05 | Stop reason: HOSPADM

## 2023-09-04 RX ORDER — NALOXONE HYDROCHLORIDE 0.4 MG/ML
0.2 INJECTION, SOLUTION INTRAMUSCULAR; INTRAVENOUS; SUBCUTANEOUS
Status: DISCONTINUED | OUTPATIENT
Start: 2023-09-04 | End: 2023-09-05 | Stop reason: HOSPADM

## 2023-09-04 RX ORDER — ACETAMINOPHEN 650 MG/1
650 SUPPOSITORY RECTAL EVERY 6 HOURS PRN
Status: DISCONTINUED | OUTPATIENT
Start: 2023-09-04 | End: 2023-09-05 | Stop reason: HOSPADM

## 2023-09-04 RX ORDER — HYDROMORPHONE HCL IN WATER/PF 6 MG/30 ML
.2-.3 PATIENT CONTROLLED ANALGESIA SYRINGE INTRAVENOUS
Status: DISCONTINUED | OUTPATIENT
Start: 2023-09-04 | End: 2023-09-04

## 2023-09-04 RX ORDER — LEVOTHYROXINE SODIUM 100 UG/1
200 TABLET ORAL DAILY
Status: DISCONTINUED | OUTPATIENT
Start: 2023-09-04 | End: 2023-09-04

## 2023-09-04 RX ORDER — SODIUM CHLORIDE, SODIUM LACTATE, POTASSIUM CHLORIDE, CALCIUM CHLORIDE 600; 310; 30; 20 MG/100ML; MG/100ML; MG/100ML; MG/100ML
INJECTION, SOLUTION INTRAVENOUS CONTINUOUS
Status: DISCONTINUED | OUTPATIENT
Start: 2023-09-04 | End: 2023-09-04

## 2023-09-04 RX ORDER — PROCHLORPERAZINE 25 MG
25 SUPPOSITORY, RECTAL RECTAL EVERY 12 HOURS PRN
Status: DISCONTINUED | OUTPATIENT
Start: 2023-09-04 | End: 2023-09-05 | Stop reason: HOSPADM

## 2023-09-04 RX ORDER — ONDANSETRON 2 MG/ML
4 INJECTION INTRAMUSCULAR; INTRAVENOUS EVERY 6 HOURS PRN
Status: DISCONTINUED | OUTPATIENT
Start: 2023-09-04 | End: 2023-09-05 | Stop reason: HOSPADM

## 2023-09-04 RX ORDER — ONDANSETRON 4 MG/1
4 TABLET, ORALLY DISINTEGRATING ORAL EVERY 6 HOURS PRN
Status: DISCONTINUED | OUTPATIENT
Start: 2023-09-04 | End: 2023-09-05 | Stop reason: HOSPADM

## 2023-09-04 RX ORDER — FAMOTIDINE 20 MG/1
20 TABLET, FILM COATED ORAL 2 TIMES DAILY PRN
Status: DISCONTINUED | OUTPATIENT
Start: 2023-09-04 | End: 2023-09-05 | Stop reason: HOSPADM

## 2023-09-04 RX ORDER — ACETAMINOPHEN 325 MG/1
650 TABLET ORAL EVERY 6 HOURS PRN
Status: DISCONTINUED | OUTPATIENT
Start: 2023-09-04 | End: 2023-09-05 | Stop reason: HOSPADM

## 2023-09-04 RX ORDER — IOPAMIDOL 755 MG/ML
65 INJECTION, SOLUTION INTRAVASCULAR ONCE
Status: DISCONTINUED | OUTPATIENT
Start: 2023-09-04 | End: 2023-09-04

## 2023-09-04 RX ORDER — FAMOTIDINE 20 MG/1
20 TABLET, FILM COATED ORAL 2 TIMES DAILY
Status: DISCONTINUED | OUTPATIENT
Start: 2023-09-04 | End: 2023-09-04

## 2023-09-04 RX ORDER — NALOXONE HYDROCHLORIDE 0.4 MG/ML
0.4 INJECTION, SOLUTION INTRAMUSCULAR; INTRAVENOUS; SUBCUTANEOUS
Status: DISCONTINUED | OUTPATIENT
Start: 2023-09-04 | End: 2023-09-05 | Stop reason: HOSPADM

## 2023-09-04 RX ORDER — LORAZEPAM 2 MG/ML
.5-1 INJECTION INTRAMUSCULAR
Status: DISCONTINUED | OUTPATIENT
Start: 2023-09-04 | End: 2023-09-04

## 2023-09-04 RX ORDER — NICOTINE 21 MG/24HR
1 PATCH, TRANSDERMAL 24 HOURS TRANSDERMAL DAILY
Status: DISCONTINUED | OUTPATIENT
Start: 2023-09-04 | End: 2023-09-05 | Stop reason: HOSPADM

## 2023-09-04 RX ORDER — AMOXICILLIN 250 MG
1 CAPSULE ORAL 2 TIMES DAILY
Status: DISCONTINUED | OUTPATIENT
Start: 2023-09-04 | End: 2023-09-04

## 2023-09-04 RX ORDER — CARBOXYMETHYLCELLULOSE SODIUM 5 MG/ML
1-2 SOLUTION/ DROPS OPHTHALMIC 3 TIMES DAILY PRN
Status: DISCONTINUED | OUTPATIENT
Start: 2023-09-04 | End: 2023-09-05 | Stop reason: HOSPADM

## 2023-09-04 RX ADMIN — SODIUM CHLORIDE, POTASSIUM CHLORIDE, SODIUM LACTATE AND CALCIUM CHLORIDE: 600; 310; 30; 20 INJECTION, SOLUTION INTRAVENOUS at 08:22

## 2023-09-04 RX ADMIN — LEVOTHYROXINE SODIUM 200 MCG: 100 TABLET ORAL at 10:12

## 2023-09-04 RX ADMIN — HYDROMORPHONE HYDROCHLORIDE 0.5 MG: 1 INJECTION, SOLUTION INTRAMUSCULAR; INTRAVENOUS; SUBCUTANEOUS at 22:23

## 2023-09-04 RX ADMIN — NICOTINE 1 CARTRIDGE: 4 INHALANT RESPIRATORY (INHALATION) at 15:34

## 2023-09-04 RX ADMIN — SODIUM CHLORIDE, POTASSIUM CHLORIDE, SODIUM LACTATE AND CALCIUM CHLORIDE 1000 ML: 600; 310; 30; 20 INJECTION, SOLUTION INTRAVENOUS at 13:09

## 2023-09-04 RX ADMIN — FAMOTIDINE 20 MG: 20 TABLET ORAL at 10:12

## 2023-09-04 RX ADMIN — NICOTINE 1 CARTRIDGE: 4 INHALANT RESPIRATORY (INHALATION) at 19:07

## 2023-09-04 RX ADMIN — IOPAMIDOL 65 ML: 755 INJECTION, SOLUTION INTRAVENOUS at 08:34

## 2023-09-04 RX ADMIN — NICOTINE 1 CARTRIDGE: 4 INHALANT RESPIRATORY (INHALATION) at 22:27

## 2023-09-04 RX ADMIN — HYDROMORPHONE HYDROCHLORIDE 1 MG: 1 INJECTION, SOLUTION INTRAMUSCULAR; INTRAVENOUS; SUBCUTANEOUS at 06:53

## 2023-09-04 RX ADMIN — SODIUM CHLORIDE, POTASSIUM CHLORIDE, SODIUM LACTATE AND CALCIUM CHLORIDE: 600; 310; 30; 20 INJECTION, SOLUTION INTRAVENOUS at 13:51

## 2023-09-04 RX ADMIN — NICOTINE 1 CARTRIDGE: 4 INHALANT RESPIRATORY (INHALATION) at 16:52

## 2023-09-04 RX ADMIN — SODIUM CHLORIDE, POTASSIUM CHLORIDE, SODIUM LACTATE AND CALCIUM CHLORIDE 1000 ML: 600; 310; 30; 20 INJECTION, SOLUTION INTRAVENOUS at 11:00

## 2023-09-04 RX ADMIN — SODIUM CHLORIDE, POTASSIUM CHLORIDE, SODIUM LACTATE AND CALCIUM CHLORIDE 1000 ML: 600; 310; 30; 20 INJECTION, SOLUTION INTRAVENOUS at 14:42

## 2023-09-04 ASSESSMENT — ACTIVITIES OF DAILY LIVING (ADL)
ADLS_ACUITY_SCORE: 43
ADLS_ACUITY_SCORE: 35
ADLS_ACUITY_SCORE: 39
ADLS_ACUITY_SCORE: 43
ADLS_ACUITY_SCORE: 35
ADLS_ACUITY_SCORE: 43
ADLS_ACUITY_SCORE: 35

## 2023-09-04 NOTE — PROVIDER NOTIFICATION
Applied fiberglass short arm cast to patients left arm per Cassandra Thompson NP written orders. Skin intact with no redness or bruising. Patient tolerated well. Instructed patient on casting care - do not get wet, do not stick/insert anything inside cast, elevate as needed, and call or seek ER attention for increase in pain and/or swelling. Provided patient/guardian a copy of cast care instructions. Patient/Guardian verbalized understanding.       Paged gyn onc at [phone]. Pt arrived from OSH. Awaiting orders. Large amount of vaginal bleeding present on arrival. RRT called.

## 2023-09-04 NOTE — PLAN OF CARE
A&O x4. Hypotensive & tachy. Vitals taken Q15 min.  BP trending downwards, LR bolus X3 given. Pt reports that they do not have any dizziness or feel light headed. On 1 L O2- sating 100%. Pt has no complaints of pain. Emesis X1. Vaginal packing in place- fully saturated. Pt continues to bleed vaginally with large blood clots-Md aware and came to bedside periodically. chux changed as needed to keep pt comfortable. Thomas catheter is in place with dark red output. Right PNT output is dark brown. PIV on R hand is saline locked, L PIV has 100 ml/hr LR running in between LR bolus. Pt code status changed to DNR/DNI. Family is at bedside with pt as they decline.

## 2023-09-04 NOTE — CODE DOCUMENTATION
"Discussed with Suni again with JACQUI Ricketts at bedside and partner Jim on telephone. Currently next of kin is Suni's mother. Suni does not want me to contact her mother, she desires Jim to be her decision maker should she not be able to make her own decisions. She is understanding of the fact that a code with CPR and intubation is futile without a way to stop her hemorrhage. Jim voices understanding and \"wants whatever Suni wants\". Plan to change code status to DNR/DNI in the computer.     Plan made with Dr Daina Cash MD  Gynecology Oncology Fellow  September 4, 2023 , 2:30 PM    "

## 2023-09-04 NOTE — PROGRESS NOTES
Brief Gyn Onc Progress Note    G3 resident met with patient, partner Jim, and RN at bedside. Reviewed that IV fluids are discontinued and that the care team will not initiate medications or other measures to support blood pressure given goals of care conversations documented earlier today. Thus, explained to the patient that she will likely become more symptomatic as her blood pressure continues to decrease. Reviewed that given ongoing and unstoppable bleeding, the patient's blood pressure will decrease until she is no longer able to remain conscious, and eventually to a point where life is no longer sustainable, and she will pass away. Although the care team cannot determine exactly when this will occur, discussed that it could be as soon as this afternoon, this evening, or overnight. Encouraged patient to make care team aware of any sources of discomfort (pain, shortness of breath, anxiety/fear) so that we may offer PRN medications for these end-of-life symptoms. Patient and partner state they have no further questions for the team at this time. Partner Jim does request  visit as soon as possible, and the patient is accepting of this at this time. Gyn Onc G3 paged and spoke over the phone with the on-call , who states he will present to the bedside shortly.    Daniela Orozco MD, PGY-3  4:05 PM  [facility] Residency

## 2023-09-04 NOTE — PROGRESS NOTES
Brief Note:     Saw patient with resident dr, Dr Miller. Patient arrived from [facility] with ongoing vaginal bleeding. Please see notes from 8/7 for previous episode of vaginal hemorrhage requiring MTP and IR embolization. Suni presented to OSH last night with ongoing bleeding and I spoke with provider overnight about transfer. Discussed my concern about high risk transfer and ongoing bleeding during transfer. Discussed also that even with transfer we have limited options for stopping this vaginal bleeding. Patient noted to this provider than she desires to remain full code and to transfer to the [facility].     Pt arrived from helicopter transfer with significant amount of vaginal bleeding. Vitals are currently stable    Patient Vitals for the past 24 hrs:   BP Temp Temp src Pulse Resp SpO2   09/04/23 0736 97/60 98.7  F (37.1  C) Oral 94 17 98 %   09/04/23 0638 (!) 123/111 -- -- 100 -- 98 %        CBC RESULTS:   Recent Labs   Lab Test 09/04/23  0654   WBC 6.1   RBC 3.84   HGB 10.7*   HCT 34.8*   MCV 91   MCH 27.9   MCHC 30.7*   RDW 16.1*        Last Comprehensive Metabolic Panel:  Lab Results   Component Value Date     09/04/2023    POTASSIUM 4.2 09/04/2023    CHLORIDE 102 09/04/2023    CO2 24 09/04/2023    ANIONGAP 11 09/04/2023     (H) 09/04/2023    BUN 12.4 09/04/2023    CR 0.54 09/04/2023    GFRESTIMATED >90 09/04/2023    HARRISON 9.1 09/04/2023       INR   Date Value Ref Range Status   09/04/2023 1.08 0.85 - 1.15 Final   09/09/2020 1.10 0.86 - 1.14 Final       Patient is mentating well, 1u pRBC is hanging and this blood came with patient from OSH. This is her second unit of blood.     Vaginal packing and a alonso catheter was placed to good effect initially. Alonso catheter draining cherry-red urine - the urine in her PNT bag is clear yellow. Currently no blood on her chux    I discussed with Suni that I am concerned about her bleeding and especially that we are running out of options to  help stop it. I am not sure that IR team will be able to help the way they were last time and increased vaginal bleeding makes me suspicious that disease is progressing and involving her pelvic vasculature. Plan to repeat stat CTA A/P and to discuss with IR this morning. Suni does desire to be full code, I voiced that should her bleeding continue and cause a code situation that chest compressions and intubation would be futile if we are unable to stop her bleeding.     Discussed with staff provider Dr Lama.     Debo Cash MD  Gynecology Oncology Fellow  September 4, 2023 , 7:56 AM        Spoke with partner Nate who is aware of ongoing heavy vaginal bleeding and unfortunately no option for intervention with IR. He is heading to the hospital. Suni did not name any other family that she wants contacted at this time.    Debo Cash MD  Gynecology Oncology Fellow  September 4, 2023 , 12:30 PM

## 2023-09-04 NOTE — PROGRESS NOTES
Pt arrived on 7C at 0630 am via litter-helicopter from [facility]. Pt alert and oriented, but anxious. Pt lying in large pool of blood and large blood clots after transfer to bed. Vitals stable. Pt denied any c/o's of pain except stating that it was more of a constant dull ache .  Vaginal packing placed per resident. Dilaudid 1.0 mg IV given prior to packing. Thomas cath placed as well by resident. Immediate return of pink urine. Pt had right PNT tube= yellow uo. 1 Unit PRBC hanging upon arrival to  via piv in left hand- hung in helicopter during transport. Other piv saline locked.Maintain pain control. Monitor bleeding. Transfer to higher level of care asap.

## 2023-09-04 NOTE — CODE/RAPID RESPONSE
Brief Rapid Response Team Note  A rapid response was called on Suni Zuluaga due to massive vaginal bleeding but canceled when primary team came to bed side and assumed full patient care, planning to repeat Hgb, do gynecologic exam and intervene accordingly.     RRT will sign off  Myra Muller PA-C on 9/4/2023 at 7:16 AM

## 2023-09-04 NOTE — PROGRESS NOTES
Pt A&Ox4, able to make needs known. Significant other Jim at bedside since 1545. Absorbant pad changed underneath pt roughly Q2 hrs as pt allows. Thomas not draining into bag, replaced drainage bag full of clots. O2 on 1L for comfort, vital signs discontinued and not taken this shift. Brought in aroma therapy for pt and taped chosen scent to bed for comfort. Pt declined massage oil at this time. Jane came to speak with pt about thoughts on how the evening will progress. Started having pain around 2230, dilaudid given with good relief.

## 2023-09-04 NOTE — DISCHARGE SUMMARY
[facility]  Gynecologic Oncology Discharge Summary    Patient: Suni Zuluaga  MRN: [id number]    Admit Date: 9/4/2023  Discharge Date: 9/5/2023  Admitting Provider: Sandra Lama MD  Discharge Provider: Corie Martinez MD     Admission Dx:   -Stage IIIC1 cervical cancer   -Vaginal Bleeding  -Rectovaginal Fistula  -s/p right internal iliac artery coil embolization  -s/p mass transfusion protocol for hemorrhagic shock  -Type 2 Diabetes Mellitus  -Hydronephrosis with R. PNT  -Severe malnutrition   -Tobacco use  -Hypothyroidism    Discharge Dx:  -Stage IIIC1 cervical cancer   -Vaginal Bleeding  -Rectovaginal Fistula, s/p end colostomy   -Vesicovaginal fistula  -Type 2 Diabetes Mellitus  -Hydronephrosis with R. PNT  -s/p right internal iliac artery coil embolization  -s/p mass transfusion protocol for hemorrhagic shock  - Severe malnutrition   - Tobacco use  - Hypothyroidism    Patient Active Problem List   Diagnosis    Malignant neoplasm of overlapping sites of cervix (H)    Cervical cancer (H)    Other hydronephrosis    Stricture or kinking of ureter    Rectovaginal fistula    Diabetes mellitus, type 2 (H)       Procedures:   - None    Prior to Admission Medications:  Medications Prior to Admission   Medication Sig Dispense Refill Last Dose    Ascorbic Acid (VITAMIN C PO) Take 500 mg by mouth daily       brimonidine (ALPHAGAN) 0.2 % ophthalmic solution On day of Tisotumab infusion instill 3 drops in each eye 10 minutes prior to infusion. 5 mL 1     brimonidine (ALPHAGAN) 0.2 % ophthalmic solution On day of Tisotumab infusion instill 3 drops in each eye 10 minutes prior to infusion. 5 mL 1     carboxymethylcellulose PF (REFRESH PLUS) 0.5 % ophthalmic solution Instill 1 drop in each eye multiple times per day as needed throughout duration of treatment and for 30 days following completion of treatment. Use for dry eye or eye irritation. 90 each 3     carboxymethylcellulose PF (REFRESH PLUS)  0.5 % ophthalmic solution Instill 1 drop in each eye multiple times per day as needed throughout duration of treatment and for 30 days following completion of treatment. Use for dry eye or eye irritation. 90 each 3     dexAMETHasone (DECADRON) 0.1 % ophthalmic solution On day of Tisotumab infusion instill 1 drop in each eye 10 minutes prior to infusion, then 1 drop in each eye 2 times throughout remainder of day. On day 2 and 3 instill 1 drop in each eye three times a day. 5 mL 3     dexAMETHasone (DECADRON) 0.1 % ophthalmic solution On day of Tisotumab infusion instill 1 drop in each eye 10 minutes prior to infusion, then 1 drop in each eye 2 times throughout remainder of day. On day 2 and 3 instill 1 drop in each eye three times a day. 5 mL 3     diazepam (VALIUM) 2 MG tablet Take 1 tablet (2 mg) by mouth 2 times daily as needed for anxiety AVOID if you are taking hydromorphone/dilaudid 12 tablet 0     HYDROmorphone (DILAUDID) 2 MG tablet Take 1 tablet (2 mg) by mouth every 6 hours as needed for severe pain 20 tablet 0     levothyroxine (SYNTHROID/LEVOTHROID) 200 MCG tablet Take 1 tablet (200 mcg) by mouth daily 30 tablet 0     magnesium oxide (MAG-OX) 400 MG tablet Take 1 tablet (400 mg) by mouth daily 30 tablet 1     oxybutynin ER (DITROPAN XL) 5 MG 24 hr tablet Take 1 tablet (5 mg) by mouth daily 30 tablet 0     polyethylene glycol (MIRALAX) 17 GM/Dose powder Take 17 g by mouth daily as needed for constipation 510 g 0     prochlorperazine (COMPAZINE) 10 MG tablet Take 1 tablet (10 mg) by mouth every 6 hours as needed for nausea or vomiting 30 tablet 2     tamsulosin (FLOMAX) 0.4 MG capsule Take 1 capsule (0.4 mg) by mouth daily 30 capsule 0     vitamin B-12 (CYANOCOBALAMIN) 500 MCG tablet Take 500 mcg by mouth daily          Discharge Medications:     Review of your medicines        UNREVIEWED medicines. Ask your doctor about these medicines        Dose / Directions   * brimonidine 0.2 % ophthalmic  solution  Commonly known as: ALPHAGAN  Used for: Malignant neoplasm of overlapping sites of cervix (H)      On day of Tisotumab infusion instill 3 drops in each eye 10 minutes prior to infusion.  Quantity: 5 mL  Refills: 1     * brimonidine 0.2 % ophthalmic solution  Commonly known as: ALPHAGAN  Used for: Malignant neoplasm of overlapping sites of cervix (H)      On day of Tisotumab infusion instill 3 drops in each eye 10 minutes prior to infusion.  Quantity: 5 mL  Refills: 1     * carboxymethylcellulose PF 0.5 % ophthalmic solution  Commonly known as: REFRESH PLUS  Used for: Malignant neoplasm of overlapping sites of cervix (H)      Instill 1 drop in each eye multiple times per day as needed throughout duration of treatment and for 30 days following completion of treatment. Use for dry eye or eye irritation.  Quantity: 90 each  Refills: 3     * carboxymethylcellulose PF 0.5 % ophthalmic solution  Commonly known as: REFRESH PLUS  Used for: Malignant neoplasm of overlapping sites of cervix (H)      Instill 1 drop in each eye multiple times per day as needed throughout duration of treatment and for 30 days following completion of treatment. Use for dry eye or eye irritation.  Quantity: 90 each  Refills: 3     * dexAMETHasone 0.1 % ophthalmic solution  Commonly known as: DECADRON  Used for: Malignant neoplasm of overlapping sites of cervix (H)      On day of Tisotumab infusion instill 1 drop in each eye 10 minutes prior to infusion, then 1 drop in each eye 2 times throughout remainder of day. On day 2 and 3 instill 1 drop in each eye three times a day.  Quantity: 5 mL  Refills: 3     * dexAMETHasone 0.1 % ophthalmic solution  Commonly known as: DECADRON  Used for: Malignant neoplasm of overlapping sites of cervix (H)      On day of Tisotumab infusion instill 1 drop in each eye 10 minutes prior to infusion, then 1 drop in each eye 2 times throughout remainder of day. On day 2 and 3 instill 1 drop in each eye three times  a day.  Quantity: 5 mL  Refills: 3     diazepam 2 MG tablet  Commonly known as: VALIUM  Used for: Malignant neoplasm of overlapping sites of cervix (H), Vertigo, Hypothyroidism due to medication      Dose: 2 mg  Take 1 tablet (2 mg) by mouth 2 times daily as needed for anxiety AVOID if you are taking hydromorphone/dilaudid  Quantity: 12 tablet  Refills: 0     HYDROmorphone 2 MG tablet  Commonly known as: DILAUDID  Used for: Malignant neoplasm of overlapping sites of cervix (H)      Dose: 2 mg  Take 1 tablet (2 mg) by mouth every 6 hours as needed for severe pain  Quantity: 20 tablet  Refills: 0     levothyroxine 200 MCG tablet  Commonly known as: SYNTHROID/LEVOTHROID  Used for: Encounter for antineoplastic immunotherapy, Hypothyroidism due to medication, Malignant neoplasm of overlapping sites of cervix (H)      Dose: 200 mcg  Take 1 tablet (200 mcg) by mouth daily  Quantity: 30 tablet  Refills: 0     magnesium oxide 400 MG tablet  Commonly known as: MAG-OX  Used for: Hypomagnesemia      Dose: 400 mg  Take 1 tablet (400 mg) by mouth daily  Quantity: 30 tablet  Refills: 1     oxyBUTYnin ER 5 MG 24 hr tablet  Commonly known as: DITROPAN XL  Used for: Dysuria, Hydroureter on right      Dose: 5 mg  Take 1 tablet (5 mg) by mouth daily  Quantity: 30 tablet  Refills: 0     polyethylene glycol 17 GM/Dose powder  Commonly known as: MIRALAX  Used for: Malignant neoplasm of overlapping sites of cervix (H)      Dose: 17 g  Take 17 g by mouth daily as needed for constipation  Quantity: 510 g  Refills: 0     prochlorperazine 10 MG tablet  Commonly known as: COMPAZINE  Used for: Malignant neoplasm of overlapping sites of cervix (H)      Dose: 10 mg  Take 1 tablet (10 mg) by mouth every 6 hours as needed for nausea or vomiting  Quantity: 30 tablet  Refills: 2     tamsulosin 0.4 MG capsule  Commonly known as: FLOMAX  Used for: Dysuria, Malignant neoplasm of overlapping sites of cervix (H)      Dose: 0.4 mg  Take 1 capsule (0.4 mg)  by mouth daily  Quantity: 30 capsule  Refills: 0     vitamin B-12 500 MCG tablet  Commonly known as: CYANOCOBALAMIN      Dose: 500 mcg  Take 500 mcg by mouth daily  Refills: 0     VITAMIN C PO      Dose: 500 mg  Take 500 mg by mouth daily  Refills: 0           * This list has 6 medication(s) that are the same as other medications prescribed for you. Read the directions carefully, and ask your doctor or other care provider to review them with you.                  Consultations:  - IR  - Inpatient Hospice       Brief History of Illness:  From H&P: Suni Zuluaga is a 38 year old female with stage IIIC1 cervical cancer who presents with acute vaginal bleeding. She is status post palliative end colostomy, status post vaginal bleeding requiring 19 units of packed red blood cells intubation, and status post embolization of the right internal iliac artery by Interventional Radiology. At that time imaging showed erosion of tumor into R. Internal iliac artery. Her vaginal bleeding began in the shower around 09/04/23 at midnight in the shower, and she sought care at [facility] where she was transported by air to [facility] for admission. On arrival she noted significantly heavier bleeding and feeling completely soaked. She denied dizziness, chest pain, shortness of breath, abdominal pain. She will be evaluated further with stat CTA A/P and will discuss future plan of care with Interventional Radiology. There is concern for progression of tumor into the pelvic vasculature, given the findings from her most recent hospital admission. She desires full code status at this time.       Hospital Course:  Dz:  -Stage IIIC1 cervical cancer  -Acute bleeding from the right internal iliac pseudoaneurysm with extension into the vaginal vault and bladder  and rectum through fistulas.    FEN:  - Was initially NPO in anticipation of possible procedures. Her diet was subsequently advanced to regular diet as tolerated. She was  given IV maintenance fluids and concomitant NS fluid boluses as needed to address hypovolemic status from acute bleeding on admission. These were stopped when she transitioned to comfort care.      Pain:  - Her pain was controlled with IV Dilaudid, Roxicodone PO, PRN Tylenol while in house. She was transitioned to PO medications prior to discharge.   CV:  - She has no history of CV issues. Her blood pressure decreased in line with hypovolemic status.   PULM:  - She has no history of pulmonary issues.   HEME:  - Home medication Apixaban was held due to acute bleeding.   - Hemoglobin was 10.7 on arrival > Hgb of 10.5 on recheck. Additional labs held when transitioned to comfort care. She received 1u pRBC from OSH. Vaginal packing was placed for acute bleeding on admission.    - CTA Chest/Abdomen showed Extravasation of contrast from the right internal iliac pseudoaneurysm with extension into the vaginal vault and bladder and rectum through previously identified fistulous tracts  - Consulted with IR regarding imaging studies; they stated there is no intervention for IR to offer because there is no access into the right iliac artery due to the previous coil pack and there is no definite evidence of catheterizeable vessel supply from the contralateral internal iliac artery.   GI:  -S/p laparoscopy colostomy with Sifuentes pouch placement. Bowel regimen and antiemetics were available prn. She had no acute GI issues in house.   :   - R PNT in place, a L PNT was previously recommended but was not completed  - Thomas was placed during exam on admission with return of bloody urine consistent with her vesicovaginal fistula. She was discharged with a Thomas catheter in place.     ID:  - The patient was AF during her hospitalization prior to transitioning to comfort care.   ENDO:  - She was maintained on her home dose of synthroid for her hypothyroidism.   PSYCH/NEURO:  - Suni was appropriately distressed given her situation.  Her significant other Nate, provided emotional support. She met with the  on admission. She had PRN Ativan and melatonin to assist with sleep and anxiety. She was discharged with PO Ativan.   PPX:   - She was given SCDs during her hospital course due to acute bleeding. She tolerated these prophylactic interventions without incident.      Discharge Instructions and Follow up:  Ms. Suni Zuluaga was discharged from the hospital to inpatient hospice.      Discharge Diet: Regular  Discharge Activity: As tolerated  Discharge Follow up: With hospice.     Discharge Disposition:  Admited to hospice care.  Discharged to home    Discharge Staff: MD Dolores Rankin DO  [facility]  Gynecology Oncology Resident, PGY-1  09/05/2023 6:08 PM    Provider Disclosure:   I agree with above History, Review of Systems, Physical exam and Plan. I have reviewed the content of the documentation and have edited it as needed. I have seen and personally performed the services documented here and the documentation accurately represents those services and the decisions I have made.     Electronically signed by:   Alberto Bryson MD   Gynecologic Oncology   [facility]

## 2023-09-04 NOTE — CONSULTS
[facility] Intensive Care Consultation    Date of Admission:  9/4/2023     Assessment & Plan   Suni Zuluaga is a 38 year old female who was admitted on 9/4/2023. In hemorrhagic shock due to cancer with no options to stop the bleeding. Family is on the way to the hospital but may be more than an hour before they arrive.   - Aggressive volume resuscitation with crystalloid. 1 liter over pressure bag now  - ICU level interventions with vasopressors would not be of benefit in this patient with the exception of a very short period of time while family is on the way  - CPR would be futile and would not achieve the intended physiologic goal.     I shared this opinion with patient in layperson's terms and told her we would do all we could to keep her alive and comfortable until her family arrived.  Will continue to provide support to nursing staff and patient      Aliza Cantu MD    Time Spent on this Encounter   Billing:  I spent 35 minutes bedside and on the inpatient unit today managing the critical care of Suni Zuluaga in relation to the issues listed in this note.    Reason for Consult   Reason for consult: I was asked by Dr. Lama to evaluate this patient for hemorrhagic shock.    History of Present Illness   Suni Zuluaga is a 38 year old female who presents with vaginal bleeding. Recently discharged after ICU stay for same. Bleeding is resulting from advanced cervical cancer eroding into right iliac artery. Consultation requested for profuse vaginal bleeding causing hemorrhagic shock. There is no way to stop her bleeding. Patient has been expressing very appropriate wish for all aggressive and life sustaining measures.     Physical Exam   Temp: 98.2  F (36.8  C) Temp src: Oral Temp  Min: 98.2  F (36.8  C)  Max: 98.7  F (37.1  C) BP: (!) 83/56 Pulse: 115   Resp: 20 SpO2: 100 % O2 Device: Nasal cannula Oxygen Delivery: 1 LPM  There  were no vitals filed for this visit.  No intake/output data recorded.    Constitutional: lying on side, no distress  Respiratory: normal appearing respiratory pattern  Cardiovascular: tachycardia  : large amount of blood on sheets  Neurologic/Psychiatric: awake, very sad    Medications    lactated ringers 100 mL/hr at 09/04/23 0822      famotidine  20 mg Oral BID    levothyroxine  200 mcg Oral Daily    nicotine  1 patch Transdermal Daily    nicotine   Transdermal Q8H    senna-docusate  1 tablet Oral BID    Or    senna-docusate  2 tablet Oral BID    sodium chloride (PF)  3 mL Intracatheter Q8H       Data   Recent Labs   Lab 09/04/23  1045 09/04/23  0654   WBC 6.2 6.1   HGB 10.5* 10.7*   MCV 89 91    246   INR 1.08 1.08   NA  --  137   POTASSIUM  --  4.2   CHLORIDE  --  102   CO2  --  24   BUN  --  12.4   CR  --  0.54   ANIONGAP  --  11   HARRISON  --  9.1   GLC  --  114*   ALBUMIN  --  3.4*   PROTTOTAL  --  6.6   BILITOTAL  --  0.2   ALKPHOS  --  77   ALT  --  7   AST  --  15     Recent Results (from the past 24 hour(s))   CTA Renal Target Study w/ contrast    Narrative    EXAM:  CTA ABDOMEN AND PELVIS    INDICATION:  Aneurysm, pelvis or lower extremity,s/p embolization of Rt Int iliac artery c/o  acute vaginal bleeding.   History of stage III cervical cancer.  History of  rectovaginal fistula.  Patient had recent surgery to repair the fistula.  Specifics of the surgery and date of surgery not clear.  Patient also has a  recent CT of the abdomen and pelvis which is not currently available for review.    TECHNIQUE:  CT angiography abdomen and pelvis following IV contras. Multiplanar and 3D  recons performed by both technologist and interpreting radiologist using both  [employer] workstation and PACS.  CT imaging of abdomen and pelvis performed. While  obtaining CT images, dose reduction techniques wutilized including: Auto  exposure control, adjustment of mA and/or kV according to patient size, and/or  use of  iterative reconstruction technique.    RADIATION DOSE:  136 DLP (mGy cm).    CONTRAST:  isovue 370 100 ml.    COMPARISON:  By discussions with ER physician, patient had a more recent CT of the abdomen  pelvis which is not currently available for review.  Only reference is  04/26/2023.    FINDINGS:  Lung bases clear    Limited assessment of the solid organs with only arterial phase imaging.    Nonspecific small central hyperattenuating hepatic focus 9 mm (axial image 43).  Probably arterial vasculature versus hyperattenuating lesion    Grossly normal adrenal glands, spleen, pancreas.    Small left renal cyst incidental.  Normal perfusion both kidneys    Minor distension right upper collecting system.  Right ureter distended into  the pelvis.  Beyond this very difficult to delineate    Percutaneous nephrostomy catheter satisfactory position    Left lower quadrant colostomy    No obstruction    Indeterminate air, fluid collection within pelvis.  Mostly around pre rectal  region best observed around axial image 175.  About 6 cm transverse, up to 4 cm  AP.  Air and fluid density.    Urinary bladder decompressed    Presacral soft tissue thickening.    Concentric thickening of rectum.    Normal aorta, celiac, SMA, renal arteries, ATUL, bilateral external iliac,  bilateral common iliac.    Embolized right internal iliac artery.    Negative for aneurysm, pseudoaneurysm or extravasation around the pelvis    Medullary sclerosis left and right sacral ala likely old healed sacral  insufficiency fractures    Osseous structures appear unchanged    IMPRESSION:  1. Negative for aneurysm, pseudoaneurysm or active extravasation around pelvis  2. Small air, fluid collection region of cervix, pre rectal region.  May  reflect postoperative collection given patient's recent surgery history but  nature of surgery and details, time of surgery unclear.  Please correlate with  these clinical indicators.  Infectious collection or collection  related to  tissue breakdown, tissue necrosis also possibility.  3. Prominent thickening of rectum.  Proctitis from ischemia, infectious or  inflammatory etiology as well as radiation related proctitis as considerations.  Please correlate clinically  4. Minor upper collecting system distension right kidney with percutaneous  nephrostomy catheter good position  5. Limited parenchymal assessment with only arterial phase imaging.  The small  hepatic focus probably small shunt or asymmetric arterial blush.  Doubtful  clinical relevance  6. Findings of sacrum suggest sequela from old insufficiency fractures possibly  from radiation therapy  7. Preliminary results discussed with ordering physician shortly after the exam   CTA Chest Abdomen Pelvis w Contrast    Impression    RESIDENT PRELIMINARY INTERPRETATION  Impression:  1. Acute contrast extravasation at the coiled right iliac  pseudoaneurysm with contrast extending into the vaginal vault,  bladder, and rectum through the known fistulous tracts. Vaginal  packing is in place.  2. Overall unchanged appearance of known cervical malignancy with  fistulas as above.  3. No significant dilation of the bowel to suggest obstruction.  Similar appearance to prior exam.  4. Otherwise, stable appearance of the abdomen and pelvis compared to  8/7/2023 CT exam.      [  [Urgent Result: Extravasation of contrast from the right internal  iliac pseudoaneurysm with extension into the vaginal vault and bladder  and rectum through previously identified fistulous tracts.]    Finding was identified on 9/4/2023 9:12 AM.     OB Gyn Team (Dr. Cash) was contacted by Dr. Mohamud Spears at  9/4/2023 10:02 AM and verbalized understanding of the urgent finding.

## 2023-09-04 NOTE — CONSULTS
SPIRITUAL HEALTH SERVICES Consult Note  [facility] ([place]) 5A  On-call Visit    On-call visit with pt and significant other to offer spiritual support. Per staff pt actively dying, is on comfort measures only. Pt in deep emotional distress, particularly regarding her young children and how they will be affected by her death. Prayer was shared. Pt/family know they can request further  support if desired.     Federico Gallego) Daiana Doty M.Div., [facility]  Staff   Pager [phone]      * [facility] remains available 24/7 for emergent requests/referrals, either by having the switchboard page the on-call  or by entering an ASAP/STAT consult in Epic (this will also page the on-call ). Routine Epic consults receive an initial response within 24 hours.*

## 2023-09-04 NOTE — CONSULTS
INTERVENTIONAL RADIOLOGY CONSULT      Suni Zuluaga is a 38 year old female with stage IIIC1 cervical cancer with known rectovaginal, rectovesicular fistula and previous coil embolization of right internal iliac artery for psa/bleeding who presents with acute vaginal bleeding. Reports she was in the shower around midnight when it started which prompted presentation to [facility]. CTA there demonstrated no active extravasation. Transferred to [facility] with ongoing significant vaginal bleeding now s/p two units of RBC. IR consulted for possible intervention.     BP (!) 82/59 (BP Location: Right arm)   Pulse 90   Temp 98.2  F (36.8  C) (Oral)   Resp 16   SpO2 98%   Recent Labs   Lab Test 09/04/23  1045   WBC 6.2   HGB 10.5*        Lab Results   Component Value Date    INR 1.08 09/04/2023    INR 1.08 09/04/2023    INR 1.10 09/09/2020       Repeat CTA demonstrates significant contrast on the non-contrast phase in the cervix, bladder and rectum presumably from interval contrast extravasation from residual contrast from prior study. Unclear if there is definite extravasation from contrast from the current study on arterial/delayed phases. Regardless there is likely high potential for ongoing/recurrent extravasation which may be from recanalized previously embolized vessels or eroded new collaterals by the mass. However unfortunately there is no intervention for IR to offer as we currently do not have access into the right iliac artery due to the previous coil pack and there is no definite evidence of catheterizeable vessel supply from the contralateral internal iliac artery.    Leonardo Braga MD  Interventional Radiology Fellow  IR pass pager: [phone]

## 2023-09-04 NOTE — CARE CONFERENCE
Gynecology Oncology Staff Note  September 4, 2023  Time 11:55 AM    I have reviewed Suni's [place] current clinical presentation urgently transferred from [place] due to recent heavy vaginal bleeding episode caused by recurrent cervical cancer eroding into internal iliac pseudoaneurysm.    In addition I have completed a review of her current labs, transfusion to date and examined the patient. Currently there is a large pool of blood in her bed. Suni reports she feels the blood running down. The alonso catheter has bloody urine as well.    I have reviewed CTA Abdomen and Pelvis and discussed them with IR staff. We discussed extensively regarding any possible interventions to manage the current large volume bleeding. There are no options that are safe at this time.       We informed Suni the current ongoing bleeding due to recurrent cervical cancer can not be controlled, this will continue to increase and will cause her to drop her blood pressure. Further resuscitation will not be successful as there is no solution to the bleeding. In addition other treatment options for her cancer has not been successful and no other acute solutions are available at this time.    Suni agreed that her family can be notified. Dr. Ng called her primary family contact, Nate Mena who will travel to the hospital.    Assessment: Large volume acute bleeding due to cervical tumor secondary to erosion into vasculature. Further aggressive interventions will not benefit her as the cause is not reversible. Will obtain MICU consult.      Plan: Continue supportive care to keep Suni comfortable, continue IV fluids to support BP.      Sandra Lama MD, MPH  Gynecologic Oncology

## 2023-09-04 NOTE — H&P
[facility]  Gynecology Oncology History and Physical    Suni Zuluaga MRN# [id number]   Age: 38 year old YOB: 1985     Date of Admission:  9/4/2023    Primary care provider: Suzy Brambila             Chief Complaint:   Vaginal bleeding         History of Present Illness:   Suni Zuluaga is a 38 year old female with stage IIIC1 cervical cancer who presents with acute vaginal bleeding. Reports she was in the shower around midnight when it started which prompted presentation to [facility]. It was relatively lighter until about 10 minutes prior to arrival here at [facility] when she noted significantly heavier bleeding and feeling completely soaked. She denies dizziness, chest pain, shortness of breath, abdominal pain.         Cancer Treatment History:   2015:HSIL Pap at the beginning of pregnancy. Repeat pap postpartum was ASCUS. Colposcopy was negative.   8/13/20: Pelvic US. Uterine fibroids, normal appearance of uterus and ovaries  8/27/2020: Pap smear which confirmed squamous cell carcinoma of the cervix. Referred to GYN  ONC  9/1/2020: GYN ONC Consult [facility]. Cervical biopsy, RADHA III, unable to identify invasion  9/9/2020: Repeat cervical biopsy confirms invasive SCC  9/28/2020: Started primary chemoRT for locally advanced cervical cancer. Stage IIIC1(r), large primary tumor with bilateral parametrial invasion and clinical evidence of posterior vaginal involvement. Pelvic lymphadenopathy involving external iliac, perirectal and internal iliac nodes.   9/28/20-10/30/20: ChemoRT.   11/6/20-11/16/20: T&R brachytherapy  11/25/2020: Followup with radonc (Dr Hernández)   8/23/22: CT A/P with nonspecific increased density in the para-aortic region, which could be seen in the setting of retroperitoneal fibrosis, inflammation, or potentially lymphoma/desmoplastic reaction. New varix from right renal vein extending down into the RLQ.   10/6/22: PET CT Whole body with large 5cm  retroperitoneal mass with FDG avidity. New nodes in retroperitoneum and mediastinum     Plan: Paclitaxel, Carboplatin, Bevacizumab, and pembrolizumab added due to PD-L1 expression every 3 weeks.   10/13/22: Cycle 1 Paclitaxel 175 mg/m2, Carboplatin AUC 6, Bevacizumab 15 mg/m2  11/4/22: Cycle 2 Paclitaxel 175 mg/m2, Carboplatin AUC 5, Bevacizumab 15 mg/m2. Pembrolizumab on hold while awaiting insurance coverage.  11/8/22: ED for nausea (no vomiting) and dizziness, discharged with valium for vertigo.  11/25/22: Cycle 3 Paclitaxel 175 mg/m2, Carboplatin AUC 5, Bevacizumab 15 mg/m2, and pembrolizumab 200 mg.  Defer x 1 week for thrombocytopenia (plts 82).  Decrease Paclitaxel 135 mg/m2, Carboplatin AUC 5, Bevacizumab 15 mg/m2, and pembrolizumab 200 mg, given 12/5.  12/19/22 CT CAP with  response     Plan: Continue paclitaxel, carboplatin, bevacizumab, and pembrolizumab with CT/MD after 3 more cycles (6 total)  12/30/22: Cycle 4 Paclitaxel 135 mg/m2, Carboplatin AUC 5, Bevacizumab 15 mg/m2, and pembrolizumab 200 mg, held carboplatin and paclitaxel due to thrombocytopenia (plts 39), not given this cycle.  Bevacizumab and Pembrolizumab given.  1/20/23: Cycle 5 Paclitaxel 135 mg/m2, Carboplatin AUC 4, Bevacizumab 15 mg/m2, and pembrolizumab 200 mg.  2/10/23: Cycle 6 Paclitaxel 135 mg/m2, Carboplatin AUC 4, Bevacizumab 15 mg/m2, and pembrolizumab 200 mg.     Plan: Continue paclitaxel, carboplatin, bevacizumab, and pembrolizumab x 1 more cycle as she did not have chemotherapy for cycle 4.  Followed by CT and follow up with Dr. Brambila.  3/3/23: Cycle 7 Paclitaxel 135 mg/m2, Carboplatin AUC 4, Bevacizumab 15 mg/m2, and pembrolizumab 200 mg.   3/20/2023: CT C/A/P with stable disease (still with retroperitoneal adenopathy encasing aorta/IVC, occulusion of IVC, cystitis/proctitis)   3/24/23: Cycle one of johny/pembro maintenance (cycle 8 overall)  4/14/23: C2 pembro/avastin  (cycle 9 overall)   4/26/23: OSH ED visit for pain,  found to have new hydro   4/28/23: Met with urology (Dr Valladares) for hydronephrosis. Plan possible stent placement in the future for hydro.   5/5/23:cycle 3 maintenance pembro/avastin (cycle 10 overall)   5/26/23: Cycle 4 maintenance pembro/avastin (cycle 11 overall).  6/16/23: Cycle 5 maintenance pembro/avastin (cycle 12 overall).  7/7/2023:Cycle 6 maintenance pembro  7/28/23: Scheduled for Cycle 7 maintenance pembro  7/31/23: laparoscopic end colostomy to address rectovaginal fistula    8/4/23-8/9/23: Admission to [facility], massive 10L hemorrhage requiring MTP, 20 units transfusion, ICU admission. Imaging notable for erosion of tumor into R internal iliac, subsequently embolized by IR.    8/28/23: C1D1 [place]         Past Medical History:     Past Medical History:   Diagnosis Date    Cervical cancer (H)     Tobacco abuse             Past Surgical History:      Past Surgical History:   Procedure Laterality Date    CARPAL TUNNEL RELEASE RT/LT Right     COMBINED CYSTOSCOPY, INSERT STENT URETER(S) Bilateral 5/10/2023    Procedure: CYSTOSCOPY, WITH RIGHT URETERAL STENT INSERTION, Bilateral retrograde pyelogram.;  Surgeon: Robert Valladares MD;  Location: [facility] OR    COMBINED CYSTOSCOPY, RETROGRADES, EXCHANGE STENT URETER(S) Right 7/5/2023    Procedure: CYSTOSCOPY with fluoroscopy;  Surgeon: Robert Valladares MD;  Location: [facility] OR    CYSTOSCOPY, REMOVE STENT(S), COMBINED N/A 7/31/2023    Procedure: Cystoscopy;  Surgeon: Robert Valladares MD;  Location: [facility] OR    IR NEPHROSTOMY TUBE PLACEMENT RIGHT  7/25/2023    IR PELVIC ARTERIAL EMBOLIZATION  8/7/2023    LAPAROSCOPIC COLOSTOMY N/A 7/31/2023    Procedure: Pelvic Exam under anesthesia with laparoscopic colostomy;  Surgeon: Suzy Brambila MD;  Location: [facility] OR    TUBAL LIGATION              Social History:     Social History     Tobacco Use    Smoking status: Every Day     Packs/day: 1.00     Types: Cigarettes    Smokeless tobacco: Never    Tobacco comments:      9/11/2020 1/2 pack/day, tying tp quit   Substance Use Topics    Alcohol use: Yes     Comment: occ.            Family History:     Family History   Problem Relation Age of Onset    Breast Cancer Mother         8/2020 newly dx            Immunizations:     Immunization History   Administered Date(s) Administered    Influenza Vaccine, 6+MO IM (QUADRIVALENT W/PRESERVATIVES) 09/18/2015    MMR 05/02/1996    TDAP (Adacel,Boostrix) 11/04/1996, 03/12/2013    TDAP Vaccine (Boostrix) 07/16/2015            Allergies:     Allergies   Allergen Reactions    Clindamycin Hives            Medications:     Current Facility-Administered Medications   Medication    HYDROmorphone (PF) (DILAUDID) injection 0.5-1 mg    naloxone (NARCAN) injection 0.2 mg    Or    naloxone (NARCAN) injection 0.4 mg    Or    naloxone (NARCAN) injection 0.2 mg    Or    naloxone (NARCAN) injection 0.4 mg     Facility-Administered Medications Ordered in Other Encounters   Medication    lidocaine (PF) (XYLOCAINE) 1 % injection 10 mL    lidocaine 1% with EPINEPHrine 1:100,000 injection 10 mL            Review of Systems:   CONSTITUTIONAL:  negative  RESPIRATORY:  negative  CARDIOVASCULAR:  negative  GASTROINTESTINAL:  negative  GENITOURINARY:  negative  HEMATOLOGIC/LYMPHATIC:  per HPI  ENDOCRINE:  negative         Physical Exam:     Vitals:    09/04/23 0638 09/04/23 0736 09/04/23 0807   BP: (!) 123/111 97/60 96/63   BP Location:  Right arm Right arm   Pulse: 100 94 79   Resp:  17 16   Temp:  98.7  F (37.1  C) 98.2  F (36.8  C)   TempSrc:  Oral Oral   SpO2: 98% 98% 98%     General: NAD  CV: Regular rate  Resp: Normal WOB  Abdomen: soft, nontender, nondistended  : large volume blood soaking diaper and chux pad, estimated ~600-800cc with ongoing bright red bleeding from vagina. 1 roll vaginal packing and alonso placed with pt consent, 1mg IV dilaudid given for pain  Extremities: WWP, no edema         Data:     Results for orders placed or performed during the  hospital encounter of 09/04/23 (from the past 24 hour(s))   CBC with platelets   Result Value Ref Range    WBC Count 6.1 4.0 - 11.0 10e3/uL    RBC Count 3.84 3.80 - 5.20 10e6/uL    Hemoglobin 10.7 (L) 11.7 - 15.7 g/dL    Hematocrit 34.8 (L) 35.0 - 47.0 %    MCV 91 78 - 100 fL    MCH 27.9 26.5 - 33.0 pg    MCHC 30.7 (L) 31.5 - 36.5 g/dL    RDW 16.1 (H) 10.0 - 15.0 %    Platelet Count 246 150 - 450 10e3/uL   Comprehensive metabolic panel   Result Value Ref Range    Sodium 137 136 - 145 mmol/L    Potassium 4.2 3.4 - 5.3 mmol/L    Chloride 102 98 - 107 mmol/L    Carbon Dioxide (CO2) 24 22 - 29 mmol/L    Anion Gap 11 7 - 15 mmol/L    Urea Nitrogen 12.4 6.0 - 20.0 mg/dL    Creatinine 0.54 0.51 - 0.95 mg/dL    Calcium 9.1 8.6 - 10.0 mg/dL    Glucose 114 (H) 70 - 99 mg/dL    Alkaline Phosphatase 77 35 - 104 U/L    AST 15 0 - 45 U/L    ALT 7 0 - 50 U/L    Protein Total 6.6 6.4 - 8.3 g/dL    Albumin 3.4 (L) 3.5 - 5.2 g/dL    Bilirubin Total 0.2 <=1.2 mg/dL    GFR Estimate >90 >60 mL/min/1.73m2   Lactic acid whole blood   Result Value Ref Range    Lactic Acid 1.3 0.7 - 2.0 mmol/L   INR   Result Value Ref Range    INR 1.08 0.85 - 1.15   Partial thromboplastin time   Result Value Ref Range    aPTT 28 22 - 38 Seconds   Fibrinogen activity   Result Value Ref Range    Fibrinogen Activity 500 (H) 170 - 490 mg/dL             Assessment and Plan:   Assessment: 38 year old female with advanced cervical cancer admitted from [place] for acute vaginal bleeding. Recent admission from 8/4-8/9 complicated by massive hemorrhage requiring MTP, ICU admission, and IR embolization of eroded R internal iliac artery.    Problem List:  Patient Active Problem List   Diagnosis    Malignant neoplasm of overlapping sites of cervix (H)    Cervical cancer (H)    Other hydronephrosis    Stricture or kinking of ureter    Rectovaginal fistula    Diabetes mellitus, type 2 (H)        Plan:    # Acute vaginal bleeding  - Pelvic exam performed on admission,  1 roll vaginal packing placed  - Hgb 10 on arrival, repeat CBC, coags ordered STAT  - Currently getting transfused 2nd unit pRBC. T&C ordered for 4 units, anticipate ongoing hemorrhage  - CTA performed at OSH without active extravasation, however given amount of bleeding suspect further erosion into major blood vessel. Repeat CTA ordered STAT  - IR consulted  - Keep NPO for now, mIVF with /hr  - Briefly discussed that in the setting of previous admission, there may not be interventions available to stop bleeding at this point. Will try everything possible - potential options include IR embolization or possibly palliative radiation. Discussed urgent need to consider code status and that with ongoing hemorrhage and no options to stop, CPR would be futile. Pt expressed understanding, will continue to address.    # Ureteral obstruction  # Vesicovaginal fistula  - R PNT in place, previously recommended L PNT but not completed  - Thomas placed with exam, return of bloody urine  - Continue to address pending bleeding situation    # End colostomy  # Rectovaginal fistula  - S/p laparoscopy colostomy with robles pouch placement  - FEN: NPO for now in anticipation of procedure; reg diet after as tolerated  - Pain: PRN tylenol, oxy, IV dilaudid  - Heme: HOLD home Apixaban    - GI: Bowel regimen, antiemetics PRN  - ID: NI    Code status: continuing to address as above  Dispo:  TBD    Patient seen immediately upon arrival with Dr. Shailesh Miller MD  Gynecologic Oncology PGY-4  Gyn Onc Pager [phone]  9/4/2023 7:22 AM      I saw patient upon arrival, please see progress note from this time. Patient with known recurrent and progressive cervical cancer with recent admission requiring IR embolization and MTP for internal iliac pseudoaneurysm, now with new vaginal bleeding. She is now s/p vaginal packing, vitals currently significant for mild hypotension but not much lower than her baseline. Will continue to  monitor closely. IR discussion after CTA and ongoing GOC and code status discussion. Discussed pt with Dr Lama.     Debo Cash MD  Gynecology Oncology Fellow  September 4, 2023 , 9:20 AM            The patient was seen and examined with the resident, the labs and vital signs were reviewed. The medical care plan outlined above was provided under my directives and direct supervision. Admit for acute bleeding, will evaluate for possible treatment options based on further imaging and consult with interventional radiology.    Sandra Lama MD, MPH  Gynecologic Oncology

## 2023-09-05 ENCOUNTER — HOSPITAL ENCOUNTER (INPATIENT)
Facility: CLINIC | Age: 38
LOS: 4 days | Discharge: HOME | End: 2023-09-09
Attending: OBSTETRICS & GYNECOLOGY | Admitting: OBSTETRICS & GYNECOLOGY
Payer: COMMERCIAL

## 2023-09-05 DIAGNOSIS — Z79.899 ENCOUNTER FOR EYE EXAM DUE TO HIGH RISK MEDICATION: ICD-10-CM

## 2023-09-05 DIAGNOSIS — H53.002 AMBLYOPIA, LEFT: ICD-10-CM

## 2023-09-05 DIAGNOSIS — Z01.00 DIABETIC EYE EXAM (H): Primary | ICD-10-CM

## 2023-09-05 DIAGNOSIS — C53.8 MALIGNANT NEOPLASM OF OVERLAPPING SITES OF CERVIX (H): ICD-10-CM

## 2023-09-05 DIAGNOSIS — E11.9 DIABETIC EYE EXAM (H): Primary | ICD-10-CM

## 2023-09-05 DIAGNOSIS — Z51.5 HOSPICE CARE: Primary | ICD-10-CM

## 2023-09-05 PROCEDURE — 250N000011 HC RX IP 250 OP 636: Performed by: STUDENT IN AN ORGANIZED HEALTH CARE EDUCATION/TRAINING PROGRAM

## 2023-09-05 PROCEDURE — 110N000005 HC R&B HOSPICE, ACCENT

## 2023-09-05 PROCEDURE — 99233 SBSQ HOSP IP/OBS HIGH 50: CPT | Mod: 24 | Performed by: OBSTETRICS & GYNECOLOGY

## 2023-09-05 RX ORDER — CARBOXYMETHYLCELLULOSE SODIUM 5 MG/ML
1-2 SOLUTION/ DROPS OPHTHALMIC 3 TIMES DAILY PRN
Status: DISCONTINUED | OUTPATIENT
Start: 2023-09-05 | End: 2023-09-09 | Stop reason: HOSPADM

## 2023-09-05 RX ORDER — PROCHLORPERAZINE MALEATE 5 MG
10 TABLET ORAL EVERY 6 HOURS PRN
Status: DISCONTINUED | OUTPATIENT
Start: 2023-09-05 | End: 2023-09-09 | Stop reason: HOSPADM

## 2023-09-05 RX ORDER — ACETAMINOPHEN 325 MG/1
650 TABLET ORAL EVERY 6 HOURS PRN
Status: DISCONTINUED | OUTPATIENT
Start: 2023-09-05 | End: 2023-09-09 | Stop reason: HOSPADM

## 2023-09-05 RX ORDER — DEXAMETHASONE 4 MG/1
4 TABLET ORAL DAILY
Status: CANCELLED | OUTPATIENT
Start: 2023-09-06

## 2023-09-05 RX ORDER — MINERAL OIL/HYDROPHIL PETROLAT
OINTMENT (GRAM) TOPICAL
Status: DISCONTINUED | OUTPATIENT
Start: 2023-09-05 | End: 2023-09-09 | Stop reason: HOSPADM

## 2023-09-05 RX ORDER — ONDANSETRON 4 MG/1
4 TABLET, FILM COATED ORAL EVERY 6 HOURS PRN
Status: CANCELLED | OUTPATIENT
Start: 2023-09-05

## 2023-09-05 RX ORDER — SALIVA STIMULANT COMB. NO.3
1 SPRAY, NON-AEROSOL (ML) MUCOUS MEMBRANE
Status: CANCELLED | OUTPATIENT
Start: 2023-09-05

## 2023-09-05 RX ORDER — DEXAMETHASONE 4 MG/1
4 TABLET ORAL DAILY
Status: DISCONTINUED | OUTPATIENT
Start: 2023-09-06 | End: 2023-09-06

## 2023-09-05 RX ORDER — CARBOXYMETHYLCELLULOSE SODIUM 5 MG/ML
1-2 SOLUTION/ DROPS OPHTHALMIC 3 TIMES DAILY PRN
Status: CANCELLED | OUTPATIENT
Start: 2023-09-05

## 2023-09-05 RX ORDER — OXYCODONE HYDROCHLORIDE 5 MG/1
5 TABLET ORAL EVERY 4 HOURS PRN
Status: CANCELLED | OUTPATIENT
Start: 2023-09-05

## 2023-09-05 RX ORDER — FAMOTIDINE 20 MG/1
20 TABLET, FILM COATED ORAL 2 TIMES DAILY PRN
Status: CANCELLED | OUTPATIENT
Start: 2023-09-05

## 2023-09-05 RX ORDER — NICOTINE 21 MG/24HR
1 PATCH, TRANSDERMAL 24 HOURS TRANSDERMAL DAILY
Status: CANCELLED | OUTPATIENT
Start: 2023-09-05

## 2023-09-05 RX ORDER — NALOXONE HYDROCHLORIDE 0.4 MG/ML
0.4 INJECTION, SOLUTION INTRAMUSCULAR; INTRAVENOUS; SUBCUTANEOUS
Status: DISCONTINUED | OUTPATIENT
Start: 2023-09-05 | End: 2023-09-09 | Stop reason: HOSPADM

## 2023-09-05 RX ORDER — NALOXONE HYDROCHLORIDE 0.4 MG/ML
0.2 INJECTION, SOLUTION INTRAMUSCULAR; INTRAVENOUS; SUBCUTANEOUS
Status: CANCELLED | OUTPATIENT
Start: 2023-09-05

## 2023-09-05 RX ORDER — PROCHLORPERAZINE MALEATE 5 MG
10 TABLET ORAL EVERY 6 HOURS PRN
Status: CANCELLED | OUTPATIENT
Start: 2023-09-05

## 2023-09-05 RX ORDER — NALOXONE HYDROCHLORIDE 0.4 MG/ML
0.4 INJECTION, SOLUTION INTRAMUSCULAR; INTRAVENOUS; SUBCUTANEOUS
Status: CANCELLED | OUTPATIENT
Start: 2023-09-05

## 2023-09-05 RX ORDER — OXYCODONE HYDROCHLORIDE 5 MG/1
5 TABLET ORAL EVERY 4 HOURS PRN
Status: DISCONTINUED | OUTPATIENT
Start: 2023-09-05 | End: 2023-09-09 | Stop reason: HOSPADM

## 2023-09-05 RX ORDER — MINERAL OIL/HYDROPHIL PETROLAT
OINTMENT (GRAM) TOPICAL
Status: CANCELLED | OUTPATIENT
Start: 2023-09-05

## 2023-09-05 RX ORDER — LORAZEPAM 0.5 MG/1
1 TABLET ORAL
Status: CANCELLED | OUTPATIENT
Start: 2023-09-05

## 2023-09-05 RX ORDER — SALIVA STIMULANT COMB. NO.3
1 SPRAY, NON-AEROSOL (ML) MUCOUS MEMBRANE
Status: DISCONTINUED | OUTPATIENT
Start: 2023-09-05 | End: 2023-09-09 | Stop reason: HOSPADM

## 2023-09-05 RX ORDER — LIDOCAINE 40 MG/G
CREAM TOPICAL
Status: DISCONTINUED | OUTPATIENT
Start: 2023-09-05 | End: 2023-09-09 | Stop reason: HOSPADM

## 2023-09-05 RX ORDER — NICOTINE 21 MG/24HR
1 PATCH, TRANSDERMAL 24 HOURS TRANSDERMAL DAILY
Status: DISCONTINUED | OUTPATIENT
Start: 2023-09-06 | End: 2023-09-09 | Stop reason: HOSPADM

## 2023-09-05 RX ORDER — NALOXONE HYDROCHLORIDE 0.4 MG/ML
0.2 INJECTION, SOLUTION INTRAMUSCULAR; INTRAVENOUS; SUBCUTANEOUS
Status: DISCONTINUED | OUTPATIENT
Start: 2023-09-05 | End: 2023-09-09 | Stop reason: HOSPADM

## 2023-09-05 RX ORDER — ACETAMINOPHEN 650 MG/1
650 SUPPOSITORY RECTAL EVERY 6 HOURS PRN
Status: DISCONTINUED | OUTPATIENT
Start: 2023-09-05 | End: 2023-09-09 | Stop reason: HOSPADM

## 2023-09-05 RX ORDER — LIDOCAINE 40 MG/G
CREAM TOPICAL
Status: CANCELLED | OUTPATIENT
Start: 2023-09-05

## 2023-09-05 RX ORDER — ECHINACEA PURPUREA EXTRACT 125 MG
1 TABLET ORAL
Status: CANCELLED | OUTPATIENT
Start: 2023-09-05

## 2023-09-05 RX ORDER — LORAZEPAM 0.5 MG/1
1 TABLET ORAL
Status: DISCONTINUED | OUTPATIENT
Start: 2023-09-05 | End: 2023-09-06

## 2023-09-05 RX ORDER — FAMOTIDINE 20 MG/1
20 TABLET, FILM COATED ORAL 2 TIMES DAILY PRN
Status: DISCONTINUED | OUTPATIENT
Start: 2023-09-05 | End: 2023-09-09 | Stop reason: HOSPADM

## 2023-09-05 RX ORDER — ACETAMINOPHEN 325 MG/1
650 TABLET ORAL EVERY 6 HOURS PRN
Status: CANCELLED | OUTPATIENT
Start: 2023-09-05

## 2023-09-05 RX ORDER — ACETAMINOPHEN 650 MG/1
650 SUPPOSITORY RECTAL EVERY 6 HOURS PRN
Status: CANCELLED | OUTPATIENT
Start: 2023-09-05

## 2023-09-05 RX ORDER — ONDANSETRON 4 MG/1
4 TABLET, FILM COATED ORAL EVERY 6 HOURS PRN
Status: DISCONTINUED | OUTPATIENT
Start: 2023-09-05 | End: 2023-09-09 | Stop reason: HOSPADM

## 2023-09-05 RX ADMIN — HYDROMORPHONE HYDROCHLORIDE 0.5 MG: 1 INJECTION, SOLUTION INTRAMUSCULAR; INTRAVENOUS; SUBCUTANEOUS at 10:36

## 2023-09-05 RX ADMIN — HYDROMORPHONE HYDROCHLORIDE 0.5 MG: 1 INJECTION, SOLUTION INTRAMUSCULAR; INTRAVENOUS; SUBCUTANEOUS at 02:45

## 2023-09-05 ASSESSMENT — ACTIVITIES OF DAILY LIVING (ADL)
ADLS_ACUITY_SCORE: 50
ADLS_ACUITY_SCORE: 42
ADLS_ACUITY_SCORE: 42
ADLS_ACUITY_SCORE: 50
ADLS_ACUITY_SCORE: 50
ADLS_ACUITY_SCORE: 35
ADLS_ACUITY_SCORE: 50
ADLS_ACUITY_SCORE: 43

## 2023-09-05 NOTE — PROGRESS NOTES
Care Management Follow Up    Length of Stay (days): 1  Expected Discharge Date: TBD   Concerns to be Addressed:  discharge planning    Patient plan of care discussed at interdisciplinary rounds: NA  Anticipated Discharge Disposition:  TBD   Anticipated Discharge Services:  TBD  Anticipated Discharge DME:  TBD  Patient/family educated on Medicare website which has current facility and service quality ratings:  NA  Education Provided on the Discharge Plan:  NA  Patient/Family in Agreement with the Plan:  NA  Referrals Placed by CM/SW:  NA  Private pay costs discussed: Not applicable    Additional Information:  RNCC approached by bedside RN stating that pt SO Don was hoping for parking assistance due to his car being parked in parking ramp since yesterday. RNCC obtained 1 time free parking coupon but was unable to find pt or SO in pt room. RNCC gave coupon to bedside RN to give to pt.      Frankie Nguyen RN BSN  5A RN Care Coordinator   Ph: [phone]  Pager: [phone]

## 2023-09-05 NOTE — CONSULTS
[facility]    Consult Note - [facility] Inpatient [facility]osp[facility]  _________________________________________________________________    [facility]/7 Contact Number: [phone]    - Providers: Please contact [facility] with changes in orders or clinical plan of care   - Nursing: Please contact [facility] with significant changes in patient condition    Hospice will notify the care team (including the hospitalist) to confirm date of inpatient hospice (GIP) admission.    New Epic encounter will not be created until hospice completes admission.   ______________________________________________________________________        Hospice Diagnosis: cervical cancer    Indication for Inpatient Hospice: bleeding and pain    Goals for Hospital Discharge: home    Plan of Care Discussed with the Following:   - Nurse: JACQUI Chavez  - Hospitalist/Rounding Provider:  Dr. Daina Culp's Family/Preferred Contact:  Don  - Hospice Provider: Dr. Pena    Summary of Visit (includes assessment, medications and any new orders):     Spoke with patient and educated on hospice. Discussed goals for discharge. Discussed pain management and trying oxycodone PO for pain management instead of IV to start so we can facilitate a smooth transition home with adequate pain management. Declined scheduling pain medication at this time.     New orders:    Decadron 4mg PO QAM      Meena Verderame

## 2023-09-05 NOTE — H&P
[facility]    History and Physical - Hospitalist Service       Date of Admission:  9/5/2023    Assessment & Plan   Suni Zuluaga is a 38 year old female who is being transitioned to inpatient hospice on 9/5/2023. Please see the discharge summary from the same date for more details of her hospital course.    Suni Zuluaga is a 38 year old female with stage IIIC1 cervical cancer who initially presented with acute vaginal bleeding. Recent hospital admission for massive hemorrhage requiring 19u transfusion and intubation in the ICU, and R internal iliac artery embolization. This admission, repeat imaging was performed and IR consulted, who confirmed nothing to embolize. There were no other treatment options available to stop her significant amount of bleeding or treat her progressively invasive cancer. In light of this, goals of care were discussed and the patient transitioned to DNR/DNI with comfort care only.     Diet: Regular Diet Adult    Thomas Catheter: PRESENT, indication:    Lines: None     Code Status: No CPR- Do NOT Intubate    Expected Discharge: working on discharge with hospice    Stephanie Miller MD  Hospitalist Service  [facility]  Securely message with RF Code (more info)  Text page via [facility] Paging/Directory     ______________________________________________________________________    Chief Complaint   Transitioning to inpatient hospice    History of Present Illness   Suni Zuluaga is a 38 year old female who is being transitioned to inpatient hospice.  Please see the discharge summary from the same date for more details; a brief summary of her current symptoms is included here.    Vaginal bleeding  Intermittent pain    Physical Exam   Vital Signs:                    Weight: 0 lbs 0 oz    Physical exam deferred given current goals of care    Medical Decision Making       60 MINUTES SPENT BY ME on the date  of service doing chart review, history, exam, documentation & further activities per the note.      Provider Disclosure:   I agree with above History, Review of Systems, Physical exam and Plan. I have reviewed the content of the documentation and have edited it as needed. I have personally performed the services documented here and the documentation accurately represents those services and the decisions I have made.     Electronically signed by:   Corie Martinez MD   Gynecologic Oncology   [facility]

## 2023-09-05 NOTE — PLAN OF CARE
Inpatient Hospice. A&Ox4. able to make needs known. Gave IV dilaudid x1 this shift with good relief. Denies nausea. Pt's partner, Nate, remained at bedside overnight. L-PIV removed per Pt request d/t irritation at site, no redness, edema or other symptoms present. Fox pad & brief changed x2. Bleeding has slowed down from previous shift. Thomas and nephrostomy patent, bright red blood o/p. Pt declined oxygen via NC w/o SOB. VS discontinued. Aroma therapy used to offset the smell of blood. Jane spoke with Pt yesterday, Pt indicated they may want to speak with them again today.

## 2023-09-05 NOTE — CARE PLAN
Inpatient Hospice.  A&Ox4.  IV Dilaudid 0.5 mg given x1.  Denies nausea.  R PIV SL.  Bleeding has slowed down.  Pt.has only needed to change pad x1 today.  Thomas and nephrostomy patent, bloody output.  Pt.'s significant other took her outside in a wheelchair.  Appetite poor.  Continue plan of care.

## 2023-09-05 NOTE — PROGRESS NOTES
Care Management Follow Up    Length of Stay (days): 1    Expected Discharge Date:  9/6/23     Concerns to be Addressed:   [facility]  Patient plan of care discussed at interdisciplinary rounds: Yes    Anticipated Discharge Disposition:  [facility]     Anticipated Discharge Services:  Hospice    Education Provided on the Discharge Plan:  yes  Patient/Family in Agreement with the Plan:  yes    Additional Information:    , covering for 5A completed chart review and discussed patient during rounds. Physician stated during rounds that a [facility] consult was placed. Charge nurse reported that medical team thought patient was possibly not going to make it through the night, so getting hospice here ASAP would be helpful.    SUSAN contacted [facility] intake at # [phone] at 10:45 am. Romario answered the phone and transferred SW to the admissions coordinator. Radha (# [phone]) stated that pt was entered in the system and she would connect with the director and call back with an update on when a nurse can speak with pt and significant other.     SUSAN received a call back from Radha who stated that Diana (Music therapist) will be out to see the patient around 12:30 pm to get paperwork signed. An RN will visit pt after paperwork has been completed sometime today.  SUSAN met with Diana ([facility]) after she met with pt and pt's Jim CORREIA. Diana stated that pt signed the paperwork, but a nurse would still need to see pt to enroll her in [facility] hospice.     SUSAN/RNCC will continue to follow and assist with any other needs that may arise.    JOELLE Zendejas, LGSW  Coverage   [facility]  [email or web address]

## 2023-09-05 NOTE — PHARMACY-ADMISSION MEDICATION HISTORY
Admission Medication History     Patient will be transitioning to comfort cares, will not complete medication history at this time.      Medication history completed by: Jada Ojeda PharmD   Call/page with questions or concerns.

## 2023-09-05 NOTE — PROGRESS NOTES
GYN ONC PROGRESS NOTE  9/5/2023    HD#:  2  Disease:  stage IIIC1 cervical cancer     24 hour events:   - Admission with acute bleeding  - IR consult with imaging, nothing to embolize  - Transitioned to DNR/DNI, comfort care    Subjective: Doing ok this morning. Went outside overnight to get some fresh air and that felt really good. Not sleeping well overnight since waking up yesterday in a pool of blood. Feels like this really messed with her head. Able to nap during the day. Partner Jim at bedside. Suni is wondering about how much blood she's lost as of this morning. Notes the bleeding seems to have slowed significantly, but still present. No other questions or concerns this morning.    Objective:   Temp:  [98.2  F (36.8  C)-98.7  F (37.1  C)] 98.2  F (36.8  C)  Pulse:  [] 127  Resp:  [16-20] 20  BP: ()/() 78/57  SpO2:  [95 %-100 %] 100 %     Gen: Frail, tired, pale  : Diaper with pad, soaked ~80%, no leaking out of diaper, no clots    Assessment:   Ms. Suni Zuluaga is a 38 year old with worsening stage IIIC1 cervical cancer admitted HD#2 for acute hemorrhage as transfer of care from [place]. Recent admission a month ago complicated by massive hemorrhage requiring MTP, ICU admission, and R internal iliac artery embolization. CTA this admission repeated on admission, IR consulted and confirmed there is nothing to embolize this time. Given no options to stop the bleeding, goals of care conversations had throughout the day yesterday and patient transitioned to DNR/DNI and comfort care. Patient stable overnight, ongoing bleeding but significantly lighter than on admission. Spiritual health visit requested yesterday by patient's partner and she did report feeling good from that conversation. Will consider additional visit from them or palliative today. Plan as below:    - Continue comfort cares   - PRN tylenol, oxycodone, dilaudid for pain   - PRN ativan for anxiety   - PRN antiemetics  if nauseous  - Continue spiritual/mental health support as desired    Stephanie Miller MD PGY4  Gyn Oncology  09/05/23 5:34 AM     Provider Disclosure:   I agree with above History, Review of Systems, Physical exam and Plan. I have reviewed the content of the documentation and have edited it as needed. I have personally performed the services documented here and the documentation accurately represents those services and the decisions I have made.     Electronically signed by:   Corie Martinez MD   Gynecologic Oncology   [facility]

## 2023-09-05 NOTE — LETTER
[facility] 5A ONCOLOGY  [address] [address]  Phone: [phone]    September 6, 2023        To whom it may concern:    Please excuse Don from work for 2 weeks. Thank you for your assistance in accommodating this request.     Please contact me for questions or concerns.      Sincerely,  Dolores Jameson DO  [facility]   September 6, 2023 8:35 AM

## 2023-09-06 DIAGNOSIS — N13.4 HYDROURETER ON RIGHT: ICD-10-CM

## 2023-09-06 DIAGNOSIS — R30.0 DYSURIA: ICD-10-CM

## 2023-09-06 PROCEDURE — 250N000013 HC RX MED GY IP 250 OP 250 PS 637

## 2023-09-06 PROCEDURE — 250N000012 HC RX MED GY IP 250 OP 636 PS 637

## 2023-09-06 PROCEDURE — 99232 SBSQ HOSP IP/OBS MODERATE 35: CPT | Mod: 24 | Performed by: OBSTETRICS & GYNECOLOGY

## 2023-09-06 PROCEDURE — 250N000011 HC RX IP 250 OP 636

## 2023-09-06 PROCEDURE — 110N000005 HC R&B HOSPICE, ACCENT

## 2023-09-06 PROCEDURE — 999N000248 HC STATISTIC IV INSERT WITH US BY RN

## 2023-09-06 RX ORDER — HYDROMORPHONE HYDROCHLORIDE 1 MG/ML
.5-1 INJECTION, SOLUTION INTRAMUSCULAR; INTRAVENOUS; SUBCUTANEOUS
Status: DISCONTINUED | OUTPATIENT
Start: 2023-09-06 | End: 2023-09-06

## 2023-09-06 RX ORDER — HYDROMORPHONE HYDROCHLORIDE 1 MG/ML
.5-1 INJECTION, SOLUTION INTRAMUSCULAR; INTRAVENOUS; SUBCUTANEOUS
Status: DISCONTINUED | OUTPATIENT
Start: 2023-09-06 | End: 2023-09-09 | Stop reason: HOSPADM

## 2023-09-06 RX ORDER — OXYBUTYNIN CHLORIDE 5 MG/1
5 TABLET, EXTENDED RELEASE ORAL DAILY
Qty: 30 TABLET | Refills: 0 | OUTPATIENT
Start: 2023-09-06

## 2023-09-06 RX ORDER — LORAZEPAM 0.5 MG/1
1 TABLET ORAL EVERY 6 HOURS
Status: DISCONTINUED | OUTPATIENT
Start: 2023-09-06 | End: 2023-09-09 | Stop reason: HOSPADM

## 2023-09-06 RX ORDER — DEXAMETHASONE 4 MG/1
4 TABLET ORAL EVERY MORNING
Status: DISCONTINUED | OUTPATIENT
Start: 2023-09-07 | End: 2023-09-09 | Stop reason: HOSPADM

## 2023-09-06 RX ORDER — HYDROMORPHONE HYDROCHLORIDE 1 MG/ML
0.5 INJECTION, SOLUTION INTRAMUSCULAR; INTRAVENOUS; SUBCUTANEOUS EVERY 6 HOURS
Status: DISCONTINUED | OUTPATIENT
Start: 2023-09-06 | End: 2023-09-09 | Stop reason: HOSPADM

## 2023-09-06 RX ADMIN — OXYCODONE HYDROCHLORIDE 5 MG: 5 TABLET ORAL at 19:35

## 2023-09-06 RX ADMIN — DEXAMETHASONE 4 MG: 4 TABLET ORAL at 08:39

## 2023-09-06 RX ADMIN — ACETAMINOPHEN 650 MG: 325 TABLET, FILM COATED ORAL at 22:10

## 2023-09-06 RX ADMIN — HYDROMORPHONE HYDROCHLORIDE 0.5 MG: 1 INJECTION, SOLUTION INTRAMUSCULAR; INTRAVENOUS; SUBCUTANEOUS at 22:42

## 2023-09-06 RX ADMIN — LORAZEPAM 1 MG: 0.5 TABLET ORAL at 08:39

## 2023-09-06 RX ADMIN — LORAZEPAM 1 MG: 0.5 TABLET ORAL at 05:26

## 2023-09-06 RX ADMIN — LORAZEPAM 1 MG: 0.5 TABLET ORAL at 22:10

## 2023-09-06 RX ADMIN — LORAZEPAM 1 MG: 0.5 TABLET ORAL at 13:50

## 2023-09-06 RX ADMIN — HYDROMORPHONE HYDROCHLORIDE 0.5 MG: 1 INJECTION, SOLUTION INTRAMUSCULAR; INTRAVENOUS; SUBCUTANEOUS at 05:44

## 2023-09-06 RX ADMIN — HYDROMORPHONE HYDROCHLORIDE 1 MG: 1 INJECTION, SOLUTION INTRAMUSCULAR; INTRAVENOUS; SUBCUTANEOUS at 08:58

## 2023-09-06 RX ADMIN — NICOTINE 1 CARTRIDGE: 4 INHALANT RESPIRATORY (INHALATION) at 05:56

## 2023-09-06 ASSESSMENT — ACTIVITIES OF DAILY LIVING (ADL)
ADLS_ACUITY_SCORE: 43
ADLS_ACUITY_SCORE: 42
ADLS_ACUITY_SCORE: 43
ADLS_ACUITY_SCORE: 42
ADLS_ACUITY_SCORE: 43

## 2023-09-06 NOTE — PROGRESS NOTES
[facility]       Hospice Diagnosis: Malignant neoplasm of overlapping sites of cervix     Indication for Inpatient Hospice: Pain    Goals for Hospital Discharge: Suni would like to return home to [facility]. Hospice Sw talked about Hospice in the home.          Plan of Care Discussed with the Following:        - Suni's Family/Preferred Contact: Jim     Support/Services in Place:  Prior to GIP Suni was seen by [facility] hosp[facility]. Hospice SW will reach out.    Services Needed/Recommended:  Assistance finding [employer] resources in [facility]    Support System     Significant relationship at present time:  JOELLE Hurt, [facility]  Hospice Social Worker    Monday -Friday 8AM-5PM     [facility]    24/7 line: [phone]     [email or web address]

## 2023-09-06 NOTE — PLAN OF CARE
A/Ox4. Admitted to IP hospice 9/5. DNR/DNI. Pain managed with dildudid. Regular diet, denies nausea. Up with 1 assist. Wearing pads for bleeding, changed x2/ Large blood clots and a lot of blood discharge, Pt went outside alone and staff member wheeled them back to the unit, leaving a bloody trail along the hallway with large blood clots. Blood saturated brief and bedding. RN stayed with Pt and called their partner to come to the hospital. Thomas & nephrostomy draining red o/p. Pt has expressed desire to discharge home; gyn/onc to discuss with Pt today. Continue to provide comfort cares.

## 2023-09-06 NOTE — PROGRESS NOTES
GYN ONC PROGRESS NOTE  9/6/2023    HD#:  3  Disease:  stage IIIC1 cervical cancer     24 hour events:   - Transitioned to inpatient hospice  - Heavy bleeding again 9/6 AM    Subjective: Not feeling good. Was outside for fresh air when bleeding increased a lot again. Feeling shaky, nervous, scared. RN called Jim who is on his way in. Amenable to anxiety and pain meds now.    Objective:   Gen: Frail, tired, pale  : Diaper and chux soaked with bright red blood, clot, ~300-400 cc    A/P:   Ms. Suni Zuluaga is a 38 year old with worsening stage IIIC1 cervical cancer admitted HD#3 for acute hemorrhage as transfer of care from [place]. Recent admission a month ago complicated by massive hemorrhage requiring MTP, ICU admission, and R internal iliac artery embolization. CTA this admission repeated, IR consulted and confirmed there is nothing to embolize this time. Given no options to stop the bleeding, goals of care conversations had throughout the day of admission and patient transitioned to DNR/DNI and comfort care. Hospice consulted yesterday and patient transitioned to inpatient hospice.    - Continue comfort cares   - PRN tylenol, oxycodone, dilaudid for pain   - PRN ativan for anxiety   - PRN antiemetics if nauseous  - Continue spiritual/mental health support as desired    Stephanie Miller MD PGY4  Gyn Oncology  09/06/23 5:47 AM     Provider Disclosure:   I agree with above History, Review of Systems, Physical exam and Plan. I have reviewed the content of the documentation and have edited it as needed. I have personally performed the services documented here and the documentation accurately represents those services and the decisions I have made.     Electronically signed by:   Corie Martinez MD   Gynecologic Oncology   [facility]

## 2023-09-06 NOTE — PLAN OF CARE
Pt A/Ox4. No VS taken. Pt admitted to IP hospice today. No CPR/intubation. Regular diet, denies nausea. Denies pain. Up with 1 assist. Wearing pads for bleeding, changed x1 today, moderate amount of bloody discharge. Thomas in place, nephrostomy with dressing changed today; both draining red urine. Colostomy changed today. Took shower. Pt has expressed desire to discharge home; gyn/onc to discuss with pt tomorrow.

## 2023-09-06 NOTE — PROGRESS NOTES
Care Management Follow Up    Length of Stay (days): 1    Expected Discharge Date: 09/06/2023     Concerns to be Addressed: All concern addressed    Patient plan of care discussed at interdisciplinary rounds: Yes    Anticipated Discharge Disposition: GIP     Anticipated Discharge Services:  GIP  Anticipated Discharge DME:  FABIANA    Patient/family educated on Medicare website which has current facility and service quality ratings: N/A   Education Provided on the Discharge Plan: GIP   Patient/Family in Agreement with the Plan:  GIP    Referrals Placed by CM/SW:  GIP  Private pay costs discussed: Not applicable    Additional Information:  Received a phone call from [facility] with report that pt's receiving skilled nursing and palliative social work services with them and wondering if they should sign off care at this time. CM acknowledged that HomeCare and Outpt palliative should be discontinued as pt has been enrolled into inpatient hospice care. GIP following.     [facility] RN  [phone]        Kavitha Dumont, FERNANDA [facility]  P:   Pager: [phone]  F: [phone]  Weekend Pager: [phone]  Weekend Coverage: 5A; 5B; 5C

## 2023-09-06 NOTE — PROGRESS NOTES
Brief Progress Note     S: Suni is feeling okay right now. She feels that the Dilaudid and Ativan have helped and she is more comfortable than she was this morning. She is interested in having her vaginal packing removed and replaced today. She is also interested seeing spiritual care again today if possible.     O:   Gen: Laying comfortably in bed, intermittently somnolent   : Briefs in place moderately saturated in blood; removed. Speculum exam with no active steff bleeding from the vaginal vault.     A/P: 39 yo F with cervical cancer admitted for acute hemorrhage now admitted to inpatient hospice care. S/p removal and replacement of vaginal packing x1. Patient tolerated this well without incident. Will arrange for spiritual care visit through inpatient hospice team.     Dolores Jameson DO  [facility]  Gynecology Oncology Resident, PGY-1  09/06/2023 10:07 AM

## 2023-09-06 NOTE — PROGRESS NOTES
[facility]    Consult Note - [facility] Inpatient Hospice  _________________________________________________________________    [facility] 24/7 Contact Number: [phone]    - Providers: Please contact [facility] with changes in orders or clinical plan of care   - Nursing: Please contact [facility] with significant changes in patient condition    Hospice will notify the care team (including the hospitalist) to confirm date of inpatient hospice (GIP) admission.    New Epic encounter will not be created until hospice completes admission.   ______________________________________________________________________        Hospice Diagnosis: Cervical Cancer    Indication for Inpatient Hospice: pain    Plan of Care Discussed with the Following:   - Nurse: JACQUI Simpson  - Hospitalist/Rounding Provider: MD Juan    - Hospice Provider: Dr. Pena    Summary of Visit (includes assessment, medications and any new orders):   Writer met with patient this morning at 9am for daily gip assessment and cares. Patient resting in bed with friend at bedside. Patient reports comfortable at this time post dilaudid administration. Discussed pain control with Suni, and she stated that is most important to her and is comfortable starting scheduled pain and anxiety medications to her POC and follow closely for needed changes. Dr. Pena, Hospice MD supported the following recommendations; scheduled decadron; anti-inflammation, often supports reduction in tumor mass. Adding scheduled Dilaudid 1 mg IV q 6 hours for pain and prn dilaudid 1 mg q hour for breakthrough pain and dyspnea and breaths >20 per minute. Schedule ativan 1mg q 6 hours for anxiety/agitation. Reaching out for legacy projects and family support.     Esperanza Hoang, CARENN RN N  [facility]  [phone]

## 2023-09-06 NOTE — PROGRESS NOTES
IV dilaudid and PO ativan given PRN for comfort. Waiting for vascular access to insert a PIV as her only one just came out. Patient's significant other by bedside throughout shift, he takes pt outside in wheelchair. Brief changed 1x this shift for bleeding.

## 2023-09-06 NOTE — PROGRESS NOTES
"SPIRITUAL HEALTH SERVICES  [facility]A  REFERRAL SOURCE: Paged. Significant Other request, Nate    SUMMARY OF VISIT   Pt was sleeping. Her significant other Don, shared about pt's life - health, children, relationships.  Don stated he didn't have a [place] practice, believing that \"the body gets buried and the light(soul) goes up.\" Don states, that pt's [place] was \"maybe she was Quaker, I don't know.\"     PLAN: Provided listening and reflective conversation as Don processes pt's dying process. Will continue to follow and support.     Rev. Diana Tovar MDiv, [facility]  Staff    Pager [phone]  * [facility] remains available 24/7 for emergent requests/referrals, either by having the switchboard page the on-call  or by entering an ASAP/STAT consult in Epic (this will also page the on-call ).*     "

## 2023-09-06 NOTE — TELEPHONE ENCOUNTER
Last prescribing provider: Mounika SANCHEZ    Last clinic visit date: 8/25/23    Recommendations for requested medication (if none, N/A): na    Any other pertinent information (if none, N/A): na    Refilled: Y/N, if NO, why?

## 2023-09-07 ENCOUNTER — MEDICAL CORRESPONDENCE (OUTPATIENT)
Dept: HEALTH INFORMATION MANAGEMENT | Facility: CLINIC | Age: 38
End: 2023-09-07

## 2023-09-07 PROCEDURE — 250N000013 HC RX MED GY IP 250 OP 250 PS 637: Performed by: STUDENT IN AN ORGANIZED HEALTH CARE EDUCATION/TRAINING PROGRAM

## 2023-09-07 PROCEDURE — 99231 SBSQ HOSP IP/OBS SF/LOW 25: CPT | Mod: 24 | Performed by: OBSTETRICS & GYNECOLOGY

## 2023-09-07 PROCEDURE — 110N000005 HC R&B HOSPICE, ACCENT

## 2023-09-07 PROCEDURE — 250N000013 HC RX MED GY IP 250 OP 250 PS 637

## 2023-09-07 PROCEDURE — 250N000012 HC RX MED GY IP 250 OP 636 PS 637

## 2023-09-07 RX ORDER — HYDROMORPHONE HYDROCHLORIDE 2 MG/1
2 TABLET ORAL EVERY 6 HOURS
Status: DISCONTINUED | OUTPATIENT
Start: 2023-09-07 | End: 2023-09-09 | Stop reason: HOSPADM

## 2023-09-07 RX ORDER — HYDROMORPHONE HYDROCHLORIDE 2 MG/1
2 TABLET ORAL
Status: DISCONTINUED | OUTPATIENT
Start: 2023-09-07 | End: 2023-09-09 | Stop reason: HOSPADM

## 2023-09-07 RX ADMIN — HYDROMORPHONE HYDROCHLORIDE 2 MG: 2 TABLET ORAL at 22:37

## 2023-09-07 RX ADMIN — HYDROMORPHONE HYDROCHLORIDE 2 MG: 2 TABLET ORAL at 17:05

## 2023-09-07 RX ADMIN — DEXAMETHASONE 4 MG: 4 TABLET ORAL at 10:36

## 2023-09-07 RX ADMIN — HYDROMORPHONE HYDROCHLORIDE 2 MG: 2 TABLET ORAL at 12:10

## 2023-09-07 RX ADMIN — LORAZEPAM 1 MG: 0.5 TABLET ORAL at 17:04

## 2023-09-07 RX ADMIN — NICOTINE 1 CARTRIDGE: 4 INHALANT RESPIRATORY (INHALATION) at 10:36

## 2023-09-07 RX ADMIN — LORAZEPAM 1 MG: 0.5 TABLET ORAL at 06:34

## 2023-09-07 RX ADMIN — LORAZEPAM 1 MG: 0.5 TABLET ORAL at 11:51

## 2023-09-07 RX ADMIN — HYDROMORPHONE HYDROCHLORIDE 2 MG: 2 TABLET ORAL at 06:33

## 2023-09-07 ASSESSMENT — ACTIVITIES OF DAILY LIVING (ADL)
ADLS_ACUITY_SCORE: 43

## 2023-09-07 NOTE — PROGRESS NOTES
0905-3648: A&Ox4. PO dilaudid and PO ativan given for comfort before packing change, otherwise pt denies pain and scheduled pain meds. Patient's significant other by bedside throughout shift, he takes pt outside in wheelchair. Brief changed 1x this shift for bleeding, small amount. Thomas, nephrostomy tube in place. Pt eating well today.

## 2023-09-07 NOTE — PROGRESS NOTES
Writer met with patient today for daily [facility] hospice assessment and cares. Patient resting, caregiver stating she had increased pain thus SN administered prn dilaudid to support comfort. POC for patient to discharge home Saturday morning with hospice to follow.     Esperanza Hoang RN  [facility]  [phone]

## 2023-09-07 NOTE — PROGRESS NOTES
Writer reached out to attending today to inquire on supporting 3 days of medications for Suni to be transported with patient back home until hospice medications can be delivered.    Esperanza Hoang RN  [facility]  [phone]

## 2023-09-07 NOTE — PROGRESS NOTES
"[facility]    Social Work Progress Note - [facility] Inpatient Hospice    __________________________________________________       Summary of Visit (includes psychosocial assessment/updates, acceptance of palliative care/hospice philosophy, discharge plans):     Hospice Social Work was informed that Suni would like to return home with hospice. Hospice supports this transition.     confirmed that [facility] does not service that area.  call Suni's previous hospice agency to reestablish care upon discharge.  [facility]   [address]   [place] [address]   [phone]        Addendum 3:51pm  Hospice Social worker connected with Dr. Dolores Jameson about safe discharge planning for Suni in regards to her packing, and plan for being home on hospice.    Hospital  met with Suni's partner Jim as Suni was sleeping. SUSAN provided active listening as as Nate described his concerns about what care Suni will get when another bleed occurs.  validated his concerns and provided information on knowing ahead of time if Suni will want to return to the ER to get another packing, or remain at home and have symptoms of pain managed by hospice, with the knowledge the bleed may be part of Suni's end of life journey.    Jim expressed Suni's wishes are to stay at home and not return to the hospital. He said \"she may as well stay in the hospital\". Fareed agreed with that perspective.    SUSAN encouraged Jim to reach out to his network of friends to provide 24/7 companionship to Suni.     Discharge plan:    To have DME equipment ordered by [facility] to be delivered tomorrow with a possible discharge on Saturday morning, if [facility] can do admits on Saturday. SUSAN called [facility] to inquire about weekend admissions.  "

## 2023-09-07 NOTE — PROGRESS NOTES
GYN ONC PROGRESS NOTE  9/7/2023    HD#:  4  Disease:  stage IIIC1 cervical cancer     24 hour events:   - Removed, replaced vaginal packing yesterday AM at pt request  - Scheduled pain, anxiety meds per Hospice    Subjective: Feeling ok this morning. Normally awake at this hour per her biological clock. Still didn't sleep great overnight. There was one point where she thought she might've been bleeding again but stood up and there wasn't any. Did get dizzy, and hyperventilated during this. Took a shower late last night, IV tape got wet and IV fell out, wasn't replaced. Still has one other IV. Hoping to take a shower earlier this morning after breakfast. States she is confused about IV vs oral meds, hospice team told her to try oral meds only. Discussed that if her goal is to go home, can't get IV meds at home. She asks if dilaudid is available as a pill, oxycodone doesn't work well for her. Does still desire discharge home, where more of her family and friends could see her before she dies.    Objective:   Gen: NAD  : Deferred    A/P:   Ms. Suni Zuluaga is a 38 year old with worsening stage IIIC1 cervical cancer admitted HD#4 for acute hemorrhage as transfer of care from [place]. Recent admission a month ago complicated by massive hemorrhage requiring MTP, ICU admission, and R internal iliac artery embolization. CTA this admission repeated, IR consulted and confirmed there is nothing to embolize this time. Given no options to stop the bleeding, goals of care conversations had throughout the day of admission and patient transitioned to DNR/DNI and comfort care. Hospice consulted yesterday and patient transitioned to inpatient hospice.    - Continue comfort cares   - Scheduled and PRN dilaudid for pain, per hospice recs. Will change PO oxy to PO dilaudid this morning.   - Scheduled and PRN ativan for anxiety   - PRN antiemetics if nauseous  - Continue spiritual/mental health support as desired  - Per  Inpatient Hospice SW, working on dispo of home hospice at Suni's request    Stephanie Miller MD PGY4  Gynecologic Oncology  09/07/23 5:54 AM   Pager [phone]    Provider Disclosure:   I agree with above History, Review of Systems, Physical exam and Plan. I have reviewed the content of the documentation and have edited it as needed. I have personally performed the services documented here and the documentation accurately represents those services and the decisions I have made.     Electronically signed by:   Corie Martinez MD   Gynecologic Oncology   [facility]

## 2023-09-07 NOTE — TELEPHONE ENCOUNTER
Per LAURENCE, patient is admitted and they will take care of refill there.    Suni Muniz MBA, MSN, RN, ONC  [facility]

## 2023-09-07 NOTE — PLAN OF CARE
Inpatient hospice. DNR/DNI. No VS assessment. Assist x1, using wheelchair. Regular diet, tolerating well, good intake. Pain goes from 0-10 sporadically, may be related to movement. Denies nausea, slept most of shift, RN did not wake for scheduled meds. Thomas & PNT with dark red, low o/p. Vaginal packing is out; team aware & address tomorrow. Brief on for vaginal discharge, changed x1 with moderate dark red drainage. Small amount of BM and gas in colostomy. Pt left the floor x1 times with partner who stayed overnight and helps with cares. R-PIV SL.

## 2023-09-07 NOTE — PLAN OF CARE
Pt on hospice. No CPR/DNI. No VS taken. Assist x1, using wheelchair. Regular diet, tolerating well, good intake. No pain for majority of shift, then had sudden pain approx 2200. Received IV dilaudid, PRN tylenol. Received routine lorazepam x1 for anxiety. Thomas and PNT with low, dark red output. Small amount of BM and gas in colostomy. Vaginal packing coming out; provider paged and stated that they would leave as is tonight. Wearing brief for vaginal discharge, changed x1 with moderate drainage. Pt left the floor multiple times with partner. Partner at bedside, staying overnight, helpful. R OMAR MACIAS.

## 2023-09-08 PROCEDURE — 99231 SBSQ HOSP IP/OBS SF/LOW 25: CPT | Mod: 24 | Performed by: OBSTETRICS & GYNECOLOGY

## 2023-09-08 PROCEDURE — 250N000013 HC RX MED GY IP 250 OP 250 PS 637

## 2023-09-08 PROCEDURE — 250N000013 HC RX MED GY IP 250 OP 250 PS 637: Performed by: STUDENT IN AN ORGANIZED HEALTH CARE EDUCATION/TRAINING PROGRAM

## 2023-09-08 PROCEDURE — 250N000012 HC RX MED GY IP 250 OP 636 PS 637

## 2023-09-08 PROCEDURE — 110N000005 HC R&B HOSPICE, ACCENT

## 2023-09-08 RX ORDER — FAMOTIDINE 20 MG/1
20 TABLET, FILM COATED ORAL 2 TIMES DAILY PRN
Qty: 6 TABLET | Refills: 0 | Status: SHIPPED | OUTPATIENT
Start: 2023-09-08

## 2023-09-08 RX ORDER — HYDROMORPHONE HYDROCHLORIDE 2 MG/1
2 TABLET ORAL EVERY 6 HOURS
Qty: 12 TABLET | Refills: 0 | Status: SHIPPED | OUTPATIENT
Start: 2023-09-08 | End: 2023-09-08

## 2023-09-08 RX ORDER — ACETAMINOPHEN 325 MG/1
650 TABLET ORAL EVERY 6 HOURS PRN
Qty: 30 TABLET | Refills: 0 | Status: SHIPPED | OUTPATIENT
Start: 2023-09-08

## 2023-09-08 RX ORDER — SENNA AND DOCUSATE SODIUM 50; 8.6 MG/1; MG/1
2 TABLET, FILM COATED ORAL 2 TIMES DAILY PRN
Qty: 60 TABLET | Refills: 0 | Status: SHIPPED | OUTPATIENT
Start: 2023-09-08

## 2023-09-08 RX ORDER — SALIVA STIMULANT COMB. NO.3
1 SPRAY, NON-AEROSOL (ML) MUCOUS MEMBRANE
Qty: 44.3 ML | Refills: 0 | Status: SHIPPED | OUTPATIENT
Start: 2023-09-08

## 2023-09-08 RX ORDER — HYDROMORPHONE HYDROCHLORIDE 2 MG/1
TABLET ORAL
Qty: 30 TABLET | Refills: 0 | Status: SHIPPED | OUTPATIENT
Start: 2023-09-08

## 2023-09-08 RX ORDER — MINERAL OIL/HYDROPHIL PETROLAT
OINTMENT (GRAM) TOPICAL
Qty: 99 G | Refills: 0 | Status: SHIPPED | OUTPATIENT
Start: 2023-09-08

## 2023-09-08 RX ORDER — HYDROMORPHONE HYDROCHLORIDE 2 MG/1
2 TABLET ORAL
Qty: 12 TABLET | Refills: 0 | Status: SHIPPED | OUTPATIENT
Start: 2023-09-08 | End: 2023-09-08

## 2023-09-08 RX ORDER — POLYETHYLENE GLYCOL 3350 17 G/17G
17 POWDER, FOR SOLUTION ORAL DAILY PRN
Qty: 510 G | Refills: 0 | Status: SHIPPED | OUTPATIENT
Start: 2023-09-08

## 2023-09-08 RX ORDER — PROCHLORPERAZINE MALEATE 10 MG
10 TABLET ORAL EVERY 6 HOURS PRN
Qty: 30 TABLET | Refills: 0 | Status: SHIPPED | OUTPATIENT
Start: 2023-09-08

## 2023-09-08 RX ORDER — LORAZEPAM 1 MG/1
1 TABLET ORAL EVERY 6 HOURS
Qty: 12 TABLET | Refills: 0 | Status: SHIPPED | OUTPATIENT
Start: 2023-09-08

## 2023-09-08 RX ORDER — ONDANSETRON 4 MG/1
4 TABLET, FILM COATED ORAL EVERY 6 HOURS PRN
Qty: 30 TABLET | Refills: 0 | Status: SHIPPED | OUTPATIENT
Start: 2023-09-08

## 2023-09-08 RX ORDER — DEXAMETHASONE 4 MG/1
4 TABLET ORAL EVERY MORNING
Qty: 3 TABLET | Refills: 0 | Status: SHIPPED | OUTPATIENT
Start: 2023-09-09

## 2023-09-08 RX ADMIN — OXYCODONE HYDROCHLORIDE 5 MG: 5 TABLET ORAL at 21:23

## 2023-09-08 RX ADMIN — HYDROMORPHONE HYDROCHLORIDE 2 MG: 2 TABLET ORAL at 17:49

## 2023-09-08 RX ADMIN — HYDROMORPHONE HYDROCHLORIDE 2 MG: 2 TABLET ORAL at 20:06

## 2023-09-08 RX ADMIN — LORAZEPAM 1 MG: 0.5 TABLET ORAL at 17:17

## 2023-09-08 RX ADMIN — HYDROMORPHONE HYDROCHLORIDE 2 MG: 2 TABLET ORAL at 13:10

## 2023-09-08 RX ADMIN — HYDROMORPHONE HYDROCHLORIDE 2 MG: 2 TABLET ORAL at 09:28

## 2023-09-08 RX ADMIN — HYDROMORPHONE HYDROCHLORIDE 2 MG: 2 TABLET ORAL at 02:11

## 2023-09-08 RX ADMIN — NICOTINE 1 CARTRIDGE: 4 INHALANT RESPIRATORY (INHALATION) at 08:34

## 2023-09-08 RX ADMIN — DEXAMETHASONE 4 MG: 4 TABLET ORAL at 08:34

## 2023-09-08 RX ADMIN — LORAZEPAM 1 MG: 0.5 TABLET ORAL at 08:34

## 2023-09-08 RX ADMIN — LORAZEPAM 1 MG: 0.5 TABLET ORAL at 02:11

## 2023-09-08 ASSESSMENT — ACTIVITIES OF DAILY LIVING (ADL)
ADLS_ACUITY_SCORE: 43
ADLS_ACUITY_SCORE: 47
ADLS_ACUITY_SCORE: 47
ADLS_ACUITY_SCORE: 43
ADLS_ACUITY_SCORE: 47

## 2023-09-08 NOTE — PROGRESS NOTES
GYN ONC PROGRESS NOTE  9/8/2023    HD#:  5  Disease:  stage IIIC1 cervical cancer     24 hour events:   - Removed, replaced vaginal packing yesterday AM again at pt request  - Transitioned to PO pain meds, none needed overnight    Subjective: Sleeping this morning, did not disturb. Will round later with staff.    Objective:   Gen: NAD, comfortable  : Deferred    A/P:   Ms. Suni Zuluaga is a 38 year old with worsening stage IIIC1 cervical cancer admitted HD#5 for acute hemorrhage as transfer of care from [place]. Recent admission a month ago complicated by massive hemorrhage requiring MTP, ICU admission, and R internal iliac artery embolization. CTA this admission repeated, IR consulted and confirmed there is nothing to embolize this time. Given no options to stop the bleeding, goals of care conversations had throughout the day of admission and patient transitioned to DNR/DNI and comfort care. Hospice consulted yesterday and patient transitioned to inpatient hospice.    - Continue comfort cares   - Scheduled and PRN dilaudid for pain, per hospice recs. Changed PO oxy to PO dilaudid.   - Scheduled and PRN ativan for anxiety   - PRN antiemetics if nauseous  - Continue spiritual/mental health support as desired  - Per Inpatient Hospice SW, working on dispo of home hospice at Suni's request - anticipate possibly home tomorrow    Stephanie Miller MD PGY4  Gynecologic Oncology  09/08/23 5:22 AM    Pager [phone]    Provider Disclosure:   I agree with above History, Review of Systems, Physical exam and Plan. I have reviewed the content of the documentation and have edited it as needed. I have personally performed the services documented here and the documentation accurately represents those services and the decisions I have made.     Electronically signed by:   Corie Martinez MD   Gynecologic Oncology   [facility]

## 2023-09-08 NOTE — DISCHARGE SUMMARY
"[facility]   Discharge Summary      Date of Admission:  9/5/2023  Date of Discharge:  09/09/23   Discharging Provider: Corie Martinez MD     Discharge Diagnoses   -Recurrent cervical cancer   -Vaginal Bleeding  -Rectovaginal Fistula, s/p end colostomy   -Vesicovaginal fistula  -Type 2 Diabetes Mellitus  -Hydronephrosis with R. PNT  -s/p right internal iliac artery coil embolization  - Severe malnutrition   - Tobacco use  - Hypothyroidism    Clinically Significant Risk Factors     # DMII: A1C = 7.0 % (Ref range: <5.7 %) within past 6 months    # Cachexia: Estimated body mass index is 17.89 kg/m  as calculated from the following:    Height as of 8/25/23: 1.6 m (5' 3\").    Weight as of 8/25/23: 45.8 kg (101 lb).       Follow-ups Needed After Discharge   Follow-up Appointments     Adult [facility] Follow-up and recommended labs and tests      [facility] hospice    Suni Zuluaga is being discharged home with hospice    Discharge Disposition   Discharged to home hospice    Hospital Course   Suni Zuluaga is a 38 year old female who was admitted on 9/5/2023.  She  is being discharged from inpatient to home hospice.     Ms. Suni Zuluaga is a 38 year old with worsening stage IIIC1 cervical cancer admitted HD#5 for acute hemorrhage as transfer of care from [place]. Recent admission a month ago complicated by massive hemorrhage requiring MTP, ICU admission, and R internal iliac artery embolization. CTA this admission repeated, IR consulted and confirmed there is nothing to embolize this time. Given no options to stop the bleeding, goals of care conversations had throughout the day of admission and patient transitioned to DNR/DNI and comfort care. Hospice consulted and patient transitioned to hospice.  - While on inpatient hospice, the patient initially received scheduled and PRN IV Dilaudid in addition to scheduled Ativan. She ultimately expressed a desire to transition " to home hospice and was subsequently transitioned to PO medications without issue. Her pain and anxiety were well controlled.   - She had extensive episode of bleeding on 9/8 PM. Discussed staying with inpatient hospice due to concern for stability during transfer home; patient expressed that her primary desire was to discharge to home.  - The patient initially had vaginal packing placed on admission due to vaginal bleeding. This was replaced at her request during her hospital stay. Her vaginal packing and alonso catheter removed at the time of discharge.        Consultations This Hospital Stay   PHARMACY IP CONSULT  NURSING TO CONSULT FOR VASCULAR ACCESS CARE IP CONSULT  SPIRITUAL HEALTH SERVICES IP CONSULT  NURSING TO CONSULT FOR VASCULAR ACCESS CARE IP CONSULT    Code Status   No CPR- Do NOT Intubate    Time Spent on this Encounter   I, Nir Ross MD, personally saw the patient today and spent greater than 30 minutes discharging this patient.       Nri Ross MD  OB/GYN PGY-2  09/09/2023 11:56 AM  ________________________________________________________________    Physical Exam   Please see physical exam from my PROGRESS NOTE on the same date       Primary Gynecologic Oncology Provider  Suzy Brambila    Discharge Orders      Hospice Referral      Reason for your hospital stay    Cervical cancer  Bleeding     Activity    Your activity upon discharge: activity as tolerated     Adult [facility] Follow-up and recommended labs and tests    [facility]     When to contact your care team    GENERAL POST-OPERATIVE  PATIENT INSTRUCTIONS FOLLOW-UP:    Call [facility] if you have:  Persistent/uncontrolled nausea and vomiting  Severe uncontrolled pain  Poorly controlled symptoms   Any other questions or concerns you may have after discharge     Diet    Follow this diet upon discharge: Orders Placed This Encounter      Regular Diet Adult       Significant Results and Procedures   Vaginal bleeding  with placement and removal of vaginal packing. Vaginal packing was removed 9/9 prior to discharge.    Discharge Medications        Review of your medicines        START taking        Dose / Directions   acetaminophen 325 MG tablet  Commonly known as: TYLENOL  Used for: Malignant neoplasm of overlapping sites of cervix (H)      Dose: 650 mg  Take 2 tablets (650 mg) by mouth every 6 hours as needed for mild pain or other (and adjunct with moderate or severe pain or per patient request)  Quantity: 30 tablet  Refills: 0     artificial saliva Soln solution  Used for: Malignant neoplasm of overlapping sites of cervix (H)      Dose: 1 spray  Take 1 mL (1 spray) by mouth every hour as needed for dry mouth  Quantity: 44.3 mL  Refills: 0     atropine 1 % Soln      Dose: 2 drop  Place 2 drops under the tongue every 2 hours as needed for secretions  Quantity: 10 mL  Refills: 0     camphor-menthol 0.5-0.5 % external lotion  Commonly known as: DERMASARRA  Used for: Malignant neoplasm of overlapping sites of cervix (H)      Dose: 1 applicator  Apply 1 mL topically 4 times daily as needed for other (itching)  Quantity: 59 mL  Refills: 0     dexAMETHasone 4 MG tablet  Commonly known as: DECADRON  Used for: Malignant neoplasm of overlapping sites of cervix (H)      Dose: 4 mg  Take 1 tablet (4 mg) by mouth every morning  Quantity: 3 tablet  Refills: 0     famotidine 20 MG tablet  Commonly known as: PEPCID  Used for: Malignant neoplasm of overlapping sites of cervix (H)      Dose: 20 mg  Take 1 tablet (20 mg) by mouth 2 times daily as needed (GERD)  Quantity: 6 tablet  Refills: 0     LORazepam 1 MG tablet  Commonly known as: ATIVAN  Used for: Malignant neoplasm of overlapping sites of cervix (H)      Dose: 1 mg  Take 1 tablet (1 mg) by mouth every 6 hours  Quantity: 12 tablet  Refills: 0     mineral oil-hydrophilic petrolatum external ointment  Used for: Malignant neoplasm of overlapping sites of cervix (H)      Apply topically every  hour as needed for dry skin  Quantity: 99 g  Refills: 0     ondansetron 4 MG tablet  Commonly known as: ZOFRAN  Used for: Malignant neoplasm of overlapping sites of cervix (H)      Dose: 4 mg  Take 1 tablet (4 mg) by mouth every 6 hours as needed for nausea or vomiting  Quantity: 30 tablet  Refills: 0     SENNA-docusate sodium 8.6-50 MG tablet  Commonly known as: SENNA S      Dose: 2 tablet  Take 2 tablets by mouth 2 times daily as needed  Quantity: 60 tablet  Refills: 0     sodium chloride 0.65 % nasal spray  Commonly known as: OCEAN  Used for: Malignant neoplasm of overlapping sites of cervix (H)      Dose: 1 spray  Spray 1 spray into both nostrils every hour as needed for congestion or other (irritation)  Quantity: 30 mL  Refills: 0            CONTINUE these medicines which may have CHANGED, or have new prescriptions. If we are uncertain of the size of tablets/capsules you have at home, strength may be listed as something that might have changed.        Dose / Directions   carboxymethylcellulose PF 0.5 % ophthalmic solution  Commonly known as: REFRESH PLUS  This may have changed: Another medication with the same name was removed. Continue taking this medication, and follow the directions you see here.  Used for: Malignant neoplasm of overlapping sites of cervix (H)      Instill 1 drop in each eye multiple times per day as needed throughout duration of treatment and for 30 days following completion of treatment. Use for dry eye or eye irritation.  Quantity: 90 each  Refills: 3     HYDROmorphone 2 MG tablet  Commonly known as: DILAUDID  This may have changed: See the new instructions.  Used for: Malignant neoplasm of overlapping sites of cervix (H)      Take 1 tablet (2 mg) by mouth every 6 hours. May also take 1 tablet (2 mg) every 6 hours as needed for breakthrough pain or severe pain.  Quantity: 30 tablet  Refills: 0            CONTINUE these medicines which have NOT CHANGED        Dose / Directions    levothyroxine 200 MCG tablet  Commonly known as: SYNTHROID/LEVOTHROID  Used for: Encounter for antineoplastic immunotherapy, Hypothyroidism due to medication, Malignant neoplasm of overlapping sites of cervix (H)      Dose: 200 mcg  Take 1 tablet (200 mcg) by mouth daily  Quantity: 30 tablet  Refills: 0     oxyBUTYnin ER 5 MG 24 hr tablet  Commonly known as: DITROPAN XL  Used for: Dysuria, Hydroureter on right      Dose: 5 mg  Take 1 tablet (5 mg) by mouth daily  Quantity: 30 tablet  Refills: 0     polyethylene glycol 17 GM/Dose powder  Commonly known as: MIRALAX  Used for: Malignant neoplasm of overlapping sites of cervix (H)      Dose: 17 g  Take 17 g by mouth daily as needed for constipation  Quantity: 510 g  Refills: 0     prochlorperazine 10 MG tablet  Commonly known as: COMPAZINE  Used for: Malignant neoplasm of overlapping sites of cervix (H)      Dose: 10 mg  Take 1 tablet (10 mg) by mouth every 6 hours as needed for nausea or vomiting  Quantity: 30 tablet  Refills: 0     tamsulosin 0.4 MG capsule  Commonly known as: FLOMAX  Used for: Dysuria, Malignant neoplasm of overlapping sites of cervix (H)      Dose: 0.4 mg  Take 1 capsule (0.4 mg) by mouth daily  Quantity: 30 capsule  Refills: 0            STOP taking      brimonidine 0.2 % ophthalmic solution  Commonly known as: ALPHAGAN        dexAMETHasone 0.1 % ophthalmic solution  Commonly known as: DECADRON        diazepam 2 MG tablet  Commonly known as: VALIUM        magnesium oxide 400 MG tablet  Commonly known as: MAG-OX        vitamin B-12 500 MCG tablet  Commonly known as: CYANOCOBALAMIN        VITAMIN C PO                  Where to get your medicines        These medications were sent to [facility] - [place], [place] - [address]  [address] [address]      Phone: [phone]   acetaminophen 325 MG tablet  artificial saliva Soln solution  atropine 1 % Soln  camphor-menthol 0.5-0.5 % external  lotion  dexAMETHasone 4 MG tablet  famotidine 20 MG tablet  HYDROmorphone 2 MG tablet  LORazepam 1 MG tablet  mineral oil-hydrophilic petrolatum external ointment  ondansetron 4 MG tablet  polyethylene glycol 17 GM/Dose powder  prochlorperazine 10 MG tablet  SENNA-docusate sodium 8.6-50 MG tablet  sodium chloride 0.65 % nasal spray          Allergies   Allergies   Allergen Reactions    Clindamycin Hives       Provider Disclosure:   I agree with above History, Review of Systems, Physical exam and Plan. I have reviewed the content of the documentation and have edited it as needed. I have personally performed the services documented here and the documentation accurately represents those services and the decisions I have made.     Electronically signed by:   Corie Martinez MD   Gynecologic Oncology   [facility]

## 2023-09-08 NOTE — PROGRESS NOTES
[facility]    Consult Note - [facility] Inpatient Hospice  _________________________________________________________________    [facility] 24/7 Contact Number: [phone]    - Providers: Please contact [facility] with changes in orders or clinical plan of care   - Nursing: Please contact [facility] with significant changes in patient condition    Hospice will notify the care team (including the hospitalist) to confirm date of inpatient hospice (GIP) admission.    New Epic encounter will not be created until hospice completes admission.   ______________________________________________________________________        Hospice Diagnosis: Cervical Cancer    Indication for Inpatient Hospice: pain, agitation    Plan of Care Discussed with the Following:   - Nurse: JACQUI Simpson  - Charge Nurse: JACQUI Pelayo  - Hospitalist/Rounding Provider:MD Juan    - Suni's Family/Preferred Contact: patient Suni  - Hospice Provider: Dr. Pena    Summary of Visit (includes assessment, medications and any new orders):   Patient met with patient today for daily [facility] hospice assessment and cares. Patient laying in bed with friend Nate at bedside. Patient had just received a prn dose of dilaudid, stating she was not feeling well and frustrated and needing to just have time alone with Don. Asked if she wished for a lavendar massage, but she stated she wasn't feeling well for one. Reinforced my number and to reach out with needs and that I am always happy to support her needs and cares. Plan of Care to include discharge tomorrow back home with hospice following.    CAREN PerdomoN RN N  [facility]  [phone]

## 2023-09-08 NOTE — PLAN OF CARE
DNR/DNI hospice IP. Pt is looking forward to going home Saturday, transport scheduled for 1200 departure. A&Ox4. No vitals, on RA. Patient pain controled by PRN & scheduled oral dilaudid and ativan. Refused scheduled pain meds as pain is intermittent and not continuous. PNT & Thomas with dark red o/p. Tolerating regular diet, denies nausea. Small amount of bloody vaginal discharge this shift. Bedding changed. R-PIV SL. Partner at bedside, supportive & helps with cares. Cont POC comfort cares.

## 2023-09-08 NOTE — PROGRESS NOTES
[facility]    Social Work Progress Note - [facility]       DISCHARGE:    Transport: [facility] transport will  Suni between:7:40am-8:27 am, Saturday 9/9    SW put ACP form in patient's chart    Please provide three days worth of meds.    [facility] will be Kae home hospice and will be at her home at 1pm.    [facility] ordered DME as well, which was delivered today     Plan of Care Discussed with the Following:       - Suni's Family/Preferred Contact: Don       ______________________________________________________________________    [facility] 24/7 Contact Number: [phone]    - Providers: Please contact [facility] with changes in orders or clinical plan of care   - Nursing: Please contact [facility] with significant changes in patient condition  - Social Work: Please contact [facility] for discharge phanning/updates  ______________________________________________________________________

## 2023-09-08 NOTE — PLAN OF CARE
Goal Outcome Evaluation:      Plan of Care Reviewed With: patient      Alert and oriented x 4. On hospice, DNR DNI. Patient pain controled by PRN oral dilaudid and ativan. Refused scheduled pain meds. Planning to go home on Sat. Colostomy with output this shift. Nephrostomy tube is in place. Thomas in place, with red urine output. Regular diet, assist by 1. Has brief with bloody discharge. PIV on right hand. Significant other at bedside, supporting cares. He is staying the night. No acute events this shift, continue with care as planned.

## 2023-09-09 PROCEDURE — 250N000011 HC RX IP 250 OP 636

## 2023-09-09 PROCEDURE — 250N000013 HC RX MED GY IP 250 OP 250 PS 637: Performed by: STUDENT IN AN ORGANIZED HEALTH CARE EDUCATION/TRAINING PROGRAM

## 2023-09-09 PROCEDURE — 250N000013 HC RX MED GY IP 250 OP 250 PS 637

## 2023-09-09 PROCEDURE — 250N000012 HC RX MED GY IP 250 OP 636 PS 637

## 2023-09-09 RX ORDER — HYDROMORPHONE HYDROCHLORIDE 1 MG/ML
0.5 INJECTION, SOLUTION INTRAMUSCULAR; INTRAVENOUS; SUBCUTANEOUS ONCE
Status: COMPLETED | OUTPATIENT
Start: 2023-09-09 | End: 2023-09-09

## 2023-09-09 RX ADMIN — ONDANSETRON HYDROCHLORIDE 4 MG: 4 TABLET, FILM COATED ORAL at 07:41

## 2023-09-09 RX ADMIN — HYDROMORPHONE HYDROCHLORIDE 2 MG: 2 TABLET ORAL at 00:16

## 2023-09-09 RX ADMIN — LORAZEPAM 1 MG: 0.5 TABLET ORAL at 01:57

## 2023-09-09 RX ADMIN — ONDANSETRON HYDROCHLORIDE 4 MG: 4 TABLET, FILM COATED ORAL at 00:45

## 2023-09-09 RX ADMIN — HYDROMORPHONE HYDROCHLORIDE 0.5 MG: 1 INJECTION, SOLUTION INTRAMUSCULAR; INTRAVENOUS; SUBCUTANEOUS at 11:13

## 2023-09-09 RX ADMIN — FAMOTIDINE 20 MG: 20 TABLET ORAL at 00:45

## 2023-09-09 RX ADMIN — HYDROMORPHONE HYDROCHLORIDE 2 MG: 2 TABLET ORAL at 12:50

## 2023-09-09 RX ADMIN — HYDROMORPHONE HYDROCHLORIDE 2 MG: 2 TABLET ORAL at 06:29

## 2023-09-09 RX ADMIN — DEXAMETHASONE 4 MG: 4 TABLET ORAL at 07:41

## 2023-09-09 RX ADMIN — LORAZEPAM 1 MG: 0.5 TABLET ORAL at 07:41

## 2023-09-09 ASSESSMENT — ACTIVITIES OF DAILY LIVING (ADL)
ADLS_ACUITY_SCORE: 49
ADLS_ACUITY_SCORE: 47
ADLS_ACUITY_SCORE: 49
ADLS_ACUITY_SCORE: 49

## 2023-09-09 NOTE — PROGRESS NOTES
[facility]    Social Work Progress Note - [facility] Inpatient [facility]osp[facility]    Summary of Visit (includes psychosocial assessment/updates, acceptance of palliative care/hospice philosophy, discharge plans):     Hospice Social Worker responded to an urgent message from Suni Bedside nurse Brisa re: impending death.  Hospice Social Worker went to Suni's bedside to provide emotional support. Life partner Nate had gone home to [place] to get the house ready for  Kae discharge home via stretcher 9/8. Hospice Sw stayed at bedside until Nate arrived. Given the severity of Suni change in condition,  talked with Nate about the potential for Suni to pass while still in the hospital / in transport. He stated that Suni wants to go home, even with that knowledge.

## 2023-09-09 NOTE — PLAN OF CARE
Goal Outcome Evaluation:    DNR/DNI. Inpatient hospice. Oral Dilaudid for pain. On oral Ativan for intermittent anxiety. Colostomy with small amount of gas and stool. PNT with yellow urine. Thomas with dark red output. Scant vaginal bleeding during the morning. Don at bedside and attentive to patient's needs. This afternoon Don went home to prepare for discharge in morning. A while later, patient passed large amounts of blood and clots with changes to her level of consciousness. Gyn-onc resident notified and saw patient at the bedside. Hospice SW and on-call  also saw patient at the bedside. Patient's significant other on his way to the hospital. Continue with plan of care.

## 2023-09-09 NOTE — CONSULTS
SPIRITUAL HEALTH SERVICES Consult Note  [facility] 5A    Saw pt Suni Zuluaga and partner Nate per STAT Consult - Impending Death.    Sat with Don and conversed with him. Suni was on comfort cares and unable to speak. I remain available if something changes and Nate or Suni requests [facility] support.    [place]  Chaplain Resident  Pager [phone]    * [facility] remains available 24/7 for emergent requests/referrals, either by having the switchboard page the on-call  or by entering an ASAP/STAT consult in Epic (this will also page the on-call ). Routine Epic consults receive an initial response within 24 hours.*

## 2023-09-09 NOTE — PROGRESS NOTES
Hospice Social worker confirmed that Hospice nurse Esperanza will be rounding this morning with Tiffanie to prepare for discharge.    Transport via [facility] stretcher will pick tiffanie up between 12:20-12:50 pm.    [facility] hospice will be at [facility] at 3pm to to [facility].    Please prepare discharge summary and 3 days worth of medications.

## 2023-09-09 NOTE — PROGRESS NOTES
Patient has been educated on potential risks of choosing to leave the unit and that the responsibility for patient well-being will belong to the patient. Pt has been informed that admission to hospital is due to need for medical treatment. Education given to the patient on some of the potential risks included but are not limited to:      - lack of access to nursing intervention      - possible missed appointments with MD, therapies, tests      - possible missed medications, antibiotics, management of IV's    Patient Response:Understands risk. Will only go outside with partner.    Patient notified staff prior to leaving unit: Yes  Coban wrap placed over IV prior to pt leaving unit: No

## 2023-09-09 NOTE — PLAN OF CARE
Goal Outcome Evaluation:      Plan of Care Reviewed With: patient, spouse    DNR/DNI. Inpatient hospice. Pain managed with oral Dilaudid and PRN Oxycodone. PNT and Thomas with dark red outputs. Colostomy with scant gas and stool. Brief in place and Pt continues to have moderate amount of vaginal bleeding. Vaginal packing in place. Pt had 2 episodes of emesis and got Oral Zofran. PIV SL. Partner DON at bedside. Bed alarm on. Cont On comfort cares.

## 2023-09-09 NOTE — PROGRESS NOTES
Brief Interval Progress Note    In to see patient again with Dr. Nguyen. Suni is hoping to go home. Despite her episodes of bleeding overnight, feels she would prefer to discharge and continue with home hospice, rather than remaining inpatient. She understands risks of discharging home but feels comfortable with this. Would like packing removed, prefers diaper with a pad for comfort. Feels she can urinate in a bedpan at home. She does note that her electricity has been turned off but per patient and her partner, they are working with hospice and have contacted them about this concern; do not feel they need additional assistance and feel this will be sorted out by the time patient arrives at home.    Packing and alonso removed from vagina, intact and saturated with dark red/brown blood, malodorous. 4 x 1.5 cm clot removed with packing. Diaper with 2 pads placed on patient. Will discharge her to home today, should have home hospice bed picked up.    Nir Ross MD  OB/GYN PGY-2  09/09/2023 11:46 AM

## 2023-09-09 NOTE — PROGRESS NOTES
GYN ONC PROGRESS NOTE  9/9/2023    HD#:  7  Disease:  stage IIIC1 cervical cancer     24 hour events:   - PM 9/8: started bleeding episode   - 0215, 0545: resting, tolerating PO rx  - unlikely discharge to home given acute bleeding episode    Subjective: Sleeping this morning, did not disturb. Overnight was drowsy but responsive, tolerating PO medication.    Objective:   Gen: NAD, comfortable  : Deferred    A/P:   Ms. Suni Zuluaga is a 38 year old with worsening stage IIIC1 cervical cancer admitted HD#7 for acute hemorrhage as transfer of care from [place]. Recent admission a month ago complicated by massive hemorrhage requiring MTP, ICU admission, and R internal iliac artery embolization. CTA this admission repeated, IR consulted and confirmed there is nothing to embolize this time. Given no options to stop the bleeding, goals of care conversations had throughout the day of admission and patient transitioned to DNR/DNI and comfort care. Hospice consulted and patient transitioned to inpatient hospice, now on HD#5 of inpatient hospice.    - Continue comfort cares   - Scheduled and PRN dilaudid for pain, per hospice recs. Changed PO oxy to PO dilaudid.   - Scheduled and PRN ativan for anxiety   - PRN antiemetics if nauseous  - Continue spiritual/mental health support as desired  - Per Inpatient Hospice SW, working on dispo of home hospice at Suni's request - were anticipating possibly home this morning, however given acute increased bleeding yesterday evening 9/8, our team is concerned that patient is not stable enough to tolerate the drive home. At this point recommend patient remain inpatient. Will discuss with patient later this morning.     Keshia Keys MD  Obstetrics & Gynecology, PGY-2  09/09/2023 8:25 AM    Provider Disclosure:   I agree with above History, Review of Systems, Physical exam and Plan. I have reviewed the content of the documentation and have edited it as needed. I have  personally performed the services documented here and the documentation accurately represents those services and the decisions I have made.     Electronically signed by:   Corie Martinez MD   Gynecologic Oncology   [facility]      Pager [phone]

## 2023-09-09 NOTE — PROGRESS NOTES
Patient has been educated on potential risks of choosing to leave the unit and that the responsibility for patient well-being will belong to the patient. Pt has been informed that admission to hospital is due to need for medical treatment. Education given to the patient on some of the potential risks included but are not limited to:      - lack of access to nursing intervention      - possible missed appointments with MD, therapies, tests      - possible missed medications, antibiotics, management of IV's    Patient Response: Understands risks of going outside. Will go outside with partner    Patient notified staff prior to leaving unit: Yes  Coban wrap placed over IV prior to pt leaving unit: Yes

## 2023-09-09 NOTE — PROGRESS NOTES
[facility]    Consult Note - [facility]  _________________________________________________________________    [facility] 24/7 Contact Number: [phone]    - Providers: Please contact [facility] with changes in orders or clinical plan of care   - Nursing: Please contact [facility] with significant changes in patient condition    Hospice will notify the care team (including the hospitalist) to confirm date of inpatient hospice (GIP) admission.    New Epic encounter will not be created until hospice completes admission.   ______________________________________________________________________        Hospice Diagnosis: Cervical Cancer    Indication for Inpatient Hospice: Pain and agitation    Plan of Care Discussed with the Following:   - Nurse: JACQUI Ledezma  - Hospitalist/Rounding Provider: MD Juan    - Suni's Family/Preferred Contact: patient  - Hospice Provider: Dr. Pena    Summary of Visit (includes assessment, medications and any new orders):   Writer met with patient today for daily [facility] hospice assessment and cares. Patient resting in bed, patient reports feeling much better than yesterday, pain comfortably controlled. Patient and friend Don preparing for discharge today back home. POC for anticipated discharge today around 12:30 pm. [facility] hosp[facility] to  cares when patient arrives home. Patient requests a repacking today, uncertain of packing cares with new hospice following. Writer reached out to MD yesterday regarding 3 days of medications to be sent with patient home. Patient visibly weaker and tired. Pt vitals: 93% O2 on ra with 16 breaths per minute, BP 71/48, P 122, afebrile.    Esperanza Hoang RN   [facility]  [phone]

## 2023-09-09 NOTE — PLAN OF CARE
Goal Outcome Evaluation:    IP Hospice. DNR/DNI. Zofran given for intermittent nausea. No emesis this shift. Pain somewhat managed with oral Dilaudid. Patient desired to discharge home. Jim, patient's significant other was here for the morning and left around 11am to prepare the home for the hospital bed delivery. One-time dose of IV Dilaudid given prior to packing removal. Gyn-Onc MD removed the patient's vaginal packing and alonso catheter. Writer changed the colostomy appliance due to a leak. Patient had minimal output for the morning, but then passed a large amount of hard stool, pushing the appliance off her skin. PNT with a small amount of urine. Patient's brief and pads changed right before discharge, had minimal output. Left unit via EMS for home.

## 2023-09-09 NOTE — PROGRESS NOTES
Care Management Follow Up    Length of Stay (days): 4    Expected Discharge Date: 09/09/2023     Concerns to be Addressed:   parking pass    Additional Information:  SW was asked by bedside if she could get a parking pass for pt's . SW asked pt which [place]celia he was parked in. SW went to [place] office to  a 1 day parking pass, since pt is going home with hospice today  RAJESH Mcdaniel   [address]    Phone: [phone]  Pager: [phone]

## 2023-09-13 ENCOUNTER — MEDICAL CORRESPONDENCE (OUTPATIENT)
Dept: HEALTH INFORMATION MANAGEMENT | Facility: CLINIC | Age: 38
End: 2023-09-13
Payer: COMMERCIAL

## 2023-09-18 ENCOUNTER — PRE VISIT (OUTPATIENT)
Dept: ENDOCRINOLOGY | Facility: CLINIC | Age: 38
End: 2023-09-18

## 2023-09-20 ENCOUNTER — MEDICAL CORRESPONDENCE (OUTPATIENT)
Dept: HEALTH INFORMATION MANAGEMENT | Facility: CLINIC | Age: 38
End: 2023-09-20
Payer: COMMERCIAL

## 2023-09-26 ENCOUNTER — DOCUMENTATION ONLY (OUTPATIENT)
Dept: OTHER | Facility: CLINIC | Age: 38
End: 2023-09-26
Payer: COMMERCIAL

## 2023-09-27 ENCOUNTER — MEDICAL CORRESPONDENCE (OUTPATIENT)
Dept: HEALTH INFORMATION MANAGEMENT | Facility: CLINIC | Age: 38
End: 2023-09-27
Payer: COMMERCIAL

## 2023-10-05 ENCOUNTER — MEDICAL CORRESPONDENCE (OUTPATIENT)
Dept: HEALTH INFORMATION MANAGEMENT | Facility: CLINIC | Age: 38
End: 2023-10-05
Payer: COMMERCIAL

## 2023-10-11 ENCOUNTER — MEDICAL CORRESPONDENCE (OUTPATIENT)
Dept: HEALTH INFORMATION MANAGEMENT | Facility: CLINIC | Age: 38
End: 2023-10-11
Payer: COMMERCIAL

## 2023-12-02 ENCOUNTER — HEALTH MAINTENANCE LETTER (OUTPATIENT)
Age: 38
End: 2023-12-02

## 2024-02-10 ENCOUNTER — HEALTH MAINTENANCE LETTER (OUTPATIENT)
Age: 39
End: 2024-02-10

## 2024-04-20 ENCOUNTER — HEALTH MAINTENANCE LETTER (OUTPATIENT)
Age: 39
End: 2024-04-20

## 2024-06-29 ENCOUNTER — HEALTH MAINTENANCE LETTER (OUTPATIENT)
Age: 39
End: 2024-06-29

## 2024-09-07 ENCOUNTER — HEALTH MAINTENANCE LETTER (OUTPATIENT)
Age: 39
End: 2024-09-07

## 2025-01-05 ENCOUNTER — HEALTH MAINTENANCE LETTER (OUTPATIENT)
Age: 40
End: 2025-01-05

## 2025-03-05 ENCOUNTER — PATIENT OUTREACH (OUTPATIENT)
Dept: ONCOLOGY | Facility: CLINIC | Age: 40
End: 2025-03-05

## 2025-03-05 NOTE — TELEPHONE ENCOUNTER
"Date of death State file number  [id number]      Verified on [email or web address]    \"Notification of \" form completed [email or web address] emailed to \"[email or web address]\" [email or web address]>  on 2025  "

## (undated) DEVICE — TUBING IRRIG CYSTO/BLADDER SET 81" LF 2C4040

## (undated) DEVICE — ENDO TROCAR FIRST ENTRY KII FIOS Z-THRD 05X100MM CTF03

## (undated) DEVICE — SOL NACL 0.9% INJ 1000ML BAG 2B1324X

## (undated) DEVICE — ESU PENCIL W/COATED BLADE E2450H

## (undated) DEVICE — APPLICATORS COTTON TIP 6"X2 STERILE LF C15053-006

## (undated) DEVICE — PACK CYSTO CUSTOM ASC

## (undated) DEVICE — SU PROLENE 2-0 SHDA 36" 8523H

## (undated) DEVICE — KIT NEW IMAGE COLOSTOMY/ILEOSTOMY 2 3/4" LF 19354

## (undated) DEVICE — SU VICRYL 4-0 PS-2 18" UND J496H

## (undated) DEVICE — GLOVE BIOGEL PI ULTRATOUCH SZ 6.5 41165

## (undated) DEVICE — COVER CAMERA IN-LIGHT DISP LT-C02

## (undated) DEVICE — ENDO TROCAR FIRST ENTRY KII FIOS Z-THRD 12X100MM CTF73

## (undated) DEVICE — SOL WATER IRRIG 500ML BOTTLE 2F7113

## (undated) DEVICE — ANTIFOG SOLUTION W/FOAM PAD CF-1002

## (undated) DEVICE — TUBING SMOKE EVAC PNEUMOCLEAR HIGH FLOW 0620050250

## (undated) DEVICE — GUIDEWIRE SENSOR DUAL FLEX STR 0.035"X150CM M0066703080

## (undated) DEVICE — SOL NACL 0.9% IRRIG 500ML BOTTLE 2F7123

## (undated) DEVICE — GLOVE BIOGEL PI MICRO INDICATOR UNDERGLOVE SZ 7.0 48970

## (undated) DEVICE — ESU ENDO SCISSORS 5MM CVD 5DCS

## (undated) DEVICE — DRAPE MAYO STAND 23X54 8337

## (undated) DEVICE — PAD CHUX UNDERPAD 23X24" 7136

## (undated) DEVICE — PAD CHUX UNDERPAD 30X30"

## (undated) DEVICE — SU MONOCRYL 4-0 PS-2 27" UND Y426H

## (undated) DEVICE — SUCTION MANIFOLD NEPTUNE 2 SYS 1 PORT 702-025-000

## (undated) DEVICE — RAD RX CONRAY 60% (50ML) CHARGE PER ML

## (undated) DEVICE — DRAPE LEGGINGS CLEAR 8430

## (undated) DEVICE — DRAPE IOBAN INCISE 23X17" 6650EZ

## (undated) DEVICE — SU VICRYL 3-0 SH 8X18" UND J864D

## (undated) DEVICE — PREP SKIN SCRUB TRAY 4461A

## (undated) DEVICE — CATH URETERAL OPEN END 5FRX70CM M0064002010

## (undated) DEVICE — ESU LIGASURE LAPAROSCOPIC BLUNT TIP SEALER 5MMX37CM LF1837

## (undated) DEVICE — ESU GROUND PAD ADULT REM W/15' CORD E7507DB

## (undated) DEVICE — TUBING SUCTION SIGMOIDOSCOPIC 18FR 6FT

## (undated) DEVICE — ENDO TROCAR SLEEVE KII Z-THREADED 05X100MM CTS02

## (undated) DEVICE — LINEN TOWEL PACK X30 5481

## (undated) DEVICE — LINEN TOWEL PACK X6 WHITE 5487

## (undated) DEVICE — SU PDS II 1 TP-1 54" Z879G

## (undated) DEVICE — SUCTION IRR STRYKERFLOW II W/TIP 250-070-520

## (undated) DEVICE — SU VICRYL 2-0 TIE 54" J615H

## (undated) DEVICE — SOL NACL 0.9% IRRIG 3000ML BAG 2B7477

## (undated) DEVICE — SOL WATER IRRIG 1000ML BOTTLE 2F7114

## (undated) DEVICE — GLOVE PROTEXIS W/NEU-THERA 8.0  2D73TE80

## (undated) DEVICE — ENDO TROCAR BLUNT TIP KII BALLOON 12X100MM C0R47

## (undated) DEVICE — PREP CHLORAPREP 26ML TINTED HI-LITE ORANGE 930815

## (undated) DEVICE — ENDO SCOPE WARMER SEAL  C3101

## (undated) DEVICE — GLOVE BIOGEL PI MICRO SZ 8.0 48580

## (undated) DEVICE — SOL NACL 0.9% IRRIG 1000ML BOTTLE 07138-09

## (undated) DEVICE — Device

## (undated) DEVICE — NDL INSUFFLATION 13GA 120MM C2201

## (undated) DEVICE — SUCTION MANIFOLD NEPTUNE 2 SYS 4 PORT 0702-020-000

## (undated) DEVICE — STPL LINEAR CUT 75MM TLC75

## (undated) DEVICE — JELLY LUBRICATING SURGILUBE 2OZ TUBE

## (undated) DEVICE — LIGHT HANDLE X1 31140133

## (undated) DEVICE — LINEN TOWEL PACK X5 5464

## (undated) RX ORDER — FENTANYL CITRATE 50 UG/ML
INJECTION, SOLUTION INTRAMUSCULAR; INTRAVENOUS
Status: DISPENSED
Start: 2022-09-28

## (undated) RX ORDER — ALBUTEROL SULFATE 90 UG/1
AEROSOL, METERED RESPIRATORY (INHALATION)
Status: DISPENSED
Start: 2023-07-31

## (undated) RX ORDER — FENTANYL CITRATE 50 UG/ML
INJECTION, SOLUTION INTRAMUSCULAR; INTRAVENOUS
Status: DISPENSED
Start: 2020-11-11

## (undated) RX ORDER — DEXAMETHASONE SODIUM PHOSPHATE 4 MG/ML
INJECTION, SOLUTION INTRA-ARTICULAR; INTRALESIONAL; INTRAMUSCULAR; INTRAVENOUS; SOFT TISSUE
Status: DISPENSED
Start: 2020-11-06

## (undated) RX ORDER — ACETAMINOPHEN 325 MG/1
TABLET ORAL
Status: DISPENSED
Start: 2023-05-10

## (undated) RX ORDER — DEXAMETHASONE SODIUM PHOSPHATE 4 MG/ML
INJECTION, SOLUTION INTRA-ARTICULAR; INTRALESIONAL; INTRAMUSCULAR; INTRAVENOUS; SOFT TISSUE
Status: DISPENSED
Start: 2020-11-13

## (undated) RX ORDER — LIDOCAINE HYDROCHLORIDE 20 MG/ML
INJECTION, SOLUTION EPIDURAL; INFILTRATION; INTRACAUDAL; PERINEURAL
Status: DISPENSED
Start: 2020-11-06

## (undated) RX ORDER — FUROSEMIDE 10 MG/ML
INJECTION INTRAMUSCULAR; INTRAVENOUS
Status: DISPENSED
Start: 2023-05-10

## (undated) RX ORDER — CEFAZOLIN SODIUM 2 G/50ML
SOLUTION INTRAVENOUS
Status: DISPENSED
Start: 2023-07-05

## (undated) RX ORDER — EPHEDRINE SULFATE 50 MG/ML
INJECTION, SOLUTION INTRAMUSCULAR; INTRAVENOUS; SUBCUTANEOUS
Status: DISPENSED
Start: 2020-11-06

## (undated) RX ORDER — SODIUM CHLORIDE 9 MG/ML
INJECTION, SOLUTION INTRAVENOUS
Status: DISPENSED
Start: 2023-07-25

## (undated) RX ORDER — LIDOCAINE HYDROCHLORIDE 10 MG/ML
INJECTION, SOLUTION EPIDURAL; INFILTRATION; INTRACAUDAL; PERINEURAL
Status: DISPENSED
Start: 2023-07-25

## (undated) RX ORDER — ONDANSETRON 2 MG/ML
INJECTION INTRAMUSCULAR; INTRAVENOUS
Status: DISPENSED
Start: 2020-11-16

## (undated) RX ORDER — PROPOFOL 10 MG/ML
INJECTION, EMULSION INTRAVENOUS
Status: DISPENSED
Start: 2023-07-31

## (undated) RX ORDER — LIDOCAINE HYDROCHLORIDE 10 MG/ML
INJECTION, SOLUTION EPIDURAL; INFILTRATION; INTRACAUDAL; PERINEURAL
Status: DISPENSED
Start: 2023-08-07

## (undated) RX ORDER — METRONIDAZOLE 500 MG/100ML
INJECTION, SOLUTION INTRAVENOUS
Status: DISPENSED
Start: 2023-07-31

## (undated) RX ORDER — FENTANYL CITRATE 50 UG/ML
INJECTION, SOLUTION INTRAMUSCULAR; INTRAVENOUS
Status: DISPENSED
Start: 2023-05-10

## (undated) RX ORDER — KETOROLAC TROMETHAMINE 30 MG/ML
INJECTION, SOLUTION INTRAMUSCULAR; INTRAVENOUS
Status: DISPENSED
Start: 2020-11-13

## (undated) RX ORDER — GLYCOPYRROLATE 0.2 MG/ML
INJECTION, SOLUTION INTRAMUSCULAR; INTRAVENOUS
Status: DISPENSED
Start: 2020-11-13

## (undated) RX ORDER — FENTANYL CITRATE 50 UG/ML
INJECTION, SOLUTION INTRAMUSCULAR; INTRAVENOUS
Status: DISPENSED
Start: 2023-07-31

## (undated) RX ORDER — PROPOFOL 10 MG/ML
INJECTION, EMULSION INTRAVENOUS
Status: DISPENSED
Start: 2020-11-13

## (undated) RX ORDER — ONDANSETRON 2 MG/ML
INJECTION INTRAMUSCULAR; INTRAVENOUS
Status: DISPENSED
Start: 2023-07-31

## (undated) RX ORDER — LIDOCAINE HYDROCHLORIDE 20 MG/ML
SOLUTION OROPHARYNGEAL
Status: DISPENSED
Start: 2020-11-06

## (undated) RX ORDER — NALOXONE HYDROCHLORIDE 0.4 MG/ML
INJECTION, SOLUTION INTRAMUSCULAR; INTRAVENOUS; SUBCUTANEOUS
Status: DISPENSED
Start: 2022-09-28

## (undated) RX ORDER — LIDOCAINE HYDROCHLORIDE 20 MG/ML
INJECTION, SOLUTION EPIDURAL; INFILTRATION; INTRACAUDAL; PERINEURAL
Status: DISPENSED
Start: 2020-11-16

## (undated) RX ORDER — FLUMAZENIL 0.1 MG/ML
INJECTION, SOLUTION INTRAVENOUS
Status: DISPENSED
Start: 2022-09-28

## (undated) RX ORDER — PROPOFOL 10 MG/ML
INJECTION, EMULSION INTRAVENOUS
Status: DISPENSED
Start: 2020-11-06

## (undated) RX ORDER — ONDANSETRON 2 MG/ML
INJECTION INTRAMUSCULAR; INTRAVENOUS
Status: DISPENSED
Start: 2020-11-06

## (undated) RX ORDER — GLYCOPYRROLATE 0.2 MG/ML
INJECTION, SOLUTION INTRAMUSCULAR; INTRAVENOUS
Status: DISPENSED
Start: 2020-11-06

## (undated) RX ORDER — DEXAMETHASONE SODIUM PHOSPHATE 4 MG/ML
INJECTION, SOLUTION INTRA-ARTICULAR; INTRALESIONAL; INTRAMUSCULAR; INTRAVENOUS; SOFT TISSUE
Status: DISPENSED
Start: 2023-07-31

## (undated) RX ORDER — FENTANYL CITRATE 50 UG/ML
INJECTION, SOLUTION INTRAMUSCULAR; INTRAVENOUS
Status: DISPENSED
Start: 2020-11-06

## (undated) RX ORDER — FENTANYL CITRATE-0.9 % NACL/PF 10 MCG/ML
PLASTIC BAG, INJECTION (ML) INTRAVENOUS
Status: DISPENSED
Start: 2020-11-06

## (undated) RX ORDER — PROPOFOL 10 MG/ML
INJECTION, EMULSION INTRAVENOUS
Status: DISPENSED
Start: 2020-11-16

## (undated) RX ORDER — FENTANYL CITRATE 50 UG/ML
INJECTION, SOLUTION INTRAMUSCULAR; INTRAVENOUS
Status: DISPENSED
Start: 2023-07-25

## (undated) RX ORDER — FENTANYL CITRATE 50 UG/ML
INJECTION, SOLUTION INTRAMUSCULAR; INTRAVENOUS
Status: DISPENSED
Start: 2020-11-09

## (undated) RX ORDER — FENTANYL CITRATE 50 UG/ML
INJECTION, SOLUTION INTRAMUSCULAR; INTRAVENOUS
Status: DISPENSED
Start: 2020-11-16

## (undated) RX ORDER — HYDROMORPHONE HYDROCHLORIDE 1 MG/ML
INJECTION, SOLUTION INTRAMUSCULAR; INTRAVENOUS; SUBCUTANEOUS
Status: DISPENSED
Start: 2020-11-13

## (undated) RX ORDER — AMPICILLIN 1 G/1
INJECTION, POWDER, FOR SOLUTION INTRAMUSCULAR; INTRAVENOUS
Status: DISPENSED
Start: 2023-07-25

## (undated) RX ORDER — HEPARIN SODIUM 5000 [USP'U]/.5ML
INJECTION, SOLUTION INTRAVENOUS; SUBCUTANEOUS
Status: DISPENSED
Start: 2023-07-31

## (undated) RX ORDER — FENTANYL CITRATE 50 UG/ML
INJECTION, SOLUTION INTRAMUSCULAR; INTRAVENOUS
Status: DISPENSED
Start: 2023-07-05

## (undated) RX ORDER — FENTANYL CITRATE 50 UG/ML
INJECTION, SOLUTION INTRAMUSCULAR; INTRAVENOUS
Status: DISPENSED
Start: 2020-11-13

## (undated) RX ORDER — LIDOCAINE HYDROCHLORIDE 20 MG/ML
INJECTION, SOLUTION EPIDURAL; INFILTRATION; INTRACAUDAL; PERINEURAL
Status: DISPENSED
Start: 2020-11-13

## (undated) RX ORDER — KETOROLAC TROMETHAMINE 30 MG/ML
INJECTION, SOLUTION INTRAMUSCULAR; INTRAVENOUS
Status: DISPENSED
Start: 2023-07-31

## (undated) RX ORDER — CEFAZOLIN SODIUM 1 G/3ML
INJECTION, POWDER, FOR SOLUTION INTRAMUSCULAR; INTRAVENOUS
Status: DISPENSED
Start: 2020-11-06

## (undated) RX ORDER — ACETAMINOPHEN 325 MG/1
TABLET ORAL
Status: DISPENSED
Start: 2023-07-05

## (undated) RX ORDER — CEFAZOLIN SODIUM/WATER 2 G/20 ML
SYRINGE (ML) INTRAVENOUS
Status: DISPENSED
Start: 2023-07-31

## (undated) RX ORDER — FENTANYL CITRATE-0.9 % NACL/PF 10 MCG/ML
PLASTIC BAG, INJECTION (ML) INTRAVENOUS
Status: DISPENSED
Start: 2023-07-31

## (undated) RX ORDER — DEXAMETHASONE SODIUM PHOSPHATE 4 MG/ML
INJECTION, SOLUTION INTRA-ARTICULAR; INTRALESIONAL; INTRAMUSCULAR; INTRAVENOUS; SOFT TISSUE
Status: DISPENSED
Start: 2020-11-16

## (undated) RX ORDER — ONDANSETRON 2 MG/ML
INJECTION INTRAMUSCULAR; INTRAVENOUS
Status: DISPENSED
Start: 2020-11-13

## (undated) RX ORDER — CEFAZOLIN SODIUM 2 G/50ML
SOLUTION INTRAVENOUS
Status: DISPENSED
Start: 2023-05-10

## (undated) RX ORDER — KETOROLAC TROMETHAMINE 30 MG/ML
INJECTION, SOLUTION INTRAMUSCULAR; INTRAVENOUS
Status: DISPENSED
Start: 2020-11-16